# Patient Record
Sex: MALE | Race: WHITE | NOT HISPANIC OR LATINO | URBAN - METROPOLITAN AREA
[De-identification: names, ages, dates, MRNs, and addresses within clinical notes are randomized per-mention and may not be internally consistent; named-entity substitution may affect disease eponyms.]

---

## 2017-05-04 ENCOUNTER — INPATIENT (INPATIENT)
Facility: HOSPITAL | Age: 77
LOS: 11 days | Discharge: HOME | DRG: 640 | End: 2017-05-16
Attending: INTERNAL MEDICINE | Admitting: INTERNAL MEDICINE
Payer: MEDICARE

## 2017-05-04 VITALS
RESPIRATION RATE: 19 BRPM | OXYGEN SATURATION: 98 % | SYSTOLIC BLOOD PRESSURE: 155 MMHG | TEMPERATURE: 97 F | DIASTOLIC BLOOD PRESSURE: 97 MMHG | HEART RATE: 79 BPM

## 2017-05-04 DIAGNOSIS — E11.319 TYPE 2 DIABETES MELLITUS WITH UNSPECIFIED DIABETIC RETINOPATHY WITHOUT MACULAR EDEMA: ICD-10-CM

## 2017-05-04 DIAGNOSIS — Z29.9 ENCOUNTER FOR PROPHYLACTIC MEASURES, UNSPECIFIED: ICD-10-CM

## 2017-05-04 DIAGNOSIS — K25.9 GASTRIC ULCER, UNSPECIFIED AS ACUTE OR CHRONIC, WITHOUT HEMORRHAGE OR PERFORATION: ICD-10-CM

## 2017-05-04 DIAGNOSIS — K56.41 FECAL IMPACTION: ICD-10-CM

## 2017-05-04 DIAGNOSIS — Z71.89 OTHER SPECIFIED COUNSELING: ICD-10-CM

## 2017-05-04 DIAGNOSIS — G43.A0 CYCLICAL VOMITING, IN MIGRAINE, NOT INTRACTABLE: ICD-10-CM

## 2017-05-04 DIAGNOSIS — E78.5 HYPERLIPIDEMIA, UNSPECIFIED: ICD-10-CM

## 2017-05-04 DIAGNOSIS — N39.0 URINARY TRACT INFECTION, SITE NOT SPECIFIED: ICD-10-CM

## 2017-05-04 DIAGNOSIS — T18.9XXA FOREIGN BODY OF ALIMENTARY TRACT, PART UNSPECIFIED, INITIAL ENCOUNTER: ICD-10-CM

## 2017-05-04 DIAGNOSIS — I69.359 HEMIPLEGIA AND HEMIPARESIS FOLLOWING CEREBRAL INFARCTION AFFECTING UNSPECIFIED SIDE: ICD-10-CM

## 2017-05-04 DIAGNOSIS — R11.0 NAUSEA: ICD-10-CM

## 2017-05-04 LAB
ALBUMIN SERPL ELPH-MCNC: 4 G/DL — SIGNIFICANT CHANGE UP (ref 3.3–5)
ALP SERPL-CCNC: 66 U/L — SIGNIFICANT CHANGE UP (ref 40–120)
ALT FLD-CCNC: 15 U/L RC — SIGNIFICANT CHANGE UP (ref 10–45)
ANION GAP SERPL CALC-SCNC: 14 MMOL/L — SIGNIFICANT CHANGE UP (ref 5–17)
APPEARANCE UR: CLEAR — SIGNIFICANT CHANGE UP
APTT BLD: 30.1 SEC — SIGNIFICANT CHANGE UP (ref 27.5–37.4)
AST SERPL-CCNC: 32 U/L — SIGNIFICANT CHANGE UP (ref 10–40)
BACTERIA # UR AUTO: ABNORMAL /HPF
BASE EXCESS BLDV CALC-SCNC: 2.2 MMOL/L — HIGH (ref -2–2)
BASOPHILS # BLD AUTO: 0 K/UL — SIGNIFICANT CHANGE UP (ref 0–0.2)
BASOPHILS NFR BLD AUTO: 0.2 % — SIGNIFICANT CHANGE UP (ref 0–2)
BILIRUB SERPL-MCNC: 0.4 MG/DL — SIGNIFICANT CHANGE UP (ref 0.2–1.2)
BILIRUB UR-MCNC: NEGATIVE — SIGNIFICANT CHANGE UP
BUN SERPL-MCNC: 16 MG/DL — SIGNIFICANT CHANGE UP (ref 7–23)
CA-I SERPL-SCNC: 1.31 MMOL/L — HIGH (ref 1.12–1.3)
CALCIUM SERPL-MCNC: 10 MG/DL — SIGNIFICANT CHANGE UP (ref 8.4–10.5)
CHLORIDE BLDV-SCNC: 106 MMOL/L — SIGNIFICANT CHANGE UP (ref 96–108)
CHLORIDE SERPL-SCNC: 102 MMOL/L — SIGNIFICANT CHANGE UP (ref 96–108)
CK MB BLD-MCNC: 8.3 % — HIGH (ref 0–3.5)
CK MB CFR SERPL CALC: 1 NG/ML — SIGNIFICANT CHANGE UP (ref 0–6.7)
CK SERPL-CCNC: 12 U/L — LOW (ref 30–200)
CK SERPL-CCNC: 44 U/L — SIGNIFICANT CHANGE UP (ref 30–200)
CO2 BLDV-SCNC: 29 MMOL/L — SIGNIFICANT CHANGE UP (ref 22–30)
CO2 SERPL-SCNC: 25 MMOL/L — SIGNIFICANT CHANGE UP (ref 22–31)
COLOR SPEC: YELLOW — SIGNIFICANT CHANGE UP
CREAT SERPL-MCNC: 0.97 MG/DL — SIGNIFICANT CHANGE UP (ref 0.5–1.3)
DIFF PNL FLD: NEGATIVE — SIGNIFICANT CHANGE UP
EOSINOPHIL # BLD AUTO: 0.2 K/UL — SIGNIFICANT CHANGE UP (ref 0–0.5)
EOSINOPHIL NFR BLD AUTO: 2.5 % — SIGNIFICANT CHANGE UP (ref 0–6)
EPI CELLS # UR: SIGNIFICANT CHANGE UP /HPF
GAS PNL BLDV: 141 MMOL/L — SIGNIFICANT CHANGE UP (ref 136–145)
GAS PNL BLDV: SIGNIFICANT CHANGE UP
GAS PNL BLDV: SIGNIFICANT CHANGE UP
GLUCOSE BLDV-MCNC: 179 MG/DL — HIGH (ref 70–99)
GLUCOSE SERPL-MCNC: 180 MG/DL — HIGH (ref 70–99)
GLUCOSE UR QL: NEGATIVE — SIGNIFICANT CHANGE UP
HCO3 BLDV-SCNC: 28 MMOL/L — SIGNIFICANT CHANGE UP (ref 21–29)
HCT VFR BLD CALC: 46 % — SIGNIFICANT CHANGE UP (ref 39–50)
HCT VFR BLDA CALC: 47 % — SIGNIFICANT CHANGE UP (ref 39–50)
HGB BLD CALC-MCNC: 15.3 G/DL — SIGNIFICANT CHANGE UP (ref 13–17)
HGB BLD-MCNC: 15.3 G/DL — SIGNIFICANT CHANGE UP (ref 13–17)
INR BLD: 0.99 RATIO — SIGNIFICANT CHANGE UP (ref 0.88–1.16)
KETONES UR-MCNC: NEGATIVE — SIGNIFICANT CHANGE UP
LACTATE BLDV-MCNC: 2.6 MMOL/L — HIGH (ref 0.7–2)
LEUKOCYTE ESTERASE UR-ACNC: ABNORMAL
LIDOCAIN IGE QN: 36 U/L — SIGNIFICANT CHANGE UP (ref 7–60)
LYMPHOCYTES # BLD AUTO: 1.7 K/UL — SIGNIFICANT CHANGE UP (ref 1–3.3)
LYMPHOCYTES # BLD AUTO: 21 % — SIGNIFICANT CHANGE UP (ref 13–44)
MAGNESIUM SERPL-MCNC: 2 MG/DL — SIGNIFICANT CHANGE UP (ref 1.6–2.6)
MCHC RBC-ENTMCNC: 32.8 PG — SIGNIFICANT CHANGE UP (ref 27–34)
MCHC RBC-ENTMCNC: 33.4 GM/DL — SIGNIFICANT CHANGE UP (ref 32–36)
MCV RBC AUTO: 98.3 FL — SIGNIFICANT CHANGE UP (ref 80–100)
MONOCYTES # BLD AUTO: 0.7 K/UL — SIGNIFICANT CHANGE UP (ref 0–0.9)
MONOCYTES NFR BLD AUTO: 8.3 % — SIGNIFICANT CHANGE UP (ref 2–14)
NEUTROPHILS # BLD AUTO: 5.5 K/UL — SIGNIFICANT CHANGE UP (ref 1.8–7.4)
NEUTROPHILS NFR BLD AUTO: 68 % — SIGNIFICANT CHANGE UP (ref 43–77)
NITRITE UR-MCNC: POSITIVE
NT-PROBNP SERPL-SCNC: 1854 PG/ML — HIGH (ref 0–300)
PCO2 BLDV: 48 MMHG — SIGNIFICANT CHANGE UP (ref 35–50)
PH BLDV: 7.38 — SIGNIFICANT CHANGE UP (ref 7.35–7.45)
PH UR: 6 — SIGNIFICANT CHANGE UP (ref 5–8)
PHOSPHATE SERPL-MCNC: 2.7 MG/DL — SIGNIFICANT CHANGE UP (ref 2.5–4.5)
PLATELET # BLD AUTO: 107 K/UL — LOW (ref 150–400)
PO2 BLDV: 34 MMHG — SIGNIFICANT CHANGE UP (ref 25–45)
POTASSIUM BLDV-SCNC: 4.1 MMOL/L — SIGNIFICANT CHANGE UP (ref 3.5–5)
POTASSIUM SERPL-MCNC: 5.1 MMOL/L — SIGNIFICANT CHANGE UP (ref 3.5–5.3)
POTASSIUM SERPL-SCNC: 5.1 MMOL/L — SIGNIFICANT CHANGE UP (ref 3.5–5.3)
PROT SERPL-MCNC: 6.9 G/DL — SIGNIFICANT CHANGE UP (ref 6–8.3)
PROT UR-MCNC: 30 MG/DL
PROTHROM AB SERPL-ACNC: 10.8 SEC — SIGNIFICANT CHANGE UP (ref 9.8–12.7)
RBC # BLD: 4.67 M/UL — SIGNIFICANT CHANGE UP (ref 4.2–5.8)
RBC # FLD: 12 % — SIGNIFICANT CHANGE UP (ref 10.3–14.5)
RBC CASTS # UR COMP ASSIST: SIGNIFICANT CHANGE UP /HPF (ref 0–2)
SAO2 % BLDV: 60 % — LOW (ref 67–88)
SODIUM SERPL-SCNC: 141 MMOL/L — SIGNIFICANT CHANGE UP (ref 135–145)
SP GR SPEC: 1.03 — HIGH (ref 1.01–1.02)
TROPONIN T SERPL-MCNC: <0.01 NG/ML — SIGNIFICANT CHANGE UP (ref 0–0.06)
TROPONIN T SERPL-MCNC: <0.01 NG/ML — SIGNIFICANT CHANGE UP (ref 0–0.06)
UROBILINOGEN FLD QL: NEGATIVE — SIGNIFICANT CHANGE UP
WBC # BLD: 8.1 K/UL — SIGNIFICANT CHANGE UP (ref 3.8–10.5)
WBC # FLD AUTO: 8.1 K/UL — SIGNIFICANT CHANGE UP (ref 3.8–10.5)
WBC UR QL: SIGNIFICANT CHANGE UP /HPF (ref 0–5)

## 2017-05-04 PROCEDURE — 74177 CT ABD & PELVIS W/CONTRAST: CPT | Mod: 26

## 2017-05-04 PROCEDURE — 93010 ELECTROCARDIOGRAM REPORT: CPT

## 2017-05-04 PROCEDURE — 99497 ADVNCD CARE PLAN 30 MIN: CPT | Mod: 25

## 2017-05-04 PROCEDURE — 99223 1ST HOSP IP/OBS HIGH 75: CPT

## 2017-05-04 PROCEDURE — 71010: CPT | Mod: 26

## 2017-05-04 PROCEDURE — 99285 EMERGENCY DEPT VISIT HI MDM: CPT | Mod: 25

## 2017-05-04 RX ORDER — ONDANSETRON 8 MG/1
4 TABLET, FILM COATED ORAL EVERY 8 HOURS
Qty: 0 | Refills: 0 | Status: DISCONTINUED | OUTPATIENT
Start: 2017-05-04 | End: 2017-05-16

## 2017-05-04 RX ORDER — ONDANSETRON 8 MG/1
4 TABLET, FILM COATED ORAL ONCE
Qty: 0 | Refills: 0 | Status: COMPLETED | OUTPATIENT
Start: 2017-05-04 | End: 2017-05-04

## 2017-05-04 RX ORDER — GLYCERIN ADULT
1 SUPPOSITORY, RECTAL RECTAL ONCE
Qty: 0 | Refills: 0 | Status: COMPLETED | OUTPATIENT
Start: 2017-05-04 | End: 2017-05-04

## 2017-05-04 RX ORDER — TAMSULOSIN HYDROCHLORIDE 0.4 MG/1
0.4 CAPSULE ORAL AT BEDTIME
Qty: 0 | Refills: 0 | Status: DISCONTINUED | OUTPATIENT
Start: 2017-05-04 | End: 2017-05-16

## 2017-05-04 RX ORDER — DEXTROSE 50 % IN WATER 50 %
25 SYRINGE (ML) INTRAVENOUS ONCE
Qty: 0 | Refills: 0 | Status: DISCONTINUED | OUTPATIENT
Start: 2017-05-04 | End: 2017-05-16

## 2017-05-04 RX ORDER — SODIUM CHLORIDE 9 MG/ML
1000 INJECTION, SOLUTION INTRAVENOUS
Qty: 0 | Refills: 0 | Status: DISCONTINUED | OUTPATIENT
Start: 2017-05-04 | End: 2017-05-16

## 2017-05-04 RX ORDER — POLYETHYLENE GLYCOL 3350 17 G/17G
17 POWDER, FOR SOLUTION ORAL DAILY
Qty: 0 | Refills: 0 | Status: DISCONTINUED | OUTPATIENT
Start: 2017-05-04 | End: 2017-05-16

## 2017-05-04 RX ORDER — DOCUSATE SODIUM 100 MG
100 CAPSULE ORAL THREE TIMES A DAY
Qty: 0 | Refills: 0 | Status: DISCONTINUED | OUTPATIENT
Start: 2017-05-04 | End: 2017-05-16

## 2017-05-04 RX ORDER — DEXTROSE 50 % IN WATER 50 %
12.5 SYRINGE (ML) INTRAVENOUS ONCE
Qty: 0 | Refills: 0 | Status: DISCONTINUED | OUTPATIENT
Start: 2017-05-04 | End: 2017-05-16

## 2017-05-04 RX ORDER — PANTOPRAZOLE SODIUM 20 MG/1
40 TABLET, DELAYED RELEASE ORAL
Qty: 0 | Refills: 0 | Status: DISCONTINUED | OUTPATIENT
Start: 2017-05-04 | End: 2017-05-16

## 2017-05-04 RX ORDER — SODIUM CHLORIDE 9 MG/ML
3 INJECTION INTRAMUSCULAR; INTRAVENOUS; SUBCUTANEOUS ONCE
Qty: 0 | Refills: 0 | Status: COMPLETED | OUTPATIENT
Start: 2017-05-04 | End: 2017-05-04

## 2017-05-04 RX ORDER — ENOXAPARIN SODIUM 100 MG/ML
40 INJECTION SUBCUTANEOUS DAILY
Qty: 0 | Refills: 0 | Status: DISCONTINUED | OUTPATIENT
Start: 2017-05-04 | End: 2017-05-16

## 2017-05-04 RX ORDER — SODIUM CHLORIDE 9 MG/ML
1000 INJECTION INTRAMUSCULAR; INTRAVENOUS; SUBCUTANEOUS ONCE
Qty: 0 | Refills: 0 | Status: COMPLETED | OUTPATIENT
Start: 2017-05-04 | End: 2017-05-04

## 2017-05-04 RX ORDER — INSULIN LISPRO 100/ML
VIAL (ML) SUBCUTANEOUS
Qty: 0 | Refills: 0 | Status: DISCONTINUED | OUTPATIENT
Start: 2017-05-04 | End: 2017-05-16

## 2017-05-04 RX ORDER — ASPIRIN/CALCIUM CARB/MAGNESIUM 324 MG
81 TABLET ORAL DAILY
Qty: 0 | Refills: 0 | Status: DISCONTINUED | OUTPATIENT
Start: 2017-05-04 | End: 2017-05-16

## 2017-05-04 RX ORDER — GLUCAGON INJECTION, SOLUTION 0.5 MG/.1ML
1 INJECTION, SOLUTION SUBCUTANEOUS ONCE
Qty: 0 | Refills: 0 | Status: DISCONTINUED | OUTPATIENT
Start: 2017-05-04 | End: 2017-05-16

## 2017-05-04 RX ORDER — SIMVASTATIN 20 MG/1
20 TABLET, FILM COATED ORAL AT BEDTIME
Qty: 0 | Refills: 0 | Status: DISCONTINUED | OUTPATIENT
Start: 2017-05-04 | End: 2017-05-16

## 2017-05-04 RX ORDER — SENNA PLUS 8.6 MG/1
2 TABLET ORAL AT BEDTIME
Qty: 0 | Refills: 0 | Status: DISCONTINUED | OUTPATIENT
Start: 2017-05-04 | End: 2017-05-16

## 2017-05-04 RX ORDER — METOPROLOL TARTRATE 50 MG
25 TABLET ORAL DAILY
Qty: 0 | Refills: 0 | Status: DISCONTINUED | OUTPATIENT
Start: 2017-05-04 | End: 2017-05-16

## 2017-05-04 RX ORDER — DEXTROSE 50 % IN WATER 50 %
1 SYRINGE (ML) INTRAVENOUS ONCE
Qty: 0 | Refills: 0 | Status: DISCONTINUED | OUTPATIENT
Start: 2017-05-04 | End: 2017-05-16

## 2017-05-04 RX ORDER — LISINOPRIL 2.5 MG/1
5 TABLET ORAL DAILY
Qty: 0 | Refills: 0 | Status: DISCONTINUED | OUTPATIENT
Start: 2017-05-04 | End: 2017-05-16

## 2017-05-04 RX ORDER — CEFTRIAXONE 500 MG/1
1 INJECTION, POWDER, FOR SOLUTION INTRAMUSCULAR; INTRAVENOUS ONCE
Qty: 0 | Refills: 0 | Status: COMPLETED | OUTPATIENT
Start: 2017-05-04 | End: 2017-05-04

## 2017-05-04 RX ORDER — CEFTRIAXONE 500 MG/1
1 INJECTION, POWDER, FOR SOLUTION INTRAMUSCULAR; INTRAVENOUS EVERY 24 HOURS
Qty: 0 | Refills: 0 | Status: DISCONTINUED | OUTPATIENT
Start: 2017-05-04 | End: 2017-05-06

## 2017-05-04 RX ADMIN — Medication 100 MILLIGRAM(S): at 22:58

## 2017-05-04 RX ADMIN — ONDANSETRON 4 MILLIGRAM(S): 8 TABLET, FILM COATED ORAL at 11:48

## 2017-05-04 RX ADMIN — TAMSULOSIN HYDROCHLORIDE 0.4 MILLIGRAM(S): 0.4 CAPSULE ORAL at 22:58

## 2017-05-04 RX ADMIN — SODIUM CHLORIDE 1000 MILLILITER(S): 9 INJECTION INTRAMUSCULAR; INTRAVENOUS; SUBCUTANEOUS at 11:37

## 2017-05-04 RX ADMIN — CEFTRIAXONE 100 GRAM(S): 500 INJECTION, POWDER, FOR SOLUTION INTRAMUSCULAR; INTRAVENOUS at 14:05

## 2017-05-04 RX ADMIN — SIMVASTATIN 20 MILLIGRAM(S): 20 TABLET, FILM COATED ORAL at 22:58

## 2017-05-04 RX ADMIN — SODIUM CHLORIDE 3 MILLILITER(S): 9 INJECTION INTRAMUSCULAR; INTRAVENOUS; SUBCUTANEOUS at 11:37

## 2017-05-04 RX ADMIN — Medication 1 SUPPOSITORY(S): at 23:49

## 2017-05-04 RX ADMIN — SENNA PLUS 2 TABLET(S): 8.6 TABLET ORAL at 22:58

## 2017-05-04 NOTE — ED PROVIDER NOTE - NS ED MD SCRIBE ATTENDING SCRIBE SECTIONS
VITAL SIGNS( Pullset)/INTAKE ASSESSMENT/SCREENINGS/HISTORY OF PRESENT ILLNESS/REVIEW OF SYSTEMS/PHYSICAL EXAM/PAST MEDICAL/SURGICAL/SOCIAL HISTORY/HIV

## 2017-05-04 NOTE — H&P ADULT. - PROBLEM SELECTOR PLAN 5
residual left sided weakness  -c/w asa and statin residual left sided weakness  -c/w asa and statin  -metoprolol tartrate 25mg daily (home dose)

## 2017-05-04 NOTE — H&P ADULT. - PROBLEM SELECTOR PLAN 1
recurrent episodes of vomiting occurring every few weeks. Vomiting may be secondary to UTI and fecal impaction +/- ingested foreign object in duodenum per Ct(pt/family denies ingestion)  -zofran prn  -treat UTI and fecal impaction  -GI consult for ? foreign body in duodendum  -NPO until GI eval

## 2017-05-04 NOTE — ED PROVIDER NOTE - CARE PLAN
Principal Discharge DX:	Vomiting Principal Discharge DX:	Nausea  Secondary Diagnosis:	Weakness  Secondary Diagnosis:	UTI (urinary tract infection)

## 2017-05-04 NOTE — H&P ADULT. - PROBLEM SELECTOR PROBLEM 7
Gastric ulcer, unspecified chronicity, unspecified whether gastric ulcer hemorrhage or perforation present

## 2017-05-04 NOTE — H&P ADULT. - PROBLEM SELECTOR PLAN 10
discussed with wife and patient. If EGD offered they would want it despite his co-morbities however they refuse ingestion any foreign body  Patient and wife agree for Full Code and all aggressive measures at this point  -face to face time advance care planning spent 20 minutes

## 2017-05-04 NOTE — H&P ADULT. - RADIOLOGY RESULTS AND INTERPRETATION
personally reviewed CT abd/pelvis: No bowel obstruction.  2.4 cm linear radiopaque density likely within the duodenum. Correlate   with the patient's recent ingestion history. Inspissated fecal material within a distended rectal vault.  personally reviewed CXR: head down, limited upper fields but clear otherwise

## 2017-05-04 NOTE — H&P ADULT. - PROBLEM SELECTOR PLAN 4
-pt and wife deny any ingestion of foreign material as seen in Ct  -will get GI consult to see if EGD warranted, no abd pain  -NPO until GI eval

## 2017-05-04 NOTE — ED ADULT NURSE REASSESSMENT NOTE - NS ED NURSE REASSESS COMMENT FT1
resting in bed, wife present, vitals stable, bed pending, residual L hemiparesis, sluggish R pupil, L pupil nonreactive, A&Ox3, follows commands, +cardiac monitor

## 2017-05-04 NOTE — H&P ADULT. - PROBLEM SELECTOR PROBLEM 6
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

## 2017-05-04 NOTE — H&P ADULT. - HISTORY OF PRESENT ILLNESS
hx per wife as pt nonverbal since last CVA:     76M w/PMHx HTN, HLD, Type 2DM, PUD s/p GI bleed not on A/C, AS s/p TAVR and asc aortic root replacement (1/2013), CVAx3 w/residual L-sided hemiplegia, bedbound, nonverbal, , who p/w recurrent episodes of n/v. Latest episode started 3 days ago x 3 days a day with poor po intake per wife. Has vomiting episodes the last 2-3 days every few weeks for unclear reason. Pt nonverbal since stroke but able to tolerate PO in past 2 days. Has BM everyday but small quantities' varying with large BM every week or so. Denies any hematuria, hematochezia, melena. No abdominal pain or fever. No CP, sob, cough, headache.       ED Vitals: 98.7, 78, 126/85, 18, 94%  ER gave 1LNS, zofran, ceftriaxone. hx mostly per wife as pt minimally verbal but A and O x2 since last CVA:     76M w/PMHx HTN, HLD, Type 2DM, PUD s/p GI bleed not on A/C, AS s/p TAVR and asc aortic root replacement (1/2013), CVAx3 w/residual L-sided hemiparesis, bedbound, A and O 2 but minimally verbal, , who p/w recurrent episodes of n/v. Latest episode started 3 days ago x 3 days a day with poor po intake per wife. Has vomiting episodes the last 2-3 days every few weeks for unclear reason. Pt nonverbal since stroke but able to tolerate PO in past 2 days. Has BM everyday but small quantities' varying with large BM every week or so. Denies any hematuria, hematochezia, melena. No abdominal pain or fever. No CP, sob, cough, headache. Wife and patient deny ingesting any foreign substances or materials. He has a 24 hours home taker, foyer lift etc at home,.      ED Vitals: 98.7, 78, 126/85, 18, 94%  ER gave 1LNS, zofran, ceftriaxone.

## 2017-05-04 NOTE — H&P ADULT. - PROBLEM SELECTOR PLAN 3
-s/p manual impaction in ER by me, soft brown stool  -will get water or mineral oil enema tonight once he gets to floors  -c/w senna, colace  -add miralax  -monitor for BM

## 2017-05-04 NOTE — ED PROVIDER NOTE - CONSTITUTIONAL, MLM
normal... Awake, not oriented, pt minimally answers questions but appears to be in no apparent distress.

## 2017-05-04 NOTE — ED PROVIDER NOTE - OBJECTIVE STATEMENT
76 year old male with PMHx of DM, HTN, HLD, CABG, CVA (in 2015) with residual left sided weakness and paralysis, presents from home with nausea and vomiting x4 days. Pt with hx of dysphagia. Has not be been eating and drinking normally. Pt's wife said he vomits after eating and complains of food not tasting good. Denies abdominal pain. Pt with hx of upper GI bleed last year. Denies blood in emesis or stools currently. No anticoagulants, takes ASA 81mg. No recent fevers, chills, diarrhea. Pt's wife reports pt with decreased urinary output and moments of confusion. NKDA.  PMD: Dr. Joseline Gutierrez

## 2017-05-04 NOTE — ED PROVIDER NOTE - DETAILS:
[I agree with scribe documentation except where as noted below]  76 yom pmhx cad s/p cabg many years ago just on asa, prior significant cva with residual left hemiparesis  now bedbound and dependent on wife/family for all ADLs, prior retina surgery with resulting blindness, prior upper gi bleed per wife requiring intervention via endoscopy (wife does not know specifically what intervention), no hx of abdominal surgery, hx of mild dysphagia but able to tolerate normal diet, presents with nausea/vomiting and difficulty tolerating PO x4 days. pt does not normally complain of pain, so wife unsure whether pt having any pain. wife also reports decr urine output and gen weakness. no f/c, no dark stool, no blood in vomitus per pt.    ROS:     Physical Exam:   constitutional - well appearing, awake and alert, oriented to baseline mental status per wife, nods yes or no somewhat appropriately  head - no external evidence of trauma  heent - dry mucous membranes  cvs - rrr, no murmurs, no peripheral edema, old healed midline cabg scar  resp - breath sounds clear and equal bilat  gi - abdomen soft and nontender, no rigidity, guarding or rebound, bowel sounds present and hyperactive  msk - moving all extremities spontaneously w exception of known residual left hemiparesis  neuro - alert and oriented to baseline mental status per wife, no focal deficits w exception of chronic left hemiparesis, CNs 2-12 w old mild left facial droop  skin- no jaundice, warm and dry    ? sbo vs cardiogenic cause of pt's nausea/vomiting in setting of known signif cad. ekg without overt signs of stemi however very minimal st changes in anterior leads. JOSE Terry MD [I agree with scribe documentation except where as noted below]  76 yom pmhx cad s/p cabg many years ago just on asa, prior significant cva with residual left hemiparesis  now bedbound and dependent on wife/family for all ADLs, minimally verbal at baseline, prior retina surgery with resulting blindness, prior upper gi bleed per wife requiring intervention via endoscopy (wife does not know specifically what intervention), no hx of abdominal surgery, hx of mild dysphagia but able to tolerate normal diet, presents with nausea/vomiting and difficulty tolerating PO x4 days. pt does not normally complain of pain, so wife unsure whether pt having any pain. wife also reports decr urine output and gen weakness. no f/c, no dark stool, no blood in vomitus per wife.    ROS: unobtainable secondary to pt's dementia    Physical Exam:   constitutional - well appearing, awake and alert, oriented to baseline mental status per wife, nods yes or no somewhat appropriately  head - no external evidence of trauma  heent - dry mucous membranes  cvs - rrr, no murmurs, no peripheral edema, old healed midline cabg scar  resp - breath sounds clear and equal bilat  gi - abdomen soft and nontender, no rigidity, guarding or rebound, bowel sounds present and hyperactive  msk - moving all extremities spontaneously w exception of known residual left hemiparesis  neuro - alert and oriented to baseline mental status per wife, no focal deficits w exception of chronic left hemiparesis, CNs 2-12 w old mild left facial droop  skin- no jaundice, warm and dry    ? sbo vs cardiogenic cause of pt's nausea/vomiting in setting of known signif cad vs gerd/reflux. ekg without overt signs of stemi however very minimal st changes in anterior leads. ct scan without evidence of sbo, however ? duodenal foreign body on CT. discussed with wife who denies any recent ingestions that she knows of - wife states she usually provides him all his food and feeds him by hand. no overt signs of toxicity at this time - pt does not require urgent GI intervention at this time; question whether fb will pass on its own - this was discussed with admitting MD who states will f/u w this issue as an inpatient. pt also found to have a UTI - will admit for iv abx and ongoing eval. JOSE Terry MD

## 2017-05-04 NOTE — ED ADULT NURSE NOTE - OBJECTIVE STATEMENT
75 y/o male with wife at bedside who is sole caretaker came in via EMS for vomiting x4days and decreased PO intake. Pt has hx of CVA w/ left sided residual, wife stated pt attempts to eat but "vomits up after eating a little bit." 77 y/o male with wife at bedside who is sole caretaker came in via EMS for vomiting x4days and decreased PO intake. Pt has hx of CVA w/ left sided residual, wife stated pt attempts to eat but "vomits up after eating a little bit." Pt. A&Ox2 nonverbal confused to situation, no s/s chest pain/SOB. ABD soft nontender, mild distention .Blachable redness noted at sacrum. Peripheral pulses strong present x4, weakness and paralysis noted on left side. Safety and comfort measures maintained.

## 2017-05-05 ENCOUNTER — TRANSCRIPTION ENCOUNTER (OUTPATIENT)
Age: 77
End: 2017-05-05

## 2017-05-05 LAB
BLD GP AB SCN SERPL QL: NEGATIVE — SIGNIFICANT CHANGE UP
HBA1C BLD-MCNC: 6.4 % — HIGH (ref 4–5.6)
HCT VFR BLD CALC: 41.2 % — SIGNIFICANT CHANGE UP (ref 39–50)
HGB BLD-MCNC: 14.4 G/DL — SIGNIFICANT CHANGE UP (ref 13–17)
MCHC RBC-ENTMCNC: 34 PG — SIGNIFICANT CHANGE UP (ref 27–34)
MCHC RBC-ENTMCNC: 35 GM/DL — SIGNIFICANT CHANGE UP (ref 32–36)
MCV RBC AUTO: 97 FL — SIGNIFICANT CHANGE UP (ref 80–100)
PLATELET # BLD AUTO: 125 K/UL — LOW (ref 150–400)
RBC # BLD: 4.25 M/UL — SIGNIFICANT CHANGE UP (ref 4.2–5.8)
RBC # FLD: 12 % — SIGNIFICANT CHANGE UP (ref 10.3–14.5)
RH IG SCN BLD-IMP: POSITIVE — SIGNIFICANT CHANGE UP
WBC # BLD: 8.3 K/UL — SIGNIFICANT CHANGE UP (ref 3.8–10.5)
WBC # FLD AUTO: 8.3 K/UL — SIGNIFICANT CHANGE UP (ref 3.8–10.5)

## 2017-05-05 PROCEDURE — 99222 1ST HOSP IP/OBS MODERATE 55: CPT

## 2017-05-05 RX ORDER — SODIUM CHLORIDE 9 MG/ML
1000 INJECTION INTRAMUSCULAR; INTRAVENOUS; SUBCUTANEOUS
Qty: 0 | Refills: 0 | Status: DISCONTINUED | OUTPATIENT
Start: 2017-05-05 | End: 2017-05-16

## 2017-05-05 RX ADMIN — CEFTRIAXONE 100 GRAM(S): 500 INJECTION, POWDER, FOR SOLUTION INTRAMUSCULAR; INTRAVENOUS at 12:22

## 2017-05-05 RX ADMIN — TAMSULOSIN HYDROCHLORIDE 0.4 MILLIGRAM(S): 0.4 CAPSULE ORAL at 22:20

## 2017-05-05 RX ADMIN — LISINOPRIL 5 MILLIGRAM(S): 2.5 TABLET ORAL at 05:17

## 2017-05-05 RX ADMIN — SENNA PLUS 2 TABLET(S): 8.6 TABLET ORAL at 22:20

## 2017-05-05 RX ADMIN — SODIUM CHLORIDE 75 MILLILITER(S): 9 INJECTION INTRAMUSCULAR; INTRAVENOUS; SUBCUTANEOUS at 22:20

## 2017-05-05 RX ADMIN — PANTOPRAZOLE SODIUM 40 MILLIGRAM(S): 20 TABLET, DELAYED RELEASE ORAL at 05:17

## 2017-05-05 RX ADMIN — SIMVASTATIN 20 MILLIGRAM(S): 20 TABLET, FILM COATED ORAL at 22:20

## 2017-05-05 RX ADMIN — Medication 25 MILLIGRAM(S): at 05:17

## 2017-05-05 RX ADMIN — Medication 100 MILLIGRAM(S): at 22:20

## 2017-05-05 RX ADMIN — Medication 100 MILLIGRAM(S): at 05:16

## 2017-05-05 RX ADMIN — SODIUM CHLORIDE 75 MILLILITER(S): 9 INJECTION INTRAMUSCULAR; INTRAVENOUS; SUBCUTANEOUS at 12:22

## 2017-05-05 NOTE — DISCHARGE NOTE ADULT - CARE PLAN
Principal Discharge DX:	Cyclical vomiting with nausea, intractability of vomiting not specified  Secondary Diagnosis:	Dehydration  Secondary Diagnosis:	CVA, old, hemiparesis  Secondary Diagnosis:	Hypertension  Secondary Diagnosis:	Diabetes mellitus Principal Discharge DX:	Cyclical vomiting with nausea, intractability of vomiting not specified  Goal:	Resolved.  Instructions for follow-up, activity and diet:	Continue with antiemetics as needed.  Follow-up with your primary care physician  1 week after discharge.  Secondary Diagnosis:	Dehydration  Instructions for follow-up, activity and diet:	Maintain adequate hydration and nutrition.  Follow-up with your primary care physician  1 week after discharge.  Secondary Diagnosis:	CVA, old, hemiparesis  Instructions for follow-up, activity and diet:	Continue with physical therapy as instructed.  Follow-up with your primary care physician  1 week after discharge.  Secondary Diagnosis:	Hypertension  Instructions for follow-up, activity and diet:	Low salt diet  Activity as tolerated.  Take all medication as prescribed.  Follow up with your medical doctor for routine blood pressure monitoring at your next visit.  Notify your doctor if you have any of the following symptoms:   Dizziness, Lightheadedness, Blurry vision, Headache, Chest pain, Shortness of breath  Secondary Diagnosis:	Diabetes mellitus  Instructions for follow-up, activity and diet:	HgA1C this admission 6.4  Make sure you get your HgA1c checked every three months.  If you take oral diabetes medications, check your blood glucose two times a day.  If you take insulin, check your blood glucose before meals and at bedtime.  It's important not to skip any meals.  Keep a log of your blood glucose results and always take it with you to your doctor appointments.  Keep a list of your current medications including injectables and over the counter medications and bring this medication list with you to all your doctor appointments.  If you have not seen your ophthalmologist this year call for appointment.  Check your feet daily for redness, sores, or openings. Do not self treat. If no improvement in two days call your primary care physician for an appointment.  Low blood sugar (hypoglycemia) is a blood sugar below 70mg/dl. Check your blood sugar if you feel signs/symptoms of hypoglycemia. If your blood sugar is below 70 take 15 grams of carbohydrates (ex 4 oz of apple juice, 3-4 glucose tablets, or 4-6 oz of regular soda) wait 15 minutes and repeat blood sugar to make sure it comes up above 70.  If your blood sugar is above 70 and you are due for a meal, have a meal.  If you are not due for a meal have a snack.  This snack helps keeps your blood sugar at a safe range. Principal Discharge DX:	Cyclical vomiting with nausea, intractability of vomiting not specified  Goal:	Resolved.  Instructions for follow-up, activity and diet:	Continue with antiemetics as needed.  Follow-up with your primary care physician  1 week after discharge.  Secondary Diagnosis:	Dehydration  Instructions for follow-up, activity and diet:	Maintain adequate hydration and nutrition.  Follow-up with your primary care physician  1 week after discharge.  Secondary Diagnosis:	CVA, old, hemiparesis  Instructions for follow-up, activity and diet:	Continue with physical therapy as instructed.  Follow-up with your primary care physician  1 week after discharge.  Secondary Diagnosis:	Hypertension  Instructions for follow-up, activity and diet:	Low salt diet  Activity as tolerated.  Take all medication as prescribed.  Follow up with your medical doctor for routine blood pressure monitoring at your next visit.  Notify your doctor if you have any of the following symptoms:   Dizziness, Lightheadedness, Blurry vision, Headache, Chest pain, Shortness of breath  Secondary Diagnosis:	Diabetes mellitus  Instructions for follow-up, activity and diet:	HgA1C this admission 6.4  Make sure you get your HgA1c checked every three months.  If you take oral diabetes medications, check your blood glucose two times a day.  If you take insulin, check your blood glucose before meals and at bedtime.  It's important not to skip any meals.  Keep a log of your blood glucose results and always take it with you to your doctor appointments.  Keep a list of your current medications including injectables and over the counter medications and bring this medication list with you to all your doctor appointments.  If you have not seen your ophthalmologist this year call for appointment.  Check your feet daily for redness, sores, or openings. Do not self treat. If no improvement in two days call your primary care physician for an appointment.  Low blood sugar (hypoglycemia) is a blood sugar below 70mg/dl. Check your blood sugar if you feel signs/symptoms of hypoglycemia. If your blood sugar is below 70 take 15 grams of carbohydrates (ex 4 oz of apple juice, 3-4 glucose tablets, or 4-6 oz of regular soda) wait 15 minutes and repeat blood sugar to make sure it comes up above 70.  If your blood sugar is above 70 and you are due for a meal, have a meal.  If you are not due for a meal have a snack.  This snack helps keeps your blood sugar at a safe range.  Secondary Diagnosis:	Urinary tract infection with hematuria, site unspecified  Instructions for follow-up, activity and diet:	Completed antibiotic.  HOME CARE INSTRUCTIONS  f you were prescribed antibiotics, take them exactly as your caregiver instructs you. Finish the medication even if you feel better after you have only taken some of the medication.  Drink enough water and fluids to keep your urine clear or pale yellow.  Avoid caffeine, tea, and carbonated beverages. They tend to irritate your bladder.  Empty your bladder often. Avoid holding urine for long periods of time.  After a bowel movement, women should cleanse from front to back. Use each tissue only once.  SEEK MEDICAL CARE IF:  You have back pain.  You develop a fever.  Your symptoms do not begin to resolve within 3 days.  SEEK IMMEDIATE MEDICAL CARE IF:  You have severe back pain or lower abdominal pain.  You develop chills.  You have nausea or vomiting.  You have continued burning or discomfort with urination.

## 2017-05-05 NOTE — DISCHARGE NOTE ADULT - SECONDARY DIAGNOSIS.
Dehydration CVA, old, hemiparesis Hypertension Diabetes mellitus Urinary tract infection with hematuria, site unspecified

## 2017-05-05 NOTE — DISCHARGE NOTE ADULT - PLAN OF CARE
Resolved. Continue with antiemetics as needed.  Follow-up with your primary care physician  1 week after discharge. Maintain adequate hydration and nutrition.  Follow-up with your primary care physician  1 week after discharge. Continue with physical therapy as instructed.  Follow-up with your primary care physician  1 week after discharge. Low salt diet  Activity as tolerated.  Take all medication as prescribed.  Follow up with your medical doctor for routine blood pressure monitoring at your next visit.  Notify your doctor if you have any of the following symptoms:   Dizziness, Lightheadedness, Blurry vision, Headache, Chest pain, Shortness of breath HgA1C this admission 6.4  Make sure you get your HgA1c checked every three months.  If you take oral diabetes medications, check your blood glucose two times a day.  If you take insulin, check your blood glucose before meals and at bedtime.  It's important not to skip any meals.  Keep a log of your blood glucose results and always take it with you to your doctor appointments.  Keep a list of your current medications including injectables and over the counter medications and bring this medication list with you to all your doctor appointments.  If you have not seen your ophthalmologist this year call for appointment.  Check your feet daily for redness, sores, or openings. Do not self treat. If no improvement in two days call your primary care physician for an appointment.  Low blood sugar (hypoglycemia) is a blood sugar below 70mg/dl. Check your blood sugar if you feel signs/symptoms of hypoglycemia. If your blood sugar is below 70 take 15 grams of carbohydrates (ex 4 oz of apple juice, 3-4 glucose tablets, or 4-6 oz of regular soda) wait 15 minutes and repeat blood sugar to make sure it comes up above 70.  If your blood sugar is above 70 and you are due for a meal, have a meal.  If you are not due for a meal have a snack.  This snack helps keeps your blood sugar at a safe range. Completed antibiotic.  HOME CARE INSTRUCTIONS  f you were prescribed antibiotics, take them exactly as your caregiver instructs you. Finish the medication even if you feel better after you have only taken some of the medication.  Drink enough water and fluids to keep your urine clear or pale yellow.  Avoid caffeine, tea, and carbonated beverages. They tend to irritate your bladder.  Empty your bladder often. Avoid holding urine for long periods of time.  After a bowel movement, women should cleanse from front to back. Use each tissue only once.  SEEK MEDICAL CARE IF:  You have back pain.  You develop a fever.  Your symptoms do not begin to resolve within 3 days.  SEEK IMMEDIATE MEDICAL CARE IF:  You have severe back pain or lower abdominal pain.  You develop chills.  You have nausea or vomiting.  You have continued burning or discomfort with urination.

## 2017-05-05 NOTE — DISCHARGE NOTE ADULT - NS AS DC FOLLOWUP STROKE INST
Stroke (includes: TIA/SAH/ICH/Ischemic Stroke)/Influenza vaccination ([facility] Pub Date: August 19, 2014)/Smoking Cessation Smoking Cessation/Influenza vaccination (VIS Pub Date: August 19, 2014)

## 2017-05-05 NOTE — DISCHARGE NOTE ADULT - OTHER SIGNIFICANT FINDINGS
ADDENDUM DISCHARGE 06/12/2017    Patient admitted with nausea vomitting 2/2 UTI-dehydration with JENNIFER on admission.    Matt Trevizo MD  [address] [facility] [phone]

## 2017-05-05 NOTE — DISCHARGE NOTE ADULT - NS AS DC STROKE ED MATERIALS
Risk Factors for Stroke/Stroke Warning Signs and Symptoms/Call 911 for Stroke/Need for Followup After Discharge/Prescribed Medications/Stroke Education Booklet

## 2017-05-05 NOTE — DISCHARGE NOTE ADULT - MEDICATION SUMMARY - MEDICATIONS TO TAKE
I will START or STAY ON the medications listed below when I get home from the hospital:    aspirin 81 mg oral delayed release tablet  -- 1 tab(s) by mouth once a day  -- Indication: For CVA, old, hemiparesis    lisinopril 5 mg oral tablet  -- 1 tab(s) by mouth once a day  -- Indication: For Hypertension    tamsulosin 0.4 mg oral capsule  -- 1 cap(s) by mouth once a day  -- Indication: For benign prostatic hypertrophy    enoxaparin  -- 40 milligram(s) subcutaneous once a day  -- Indication: For protect from deep vein thrombosis    metFORMIN 850 mg oral tablet  -- 1 tab(s) by mouth 2 times a day  -- Indication: For 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, unspecified laterality, unspecified long term insulin use status, unspecified retinopathy severity    insulin lispro 100 units/mL subcutaneous solution  --  subcutaneous 3 times a day (before meals); 1 Unit(s) if Glucose 151 - 200  2 Unit(s) if Glucose 201 - 250  3 Unit(s) if Glucose 251 - 300  4 Unit(s) if Glucose 301 - 350  5 Unit(s) if Glucose 351 - 400  6 Unit(s) if Glucose Greater Than 400  -- Indication: For 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, unspecified laterality, unspecified long term insulin use status, unspecified retinopathy severity    simvastatin 20 mg oral tablet  -- 1 tab(s) by mouth once a day (in the evening)  -- Indication: For Hyperlipidemia, unspecified hyperlipidemia type    metoprolol tartrate 25 mg oral tablet  -- 1 tab(s) by mouth once a day  -- Indication: For Hypertension    nystatin 100,000 units/g topical powder  -- 1 application on skin 4 times a day  -- Indication: For perineal rash    docusate sodium 100 mg oral capsule  -- 3 cap(s) by mouth once a day (at bedtime)  -- Indication: For Constipation    polyethylene glycol 3350 oral powder for reconstitution  -- 17 gram(s) by mouth once a day. Hold for loose stools.  -- Indication: For Constipation    senna oral tablet  -- 2 tab(s) by mouth once a day (at bedtime).Hold for loose stools.  -- Indication: For Constipation    pantoprazole 40 mg oral delayed release tablet  -- 1 tab(s) by mouth once a day  -- Indication: For protect from indigestion, gastric ulcer    multivitamin  -- 1 tab(s) by mouth once a day  -- Indication: For Supplement

## 2017-05-05 NOTE — DISCHARGE NOTE ADULT - PATIENT PORTAL LINK FT
“You can access the [facility] Patient Portal, offered by [facility], by registering with the following website: [email or web address]”

## 2017-05-05 NOTE — DISCHARGE NOTE ADULT - ADDITIONAL INSTRUCTIONS
Follow-up with your primary care physician, Follow-up with your primary care physician  1 week after discharge.

## 2017-05-05 NOTE — DISCHARGE NOTE ADULT - CARE PROVIDER_API CALL
Matt Trevizo (BRAYDEN), Cardiology  [address] [place] [address]  Phone: [phone]  Fax: [phone]

## 2017-05-05 NOTE — DISCHARGE NOTE ADULT - HOSPITAL COURSE
To be completed by attending. 76M w/PMHx HTN, HLD, Type 2DM, PUD s/p GI bleed not on A/C, AS s/p TAVR and asc aortic root replacement (1/2013), CVAx3 w/residual L-sided hemiparesis, bedbound, A and O 2 but minimally verbal, , who p/w recurrent episodes of n/v. Latest episode started 3 days ago x 3 days a day with poor po intake per wife. Has vomiting episodes the last 2-3 days every few weeks for unclear reason. Pt nonverbal since stroke but able to tolerate PO in past 2 days. Has BM everyday but small quantities' varying with large BM every week or so. Denies any hematuria, hematochezia, melena. No abdominal pain or fever. No CP, sob, cough, headache. Wife and patient deny ingesting any foreign substances or materials. He has a 24 hours home taker, foyer lift etc at home,.      ED Vitals: 98.7, 78, 126/85, 18, 94%  ER gave 1LNS, zofran, ceftriaxone.       Admit - 5/4 - n/v x 4 days                 No bowel obstruction / 2.4 cm linear radiopaque density likely within the               duodenum                    Correlate  with the pt.'s recent ingestion hx.              Inspissated fecal material within a distended rectal vault.  (Fecal impaction)              Tapwater enema                linear plaque in duodenum - GI to f/u   5/5 - @5:30a - pt had glycerin supp & tap water enema with good result         - moderate amount formed stool   5/5 - @6:45 am per RN pt has not voided overnight since arrival to unit          bladder scan ordered,          pt w/ > 450 cc - straight cath ordered         - + hematuria  - urology called  -stat CBC / type screen  - d/w Dr. Trevizo            GI note- EGD cancelled, want to get Upper GI series now,           EGD pending the results of this test   5/8: Upper GI Series: Severe limited study Esophageal reflux, Egd in ronym (  )  5/09: EGD: Medium sized hiatus hernia, erythematous mucosa in the antrum, biopsied---> 	Gastric antral mucosa with chronic inactive gastritis- Negative for Helicobacter 	microorganisms     5/10 	Pod : toenails filed  5/11 	PT eval. Pt wife wants ERIN.  5/13	AMS thought to be 2/2 dehydration - encourage PO - f/u AM labs tomorrow (5/14)  5/15	urology called to replace foreskin s/p straight cath - done   	dc planning, pt to re-eval for proper dc planning, wife wants ERIN  5/16 Pt is accepted by rehab. Awaiting bed availability.

## 2017-05-06 LAB
-  AMIKACIN: SIGNIFICANT CHANGE UP
-  AZTREONAM: SIGNIFICANT CHANGE UP
-  CEFEPIME: SIGNIFICANT CHANGE UP
-  CEFTAZIDIME: SIGNIFICANT CHANGE UP
-  CIPROFLOXACIN: SIGNIFICANT CHANGE UP
-  GENTAMICIN: SIGNIFICANT CHANGE UP
-  IMIPENEM: SIGNIFICANT CHANGE UP
-  LEVOFLOXACIN: SIGNIFICANT CHANGE UP
-  MEROPENEM: SIGNIFICANT CHANGE UP
-  PIPERACILLIN/TAZOBACTAM: SIGNIFICANT CHANGE UP
-  TOBRAMYCIN: SIGNIFICANT CHANGE UP
ANION GAP SERPL CALC-SCNC: 18 MMOL/L — HIGH (ref 5–17)
BUN SERPL-MCNC: 14 MG/DL — SIGNIFICANT CHANGE UP (ref 7–23)
CALCIUM SERPL-MCNC: 9.4 MG/DL — SIGNIFICANT CHANGE UP (ref 8.4–10.5)
CHLORIDE SERPL-SCNC: 105 MMOL/L — SIGNIFICANT CHANGE UP (ref 96–108)
CO2 SERPL-SCNC: 20 MMOL/L — LOW (ref 22–31)
CREAT SERPL-MCNC: 0.76 MG/DL — SIGNIFICANT CHANGE UP (ref 0.5–1.3)
CULTURE RESULTS: SIGNIFICANT CHANGE UP
GLUCOSE SERPL-MCNC: 122 MG/DL — HIGH (ref 70–99)
HCT VFR BLD CALC: 40.9 % — SIGNIFICANT CHANGE UP (ref 39–50)
HGB BLD-MCNC: 13.7 G/DL — SIGNIFICANT CHANGE UP (ref 13–17)
MCHC RBC-ENTMCNC: 32.2 PG — SIGNIFICANT CHANGE UP (ref 27–34)
MCHC RBC-ENTMCNC: 33.5 GM/DL — SIGNIFICANT CHANGE UP (ref 32–36)
MCV RBC AUTO: 96.2 FL — SIGNIFICANT CHANGE UP (ref 80–100)
METHOD TYPE: SIGNIFICANT CHANGE UP
ORGANISM # SPEC MICROSCOPIC CNT: SIGNIFICANT CHANGE UP
ORGANISM # SPEC MICROSCOPIC CNT: SIGNIFICANT CHANGE UP
PLATELET # BLD AUTO: 151 K/UL — SIGNIFICANT CHANGE UP (ref 150–400)
POTASSIUM SERPL-MCNC: 4 MMOL/L — SIGNIFICANT CHANGE UP (ref 3.5–5.3)
POTASSIUM SERPL-SCNC: 4 MMOL/L — SIGNIFICANT CHANGE UP (ref 3.5–5.3)
RBC # BLD: 4.25 M/UL — SIGNIFICANT CHANGE UP (ref 4.2–5.8)
RBC # FLD: 13.6 % — SIGNIFICANT CHANGE UP (ref 10.3–14.5)
SODIUM SERPL-SCNC: 143 MMOL/L — SIGNIFICANT CHANGE UP (ref 135–145)
SPECIMEN SOURCE: SIGNIFICANT CHANGE UP
WBC # BLD: 7.11 K/UL — SIGNIFICANT CHANGE UP (ref 3.8–10.5)
WBC # FLD AUTO: 7.11 K/UL — SIGNIFICANT CHANGE UP (ref 3.8–10.5)

## 2017-05-06 RX ORDER — CIPROFLOXACIN LACTATE 400MG/40ML
500 VIAL (ML) INTRAVENOUS EVERY 12 HOURS
Qty: 0 | Refills: 0 | Status: COMPLETED | OUTPATIENT
Start: 2017-05-06 | End: 2017-05-11

## 2017-05-06 RX ADMIN — Medication 81 MILLIGRAM(S): at 12:01

## 2017-05-06 RX ADMIN — Medication 2: at 12:07

## 2017-05-06 RX ADMIN — Medication 25 MILLIGRAM(S): at 05:27

## 2017-05-06 RX ADMIN — Medication 500 MILLIGRAM(S): at 22:51

## 2017-05-06 RX ADMIN — POLYETHYLENE GLYCOL 3350 17 GRAM(S): 17 POWDER, FOR SOLUTION ORAL at 12:01

## 2017-05-06 RX ADMIN — Medication 1 TABLET(S): at 12:01

## 2017-05-06 RX ADMIN — PANTOPRAZOLE SODIUM 40 MILLIGRAM(S): 20 TABLET, DELAYED RELEASE ORAL at 05:26

## 2017-05-06 RX ADMIN — SIMVASTATIN 20 MILLIGRAM(S): 20 TABLET, FILM COATED ORAL at 22:36

## 2017-05-06 RX ADMIN — Medication 100 MILLIGRAM(S): at 05:27

## 2017-05-06 RX ADMIN — Medication 100 MILLIGRAM(S): at 13:33

## 2017-05-06 RX ADMIN — ENOXAPARIN SODIUM 40 MILLIGRAM(S): 100 INJECTION SUBCUTANEOUS at 12:01

## 2017-05-06 RX ADMIN — SENNA PLUS 2 TABLET(S): 8.6 TABLET ORAL at 22:36

## 2017-05-06 RX ADMIN — CEFTRIAXONE 100 GRAM(S): 500 INJECTION, POWDER, FOR SOLUTION INTRAMUSCULAR; INTRAVENOUS at 12:06

## 2017-05-06 RX ADMIN — LISINOPRIL 5 MILLIGRAM(S): 2.5 TABLET ORAL at 05:27

## 2017-05-06 RX ADMIN — Medication 100 MILLIGRAM(S): at 22:35

## 2017-05-06 RX ADMIN — Medication 1: at 18:01

## 2017-05-06 RX ADMIN — TAMSULOSIN HYDROCHLORIDE 0.4 MILLIGRAM(S): 0.4 CAPSULE ORAL at 22:36

## 2017-05-07 RX ADMIN — Medication 100 MILLIGRAM(S): at 13:15

## 2017-05-07 RX ADMIN — SIMVASTATIN 20 MILLIGRAM(S): 20 TABLET, FILM COATED ORAL at 21:16

## 2017-05-07 RX ADMIN — TAMSULOSIN HYDROCHLORIDE 0.4 MILLIGRAM(S): 0.4 CAPSULE ORAL at 21:15

## 2017-05-07 RX ADMIN — Medication 81 MILLIGRAM(S): at 12:54

## 2017-05-07 RX ADMIN — SENNA PLUS 2 TABLET(S): 8.6 TABLET ORAL at 21:16

## 2017-05-07 RX ADMIN — Medication 1: at 09:06

## 2017-05-07 RX ADMIN — ENOXAPARIN SODIUM 40 MILLIGRAM(S): 100 INJECTION SUBCUTANEOUS at 12:54

## 2017-05-07 RX ADMIN — LISINOPRIL 5 MILLIGRAM(S): 2.5 TABLET ORAL at 05:59

## 2017-05-07 RX ADMIN — Medication 100 MILLIGRAM(S): at 05:59

## 2017-05-07 RX ADMIN — Medication 1: at 17:21

## 2017-05-07 RX ADMIN — PANTOPRAZOLE SODIUM 40 MILLIGRAM(S): 20 TABLET, DELAYED RELEASE ORAL at 06:00

## 2017-05-07 RX ADMIN — Medication 25 MILLIGRAM(S): at 05:59

## 2017-05-07 RX ADMIN — Medication 3: at 12:54

## 2017-05-07 RX ADMIN — POLYETHYLENE GLYCOL 3350 17 GRAM(S): 17 POWDER, FOR SOLUTION ORAL at 12:54

## 2017-05-07 RX ADMIN — Medication 1 TABLET(S): at 12:54

## 2017-05-07 RX ADMIN — Medication 500 MILLIGRAM(S): at 17:22

## 2017-05-07 RX ADMIN — Medication 100 MILLIGRAM(S): at 21:16

## 2017-05-08 LAB
ANION GAP SERPL CALC-SCNC: 15 MMOL/L — SIGNIFICANT CHANGE UP (ref 5–17)
BUN SERPL-MCNC: 20 MG/DL — SIGNIFICANT CHANGE UP (ref 7–23)
CALCIUM SERPL-MCNC: 9.5 MG/DL — SIGNIFICANT CHANGE UP (ref 8.4–10.5)
CHLORIDE SERPL-SCNC: 105 MMOL/L — SIGNIFICANT CHANGE UP (ref 96–108)
CO2 SERPL-SCNC: 23 MMOL/L — SIGNIFICANT CHANGE UP (ref 22–31)
CREAT SERPL-MCNC: 0.79 MG/DL — SIGNIFICANT CHANGE UP (ref 0.5–1.3)
GLUCOSE SERPL-MCNC: 160 MG/DL — HIGH (ref 70–99)
HCT VFR BLD CALC: 40.4 % — SIGNIFICANT CHANGE UP (ref 39–50)
HGB BLD-MCNC: 13.4 G/DL — SIGNIFICANT CHANGE UP (ref 13–17)
MCHC RBC-ENTMCNC: 32 PG — SIGNIFICANT CHANGE UP (ref 27–34)
MCHC RBC-ENTMCNC: 33.2 GM/DL — SIGNIFICANT CHANGE UP (ref 32–36)
MCV RBC AUTO: 96.4 FL — SIGNIFICANT CHANGE UP (ref 80–100)
PLATELET # BLD AUTO: 141 K/UL — LOW (ref 150–400)
POTASSIUM SERPL-MCNC: 4.4 MMOL/L — SIGNIFICANT CHANGE UP (ref 3.5–5.3)
POTASSIUM SERPL-SCNC: 4.4 MMOL/L — SIGNIFICANT CHANGE UP (ref 3.5–5.3)
RBC # BLD: 4.19 M/UL — LOW (ref 4.2–5.8)
RBC # FLD: 13.4 % — SIGNIFICANT CHANGE UP (ref 10.3–14.5)
SODIUM SERPL-SCNC: 143 MMOL/L — SIGNIFICANT CHANGE UP (ref 135–145)
WBC # BLD: 6.97 K/UL — SIGNIFICANT CHANGE UP (ref 3.8–10.5)
WBC # FLD AUTO: 6.97 K/UL — SIGNIFICANT CHANGE UP (ref 3.8–10.5)

## 2017-05-08 PROCEDURE — 74241: CPT | Mod: 26

## 2017-05-08 RX ADMIN — ENOXAPARIN SODIUM 40 MILLIGRAM(S): 100 INJECTION SUBCUTANEOUS at 11:28

## 2017-05-08 RX ADMIN — Medication 100 MILLIGRAM(S): at 06:17

## 2017-05-08 RX ADMIN — LISINOPRIL 5 MILLIGRAM(S): 2.5 TABLET ORAL at 06:18

## 2017-05-08 RX ADMIN — PANTOPRAZOLE SODIUM 40 MILLIGRAM(S): 20 TABLET, DELAYED RELEASE ORAL at 06:18

## 2017-05-08 RX ADMIN — SENNA PLUS 2 TABLET(S): 8.6 TABLET ORAL at 22:58

## 2017-05-08 RX ADMIN — Medication 500 MILLIGRAM(S): at 17:08

## 2017-05-08 RX ADMIN — POLYETHYLENE GLYCOL 3350 17 GRAM(S): 17 POWDER, FOR SOLUTION ORAL at 11:29

## 2017-05-08 RX ADMIN — Medication 100 MILLIGRAM(S): at 22:58

## 2017-05-08 RX ADMIN — Medication 81 MILLIGRAM(S): at 11:28

## 2017-05-08 RX ADMIN — Medication 2: at 17:08

## 2017-05-08 RX ADMIN — Medication 100 MILLIGRAM(S): at 13:01

## 2017-05-08 RX ADMIN — Medication 1 TABLET(S): at 11:28

## 2017-05-08 RX ADMIN — SIMVASTATIN 20 MILLIGRAM(S): 20 TABLET, FILM COATED ORAL at 22:58

## 2017-05-08 RX ADMIN — Medication 1: at 08:45

## 2017-05-08 RX ADMIN — TAMSULOSIN HYDROCHLORIDE 0.4 MILLIGRAM(S): 0.4 CAPSULE ORAL at 22:58

## 2017-05-08 RX ADMIN — Medication 25 MILLIGRAM(S): at 06:18

## 2017-05-08 RX ADMIN — Medication 500 MILLIGRAM(S): at 06:18

## 2017-05-09 ENCOUNTER — RESULT REVIEW (OUTPATIENT)
Age: 77
End: 2017-05-09

## 2017-05-09 LAB
ANION GAP SERPL CALC-SCNC: 15 MMOL/L — SIGNIFICANT CHANGE UP (ref 5–17)
BUN SERPL-MCNC: 16 MG/DL — SIGNIFICANT CHANGE UP (ref 7–23)
CALCIUM SERPL-MCNC: 9.7 MG/DL — SIGNIFICANT CHANGE UP (ref 8.4–10.5)
CHLORIDE SERPL-SCNC: 103 MMOL/L — SIGNIFICANT CHANGE UP (ref 96–108)
CO2 SERPL-SCNC: 24 MMOL/L — SIGNIFICANT CHANGE UP (ref 22–31)
CREAT SERPL-MCNC: 0.79 MG/DL — SIGNIFICANT CHANGE UP (ref 0.5–1.3)
GLUCOSE SERPL-MCNC: 152 MG/DL — HIGH (ref 70–99)
HCT VFR BLD CALC: 40.4 % — SIGNIFICANT CHANGE UP (ref 39–50)
HGB BLD-MCNC: 13.5 G/DL — SIGNIFICANT CHANGE UP (ref 13–17)
MCHC RBC-ENTMCNC: 32.5 PG — SIGNIFICANT CHANGE UP (ref 27–34)
MCHC RBC-ENTMCNC: 33.4 GM/DL — SIGNIFICANT CHANGE UP (ref 32–36)
MCV RBC AUTO: 97.1 FL — SIGNIFICANT CHANGE UP (ref 80–100)
PLATELET # BLD AUTO: 139 K/UL — LOW (ref 150–400)
POTASSIUM SERPL-MCNC: 3.9 MMOL/L — SIGNIFICANT CHANGE UP (ref 3.5–5.3)
POTASSIUM SERPL-SCNC: 3.9 MMOL/L — SIGNIFICANT CHANGE UP (ref 3.5–5.3)
RBC # BLD: 4.16 M/UL — LOW (ref 4.2–5.8)
RBC # FLD: 13.4 % — SIGNIFICANT CHANGE UP (ref 10.3–14.5)
SODIUM SERPL-SCNC: 142 MMOL/L — SIGNIFICANT CHANGE UP (ref 135–145)
WBC # BLD: 6.73 K/UL — SIGNIFICANT CHANGE UP (ref 3.8–10.5)
WBC # FLD AUTO: 6.73 K/UL — SIGNIFICANT CHANGE UP (ref 3.8–10.5)

## 2017-05-09 PROCEDURE — 43239 EGD BIOPSY SINGLE/MULTIPLE: CPT | Mod: GC

## 2017-05-09 PROCEDURE — 88305 TISSUE EXAM BY PATHOLOGIST: CPT | Mod: 26

## 2017-05-09 PROCEDURE — 88312 SPECIAL STAINS GROUP 1: CPT | Mod: 26

## 2017-05-09 RX ADMIN — SENNA PLUS 2 TABLET(S): 8.6 TABLET ORAL at 21:18

## 2017-05-09 RX ADMIN — Medication 500 MILLIGRAM(S): at 17:09

## 2017-05-09 RX ADMIN — TAMSULOSIN HYDROCHLORIDE 0.4 MILLIGRAM(S): 0.4 CAPSULE ORAL at 21:18

## 2017-05-09 RX ADMIN — Medication 1: at 08:38

## 2017-05-09 RX ADMIN — ENOXAPARIN SODIUM 40 MILLIGRAM(S): 100 INJECTION SUBCUTANEOUS at 15:33

## 2017-05-09 RX ADMIN — Medication 100 MILLIGRAM(S): at 21:18

## 2017-05-09 RX ADMIN — Medication 100 MILLIGRAM(S): at 15:33

## 2017-05-09 RX ADMIN — Medication 1: at 17:09

## 2017-05-09 RX ADMIN — SIMVASTATIN 20 MILLIGRAM(S): 20 TABLET, FILM COATED ORAL at 21:18

## 2017-05-09 NOTE — DIETITIAN INITIAL EVALUATION ADULT. - OTHER INFO
Patient referred as per family request.  patient just returned from endoscopy and wife present at bedside.  Patient is mostly nonverbal s/p CVA x3.  Also noted with left side paralysis and bedbound.  Wife is primary caregiver.  As per wife, patient usually has an excellent appetite and intake but there was a noted decline in the last month.  Patient was experiencing N/V over week before admit.  Patient also found with a fecal impaction upon admit.  As per GI, endoscopy was uneventful, with GERD.  Highest wt was 187 pounds last 2013 and as per wife, patient had many medical issues over the last 4 years, most significant being his 3rd CVA that left him mostly nonverbal and bedbound.  Eats a soft chopped up diet.  Occasionally takes Glucerna.  Fingersticks run around 130-140 at home.  A1c 6.4.  Breakfast is always hot cereal, yogurt and coffee.  Lunch is either homemade soup or hamburger, pizza, chicken.  Dinner is meat, chicken, fish with potato or pasta and vegetables.  Likes pudding and yogurt for snacks.

## 2017-05-09 NOTE — DIETITIAN INITIAL EVALUATION ADULT. - NS AS NUTRI INTERV ED CONTENT
Recommended modifications/Reviewed and reinforced diabetes management, encouraged protein intake , self monitoring, use of Glucerna shakes for wt stability.

## 2017-05-09 NOTE — DIETITIAN INITIAL EVALUATION ADULT. - ENERGY NEEDS
Ht 66 inches, Wt 142 pounds,  pounds,  pounds 2013, recent wt 146 pounds.  Dxd with Fecal impaction, N/V, dysphagia. Well nourished, good muscle considering bedbound  Skin- deep tissue injury to sacrum.

## 2017-05-10 LAB
ANION GAP SERPL CALC-SCNC: 16 MMOL/L — SIGNIFICANT CHANGE UP (ref 5–17)
BUN SERPL-MCNC: 18 MG/DL — SIGNIFICANT CHANGE UP (ref 7–23)
CALCIUM SERPL-MCNC: 9.5 MG/DL — SIGNIFICANT CHANGE UP (ref 8.4–10.5)
CHLORIDE SERPL-SCNC: 103 MMOL/L — SIGNIFICANT CHANGE UP (ref 96–108)
CO2 SERPL-SCNC: 22 MMOL/L — SIGNIFICANT CHANGE UP (ref 22–31)
CREAT SERPL-MCNC: 0.84 MG/DL — SIGNIFICANT CHANGE UP (ref 0.5–1.3)
GLUCOSE SERPL-MCNC: 181 MG/DL — HIGH (ref 70–99)
HCT VFR BLD CALC: 40.8 % — SIGNIFICANT CHANGE UP (ref 39–50)
HGB BLD-MCNC: 13.3 G/DL — SIGNIFICANT CHANGE UP (ref 13–17)
MCHC RBC-ENTMCNC: 32 PG — SIGNIFICANT CHANGE UP (ref 27–34)
MCHC RBC-ENTMCNC: 32.6 GM/DL — SIGNIFICANT CHANGE UP (ref 32–36)
MCV RBC AUTO: 98.1 FL — SIGNIFICANT CHANGE UP (ref 80–100)
PLATELET # BLD AUTO: 151 K/UL — SIGNIFICANT CHANGE UP (ref 150–400)
POTASSIUM SERPL-MCNC: 4.1 MMOL/L — SIGNIFICANT CHANGE UP (ref 3.5–5.3)
POTASSIUM SERPL-SCNC: 4.1 MMOL/L — SIGNIFICANT CHANGE UP (ref 3.5–5.3)
RBC # BLD: 4.16 M/UL — LOW (ref 4.2–5.8)
RBC # FLD: 13.5 % — SIGNIFICANT CHANGE UP (ref 10.3–14.5)
SODIUM SERPL-SCNC: 141 MMOL/L — SIGNIFICANT CHANGE UP (ref 135–145)
WBC # BLD: 7.06 K/UL — SIGNIFICANT CHANGE UP (ref 3.8–10.5)
WBC # FLD AUTO: 7.06 K/UL — SIGNIFICANT CHANGE UP (ref 3.8–10.5)

## 2017-05-10 RX ADMIN — PANTOPRAZOLE SODIUM 40 MILLIGRAM(S): 20 TABLET, DELAYED RELEASE ORAL at 05:15

## 2017-05-10 RX ADMIN — SIMVASTATIN 20 MILLIGRAM(S): 20 TABLET, FILM COATED ORAL at 21:42

## 2017-05-10 RX ADMIN — Medication 1 TABLET(S): at 11:09

## 2017-05-10 RX ADMIN — Medication 100 MILLIGRAM(S): at 21:43

## 2017-05-10 RX ADMIN — Medication 500 MILLIGRAM(S): at 17:11

## 2017-05-10 RX ADMIN — Medication 100 MILLIGRAM(S): at 05:15

## 2017-05-10 RX ADMIN — LISINOPRIL 5 MILLIGRAM(S): 2.5 TABLET ORAL at 05:15

## 2017-05-10 RX ADMIN — ENOXAPARIN SODIUM 40 MILLIGRAM(S): 100 INJECTION SUBCUTANEOUS at 11:09

## 2017-05-10 RX ADMIN — Medication 100 MILLIGRAM(S): at 17:11

## 2017-05-10 RX ADMIN — Medication 81 MILLIGRAM(S): at 11:09

## 2017-05-10 RX ADMIN — Medication 25 MILLIGRAM(S): at 05:15

## 2017-05-10 RX ADMIN — Medication 1: at 11:55

## 2017-05-10 RX ADMIN — POLYETHYLENE GLYCOL 3350 17 GRAM(S): 17 POWDER, FOR SOLUTION ORAL at 11:10

## 2017-05-10 RX ADMIN — TAMSULOSIN HYDROCHLORIDE 0.4 MILLIGRAM(S): 0.4 CAPSULE ORAL at 21:43

## 2017-05-10 RX ADMIN — Medication 500 MILLIGRAM(S): at 05:15

## 2017-05-10 RX ADMIN — SENNA PLUS 2 TABLET(S): 8.6 TABLET ORAL at 21:43

## 2017-05-10 RX ADMIN — Medication 1: at 08:15

## 2017-05-11 RX ORDER — NYSTATIN CREAM 100000 [USP'U]/G
1 CREAM TOPICAL
Qty: 0 | Refills: 0 | Status: DISCONTINUED | OUTPATIENT
Start: 2017-05-11 | End: 2017-05-12

## 2017-05-11 RX ADMIN — SIMVASTATIN 20 MILLIGRAM(S): 20 TABLET, FILM COATED ORAL at 21:54

## 2017-05-11 RX ADMIN — TAMSULOSIN HYDROCHLORIDE 0.4 MILLIGRAM(S): 0.4 CAPSULE ORAL at 21:54

## 2017-05-11 RX ADMIN — Medication 500 MILLIGRAM(S): at 06:10

## 2017-05-11 RX ADMIN — Medication 100 MILLIGRAM(S): at 21:54

## 2017-05-11 RX ADMIN — Medication 2: at 12:41

## 2017-05-11 RX ADMIN — Medication 25 MILLIGRAM(S): at 06:10

## 2017-05-11 RX ADMIN — Medication 100 MILLIGRAM(S): at 13:31

## 2017-05-11 RX ADMIN — Medication 100 MILLIGRAM(S): at 06:10

## 2017-05-11 RX ADMIN — Medication 500 MILLIGRAM(S): at 17:22

## 2017-05-11 RX ADMIN — ENOXAPARIN SODIUM 40 MILLIGRAM(S): 100 INJECTION SUBCUTANEOUS at 12:42

## 2017-05-11 RX ADMIN — Medication 1: at 08:30

## 2017-05-11 RX ADMIN — LISINOPRIL 5 MILLIGRAM(S): 2.5 TABLET ORAL at 06:10

## 2017-05-11 RX ADMIN — PANTOPRAZOLE SODIUM 40 MILLIGRAM(S): 20 TABLET, DELAYED RELEASE ORAL at 06:10

## 2017-05-11 RX ADMIN — Medication 1 TABLET(S): at 12:42

## 2017-05-11 RX ADMIN — Medication 1: at 17:21

## 2017-05-11 RX ADMIN — POLYETHYLENE GLYCOL 3350 17 GRAM(S): 17 POWDER, FOR SOLUTION ORAL at 12:42

## 2017-05-11 RX ADMIN — SENNA PLUS 2 TABLET(S): 8.6 TABLET ORAL at 21:54

## 2017-05-11 RX ADMIN — Medication 81 MILLIGRAM(S): at 12:42

## 2017-05-11 NOTE — PHYSICAL THERAPY INITIAL EVALUATION ADULT - GENERAL OBSERVATIONS, REHAB EVAL
pt received semi-supine in bed, NAD, agreeable to PT session, wife at bedside, non-verbal, follows simple commands

## 2017-05-11 NOTE — PHYSICAL THERAPY INITIAL EVALUATION ADULT - BALANCE DISTURBANCE, IDENTIFIED IMPAIRMENT CONTRIBUTE, REHAB EVAL
impaired coordination/abnormal muscle tone/impaired postural control/impaired motor control/decreased strength

## 2017-05-11 NOTE — PHYSICAL THERAPY INITIAL EVALUATION ADULT - IMPAIRMENTS FOUND, PT EVAL
gait, locomotion, and balance/arousal, attention, and cognition/muscle strength/poor safety awareness/aerobic capacity/endurance

## 2017-05-11 NOTE — PHYSICAL THERAPY INITIAL EVALUATION ADULT - ADDITIONAL COMMENTS
CT abd/pelvis 2.4 cm linear radiopaque density likely within the duodenum. Correlate with the patient's recent ingestion history. Inspissated fecal material within a distended rectal vault.  Pt non-verbal, social hx obtained from wife at bedside, Pt lives with spouse in an apt, 5 steps to enter building. Pt had temporary ramp, can be placed over steps for WC. Wife with inconsistent reports about pt prior level of function. first reporting pt non-ambulatory/performing transfers for last 6 months and any OOB activities (bed<>chair) performed with marcella lift. Then wife reporting, pt been ambulatory for ~12 steps up to 2 weeks ago. Pt owns marcella lift, wheelchair, rolling walker.

## 2017-05-11 NOTE — PHYSICAL THERAPY INITIAL EVALUATION ADULT - PERTINENT HX OF CURRENT PROBLEM, REHAB EVAL
76yoM, pmhx below. p/w recurrent episodes of n/v. Latest episode started 3 days ago x 3 days a day with poor po intake per wife. Has vomiting episodes the last 2-3 days every few weeks for unclear reason. Pt nonverbal since stroke but able to tolerate PO in past 2 days. xray upper GI Presbyesophagus with moderate gastroesophageal reflux. upper endoscopy Etiology of nausea/vomiting not elucidated on upper endoscopy. medium-sized hiatus hernia. Erythematous mucosa in the antrum. Biopsied.

## 2017-05-11 NOTE — PHYSICAL THERAPY INITIAL EVALUATION ADULT - BED MOBILITY LIMITATIONS, REHAB EVAL
impaired ability to control trunk for mobility/decreased ability to use legs for bridging/pushing/dependent to maintain upright sitting balance EOB/decreased ability to use arms for pushing/pulling

## 2017-05-12 RX ORDER — NYSTATIN CREAM 100000 [USP'U]/G
1 CREAM TOPICAL
Qty: 0 | Refills: 0 | COMMUNITY
Start: 2017-05-12

## 2017-05-12 RX ORDER — SENNA PLUS 8.6 MG/1
2 TABLET ORAL
Qty: 0 | Refills: 0 | COMMUNITY
Start: 2017-05-12

## 2017-05-12 RX ORDER — NYSTATIN CREAM 100000 [USP'U]/G
1 CREAM TOPICAL
Qty: 0 | Refills: 0 | Status: DISCONTINUED | OUTPATIENT
Start: 2017-05-12 | End: 2017-05-16

## 2017-05-12 RX ORDER — POLYETHYLENE GLYCOL 3350 17 G/17G
17 POWDER, FOR SOLUTION ORAL
Qty: 0 | Refills: 0 | COMMUNITY
Start: 2017-05-12

## 2017-05-12 RX ADMIN — NYSTATIN CREAM 1 APPLICATION(S): 100000 CREAM TOPICAL at 06:11

## 2017-05-12 RX ADMIN — Medication 81 MILLIGRAM(S): at 12:30

## 2017-05-12 RX ADMIN — Medication 100 MILLIGRAM(S): at 22:52

## 2017-05-12 RX ADMIN — POLYETHYLENE GLYCOL 3350 17 GRAM(S): 17 POWDER, FOR SOLUTION ORAL at 12:30

## 2017-05-12 RX ADMIN — Medication 25 MILLIGRAM(S): at 06:11

## 2017-05-12 RX ADMIN — Medication 1: at 08:22

## 2017-05-12 RX ADMIN — SENNA PLUS 2 TABLET(S): 8.6 TABLET ORAL at 22:52

## 2017-05-12 RX ADMIN — LISINOPRIL 5 MILLIGRAM(S): 2.5 TABLET ORAL at 06:11

## 2017-05-12 RX ADMIN — PANTOPRAZOLE SODIUM 40 MILLIGRAM(S): 20 TABLET, DELAYED RELEASE ORAL at 06:11

## 2017-05-12 RX ADMIN — SIMVASTATIN 20 MILLIGRAM(S): 20 TABLET, FILM COATED ORAL at 22:52

## 2017-05-12 RX ADMIN — Medication 1 TABLET(S): at 12:30

## 2017-05-12 RX ADMIN — ENOXAPARIN SODIUM 40 MILLIGRAM(S): 100 INJECTION SUBCUTANEOUS at 12:30

## 2017-05-12 RX ADMIN — NYSTATIN CREAM 1 APPLICATION(S): 100000 CREAM TOPICAL at 17:24

## 2017-05-12 RX ADMIN — Medication 100 MILLIGRAM(S): at 06:12

## 2017-05-12 RX ADMIN — Medication 1: at 12:30

## 2017-05-12 RX ADMIN — TAMSULOSIN HYDROCHLORIDE 0.4 MILLIGRAM(S): 0.4 CAPSULE ORAL at 22:52

## 2017-05-12 RX ADMIN — Medication 100 MILLIGRAM(S): at 14:07

## 2017-05-12 RX ADMIN — Medication 1: at 17:24

## 2017-05-13 RX ADMIN — Medication 81 MILLIGRAM(S): at 11:59

## 2017-05-13 RX ADMIN — Medication 2: at 08:29

## 2017-05-13 RX ADMIN — LISINOPRIL 5 MILLIGRAM(S): 2.5 TABLET ORAL at 06:04

## 2017-05-13 RX ADMIN — SENNA PLUS 2 TABLET(S): 8.6 TABLET ORAL at 21:03

## 2017-05-13 RX ADMIN — Medication 100 MILLIGRAM(S): at 06:04

## 2017-05-13 RX ADMIN — Medication 100 MILLIGRAM(S): at 21:03

## 2017-05-13 RX ADMIN — PANTOPRAZOLE SODIUM 40 MILLIGRAM(S): 20 TABLET, DELAYED RELEASE ORAL at 06:04

## 2017-05-13 RX ADMIN — TAMSULOSIN HYDROCHLORIDE 0.4 MILLIGRAM(S): 0.4 CAPSULE ORAL at 21:03

## 2017-05-13 RX ADMIN — NYSTATIN CREAM 1 APPLICATION(S): 100000 CREAM TOPICAL at 18:09

## 2017-05-13 RX ADMIN — POLYETHYLENE GLYCOL 3350 17 GRAM(S): 17 POWDER, FOR SOLUTION ORAL at 12:00

## 2017-05-13 RX ADMIN — NYSTATIN CREAM 1 APPLICATION(S): 100000 CREAM TOPICAL at 11:59

## 2017-05-13 RX ADMIN — NYSTATIN CREAM 1 APPLICATION(S): 100000 CREAM TOPICAL at 00:57

## 2017-05-13 RX ADMIN — SIMVASTATIN 20 MILLIGRAM(S): 20 TABLET, FILM COATED ORAL at 21:03

## 2017-05-13 RX ADMIN — Medication 1: at 12:26

## 2017-05-13 RX ADMIN — ENOXAPARIN SODIUM 40 MILLIGRAM(S): 100 INJECTION SUBCUTANEOUS at 11:59

## 2017-05-13 RX ADMIN — Medication 25 MILLIGRAM(S): at 06:05

## 2017-05-13 RX ADMIN — NYSTATIN CREAM 1 APPLICATION(S): 100000 CREAM TOPICAL at 23:02

## 2017-05-13 RX ADMIN — Medication 1 TABLET(S): at 11:59

## 2017-05-13 RX ADMIN — NYSTATIN CREAM 1 APPLICATION(S): 100000 CREAM TOPICAL at 06:05

## 2017-05-14 LAB
ANION GAP SERPL CALC-SCNC: 17 MMOL/L — SIGNIFICANT CHANGE UP (ref 5–17)
BUN SERPL-MCNC: 26 MG/DL — HIGH (ref 7–23)
CALCIUM SERPL-MCNC: 9.8 MG/DL — SIGNIFICANT CHANGE UP (ref 8.4–10.5)
CHLORIDE SERPL-SCNC: 101 MMOL/L — SIGNIFICANT CHANGE UP (ref 96–108)
CO2 SERPL-SCNC: 23 MMOL/L — SIGNIFICANT CHANGE UP (ref 22–31)
CREAT SERPL-MCNC: 0.92 MG/DL — SIGNIFICANT CHANGE UP (ref 0.5–1.3)
GLUCOSE SERPL-MCNC: 174 MG/DL — HIGH (ref 70–99)
HCT VFR BLD CALC: 39.8 % — SIGNIFICANT CHANGE UP (ref 39–50)
HGB BLD-MCNC: 13.6 G/DL — SIGNIFICANT CHANGE UP (ref 13–17)
MCHC RBC-ENTMCNC: 33.6 PG — SIGNIFICANT CHANGE UP (ref 27–34)
MCHC RBC-ENTMCNC: 34.2 GM/DL — SIGNIFICANT CHANGE UP (ref 32–36)
MCV RBC AUTO: 98.3 FL — SIGNIFICANT CHANGE UP (ref 80–100)
PLATELET # BLD AUTO: 140 K/UL — LOW (ref 150–400)
POTASSIUM SERPL-MCNC: 4.4 MMOL/L — SIGNIFICANT CHANGE UP (ref 3.5–5.3)
POTASSIUM SERPL-SCNC: 4.4 MMOL/L — SIGNIFICANT CHANGE UP (ref 3.5–5.3)
RBC # BLD: 4.05 M/UL — LOW (ref 4.2–5.8)
RBC # FLD: 13.5 % — SIGNIFICANT CHANGE UP (ref 10.3–14.5)
SODIUM SERPL-SCNC: 141 MMOL/L — SIGNIFICANT CHANGE UP (ref 135–145)
WBC # BLD: 8.33 K/UL — SIGNIFICANT CHANGE UP (ref 3.8–10.5)
WBC # FLD AUTO: 8.33 K/UL — SIGNIFICANT CHANGE UP (ref 3.8–10.5)

## 2017-05-14 RX ADMIN — Medication 1: at 08:12

## 2017-05-14 RX ADMIN — TAMSULOSIN HYDROCHLORIDE 0.4 MILLIGRAM(S): 0.4 CAPSULE ORAL at 22:08

## 2017-05-14 RX ADMIN — ENOXAPARIN SODIUM 40 MILLIGRAM(S): 100 INJECTION SUBCUTANEOUS at 11:47

## 2017-05-14 RX ADMIN — SENNA PLUS 2 TABLET(S): 8.6 TABLET ORAL at 22:08

## 2017-05-14 RX ADMIN — NYSTATIN CREAM 1 APPLICATION(S): 100000 CREAM TOPICAL at 18:00

## 2017-05-14 RX ADMIN — Medication 100 MILLIGRAM(S): at 22:08

## 2017-05-14 RX ADMIN — Medication 1: at 12:42

## 2017-05-14 RX ADMIN — SIMVASTATIN 20 MILLIGRAM(S): 20 TABLET, FILM COATED ORAL at 22:08

## 2017-05-14 RX ADMIN — Medication 25 MILLIGRAM(S): at 05:05

## 2017-05-14 RX ADMIN — Medication 1 TABLET(S): at 11:47

## 2017-05-14 RX ADMIN — Medication 100 MILLIGRAM(S): at 14:48

## 2017-05-14 RX ADMIN — NYSTATIN CREAM 1 APPLICATION(S): 100000 CREAM TOPICAL at 11:48

## 2017-05-14 RX ADMIN — NYSTATIN CREAM 1 APPLICATION(S): 100000 CREAM TOPICAL at 23:44

## 2017-05-14 RX ADMIN — LISINOPRIL 5 MILLIGRAM(S): 2.5 TABLET ORAL at 05:06

## 2017-05-14 RX ADMIN — POLYETHYLENE GLYCOL 3350 17 GRAM(S): 17 POWDER, FOR SOLUTION ORAL at 11:47

## 2017-05-14 RX ADMIN — NYSTATIN CREAM 1 APPLICATION(S): 100000 CREAM TOPICAL at 05:06

## 2017-05-14 RX ADMIN — Medication 100 MILLIGRAM(S): at 05:06

## 2017-05-14 RX ADMIN — Medication 81 MILLIGRAM(S): at 11:47

## 2017-05-14 RX ADMIN — PANTOPRAZOLE SODIUM 40 MILLIGRAM(S): 20 TABLET, DELAYED RELEASE ORAL at 05:07

## 2017-05-15 PROCEDURE — 54450 PREPUTIAL STRETCHING: CPT

## 2017-05-15 RX ORDER — MORPHINE SULFATE 50 MG/1
1 CAPSULE, EXTENDED RELEASE ORAL ONCE
Qty: 0 | Refills: 0 | Status: DISCONTINUED | OUTPATIENT
Start: 2017-05-15 | End: 2017-05-15

## 2017-05-15 RX ADMIN — NYSTATIN CREAM 1 APPLICATION(S): 100000 CREAM TOPICAL at 05:19

## 2017-05-15 RX ADMIN — ENOXAPARIN SODIUM 40 MILLIGRAM(S): 100 INJECTION SUBCUTANEOUS at 12:28

## 2017-05-15 RX ADMIN — MORPHINE SULFATE 1 MILLIGRAM(S): 50 CAPSULE, EXTENDED RELEASE ORAL at 09:45

## 2017-05-15 RX ADMIN — Medication 100 MILLIGRAM(S): at 15:54

## 2017-05-15 RX ADMIN — NYSTATIN CREAM 1 APPLICATION(S): 100000 CREAM TOPICAL at 17:16

## 2017-05-15 RX ADMIN — SIMVASTATIN 20 MILLIGRAM(S): 20 TABLET, FILM COATED ORAL at 21:30

## 2017-05-15 RX ADMIN — MORPHINE SULFATE 1 MILLIGRAM(S): 50 CAPSULE, EXTENDED RELEASE ORAL at 09:26

## 2017-05-15 RX ADMIN — Medication 81 MILLIGRAM(S): at 12:27

## 2017-05-15 RX ADMIN — Medication 2: at 17:16

## 2017-05-15 RX ADMIN — NYSTATIN CREAM 1 APPLICATION(S): 100000 CREAM TOPICAL at 23:52

## 2017-05-15 RX ADMIN — Medication 2: at 08:58

## 2017-05-15 RX ADMIN — SENNA PLUS 2 TABLET(S): 8.6 TABLET ORAL at 21:30

## 2017-05-15 RX ADMIN — Medication 1 TABLET(S): at 12:27

## 2017-05-15 RX ADMIN — LISINOPRIL 5 MILLIGRAM(S): 2.5 TABLET ORAL at 05:20

## 2017-05-15 RX ADMIN — Medication 25 MILLIGRAM(S): at 05:20

## 2017-05-15 RX ADMIN — Medication 2: at 12:27

## 2017-05-15 RX ADMIN — Medication 100 MILLIGRAM(S): at 21:30

## 2017-05-15 RX ADMIN — TAMSULOSIN HYDROCHLORIDE 0.4 MILLIGRAM(S): 0.4 CAPSULE ORAL at 21:30

## 2017-05-15 RX ADMIN — Medication 100 MILLIGRAM(S): at 05:20

## 2017-05-15 RX ADMIN — PANTOPRAZOLE SODIUM 40 MILLIGRAM(S): 20 TABLET, DELAYED RELEASE ORAL at 05:20

## 2017-05-15 RX ADMIN — NYSTATIN CREAM 1 APPLICATION(S): 100000 CREAM TOPICAL at 12:28

## 2017-05-15 RX ADMIN — POLYETHYLENE GLYCOL 3350 17 GRAM(S): 17 POWDER, FOR SOLUTION ORAL at 12:28

## 2017-05-16 VITALS
HEART RATE: 63 BPM | TEMPERATURE: 98 F | OXYGEN SATURATION: 96 % | SYSTOLIC BLOOD PRESSURE: 116 MMHG | RESPIRATION RATE: 18 BRPM | DIASTOLIC BLOOD PRESSURE: 74 MMHG

## 2017-05-16 PROCEDURE — 99285 EMERGENCY DEPT VISIT HI MDM: CPT | Mod: 25

## 2017-05-16 PROCEDURE — 97163 PT EVAL HIGH COMPLEX 45 MIN: CPT

## 2017-05-16 PROCEDURE — 82962 GLUCOSE BLOOD TEST: CPT

## 2017-05-16 PROCEDURE — 80048 BASIC METABOLIC PNL TOTAL CA: CPT

## 2017-05-16 PROCEDURE — 85014 HEMATOCRIT: CPT

## 2017-05-16 PROCEDURE — 74241: CPT

## 2017-05-16 PROCEDURE — 86901 BLOOD TYPING SEROLOGIC RH(D): CPT

## 2017-05-16 PROCEDURE — 86850 RBC ANTIBODY SCREEN: CPT

## 2017-05-16 PROCEDURE — 83880 ASSAY OF NATRIURETIC PEPTIDE: CPT

## 2017-05-16 PROCEDURE — 87086 URINE CULTURE/COLONY COUNT: CPT

## 2017-05-16 PROCEDURE — 88305 TISSUE EXAM BY PATHOLOGIST: CPT

## 2017-05-16 PROCEDURE — 82947 ASSAY GLUCOSE BLOOD QUANT: CPT

## 2017-05-16 PROCEDURE — 82435 ASSAY OF BLOOD CHLORIDE: CPT

## 2017-05-16 PROCEDURE — 71045 X-RAY EXAM CHEST 1 VIEW: CPT

## 2017-05-16 PROCEDURE — 85730 THROMBOPLASTIN TIME PARTIAL: CPT

## 2017-05-16 PROCEDURE — 88312 SPECIAL STAINS GROUP 1: CPT

## 2017-05-16 PROCEDURE — 85610 PROTHROMBIN TIME: CPT

## 2017-05-16 PROCEDURE — 80053 COMPREHEN METABOLIC PANEL: CPT

## 2017-05-16 PROCEDURE — 82550 ASSAY OF CK (CPK): CPT

## 2017-05-16 PROCEDURE — 83690 ASSAY OF LIPASE: CPT

## 2017-05-16 PROCEDURE — 83735 ASSAY OF MAGNESIUM: CPT

## 2017-05-16 PROCEDURE — 85027 COMPLETE CBC AUTOMATED: CPT

## 2017-05-16 PROCEDURE — 82330 ASSAY OF CALCIUM: CPT

## 2017-05-16 PROCEDURE — 96375 TX/PRO/DX INJ NEW DRUG ADDON: CPT

## 2017-05-16 PROCEDURE — 74177 CT ABD & PELVIS W/CONTRAST: CPT

## 2017-05-16 PROCEDURE — 82803 BLOOD GASES ANY COMBINATION: CPT

## 2017-05-16 PROCEDURE — 86900 BLOOD TYPING SEROLOGIC ABO: CPT

## 2017-05-16 PROCEDURE — 84100 ASSAY OF PHOSPHORUS: CPT

## 2017-05-16 PROCEDURE — 81001 URINALYSIS AUTO W/SCOPE: CPT

## 2017-05-16 PROCEDURE — 83605 ASSAY OF LACTIC ACID: CPT

## 2017-05-16 PROCEDURE — 84484 ASSAY OF TROPONIN QUANT: CPT

## 2017-05-16 PROCEDURE — 84295 ASSAY OF SERUM SODIUM: CPT

## 2017-05-16 PROCEDURE — 97110 THERAPEUTIC EXERCISES: CPT

## 2017-05-16 PROCEDURE — 82553 CREATINE MB FRACTION: CPT

## 2017-05-16 PROCEDURE — 87186 SC STD MICRODIL/AGAR DIL: CPT

## 2017-05-16 PROCEDURE — 83036 HEMOGLOBIN GLYCOSYLATED A1C: CPT

## 2017-05-16 PROCEDURE — 96374 THER/PROPH/DIAG INJ IV PUSH: CPT | Mod: XU

## 2017-05-16 PROCEDURE — 93005 ELECTROCARDIOGRAM TRACING: CPT

## 2017-05-16 PROCEDURE — 84132 ASSAY OF SERUM POTASSIUM: CPT

## 2017-05-16 PROCEDURE — 97530 THERAPEUTIC ACTIVITIES: CPT

## 2017-05-16 RX ORDER — ENOXAPARIN SODIUM 100 MG/ML
40 INJECTION SUBCUTANEOUS
Qty: 0 | Refills: 0 | COMMUNITY
Start: 2017-05-16

## 2017-05-16 RX ORDER — NYSTATIN CREAM 100000 [USP'U]/G
1 CREAM TOPICAL
Qty: 0 | Refills: 0 | COMMUNITY
Start: 2017-05-16

## 2017-05-16 RX ORDER — INSULIN LISPRO 100/ML
0 VIAL (ML) SUBCUTANEOUS
Qty: 0 | Refills: 0 | COMMUNITY
Start: 2017-05-16

## 2017-05-16 RX ADMIN — POLYETHYLENE GLYCOL 3350 17 GRAM(S): 17 POWDER, FOR SOLUTION ORAL at 12:15

## 2017-05-16 RX ADMIN — Medication 25 MILLIGRAM(S): at 05:54

## 2017-05-16 RX ADMIN — Medication 2: at 08:08

## 2017-05-16 RX ADMIN — NYSTATIN CREAM 1 APPLICATION(S): 100000 CREAM TOPICAL at 05:54

## 2017-05-16 RX ADMIN — PANTOPRAZOLE SODIUM 40 MILLIGRAM(S): 20 TABLET, DELAYED RELEASE ORAL at 06:07

## 2017-05-16 RX ADMIN — LISINOPRIL 5 MILLIGRAM(S): 2.5 TABLET ORAL at 05:54

## 2017-05-16 RX ADMIN — NYSTATIN CREAM 1 APPLICATION(S): 100000 CREAM TOPICAL at 12:15

## 2017-05-16 RX ADMIN — Medication 3: at 12:14

## 2017-05-16 RX ADMIN — ENOXAPARIN SODIUM 40 MILLIGRAM(S): 100 INJECTION SUBCUTANEOUS at 12:15

## 2017-05-16 RX ADMIN — Medication 81 MILLIGRAM(S): at 12:15

## 2017-05-16 RX ADMIN — Medication 100 MILLIGRAM(S): at 05:54

## 2017-05-16 RX ADMIN — Medication 1 TABLET(S): at 12:15

## 2017-12-03 ENCOUNTER — INPATIENT (INPATIENT)
Facility: HOSPITAL | Age: 77
LOS: 3 days | Discharge: NURSING, REHAB OR LONG-TERM CARE | DRG: 871 | End: 2017-12-07
Attending: INTERNAL MEDICINE | Admitting: INTERNAL MEDICINE
Payer: MEDICARE

## 2017-12-03 VITALS
DIASTOLIC BLOOD PRESSURE: 84 MMHG | HEART RATE: 102 BPM | SYSTOLIC BLOOD PRESSURE: 138 MMHG | RESPIRATION RATE: 18 BRPM | WEIGHT: 149.91 LBS | TEMPERATURE: 99 F | OXYGEN SATURATION: 95 %

## 2017-12-03 DIAGNOSIS — R50.9 FEVER, UNSPECIFIED: ICD-10-CM

## 2017-12-03 LAB
ALBUMIN SERPL ELPH-MCNC: 3.7 G/DL — SIGNIFICANT CHANGE UP (ref 3.3–5)
ALP SERPL-CCNC: 71 U/L — SIGNIFICANT CHANGE UP (ref 40–120)
ALT FLD-CCNC: 13 U/L RC — SIGNIFICANT CHANGE UP (ref 10–45)
ANION GAP SERPL CALC-SCNC: 18 MMOL/L — HIGH (ref 5–17)
APPEARANCE UR: CLEAR — SIGNIFICANT CHANGE UP
AST SERPL-CCNC: 12 U/L — SIGNIFICANT CHANGE UP (ref 10–40)
BASE EXCESS BLDV CALC-SCNC: 0.7 MMOL/L — SIGNIFICANT CHANGE UP (ref -2–2)
BASOPHILS # BLD AUTO: 0.1 K/UL — SIGNIFICANT CHANGE UP (ref 0–0.2)
BASOPHILS NFR BLD AUTO: 0.5 % — SIGNIFICANT CHANGE UP (ref 0–2)
BILIRUB SERPL-MCNC: 0.2 MG/DL — SIGNIFICANT CHANGE UP (ref 0.2–1.2)
BILIRUB UR-MCNC: NEGATIVE — SIGNIFICANT CHANGE UP
BUN SERPL-MCNC: 24 MG/DL — HIGH (ref 7–23)
CA-I SERPL-SCNC: 1.32 MMOL/L — HIGH (ref 1.12–1.3)
CALCIUM SERPL-MCNC: 10 MG/DL — SIGNIFICANT CHANGE UP (ref 8.4–10.5)
CHLORIDE BLDV-SCNC: 111 MMOL/L — HIGH (ref 96–108)
CHLORIDE SERPL-SCNC: 105 MMOL/L — SIGNIFICANT CHANGE UP (ref 96–108)
CO2 BLDV-SCNC: 26 MMOL/L — SIGNIFICANT CHANGE UP (ref 22–30)
CO2 SERPL-SCNC: 21 MMOL/L — LOW (ref 22–31)
COLOR SPEC: YELLOW — SIGNIFICANT CHANGE UP
CREAT SERPL-MCNC: 0.86 MG/DL — SIGNIFICANT CHANGE UP (ref 0.5–1.3)
DIFF PNL FLD: NEGATIVE — SIGNIFICANT CHANGE UP
EOSINOPHIL # BLD AUTO: 0.3 K/UL — SIGNIFICANT CHANGE UP (ref 0–0.5)
EOSINOPHIL NFR BLD AUTO: 3.2 % — SIGNIFICANT CHANGE UP (ref 0–6)
GAS PNL BLDV: 140 MMOL/L — SIGNIFICANT CHANGE UP (ref 136–145)
GAS PNL BLDV: SIGNIFICANT CHANGE UP
GLUCOSE BLDC GLUCOMTR-MCNC: 194 MG/DL — HIGH (ref 70–99)
GLUCOSE BLDV-MCNC: 311 MG/DL — HIGH (ref 70–99)
GLUCOSE SERPL-MCNC: 338 MG/DL — HIGH (ref 70–99)
GLUCOSE UR QL: >1000
HCO3 BLDV-SCNC: 25 MMOL/L — SIGNIFICANT CHANGE UP (ref 21–29)
HCT VFR BLD CALC: 42.4 % — SIGNIFICANT CHANGE UP (ref 39–50)
HCT VFR BLDA CALC: 48 % — SIGNIFICANT CHANGE UP (ref 39–50)
HGB BLD CALC-MCNC: 15.5 G/DL — SIGNIFICANT CHANGE UP (ref 13–17)
HGB BLD-MCNC: 14.6 G/DL — SIGNIFICANT CHANGE UP (ref 13–17)
KETONES UR-MCNC: NEGATIVE — SIGNIFICANT CHANGE UP
LACTATE BLDV-MCNC: 3.4 MMOL/L — HIGH (ref 0.7–2)
LEUKOCYTE ESTERASE UR-ACNC: ABNORMAL
LYMPHOCYTES # BLD AUTO: 1.8 K/UL — SIGNIFICANT CHANGE UP (ref 1–3.3)
LYMPHOCYTES # BLD AUTO: 16.3 % — SIGNIFICANT CHANGE UP (ref 13–44)
MCHC RBC-ENTMCNC: 34.5 GM/DL — SIGNIFICANT CHANGE UP (ref 32–36)
MCHC RBC-ENTMCNC: 35.1 PG — HIGH (ref 27–34)
MCV RBC AUTO: 102 FL — HIGH (ref 80–100)
MONOCYTES # BLD AUTO: 0.9 K/UL — SIGNIFICANT CHANGE UP (ref 0–0.9)
MONOCYTES NFR BLD AUTO: 8.1 % — SIGNIFICANT CHANGE UP (ref 2–14)
NEUTROPHILS # BLD AUTO: 7.7 K/UL — HIGH (ref 1.8–7.4)
NEUTROPHILS NFR BLD AUTO: 71.9 % — SIGNIFICANT CHANGE UP (ref 43–77)
NITRITE UR-MCNC: NEGATIVE — SIGNIFICANT CHANGE UP
PCO2 BLDV: 42 MMHG — SIGNIFICANT CHANGE UP (ref 35–50)
PH BLDV: 7.39 — SIGNIFICANT CHANGE UP (ref 7.35–7.45)
PH UR: 5.5 — SIGNIFICANT CHANGE UP (ref 5–8)
PLATELET # BLD AUTO: 157 K/UL — SIGNIFICANT CHANGE UP (ref 150–400)
PO2 BLDV: 54 MMHG — HIGH (ref 25–45)
POTASSIUM BLDV-SCNC: 4.1 MMOL/L — SIGNIFICANT CHANGE UP (ref 3.5–5)
POTASSIUM SERPL-MCNC: 4.5 MMOL/L — SIGNIFICANT CHANGE UP (ref 3.5–5.3)
POTASSIUM SERPL-SCNC: 4.5 MMOL/L — SIGNIFICANT CHANGE UP (ref 3.5–5.3)
PROT SERPL-MCNC: 6.9 G/DL — SIGNIFICANT CHANGE UP (ref 6–8.3)
PROT UR-MCNC: 100 MG/DL
RBC # BLD: 4.16 M/UL — LOW (ref 4.2–5.8)
RBC # FLD: 11.5 % — SIGNIFICANT CHANGE UP (ref 10.3–14.5)
RBC CASTS # UR COMP ASSIST: SIGNIFICANT CHANGE UP /HPF (ref 0–2)
SAO2 % BLDV: 87 % — SIGNIFICANT CHANGE UP (ref 67–88)
SODIUM SERPL-SCNC: 144 MMOL/L — SIGNIFICANT CHANGE UP (ref 135–145)
SP GR SPEC: >1.03 — HIGH (ref 1.01–1.02)
UROBILINOGEN FLD QL: NEGATIVE — SIGNIFICANT CHANGE UP
WBC # BLD: 10.7 K/UL — HIGH (ref 3.8–10.5)
WBC # FLD AUTO: 10.7 K/UL — HIGH (ref 3.8–10.5)
WBC UR QL: ABNORMAL /HPF (ref 0–5)

## 2017-12-03 PROCEDURE — 93010 ELECTROCARDIOGRAM REPORT: CPT

## 2017-12-03 PROCEDURE — 71010: CPT | Mod: 26

## 2017-12-03 PROCEDURE — 99223 1ST HOSP IP/OBS HIGH 75: CPT

## 2017-12-03 PROCEDURE — 99284 EMERGENCY DEPT VISIT MOD MDM: CPT | Mod: 25

## 2017-12-03 RX ORDER — DEXTROSE 50 % IN WATER 50 %
25 SYRINGE (ML) INTRAVENOUS ONCE
Qty: 0 | Refills: 0 | Status: DISCONTINUED | OUTPATIENT
Start: 2017-12-03 | End: 2017-12-07

## 2017-12-03 RX ORDER — SODIUM CHLORIDE 9 MG/ML
1000 INJECTION, SOLUTION INTRAVENOUS
Qty: 0 | Refills: 0 | Status: DISCONTINUED | OUTPATIENT
Start: 2017-12-03 | End: 2017-12-07

## 2017-12-03 RX ORDER — DEXTROSE 50 % IN WATER 50 %
12.5 SYRINGE (ML) INTRAVENOUS ONCE
Qty: 0 | Refills: 0 | Status: DISCONTINUED | OUTPATIENT
Start: 2017-12-03 | End: 2017-12-07

## 2017-12-03 RX ORDER — SODIUM CHLORIDE 9 MG/ML
1000 INJECTION INTRAMUSCULAR; INTRAVENOUS; SUBCUTANEOUS ONCE
Qty: 0 | Refills: 0 | Status: COMPLETED | OUTPATIENT
Start: 2017-12-03 | End: 2017-12-03

## 2017-12-03 RX ORDER — CEFAZOLIN SODIUM 1 G
1000 VIAL (EA) INJECTION ONCE
Qty: 0 | Refills: 0 | Status: COMPLETED | OUTPATIENT
Start: 2017-12-03 | End: 2017-12-03

## 2017-12-03 RX ORDER — GLUCAGON INJECTION, SOLUTION 0.5 MG/.1ML
1 INJECTION, SOLUTION SUBCUTANEOUS ONCE
Qty: 0 | Refills: 0 | Status: DISCONTINUED | OUTPATIENT
Start: 2017-12-03 | End: 2017-12-07

## 2017-12-03 RX ORDER — INSULIN LISPRO 100/ML
VIAL (ML) SUBCUTANEOUS AT BEDTIME
Qty: 0 | Refills: 0 | Status: DISCONTINUED | OUTPATIENT
Start: 2017-12-03 | End: 2017-12-07

## 2017-12-03 RX ORDER — INSULIN LISPRO 100/ML
VIAL (ML) SUBCUTANEOUS
Qty: 0 | Refills: 0 | Status: DISCONTINUED | OUTPATIENT
Start: 2017-12-03 | End: 2017-12-07

## 2017-12-03 RX ORDER — DEXTROSE 50 % IN WATER 50 %
1 SYRINGE (ML) INTRAVENOUS ONCE
Qty: 0 | Refills: 0 | Status: DISCONTINUED | OUTPATIENT
Start: 2017-12-03 | End: 2017-12-07

## 2017-12-03 RX ADMIN — SODIUM CHLORIDE 1000 MILLILITER(S): 9 INJECTION INTRAMUSCULAR; INTRAVENOUS; SUBCUTANEOUS at 20:05

## 2017-12-03 RX ADMIN — SODIUM CHLORIDE 1000 MILLILITER(S): 9 INJECTION INTRAMUSCULAR; INTRAVENOUS; SUBCUTANEOUS at 22:19

## 2017-12-03 RX ADMIN — Medication 100 MILLIGRAM(S): at 21:24

## 2017-12-03 NOTE — ED ADULT NURSE NOTE - CHPI ED SYMPTOMS NEG
no weakness/no chills/no vomiting/no pain/no tingling/no dizziness/no fever/no numbness/no nausea/no decreased eating/drinking

## 2017-12-03 NOTE — ED ADULT NURSE NOTE - OBJECTIVE STATEMENT
76M w/PMHx HTN, HLD, Type 2DM, PUD s/p GI bleed not on A/C, AS s/p TAVR and asc aortic root replacement (1/2013), CVAx3 w/residual L-sided hemiparesis, bedbound, A and O 2 but minimally verbal,  brought by wife and son because he is more lethargic and acting not like usually ,They also noticed redness and bluish  discoloration of both legs ,No fever at home but feels warm to touch   PMD DR Matt Trevizo [phone] Pt is a 75 yo M who was brought to the Ed by his family c/o BLE redness. States they were "purple" but is improving, now with mild redness, no streaking, no warmth. + pulses. Pt is bedbound, hx CVAx3 w/residual L-sided hemiparesis, A/O to person, place, minimally verbal. Family also states pt is more lethargic than normal, not acting like his usual self.

## 2017-12-03 NOTE — ED ADULT NURSE NOTE - PMH
CVA, old, hemiparesis    Diabetes mellitus    Gastric ulcer    Hyperlipemia    Hypertension CVA, old, hemiparesis  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  Diabetes mellitus    Gastric ulcer    GI bleed    Hyperlipemia    Hypertension    PUD (peptic ulcer disease)

## 2017-12-03 NOTE — ED PROVIDER NOTE - OBJECTIVE STATEMENT
76M w/PMHx HTN, HLD, Type 2DM, PUD s/p GI bleed not on A/C, AS s/p TAVR and asc aortic root replacement (1/2013), CVAx3 w/residual L-sided hemiparesis, bedbound, A and O 2 but minimally verbal,  brought by wife and son because he is more lethargic and acting not like usually ,They also noticed redness and bluish  discoloration of both legs ,No fever at home but feels warm to touch   PMD DR Matt Trevizo [phone]

## 2017-12-04 DIAGNOSIS — A41.9 SEPSIS, UNSPECIFIED ORGANISM: ICD-10-CM

## 2017-12-04 DIAGNOSIS — G93.41 METABOLIC ENCEPHALOPATHY: ICD-10-CM

## 2017-12-04 DIAGNOSIS — N30.00 ACUTE CYSTITIS WITHOUT HEMATURIA: ICD-10-CM

## 2017-12-04 DIAGNOSIS — I63.9 CEREBRAL INFARCTION, UNSPECIFIED: ICD-10-CM

## 2017-12-04 LAB
ALBUMIN SERPL ELPH-MCNC: 3.3 G/DL — SIGNIFICANT CHANGE UP (ref 3.3–5)
ALP SERPL-CCNC: 57 U/L — SIGNIFICANT CHANGE UP (ref 40–120)
ALT FLD-CCNC: 11 U/L RC — SIGNIFICANT CHANGE UP (ref 10–45)
ANION GAP SERPL CALC-SCNC: 14 MMOL/L — SIGNIFICANT CHANGE UP (ref 5–17)
APTT BLD: 27.4 SEC — LOW (ref 27.5–37.4)
AST SERPL-CCNC: 11 U/L — SIGNIFICANT CHANGE UP (ref 10–40)
BASOPHILS # BLD AUTO: 0 K/UL — SIGNIFICANT CHANGE UP (ref 0–0.2)
BASOPHILS NFR BLD AUTO: 0.2 % — SIGNIFICANT CHANGE UP (ref 0–2)
BILIRUB SERPL-MCNC: 0.3 MG/DL — SIGNIFICANT CHANGE UP (ref 0.2–1.2)
BUN SERPL-MCNC: 21 MG/DL — SIGNIFICANT CHANGE UP (ref 7–23)
CALCIUM SERPL-MCNC: 8.9 MG/DL — SIGNIFICANT CHANGE UP (ref 8.4–10.5)
CHLORIDE SERPL-SCNC: 110 MMOL/L — HIGH (ref 96–108)
CO2 SERPL-SCNC: 23 MMOL/L — SIGNIFICANT CHANGE UP (ref 22–31)
CREAT SERPL-MCNC: 0.74 MG/DL — SIGNIFICANT CHANGE UP (ref 0.5–1.3)
EOSINOPHIL # BLD AUTO: 0.4 K/UL — SIGNIFICANT CHANGE UP (ref 0–0.5)
EOSINOPHIL NFR BLD AUTO: 4.8 % — SIGNIFICANT CHANGE UP (ref 0–6)
GLUCOSE BLDC GLUCOMTR-MCNC: 187 MG/DL — HIGH (ref 70–99)
GLUCOSE BLDC GLUCOMTR-MCNC: 190 MG/DL — HIGH (ref 70–99)
GLUCOSE BLDC GLUCOMTR-MCNC: 214 MG/DL — HIGH (ref 70–99)
GLUCOSE BLDC GLUCOMTR-MCNC: 219 MG/DL — HIGH (ref 70–99)
GLUCOSE SERPL-MCNC: 207 MG/DL — HIGH (ref 70–99)
HBA1C BLD-MCNC: 6.9 % — HIGH (ref 4–5.6)
HCT VFR BLD CALC: 41.3 % — SIGNIFICANT CHANGE UP (ref 39–50)
HGB BLD-MCNC: 14 G/DL — SIGNIFICANT CHANGE UP (ref 13–17)
INR BLD: 1.01 RATIO — SIGNIFICANT CHANGE UP (ref 0.88–1.16)
LYMPHOCYTES # BLD AUTO: 1.8 K/UL — SIGNIFICANT CHANGE UP (ref 1–3.3)
LYMPHOCYTES # BLD AUTO: 20.9 % — SIGNIFICANT CHANGE UP (ref 13–44)
MAGNESIUM SERPL-MCNC: 1.8 MG/DL — SIGNIFICANT CHANGE UP (ref 1.6–2.6)
MCHC RBC-ENTMCNC: 33.8 GM/DL — SIGNIFICANT CHANGE UP (ref 32–36)
MCHC RBC-ENTMCNC: 34.5 PG — HIGH (ref 27–34)
MCV RBC AUTO: 102 FL — HIGH (ref 80–100)
MONOCYTES # BLD AUTO: 0.6 K/UL — SIGNIFICANT CHANGE UP (ref 0–0.9)
MONOCYTES NFR BLD AUTO: 7.3 % — SIGNIFICANT CHANGE UP (ref 2–14)
NEUTROPHILS # BLD AUTO: 5.9 K/UL — SIGNIFICANT CHANGE UP (ref 1.8–7.4)
NEUTROPHILS NFR BLD AUTO: 66.7 % — SIGNIFICANT CHANGE UP (ref 43–77)
PHOSPHATE SERPL-MCNC: 2.7 MG/DL — SIGNIFICANT CHANGE UP (ref 2.5–4.5)
PLATELET # BLD AUTO: 144 K/UL — LOW (ref 150–400)
POTASSIUM SERPL-MCNC: 4.1 MMOL/L — SIGNIFICANT CHANGE UP (ref 3.5–5.3)
POTASSIUM SERPL-SCNC: 4.1 MMOL/L — SIGNIFICANT CHANGE UP (ref 3.5–5.3)
PROT SERPL-MCNC: 6.1 G/DL — SIGNIFICANT CHANGE UP (ref 6–8.3)
PROTHROM AB SERPL-ACNC: 10.9 SEC — SIGNIFICANT CHANGE UP (ref 9.8–12.7)
RAPID RVP RESULT: SIGNIFICANT CHANGE UP
RBC # BLD: 4.04 M/UL — LOW (ref 4.2–5.8)
RBC # FLD: 11.6 % — SIGNIFICANT CHANGE UP (ref 10.3–14.5)
SODIUM SERPL-SCNC: 147 MMOL/L — HIGH (ref 135–145)
TSH SERPL-MCNC: 1.07 UIU/ML — SIGNIFICANT CHANGE UP (ref 0.27–4.2)
WBC # BLD: 8.8 K/UL — SIGNIFICANT CHANGE UP (ref 3.8–10.5)
WBC # FLD AUTO: 8.8 K/UL — SIGNIFICANT CHANGE UP (ref 3.8–10.5)

## 2017-12-04 PROCEDURE — 99222 1ST HOSP IP/OBS MODERATE 55: CPT

## 2017-12-04 RX ORDER — HEPARIN SODIUM 5000 [USP'U]/ML
5000 INJECTION INTRAVENOUS; SUBCUTANEOUS EVERY 8 HOURS
Qty: 0 | Refills: 0 | Status: DISCONTINUED | OUTPATIENT
Start: 2017-12-04 | End: 2017-12-07

## 2017-12-04 RX ORDER — CEFTRIAXONE 500 MG/1
INJECTION, POWDER, FOR SOLUTION INTRAMUSCULAR; INTRAVENOUS
Qty: 0 | Refills: 0 | Status: DISCONTINUED | OUTPATIENT
Start: 2017-12-04 | End: 2017-12-04

## 2017-12-04 RX ORDER — TAMSULOSIN HYDROCHLORIDE 0.4 MG/1
0.4 CAPSULE ORAL AT BEDTIME
Qty: 0 | Refills: 0 | Status: DISCONTINUED | OUTPATIENT
Start: 2017-12-04 | End: 2017-12-07

## 2017-12-04 RX ORDER — CEFTRIAXONE 500 MG/1
1 INJECTION, POWDER, FOR SOLUTION INTRAMUSCULAR; INTRAVENOUS ONCE
Qty: 0 | Refills: 0 | Status: COMPLETED | OUTPATIENT
Start: 2017-12-04 | End: 2017-12-04

## 2017-12-04 RX ORDER — PIPERACILLIN AND TAZOBACTAM 4; .5 G/20ML; G/20ML
3.38 INJECTION, POWDER, LYOPHILIZED, FOR SOLUTION INTRAVENOUS EVERY 8 HOURS
Qty: 0 | Refills: 0 | Status: DISCONTINUED | OUTPATIENT
Start: 2017-12-04 | End: 2017-12-05

## 2017-12-04 RX ORDER — PANTOPRAZOLE SODIUM 20 MG/1
40 TABLET, DELAYED RELEASE ORAL
Qty: 0 | Refills: 0 | Status: DISCONTINUED | OUTPATIENT
Start: 2017-12-04 | End: 2017-12-07

## 2017-12-04 RX ORDER — ASPIRIN/CALCIUM CARB/MAGNESIUM 324 MG
81 TABLET ORAL DAILY
Qty: 0 | Refills: 0 | Status: DISCONTINUED | OUTPATIENT
Start: 2017-12-04 | End: 2017-12-07

## 2017-12-04 RX ORDER — SODIUM CHLORIDE 9 MG/ML
1000 INJECTION INTRAMUSCULAR; INTRAVENOUS; SUBCUTANEOUS
Qty: 0 | Refills: 0 | Status: DISCONTINUED | OUTPATIENT
Start: 2017-12-04 | End: 2017-12-06

## 2017-12-04 RX ADMIN — CEFTRIAXONE 100 GRAM(S): 500 INJECTION, POWDER, FOR SOLUTION INTRAMUSCULAR; INTRAVENOUS at 05:08

## 2017-12-04 RX ADMIN — Medication 1: at 12:44

## 2017-12-04 RX ADMIN — TAMSULOSIN HYDROCHLORIDE 0.4 MILLIGRAM(S): 0.4 CAPSULE ORAL at 23:31

## 2017-12-04 RX ADMIN — Medication 81 MILLIGRAM(S): at 11:41

## 2017-12-04 RX ADMIN — HEPARIN SODIUM 5000 UNIT(S): 5000 INJECTION INTRAVENOUS; SUBCUTANEOUS at 05:08

## 2017-12-04 RX ADMIN — HEPARIN SODIUM 5000 UNIT(S): 5000 INJECTION INTRAVENOUS; SUBCUTANEOUS at 23:31

## 2017-12-04 RX ADMIN — Medication 2: at 17:16

## 2017-12-04 RX ADMIN — PIPERACILLIN AND TAZOBACTAM 25 GRAM(S): 4; .5 INJECTION, POWDER, LYOPHILIZED, FOR SOLUTION INTRAVENOUS at 23:30

## 2017-12-04 RX ADMIN — HEPARIN SODIUM 5000 UNIT(S): 5000 INJECTION INTRAVENOUS; SUBCUTANEOUS at 16:52

## 2017-12-04 RX ADMIN — SODIUM CHLORIDE 60 MILLILITER(S): 9 INJECTION INTRAMUSCULAR; INTRAVENOUS; SUBCUTANEOUS at 02:00

## 2017-12-04 RX ADMIN — Medication 1: at 09:26

## 2017-12-04 NOTE — H&P ADULT - ASSESSMENT
77M c HTN, HLD, DM2, PUD, not on A/C 2/2 GIB, AS s/p TAVR and aortic root replacement (1/2013), CVAx3 c/b left hemiparesis, bedbound, minimally verbal, p/w sepsis, poss 2/2 UTI

## 2017-12-04 NOTE — H&P ADULT - NSHPPHYSICALEXAM_GEN_ALL_CORE
PHYSICAL EXAM:   GENERAL: Alert. Not confused. No acute distress. +thin/cachectic.  HEAD:  Atraumatic. Normocephalic.  EYES: EOMI. PERRLA. Normal conjunctiva/sclera.  ENT: Neck supple. No JVD. Moist oral mucosa.   LYMPH: Normal supraclavicular/cervical lymph nodes.   CARDIAC: Not irregular rhythm. RRR. S1. S2. No tachy. No alexia. No murmur. No rub. No distant heart sounds.  LUNG/CHEST: CTAB. BS equal bilaterally. No wheezes. No rales. No rhonchi.  ABDOMEN: Soft. No tenderness. No distension. No fluid wave. Normal bowel sounds.  BACK: No midline/vertebral tenderness. No flank tenderness.  VASCULAR: +2 b/l radial or ulnar pulses. Palpable DP pulses.  EXTREMITIES:  No clubbing. No cyanosis. No edema. Moving all 4.  NEUROLOGY: Alert. unable to assess orientation. Leftsided hemiplegia.   PSYCH: unable to assess  SKIN: No jaundice. No erythema. No rash/lesion.  Vascular Access:       ICU Vital Signs Last 24 Hrs  T(C): 36.9 (04 Dec 2017 00:50), Max: 38 (03 Dec 2017 18:35)  T(F): 98.5 (04 Dec 2017 00:50), Max: 100.4 (03 Dec 2017 18:35)  HR: 78 (04 Dec 2017 00:50) (74 - 102)  BP: 124/77 (04 Dec 2017 00:50) (114/62 - 145/83)  BP(mean): --  ABP: --  ABP(mean): --  RR: 18 (04 Dec 2017 00:50) (17 - 20)  SpO2: 94% (04 Dec 2017 00:50) (94% - 96%)      I&O's Summary

## 2017-12-04 NOTE — H&P ADULT - ATTENDING COMMENTS
Pt signed out to NP service. Time spent 70 minutes on total encounter. Dr. Teague to assume care in AM. Pt signed out to NP service. Time spent 70 minutes on total encounter. Dr. Teague to assume care in AM. Pt evaluated prior to midnight.

## 2017-12-04 NOTE — H&P ADULT - PROBLEM SELECTOR PLAN 2
- unclear etiology, poss 2/2 UTI  - consider aspiration in dx given CVA, despite clear CXR  - no evidence of cellulitis in b/l LE  - VS q4h  - maintenance IVF  - f/u blood and ur cx  - CXR grossly clear at this time

## 2017-12-04 NOTE — H&P ADULT - NSHPLABSRESULTS_GEN_ALL_CORE
Personally reviewed labs.   Personally reviewed imaging.   Personally reviewed EKG. NSR, rate 93, . Left axis deviation. Anterior fascic block/RBBB. Poor r wave progression. Q wave in II. <1mm NISHI in V1-V3.                          14.6   10.7  )-----------( 157      ( 03 Dec 2017 21:14 )             42.4       12-03    144  |  105  |  24<H>  ----------------------------<  338<H>  4.5   |  21<L>  |  0.86    Ca    10.0      03 Dec 2017 19:24    TPro  6.9  /  Alb  3.7  /  TBili  0.2  /  DBili  x   /  AST  12  /  ALT  13  /  AlkPhos  71  12-03            LIVER FUNCTIONS - ( 03 Dec 2017 19:24 )  Alb: 3.7 g/dL / Pro: 6.9 g/dL / ALK PHOS: 71 U/L / ALT: 13 U/L RC / AST: 12 U/L / GGT: x                 Urinalysis Basic - ( 03 Dec 2017 19:24 )    Color: Yellow / Appearance: Clear / SG: >1.030 / pH: x  Gluc: x / Ketone: Negative  / Bili: Negative / Urobili: Negative   Blood: x / Protein: 100 mg/dL / Nitrite: Negative   Leuk Esterase: Trace / RBC: 0-2 /HPF / WBC 5-10 /HPF   Sq Epi: x / Non Sq Epi: x / Bacteria: x

## 2017-12-04 NOTE — H&P ADULT - PROBLEM SELECTOR PLAN 3
- acute on chronic waxing and waning  - likely 2/2 sepsis and infection  - cont to monitor, treat as above

## 2017-12-04 NOTE — H&P ADULT - NSHPSOCIALHISTORY_GEN_ALL_CORE
Social History:    Marital Status: ( x ) , (  ) Single, (  ) , (  ) , (  )   # of Children:   Lives with: (  ) alone, (  ) children, ( x ) spouse, (  ) parents, (  ) siblings, (  ) friends, (  ) other:   Occupation:     Substance Use/Illicit Drugs: (  ) never used, (  ) other:   Tobacco Usage: (  ) never smoked, (  ) former smoker, (  ) current smoker, and Total Pack-Years:   Last Alcohol Usage/Frequency/Amount:

## 2017-12-04 NOTE — H&P ADULT - HISTORY OF PRESENT ILLNESS
77M c HTN, HLD, Type 2DM, PUD s/p GI bleed not on A/C, AS s/p TAVR and asc aortic root replacement (1/2013), CVAx3 w/residual L-sided hemiparesis, bedbound, A and O 2 but minimally verbal, , who p/w recurrent episodes of n/v. Latest episode started 3 days ago x 3 days a day with poor po intake per wife. Has vomiting episodes the last 2-3 days every few weeks for unclear reason. Pt nonverbal since stroke but able to tolerate PO in past 2 days. Has BM everyday but small quantities' varying with large BM every week or so. Denies any hematuria, hematochezia, melena. No abdominal pain or fever. No CP, sob, cough, headache. Wife and patient deny ingesting any foreign substances or materials. He has a 24 hours home taker, foyer lift etc at home,.      ED Vitals: 98.7, 78, 126/85, 18, 94%  ER gave 1LNS, zofran, ceftriaxone. 77M c HTN, HLD, DM2, PUD, not on A/C 2/2 GIB, AS s/p TAVR and aortic root replacement (1/2013), CVAx3 c/b left hemiparesis, bedbound, minimally verbal, p/w lethargy and fevers.    History obtained from chart. Pt unable to provided history 2/2 minimally verbal. Called and left voicemail on wife's # and son's #.    At baseline, pt is bedbound, minimally verbal but answers questions with yes or no. Currently, pt not complaining of pain, and able to follow simple commands such as raise hand, point at ceiling, squeeze hand. History per chart is that pt has been more lethargic. Pt has an adult diaper on.    VS: Tm 100.4, P 102, /64, R 20, 95% RA  In the ED, received cefazolin, NS 2L, blood and ur cx

## 2017-12-05 ENCOUNTER — TRANSCRIPTION ENCOUNTER (OUTPATIENT)
Age: 77
End: 2017-12-05

## 2017-12-05 LAB
-  AMIKACIN: SIGNIFICANT CHANGE UP
-  AZTREONAM: SIGNIFICANT CHANGE UP
-  CEFEPIME: SIGNIFICANT CHANGE UP
-  CEFTAZIDIME: SIGNIFICANT CHANGE UP
-  CIPROFLOXACIN: SIGNIFICANT CHANGE UP
-  GENTAMICIN: SIGNIFICANT CHANGE UP
-  IMIPENEM: SIGNIFICANT CHANGE UP
-  LEVOFLOXACIN: SIGNIFICANT CHANGE UP
-  MEROPENEM: SIGNIFICANT CHANGE UP
-  PIPERACILLIN/TAZOBACTAM: SIGNIFICANT CHANGE UP
-  TOBRAMYCIN: SIGNIFICANT CHANGE UP
ANION GAP SERPL CALC-SCNC: 14 MMOL/L — SIGNIFICANT CHANGE UP (ref 5–17)
BASOPHILS # BLD AUTO: 0.02 K/UL — SIGNIFICANT CHANGE UP (ref 0–0.2)
BASOPHILS NFR BLD AUTO: 0.2 % — SIGNIFICANT CHANGE UP (ref 0–2)
BUN SERPL-MCNC: 22 MG/DL — SIGNIFICANT CHANGE UP (ref 7–23)
CALCIUM SERPL-MCNC: 9.3 MG/DL — SIGNIFICANT CHANGE UP (ref 8.4–10.5)
CHLORIDE SERPL-SCNC: 108 MMOL/L — SIGNIFICANT CHANGE UP (ref 96–108)
CO2 SERPL-SCNC: 22 MMOL/L — SIGNIFICANT CHANGE UP (ref 22–31)
CREAT SERPL-MCNC: 0.7 MG/DL — SIGNIFICANT CHANGE UP (ref 0.5–1.3)
CULTURE RESULTS: SIGNIFICANT CHANGE UP
EOSINOPHIL # BLD AUTO: 0.43 K/UL — SIGNIFICANT CHANGE UP (ref 0–0.5)
EOSINOPHIL NFR BLD AUTO: 4.4 % — SIGNIFICANT CHANGE UP (ref 0–6)
GLUCOSE BLDC GLUCOMTR-MCNC: 199 MG/DL — HIGH (ref 70–99)
GLUCOSE BLDC GLUCOMTR-MCNC: 223 MG/DL — HIGH (ref 70–99)
GLUCOSE BLDC GLUCOMTR-MCNC: 275 MG/DL — HIGH (ref 70–99)
GLUCOSE BLDC GLUCOMTR-MCNC: 276 MG/DL — HIGH (ref 70–99)
GLUCOSE SERPL-MCNC: 210 MG/DL — HIGH (ref 70–99)
HCT VFR BLD CALC: 41.9 % — SIGNIFICANT CHANGE UP (ref 39–50)
HGB BLD-MCNC: 14 G/DL — SIGNIFICANT CHANGE UP (ref 13–17)
IMM GRANULOCYTES NFR BLD AUTO: 0.4 % — SIGNIFICANT CHANGE UP (ref 0–1.5)
LYMPHOCYTES # BLD AUTO: 1.83 K/UL — SIGNIFICANT CHANGE UP (ref 1–3.3)
LYMPHOCYTES # BLD AUTO: 18.7 % — SIGNIFICANT CHANGE UP (ref 13–44)
MCHC RBC-ENTMCNC: 33.4 GM/DL — SIGNIFICANT CHANGE UP (ref 32–36)
MCHC RBC-ENTMCNC: 33.5 PG — SIGNIFICANT CHANGE UP (ref 27–34)
MCV RBC AUTO: 100.2 FL — HIGH (ref 80–100)
METHOD TYPE: SIGNIFICANT CHANGE UP
MONOCYTES # BLD AUTO: 0.79 K/UL — SIGNIFICANT CHANGE UP (ref 0–0.9)
MONOCYTES NFR BLD AUTO: 8.1 % — SIGNIFICANT CHANGE UP (ref 2–14)
NEUTROPHILS # BLD AUTO: 6.66 K/UL — SIGNIFICANT CHANGE UP (ref 1.8–7.4)
NEUTROPHILS NFR BLD AUTO: 68.2 % — SIGNIFICANT CHANGE UP (ref 43–77)
ORGANISM # SPEC MICROSCOPIC CNT: SIGNIFICANT CHANGE UP
ORGANISM # SPEC MICROSCOPIC CNT: SIGNIFICANT CHANGE UP
PLATELET # BLD AUTO: 123 K/UL — LOW (ref 150–400)
POTASSIUM SERPL-MCNC: 4.5 MMOL/L — SIGNIFICANT CHANGE UP (ref 3.5–5.3)
POTASSIUM SERPL-SCNC: 4.5 MMOL/L — SIGNIFICANT CHANGE UP (ref 3.5–5.3)
RBC # BLD: 4.18 M/UL — LOW (ref 4.2–5.8)
RBC # FLD: 13.1 % — SIGNIFICANT CHANGE UP (ref 10.3–14.5)
SODIUM SERPL-SCNC: 144 MMOL/L — SIGNIFICANT CHANGE UP (ref 135–145)
SPECIMEN SOURCE: SIGNIFICANT CHANGE UP
WBC # BLD: 9.77 K/UL — SIGNIFICANT CHANGE UP (ref 3.8–10.5)
WBC # FLD AUTO: 9.77 K/UL — SIGNIFICANT CHANGE UP (ref 3.8–10.5)

## 2017-12-05 PROCEDURE — 99232 SBSQ HOSP IP/OBS MODERATE 35: CPT

## 2017-12-05 RX ORDER — AZTREONAM 2 G
1000 VIAL (EA) INJECTION ONCE
Qty: 0 | Refills: 0 | Status: COMPLETED | OUTPATIENT
Start: 2017-12-05 | End: 2017-12-05

## 2017-12-05 RX ORDER — AZTREONAM 2 G
1000 VIAL (EA) INJECTION EVERY 8 HOURS
Qty: 0 | Refills: 0 | Status: DISCONTINUED | OUTPATIENT
Start: 2017-12-05 | End: 2017-12-06

## 2017-12-05 RX ORDER — DIPHENHYDRAMINE HCL 50 MG
25 CAPSULE ORAL ONCE
Qty: 0 | Refills: 0 | Status: COMPLETED | OUTPATIENT
Start: 2017-12-05 | End: 2017-12-05

## 2017-12-05 RX ORDER — AZTREONAM 2 G
VIAL (EA) INJECTION
Qty: 0 | Refills: 0 | Status: DISCONTINUED | OUTPATIENT
Start: 2017-12-05 | End: 2017-12-06

## 2017-12-05 RX ADMIN — HEPARIN SODIUM 5000 UNIT(S): 5000 INJECTION INTRAVENOUS; SUBCUTANEOUS at 13:54

## 2017-12-05 RX ADMIN — Medication 1: at 08:25

## 2017-12-05 RX ADMIN — Medication 81 MILLIGRAM(S): at 12:03

## 2017-12-05 RX ADMIN — Medication 50 MILLIGRAM(S): at 16:21

## 2017-12-05 RX ADMIN — PANTOPRAZOLE SODIUM 40 MILLIGRAM(S): 20 TABLET, DELAYED RELEASE ORAL at 06:31

## 2017-12-05 RX ADMIN — TAMSULOSIN HYDROCHLORIDE 0.4 MILLIGRAM(S): 0.4 CAPSULE ORAL at 22:54

## 2017-12-05 RX ADMIN — Medication 25 MILLIGRAM(S): at 16:14

## 2017-12-05 RX ADMIN — HEPARIN SODIUM 5000 UNIT(S): 5000 INJECTION INTRAVENOUS; SUBCUTANEOUS at 06:32

## 2017-12-05 RX ADMIN — HEPARIN SODIUM 5000 UNIT(S): 5000 INJECTION INTRAVENOUS; SUBCUTANEOUS at 22:54

## 2017-12-05 RX ADMIN — PIPERACILLIN AND TAZOBACTAM 25 GRAM(S): 4; .5 INJECTION, POWDER, LYOPHILIZED, FOR SOLUTION INTRAVENOUS at 14:31

## 2017-12-05 RX ADMIN — Medication 50 MILLIGRAM(S): at 22:54

## 2017-12-05 RX ADMIN — Medication 3: at 12:04

## 2017-12-05 RX ADMIN — PIPERACILLIN AND TAZOBACTAM 25 GRAM(S): 4; .5 INJECTION, POWDER, LYOPHILIZED, FOR SOLUTION INTRAVENOUS at 06:31

## 2017-12-05 RX ADMIN — Medication 3: at 17:08

## 2017-12-05 NOTE — OCCUPATIONAL THERAPY INITIAL EVALUATION ADULT - LIVES WITH, PROFILE
spouse/Pt lives with his wife in a private home, 2 steps to enter or ramp access, all needs on 1st floor. Lengthy conversation had with pt's son, Klebre Pfeiffer, [phone], regarding pt's PLOF. As per pt's son, pt requires A with most ADLs, was able to feed/ groom using RUE. A few weeks ago, pt was taking a couple of steps with assistance and RW, transferring to and from bed to chair with assistance. Pt was recently receiving home services and ambulating a few steps. Pt has a hospital bed, RW, wheelchair. urinal, sponge bathes with assistance from wife or . More recently, pt has been primarily in bed with son transferring pt to/from wheelchair as able. Pt's son is a contractor and is in process of making home handicap accessible. spouse/Pt lives with his wife in a private home, 2 steps to enter or ramp access, all needs on 1st floor. Lengthy conversation had with pt's son, Kleber Pfeiffer, [phone], regarding pt's PLOF. As per pt's son, pt requires A with most ADLs, was able to feed/ groom using RUE. A few weeks ago, pt was taking a couple of steps with assistance and RW, transferring to and from bed to chair with assistance. Pt was recently receiving home services and ambulating a few steps. Pt has a hospital bed, RW, wheelchair. urinal, sponge bathes with assistance from wife or son. More recently, pt has been primarily in bed with son transferring pt to/from wheelchair as able. Pt's son is a contractor and is in process of making home handicap accessible.

## 2017-12-05 NOTE — DISCHARGE NOTE ADULT - MEDICATION SUMMARY - MEDICATIONS TO TAKE
I will START or STAY ON the medications listed below when I get home from the hospital:    aspirin 81 mg oral delayed release tablet  -- 1 tab(s) by mouth once a day  -- Indication: For CVA, old, hemiparesis    lisinopril 5 mg oral tablet  -- 1 tab(s) by mouth once a day  -- Indication: For Hypertension    tamsulosin 0.4 mg oral capsule  -- 1 cap(s) by mouth once a day (at bedtime)  -- Indication: For BPH    metFORMIN 500 mg oral tablet  -- 1 tab(s) by mouth 3 times a day  -- Indication: For Diabetes    simvastatin 20 mg oral tablet  -- 1 tab(s) by mouth once a day (in the evening)  -- Indication: For Hyperlipidemia    metoprolol succinate 25 mg oral tablet, extended release  -- 1 tab(s) by mouth once a day  -- Indication: For Hypertension    nystatin 100,000 units/g topical powder  -- 1 application on skin 4 times a day  -- Indication: For Fungal rash    docusate sodium 100 mg oral capsule  -- 3 cap(s) by mouth once a day (at bedtime)  -- Indication: For Constipation    polyethylene glycol 3350 oral powder for reconstitution  -- 17 gram(s) by mouth once a day. Hold for loose stools.  -- Indication: For Constipation    senna oral tablet  -- 2 tab(s) by mouth once a day (at bedtime).Hold for loose stools.  -- Indication: For Constipation    pantoprazole 40 mg oral delayed release tablet  -- 1 tab(s) by mouth once a day  -- Indication: For Gerd    ciprofloxacin 500 mg oral tablet  -- 1 tab(s) by mouth every 12 hours  -- Indication: For Uti    multivitamin  -- 1 tab(s) by mouth once a day  -- Indication: For Supplement

## 2017-12-05 NOTE — PROGRESS NOTE ADULT - SUBJECTIVE AND OBJECTIVE BOX
Patient is a 77y old  Male who presents with a chief complaint of Sent for abdominal pain/nausea/vomiting/ distended abdomen (04 Dec 2017 14:40)    Being followed by ID for ?UTI,failure to thrive    Interval history:awake  answers queries  No acute events      ROS:  No cough,SOB,CP  No N/V/D./abd pain  No other complaints      Antimicrobials:    piperacillin/tazobactam IVPB. 3.375 Gram(s) IV Intermittent every 8 hours    Other medications reviewed    Vital Signs Last 24 Hrs  T(C): 36.6 (12-05-17 @ 04:12), Max: 37.2 (12-04-17 @ 14:53)  T(F): 97.9 (12-05-17 @ 04:12), Max: 98.9 (12-04-17 @ 14:53)  HR: 78 (12-05-17 @ 04:12) (78 - 85)  BP: 153/86 (12-05-17 @ 04:12) (126/82 - 153/86)  BP(mean): --  RR: 18 (12-05-17 @ 04:12) (17 - 18)  SpO2: 96% (12-05-17 @ 04:12) (95% - 96%)    Physical Exam:    Constitutional well preserved,comfortable,pleasant    HEENT PERRLA EOMI,No pallor or icterus    No oral exudate or erythema    Neck supple no JVD or LN    Chest Good AE,CTA    CVS RRR S1 S2 WNl No murmur or rub or gallop    Abd soft BS normal No tenderness no masses    Ext No cyanosis clubbing or edema    IV site no erythema tenderness or discharge    Joints no swelling or LOM    CNS AAO X 2,left hemiparesis    Lab Data:                          14.0   9.77  )-----------( 123      ( 05 Dec 2017 08:42 )             41.9       12-05    144  |  108  |  22  ----------------------------<  210<H>  4.5   |  22  |  0.70    Ca    9.3      05 Dec 2017 08:44  Phos  2.7     12-04  Mg     1.8     12-04    TPro  6.1  /  Alb  3.3  /  TBili  0.3  /  DBili  x   /  AST  11  /  ALT  11  /  AlkPhos  57  12-04        Culture - Urine (collected 03 Dec 2017 22:33)  Source: .Urine Catheterized  Preliminary Report (04 Dec 2017 19:22):    10,000 - 49,000 CFU/mL Mucin producing Pseudomonas aeruginosa    Culture - Blood (collected 03 Dec 2017 22:31)  Source: .Blood Blood  Preliminary Report (04 Dec 2017 23:02):    No growth to date.    Culture - Blood (collected 03 Dec 2017 22:31)  Source: .Blood Blood  Preliminary Report (04 Dec 2017 23:02):    No growth to date.

## 2017-12-05 NOTE — PROGRESS NOTE ADULT - SUBJECTIVE AND OBJECTIVE BOX
Patient is a 77y old  Male who presents with a chief complaint of Sent for abdominal pain/nausea/vomiting/ distended abdomen (05 Dec 2017 13:12)      SUBJECTIVE / OVERNIGHT EVENTS:   Feels better.  Denies CP/SOB/Palpitation/HA.    MEDICATIONS  (STANDING):  aspirin enteric coated 81 milliGRAM(s) Oral daily  aztreonam  IVPB      aztreonam  IVPB 1000 milliGRAM(s) IV Intermittent every 8 hours  dextrose 5%. 1000 milliLiter(s) (50 mL/Hr) IV Continuous <Continuous>  dextrose 50% Injectable 12.5 Gram(s) IV Push once  dextrose 50% Injectable 25 Gram(s) IV Push once  dextrose 50% Injectable 25 Gram(s) IV Push once  heparin  Injectable 5000 Unit(s) SubCutaneous every 8 hours  insulin lispro (HumaLOG) corrective regimen sliding scale   SubCutaneous three times a day before meals  insulin lispro (HumaLOG) corrective regimen sliding scale   SubCutaneous at bedtime  pantoprazole    Tablet 40 milliGRAM(s) Oral before breakfast  sodium chloride 0.9%. 1000 milliLiter(s) (60 mL/Hr) IV Continuous <Continuous>  tamsulosin 0.4 milliGRAM(s) Oral at bedtime    MEDICATIONS  (PRN):  dextrose Gel 1 Dose(s) Oral once PRN Blood Glucose LESS THAN 70 milliGRAM(s)/deciliter  glucagon  Injectable 1 milliGRAM(s) IntraMuscular once PRN Glucose LESS THAN 70 milligrams/deciliter        CAPILLARY BLOOD GLUCOSE      POCT Blood Glucose.: 223 mg/dL (05 Dec 2017 22:34)  POCT Blood Glucose.: 275 mg/dL (05 Dec 2017 16:27)  POCT Blood Glucose.: 276 mg/dL (05 Dec 2017 11:27)  POCT Blood Glucose.: 199 mg/dL (05 Dec 2017 07:21)    I&O's Summary    04 Dec 2017 07:01  -  05 Dec 2017 07:00  --------------------------------------------------------  IN: 200 mL / OUT: 0 mL / NET: 200 mL    05 Dec 2017 07:01  -  05 Dec 2017 22:51  --------------------------------------------------------  IN: 930 mL / OUT: 1 mL / NET: 929 mL        PHYSICAL EXAM:  GENERAL: NAD, well-developed  HEAD:  Atraumatic, Normocephalic  NECK: Supple, No JVD  CHEST/LUNG: Clear to auscultation bilaterally; No wheezing.  HEART: Regular rate and rhythm; No murmurs, rubs, or gallops  ABDOMEN: Soft, Nontender, Nondistended; Bowel sounds present  EXTREMITIES:   No clubbing, cyanosis, or edema  NEUROLOGY: AAO X 3  SKIN: No rashes    LABS:                        14.0   9.77  )-----------( 123      ( 05 Dec 2017 08:42 )             41.9     12-05    144  |  108  |  22  ----------------------------<  210<H>  4.5   |  22  |  0.70    Ca    9.3      05 Dec 2017 08:44  Phos  2.7     12-04  Mg     1.8     12-04    TPro  6.1  /  Alb  3.3  /  TBili  0.3  /  DBili  x   /  AST  11  /  ALT  11  /  AlkPhos  57  12-04    PT/INR - ( 04 Dec 2017 06:19 )   PT: 10.9 sec;   INR: 1.01 ratio         PTT - ( 04 Dec 2017 06:19 )  PTT:27.4 sec        CAPILLARY BLOOD GLUCOSE      POCT Blood Glucose.: 223 mg/dL (05 Dec 2017 22:34)  POCT Blood Glucose.: 275 mg/dL (05 Dec 2017 16:27)  POCT Blood Glucose.: 276 mg/dL (05 Dec 2017 11:27)  POCT Blood Glucose.: 199 mg/dL (05 Dec 2017 07:21)    12-03 @ 22:33  Culture-urine --  Culture results   10,000 - 49,000 CFU/mL Mucin producing Pseudomonas aeruginosa  Normal Urogenital jack present  method type ERNESTINE  Organism Pseudomonas aeruginosa  Organism Identification Pseudomonas aeruginosa  Specimen source .Urine Catheterized  12-03 @ 22:31  Culture-urine --  Culture results   No growth to date.  method type --  Organism --  Organism Identification --  Specimen source .Blood Blood           12-03 @ 22:33  Culture blood --  Culture results   10,000 - 49,000 CFU/mL Mucin producing Pseudomonas aeruginosa  Normal Urogenital jack present  Gram stain --  Gram stain blood --  Method type ERNESTINE  Organism Pseudomonas aeruginosa  Organism identification Pseudomonas aeruginosa  Specimen source .Urine Catheterized   12-03 @ 22:31  Culture blood --  Culture results   No growth to date.  Gram stain --  Gram stain blood --  Method type --  Organism --  Organism identification --  Specimen source .Blood Blood      RADIOLOGY & ADDITIONAL TESTS:    Imaging Personally Reviewed:    Consultant(s) Notes Reviewed:      Care Discussed with Consultants/Other Providers:

## 2017-12-05 NOTE — OCCUPATIONAL THERAPY INITIAL EVALUATION ADULT - RANGE OF MOTION EXAMINATION, LOWER EXTREMITY
Right LE Active ROM was WFL   (within functional limits)/Left LE Passive ROM was WFL (w/i functional limits)

## 2017-12-05 NOTE — OCCUPATIONAL THERAPY INITIAL EVALUATION ADULT - PLANNED THERAPY INTERVENTIONS, OT EVAL
parent/caregiver training.../balance training/neuromuscular re-education/ADL retraining/bed mobility training

## 2017-12-05 NOTE — DISCHARGE NOTE ADULT - HOME CARE AGENCY
[facility] - A visiting nurse will contact you to schedule your home care services. Please call [facility] if you have any questions or concerns about your home care services.

## 2017-12-05 NOTE — DISCHARGE NOTE ADULT - PLAN OF CARE
sepsis resolves Take all antibiotics as ordered.  Call you Health care provider upon arrival home to make a one week follow up appointment.  If you develop fever, chills, malaise, or change in mental status call your Health Care Provider or go to the Emergency Department.  Nutrition is important, eat small frequent meals to help ensure you get adequate calories.  Do not stay in bed all day!  Increase your activity daily as tolerated. complete antibiotics continue aspirin Follow up with cardiology Possible secondary to infection; now improved continue pureed food with honey thick liquids

## 2017-12-05 NOTE — OCCUPATIONAL THERAPY INITIAL EVALUATION ADULT - BALANCE DISTURBANCE, IDENTIFIED IMPAIRMENT CONTRIBUTE, REHAB EVAL
impaired coordination/impaired motor control/decreased strength/abnormal muscle tone/impaired postural control

## 2017-12-05 NOTE — DISCHARGE NOTE ADULT - CARE PROVIDER_API CALL
Matt Trevizo (BRAYDEN), Cardiology  [address] [id number]  Phone: [phone]  Fax: [phone]

## 2017-12-05 NOTE — CONSULT NOTE ADULT - ATTENDING COMMENTS
Thank you for the courtesy of the consultation,I would be available for any further discussion if needed.  Matt Trevizo MD,FACC.  [address] [place], [place] [id number] [phone]

## 2017-12-05 NOTE — DISCHARGE NOTE ADULT - PATIENT PORTAL LINK FT
“You can access the FollowHealth Patient Portal, offered by [facility], by registering with the following website: [email or web address]”

## 2017-12-05 NOTE — DISCHARGE NOTE ADULT - MEDICATION SUMMARY - MEDICATIONS TO CHANGE
I will SWITCH the dose or number of times a day I take the medications listed below when I get home from the hospital:    metFORMIN 850 mg oral tablet  -- 1 tab(s) by mouth 2 times a day    metoprolol tartrate 25 mg oral tablet  -- 1 tab(s) by mouth once a day

## 2017-12-05 NOTE — OCCUPATIONAL THERAPY INITIAL EVALUATION ADULT - DIAGNOSIS, OT EVAL
Pt with impaired strength, balance impacting pt's ability to complete ADLs, functional mobility (impaired motor contolr, cognition, language baseline 2* old CVA).

## 2017-12-05 NOTE — PROGRESS NOTE ADULT - ASSESSMENT
77M c HTN, HLD, DM2, PUD, not on A/C 2/2 GIB, AS s/p TAVR and aortic root replacement (1/2013), CVAx3 c/b left hemiparesis, bedbound, minimally verbal, p/w lethargy and fevers.  Also rhinorrhea  Some pyuria-no catheter  CXR clear  delirium improved    ?UTI ?asp pna  ?other occult foci  ?Non infectious focus    prior Cx s/o pseudomonas urinary colonization-though lore have had morrison then.  Urine cx from yesterdya also with pseudomonas      rec:  1) follow blood,urine Cx  2) Continue Empiric Zosyn  4) if fever /delirium persists and  no other foci consider Ct chest,abd pelvis  5) Consider Speech and swallow eval  6) Tailor plan per course,results.    Will Follow.  Beeper [id number]-groj/afterhours/No response-[phone]

## 2017-12-05 NOTE — DISCHARGE NOTE ADULT - SECONDARY DIAGNOSIS.
Acute cystitis without hematuria CVA, old, hemiparesis H/O aortic root repair Metabolic encephalopathy Dysphagia

## 2017-12-05 NOTE — DISCHARGE NOTE ADULT - NS AS DC FOLLOWUP STROKE INST
Smoking Cessation/Influenza vaccination (VIS Pub Date: August 19, 2014) Influenza vaccination (VIS Pub Date: August 19, 2014)/Smoking Cessation/Stroke (includes: TIA/SAH/ICH/Ischemic Stroke)

## 2017-12-05 NOTE — DISCHARGE NOTE ADULT - MEDICATION SUMMARY - MEDICATIONS TO STOP TAKING
I will STOP taking the medications listed below when I get home from the hospital:    enoxaparin  -- 40 milligram(s) subcutaneous once a day    insulin lispro 100 units/mL subcutaneous solution  --  subcutaneous 3 times a day (before meals); 1 Unit(s) if Glucose 151 - 200  2 Unit(s) if Glucose 201 - 250  3 Unit(s) if Glucose 251 - 300  4 Unit(s) if Glucose 301 - 350  5 Unit(s) if Glucose 351 - 400  6 Unit(s) if Glucose Greater Than 400

## 2017-12-05 NOTE — CONSULT NOTE ADULT - ASSESSMENT
· Assessment		  77 yr old male with sig PMHx of HTN, HLD, T2DM, PUD, not on A/C 2/2 GIB, s/p BioAVR and aortic root replacement (1/2013), CVAx3 with residual  left hemiparesis, bedbound, minimally verbal, p/w sepsis, poss 2/2 UTI    Problem/Plan - 1:  ·  Problem: Acute cystitis without hematuria.  Plan: - Urine C&S no growth.  - cont with ceftriaxone for now.     Problem/Plan - 2:  ·  Problem: Sepsis, due to unspecified organism.  Plan: - unclear etiology, poss 2/2 UTI  - consider aspiration in dx given CVA, despite clear CXR  - no evidence of cellulitis in b/l LE  - VS q4h  - maintenance IVF  -- CXR grossly clear at this time.     Problem/Plan - 3:  ·  Problem: Metabolic encephalopathy.  Plan: - acute on chronic waxing and waning  - likely 2/2 sepsis and infection  - cont to monitor, treat as above.Mental status trending to baseline    Problem/Plan - 4:  ·  Problem: Cerebrovascular accident (CVA), unspecified mechanism.  Plan: - Continue Aspirin 81mg,resume Metoprolol and Lisinopril
1. bilateral hypertrohic Onychomycosis digits 1-5   2. Pain from Elongated nails, ingrowing  and dystrophic nails    Plan: Discussed diagnosis and treatment with patient  Patient was seen and examined  Aseptic debridement and curettage of all fungal and ingrowing nails 1-5 bilateral  Discussed importance of daily foot examinations and proper shoe gear  Please re-consult as needed.
77M c HTN, HLD, DM2, PUD, not on A/C 2/2 GIB, AS s/p TAVR and aortic root replacement (1/2013), CVAx3 c/b left hemiparesis, bedbound, minimally verbal, p/w lethargy and fevers.  Also rhinorrhea  Some pyuria-no catheter  CXR clear  delirium improved    ?UTI ?asp pna  ?other occult foci  ?Non infectious focus    prior Cx s/o pseudomonas urinary colonization-though lore have had morrison then.      rec:  1) follow blood,urine Cx  2) Check RVP  3) Empiric Zosyn  4) if fever /delirium persists and  no other foci consider Ct chest,abd pelvis  5) Speech and swallow eval  6) Tailor plan per course,results.  Case d/w wife,Med NP  Will Follow.  Beeper [id number]-ovla/afterhours/No response-[phone]

## 2017-12-05 NOTE — DISCHARGE NOTE ADULT - CARE PLAN
Principal Discharge DX:	Sepsis, due to unspecified organism  Goal:	sepsis resolves  Instructions for follow-up, activity and diet:	Take all antibiotics as ordered.  Call you Health care provider upon arrival home to make a one week follow up appointment.  If you develop fever, chills, malaise, or change in mental status call your Health Care Provider or go to the Emergency Department.  Nutrition is important, eat small frequent meals to help ensure you get adequate calories.  Do not stay in bed all day!  Increase your activity daily as tolerated.  Secondary Diagnosis:	Acute cystitis without hematuria  Instructions for follow-up, activity and diet:	complete antibiotics  Secondary Diagnosis:	CVA, old, hemiparesis  Instructions for follow-up, activity and diet:	continue aspirin  Secondary Diagnosis:	H/O aortic root repair  Instructions for follow-up, activity and diet:	Follow up with cardiology  Secondary Diagnosis:	Metabolic encephalopathy  Instructions for follow-up, activity and diet:	Possible secondary to infection; now improved Principal Discharge DX:	Sepsis, due to unspecified organism  Goal:	sepsis resolves  Instructions for follow-up, activity and diet:	Take all antibiotics as ordered.  Call you Health care provider upon arrival home to make a one week follow up appointment.  If you develop fever, chills, malaise, or change in mental status call your Health Care Provider or go to the Emergency Department.  Nutrition is important, eat small frequent meals to help ensure you get adequate calories.  Do not stay in bed all day!  Increase your activity daily as tolerated.  Secondary Diagnosis:	Acute cystitis without hematuria  Instructions for follow-up, activity and diet:	complete antibiotics  Secondary Diagnosis:	CVA, old, hemiparesis  Instructions for follow-up, activity and diet:	continue aspirin  Secondary Diagnosis:	H/O aortic root repair  Instructions for follow-up, activity and diet:	Follow up with cardiology  Secondary Diagnosis:	Metabolic encephalopathy  Instructions for follow-up, activity and diet:	Possible secondary to infection; now improved  Secondary Diagnosis:	Dysphagia  Instructions for follow-up, activity and diet:	continue pureed food with honey thick liquids

## 2017-12-05 NOTE — OCCUPATIONAL THERAPY INITIAL EVALUATION ADULT - PERTINENT HX OF CURRENT PROBLEM, REHAB EVAL
77M c HTN, HLD, DM2, PUD, not on A/C 2/2 GIB, AS s/p TAVR and aortic root replacement (1/2013), CVAx3 c/b L hemiparesis, bedbound, minimally verbal, p/w lethargy & fevers. Pt unable to provided history 2/2 minimally verbal. Left voicemail on wife's & son's #. At baseline, pt is bedbound, minimally verbal, answers questions w/ yes/no. Currently, pt w/o c/o pain, follows simple commands such as raise hand, point at ceiling, squeeze hand. Hx per chart is that pt has been more lethargic.

## 2017-12-05 NOTE — CONSULT NOTE ADULT - SUBJECTIVE AND OBJECTIVE BOX
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      VS: Tm 100.4, P 102, /64, R 20, 95% RA  In the ED, received cefazolin, NS 2L,.    PAST MEDICAL & SURGICAL HISTORY:  PUD (peptic ulcer disease)-recurrent GIB  CVA, old, 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  Hyperlipemia  Diabetes mellitus  Hypertension  History of eye surgery: endophthalmitis in 2014  H/O aortic root repair        MEDICATIONS  (STANDING):  aspirin enteric coated 81 milliGRAM(s) Oral daily  heparin  Injectable 5000 Unit(s) SubCutaneous every 8 hours  insulin lispro (HumaLOG) corrective regimen sliding scale   SubCutaneous three times a day before meals  insulin lispro (HumaLOG) corrective regimen sliding scale   SubCutaneous at bedtime  pantoprazole    Tablet 40 milliGRAM(s) Oral before breakfast  piperacillin/tazobactam IVPB. 3.375 Gram(s) IV Intermittent every 8 hours  sodium chloride 0.9%. 1000 milliLiter(s) (60 mL/Hr) IV Continuous <Continuous>  tamsulosin 0.4 milliGRAM(s) Oral at bedtime    MEDICATIONS  (PRN):  dextrose Gel 1 Dose(s) Oral once PRN Blood Glucose LESS THAN 70 milliGRAM(s)/deciliter  glucagon  Injectable 1 milliGRAM(s) IntraMuscular once PRN Glucose LESS THAN 70 milligrams/deciliter      FAMILY HISTORY:  No pertinent family history in first degree relatives  No family history of premature coronary artery disease or sudden cardiac death    SOCIAL HISTORY:  Smoking-Former smoker  Alcohol-Denied  Ilicit Drug use-Denied    REVIEW OF SYSTEMS:  Constitutional: [x ] fever, [ ]weight loss, [ ]fatigue Activity [x ] Bedbound,[ ] Ambulates [ ] Unassisted[ ] Cane/Walker [ ] Assistence.  Eyes: [ ] visual changes  Respiratory: [ ]shortness of breath;  [x ] cough, [ ]wheezing, [ ]chills, [ ]hemoptysis  Cardiovascular: [ ] chest pain, [ ]palpitations, [ ]dizziness,  [ ]leg swelling[ ]orthopnea [ ]PND  Gastrointestinal: [x ] abdominal pain, [x ]nausea, [ ]vomiting,  [ ]diarrhea,[ ]constipation  Genitourinary: [ ] dysuria, [ ] hematuria  Neurologic: [ ] headaches [ ] tremors[x ] weakness-left  Skin: [ ] itching, [ ]burning, [ ] rashes  Endocrine: [ ] heat or cold intolerance  Musculoskeletal: [ ] joint pain or swelling; [ ] muscle, back, or extremity pain  Psychiatric: [ ] depression, [ ]anxiety, [ ]mood swings, or [ ]difficulty sleeping  Hematologic: [ ] easy bruising, [ ] bleeding gums       [ x] All others negative	  [ ] Unable to obtain    Vital Signs Last 24 Hrs  T(C): 36.6 (05 Dec 2017 04:12), Max: 37.2 (04 Dec 2017 14:53)  T(F): 97.9 (05 Dec 2017 04:12), Max: 98.9 (04 Dec 2017 14:53)  HR: 78 (05 Dec 2017 04:12) (78 - 85)  BP: 153/86 (05 Dec 2017 04:12) (126/82 - 153/86)  RR: 18 (05 Dec 2017 04:12) (17 - 18)  SpO2: 96% (05 Dec 2017 04:12) (95% - 96%)  I&O's Summary      PHYSICAL EXAM:  General: No acute distress BMI-27.2  HEENT: EOMI, PERRL[ ] Icteric  Neck: Supple, No JVD  Lungs: Equal air entry bilaterally; [ ] Rales [x] Rhonchi [ ] Wheezing  Heart: Regular rate and rhythm;[x ] Murmurs-   2/6 [x ] Systolic [ ] Diastolic [ ] Radiation,No rubs, or gallops  Abdomen: Nontender, bowel sounds present  Extremities: No clubbing, cyanosis, or edema[ ] Calf tenderness  Nervous system:  Alert & Oriented X2-responds to verbal,Left paresis  Psychiatric: Normal affect  Skin: No rashes or lesions      LABS:  12-04    147<H>  |  110<H>  |  21  ----------------------------<  207<H>  4.1   |  23  |  0.74    Ca    8.9      04 Dec 2017 06:19  Phos  2.7     12-04  Mg     1.8     12-04    TPro  6.1  /  Alb  3.3  /  TBili  0.3  /  DBili  x   /  AST  11  /  ALT  11  /  AlkPhos  57  12-04    Creatinine Trend: 0.74<--, 0.86<--                        14.0   8.8   )-----------( 144      ( 04 Dec 2017 06:19 )             41.3     PT/INR - ( 04 Dec 2017 06:19 )   PT: 10.9 sec;   INR: 1.01 ratio    PTT - ( 04 Dec 2017 06:19 )  PTT:27.4 sec    12-04 [id number] 6.9    Culture - Urine (12.03.17 @ 22:33)    Specimen Source: .Urine Catheterized--10,000 - 49,000 CFU/mL Mucin producing Pseudomonas aeruginosa    Culture - Blood (12.03.17 @ 22:31)    Specimen Source: .Blood--No growth to date.      RADIOLOGY:CHEST-PORTABLE URGENT    IMPRESSION:  Clear lungs    ECG [my interpretation]: SINUS RHYTHM at 93 BPM WITH 1ST DEGREE A-V BLOCK,LEFT AXIS DEVIATION,NON-SPECIFIC INTRA-VENTRICULAR CONDUCTION BLOCK (QRS Duration 148 ms)    DICKSON: 2/21/2013Normal left ventricular systolic function.  Status post Bioprosthetic Aortic  Valve Replacement (BioAVR) and ascending graft  There is no aortic regurgitation.
Patient is a 77y old  Male who presents with a chief complaint of Sent for abdominal pain/nausea/vomiting/ distended abdomen (04 Dec 2017 14:40)    From HPI" HPI:  77M c HTN, HLD, DM2, PUD, not on A/C 2/2 GIB, AS s/p TAVR and aortic root replacement (1/2013), CVAx3 c/b left hemiparesis, bedbound, minimally verbal, p/w lethargy and fevers.    History obtained from chart. Pt unable to provided history 2/2 minimally verbal. Called and left voicemail on wife's # and son's #.    At baseline, pt is bedbound, minimally verbal but answers questions with yes or no. Currently, pt not complaining of pain, and able to follow simple commands such as raise hand, point at ceiling, squeeze hand. History per chart is that pt has been more lethargic. Pt has an adult diaper on.    VS: Tm 100.4, P 102, /64, R 20, 95% RA  In the ED, received cefazolin, NS 2L, blood and ur cx (04 Dec 2017 00:54)  "    Above verified-agree with above unless noted below.    At time of interview wife present  She notes that patient has been more lethargic last 2-3 days  Report runny nose -some cough though no sputum  Also has had decraesed appetitea-she says he has had swallowing issues and coughs some times when eating  No documented fevers  Patient does not have a morrison at home  No sick contacts  No observed nausea,voting or diarrhea      Admitted,given ceftriaxone  When seen patient awake -answers some queries  denies pain(but wife says he raraley gives accurate answers)    ID consulted     PAST MEDICAL & SURGICAL HISTORY:  GI bleed  PUD (peptic ulcer disease)  CVA, old, 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  Gastric ulcer  Hyperlipemia  Diabetes mellitus  Hypertension  History of eye surgery: endophthalmitis in 2014  H/O aortic root repair  No Past Surgical History      Social history:  ex   Smoking quite few years ago,No ,    ETOH,      IVDU       FAMILY HISTORY:  No pertinent family history in first degree relatives  - Reviewed,Non contributory     REVIEW OF SYSTEMS  As above-patient unable to provide reliable ROS    No Known Allergies    Intolerances        Antimicrobials:    cefTRIAXone   IVPB        Other medications reviewed      Vital Signs Last 24 Hrs  T(C): 37.2 (04 Dec 2017 14:53), Max: 38 (03 Dec 2017 18:35)  T(F): 98.9 (04 Dec 2017 14:53), Max: 100.4 (03 Dec 2017 18:35)  HR: 85 (04 Dec 2017 14:53) (74 - 102)  BP: 143/84 (04 Dec 2017 14:53) (114/62 - 154/85)  BP(mean): --  RR: 17 (04 Dec 2017 14:53) (17 - 20)  SpO2: 95% (04 Dec 2017 14:53) (94% - 97%)    PHYSICAL EXAM:Pleasant patient in no acute distress.      Constitutional:Comfortable.Awake and alert    Eyes:PERRL EOMI.NO discharge or conjunctival injection    ENMT:No sinus tenderness.No thrush.No pharyngeal exudate or erythema.  Neck:Supple,No LN,no JVD      Respiratory:Good air entry bilaterally,CTA    Cardiovascular:S1 S2 wnl, No murmurs,rub or gallops    Gastrointestinal:Soft BS(+) no tenderness no masses ,No rebound or guarding    Genitourinary:No CVA tendereness         Extremities:No cyanosis,clubbing or edema.    Vascular:peripheral pulses felt    Neurological:As above ,does not follow commands fully  No meningeal signs            Musculoskeletal:No joint swelling or LOM    Psychiatric:Affect normal.          Labs:                            14.0   8.8   )-----------( 144      ( 04 Dec 2017 06:19 )             41.3         12-04    147<H>  |  110<H>  |  21  ----------------------------<  207<H>  4.1   |  23  |  0.74    Ca    8.9      04 Dec 2017 06:19  Phos  2.7     12-04  Mg     1.8     12-04    TPro  6.1  /  Alb  3.3  /  TBili  0.3  /  DBili  x   /  AST  11  /  ALT  11  /  AlkPhos  57  12-04        Urine Microscopic-Add On (NC) (12.03.17 @ 19:24)    Red Blood Cell - Urine: 0-2 /HPF    White Blood Cell - Urine: 5-10 /HPF          < from: Xray Chest 1 View AP- PORTABLE-Urgent (12.03.17 @ 19:43) >  IMPRESSION:  Clear lungs    < end of copied text >      Culture - Urine (05.04.17 @ 13:04)    -  Piperacillin/Tazobactam: S <=16    -  Tobramycin: S <=4    -  Amikacin: S <=16    -  Aztreonam: S <=4    -  Cefepime: S <=4    -  Ceftazidime: S <=1    -  Ciprofloxacin: S <=1    -  Imipenem: S <=1    -  Levofloxacin: S <=2    -  Gentamicin: S <=4    -  Meropenem: S <=1    Specimen Source: .Urine Catheterized    Culture Results:   >100,000 CFU/ml Pseudomonas aeruginosa    Organism Identification: Pseudomonas aeruginosa    Organism: Pseudomonas aeruginosa    Method Type: ERNESTINE    Culture - Urine (08.12.16 @ 16:03)    -  Amikacin: S <=16 ERNESTINE    -  Aztreonam: S 8 ERNESTINE    -  Piperacillin/Tazobactam: S <=16 ERNESTINE    -  Imipenem: S <=1 ERNESTINE    -  Levofloxacin: S <=2 ERNESTINE    -  Meropenem: S <=1 ERNESTINE    -  Gentamicin: S <=4 ERNESTINE    -  Ciprofloxacin: S <=1 ERNESTINE    -  Ceftazidime: S 4 ERNESTINE    -  Cefepime: S <=4 ERNESTINE    Culture - Urine:   COLONY COUNT: 50,000 CFU/ML    -  Tobramycin: S <=4 ERNESTINE    Specimen Source: URINE MIDSTREAM    Organism Identification: Pseudomonas aeruginosa    Organism: Pseudomonas aeruginosa    Method Type: MICROSCAN NEG URINE COMBO 61
S :   77y year old Male seen at bedside with a chief complaint of   painful thickened, dystrophic, ingrowing  and long toenails digits 1-5 bilaterally  and preventative foot examination. Patient is medically managed  by Medicine/Hospitalists.  Patient denies any history o f trauma to both feet.  Patient has no other pedal complaints.  Patient is experiencing pain while standing, walking and in shoe gear.       Patient admits to  (-) Fevers, (-) Chills, (-) Nausea, (-) Vomiting, (-) Shortness of Breath (-) calf pain (-) chest pain       PMH: GI bleed  PUD (peptic ulcer disease)  CVA, old, hemiparesis  Gastric ulcer  Hyperlipemia  Diabetes mellitus  Hypertension    PSH:History of eye surgery  H/O aortic root repair  No Past Surgical History      Allergies:No Known Allergies      Labs:                        14.0   9.77  )-----------( 123      ( 05 Dec 2017 08:42 )             41.9       WBC Trend  9.77 Date (12-05 @ 08:42)  8.8 Date (12-04 @ 06:19)  10.7<H> Date (12-03 @ 21:14)      Chem  12-05    144  |  108  |  22  ----------------------------<  210<H>  4.5   |  22  |  0.70    Ca    9.3      05 Dec 2017 08:44  Phos  2.7     12-04  Mg     1.8     12-04    TPro  6.1  /  Alb  3.3  /  TBili  0.3  /  DBili  x   /  AST  11  /  ALT  11  /  AlkPhos  57  12-04          T(F): 97.9 (12-05-17 @ 04:12), Max: 98.9 (12-04-17 @ 14:53)  HR: 78 (12-05-17 @ 04:12) (78 - 85)  BP: 153/86 (12-05-17 @ 04:12) (126/82 - 153/86)  RR: 18 (12-05-17 @ 04:12) (17 - 18)  SpO2: 96% (12-05-17 @ 04:12) (95% - 96%)  Wt(kg): --    O:   Integument:  Skin warm, dry and supple bilateral.  No open lesions or inter-digital macerations noted bilateral.  Toenails 1-5 Right and Left feet thickened, elongated, discolored, and dystrophic with subungual debris. There is pain upon palpation of all fungal and ingrowing nails 1-5 bilaterally.   Vascular: Dorsalis Pedis and Posterior Tibial pulses 1/4.  Capillary re-fill time less than 3 seconds digits 1-5 bilateral.  Neuro: Protective sensation intact to the level of the digits bilateral.  MSK: Muscle strength 5/5 all major muscle groups bilateral. No structural abnormality, bilaterally

## 2017-12-05 NOTE — OCCUPATIONAL THERAPY INITIAL EVALUATION ADULT - ANTICIPATED DISCHARGE DISPOSITION, OT EVAL
home w/ OT/to increase independence with ADLs, functional mobility. recommend supervision/assist [facility] functional activities home w/ OT/to increase independence with ADLs, functional mobility. recommend supervision/assist with all functional activities

## 2017-12-05 NOTE — OCCUPATIONAL THERAPY INITIAL EVALUATION ADULT - IMPAIRED TRANSFERS: SIT/STAND, REHAB EVAL
impaired postural control/impaired balance/cognition/impaired coordination/impaired motor control/abnormal muscle tone/decreased strength

## 2017-12-05 NOTE — DISCHARGE NOTE ADULT - HOSPITAL COURSE
77M c HTN, HLD, DM2, PUD, not on A/C 2/2 GIB, AS s/p TAVR and aortic root replacement (1/2013), CVAx3 c/b left hemiparesis, bedbound, minimally verbal, p/w lethargy and fevers. Also rhinorrhea;Some pyuria-no catheter;CXR clear ?UTI ?other occult foci  ?Non infectious focus; Recieved empiric Zosyn : Urine clx  with pseudomonas-Delirium improved; ID cleared to discharge on  cipro to finish 5 day course;

## 2017-12-06 DIAGNOSIS — E11.65 TYPE 2 DIABETES MELLITUS WITH HYPERGLYCEMIA: ICD-10-CM

## 2017-12-06 LAB
GLUCOSE BLDC GLUCOMTR-MCNC: 210 MG/DL — HIGH (ref 70–99)
GLUCOSE BLDC GLUCOMTR-MCNC: 216 MG/DL — HIGH (ref 70–99)
GLUCOSE BLDC GLUCOMTR-MCNC: 222 MG/DL — HIGH (ref 70–99)
GLUCOSE BLDC GLUCOMTR-MCNC: 282 MG/DL — HIGH (ref 70–99)

## 2017-12-06 PROCEDURE — 99233 SBSQ HOSP IP/OBS HIGH 50: CPT

## 2017-12-06 RX ORDER — METOPROLOL TARTRATE 50 MG
25 TABLET ORAL DAILY
Qty: 0 | Refills: 0 | Status: DISCONTINUED | OUTPATIENT
Start: 2017-12-06 | End: 2017-12-07

## 2017-12-06 RX ORDER — METOPROLOL TARTRATE 50 MG
25 TABLET ORAL DAILY
Qty: 0 | Refills: 0 | Status: DISCONTINUED | OUTPATIENT
Start: 2017-12-06 | End: 2017-12-06

## 2017-12-06 RX ORDER — CIPROFLOXACIN LACTATE 400MG/40ML
500 VIAL (ML) INTRAVENOUS EVERY 12 HOURS
Qty: 0 | Refills: 0 | Status: DISCONTINUED | OUTPATIENT
Start: 2017-12-06 | End: 2017-12-07

## 2017-12-06 RX ADMIN — TAMSULOSIN HYDROCHLORIDE 0.4 MILLIGRAM(S): 0.4 CAPSULE ORAL at 21:58

## 2017-12-06 RX ADMIN — Medication 81 MILLIGRAM(S): at 12:38

## 2017-12-06 RX ADMIN — Medication 3: at 17:22

## 2017-12-06 RX ADMIN — Medication 500 MILLIGRAM(S): at 17:22

## 2017-12-06 RX ADMIN — Medication 50 MILLIGRAM(S): at 06:03

## 2017-12-06 RX ADMIN — PANTOPRAZOLE SODIUM 40 MILLIGRAM(S): 20 TABLET, DELAYED RELEASE ORAL at 06:04

## 2017-12-06 RX ADMIN — HEPARIN SODIUM 5000 UNIT(S): 5000 INJECTION INTRAVENOUS; SUBCUTANEOUS at 21:58

## 2017-12-06 RX ADMIN — Medication 2: at 12:40

## 2017-12-06 RX ADMIN — Medication 2: at 08:24

## 2017-12-06 RX ADMIN — Medication 25 MILLIGRAM(S): at 12:38

## 2017-12-06 RX ADMIN — HEPARIN SODIUM 5000 UNIT(S): 5000 INJECTION INTRAVENOUS; SUBCUTANEOUS at 12:38

## 2017-12-06 RX ADMIN — HEPARIN SODIUM 5000 UNIT(S): 5000 INJECTION INTRAVENOUS; SUBCUTANEOUS at 06:04

## 2017-12-06 NOTE — SWALLOW BEDSIDE ASSESSMENT ADULT - COMMENTS
Per ID: Pt p/w lethargy and fevers; Also rhinorrhea; Some pyuria-no catheter; CXR clear; delirium improved; ?UTI ?asp pna ?other occult foci ?Non infectious focus; prior Cx s/o pseudomonas urinary colonization-though may have had morrison then. UCx from 12/5 also with pseudomonas. Cards following. Podiatry called for ingrown toenails.    Swallow Hx: MBS at [facility] 8/17/16: Patient presents with mild to moderate oral and moderate to severe pharyngeal dysphagia characterized by adequate oral containment, slow prolonged chewing for mechanical soft solid, slow bolus manipulation and transport for mechanical soft solid with piecemeal deglutition with oral residue on the tongue surface post primary swallow; adequate bolus manipulation and transport for puree with adequate oral clearance post primary swallow.  There is delayed initiation of the pharyngeal swallow, reduced laryngeal elevation, adequate tongue base retraction, adequate pharyngeal constriction with adequate pharyngeal clearance post swallow.  There was Laryngeal Penetration during the swallow for Honey Thick Liquid with retrieval leaving no residue in the endolarynx. There was Aspiration (Silent) during the swallow for [place] Thick liquids.  Patient with reduced laryngal sensation given significantly delayed cough response to the Aspiration. Rx: Puree with Honey Thick Liquids.

## 2017-12-06 NOTE — SWALLOW BEDSIDE ASSESSMENT ADULT - SLP PERTINENT HISTORY OF CURRENT PROBLEM
77M c HTN, HLD, DM2, PUD, not on A/C 2/2 GIB, AS s/p TAVR and aortic root replacement (1/2013), CVAx3 c/b left hemiparesis, bedbound, minimally verbal, p/w lethargy and fevers. At baseline, pt bedbound, minimally verbal but answers questions with yes or no. Currently, pt not complaining of pain, and able to follow simple commands such as raise hand, point at ceiling, squeeze hand. Hx per chart is that pt has been more lethargic. Pt a/w sepsis, poss 2/2 UTI - started on Abx, Sepsis poss 2/2 UTI, consider aspiration in dx given CVA, despite clear CXR, no evidence of cellulitis in b/l LE, Metabolic encephalopathy - acute on chronic waxing and waning - likely 2/2 sepsis and infection.

## 2017-12-06 NOTE — PROGRESS NOTE ADULT - ASSESSMENT
77M c HTN, HLD, DM2, PUD, not on A/C 2/2 GIB, AS s/p TAVR and aortic root replacement (1/2013), CVAx3 c/b left hemiparesis, bedbound, minimally verbal, p/w lethargy and fevers.  Also rhinorrhea  Some pyuria-no catheter  CXR clear  delirium improved    ?UTI   ?other occult foci  ?Non infectious focus    prior Cx s/o pseudomonas urinary colonization-though lore have had morrison then.  Repeat Cx also with pseudomonas-sensis as above  Continue cipro to finish 5 day course  Other plan per primary  case d/w Med NP  Will Follow.  Beeper [id number]-efgm/afterhours/No response-[id number]

## 2017-12-06 NOTE — PROGRESS NOTE ADULT - SUBJECTIVE AND OBJECTIVE BOX
Patient is a 77y old  Male who presents with a chief complaint of Sent for abdominal pain/nausea/vomiting/ distended abdomen (05 Dec 2017 13:12)      SUBJECTIVE / OVERNIGHT EVENTS:   Feels better.  Denies CP/SOB/Palpitation/HA.    MEDICATIONS  (STANDING):  aspirin enteric coated 81 milliGRAM(s) Oral daily  ciprofloxacin     Tablet 500 milliGRAM(s) Oral every 12 hours  dextrose 5%. 1000 milliLiter(s) (50 mL/Hr) IV Continuous <Continuous>  dextrose 50% Injectable 12.5 Gram(s) IV Push once  dextrose 50% Injectable 25 Gram(s) IV Push once  dextrose 50% Injectable 25 Gram(s) IV Push once  heparin  Injectable 5000 Unit(s) SubCutaneous every 8 hours  insulin lispro (HumaLOG) corrective regimen sliding scale   SubCutaneous three times a day before meals  insulin lispro (HumaLOG) corrective regimen sliding scale   SubCutaneous at bedtime  metoprolol succinate ER 25 milliGRAM(s) Oral daily  pantoprazole    Tablet 40 milliGRAM(s) Oral before breakfast  tamsulosin 0.4 milliGRAM(s) Oral at bedtime    MEDICATIONS  (PRN):  dextrose Gel 1 Dose(s) Oral once PRN Blood Glucose LESS THAN 70 milliGRAM(s)/deciliter  glucagon  Injectable 1 milliGRAM(s) IntraMuscular once PRN Glucose LESS THAN 70 milligrams/deciliter        CAPILLARY BLOOD GLUCOSE      POCT Blood Glucose.: 282 mg/dL (06 Dec 2017 17:17)  POCT Blood Glucose.: 216 mg/dL (06 Dec 2017 12:06)  POCT Blood Glucose.: 222 mg/dL (06 Dec 2017 08:03)  POCT Blood Glucose.: 223 mg/dL (05 Dec 2017 22:34)    I&O's Summary    05 Dec 2017 07:01  -  06 Dec 2017 07:00  --------------------------------------------------------  IN: 1030 mL / OUT: 1 mL / NET: 1029 mL    06 Dec 2017 07:01  -  06 Dec 2017 19:03  --------------------------------------------------------  IN: 720 mL / OUT: 0 mL / NET: 720 mL        PHYSICAL EXAM:  GENERAL: NAD, well-developed  HEAD:  Atraumatic, Normocephalic  NECK: Supple, No JVD  CHEST/LUNG: Clear to auscultation bilaterally; No wheezing.  HEART: Regular rate and rhythm; No murmurs, rubs, or gallops  ABDOMEN: Soft, Nontender, Nondistended; Bowel sounds present  EXTREMITIES:   No clubbing, cyanosis, or edema  NEUROLOGY: AAO X 3  SKIN: No rashes    LABS:                        14.0   9.77  )-----------( 123      ( 05 Dec 2017 08:42 )             41.9     12-05    144  |  108  |  22  ----------------------------<  210<H>  4.5   |  22  |  0.70    Ca    9.3      05 Dec 2017 08:44              CAPILLARY BLOOD GLUCOSE      POCT Blood Glucose.: 282 mg/dL (06 Dec 2017 17:17)  POCT Blood Glucose.: 216 mg/dL (06 Dec 2017 12:06)  POCT Blood Glucose.: 222 mg/dL (06 Dec 2017 08:03)  POCT Blood Glucose.: 223 mg/dL (05 Dec 2017 22:34)    12-03 @ 22:33  Culture-urine --  Culture results   10,000 - 49,000 CFU/mL Mucin producing Pseudomonas aeruginosa  Normal Urogenital jack present  method type ERNESTINE  Organism Pseudomonas aeruginosa  Organism Identification Pseudomonas aeruginosa  Specimen source .Urine Catheterized  12-03 @ 22:31  Culture-urine --  Culture results   No growth to date.  method type --  Organism --  Organism Identification --  Specimen source .Blood Blood           12-03 @ 22:33  Culture blood --  Culture results   10,000 - 49,000 CFU/mL Mucin producing Pseudomonas aeruginosa  Normal Urogenital jack present  Gram stain --  Gram stain blood --  Method type ERNESTINE  Organism Pseudomonas aeruginosa  Organism identification Pseudomonas aeruginosa  Specimen source .Urine Catheterized   12-03 @ 22:31  Culture blood --  Culture results   No growth to date.  Gram stain --  Gram stain blood --  Method type --  Organism --  Organism identification --  Specimen source .Blood Blood      RADIOLOGY & ADDITIONAL TESTS:    Imaging Personally Reviewed:    Consultant(s) Notes Reviewed:      Care Discussed with Consultants/Other Providers:

## 2017-12-06 NOTE — PHYSICAL THERAPY INITIAL EVALUATION ADULT - IMPAIRMENTS FOUND, PT EVAL
muscle strength/aerobic capacity/endurance/joint integrity and mobility/gait, locomotion, and balance

## 2017-12-06 NOTE — PHYSICAL THERAPY INITIAL EVALUATION ADULT - ADDITIONAL COMMENTS
Pt lives with his wife in a private home, 2 steps to enter or ramp access, all needs on 1st floor. As per care notes and OT notes, pt requires A with most ADLs, was able to feed/ groom using RUE. A few weeks ago, pt was taking a couple of steps with assistance and RW, transferring to and from bed to chair with assistance. Pt was recently receiving home services and ambulating a few steps. Pt has a hospital bed, RW, wheelchair. urinal, sponge bathes with assistance from wife or son. More recently, pt has been primarily in bed with son transferring pt to/from wheelchair as able. Pt's son is a contractor and is in process of making home handicap accessible.

## 2017-12-06 NOTE — SWALLOW BEDSIDE ASSESSMENT ADULT - SWALLOW EVAL: DIAGNOSIS
Pt with known prior h/o dysphagia with documented h/o silent aspiration of nectar-thick liquids, thus Pt assessed for tolerance of diet as of last known objective assessment. Pt presents with overt tolerance of puree textures and honey-thick liquids with no overt s/s laryngeal penetration/aspiration. Would recommend objective for comprehensive assessment for potential diet upgrade. Pt with known prior h/o dysphagia with documented h/o silent aspiration of nectar-thick liquids, thus Pt assessed for tolerance of diet as of last known objective assessment. Pt presents with overt tolerance of puree textures and honey-thick liquids with no overt s/s laryngeal penetration/aspiration. Would recommend objective for comprehensive assessment for potential diet upgrade. Additionally, Pt presents with cognitive-linguistic deficits consistent with prior h/o CVAs.

## 2017-12-06 NOTE — SWALLOW BEDSIDE ASSESSMENT ADULT - SWALLOW EVAL: RECOMMENDED FEEDING/EATING TECHNIQUES
crush medication (when feasible)/position upright (90 degrees)/small sips/bites/oral hygiene/maintain upright posture during/after eating for 30 mins

## 2017-12-06 NOTE — SWALLOW BEDSIDE ASSESSMENT ADULT - SWALLOW EVAL: PATIENT/FAMILY GOALS STATEMENT
Pt reports that he's hungry. Pt unable to provide accurate hx due to baseline cognitive-linguistic deficits. As per d/w Pt's wife, Jessa, via telephone, Pt has been drinking thin liquids and eating regular foods cut up very small at home. She reports recalling that Pt had MBS at [facility], that he was followed at rehab by an SLP Pt reports that he's hungry. Pt unable to provide accurate hx due to baseline cognitive-linguistic deficits. As per d/w Pt's wife, Jessa, via telephone, Pt has been drinking thin liquids and eating regular foods cut up very small at home. She reports recalling that Pt had MBS at [facility], that he was followed at rehab by an SLP, but does not recall any obsequent objective swallow assessment.

## 2017-12-06 NOTE — PROGRESS NOTE ADULT - ATTENDING COMMENTS
Thank you for the courtesy of the consultation,I would be available for any further discussion if needed.  Matt Trevizo MD,FACC.  [address] [place], [place] [id number] [phone]

## 2017-12-06 NOTE — SWALLOW BEDSIDE ASSESSMENT ADULT - SLP GENERAL OBSERVATIONS
Pt seen at Pt seen at bedside, awake and alert, oriented to self and hospital. Pt verbally responsive, inconsistently following simple directions for the purposes of the exam.

## 2017-12-06 NOTE — SWALLOW BEDSIDE ASSESSMENT ADULT - ASR SWALLOW ASPIRATION MONITOR
cough/fever/change of breathing pattern/Monitor for s/s aspiration/laryngeal penetration. If noted:  D/C p.o. intake, provide non-oral nutrition/hydration/meds, and contact this service @ [phone]/pneumonia/throat clearing/upper respiratory infection/gurgly voice

## 2017-12-06 NOTE — PHYSICAL THERAPY INITIAL EVALUATION ADULT - CRITERIA FOR SKILLED THERAPEUTIC INTERVENTIONS
functional limitations in following categories/anticipated discharge recommendation/risk reduction/prevention/rehab potential/predicted duration of therapy intervention/anticipated equipment needs at discharge/impairments found/therapy frequency

## 2017-12-06 NOTE — PHYSICAL THERAPY INITIAL EVALUATION ADULT - RANGE OF MOTION EXAMINATION, REHAB EVAL
Right LE ROM was WFL (within functional limits)/Left LE ROM was WFL (within functional limits)/deficits as listed below/Right UE ROM was WFL (within functional limits)/L sided CVA, hand wrist elbow and sholder limited to less than 1/5 avilable ROM, LLE limited to 1/2 avialable ROM

## 2017-12-06 NOTE — PROGRESS NOTE ADULT - SUBJECTIVE AND OBJECTIVE BOX
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      PMH -reviewed admission note, no change since admission  Heart failure: acute [ ] chronic [ ] acute or chronic [ ] diastolic [ ] systolic [ ] combined systolic and diastolic[ ]  JENNIFER: ATN[ ] renal medullary necrosis [ ] CKD I [ ]CKDII [ ]CKD III [ ]CKD IV [ ]CKD V [ ]Other pathological lesions [ ]    MEDICATIONS  (STANDING):  aspirin enteric coated 81 milliGRAM(s) Oral daily  aztreonam  IVPB      aztreonam  IVPB 1000 milliGRAM(s) IV Intermittent every 8 hours  heparin  Injectable 5000 Unit(s) SubCutaneous every 8 hours  insulin lispro (HumaLOG) corrective regimen sliding scale   SubCutaneous three times a day before meals  insulin lispro (HumaLOG) corrective regimen sliding scale   SubCutaneous at bedtime  pantoprazole    Tablet 40 milliGRAM(s) Oral before breakfast  sodium chloride 0.9%. 1000 milliLiter(s) (60 mL/Hr) IV Continuous <Continuous>  tamsulosin 0.4 milliGRAM(s) Oral at bedtime    MEDICATIONS  (PRN):  dextrose Gel 1 Dose(s) Oral once PRN Blood Glucose LESS THAN 70 milliGRAM(s)/deciliter  glucagon  Injectable 1 milliGRAM(s) IntraMuscular once PRN Glucose LESS THAN 70 milligrams/deciliter          FAMILY HISTORY:  No pertinent family history in first degree relatives  No family history of premature coronary artery disease or sudden cardiac death      REVIEW OF SYSTEMS:  Constitutional: [ ] fever, [ ]weight loss,  [x ]fatigue  Eyes: [ ] visual changes  Respiratory: [ ]shortness of breath;  [ ] cough, [ ]wheezing, [ ]chills, [ ]hemoptysis  Cardiovascular: [ ] chest pain, [ ]palpitations, [ ]dizziness,  [ ]leg swelling[ ]orthopnea[ ]PND  Gastrointestinal: [ ] abdominal pain, [ ]nausea, [ ]vomiting,  [ ]diarrhea   Genitourinary: [ ] dysuria, [ ] hematuria  Neurologic: [ ] headaches [ ] tremors[x ]weakness-left  Skin: [ ] itching, [ ]burning, [ ] rashes  Endocrine: [ ] heat or cold intolerance  Musculoskeletal: [ ] joint pain or swelling; [ ] muscle, back, or extremity pain  Psychiatric: [ ] depression, [ ]anxiety, [ ]mood swings, or [ ]difficulty sleeping  Hematologic: [ ] easy bruising, [ ] bleeding gums    [x] All remaining systems negative except as per above.   [ ]Unable to obtain.    Vital Signs Last 24 Hrs  T(C): 36.8 (06 Dec 2017 03:56), Max: 37.2 (05 Dec 2017 11:59)  T(F): 98.3 (06 Dec 2017 03:56), Max: 98.9 (05 Dec 2017 11:59)  HR: 74 (06 Dec 2017 03:56) (74 - 102)  BP: 118/75 (06 Dec 2017 03:56) (118/75 - 132/86)  RR: 18 (06 Dec 2017 03:56) (18 - 18)  SpO2: 95% (06 Dec 2017 03:56) (95% - 98%)  I&O's Summary    05 Dec 2017 07:01  -  06 Dec 2017 07:00  --------------------------------------------------------  IN: 1030 mL / OUT: 1 mL / NET: 1029 mL        PHYSICAL EXAM:  General: No acute distress BMI-27.2  HEENT: EOMI, PERRL  Neck: Supple, [ ] JVD  Lungs: Equal air entry bilaterally; [ ] rales [ ] wheezing [ ] rhonchi  Heart: Regular rate and rhythm; [x ] murmur  2 /6 [x ] systolic [ ] diastolic [ ] radiation[ ] rubs [ ]  gallops  Abdomen: Nontender, bowel sounds present  Extremities: No clubbing, cyanosis, [ ] edema  Nervous system:  Alert & Oriented X3,Left paralysis  Psychiatric: Normal affect  Skin: No rashes or lesions    LABS:  12-05    144  |  108  |  22  ----------------------------<  210<H>  4.5   |  22  |  0.70    Ca    9.3      05 Dec 2017 08:44      Creatinine Trend: 0.70<--, 0.74<--, 0.86<--                        14.0   9.77  )-----------( 123      ( 05 Dec 2017 08:42 )             41.9       Culture - Urine (12.03.17 @ 22:33)      Specimen Source: .Urine Catheterized    Culture Results:  10,000 - 49,000 CFU/mL Mucin producing Pseudomonas aeruginosa          IMPRESSION AND PLAN:    77 yr old male with sig PMHx of HTN, HLD, T2DM, PUD, not on A/C 2/2 GIB, s/p BioAVR and aortic root replacement (1/2013), CVAx3 with residual  left hemiparesis, bedbound, minimally verbal, p/w sepsis, poss 2/2 UTI    Problem/Plan - 1:  ·  Problem: Acute cystitis without hematuria.  Plan: - Urine C&S no growth.      Problem/Plan - 2:  ·  Problem: Sepsis, due to unspecified organism.  Plan: - unclear etiology, poss 2/2 UTI  - consider aspiration in dx given CVA, despite clear CXR  - no evidence of cellulitis in b/l LE  - VS q4h  - maintenance IVF  -- CXR grossly clear at this time.     Problem/Plan - 3:  ·  Problem: Metabolic encephalopathy.  Plan: - acute on chronic waxing and waning  - likely 2/2 sepsis and infection  - cont to monitor, treat as above.Mental status trending to baseline    Problem/Plan - 4:  ·  Problem: Cerebrovascular accident (CVA), unspecified mechanism.  Plan: - Continue Aspirin 81mg,resume Metoprolol and Lisinopril

## 2017-12-06 NOTE — PHYSICAL THERAPY INITIAL EVALUATION ADULT - ACTIVE RANGE OF MOTION EXAMINATION, REHAB EVAL
L sided CVA, hand wrist elbow and sholder limited to less than 1/5 avilable ROM LLE limited to 1/2 avialable ROM/Right LE Active ROM was WFL (within functional limits)/Right UE Active ROM was WFL (within functional limits)/deficits as listed below

## 2017-12-06 NOTE — PROGRESS NOTE ADULT - SUBJECTIVE AND OBJECTIVE BOX
Patient is a 77y old  Male who presents with a chief complaint of Sent for abdominal pain/nausea/vomiting/ distended abdomen (05 Dec 2017 13:12)    Being followed by ID for UTI    Interval history:?Neck redness with zosyn infusion yesterday-was changed to aztreonam-today am after sensis reviewed changed to cipro  Has had facial redness  Alert,awake-denies itching  No other acute events      ROS:  No cough,SOB,CP  No N/V/D  No abd pain  No urinary complaints  No HA  No joint or limb pain  No other complaints      Antimicrobials:    ciprofloxacin     Tablet 500 milliGRAM(s) Oral every 12 hours      other medications reviewed    Vital Signs Last 24 Hrs  T(C): 36.8 (12-06-17 @ 03:56), Max: 37.2 (12-05-17 @ 11:59)  T(F): 98.3 (12-06-17 @ 03:56), Max: 98.9 (12-05-17 @ 11:59)  HR: 74 (12-06-17 @ 03:56) (74 - 102)  BP: 118/75 (12-06-17 @ 03:56) (118/75 - 132/86)  BP(mean): --  RR: 18 (12-06-17 @ 03:56) (18 - 18)  SpO2: 95% (12-06-17 @ 03:56) (95% - 98%)    Physical Exam:    Constitutional well preserved,comfortable,pleasant    HEENT PERRLA EOMI,No pallor or icterus    No oral exudate or erythema    Neck supple no JVD or LN    Chest Good AE,CTA    CVS RRR S1 S2 WNl No murmur or rub or gallop    Abd soft BS normal No tenderness no masses    Ext No cyanosis clubbing or edema    IV site no erythema tenderness or discharge    Joints no swelling or LOM    CNS AAO X 2 Left hemiparesis    Skin-facial erythema(unchanged from admission) -no other rash noted    Lab Data:                          14.0   9.77  )-----------( 123      ( 05 Dec 2017 08:42 )             41.9       12-05    144  |  108  |  22  ----------------------------<  210<H>  4.5   |  22  |  0.70    Ca    9.3      05 Dec 2017 08:44          Culture - Urine (collected 03 Dec 2017 22:33)  Source: .Urine Catheterized  Final Report (05 Dec 2017 19:01):    10,000 - 49,000 CFU/mL Mucin producing Pseudomonas aeruginosa    Normal Urogenital jack present  Organism: Pseudomonas aeruginosa (05 Dec 2017 19:01)  Organism: Pseudomonas aeruginosa (05 Dec 2017 19:01)      -  Amikacin: S <=8      -  Aztreonam: S <=4      -  Cefepime: S <=2      -  Ceftazidime: S 4      -  Ciprofloxacin: S <=0.5      -  Gentamicin: S 2      -  Imipenem: S <=1      -  Levofloxacin: S <=1      -  Meropenem: S <=1      -  Piperacillin/Tazobactam: S <=8      -  Tobramycin: S <=2      Method Type: ERNESTINE    Culture - Blood (collected 03 Dec 2017 22:31)  Source: .Blood Blood  Preliminary Report (04 Dec 2017 23:02):    No growth to date.    Culture - Blood (collected 03 Dec 2017 22:31)  Source: .Blood Blood  Preliminary Report (04 Dec 2017 23:02):    No growth to date.

## 2017-12-07 VITALS
TEMPERATURE: 98 F | RESPIRATION RATE: 18 BRPM | HEART RATE: 83 BPM | OXYGEN SATURATION: 96 % | SYSTOLIC BLOOD PRESSURE: 131 MMHG | DIASTOLIC BLOOD PRESSURE: 77 MMHG

## 2017-12-07 LAB
GLUCOSE BLDC GLUCOMTR-MCNC: 194 MG/DL — HIGH (ref 70–99)
GLUCOSE BLDC GLUCOMTR-MCNC: 222 MG/DL — HIGH (ref 70–99)

## 2017-12-07 PROCEDURE — 74230 X-RAY XM SWLNG FUNCJ C+: CPT | Mod: 26

## 2017-12-07 PROCEDURE — 99232 SBSQ HOSP IP/OBS MODERATE 35: CPT

## 2017-12-07 RX ORDER — ASPIRIN/CALCIUM CARB/MAGNESIUM 324 MG
1 TABLET ORAL
Qty: 0 | Refills: 0 | COMMUNITY
Start: 2017-12-07

## 2017-12-07 RX ORDER — CIPROFLOXACIN LACTATE 400MG/40ML
1 VIAL (ML) INTRAVENOUS
Qty: 8 | Refills: 0 | OUTPATIENT
Start: 2017-12-07 | End: 2017-12-11

## 2017-12-07 RX ORDER — TAMSULOSIN HYDROCHLORIDE 0.4 MG/1
1 CAPSULE ORAL
Qty: 0 | Refills: 0 | COMMUNITY
Start: 2017-12-07

## 2017-12-07 RX ORDER — METOPROLOL TARTRATE 50 MG
1 TABLET ORAL
Qty: 0 | Refills: 0 | COMMUNITY

## 2017-12-07 RX ORDER — METOPROLOL TARTRATE 50 MG
1 TABLET ORAL
Qty: 30 | Refills: 0 | OUTPATIENT
Start: 2017-12-07 | End: 2018-01-06

## 2017-12-07 RX ORDER — METFORMIN HYDROCHLORIDE 850 MG/1
1 TABLET ORAL
Qty: 0 | Refills: 0 | COMMUNITY

## 2017-12-07 RX ADMIN — HEPARIN SODIUM 5000 UNIT(S): 5000 INJECTION INTRAVENOUS; SUBCUTANEOUS at 06:16

## 2017-12-07 RX ADMIN — Medication 2: at 12:29

## 2017-12-07 RX ADMIN — HEPARIN SODIUM 5000 UNIT(S): 5000 INJECTION INTRAVENOUS; SUBCUTANEOUS at 12:31

## 2017-12-07 RX ADMIN — Medication 25 MILLIGRAM(S): at 06:16

## 2017-12-07 RX ADMIN — Medication 1: at 08:11

## 2017-12-07 RX ADMIN — PANTOPRAZOLE SODIUM 40 MILLIGRAM(S): 20 TABLET, DELAYED RELEASE ORAL at 06:16

## 2017-12-07 RX ADMIN — Medication 500 MILLIGRAM(S): at 06:16

## 2017-12-07 RX ADMIN — Medication 81 MILLIGRAM(S): at 12:31

## 2017-12-07 NOTE — PROGRESS NOTE ADULT - SUBJECTIVE AND OBJECTIVE BOX
Patient is a 77y old  Male who presents with a chief complaint of Sent for abdominal pain/nausea/vomiting/ distended abdomen (05 Dec 2017 13:12)    Being followed by ID for UTI    Interval history:  No acute events      ROS:  No cough,SOB,CP  No N/V/D./abd pain  No other complaints      Antimicrobials:    ciprofloxacin     Tablet 500 milliGRAM(s) Oral every 12 hours    Other medications reviewed    Vital Signs Last 24 Hrs  T(C): 36.4 (12-07-17 @ 04:06), Max: 36.9 (12-06-17 @ 12:34)  T(F): 97.5 (12-07-17 @ 04:06), Max: 98.4 (12-06-17 @ 12:34)  HR: 85 (12-07-17 @ 04:06) (72 - 85)  BP: 138/88 (12-07-17 @ 04:06) (126/74 - 138/88)  BP(mean): --  RR: 18 (12-07-17 @ 04:06) (18 - 18)  SpO2: 94% (12-07-17 @ 04:06) (94% - 97%)    Physical Exam:    Constitutional well preserved,comfortable,pleasant    HEENT PERRLA EOMI,No pallor or icterus    No oral exudate or erythema  facial erythema unchanged  no rash otherwsie    Neck supple no JVD or LN    Chest Good AE,CTA    CVS RRR S1 S2 WNl No murmur or rub or gallop    Abd soft BS normal No tenderness no masses    Ext No cyanosis clubbing or edema    IV site no erythema tenderness or discharge    Joints no swelling or LOM    CNS AAO X 2 hemiparesis L    Lab Data:                  Culture - Urine (collected 03 Dec 2017 22:33)  Source: .Urine Catheterized  Final Report (05 Dec 2017 19:01):    10,000 - 49,000 CFU/mL Mucin producing Pseudomonas aeruginosa    Normal Urogenital jack present  Organism: Pseudomonas aeruginosa (05 Dec 2017 19:01)  Organism: Pseudomonas aeruginosa (05 Dec 2017 19:01)      -  Amikacin: S <=8      -  Aztreonam: S <=4      -  Cefepime: S <=2      -  Ceftazidime: S 4      -  Ciprofloxacin: S <=0.5      -  Gentamicin: S 2      -  Imipenem: S <=1      -  Levofloxacin: S <=1      -  Meropenem: S <=1      -  Piperacillin/Tazobactam: S <=8      -  Tobramycin: S <=2      Method Type: ERNESTINE    Culture - Blood (collected 03 Dec 2017 22:31)  Source: .Blood Blood  Preliminary Report (04 Dec 2017 23:02):    No growth to date.    Culture - Blood (collected 03 Dec 2017 22:31)  Source: .Blood Blood  Preliminary Report (04 Dec 2017 23:02):    No growth to date.

## 2017-12-07 NOTE — SWALLOW VFSS/MBS ASSESSMENT ADULT - COMMENTS
Per ID: Pt p/w lethargy and fevers; Also rhinorrhea; Some pyuria-no catheter; CXR clear; delirium improved; ?UTI ?asp pna ?other occult foci ?Non infectious focus; prior Cx s/o pseudomonas urinary colonization-though may have had morrison then. UCx from 12/5 also with pseudomonas. Cards following. Podiatry called for ingrown toenails.    Swallow Hx: MBS at [facility] 8/17/16: Patient presents with mild to moderate oral and moderate to severe pharyngeal dysphagia characterized by adequate oral containment, slow prolonged chewing for mechanical soft solid, slow bolus manipulation and transport for mechanical soft solid with piecemeal deglutition with oral residue on the tongue surface post primary swallow; adequate bolus manipulation and transport for puree with adequate oral clearance post primary swallow.  There is delayed initiation of the pharyngeal swallow, reduced laryngeal elevation, adequate tongue base retraction, adequate pharyngeal constriction with adequate pharyngeal clearance post swallow.  There was Laryngeal Penetration during the swallow for Honey Thick Liquid with retrieval leaving no residue in the endolarynx. There was Aspiration (Silent) during the swallow for Ginger Blue Thick liquids.  Patient with reduced laryngal sensation given significantly delayed cough response to the Aspiration. Rx: Puree with Honey Thick Liquids.

## 2017-12-07 NOTE — SWALLOW VFSS/MBS ASSESSMENT ADULT - RECOMMENDED FEEDING/EATING TECHNIQUES
alternate food with liquid/maintain upright posture during/after eating for 30 mins/small sips/bites/no straws/crush medication (when feasible)/position upright (90 degrees)/oral hygiene

## 2017-12-07 NOTE — PROGRESS NOTE ADULT - ASSESSMENT
77M c HTN, HLD, DM2, PUD, not on A/C 2/2 GIB, AS s/p TAVR and aortic root replacement (1/2013), CVAx3 c/b left hemiparesis, bedbound, minimally verbal, p/w lethargy and fevers.    delirium improved    Pseudomonas UTI   Continue cipro to finish 5 day course  Other plan per primary  case d/w Med NP  Will Follow.  Beeper [id number]-svla/afterhours/No response-[phone]

## 2017-12-07 NOTE — SWALLOW VFSS/MBS ASSESSMENT ADULT - ADDITIONAL RECOMMENDATIONS
Maintain good oral hygiene.   Trial of swallow therapy pending Pt ability to participate in exercises.

## 2017-12-07 NOTE — SWALLOW VFSS/MBS ASSESSMENT ADULT - NS SWALLOW VFSS REC ASPIR MON
Monitor for s/s aspiration/laryngeal penetration. If noted:  D/C p.o. intake, provide non-oral nutrition/hydration/meds, and contact this service @ [phone]/upper respiratory infection/cough/gurgly voice/fever/change of breathing pattern/pneumonia/throat clearing

## 2017-12-07 NOTE — SWALLOW VFSS/MBS ASSESSMENT ADULT - ADDITIONAL INFORMATION
+ difficult positioning with reduced trunk control and high shoulder girdle which limited view of subglottic space and proximal esophagus to a degree.

## 2017-12-07 NOTE — SWALLOW VFSS/MBS ASSESSMENT ADULT - SLP GENERAL OBSERVATIONS
Pt received in radiology, secure in MAUREEN chair. Pt awake and alert, cooperative, inconsistently following directions for the purposes of the exam.

## 2017-12-07 NOTE — SWALLOW VFSS/MBS ASSESSMENT ADULT - DIAGNOSTIC IMPRESSIONS
Pt presents with an oropharyngeal dysphagia notable for reduced oral management, and silent laryngeal penetration/aspiration with all liquid consistencies and soft solids. Use of controlled volume via teaspoon delivery and head in Pt's neutral position (in head down position) was effective for improving airway protection with honey-thick liquids. Pt presents with an oropharyngeal dysphagia notable for reduced oral management, and silent laryngeal penetration/aspiration with all liquid consistencies and soft solids. Use of controlled volume via teaspoon delivery and head in Pt's neutral position (in head down position) was effective for improving airway protection with honey-thick liquids.    Disorders: reduced lingual strength/ROM/Rate of motion, mildly reduced BOT to posterior pharyngeal wall contact, mild delay in trigger of the swallow reflex, reduced laryngeal closure, reduced supraglottic sensation, reduced subglottic sensation.

## 2017-12-08 LAB
CULTURE RESULTS: SIGNIFICANT CHANGE UP
CULTURE RESULTS: SIGNIFICANT CHANGE UP
SPECIMEN SOURCE: SIGNIFICANT CHANGE UP
SPECIMEN SOURCE: SIGNIFICANT CHANGE UP

## 2017-12-19 PROCEDURE — 85027 COMPLETE CBC AUTOMATED: CPT

## 2017-12-19 PROCEDURE — 83735 ASSAY OF MAGNESIUM: CPT

## 2017-12-19 PROCEDURE — 82803 BLOOD GASES ANY COMBINATION: CPT

## 2017-12-19 PROCEDURE — 99285 EMERGENCY DEPT VISIT HI MDM: CPT | Mod: 25

## 2017-12-19 PROCEDURE — 82330 ASSAY OF CALCIUM: CPT

## 2017-12-19 PROCEDURE — 74230 X-RAY XM SWLNG FUNCJ C+: CPT

## 2017-12-19 PROCEDURE — 87486 CHLMYD PNEUM DNA AMP PROBE: CPT

## 2017-12-19 PROCEDURE — 85730 THROMBOPLASTIN TIME PARTIAL: CPT

## 2017-12-19 PROCEDURE — 97162 PT EVAL MOD COMPLEX 30 MIN: CPT

## 2017-12-19 PROCEDURE — 87040 BLOOD CULTURE FOR BACTERIA: CPT

## 2017-12-19 PROCEDURE — 71045 X-RAY EXAM CHEST 1 VIEW: CPT

## 2017-12-19 PROCEDURE — 84132 ASSAY OF SERUM POTASSIUM: CPT

## 2017-12-19 PROCEDURE — 82947 ASSAY GLUCOSE BLOOD QUANT: CPT

## 2017-12-19 PROCEDURE — 85014 HEMATOCRIT: CPT

## 2017-12-19 PROCEDURE — 81001 URINALYSIS AUTO W/SCOPE: CPT

## 2017-12-19 PROCEDURE — 97166 OT EVAL MOD COMPLEX 45 MIN: CPT

## 2017-12-19 PROCEDURE — 93005 ELECTROCARDIOGRAM TRACING: CPT

## 2017-12-19 PROCEDURE — 87186 SC STD MICRODIL/AGAR DIL: CPT

## 2017-12-19 PROCEDURE — 84295 ASSAY OF SERUM SODIUM: CPT

## 2017-12-19 PROCEDURE — 80053 COMPREHEN METABOLIC PANEL: CPT

## 2017-12-19 PROCEDURE — 82962 GLUCOSE BLOOD TEST: CPT

## 2017-12-19 PROCEDURE — 96374 THER/PROPH/DIAG INJ IV PUSH: CPT

## 2017-12-19 PROCEDURE — 82435 ASSAY OF BLOOD CHLORIDE: CPT

## 2017-12-19 PROCEDURE — 87581 M.PNEUMON DNA AMP PROBE: CPT

## 2017-12-19 PROCEDURE — 80048 BASIC METABOLIC PNL TOTAL CA: CPT

## 2017-12-19 PROCEDURE — 87798 DETECT AGENT NOS DNA AMP: CPT

## 2017-12-19 PROCEDURE — 83605 ASSAY OF LACTIC ACID: CPT

## 2017-12-19 PROCEDURE — 87086 URINE CULTURE/COLONY COUNT: CPT

## 2017-12-19 PROCEDURE — 87633 RESP VIRUS 12-25 TARGETS: CPT

## 2017-12-19 PROCEDURE — 85610 PROTHROMBIN TIME: CPT

## 2017-12-19 PROCEDURE — 84100 ASSAY OF PHOSPHORUS: CPT

## 2017-12-19 PROCEDURE — 84443 ASSAY THYROID STIM HORMONE: CPT

## 2017-12-19 PROCEDURE — 83036 HEMOGLOBIN GLYCOSYLATED A1C: CPT

## 2018-07-16 PROBLEM — I69.359 HEMIPLEGIA AND HEMIPARESIS FOLLOWING CEREBRAL INFARCTION AFFECTING UNSPECIFIED SIDE: Chronic | Status: ACTIVE | Noted: 2017-05-04

## 2018-11-01 ENCOUNTER — INPATIENT (INPATIENT)
Facility: HOSPITAL | Age: 78
LOS: 13 days | Discharge: NURSING, REHAB OR LONG-TERM CARE | End: 2018-11-15
Attending: INTERNAL MEDICINE | Admitting: INTERNAL MEDICINE
Payer: MEDICARE

## 2018-11-01 VITALS
RESPIRATION RATE: 18 BRPM | DIASTOLIC BLOOD PRESSURE: 77 MMHG | TEMPERATURE: 100 F | SYSTOLIC BLOOD PRESSURE: 109 MMHG | OXYGEN SATURATION: 96 % | HEART RATE: 123 BPM

## 2018-11-01 DIAGNOSIS — A41.9 SEPSIS, UNSPECIFIED ORGANISM: ICD-10-CM

## 2018-11-01 DIAGNOSIS — E11.8 TYPE 2 DIABETES MELLITUS WITH UNSPECIFIED COMPLICATIONS: ICD-10-CM

## 2018-11-01 DIAGNOSIS — I10 ESSENTIAL (PRIMARY) HYPERTENSION: ICD-10-CM

## 2018-11-01 DIAGNOSIS — N39.0 URINARY TRACT INFECTION, SITE NOT SPECIFIED: ICD-10-CM

## 2018-11-01 DIAGNOSIS — Z29.9 ENCOUNTER FOR PROPHYLACTIC MEASURES, UNSPECIFIED: ICD-10-CM

## 2018-11-01 DIAGNOSIS — L60.8 OTHER NAIL DISORDERS: ICD-10-CM

## 2018-11-01 DIAGNOSIS — Z79.899 OTHER LONG TERM (CURRENT) DRUG THERAPY: ICD-10-CM

## 2018-11-01 PROBLEM — K92.2 GASTROINTESTINAL HEMORRHAGE, UNSPECIFIED: Chronic | Status: ACTIVE | Noted: 2017-12-03

## 2018-11-01 PROBLEM — K27.9 PEPTIC ULCER, SITE UNSPECIFIED, UNSPECIFIED AS ACUTE OR CHRONIC, WITHOUT HEMORRHAGE OR PERFORATION: Chronic | Status: ACTIVE | Noted: 2017-12-03

## 2018-11-01 LAB
ALBUMIN SERPL ELPH-MCNC: 3.5 G/DL — SIGNIFICANT CHANGE UP (ref 3.3–5)
ALP SERPL-CCNC: 61 U/L — SIGNIFICANT CHANGE UP (ref 40–120)
ALT FLD-CCNC: 13 U/L — SIGNIFICANT CHANGE UP (ref 4–41)
APPEARANCE UR: CLEAR — SIGNIFICANT CHANGE UP
AST SERPL-CCNC: 12 U/L — SIGNIFICANT CHANGE UP (ref 4–40)
BACTERIA # UR AUTO: SIGNIFICANT CHANGE UP
BASE EXCESS BLDV CALC-SCNC: 0.1 MMOL/L — SIGNIFICANT CHANGE UP
BASOPHILS # BLD AUTO: 0.06 K/UL — SIGNIFICANT CHANGE UP (ref 0–0.2)
BASOPHILS NFR BLD AUTO: 0.5 % — SIGNIFICANT CHANGE UP (ref 0–2)
BILIRUB SERPL-MCNC: 0.4 MG/DL — SIGNIFICANT CHANGE UP (ref 0.2–1.2)
BILIRUB UR-MCNC: NEGATIVE — SIGNIFICANT CHANGE UP
BLOOD GAS VENOUS - CREATININE: 0.98 MG/DL — SIGNIFICANT CHANGE UP (ref 0.5–1.3)
BLOOD UR QL VISUAL: NEGATIVE — SIGNIFICANT CHANGE UP
BUN SERPL-MCNC: 30 MG/DL — HIGH (ref 7–23)
CALCIUM SERPL-MCNC: 9.5 MG/DL — SIGNIFICANT CHANGE UP (ref 8.4–10.5)
CHLORIDE BLDV-SCNC: 113 MMOL/L — HIGH (ref 96–108)
CHLORIDE SERPL-SCNC: 109 MMOL/L — HIGH (ref 98–107)
CO2 SERPL-SCNC: 22 MMOL/L — SIGNIFICANT CHANGE UP (ref 22–31)
COLOR SPEC: YELLOW — SIGNIFICANT CHANGE UP
CREAT SERPL-MCNC: 0.99 MG/DL — SIGNIFICANT CHANGE UP (ref 0.5–1.3)
EOSINOPHIL # BLD AUTO: 0.1 K/UL — SIGNIFICANT CHANGE UP (ref 0–0.5)
EOSINOPHIL NFR BLD AUTO: 0.8 % — SIGNIFICANT CHANGE UP (ref 0–6)
GAS PNL BLDV: 146 MMOL/L — SIGNIFICANT CHANGE UP (ref 136–146)
GLUCOSE BLDC GLUCOMTR-MCNC: 324 MG/DL — HIGH (ref 70–99)
GLUCOSE BLDC GLUCOMTR-MCNC: 333 MG/DL — HIGH (ref 70–99)
GLUCOSE BLDV-MCNC: 279 — HIGH (ref 70–99)
GLUCOSE SERPL-MCNC: 294 MG/DL — HIGH (ref 70–99)
GLUCOSE UR-MCNC: 300 — HIGH
HCO3 BLDV-SCNC: 24 MMOL/L — SIGNIFICANT CHANGE UP (ref 20–27)
HCT VFR BLD CALC: 44.7 % — SIGNIFICANT CHANGE UP (ref 39–50)
HCT VFR BLDV CALC: 46.3 % — SIGNIFICANT CHANGE UP (ref 39–51)
HGB BLD-MCNC: 14.6 G/DL — SIGNIFICANT CHANGE UP (ref 13–17)
HGB BLDV-MCNC: 15.1 G/DL — SIGNIFICANT CHANGE UP (ref 13–17)
HYALINE CASTS # UR AUTO: HIGH
IMM GRANULOCYTES # BLD AUTO: 0.07 # — SIGNIFICANT CHANGE UP
IMM GRANULOCYTES NFR BLD AUTO: 0.6 % — SIGNIFICANT CHANGE UP (ref 0–1.5)
KETONES UR-MCNC: SIGNIFICANT CHANGE UP
LACTATE BLDV-MCNC: 1.9 MMOL/L — SIGNIFICANT CHANGE UP (ref 0.5–2)
LEUKOCYTE ESTERASE UR-ACNC: SIGNIFICANT CHANGE UP
LYMPHOCYTES # BLD AUTO: 17.1 % — SIGNIFICANT CHANGE UP (ref 13–44)
LYMPHOCYTES # BLD AUTO: 2.13 K/UL — SIGNIFICANT CHANGE UP (ref 1–3.3)
MCHC RBC-ENTMCNC: 32.3 PG — SIGNIFICANT CHANGE UP (ref 27–34)
MCHC RBC-ENTMCNC: 32.7 % — SIGNIFICANT CHANGE UP (ref 32–36)
MCV RBC AUTO: 98.9 FL — SIGNIFICANT CHANGE UP (ref 80–100)
MONOCYTES # BLD AUTO: 1.08 K/UL — HIGH (ref 0–0.9)
MONOCYTES NFR BLD AUTO: 8.7 % — SIGNIFICANT CHANGE UP (ref 2–14)
MUCOUS THREADS # UR AUTO: SIGNIFICANT CHANGE UP
NEUTROPHILS # BLD AUTO: 9.03 K/UL — HIGH (ref 1.8–7.4)
NEUTROPHILS NFR BLD AUTO: 72.3 % — SIGNIFICANT CHANGE UP (ref 43–77)
NITRITE UR-MCNC: NEGATIVE — SIGNIFICANT CHANGE UP
NRBC # FLD: 0.02 — SIGNIFICANT CHANGE UP
PCO2 BLDV: 42 MMHG — SIGNIFICANT CHANGE UP (ref 41–51)
PH BLDV: 7.38 PH — SIGNIFICANT CHANGE UP (ref 7.32–7.43)
PH UR: 5.5 — SIGNIFICANT CHANGE UP (ref 5–8)
PLATELET # BLD AUTO: 150 K/UL — SIGNIFICANT CHANGE UP (ref 150–400)
PMV BLD: 11.9 FL — SIGNIFICANT CHANGE UP (ref 7–13)
PO2 BLDV: 57 MMHG — HIGH (ref 35–40)
POTASSIUM BLDV-SCNC: 4.3 MMOL/L — SIGNIFICANT CHANGE UP (ref 3.4–4.5)
POTASSIUM SERPL-MCNC: 4.5 MMOL/L — SIGNIFICANT CHANGE UP (ref 3.5–5.3)
POTASSIUM SERPL-SCNC: 4.5 MMOL/L — SIGNIFICANT CHANGE UP (ref 3.5–5.3)
PROT SERPL-MCNC: 6.9 G/DL — SIGNIFICANT CHANGE UP (ref 6–8.3)
PROT UR-MCNC: 100 — HIGH
RBC # BLD: 4.52 M/UL — SIGNIFICANT CHANGE UP (ref 4.2–5.8)
RBC # FLD: 12.7 % — SIGNIFICANT CHANGE UP (ref 10.3–14.5)
RBC CASTS # UR COMP ASSIST: HIGH (ref 0–?)
SAO2 % BLDV: 88 % — HIGH (ref 60–85)
SODIUM SERPL-SCNC: 147 MMOL/L — HIGH (ref 135–145)
SP GR SPEC: 1.03 — SIGNIFICANT CHANGE UP (ref 1–1.04)
SQUAMOUS # UR AUTO: SIGNIFICANT CHANGE UP
UROBILINOGEN FLD QL: NORMAL — SIGNIFICANT CHANGE UP
WBC # BLD: 12.47 K/UL — HIGH (ref 3.8–10.5)
WBC # FLD AUTO: 12.47 K/UL — HIGH (ref 3.8–10.5)
WBC UR QL: >50 — HIGH (ref 0–?)

## 2018-11-01 PROCEDURE — 99223 1ST HOSP IP/OBS HIGH 75: CPT

## 2018-11-01 PROCEDURE — 71045 X-RAY EXAM CHEST 1 VIEW: CPT | Mod: 26

## 2018-11-01 PROCEDURE — 70450 CT HEAD/BRAIN W/O DYE: CPT | Mod: 26

## 2018-11-01 RX ORDER — VANCOMYCIN HCL 1 G
1000 VIAL (EA) INTRAVENOUS ONCE
Qty: 0 | Refills: 0 | Status: COMPLETED | OUTPATIENT
Start: 2018-11-01 | End: 2018-11-01

## 2018-11-01 RX ORDER — METOPROLOL TARTRATE 50 MG
25 TABLET ORAL DAILY
Qty: 0 | Refills: 0 | Status: DISCONTINUED | OUTPATIENT
Start: 2018-11-01 | End: 2018-11-15

## 2018-11-01 RX ORDER — SODIUM CHLORIDE 9 MG/ML
1000 INJECTION, SOLUTION INTRAVENOUS
Qty: 0 | Refills: 0 | Status: DISCONTINUED | OUTPATIENT
Start: 2018-11-01 | End: 2018-11-15

## 2018-11-01 RX ORDER — SODIUM CHLORIDE 9 MG/ML
1000 INJECTION INTRAMUSCULAR; INTRAVENOUS; SUBCUTANEOUS
Qty: 0 | Refills: 0 | Status: DISCONTINUED | OUTPATIENT
Start: 2018-11-01 | End: 2018-11-15

## 2018-11-01 RX ORDER — ASPIRIN/CALCIUM CARB/MAGNESIUM 324 MG
81 TABLET ORAL DAILY
Qty: 0 | Refills: 0 | Status: DISCONTINUED | OUTPATIENT
Start: 2018-11-01 | End: 2018-11-15

## 2018-11-01 RX ORDER — CIPROFLOXACIN LACTATE 400MG/40ML
400 VIAL (ML) INTRAVENOUS EVERY 12 HOURS
Qty: 0 | Refills: 0 | Status: DISCONTINUED | OUTPATIENT
Start: 2018-11-01 | End: 2018-11-03

## 2018-11-01 RX ORDER — SODIUM CHLORIDE 9 MG/ML
1000 INJECTION INTRAMUSCULAR; INTRAVENOUS; SUBCUTANEOUS ONCE
Qty: 0 | Refills: 0 | Status: COMPLETED | OUTPATIENT
Start: 2018-11-01 | End: 2018-11-01

## 2018-11-01 RX ORDER — ACETAMINOPHEN 500 MG
1000 TABLET ORAL ONCE
Qty: 0 | Refills: 0 | Status: COMPLETED | OUTPATIENT
Start: 2018-11-01 | End: 2018-11-01

## 2018-11-01 RX ORDER — LISINOPRIL 2.5 MG/1
5 TABLET ORAL DAILY
Qty: 0 | Refills: 0 | Status: DISCONTINUED | OUTPATIENT
Start: 2018-11-01 | End: 2018-11-15

## 2018-11-01 RX ORDER — DEXTROSE 50 % IN WATER 50 %
25 SYRINGE (ML) INTRAVENOUS ONCE
Qty: 0 | Refills: 0 | Status: DISCONTINUED | OUTPATIENT
Start: 2018-11-01 | End: 2018-11-15

## 2018-11-01 RX ORDER — CEFTRIAXONE 500 MG/1
1 INJECTION, POWDER, FOR SOLUTION INTRAMUSCULAR; INTRAVENOUS ONCE
Qty: 0 | Refills: 0 | Status: DISCONTINUED | OUTPATIENT
Start: 2018-11-01 | End: 2018-11-01

## 2018-11-01 RX ORDER — INSULIN LISPRO 100/ML
VIAL (ML) SUBCUTANEOUS AT BEDTIME
Qty: 0 | Refills: 0 | Status: DISCONTINUED | OUTPATIENT
Start: 2018-11-01 | End: 2018-11-15

## 2018-11-01 RX ORDER — PANTOPRAZOLE SODIUM 20 MG/1
40 TABLET, DELAYED RELEASE ORAL
Qty: 0 | Refills: 0 | Status: DISCONTINUED | OUTPATIENT
Start: 2018-11-01 | End: 2018-11-15

## 2018-11-01 RX ORDER — SIMVASTATIN 20 MG/1
20 TABLET, FILM COATED ORAL AT BEDTIME
Qty: 0 | Refills: 0 | Status: DISCONTINUED | OUTPATIENT
Start: 2018-11-01 | End: 2018-11-15

## 2018-11-01 RX ORDER — TAMSULOSIN HYDROCHLORIDE 0.4 MG/1
0.4 CAPSULE ORAL AT BEDTIME
Qty: 0 | Refills: 0 | Status: DISCONTINUED | OUTPATIENT
Start: 2018-11-01 | End: 2018-11-15

## 2018-11-01 RX ORDER — DEXTROSE 50 % IN WATER 50 %
12.5 SYRINGE (ML) INTRAVENOUS ONCE
Qty: 0 | Refills: 0 | Status: DISCONTINUED | OUTPATIENT
Start: 2018-11-01 | End: 2018-11-15

## 2018-11-01 RX ORDER — GLUCAGON INJECTION, SOLUTION 0.5 MG/.1ML
1 INJECTION, SOLUTION SUBCUTANEOUS ONCE
Qty: 0 | Refills: 0 | Status: DISCONTINUED | OUTPATIENT
Start: 2018-11-01 | End: 2018-11-15

## 2018-11-01 RX ORDER — PIPERACILLIN AND TAZOBACTAM 4; .5 G/20ML; G/20ML
3.38 INJECTION, POWDER, LYOPHILIZED, FOR SOLUTION INTRAVENOUS ONCE
Qty: 0 | Refills: 0 | Status: COMPLETED | OUTPATIENT
Start: 2018-11-01 | End: 2018-11-01

## 2018-11-01 RX ORDER — DEXTROSE 50 % IN WATER 50 %
15 SYRINGE (ML) INTRAVENOUS ONCE
Qty: 0 | Refills: 0 | Status: DISCONTINUED | OUTPATIENT
Start: 2018-11-01 | End: 2018-11-15

## 2018-11-01 RX ORDER — ACETAMINOPHEN 500 MG
650 TABLET ORAL ONCE
Qty: 0 | Refills: 0 | Status: DISCONTINUED | OUTPATIENT
Start: 2018-11-01 | End: 2018-11-01

## 2018-11-01 RX ORDER — INSULIN LISPRO 100/ML
VIAL (ML) SUBCUTANEOUS
Qty: 0 | Refills: 0 | Status: DISCONTINUED | OUTPATIENT
Start: 2018-11-01 | End: 2018-11-15

## 2018-11-01 RX ORDER — ACETAMINOPHEN 500 MG
650 TABLET ORAL EVERY 6 HOURS
Qty: 0 | Refills: 0 | Status: DISCONTINUED | OUTPATIENT
Start: 2018-11-01 | End: 2018-11-15

## 2018-11-01 RX ORDER — HEPARIN SODIUM 5000 [USP'U]/ML
5000 INJECTION INTRAVENOUS; SUBCUTANEOUS EVERY 8 HOURS
Qty: 0 | Refills: 0 | Status: DISCONTINUED | OUTPATIENT
Start: 2018-11-01 | End: 2018-11-15

## 2018-11-01 RX ORDER — DOCUSATE SODIUM 100 MG
100 CAPSULE ORAL THREE TIMES A DAY
Qty: 0 | Refills: 0 | Status: DISCONTINUED | OUTPATIENT
Start: 2018-11-01 | End: 2018-11-15

## 2018-11-01 RX ADMIN — Medication 400 MILLIGRAM(S): at 13:53

## 2018-11-01 RX ADMIN — PIPERACILLIN AND TAZOBACTAM 200 GRAM(S): 4; .5 INJECTION, POWDER, LYOPHILIZED, FOR SOLUTION INTRAVENOUS at 13:53

## 2018-11-01 RX ADMIN — HEPARIN SODIUM 5000 UNIT(S): 5000 INJECTION INTRAVENOUS; SUBCUTANEOUS at 21:49

## 2018-11-01 RX ADMIN — SODIUM CHLORIDE 1000 MILLILITER(S): 9 INJECTION INTRAMUSCULAR; INTRAVENOUS; SUBCUTANEOUS at 13:26

## 2018-11-01 RX ADMIN — Medication 200 MILLIGRAM(S): at 21:49

## 2018-11-01 RX ADMIN — SODIUM CHLORIDE 1000 MILLILITER(S): 9 INJECTION INTRAMUSCULAR; INTRAVENOUS; SUBCUTANEOUS at 15:31

## 2018-11-01 RX ADMIN — SODIUM CHLORIDE 75 MILLILITER(S): 9 INJECTION INTRAMUSCULAR; INTRAVENOUS; SUBCUTANEOUS at 21:49

## 2018-11-01 RX ADMIN — SIMVASTATIN 20 MILLIGRAM(S): 20 TABLET, FILM COATED ORAL at 22:41

## 2018-11-01 RX ADMIN — Medication 2: at 22:35

## 2018-11-01 RX ADMIN — Medication 250 MILLIGRAM(S): at 15:31

## 2018-11-01 NOTE — H&P ADULT - HISTORY OF PRESENT ILLNESS
77yr old male significant  PMHx of recurrent UTIs,  HTN, HLD, T2DM, PUD, h/o  recurrent GIB, s/p Aortic root replacement with AVR 2/2 Type A aneurysm (1/2013), CVAx3 with residual left hemiparesis, bedbound, minimally verbal. Lives at home with wife who noted increased weakness/ sleeping more over last few days. Pt on dysphagia 1 diet with honey thick liquids, requiring feeding in small increments. Wife denies coughing, sob, endorses decreased and darker urine output. Pt with diaper. UA +; prior cultures all grew out pan-sensitive pseudomonas.    Tmax in .3, wbc 12.47 77yr old male significant  PMHx of recurrent UTIs,  HTN, HLD, T2DM, PUD, h/o  recurrent GIB, s/p Aortic root replacement with AVR 2/2 Type A aneurysm (1/2013), CVAx3 with residual left hemiparesis, bedbound, minimally verbal. Lives at home with wife who noted increased weakness/ sleeping more over last few days. Pt on dysphagia 1 diet with honey thick liquids, requiring feeding in small increments. Wife denies coughing, sob, endorses decreased and darker urine output. Pt with diaper. UA +; prior urine cultures all grew out pan-sensitive pseudomonas.    Tmax in .3, wbc 12.47

## 2018-11-01 NOTE — ED PROVIDER NOTE - PROGRESS NOTE DETAILS
Bart Sheehan PGY2: pt intermittently responsive, ordered CTH, urine still pending, WBC elevated Bart Sheehan PGY2: d/w pcp Dr. Trevizo - agreed w/ plan and to admission under his service; texted page MAR: admit: Kleber Pfeiffer [phone]  to Dr. Trevizo  UTI, AMS  callback: [phone]

## 2018-11-01 NOTE — H&P ADULT - NSHPLABSRESULTS_GEN_ALL_CORE
Vital Signs Last 24 Hrs  T(C): 38.5 (2018 13:30), Max: 38.5 (2018 13:30)  T(F): 101.3 (2018 13:30), Max: 101.3 (2018 13:30)  HR: 101 (2018 15:36) (101 - 123)  BP: 112/73 (2018 15:36) (109/77 - 122/74)  BP(mean): --  RR: 16 (2018 15:36) (16 - 20)  SpO2: 98% (2018 15:36) (96% - 98%)                            14.6   12.47 )-----------( 150      ( 2018 12:55 )             44.7     11    147<H>  |  109<H>  |  30<H>  ----------------------------<  294<H>  4.5   |  22  |  0.99    Ca    9.5      2018 12:55    TPro  6.9  /  Alb  3.5  /  TBili  0.4  /  DBili  x   /  AST  12  /  ALT  13  /  AlkPhos  61  11-01    CAPILLARY BLOOD GLUCOSE      POCT Blood Glucose.: 273 mg/dL (2018 12:09)      Urinalysis Basic - ( 2018 13:50 )    Color: YELLOW / Appearance: CLEAR / S.030 / pH: 5.5  Gluc: 300 / Ketone: SMALL  / Bili: NEGATIVE / Urobili: NORMAL   Blood: NEGATIVE / Protein: 100 / Nitrite: NEGATIVE   Leuk Esterase: LARGE / RBC: 6-10 / WBC >50   Sq Epi: FEW / Non Sq Epi: x / Bacteria: SMALL

## 2018-11-01 NOTE — ED PROVIDER NOTE - ATTENDING CONTRIBUTION TO CARE
Mary: I have seen and examined the patient face to face, have reviewed and addended the HPI, PE and a/p as necessary.     77M w/ pmh HTN, HLD, DM2, PUD, AS s/p TAVR and aortic root replacement (1/2013, on asa), CVAx3 c/b left hemiparesis, bedbound, minimally verbal, bib wife out of concern for lethargy, fevers (101.2). Wife feeds pt normally but he has been very lethargic and not able to eat or swallow anything today. Wife reports similar to UTI symptoms in the past.      Gen: AAOx0, responsive to pain, eyes closed, Chest: decreased breath sounds bilaterally, S1S2 tachycardic, Abd: Soft non-tender non-distended, Back: non-tender, no decub, Ext: no swelling    76 yo M a/w ams and fever similar to UTI in the past.  Concern for sepsis with AMS.  Septic work up. Likely UTI and dehydrated, will give fluids.  - cbc, cmp, mg and phos,  - blood cultures

## 2018-11-01 NOTE — PHARMACOTHERAPY INTERVENTION NOTE - COMMENTS
Recommended discontinuing Ceftriaxone 1gram x1 order for UTI, since patient received Zosyn 20 minutes prior and has history of urine cultures with Pseudomonas.

## 2018-11-01 NOTE — ED PROVIDER NOTE - PMH
CVA, old, hemiparesis  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  Diabetes mellitus    Gastric ulcer    GI bleed    Hyperlipemia    Hypertension    PUD (peptic ulcer disease)

## 2018-11-01 NOTE — H&P ADULT - PROBLEM SELECTOR PLAN 6
provided by list from wife IMPROVE VTE Individual Risk Assessment    RISK                                                          Points  [] Previous VTE                                           3  [] Thrombophilia                                        2  [] Lower limb paralysis                              2   [] Current Cancer                                       2   [x] Immobilization > 24 hrs                        1  [] ICU/CCU stay > 24 hours                       1  [x] Age > 60                                                   1    IMPROVE VTE Score: 2  heparin

## 2018-11-01 NOTE — H&P ADULT - NSHPPHYSICALEXAM_GEN_ALL_CORE
PHYSICAL EXAM:      Constitutional: NAD,  well-developed  HEENT: EOMI, nearly edentulous  Neck: No LAD, No JVD  Back: Normal spine flexure, No CVA tenderness  Respiratory: CTAB at anterior lung fields  Cardiovascular: S1 and S2, RRR  Gastrointestinal: BS+, soft, NT/ND  Extremities: No peripheral edema  Vascular: 2+ peripheral pulses  Neurological: minimally verbal, follows some commands   Psychiatric: Normal mood, normal affect  Musculoskeletal: chronic left hemiparesis  Skin: No rashes

## 2018-11-01 NOTE — ED PROVIDER NOTE - OBJECTIVE STATEMENT
77M w/ pmh HTN, HLD, DM2, PUD, AS s/p TAVR and aortic root replacement (1/2013, on asa), CVAx3 c/b left hemiparesis, bedbound, minimally verbal, bib wife out of concern for lethargy, fevers (101.2). Wife feeds pt normally but he has been very lethargic and not able to eat or swallow anything today.     PCP: Matt Trevzio [phone]

## 2018-11-01 NOTE — ED PROVIDER NOTE - PHYSICAL EXAMINATION
*GEN:   intermittently awake and responsive, non verbal, not following commands; warm to touch    ///    *EYES:   pupils equally round and reactive to light, extra-occular movements intact    ///    *HEENT:   dry mucosal membranes    ///    *CV:   regular rate and rhythm    ///    *RESP:   clear to auscultation bilaterally, non-labored    ///    *ABD:   soft, non-tender    ///    *:   no cva/flank tenderness    ///    *MSK:   no MSK tenderness or limited ROM    ///    *SKIN:   dry, intact    ///    *NEURO:   intermittently awake and responsive, non verbal, not following commands

## 2018-11-02 LAB
ALBUMIN SERPL ELPH-MCNC: 2.9 G/DL — LOW (ref 3.3–5)
ALP SERPL-CCNC: 50 U/L — SIGNIFICANT CHANGE UP (ref 40–120)
ALT FLD-CCNC: 10 U/L — SIGNIFICANT CHANGE UP (ref 4–41)
APTT BLD: 28.3 SEC — SIGNIFICANT CHANGE UP (ref 27.5–36.3)
AST SERPL-CCNC: 12 U/L — SIGNIFICANT CHANGE UP (ref 4–40)
BACTERIA UR CULT: SIGNIFICANT CHANGE UP
BILIRUB SERPL-MCNC: 0.2 MG/DL — SIGNIFICANT CHANGE UP (ref 0.2–1.2)
BUN SERPL-MCNC: 24 MG/DL — HIGH (ref 7–23)
CALCIUM SERPL-MCNC: 8.7 MG/DL — SIGNIFICANT CHANGE UP (ref 8.4–10.5)
CHLORIDE SERPL-SCNC: 112 MMOL/L — HIGH (ref 98–107)
CO2 SERPL-SCNC: 23 MMOL/L — SIGNIFICANT CHANGE UP (ref 22–31)
CREAT SERPL-MCNC: 0.85 MG/DL — SIGNIFICANT CHANGE UP (ref 0.5–1.3)
GLUCOSE BLDC GLUCOMTR-MCNC: 203 MG/DL — HIGH (ref 70–99)
GLUCOSE BLDC GLUCOMTR-MCNC: 250 MG/DL — HIGH (ref 70–99)
GLUCOSE SERPL-MCNC: 219 MG/DL — HIGH (ref 70–99)
HBA1C BLD-MCNC: 7.1 % — HIGH (ref 4–5.6)
HCT VFR BLD CALC: 37.8 % — LOW (ref 39–50)
HGB BLD-MCNC: 12.2 G/DL — LOW (ref 13–17)
INR BLD: 1.36 — HIGH (ref 0.88–1.17)
MCHC RBC-ENTMCNC: 32.3 % — SIGNIFICANT CHANGE UP (ref 32–36)
MCHC RBC-ENTMCNC: 32.8 PG — SIGNIFICANT CHANGE UP (ref 27–34)
MCV RBC AUTO: 101.6 FL — HIGH (ref 80–100)
METHOD TYPE: SIGNIFICANT CHANGE UP
NRBC # FLD: 0 — SIGNIFICANT CHANGE UP
ORGANISM # SPEC MICROSCOPIC CNT: SIGNIFICANT CHANGE UP
ORGANISM # SPEC MICROSCOPIC CNT: SIGNIFICANT CHANGE UP
PLATELET # BLD AUTO: 134 K/UL — LOW (ref 150–400)
PMV BLD: 12.1 FL — SIGNIFICANT CHANGE UP (ref 7–13)
POTASSIUM SERPL-MCNC: 3.9 MMOL/L — SIGNIFICANT CHANGE UP (ref 3.5–5.3)
POTASSIUM SERPL-SCNC: 3.9 MMOL/L — SIGNIFICANT CHANGE UP (ref 3.5–5.3)
PROT SERPL-MCNC: 5.7 G/DL — LOW (ref 6–8.3)
PROTHROM AB SERPL-ACNC: 15.7 SEC — HIGH (ref 9.8–13.1)
RBC # BLD: 3.72 M/UL — LOW (ref 4.2–5.8)
RBC # FLD: 12.9 % — SIGNIFICANT CHANGE UP (ref 10.3–14.5)
SODIUM SERPL-SCNC: 144 MMOL/L — SIGNIFICANT CHANGE UP (ref 135–145)
SPECIMEN SOURCE: SIGNIFICANT CHANGE UP
WBC # BLD: 8.73 K/UL — SIGNIFICANT CHANGE UP (ref 3.8–10.5)
WBC # FLD AUTO: 8.73 K/UL — SIGNIFICANT CHANGE UP (ref 3.8–10.5)

## 2018-11-02 PROCEDURE — 76770 US EXAM ABDO BACK WALL COMP: CPT | Mod: 26

## 2018-11-02 RX ORDER — MAGNESIUM HYDROXIDE 400 MG/1
30 TABLET, CHEWABLE ORAL DAILY
Qty: 0 | Refills: 0 | Status: DISCONTINUED | OUTPATIENT
Start: 2018-11-02 | End: 2018-11-15

## 2018-11-02 RX ORDER — VANCOMYCIN HCL 1 G
1000 VIAL (EA) INTRAVENOUS EVERY 12 HOURS
Qty: 0 | Refills: 0 | Status: DISCONTINUED | OUTPATIENT
Start: 2018-11-02 | End: 2018-11-05

## 2018-11-02 RX ORDER — NYSTATIN CREAM 100000 [USP'U]/G
1 CREAM TOPICAL
Qty: 0 | Refills: 0 | Status: DISCONTINUED | OUTPATIENT
Start: 2018-11-02 | End: 2018-11-15

## 2018-11-02 RX ORDER — GENTAMICIN SULFATE 40 MG/ML
60 VIAL (ML) INJECTION EVERY 8 HOURS
Qty: 0 | Refills: 0 | Status: DISCONTINUED | OUTPATIENT
Start: 2018-11-02 | End: 2018-11-03

## 2018-11-02 RX ADMIN — SODIUM CHLORIDE 75 MILLILITER(S): 9 INJECTION INTRAMUSCULAR; INTRAVENOUS; SUBCUTANEOUS at 23:01

## 2018-11-02 RX ADMIN — Medication 100 MILLIGRAM(S): at 23:01

## 2018-11-02 RX ADMIN — Medication 1 TABLET(S): at 12:31

## 2018-11-02 RX ADMIN — SODIUM CHLORIDE 75 MILLILITER(S): 9 INJECTION INTRAMUSCULAR; INTRAVENOUS; SUBCUTANEOUS at 07:56

## 2018-11-02 RX ADMIN — Medication 200 MILLIGRAM(S): at 18:01

## 2018-11-02 RX ADMIN — HEPARIN SODIUM 5000 UNIT(S): 5000 INJECTION INTRAVENOUS; SUBCUTANEOUS at 23:01

## 2018-11-02 RX ADMIN — NYSTATIN CREAM 1 APPLICATION(S): 100000 CREAM TOPICAL at 18:01

## 2018-11-02 RX ADMIN — SODIUM CHLORIDE 75 MILLILITER(S): 9 INJECTION INTRAMUSCULAR; INTRAVENOUS; SUBCUTANEOUS at 09:47

## 2018-11-02 RX ADMIN — Medication 3: at 18:00

## 2018-11-02 RX ADMIN — TAMSULOSIN HYDROCHLORIDE 0.4 MILLIGRAM(S): 0.4 CAPSULE ORAL at 23:01

## 2018-11-02 RX ADMIN — Medication 1: at 23:00

## 2018-11-02 RX ADMIN — HEPARIN SODIUM 5000 UNIT(S): 5000 INJECTION INTRAVENOUS; SUBCUTANEOUS at 13:22

## 2018-11-02 RX ADMIN — SIMVASTATIN 20 MILLIGRAM(S): 20 TABLET, FILM COATED ORAL at 23:01

## 2018-11-02 RX ADMIN — Medication 81 MILLIGRAM(S): at 12:32

## 2018-11-02 RX ADMIN — Medication 250 MILLIGRAM(S): at 19:40

## 2018-11-02 RX ADMIN — Medication 200 MILLIGRAM(S): at 09:55

## 2018-11-02 RX ADMIN — HEPARIN SODIUM 5000 UNIT(S): 5000 INJECTION INTRAVENOUS; SUBCUTANEOUS at 05:24

## 2018-11-02 NOTE — CONSULT NOTE ADULT - SUBJECTIVE AND OBJECTIVE BOX
Patient is a 77y old  Male who presents with a chief complaint of sepsis (2018 17:45)      HPI:  77yr old male significant  PMHx of recurrent UTIs,  HTN, HLD, T2DM, PUD, h/o  recurrent GIB, s/p Aortic root replacement with AVR 2/2 Type A aneurysm (2013), CVAx3 with residual left hemiparesis, bedbound, minimally verbal. Lives at home with wife who noted increased weakness/ sleeping more over last few days. Pt on dysphagia 1 diet with honey thick liquids, requiring feeding in small increments. Wife denies coughing, sob, endorses decreased and darker urine output. Pt with diaper. UA +; prior urine cultures all grew out pan-sensitive pseudomonas.    Tmax in .3, wbc 12.47 (2018 17:15)      PAST MEDICAL & SURGICAL HISTORY:  GI bleed  PUD (peptic ulcer disease)  CVA, old, 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  Gastric ulcer  Hyperlipemia  Diabetes mellitus  Hypertension  History of eye surgery: endophthalmitis in   H/O aortic root repair  No Past Surgical History      Review of Systems:   CONSTITUTIONAL: No fever,  or fatigue  NECK: No pain or stiffness  RESPIRATORY: No cough, wheezing, chills or hemoptysis; No shortness of breath  CARDIOVASCULAR: No chest pain, palpitations, dizziness, or leg swelling  GASTROINTESTINAL: No abdominal or epigastric pain. No nausea, vomiting, or hematemesis; No diarrhea or constipation.   NEUROLOGICAL: No headaches,           Allergies    No Known Allergies    Intolerances        Social History:     FAMILY HISTORY:  No pertinent family history in first degree relatives      MEDICATIONS  (STANDING):  aspirin enteric coated 81 milliGRAM(s) Oral daily  ciprofloxacin   IVPB 400 milliGRAM(s) IV Intermittent every 12 hours  dextrose 5%. 1000 milliLiter(s) (50 mL/Hr) IV Continuous <Continuous>  dextrose 50% Injectable 12.5 Gram(s) IV Push once  dextrose 50% Injectable 25 Gram(s) IV Push once  dextrose 50% Injectable 25 Gram(s) IV Push once  docusate sodium 100 milliGRAM(s) Oral three times a day  gentamicin   IVPB 60 milliGRAM(s) IV Intermittent every 8 hours  heparin  Injectable 5000 Unit(s) SubCutaneous every 8 hours  insulin lispro (HumaLOG) corrective regimen sliding scale   SubCutaneous three times a day before meals  insulin lispro (HumaLOG) corrective regimen sliding scale   SubCutaneous at bedtime  lisinopril 5 milliGRAM(s) Oral daily  metoprolol succinate ER 25 milliGRAM(s) Oral daily  multivitamin 1 Tablet(s) Oral daily  nystatin Powder 1 Application(s) Topical two times a day  pantoprazole    Tablet 40 milliGRAM(s) Oral before breakfast  rifampin 300 milliGRAM(s) Oral three times a day  simvastatin 20 milliGRAM(s) Oral at bedtime  sodium chloride 0.9%. 1000 milliLiter(s) (75 mL/Hr) IV Continuous <Continuous>  tamsulosin 0.4 milliGRAM(s) Oral at bedtime  vancomycin  IVPB 1000 milliGRAM(s) IV Intermittent every 12 hours    MEDICATIONS  (PRN):  acetaminophen   Tablet .. 650 milliGRAM(s) Oral every 6 hours PRN Temp greater or equal to 38C (100.4F), Mild Pain (1 - 3), Moderate Pain (4 - 6)  dextrose 40% Gel 15 Gram(s) Oral once PRN Blood Glucose LESS THAN 70 milliGRAM(s)/deciliter  glucagon  Injectable 1 milliGRAM(s) IntraMuscular once PRN Glucose LESS THAN 70 milligrams/deciliter  magnesium hydroxide Suspension 30 milliLiter(s) Oral daily PRN Constipation      CAPILLARY BLOOD GLUCOSE      POCT Blood Glucose.: 286 mg/dL (2018 22:11)  POCT Blood Glucose.: 250 mg/dL (2018 16:59)  POCT Blood Glucose.: 203 mg/dL (2018 07:26)    I&O's Summary      PHYSICAL EXAM:      NECK: Supple, No JVD  CHEST/LUNG: Clear to auscultation bilaterally; No wheezing.  HEART: Regular rate and rhythm; No murmurs, rubs, or gallops  ABDOMEN: Soft, Nontender, Nondistended; Bowel sounds present  EXTREMITIES:  2+ Peripheral Pulses, No clubbing, cyanosis, or edema  PSYCH: AAOx3        LABS:                        12.2   8.73  )-----------( 134      ( 2018 06:00 )             37.8     11-    144  |  112<H>  |  24<H>  ----------------------------<  219<H>  3.9   |  23  |  0.85    Ca    8.7      2018 06:00    TPro  5.7<L>  /  Alb  2.9<L>  /  TBili  0.2  /  DBili  x   /  AST  12  /  ALT  10  /  AlkPhos  50  11    PT/INR - ( 2018 06:00 )   PT: 15.7 SEC;   INR: 1.36          PTT - ( 2018 06:00 )  PTT:28.3 SEC      Urinalysis Basic - ( 2018 13:50 )    Color: YELLOW / Appearance: CLEAR / S.030 / pH: 5.5  Gluc: 300 / Ketone: SMALL  / Bili: NEGATIVE / Urobili: NORMAL   Blood: NEGATIVE / Protein: 100 / Nitrite: NEGATIVE   Leuk Esterase: LARGE / RBC: 6-10 / WBC >50   Sq Epi: FEW / Non Sq Epi: x / Bacteria: SMALL      CAPILLARY BLOOD GLUCOSE      POCT Blood Glucose.: 286 mg/dL (2018 22:11)  POCT Blood Glucose.: 250 mg/dL (2018 16:59)  POCT Blood Glucose.: 203 mg/dL (2018 07:26)     @ 15:22  Culture-urine   NO GROWTH AT 24 HOURS  Culture results --  method type --  Organism --  Organism Identification --  Specimen source URINE CATHETER   @ 13:38  Culture-urine --  Culture results --  method type PCR  Organism BLOOD CULTURE PCR  Organism Identification BLOOD CULTURE PCR  Specimen source BLOOD PERIPHERAL            @ 15:22  Culture blood --  Culture results --  Gram stain --  Gram stain blood --  Method type --  Organism --  Organism identification --  Specimen source URINE CATHETER    @ 13:38  Culture blood   ***Blood Panel PCR results on this specimen are available  approximately 3 hours after the Gram stain result***  Gram stain, PCR, and/or culture results may not always  correspond due to difference in methodologies  ------------------------------------------------------------  This PCR assay was performed using [employer].  The  following targets are tested for:  Enterococcus, vancomycin  resistant enterococci, Listeria monocytogenes,  coagulase  negative staphylococci, S. aureus, methicillin resistant S.  aureus, Streptococcus agalactiae (Group B), S. pneumoniae,  S. pyogenes (Group A), Acinetobacter baumannii, Enterobacter  cloacae, E. coli, Klebsiella oxytoca, K. pneumoniae, Proteus  sp., Serratia marcescens, Haemophilus influenzae, Neisseria  meningitidis, Pseudomonas aeruginosa, Candida albicans, C.  glabrata, C. krusei, C. parapsilosis, C. tropicalis and the  KPC resistance gene.  **NOTE: Due to technical problems, Proteus sp. will NOT be  reported as part of the BCID paneluntil further notice.  Culture results --  Gram stain --  Gram stain blood   ***** CRITICAL RESULT *****  PERSON CALLED / READ-BACK: BLANK CRAWFORD RN  DATE / TIME CALLED: 18  CALLED BY: DEANNE NOE  GPCCL^Gram Pos Cocci In Clusters  AFTER: 16 HOURS INCUBATION  BOTTLE: AEROBIC BOTTLE  Method type PCR  Organism BLOOD CULTURE PCR  Organism identification BLOOD CULTURE PCR  Specimen source BLOOD PERIPHERAL      RADIOLOGY & ADDITIONAL TESTS:    Imaging Personally Reviewed:    Consultant(s) Notes Reviewed:      Care Discussed with Consultants/Other Providers:    Thanks for consult. Will follow.

## 2018-11-02 NOTE — CONSULT NOTE ADULT - ASSESSMENT
77yr old male significant  PMHx of recurrent UTIs,  HTN, HLD, T2DM, PUD, h/o  recurrent GIB, s/p Aortic root replacement with AVR 2/2 Type A aneurysm (1/2013), CVAx3 with residual left hemiparesis, bedbound, minimally verbal. Lives at home with wife who noted increased weakness/ sleeping more over last few days. Pt on dysphagia 1 diet with honey thick liquids, requiring feeding in small increments. Wife denies coughing, sob, endorses decreased and darker urine output. Pt with diaper. UA +; prior urine cultures all grew out pan-sensitive pseudomonas.    Tmax in .3, wbc 12.47 (01 Nov 2018 17:15)    Wife present at bedside, states pt usually does not c/o of anything, but that she noticed he has been weaker than usual, not eating as much.  He had fever >101 at home.  Wife also notes he has a rash over his back and b/l axillary region that "comes and goes".  Sometimes he develops small pimples that pop open, which she then cleans and applies cortisone cream.  She believes he has a heat rash.  No recent hospitalizations, outpatient procedures or dental work, no recent abx exposure.    Pt admitted to evaluate for UTI.  Also noted to have bacteremia with GPC.  Pt received dose of vanco in the ER, he is now on cipro.  NINI sorensen called for further antibiotic managment.     Problem/Plan - 1:    ·	MRSA bacteremia    - Resume vancomycin 1gm iv q12, check trough prior to 4th dose.  Maintain levels between 15-20.  Source likely rash over pt's back and b/l axillary regions.  ?Contact vs. allergic dermatitis.  Consider derm evaluation.    - Check repeat blood cultures to ensure clearance    - Pt with AVR, r/o prosthetic valve endocarditis.  Check TTE, if negative, pt will need DICKSON.  Will add gentamicin (1mg/kg q 8) and rifampin 300mg IV q8 until PVE ruled out. Monitor for renal and ototoxicity.      - ESR, CRP    Will follow,    Tanisha Ash  [phone] 77yr old male significant  PMHx of recurrent UTIs,  HTN, HLD, T2DM, PUD, h/o  recurrent GIB, s/p Aortic root replacement with AVR 2/2 Type A aneurysm (1/2013), CVAx3 with residual left hemiparesis, bedbound, minimally verbal. Lives at home with wife who noted increased weakness/ sleeping more over last few days. Pt on dysphagia 1 diet with honey thick liquids, requiring feeding in small increments. Wife denies coughing, sob, endorses decreased and darker urine output. Pt with diaper. UA +; prior urine cultures all grew out pan-sensitive pseudomonas.    Tmax in .3, wbc 12.47 (01 Nov 2018 17:15)    Wife present at bedside, states pt usually does not c/o of anything, but that she noticed he has been weaker than usual, not eating as much.  He had fever >101 at home.  Wife also notes he has a rash over his back and b/l axillary region that "comes and goes".  Sometimes he develops small pimples that pop open, which she then cleans and applies cortisone cream.  She believes he has a heat rash.  No recent hospitalizations, outpatient procedures or dental work, no recent abx exposure.    Pt admitted to evaluate for UTI.  Also noted to have bacteremia with GPC.  Pt received dose of vanco in the ER, he is now on cipro.  NINI sorensen called for further antibiotic managment.     Problem/Plan - 1:    ·	MRSA bacteremia    - Resume vancomycin 1gm iv q12, check trough prior to 4th dose.  Maintain levels between 15-20.  Source likely rash over pt's back and b/l axillary regions.  ?Contact vs. allergic dermatitis.  Consider derm evaluation.    - Check repeat blood cultures to ensure clearance    - Pt with AVR, r/o prosthetic valve endocarditis.  Check TTE, if negative, pt will need DICKSON.  Will add gentamicin (1mg/kg q 8) and rifampin 300mg IV q8 until PVE ruled out. Monitor for renal and ototoxicity.   Check gent trough prior to 4th dose (should be less than 1mcg/ml)     - ESR, CRP    - Urine cultures negative, will d/c cipro.      Will follow,    Tanisha Ash  [phone] 77yr old male significant  PMHx of recurrent UTIs,  HTN, HLD, T2DM, PUD, h/o  recurrent GIB, s/p Aortic root replacement with AVR 2/2 Type A aneurysm (1/2013), CVAx3 with residual left hemiparesis, bedbound, minimally verbal. Lives at home with wife who noted increased weakness/ sleeping more over last few days. Pt on dysphagia 1 diet with honey thick liquids, requiring feeding in small increments. Wife denies coughing, sob, endorses decreased and darker urine output. Pt with diaper. UA +; prior urine cultures all grew out pan-sensitive pseudomonas.    Tmax in .3, wbc 12.47 (01 Nov 2018 17:15)    Wife present at bedside, states pt usually does not c/o of anything, but that she noticed he has been weaker than usual, not eating as much.  He had fever >101 at home.  Wife also notes he has a rash over his back and b/l axillary region that "comes and goes".  Sometimes he develops small pimples that pop open, which she then cleans and applies cortisone cream.  She believes he has a heat rash.  No recent hospitalizations, outpatient procedures or dental work, no recent abx exposure.    Pt admitted to evaluate for UTI.  Also noted to have bacteremia with GPC.  Pt received dose of vanco in the ER, he is now on cipro.  NINI sorensen called for further antibiotic managment.     Problem/Plan - 1:    ·	MRSA bacteremia    - Resume vancomycin 1gm iv q12, check trough prior to 4th dose.  Maintain levels between 15-20.  Source likely rash over pt's back and b/l axillary regions.  ?Contact vs. allergic dermatitis.  Consider derm evaluation.    - Check repeat blood cultures to ensure clearance    - Pt with AVR, r/o prosthetic valve endocarditis.  Check TTE, if negative, pt will need DICKSON.  Will add gentamicin (1mg/kg q 8) and rifampin 300mg PO q8 until PVE ruled out. Monitor for renal and ototoxicity.   Check gent trough prior to 4th dose (should be less than 1mcg/ml)     - ESR, CRP    - Urine cultures negative, will d/c cipro.      Will follow,    Tanisha Ash  [phone]

## 2018-11-02 NOTE — PROGRESS NOTE ADULT - SUBJECTIVE AND OBJECTIVE BOX
PRESENTING CC:    SUBJ: 77yr old male significant  PMHx of recurrent UTIs,  HTN, HLD, T2DM, PUD, h/o  recurrent GIB, s/p Aortic root replacement with AVR 2/2 Type A aneurysm (1/2013), CVAx3 with residual left hemiparesis, bedbound, minimally verbal increased weakness/ sleeping more over last few days.Admitted with sepsis 2/2 UTI      PMH -reviewed admission note, no change since admission  Heart failure: acute [ ] chronic [ ] acute or chronic [ ] diastolic [ ] systolic [ ] combined systolic and diastolic[ ]  JENNIFER: ATN[ ] renal medullary necrosis [ ] CKD I [ ]CKDII [ ]CKD III [ ]CKD IV [ ]CKD V [ ]Other pathological lesions [ ]    MEDICATIONS  (STANDING):  aspirin enteric coated 81 milliGRAM(s) Oral daily  ciprofloxacin   IVPB 400 milliGRAM(s) IV Intermittent every 12 hours  docusate sodium 100 milliGRAM(s) Oral three times a day  heparin  Injectable 5000 Unit(s) SubCutaneous every 8 hours  insulin lispro (HumaLOG) corrective regimen sliding scale   SubCutaneous three times a day before meals  insulin lispro (HumaLOG) corrective regimen sliding scale   SubCutaneous at bedtime  lisinopril 5 milliGRAM(s) Oral daily  metoprolol succinate ER 25 milliGRAM(s) Oral daily  multivitamin 1 Tablet(s) Oral daily  nystatin Powder 1 Application(s) Topical two times a day  pantoprazole    Tablet 40 milliGRAM(s) Oral before breakfast  simvastatin 20 milliGRAM(s) Oral at bedtime  sodium chloride 0.9%. 1000 milliLiter(s) (75 mL/Hr) IV Continuous <Continuous>  tamsulosin 0.4 milliGRAM(s) Oral at bedtime    MEDICATIONS  (PRN):  acetaminophen   Tablet .. 650 milliGRAM(s) Oral every 6 hours PRN Temp greater or equal to 38C (100.4F), Mild Pain (1 - 3), Moderate Pain (4 - 6)  dextrose 40% Gel 15 Gram(s) Oral once PRN Blood Glucose LESS THAN 70 milliGRAM(s)/deciliter  glucagon  Injectable 1 milliGRAM(s) IntraMuscular once PRN Glucose LESS THAN 70 milligrams/deciliter          FAMILY HISTORY:  No pertinent family history in first degree relatives  No family history of premature coronary artery disease or sudden cardiac death      REVIEW OF SYSTEMS:  Constitutional: [ ] fever, [ ]weight loss,  [ ]fatigue  Eyes: [ ] visual changes  Respiratory: [ ]shortness of breath;  [ ] cough, [ ]wheezing, [ ]chills, [ ]hemoptysis  Cardiovascular: [ ] chest pain, [ ]palpitations, [ ]dizziness,  [ ]leg swelling[ ]orthopnea[ ]PND  Gastrointestinal: [ ] abdominal pain, [ ]nausea, [ ]vomiting,  [ ]diarrhea   Genitourinary: [ ] dysuria, [ ] hematuria  Neurologic: [ ] headaches [ ] tremors[ ]weakness  Skin: [ ] itching, [ ]burning, [ ] rashes  Endocrine: [ ] heat or cold intolerance  Musculoskeletal: [ ] joint pain or swelling; [ ] muscle, back, or extremity pain  Psychiatric: [ ] depression, [ ]anxiety, [ ]mood swings, or [ ]difficulty sleeping  Hematologic: [ ] easy bruising, [ ] bleeding gums    [x] All remaining systems negative except as per above.   [ ]Unable to obtain.    Vital Signs Last 24 Hrs  T(C): 36.3 (02 Nov 2018 05:22), Max: 38.5 (01 Nov 2018 13:30)  T(F): 97.4 (02 Nov 2018 05:22), Max: 101.3 (01 Nov 2018 13:30)  HR: 80 (02 Nov 2018 05:22) (80 - 123)  BP: 110/65 (02 Nov 2018 05:22) (109/77 - 122/74)  BP(mean): --  RR: 18 (02 Nov 2018 05:22) (16 - 20)  SpO2: 97% (02 Nov 2018 05:22) (96% - 98%)  I&O's Summary      PHYSICAL EXAM:  General: No acute distress BMI-22.1  HEENT: EOMI, PERRL  Neck: Supple, [ ] JVD  Lungs: Equal air entry bilaterally; [ ] rales [ ] wheezing [ ] rhonchi  Heart: Regular rate and rhythm; [x ] murmur   /6 [ ] systolic [ ] diastolic [ ] radiation[ ] rubs [ ]  gallops  Abdomen: Nontender, bowel sounds present  Extremities: No clubbing, cyanosis, [ ] edema  Nervous system:  Alert & Oriented X2, Left Paresis  Psychiatric: Normal affect  Skin: No rashes or lesions    LABS:  11-01    147<H>  |  109<H>  |  30<H>  ----------------------------<  294<H>  4.5   |  22  |  0.99    Ca    9.5      01 Nov 2018 12:55    TPro  6.9  /  Alb  3.5  /  TBili  0.4  /  DBili  x   /  AST  12  /  ALT  13  /  AlkPhos  61  11-01    Creatinine Trend: 0.99<--                        14.6   12.47 )-----------( 150      ( 01 Nov 2018 12:55 )             44.7       Urinalysis (11.01.18 @ 13:50)    Color: YELLOW    Urine Appearance: CLEAR    Glucose: 300    Bilirubin: NEGATIVE    Ketone - Urine: SMALL    Specific Gravity: 1.030    Blood: NEGATIVE    pH - Urine: 5.5    Protein, Urine: 100    Urobilinogen: NORMAL    Nitrite: NEGATIVE    Leukocyte Esterase Concentration: LARGE    Red Blood Cell - Urine: 6-10    White Blood Cell - Urine: >50    Hyaline Casts: 3+    Mucus: FEW    Bacteria: SMALL    Squamous Epithelial: FEW          RADIOLOGY: XR CHEST PORTABLE IMMED 1V  IMPRESSION: Clear lungs. No pleural effusions or pneumothorax.      ECG [my interpretation]:        IMPRESSION AND PLAN:    74 yo m with sepsis 2/2 recurrent UTI    Problem/Plan - 1:  ·  Problem:Sepsis 2/2 Urinary tract infection without hematuria, site unspecified.  Plan: -c/w IVF  -place on ciprofloxacin based on prior sensitivities  -Renal Sono      Problem/Plan - 2:  ·  Problem: Sepsis, due to unspecified organism.  Plan: continue with treatment for UTI.     Problem/Plan - 3:  ·  Problem: Type 2 diabetes mellitus with complication, unspecified whether long term insulin use.  Plan: hold metformin, place on ISS.     Problem/Plan - 4:  ·  Problem: Essential hypertension.  Plan: c/w toprol, lisinopril as tolerated.     Problem/Plan - 5:  ·  Problem: Toenail deformity. Plan: podiatry for overgrown toenails.    Problem/Plan - 6:  Problem: Prophylactic measure.Plan: IMPROVE VTE Individual Risk AssessmentIMPROVE VTE Score: 2  heparin.

## 2018-11-02 NOTE — CONSULT NOTE ADULT - ASSESSMENT
· Assessment	  74 yo m with sepsis 2/2 recurrent UTI     Problem/Plan - 1:  ·  Problem: Urinary tract infection without hematuria, site unspecified.  Plan: -c/w IVF  -place on ciprofloxacin based on prior sensitivities     Problem/Plan - 2:  ·  Problem: Sepsis, due to unspecified organism.  Plan: continue with treatment for UTI.      Problem/Plan - 3:  ·  Problem: Type 2 diabetes mellitus with complication, unspecified whether long term insulin use.  Plan: hold metformin, place on ISS.      Problem/Plan - 4:  ·  Problem: Essential hypertension.  Plan: c/w toprol, lisinopril as tolerated.      Problem/Plan - 5:  ·  Problem: Toenail deformity. Plan: podiatry for overgrown toenails.

## 2018-11-02 NOTE — CONSULT NOTE ADULT - SUBJECTIVE AND OBJECTIVE BOX
Patient is a 77y old  Male who presents with a chief complaint of sepsis (2018 06:40)      HPI:    77yr old male significant  PMHx of recurrent UTIs,  HTN, HLD, T2DM, PUD, h/o  recurrent GIB, s/p Aortic root replacement with AVR 2/2 Type A aneurysm (2013), CVAx3 with residual left hemiparesis, bedbound, minimally verbal. Lives at home with wife who noted increased weakness/ sleeping more over last few days. Pt on dysphagia 1 diet with honey thick liquids, requiring feeding in small increments. Wife denies coughing, sob, endorses decreased and darker urine output. Pt with diaper. UA +; prior urine cultures all grew out pan-sensitive pseudomonas.    Tmax in .3, wbc 12.47 (2018 17:15)      REVIEW OF SYSTEMS:    CONSTITUTIONAL: No fever, weight loss, or fatigue  EYES: No eye pain, visual disturbances, or discharge  ENMT:  No sore throat  NECK: No pain or stiffness  RESPIRATORY: No cough, wheezing, chills or hemoptysis; No shortness of breath  CARDIOVASCULAR: No chest pain, palpitations, dizziness, or leg swelling  GASTROINTESTINAL: No abdominal or epigastric pain. No nausea, vomiting, or hematemesis; No diarrhea or constipation. No melena or hematochezia.  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  Gastric ulcer  Hyperlipemia  Diabetes mellitus  Hypertension  History of eye surgery: endophthalmitis in   H/O aortic root repair  No Past Surgical History      Allergies    No Known Allergies    Intolerances        FAMILY HISTORY:  No pertinent family history in first degree relatives      SOCIAL HISTORY:  Lives at home with wife and HHA.  No reported etoh, ivdu, smoking history      MEDICATIONS  (STANDING):  aspirin enteric coated 81 milliGRAM(s) Oral daily  ciprofloxacin   IVPB 400 milliGRAM(s) IV Intermittent every 12 hours  dextrose 5%. 1000 milliLiter(s) (50 mL/Hr) IV Continuous <Continuous>  dextrose 50% Injectable 12.5 Gram(s) IV Push once  dextrose 50% Injectable 25 Gram(s) IV Push once  dextrose 50% Injectable 25 Gram(s) IV Push once  docusate sodium 100 milliGRAM(s) Oral three times a day  heparin  Injectable 5000 Unit(s) SubCutaneous every 8 hours  insulin lispro (HumaLOG) corrective regimen sliding scale   SubCutaneous three times a day before meals  insulin lispro (HumaLOG) corrective regimen sliding scale   SubCutaneous at bedtime  lisinopril 5 milliGRAM(s) Oral daily  metoprolol succinate ER 25 milliGRAM(s) Oral daily  multivitamin 1 Tablet(s) Oral daily  nystatin Powder 1 Application(s) Topical two times a day  pantoprazole    Tablet 40 milliGRAM(s) Oral before breakfast  simvastatin 20 milliGRAM(s) Oral at bedtime  sodium chloride 0.9%. 1000 milliLiter(s) (75 mL/Hr) IV Continuous <Continuous>  tamsulosin 0.4 milliGRAM(s) Oral at bedtime    MEDICATIONS  (PRN):  acetaminophen   Tablet .. 650 milliGRAM(s) Oral every 6 hours PRN Temp greater or equal to 38C (100.4F), Mild Pain (1 - 3), Moderate Pain (4 - 6)  dextrose 40% Gel 15 Gram(s) Oral once PRN Blood Glucose LESS THAN 70 milliGRAM(s)/deciliter  glucagon  Injectable 1 milliGRAM(s) IntraMuscular once PRN Glucose LESS THAN 70 milligrams/deciliter      Vital Signs Last 24 Hrs  T(C): 36.3 (2018 13:49), Max: 37.2 (2018 21:43)  T(F): 97.3 (2018 13:49), Max: 98.9 (2018 21:43)  HR: 95 (2018 13:49) (80 - 95)  BP: 118/69 (2018 13:49) (110/65 - 118/69)  BP(mean): --  RR: 17 (2018 13:49) (17 - 18)  SpO2: 96% (2018 13:49) (96% - 98%)    PHYSICAL EXAM:    GENERAL: NAD, well-groomed  HEAD:  Atraumatic, Normocephalic  EYES: EOMI, PERRLA, conjunctiva and sclera clear  ENMT: No tonsillar erythema, exudates, or enlargement; Moist mucous membranes  NECK: Supple, No JVD  CHEST/LUNG: Clear to percussion bilaterally; No rales, rhonchi, wheezing, or rubs  HEART: Regular rate and rhythm; No murmurs, rubs, or gallops  ABDOMEN: Soft, Nontender, Nondistended; Bowel sounds present  EXTREMITIES:  2+ Peripheral Pulses, No clubbing, cyanosis, or edema  LYMPH: No lymphadenopathy noted  SKIN: No rashes or lesions    LABS:  CBC Full  -  ( 2018 06:00 )  WBC Count : 8.73 K/uL  Hemoglobin : 12.2 g/dL  Hematocrit : 37.8 %  Platelet Count - Automated : 134 K/uL  Mean Cell Volume : 101.6 fL  Mean Cell Hemoglobin : 32.8 pg  Mean Cell Hemoglobin Concentration : 32.3 %  Auto Neutrophil # : x  Auto Lymphocyte # : x  Auto Monocyte # : x  Auto Eosinophil # : x  Auto Basophil # : x  Auto Neutrophil % : x  Auto Lymphocyte % : x  Auto Monocyte % : x  Auto Eosinophil % : x  Auto Basophil % : x      11    144  |  112<H>  |  24<H>  ----------------------------<  219<H>  3.9   |  23  |  0.85    Ca    8.7      2018 06:00    TPro  5.7<L>  /  Alb  2.9<L>  /  TBili  0.2  /  DBili  x   /  AST  12  /  ALT  10  /  AlkPhos  50  11-02      LIVER FUNCTIONS - ( 2018 06:00 )  Alb: 2.9 g/dL / Pro: 5.7 g/dL / ALK PHOS: 50 u/L / ALT: 10 u/L / AST: 12 u/L / GGT: x                               MICROBIOLOGY:        Urinalysis Basic - ( 2018 13:50 )    Color: YELLOW / Appearance: CLEAR / S.030 / pH: 5.5  Gluc: 300 / Ketone: SMALL  / Bili: NEGATIVE / Urobili: NORMAL   Blood: NEGATIVE / Protein: 100 / Nitrite: NEGATIVE   Leuk Esterase: LARGE / RBC: 6-10 / WBC >50   Sq Epi: FEW / Non Sq Epi: x / Bacteria: SMALL      Culture - Urine (18 @ 15:22)    Culture - Urine:   NO GROWTH AT 24 HOURS    Specimen Source: URINE CATHETER    Culture - Blood (18 @ 13:38)    Culture - Blood:   NO ORGANISMS ISOLATED  NO ORGANISMS ISOLATED AT 24 HOURS    Specimen Source: BLOOD VENOUS      Culture - Blood (18 @ 13:38)    Culture - Blood:   ***Blood Panel PCR results on this specimen are available  approximately 3 hours after the Gram stain result***  Gram stain, PCR, and/or culture results may not always  correspond due to difference in methodologies  ------------------------------------------------------------  This PCR assay was performed using [employer].  The  following targets are tested for:  Enterococcus, vancomycin  resistant enterococci, Listeria monocytogenes,  coagulase  negative staphylococci, S. aureus, methicillin resistant S.  aureus, Streptococcus agalactiae (Group B), S. pneumoniae,  S. pyogenes (Group A), Acinetobacter baumannii, Enterobacter  cloacae, E. coli, Klebsiella oxytoca, K. pneumoniae, Proteus  sp., Serratia marcescens, Haemophilus influenzae, Neisseria  meningitidis, Pseudomonas aeruginosa, Candida albicans, C.  glabrata, C. krusei, C. parapsilosis, C. tropicalis and the  KPC resistance gene.  **NOTE: Due to technical problems, Proteus sp. will NOT be  reported as part of the BCID paneluntil further notice.    -  Methicillin resistant Staphylococcus aureus (MRSA): + DETECT ERNESTINE Any isolate of Staphylococcus aureus from a blood culture is  NOT considered a contaminant.    Specimen Source: BLOOD PERIPHERAL    Organism: BLOOD CULTURE PCR    Gram Stain Blood:   ***** CRITICAL RESULT *****  PERSON CALLED / READ-BACK: BLANK CRAWFORD RN  DATE / TIME CALLED: 18 [id number]  CALLED BY: DEANNE NOE  GPCCL^Gram Pos Cocci In Clusters  AFTER: 16 HOURS INCUBATION  BOTTLE: AEROBIC BOTTLE    Organism Identification: BLOOD CULTURE PCR    Method Type: PCR            RADIOLOGY: Patient is a 77y old  Male who presents with a chief complaint of sepsis (2018 06:40)      HPI:    77yr old male significant  PMHx of recurrent UTIs,  HTN, HLD, T2DM, PUD, h/o  recurrent GIB, s/p Aortic root replacement with AVR 2/2 Type A aneurysm (2013), CVAx3 with residual left hemiparesis, bedbound, minimally verbal. Lives at home with wife who noted increased weakness/ sleeping more over last few days. Pt on dysphagia 1 diet with honey thick liquids, requiring feeding in small increments. Wife denies coughing, sob, endorses decreased and darker urine output. Pt with diaper. UA +; prior urine cultures all grew out pan-sensitive pseudomonas.    Tmax in .3, wbc 12.47 (2018 17:15)    Wife present at bedside, states pt usually does not c/o of anything, but that she noticed he has been weaker than usual, not eating as much.  He had fever >101 at home.  Wife also notes he has a rash over his back and b/l axillary region that "comes and goes".  Sometimes he develops small pimples that pop open, which she then cleans and applies cortisone cream.  She believes he has a heat rash.  No recent hospitalizations, outpatient procedures or dental work, no recent abx exposure.    Pt admitted to evaluate for UTI.  Also noted to have bacteremia with GPC.  Pt received dose of vanco in the ER, he is now on cipro.  ID edu called for further antibiotic managment.        REVIEW OF SYSTEMS:    Pt with poor cognition, nonverbal, unable to evaluate      PAST MEDICAL & SURGICAL HISTORY:  GI bleed  PUD (peptic ulcer disease)  CVA, old, 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  Gastric ulcer  Hyperlipemia  Diabetes mellitus  Hypertension  History of eye surgery: endophthalmitis in   H/O aortic root repair  No Past Surgical History      Allergies    No Known Allergies    Intolerances        FAMILY HISTORY:  No pertinent family history in first degree relatives      SOCIAL HISTORY:  Lives at home with wife and HHA.  No reported etoh, ivdu, smoking history      MEDICATIONS  (STANDING):  aspirin enteric coated 81 milliGRAM(s) Oral daily  ciprofloxacin   IVPB 400 milliGRAM(s) IV Intermittent every 12 hours  dextrose 5%. 1000 milliLiter(s) (50 mL/Hr) IV Continuous <Continuous>  dextrose 50% Injectable 12.5 Gram(s) IV Push once  dextrose 50% Injectable 25 Gram(s) IV Push once  dextrose 50% Injectable 25 Gram(s) IV Push once  docusate sodium 100 milliGRAM(s) Oral three times a day  heparin  Injectable 5000 Unit(s) SubCutaneous every 8 hours  insulin lispro (HumaLOG) corrective regimen sliding scale   SubCutaneous three times a day before meals  insulin lispro (HumaLOG) corrective regimen sliding scale   SubCutaneous at bedtime  lisinopril 5 milliGRAM(s) Oral daily  metoprolol succinate ER 25 milliGRAM(s) Oral daily  multivitamin 1 Tablet(s) Oral daily  nystatin Powder 1 Application(s) Topical two times a day  pantoprazole    Tablet 40 milliGRAM(s) Oral before breakfast  simvastatin 20 milliGRAM(s) Oral at bedtime  sodium chloride 0.9%. 1000 milliLiter(s) (75 mL/Hr) IV Continuous <Continuous>  tamsulosin 0.4 milliGRAM(s) Oral at bedtime    MEDICATIONS  (PRN):  acetaminophen   Tablet .. 650 milliGRAM(s) Oral every 6 hours PRN Temp greater or equal to 38C (100.4F), Mild Pain (1 - 3), Moderate Pain (4 - 6)  dextrose 40% Gel 15 Gram(s) Oral once PRN Blood Glucose LESS THAN 70 milliGRAM(s)/deciliter  glucagon  Injectable 1 milliGRAM(s) IntraMuscular once PRN Glucose LESS THAN 70 milligrams/deciliter      Vital Signs Last 24 Hrs  T(C): 36.3 (2018 13:49), Max: 37.2 (2018 21:43)  T(F): 97.3 (2018 13:49), Max: 98.9 (2018 21:43)  HR: 95 (2018 13:49) (80 - 95)  BP: 118/69 (2018 13:49) (110/65 - 118/69)  BP(mean): --  RR: 17 (2018 13:49) (17 - 18)  SpO2: 96% (2018 13:49) (96% - 98%)    PHYSICAL EXAM:    GENERAL: awke, nonverbal  HEAD:  Atraumatic, Normocephalic  EYES: EOMI, PERRLA, conjunctiva and sclera clear  ENMT: No tonsillar erythema, exudates, or enlargement; Moist mucous membranes  NECK: Supple, No JVD  CHEST/LUNG: Clear to percussion bilaterally; No rales, rhonchi, wheezing, or rubs  HEART: Regular rate and rhythm; No murmurs, rubs, or gallops  ABDOMEN: Soft, Nontender, Nondistended; Bowel sounds present  EXTREMITIES:  2+ Peripheral Pulses, No clubbing, cyanosis, or edema  LYMPH: No lymphadenopathy noted  SKIN: rash over b/l axillary region and back, no vesicles, no pus, no devitalized tissue    LABS:  CBC Full  -  ( 2018 06:00 )  WBC Count : 8.73 K/uL  Hemoglobin : 12.2 g/dL  Hematocrit : 37.8 %  Platelet Count - Automated : 134 K/uL  Mean Cell Volume : 101.6 fL  Mean Cell Hemoglobin : 32.8 pg  Mean Cell Hemoglobin Concentration : 32.3 %  Auto Neutrophil # : x  Auto Lymphocyte # : x  Auto Monocyte # : x  Auto Eosinophil # : x  Auto Basophil # : x  Auto Neutrophil % : x  Auto Lymphocyte % : x  Auto Monocyte % : x  Auto Eosinophil % : x  Auto Basophil % : x      11    144  |  112<H>  |  24<H>  ----------------------------<  219<H>  3.9   |  23  |  0.85    Ca    8.7      2018 06:00    TPro  5.7<L>  /  Alb  2.9<L>  /  TBili  0.2  /  DBili  x   /  AST  12  /  ALT  10  /  AlkPhos  50  11-02      LIVER FUNCTIONS - ( 2018 06:00 )  Alb: 2.9 g/dL / Pro: 5.7 g/dL / ALK PHOS: 50 u/L / ALT: 10 u/L / AST: 12 u/L / GGT: x                               MICROBIOLOGY:        Urinalysis Basic - ( 2018 13:50 )    Color: YELLOW / Appearance: CLEAR / S.030 / pH: 5.5  Gluc: 300 / Ketone: SMALL  / Bili: NEGATIVE / Urobili: NORMAL   Blood: NEGATIVE / Protein: 100 / Nitrite: NEGATIVE   Leuk Esterase: LARGE / RBC: 6-10 / WBC >50   Sq Epi: FEW / Non Sq Epi: x / Bacteria: SMALL      Culture - Urine (18 @ 15:22)    Culture - Urine:   NO GROWTH AT 24 HOURS    Specimen Source: URINE CATHETER    Culture - Blood (18 @ 13:38)    Culture - Blood:   NO ORGANISMS ISOLATED  NO ORGANISMS ISOLATED AT 24 HOURS    Specimen Source: BLOOD VENOUS      Culture - Blood (18 @ 13:38)    Culture - Blood:   ***Blood Panel PCR results on this specimen are available  approximately 3 hours after the Gram stain result***  Gram stain, PCR, and/or culture results may not always  correspond due to difference in methodologies  ------------------------------------------------------------  This PCR assay was performed using [employer].  The  following targets are tested for:  Enterococcus, vancomycin  resistant enterococci, Listeria monocytogenes,  coagulase  negative staphylococci, S. aureus, methicillin resistant S.  aureus, Streptococcus agalactiae (Group B), S. pneumoniae,  S. pyogenes (Group A), Acinetobacter baumannii, Enterobacter  cloacae, E. coli, Klebsiella oxytoca, K. pneumoniae, Proteus  sp., Serratia marcescens, Haemophilus influenzae, Neisseria  meningitidis, Pseudomonas aeruginosa, Candida albicans, C.  glabrata, C. krusei, C. parapsilosis, C. tropicalis and the  KPC resistance gene.  **NOTE: Due to technical problems, Proteus sp. will NOT be  reported as part of the BCID paneluntil further notice.    -  Methicillin resistant Staphylococcus aureus (MRSA): + DETECT ERNESTINE Any isolate of Staphylococcus aureus from a blood culture is  NOT considered a contaminant.    Specimen Source: BLOOD PERIPHERAL    Organism: BLOOD CULTURE PCR    Gram Stain Blood:   ***** CRITICAL RESULT *****  PERSON CALLED / READ-BACK: BLANK CRAWFORD RN  DATE / TIME CALLED: 18  CALLED BY: DEANNE NOE^Gram Pos Cocci In Clusters  AFTER: 16 HOURS INCUBATION  BOTTLE: AEROBIC BOTTLE    Organism Identification: BLOOD CULTURE PCR    Method Type: PCR            RADIOLOGY:      < from: US Kidney and Bladder (18 @ 11:46) >  FINDINGS:    Right kidney:  10.7 cm. Several right renal cysts. No renal mass,   hydronephrosis or calculi.    Left kidney:  10 cm. A 2.1 x 2 x 2 cm cystic lesion at the upper pole   with an echogenic nodular focus at at its periphery is indeterminate. No   associated vascularity. No hydronephrosis or calculi.    Urinary bladder: Dependent debris within the urinary bladder.    Prostatomegaly measuring 6.5 cm transverse dimension.    IMPRESSION:     A 2.1 cm LEFT upper pole cystic lesion with a peripheral nodule.   Differential includes neoplasm. Dedicated contrast enhanced renal   protocol CT or MR is recommended for further evaluation.    < end of copied text >        < from: CT Head No Cont (18 @ 14:59) >  FINDINGS:   There is no CT evidence of acute intracranial hemorrhage, extra-axial   collection, vasogenic edema, mass effect, midline shift, central   herniation, or hydrocephalus.     There is moderate age-related cerebral volume loss. There are unchanged   bilateral superior cerebellar artery distribution chronic infarctions and   unchanged chronic pontine lacunar infarctions.    The visualized paranasal sinuses are clear. The mastoid air cells and   middle ear cavities are clear.    The soft tissues of the scalp are unremarkable. The calvarium is intact.      IMPRESSION:   No CT evidence of acute intracranial hemorrhage, brain edema, or mass   effect.     < end of copied text >        < from: Xray Chest 1 View-PORTABLE IMMEDIATE (18 @ 12:55) >  IMPRESSION:  Clear lungs. No pleural effusions or pneumothorax.    Stable sternal wires, prostatic aortic valve, and cardiac and mediastinal   silhouettes.     Trachea midline.    Generalized osteopenia and bilateral shoulder degenerative change again   noted.    Vertically oriented embolization coils in upper right epigastric region,   correlate clinically.    < end of copied text >

## 2018-11-03 LAB
ALBUMIN SERPL ELPH-MCNC: 2.9 G/DL — LOW (ref 3.3–5)
ALP SERPL-CCNC: 49 U/L — SIGNIFICANT CHANGE UP (ref 40–120)
ALT FLD-CCNC: 12 U/L — SIGNIFICANT CHANGE UP (ref 4–41)
AST SERPL-CCNC: 11 U/L — SIGNIFICANT CHANGE UP (ref 4–40)
BILIRUB SERPL-MCNC: 0.2 MG/DL — SIGNIFICANT CHANGE UP (ref 0.2–1.2)
BUN SERPL-MCNC: 20 MG/DL — SIGNIFICANT CHANGE UP (ref 7–23)
CALCIUM SERPL-MCNC: 8.4 MG/DL — SIGNIFICANT CHANGE UP (ref 8.4–10.5)
CHLORIDE SERPL-SCNC: 111 MMOL/L — HIGH (ref 98–107)
CO2 SERPL-SCNC: 21 MMOL/L — LOW (ref 22–31)
CREAT SERPL-MCNC: 0.75 MG/DL — SIGNIFICANT CHANGE UP (ref 0.5–1.3)
CRP SERPL-MCNC: 4.2 MG/L — SIGNIFICANT CHANGE UP
ERYTHROCYTE [SEDIMENTATION RATE] IN BLOOD: 16 MM/HR — HIGH (ref 1–15)
GENTAMICIN TROUGH SERPL-MCNC: 2.3 UG/ML — CRITICAL HIGH (ref 0.4–2)
GLUCOSE BLDC GLUCOMTR-MCNC: 178 MG/DL — HIGH (ref 70–99)
GLUCOSE BLDC GLUCOMTR-MCNC: 197 MG/DL — HIGH (ref 70–99)
GLUCOSE BLDC GLUCOMTR-MCNC: 232 MG/DL — HIGH (ref 70–99)
GLUCOSE SERPL-MCNC: 186 MG/DL — HIGH (ref 70–99)
HCT VFR BLD CALC: 33.8 % — LOW (ref 39–50)
HGB BLD-MCNC: 11.3 G/DL — LOW (ref 13–17)
MCHC RBC-ENTMCNC: 33.4 % — SIGNIFICANT CHANGE UP (ref 32–36)
MCHC RBC-ENTMCNC: 33.5 PG — SIGNIFICANT CHANGE UP (ref 27–34)
MCV RBC AUTO: 100.3 FL — HIGH (ref 80–100)
NRBC # FLD: 0 — SIGNIFICANT CHANGE UP
ORGANISM # SPEC MICROSCOPIC CNT: SIGNIFICANT CHANGE UP
ORGANISM # SPEC MICROSCOPIC CNT: SIGNIFICANT CHANGE UP
PLATELET # BLD AUTO: 124 K/UL — LOW (ref 150–400)
PMV BLD: 12.3 FL — SIGNIFICANT CHANGE UP (ref 7–13)
POTASSIUM SERPL-MCNC: 3.6 MMOL/L — SIGNIFICANT CHANGE UP (ref 3.5–5.3)
POTASSIUM SERPL-SCNC: 3.6 MMOL/L — SIGNIFICANT CHANGE UP (ref 3.5–5.3)
PROT SERPL-MCNC: 5.3 G/DL — LOW (ref 6–8.3)
RBC # BLD: 3.37 M/UL — LOW (ref 4.2–5.8)
RBC # FLD: 12.6 % — SIGNIFICANT CHANGE UP (ref 10.3–14.5)
SODIUM SERPL-SCNC: 144 MMOL/L — SIGNIFICANT CHANGE UP (ref 135–145)
SPECIMEN SOURCE: SIGNIFICANT CHANGE UP
VANCOMYCIN TROUGH SERPL-MCNC: 13.8 UG/ML — SIGNIFICANT CHANGE UP (ref 10–20)
WBC # BLD: 6.5 K/UL — SIGNIFICANT CHANGE UP (ref 3.8–10.5)
WBC # FLD AUTO: 6.5 K/UL — SIGNIFICANT CHANGE UP (ref 3.8–10.5)

## 2018-11-03 RX ORDER — GENTAMICIN SULFATE 40 MG/ML
60 VIAL (ML) INJECTION EVERY 12 HOURS
Qty: 0 | Refills: 0 | Status: DISCONTINUED | OUTPATIENT
Start: 2018-11-04 | End: 2018-11-04

## 2018-11-03 RX ADMIN — SIMVASTATIN 20 MILLIGRAM(S): 20 TABLET, FILM COATED ORAL at 23:06

## 2018-11-03 RX ADMIN — NYSTATIN CREAM 1 APPLICATION(S): 100000 CREAM TOPICAL at 18:09

## 2018-11-03 RX ADMIN — NYSTATIN CREAM 1 APPLICATION(S): 100000 CREAM TOPICAL at 05:43

## 2018-11-03 RX ADMIN — Medication 200 MILLIGRAM(S): at 05:41

## 2018-11-03 RX ADMIN — Medication 1: at 18:09

## 2018-11-03 RX ADMIN — Medication 100 MILLIGRAM(S): at 23:07

## 2018-11-03 RX ADMIN — Medication 2: at 08:53

## 2018-11-03 RX ADMIN — Medication 250 MILLIGRAM(S): at 05:41

## 2018-11-03 RX ADMIN — Medication 100 MILLIGRAM(S): at 15:32

## 2018-11-03 RX ADMIN — Medication 25 MILLIGRAM(S): at 05:43

## 2018-11-03 RX ADMIN — Medication 100 MILLIGRAM(S): at 00:30

## 2018-11-03 RX ADMIN — SODIUM CHLORIDE 75 MILLILITER(S): 9 INJECTION INTRAMUSCULAR; INTRAVENOUS; SUBCUTANEOUS at 08:53

## 2018-11-03 RX ADMIN — SODIUM CHLORIDE 75 MILLILITER(S): 9 INJECTION INTRAMUSCULAR; INTRAVENOUS; SUBCUTANEOUS at 23:06

## 2018-11-03 RX ADMIN — Medication 100 MILLIGRAM(S): at 05:43

## 2018-11-03 RX ADMIN — HEPARIN SODIUM 5000 UNIT(S): 5000 INJECTION INTRAVENOUS; SUBCUTANEOUS at 15:33

## 2018-11-03 RX ADMIN — Medication 1 TABLET(S): at 11:59

## 2018-11-03 RX ADMIN — HEPARIN SODIUM 5000 UNIT(S): 5000 INJECTION INTRAVENOUS; SUBCUTANEOUS at 23:07

## 2018-11-03 RX ADMIN — PANTOPRAZOLE SODIUM 40 MILLIGRAM(S): 20 TABLET, DELAYED RELEASE ORAL at 05:42

## 2018-11-03 RX ADMIN — LISINOPRIL 5 MILLIGRAM(S): 2.5 TABLET ORAL at 05:43

## 2018-11-03 RX ADMIN — HEPARIN SODIUM 5000 UNIT(S): 5000 INJECTION INTRAVENOUS; SUBCUTANEOUS at 05:42

## 2018-11-03 RX ADMIN — Medication 1: at 12:55

## 2018-11-03 RX ADMIN — TAMSULOSIN HYDROCHLORIDE 0.4 MILLIGRAM(S): 0.4 CAPSULE ORAL at 23:06

## 2018-11-03 RX ADMIN — Medication 81 MILLIGRAM(S): at 11:59

## 2018-11-03 RX ADMIN — Medication 250 MILLIGRAM(S): at 18:10

## 2018-11-03 RX ADMIN — Medication 100 MILLIGRAM(S): at 05:42

## 2018-11-03 NOTE — CHART NOTE - NSCHARTNOTEFT_GEN_A_CORE
Notified by RN that Gentamycin trough prior to the 4 th dose is 2.3 .Dose held. Notified by RN that Gentamycin trough prior to the 4 th dose is 2.3 .Dose held. Patient is currently on Gentamycin 60 mg Q8 hours . Frequency changed to Q12 hours . Will send Gentamycin random level prior to the next dose .

## 2018-11-03 NOTE — PROGRESS NOTE ADULT - SUBJECTIVE AND OBJECTIVE BOX
Patient is a 77y old  Male who presents with a chief complaint of sepsis     SUBJECTIVE / OVERNIGHT EVENTS:  Nonverbal  Awake more alert remains afebrile-ID follow up noted    MEDICATIONS  (STANDING):  aspirin enteric coated 81 milliGRAM(s) Oral daily  docusate sodium 100 milliGRAM(s) Oral three times a day  gentamicin   IVPB 60 milliGRAM(s) IV Intermittent every 12 hours  heparin  Injectable 5000 Unit(s) SubCutaneous every 8 hours  insulin lispro (HumaLOG) corrective regimen sliding scale   SubCutaneous three times a day before meals  insulin lispro (HumaLOG) corrective regimen sliding scale   SubCutaneous at bedtime  lisinopril 5 milliGRAM(s) Oral daily  metoprolol succinate ER 25 milliGRAM(s) Oral daily  multivitamin 1 Tablet(s) Oral daily  nystatin Powder 1 Application(s) Topical two times a day  pantoprazole    Tablet 40 milliGRAM(s) Oral before breakfast  rifampin 300 milliGRAM(s) Oral three times a day  simvastatin 20 milliGRAM(s) Oral at bedtime  sodium chloride 0.9%. 1000 milliLiter(s) (75 mL/Hr) IV Continuous <Continuous>  tamsulosin 0.4 milliGRAM(s) Oral at bedtime  vancomycin  IVPB 1000 milliGRAM(s) IV Intermittent every 12 hours    MEDICATIONS  (PRN):  acetaminophen   Tablet .. 650 milliGRAM(s) Oral every 6 hours PRN Temp greater or equal to 38C (100.4F), Mild Pain (1 - 3), Moderate Pain (4 - 6)  dextrose 40% Gel 15 Gram(s) Oral once PRN Blood Glucose LESS THAN 70 milliGRAM(s)/deciliter  glucagon  Injectable 1 milliGRAM(s) IntraMuscular once PRN Glucose LESS THAN 70 milligrams/deciliter  magnesium hydroxide Suspension 30 milliLiter(s) Oral daily PRN Constipation        CAPILLARY BLOOD GLUCOSE      POCT Blood Glucose.: 232 mg/dL (03 Nov 2018 22:21)  POCT Blood Glucose.: 197 mg/dL (03 Nov 2018 17:47)  POCT Blood Glucose.: 178 mg/dL (03 Nov 2018 12:31)  POCT Blood Glucose.: 245 mg/dL (03 Nov 2018 08:36)    I&O's Summary      PHYSICAL EXAM:    HEAD:  Atraumatic, Normocephalic  NECK: Supple, No JVD  CHEST/LUNG: Clear to auscultation bilaterally; No wheezing.  HEART: Regular rate and rhythm; No murmurs, rubs, or gallops  ABDOMEN: Soft, Nontender, Nondistended; Bowel sounds present  EXTREMITIES:   No clubbing, cyanosis, or edema  NEUROLOGY: AAO X 3 Non verbal Paresis      LABS:                        11.3   6.50  )-----------( 124      ( 03 Nov 2018 05:14 )             33.8     11-03    144  |  111<H>  |  20  ----------------------------<  186<H>  3.6   |  21<L>  |  0.75    Ca    8.4      03 Nov 2018 05:14    TPro  5.3<L>  /  Alb  2.9<L>  /  TBili  0.2  /  DBili  x   /  AST  11  /  ALT  12  /  AlkPhos  49  11-03    PT/INR - ( 02 Nov 2018 06:00 )   PT: 15.7 SEC;   INR: 1.36          PTT - ( 02 Nov 2018 06:00 )  PTT:28.3 SEC        CAPILLARY BLOOD GLUCOSE      POCT Blood Glucose.: 232 mg/dL (03 Nov 2018 22:21)  POCT Blood Glucose.: 197 mg/dL (03 Nov 2018 17:47)  POCT Blood Glucose.: 178 mg/dL (03 Nov 2018 12:31)  POCT Blood Glucose.: 245 mg/dL (03 Nov 2018 08:36)    Specimen source BLOOD PERIPHERAL  11-01 @ 15:22  Culture-urine   NO GROWTH AT 24 HOURS  Specimen source URINE CATHETER  11-01 @ 13:38  Organism BLOOD CULTURE PCR  Organism Identification BLOOD CULTURE PCR  Staphylococcus aureus  Specimen source BLOOD PERIPHERAL           Gram stain blood   ***** CRITICAL RESULT *****  PERSON CALLED / READ-BACK: BLANK CRAWFORD RN  DATE / TIME CALLED: 11/02/18 0707  CALLED BY: DEANNE NOE  GPCCL^Gram Pos Cocci In Clusters  AFTER: 16 HOURS INCUBATION  BOTTLE: AEROBIC BOTTLE  Method type PCR  Organism BLOOD CULTURE PCR  Organism identification BLOOD CULTURE PCR  Staphylococcus aureus

## 2018-11-03 NOTE — CHART NOTE - NSCHARTNOTEFT_GEN_A_CORE
Podiatry consulted for elongated toe nails. Will see pt in next 1-2 days. Will follow.     ADS  [id number]

## 2018-11-03 NOTE — PROGRESS NOTE ADULT - SUBJECTIVE AND OBJECTIVE BOX
Patient is a 77y old  Male who presents with a chief complaint of sepsis (03 Nov 2018 22:58)      SUBJECTIVE / OVERNIGHT EVENTS:  Nonverbal  Awake    MEDICATIONS  (STANDING):  aspirin enteric coated 81 milliGRAM(s) Oral daily  dextrose 5%. 1000 milliLiter(s) (50 mL/Hr) IV Continuous <Continuous>  dextrose 50% Injectable 12.5 Gram(s) IV Push once  dextrose 50% Injectable 25 Gram(s) IV Push once  dextrose 50% Injectable 25 Gram(s) IV Push once  docusate sodium 100 milliGRAM(s) Oral three times a day  gentamicin   IVPB 60 milliGRAM(s) IV Intermittent every 12 hours  heparin  Injectable 5000 Unit(s) SubCutaneous every 8 hours  insulin lispro (HumaLOG) corrective regimen sliding scale   SubCutaneous three times a day before meals  insulin lispro (HumaLOG) corrective regimen sliding scale   SubCutaneous at bedtime  lisinopril 5 milliGRAM(s) Oral daily  metoprolol succinate ER 25 milliGRAM(s) Oral daily  multivitamin 1 Tablet(s) Oral daily  nystatin Powder 1 Application(s) Topical two times a day  pantoprazole    Tablet 40 milliGRAM(s) Oral before breakfast  rifampin 300 milliGRAM(s) Oral three times a day  simvastatin 20 milliGRAM(s) Oral at bedtime  sodium chloride 0.9%. 1000 milliLiter(s) (75 mL/Hr) IV Continuous <Continuous>  tamsulosin 0.4 milliGRAM(s) Oral at bedtime  vancomycin  IVPB 1000 milliGRAM(s) IV Intermittent every 12 hours    MEDICATIONS  (PRN):  acetaminophen   Tablet .. 650 milliGRAM(s) Oral every 6 hours PRN Temp greater or equal to 38C (100.4F), Mild Pain (1 - 3), Moderate Pain (4 - 6)  dextrose 40% Gel 15 Gram(s) Oral once PRN Blood Glucose LESS THAN 70 milliGRAM(s)/deciliter  glucagon  Injectable 1 milliGRAM(s) IntraMuscular once PRN Glucose LESS THAN 70 milligrams/deciliter  magnesium hydroxide Suspension 30 milliLiter(s) Oral daily PRN Constipation        CAPILLARY BLOOD GLUCOSE      POCT Blood Glucose.: 232 mg/dL (03 Nov 2018 22:21)  POCT Blood Glucose.: 197 mg/dL (03 Nov 2018 17:47)  POCT Blood Glucose.: 178 mg/dL (03 Nov 2018 12:31)  POCT Blood Glucose.: 245 mg/dL (03 Nov 2018 08:36)    I&O's Summary      PHYSICAL EXAM:    HEAD:  Atraumatic, Normocephalic  NECK: Supple, No JVD  CHEST/LUNG: Clear to auscultation bilaterally; No wheezing.  HEART: Regular rate and rhythm; No murmurs, rubs, or gallops  ABDOMEN: Soft, Nontender, Nondistended; Bowel sounds present  EXTREMITIES:   No clubbing, cyanosis, or edema  NEUROLOGY: AAO X 3      LABS:                        11.3   6.50  )-----------( 124      ( 03 Nov 2018 05:14 )             33.8     11-03    144  |  111<H>  |  20  ----------------------------<  186<H>  3.6   |  21<L>  |  0.75    Ca    8.4      03 Nov 2018 05:14    TPro  5.3<L>  /  Alb  2.9<L>  /  TBili  0.2  /  DBili  x   /  AST  11  /  ALT  12  /  AlkPhos  49  11-03    PT/INR - ( 02 Nov 2018 06:00 )   PT: 15.7 SEC;   INR: 1.36          PTT - ( 02 Nov 2018 06:00 )  PTT:28.3 SEC        CAPILLARY BLOOD GLUCOSE      POCT Blood Glucose.: 232 mg/dL (03 Nov 2018 22:21)  POCT Blood Glucose.: 197 mg/dL (03 Nov 2018 17:47)  POCT Blood Glucose.: 178 mg/dL (03 Nov 2018 12:31)  POCT Blood Glucose.: 245 mg/dL (03 Nov 2018 08:36)    11-02 @ 11:50  Culture-urine --  Culture results --  method type --  Organism --  Organism Identification --  Specimen source BLOOD PERIPHERAL  11-01 @ 15:22  Culture-urine   NO GROWTH AT 24 HOURS  Culture results --  method type --  Organism --  Organism Identification --  Specimen source URINE CATHETER  11-01 @ 13:38  Culture-urine --  Culture results --  method type PCR  Organism BLOOD CULTURE PCR  Organism Identification BLOOD CULTURE PCR  Staphylococcus aureus  Specimen source BLOOD PERIPHERAL           11-02 @ 11:50  Culture blood   NO ORGANISMS ISOLATED  NO ORGANISMS ISOLATED AT 24 HOURS  Culture results --  Gram stain --  Gram stain blood --  Method type --  Organism --  Organism identification --  Specimen source BLOOD PERIPHERAL   11-01 @ 15:22  Culture blood --  Culture results --  Gram stain --  Gram stain blood --  Method type --  Organism --  Organism identification --  Specimen source URINE CATHETER   11-01 @ 13:38  Culture blood   ***Blood Panel PCR results on this specimen are available  approximately 3 hours after the Gram stain result***  Gram stain, PCR, and/or culture results may not always  correspond due to difference in methodologies  ------------------------------------------------------------  This PCR assay was performed using [facility].  The  following targets are tested for:  Enterococcus, vancomycin  resistant enterococci, Listeria monocytogenes,  coagulase  negative staphylococci, S. aureus, methicillin resistant S.  aureus, Streptococcus agalactiae (Group B), S. pneumoniae,  S. pyogenes (Group A), Acinetobacter baumannii, Enterobacter  cloacae, E. coli, Klebsiella oxytoca, K. pneumoniae, Proteus  sp., Serratia marcescens, Haemophilus influenzae, Neisseria  meningitidis, Pseudomonas aeruginosa, Candida albicans, C.  glabrata, C. krusei, C. parapsilosis, C. tropicalis and the  KPC resistance gene.  **NOTE: Due to technical problems, Proteus sp. will NOT be  reported as part of the BCID paneluntil further notice.  Culture results --  Gram stain --  Gram stain blood   ***** CRITICAL RESULT *****  PERSON CALLED / READ-BACK: BLANK CRAWFORD RN  DATE / TIME CALLED: 11/02/18 0707  CALLED BY: DEANNE NOE  GPCCL^Gram Pos Cocci In Clusters  AFTER: 16 HOURS INCUBATION  BOTTLE: AEROBIC BOTTLE  Method type PCR  Organism BLOOD CULTURE PCR  Organism identification BLOOD CULTURE PCR  Staphylococcus aureus  Specimen source BLOOD PERIPHERAL      RADIOLOGY & ADDITIONAL TESTS:    Imaging Personally Reviewed:    Consultant(s) Notes Reviewed:      Care Discussed with Consultants/Other Providers:

## 2018-11-03 NOTE — PROGRESS NOTE ADULT - ASSESSMENT
MRSA Bacteremia    77yr old male significant  PMHx of recurrent UTIs,  HTN, HLD, T2DM, PUD, h/o  recurrent GIB, s/p Aortic root replacement with AVR 2/2 Type A aneurysm (1/2013), CVAx3 with residual left hemiparesis, bedbound, minimally verbal. Lives at home with wife who noted increased weakness/ sleeping more over last few days. Pt on dysphagia 1 diet with honey thick liquids, requiring feeding in small increments. Wife denies coughing, sob, endorses decreased and darker urine output. Pt with diaper. UA +; prior urine cultures all grew out pan-sensitive pseudomonas.  Tmax in .3, wbc 12.47  , having   fevers >101 at home along with the reported  rash over his back and b/l axillary region that "comes and goes",not present currently  No recent hospitalizations, outpatient procedures or dental work, no recent anbx exposure.Adm blood cultures with MRSA ,on anbx afebrile and with the subsequent negative blood cultures.Presentation with the febrile illness along with MRSA bacteremia of unknown duration, ?point of entry might cutaneous but with the h/o AVR and hence need to be r/o and treated for possible prosthetic valve endocarditis along with ruling out other metastatic foci of MRSA seeding  suggest to   provide supportive care  repeat multiple sets of blood   cultures  cont vanco  with q 12 dosing  along with po rifampin ,  to achieve the target trough  level of 14 t0 16, gent can be   dc as already clearing up the  blood stream  TTE which if negative proceed  with DICKSON  get the indium scan if DICKSON negative  monitor closely serum cr while on vanco   and LFTs while on rifampin  cards /derm follow up      Alysia Davis MD  [phone] MRSA Bacteremia    77yr old male significant  PMHx of recurrent UTIs,  HTN, HLD, T2DM, PUD, h/o  recurrent GIB, s/p Aortic root replacement with AVR 2/2 Type A aneurysm (1/2013), CVAx3 with residual left hemiparesis, bedbound, minimally verbal. Lives at home with wife who noted increased weakness/ sleeping more over last few days. Pt on dysphagia 1 diet with honey thick liquids, requiring feeding in small increments. Wife denies coughing, sob, endorses decreased and darker urine output. Pt with diaper. UA +; prior urine cultures all grew out pan-sensitive pseudomonas.  Tmax in .3, wbc 12.47  , having   fevers >101 at home along with the reported  rash over his back and b/l axillary region that "comes and goes",not present currently  No recent hospitalizations, outpatient procedures or dental work, no recent anbx exposure.Adm blood cultures with MRSA ,on anbx afebrile and with the subsequent negative blood cultures.Presentation with the febrile illness along with MRSA bacteremia of unknown duration, ?point of entry might cutaneous but with the h/o AVR and hence need to be r/o and treated for possible prosthetic valve endocarditis along with ruling out other metastatic foci of MRSA seeding  suggest to   provide supportive care  repeat multiple sets of blood   cultures  cont vanco  with q 12 dosing  along with po rifampin ,  to achieve the target trough  level of 14 t0 16, gent can be   dc in am as already clearing up the  blood stream  TTE which if negative proceed  with DICKSON  get the indium scan if DICKSON negative  monitor closely serum cr while on vanco   and LFTs while on rifampin  cards /derm follow up      Alysia Davis MD  [phone]

## 2018-11-03 NOTE — PROGRESS NOTE ADULT - ASSESSMENT
76 yo m with sepsis 2/2 recurrent UTI    Problem/Plan - 1:  ·  Problem:Sepsis 2/2 Urinary tract infection without hematuria, site unspecified.  Plan: -c/w IVF  -place on ciprofloxacin based on prior sensitivities  -Renal Sono      Problem/Plan - 2:  ·  Problem: Sepsis, MRSA bacteremia.  Plan: continue with  IV ABX    Problem/Plan - 3:  ·  Problem: Type 2 diabetes mellitus with complication, unspecified whether long term insulin use.  Plan: hold metformin, place on ISS.     Problem/Plan - 4:  ·  Problem: Essential hypertension.  Plan: c/w toprol, lisinopril as tolerated.     Problem/Plan - 5:  ·  Problem: Toenail deformity. Plan: podiatry for overgrown toenails.    Problem/Plan - 6:  Problem: Prophylactic measure.Plan: IMPROVE VTE Individual Risk AssessmentIMPROVE VTE Score: 2  heparin.

## 2018-11-03 NOTE — PROGRESS NOTE ADULT - SUBJECTIVE AND OBJECTIVE BOX
Infectious Diseases progress note:    Subjective:  HPI:  77yr old male significant  PMHx of recurrent UTIs,  HTN, HLD, T2DM, PUD, h/o  recurrent GIB, s/p Aortic root replacement with AVR 2/2 Type A aneurysm (1/2013), CVAx3 with residual left hemiparesis, bedbound, minimally verbal. Lives at home with wife who noted increased weakness/ sleeping more over last few days. Pt on dysphagia 1 diet with honey thick liquids, requiring feeding in small increments. Wife denies coughing, sob, endorses decreased and darker urine output. Pt with diaper. UA +; prior urine cultures all grew out pan-sensitive pseudomonas.    Tmax in .3, wbc 12.47 (01 Nov 2018 17:15)      ROS:  CONSTITUTIONAL:  No fever, chills, rigors  CARDIOVASCULAR:  No chest pain or palpitations  RESPIRATORY:   No SOB, cough, dyspnea on exertion.  No wheezing  GASTROINTESTINAL:  No abd pain, N/V, diarrhea/constipation  EXTREMITIES:  No swelling or joint pain  GENITOURINARY:  No burning on urination, increased frequency or urgency.  No flank pain  NEUROLOGIC:  No HA, visual disturbances  SKIN: No rashes    Allergies    No Known Allergies    Intolerances        ANTIBIOTICS/RELEVANT:  antimicrobials  rifampin 300 milliGRAM(s) Oral three times a day  vancomycin  IVPB 1000 milliGRAM(s) IV Intermittent every 12 hours    immunologic:    OTHER:  acetaminophen   Tablet .. 650 milliGRAM(s) Oral every 6 hours PRN  aspirin enteric coated 81 milliGRAM(s) Oral daily  dextrose 40% Gel 15 Gram(s) Oral once PRN  dextrose 5%. 1000 milliLiter(s) IV Continuous <Continuous>  dextrose 50% Injectable 12.5 Gram(s) IV Push once  dextrose 50% Injectable 25 Gram(s) IV Push once  dextrose 50% Injectable 25 Gram(s) IV Push once  docusate sodium 100 milliGRAM(s) Oral three times a day  glucagon  Injectable 1 milliGRAM(s) IntraMuscular once PRN  heparin  Injectable 5000 Unit(s) SubCutaneous every 8 hours  insulin lispro (HumaLOG) corrective regimen sliding scale   SubCutaneous three times a day before meals  insulin lispro (HumaLOG) corrective regimen sliding scale   SubCutaneous at bedtime  lisinopril 5 milliGRAM(s) Oral daily  magnesium hydroxide Suspension 30 milliLiter(s) Oral daily PRN  metoprolol succinate ER 25 milliGRAM(s) Oral daily  multivitamin 1 Tablet(s) Oral daily  nystatin Powder 1 Application(s) Topical two times a day  pantoprazole    Tablet 40 milliGRAM(s) Oral before breakfast  simvastatin 20 milliGRAM(s) Oral at bedtime  sodium chloride 0.9%. 1000 milliLiter(s) IV Continuous <Continuous>  tamsulosin 0.4 milliGRAM(s) Oral at bedtime      Objective:  Vital Signs Last 24 Hrs  T(C): 36.7 (03 Nov 2018 22:57), Max: 36.7 (03 Nov 2018 22:57)  T(F): 98 (03 Nov 2018 22:57), Max: 98 (03 Nov 2018 22:57)  HR: 75 (03 Nov 2018 22:57) (75 - 82)  BP: 140/81 (03 Nov 2018 22:57) (115/71 - 140/81)  BP(mean): --  RR: 18 (03 Nov 2018 22:57) (18 - 18)  SpO2: 97% (03 Nov 2018 22:57) (97% - 99%)    PHYSICAL EXAM:  Constitutional:NAD  Eyes:BENJY, EOMI  Ear/Nose/Throat: no thrush, mucositis.  Moist mucous membranes	  Neck:no JVD, no lymphadenopathy, supple  Respiratory: CTA phi  Cardiovascular: S1S2 RRR, no murmurs  Gastrointestinal:soft, nontender,  nondistended (+) BS  Extremities:no e/e/c  Skin:  no rashes, open wounds or ulcerations        LABS:                        11.3   6.50  )-----------( 124      ( 03 Nov 2018 05:14 )             33.8     11-03    144  |  111<H>  |  20  ----------------------------<  186<H>  3.6   |  21<L>  |  0.75    Ca    8.4      03 Nov 2018 05:14    TPro  5.3<L>  /  Alb  2.9<L>  /  TBili  0.2  /  DBili  x   /  AST  11  /  ALT  12  /  AlkPhos  49  11-03    PT/INR - ( 02 Nov 2018 06:00 )   PT: 15.7 SEC;   INR: 1.36          PTT - ( 02 Nov 2018 06:00 )  PTT:28.3 SEC            Vancomycin Level, Trough: 13.8 ug/mL (11-03 @ 17:10)              MICROBIOLOGY:          RADIOLOGY & ADDITIONAL STUDIES: Infectious Diseases progress note: Covering Dr Ash  Subjective:  HPI:  77yr old male significant  PMHx of recurrent UTIs,  HTN, HLD, T2DM, PUD, h/o  recurrent GIB, s/p Aortic root replacement with AVR 2/2 Type A aneurysm (1/2013), CVAx3 with residual left hemiparesis, bedbound, minimally verbal. Lives at home with wife who noted increased weakness/ sleeping more over last few days. Pt on dysphagia 1 diet with honey thick liquids, requiring feeding in small increments. Wife denies coughing, sob, endorses decreased and darker urine output. Pt with diaper. UA +; prior urine cultures all grew out pan-sensitive pseudomonas.  Tmax in .3, wbc 12.47 .Admission Blood Cultures with MRSA.  Urine Culture Negative.      ROS:  CONSTITUTIONAL:  No fever, chills, rigors  CARDIOVASCULAR:  No chest pain or palpitations  RESPIRATORY:   No SOB, cough, dyspnea on exertion.  No wheezing  GASTROINTESTINAL:  No abd pain, N/V, diarrhea/constipation  EXTREMITIES:  No swelling or joint pain  GENITOURINARY:  No burning on urination, increased frequency or urgency.  No flank pain  NEUROLOGIC:  No HA, visual disturbances  SKIN: No rashes    Allergies    No Known Allergies    Intolerances        ANTIBIOTICS/RELEVANT:  antimicrobials  rifampin 300 milliGRAM(s) Oral three times a day  vancomycin  IVPB 1000 milliGRAM(s) IV Intermittent every 12 hours    immunologic:    OTHER:  acetaminophen   Tablet .. 650 milliGRAM(s) Oral every 6 hours PRN  aspirin enteric coated 81 milliGRAM(s) Oral daily  dextrose 40% Gel 15 Gram(s) Oral once PRN  dextrose 5%. 1000 milliLiter(s) IV Continuous <Continuous>  dextrose 50% Injectable 12.5 Gram(s) IV Push once  dextrose 50% Injectable 25 Gram(s) IV Push once  dextrose 50% Injectable 25 Gram(s) IV Push once  docusate sodium 100 milliGRAM(s) Oral three times a day  glucagon  Injectable 1 milliGRAM(s) IntraMuscular once PRN  heparin  Injectable 5000 Unit(s) SubCutaneous every 8 hours  insulin lispro (HumaLOG) corrective regimen sliding scale   SubCutaneous three times a day before meals  insulin lispro (HumaLOG) corrective regimen sliding scale   SubCutaneous at bedtime  lisinopril 5 milliGRAM(s) Oral daily  magnesium hydroxide Suspension 30 milliLiter(s) Oral daily PRN  metoprolol succinate ER 25 milliGRAM(s) Oral daily  multivitamin 1 Tablet(s) Oral daily  nystatin Powder 1 Application(s) Topical two times a day  pantoprazole    Tablet 40 milliGRAM(s) Oral before breakfast  simvastatin 20 milliGRAM(s) Oral at bedtime  sodium chloride 0.9%. 1000 milliLiter(s) IV Continuous <Continuous>  tamsulosin 0.4 milliGRAM(s) Oral at bedtime      Objective:  Vital Signs Last 24 Hrs  T(C): 36.7 (03 Nov 2018 22:57), Max: 36.7 (03 Nov 2018 22:57)  T(F): 98 (03 Nov 2018 22:57), Max: 98 (03 Nov 2018 22:57)  HR: 75 (03 Nov 2018 22:57) (75 - 82)  BP: 140/81 (03 Nov 2018 22:57) (115/71 - 140/81)  BP(mean): --  RR: 18 (03 Nov 2018 22:57) (18 - 18)  SpO2: 97% (03 Nov 2018 22:57) (97% - 99%)    PHYSICAL EXAM:  Constitutional:NAD, Lethargic ,Nonverbal  Eyes:BENJY, EOMI  Ear/Nose/Throat: no thrush, mucositis.  Moist mucous membranes	  Neck:no JVD, no lymphadenopathy, supple  Respiratory: CTA phi  Cardiovascular: S1S2 RRR, SM murmur  Gastrointestinal:soft, nontender,  nondistended (+) BS  Extremities:no e/e/c  Skin:  no rashes, open wounds or ulcerations        LABS:                        11.3   6.50  )-----------( 124      ( 03 Nov 2018 05:14 )             33.8     11-03    144  |  111<H>  |  20  ----------------------------<  186<H>  3.6   |  21<L>  |  0.75    Ca    8.4      03 Nov 2018 05:14    TPro  5.3<L>  /  Alb  2.9<L>  /  TBili  0.2  /  DBili  x   /  AST  11  /  ALT  12  /  AlkPhos  49  11-03    PT/INR - ( 02 Nov 2018 06:00 )   PT: 15.7 SEC;   INR: 1.36          PTT - ( 02 Nov 2018 06:00 )  PTT:28.3 SEC            Vancomycin Level, Trough: 13.8 ug/mL (11-03 @ 17:10)              MICROBIOLOGY:    Culture - Blood (11.01.18 @ 13:38)    -  Methicillin resistant Staphylococcus aureus (MRSA): + DETECT ERNESTINE Any isolate of Staphylococcus aureus from a blood culture is  NOT considered a contaminant.    Culture - Blood:   ***Blood Panel PCR results on this specimen are available  approximately 3 hours after the Gram stain result***  Gram stain, PCR, and/or culture results may not always  correspond due to difference in methodologies  ------------------------------------------------------------  This PCR assay was performed using [employer].  The  following targets are tested for:  Enterococcus, vancomycin  resistant enterococci, Listeria monocytogenes,  coagulase  negative staphylococci, S. aureus, methicillin resistant S.  aureus, Streptococcus agalactiae (Group B), S. pneumoniae,  S. pyogenes (Group A), Acinetobacter baumannii, Enterobacter  cloacae, E. coli, Klebsiella oxytoca, K. pneumoniae, Proteus  sp., Serratia marcescens, Haemophilus influenzae, Neisseria  meningitidis, Pseudomonas aeruginosa, Candida albicans, C.  glabrata, C. krusei, C. parapsilosis, C. tropicalis and the  KPC resistance gene.  **NOTE: Due to technical problems, Proteus sp. will NOT be  reported as part of the BCID paneluntil further notice.    Culture - Blood:   MRSA^Staph. aureus *MRSA*  OXICILLIN-RESISTANT staphylococci should be regarded as  RESISTANT to ALL other Beta-Lactams regardless of the  in-vitro results obtained.  These include: ALL  Penicillins, Cephalosporins, Amoxicillin-clavulanic  acid, Ticarcillin-clavulanic acid,  Ampicillin-sulbactam, and Imipenem.    Culture - Blood:   RESULT CALLED TO / READ BACK: JW SCHWARTZ,S/Y  DATE / TIME CALLED: 11/04/18 1210  CALLED BY: TAMRA LÓPEZ  MRSA^Staph. aureus *MRSA*  OXICILLIN-RESISTANT staphylococci should be regarded as  RESISTANT to ALL other Beta-Lactams regardless of the  in-vitro results obtained.  These include: ALL  Penicillins, Cephalosporins, Amoxicillin-clavulanic  acid, Ticarcillin-clavulanic acid,  Ampicillin-sulbactam, and Imipenem.    Specimen Source: BLOOD PERIPHERAL    Organism: BLOOD CULTURE PCR    Organism: Staphylococcus aureus    Gram Stain Blood:   ***** CRITICAL RESULT *****  PERSON CALLED / READ-BACK: BLANK CRAWFORD RN  DATE / TIME CALLED: 11/02/18 0707  CALLED BY: DEANNE NOE  GPCCL^Gram Pos Cocci In Clusters  AFTER: 16 HOURS INCUBATION  BOTTLE: AEROBIC BOTTLE    Organism Identification: BLOOD CULTURE PCR  Staphylococcus aureus    Method Type: PCR  Culture - Blood (11.02.18 @ 11:50)    Culture - Blood:   NO ORGANISMS ISOLATED  NO ORGANISMS ISOLATED AT 48 HRS.    Specimen Source: BLOOD PERIPHERAL        Culture - Urine (11.01.18 @ 15:22)    Culture - Urine:   NO GROWTH AT 24 HOURS    Specimen Source: URINE CATHETER            RADIOLOGY & ADDITIONAL STUDIES:

## 2018-11-03 NOTE — PROGRESS NOTE ADULT - ASSESSMENT
· Assessment	  76 yo m with sepsis 2/2 recurrent UTI     Problem/Plan - 1:  ·  Problem: Urinary tract infection without hematuria, site unspecified.  Plan: -c/w IVF  IV genta/Vanco     Problem/Plan - 2:  ·  Problem: Sepsis, due to unspecified organism.  Plan: continue with treatment for UTI.      Problem/Plan - 3:  ·  Problem: Type 2 diabetes mellitus with complication, unspecified whether long term insulin use.  Plan:  on ISS.      Problem/Plan - 4:  ·  Problem: Essential hypertension.  Plan: c/w toprol, lisinopril as tolerated.      Problem/Plan - 5:  ·  Problem: Toenail deformity. Plan: podiatry for overgrown toenails.

## 2018-11-04 LAB
ALBUMIN SERPL ELPH-MCNC: 2.9 G/DL — LOW (ref 3.3–5)
ALP SERPL-CCNC: 58 U/L — SIGNIFICANT CHANGE UP (ref 40–120)
ALT FLD-CCNC: 11 U/L — SIGNIFICANT CHANGE UP (ref 4–41)
AST SERPL-CCNC: 10 U/L — SIGNIFICANT CHANGE UP (ref 4–40)
BACTERIA BLD CULT: SIGNIFICANT CHANGE UP
BILIRUB SERPL-MCNC: 0.2 MG/DL — SIGNIFICANT CHANGE UP (ref 0.2–1.2)
BUN SERPL-MCNC: 15 MG/DL — SIGNIFICANT CHANGE UP (ref 7–23)
CALCIUM SERPL-MCNC: 8.4 MG/DL — SIGNIFICANT CHANGE UP (ref 8.4–10.5)
CHLORIDE SERPL-SCNC: 108 MMOL/L — HIGH (ref 98–107)
CO2 SERPL-SCNC: 20 MMOL/L — LOW (ref 22–31)
CREAT SERPL-MCNC: 0.72 MG/DL — SIGNIFICANT CHANGE UP (ref 0.5–1.3)
GENTAMICIN SERPL-MCNC: 1 UG/ML — SIGNIFICANT CHANGE UP
GLUCOSE BLDC GLUCOMTR-MCNC: 162 MG/DL — HIGH (ref 70–99)
GLUCOSE BLDC GLUCOMTR-MCNC: 209 MG/DL — HIGH (ref 70–99)
GLUCOSE BLDC GLUCOMTR-MCNC: 229 MG/DL — HIGH (ref 70–99)
GLUCOSE BLDC GLUCOMTR-MCNC: 230 MG/DL — HIGH (ref 70–99)
GLUCOSE SERPL-MCNC: 207 MG/DL — HIGH (ref 70–99)
HCT VFR BLD CALC: 34.7 % — LOW (ref 39–50)
HGB BLD-MCNC: 11.5 G/DL — LOW (ref 13–17)
MCHC RBC-ENTMCNC: 32.3 PG — SIGNIFICANT CHANGE UP (ref 27–34)
MCHC RBC-ENTMCNC: 33.1 % — SIGNIFICANT CHANGE UP (ref 32–36)
MCV RBC AUTO: 97.5 FL — SIGNIFICANT CHANGE UP (ref 80–100)
NRBC # FLD: 0 — SIGNIFICANT CHANGE UP
PLATELET # BLD AUTO: 107 K/UL — LOW (ref 150–400)
PMV BLD: 12.3 FL — SIGNIFICANT CHANGE UP (ref 7–13)
POTASSIUM SERPL-MCNC: 3.6 MMOL/L — SIGNIFICANT CHANGE UP (ref 3.5–5.3)
POTASSIUM SERPL-SCNC: 3.6 MMOL/L — SIGNIFICANT CHANGE UP (ref 3.5–5.3)
PROT SERPL-MCNC: 5.6 G/DL — LOW (ref 6–8.3)
RBC # BLD: 3.56 M/UL — LOW (ref 4.2–5.8)
RBC # FLD: 12.4 % — SIGNIFICANT CHANGE UP (ref 10.3–14.5)
SODIUM SERPL-SCNC: 140 MMOL/L — SIGNIFICANT CHANGE UP (ref 135–145)
SPECIMEN SOURCE: SIGNIFICANT CHANGE UP
VANCOMYCIN FLD-MCNC: 17.8 UG/ML — SIGNIFICANT CHANGE UP
WBC # BLD: 7.64 K/UL — SIGNIFICANT CHANGE UP (ref 3.8–10.5)
WBC # FLD AUTO: 7.64 K/UL — SIGNIFICANT CHANGE UP (ref 3.8–10.5)

## 2018-11-04 RX ADMIN — Medication 2: at 09:45

## 2018-11-04 RX ADMIN — HEPARIN SODIUM 5000 UNIT(S): 5000 INJECTION INTRAVENOUS; SUBCUTANEOUS at 05:33

## 2018-11-04 RX ADMIN — SIMVASTATIN 20 MILLIGRAM(S): 20 TABLET, FILM COATED ORAL at 22:01

## 2018-11-04 RX ADMIN — NYSTATIN CREAM 1 APPLICATION(S): 100000 CREAM TOPICAL at 05:33

## 2018-11-04 RX ADMIN — Medication 100 MILLIGRAM(S): at 05:32

## 2018-11-04 RX ADMIN — SODIUM CHLORIDE 75 MILLILITER(S): 9 INJECTION INTRAMUSCULAR; INTRAVENOUS; SUBCUTANEOUS at 14:35

## 2018-11-04 RX ADMIN — Medication 250 MILLIGRAM(S): at 05:30

## 2018-11-04 RX ADMIN — Medication 100 MILLIGRAM(S): at 05:31

## 2018-11-04 RX ADMIN — HEPARIN SODIUM 5000 UNIT(S): 5000 INJECTION INTRAVENOUS; SUBCUTANEOUS at 22:27

## 2018-11-04 RX ADMIN — TAMSULOSIN HYDROCHLORIDE 0.4 MILLIGRAM(S): 0.4 CAPSULE ORAL at 22:01

## 2018-11-04 RX ADMIN — Medication 1 TABLET(S): at 14:34

## 2018-11-04 RX ADMIN — Medication 2: at 17:58

## 2018-11-04 RX ADMIN — Medication 81 MILLIGRAM(S): at 14:34

## 2018-11-04 RX ADMIN — Medication 100 MILLIGRAM(S): at 22:01

## 2018-11-04 RX ADMIN — LISINOPRIL 5 MILLIGRAM(S): 2.5 TABLET ORAL at 05:32

## 2018-11-04 RX ADMIN — NYSTATIN CREAM 1 APPLICATION(S): 100000 CREAM TOPICAL at 17:58

## 2018-11-04 RX ADMIN — PANTOPRAZOLE SODIUM 40 MILLIGRAM(S): 20 TABLET, DELAYED RELEASE ORAL at 05:32

## 2018-11-04 RX ADMIN — Medication 1: at 14:34

## 2018-11-04 RX ADMIN — Medication 250 MILLIGRAM(S): at 17:58

## 2018-11-04 RX ADMIN — Medication 25 MILLIGRAM(S): at 05:32

## 2018-11-04 RX ADMIN — HEPARIN SODIUM 5000 UNIT(S): 5000 INJECTION INTRAVENOUS; SUBCUTANEOUS at 14:35

## 2018-11-04 RX ADMIN — Medication 100 MILLIGRAM(S): at 14:34

## 2018-11-04 NOTE — PROGRESS NOTE ADULT - ASSESSMENT
74 yo m with sepsis 2/2 recurrent UTI    Problem/Plan - 1:  ·  Problem:Sepsis 2/2 Urinary tract infection without hematuria, site unspecified.  Plan: -c/w IVF  -place on ciprofloxacin based on prior sensitivities  -Renal Sono      Problem/Plan - 2:  ·  Problem: Sepsis, MRSA bacteremia.  Plan: continue with  IV ABX    Problem/Plan - 3:  ·  Problem: Type 2 diabetes mellitus with complication, unspecified whether long term insulin use.  Plan: hold metformin, place on ISS.     Problem/Plan - 4:  ·  Problem: Essential hypertension.  Plan: c/w toprol, lisinopril as tolerated.     Problem/Plan - 5:  ·  Problem: Toenail deformity. Plan: podiatry for overgrown toenails.    Problem/Plan - 6:  Problem: Prophylactic measure.Plan: IMPROVE VTE Individual Risk AssessmentIMPROVE VTE Score: 2  heparin.

## 2018-11-04 NOTE — PROGRESS NOTE ADULT - ASSESSMENT
MRSA Bacteremia    77yr old male significant  PMHx of recurrent UTIs,  HTN, HLD, T2DM, PUD, h/o  recurrent GIB, s/p Aortic root replacement with AVR 2/2 Type A aneurysm (1/2013), CVAx3 with residual left hemiparesis, bedbound, minimally verbal. Lives at home with wife who noted increased weakness/ sleeping more over last few days. Pt on dysphagia 1 diet with honey thick liquids, requiring feeding in small increments. Wife denies coughing, sob, endorses decreased and darker urine output. Pt with diaper. UA +; prior urine cultures all grew out pan-sensitive pseudomonas.  Tmax in .3, wbc 12.47  , having   fevers >101 at home along with the reported  rash over his back and b/l axillary region that "comes and goes",not present currently  No recent hospitalizations, outpatient procedures or dental work, no recent anbx exposure.Adm blood cultures with MRSA ,on anbx afebrile and with the subsequent negative blood cultures.Presentation with the febrile illness along with MRSA bacteremia of unknown duration, ?point of entry might cutaneous but with the h/o AVR and hence need to be r/o and treated for possible prosthetic valve endocarditis along with ruling out other metastatic foci of MRSA seeding  suggest to   provide supportive care  repeat multiple sets of blood   cultures  cont vanco  with q 12 dosing  along with po rifampin ,  to achieve the target trough  level of 14 t0 16, gent can be   dc as already clearing up the  blood stream  TTE which if negative proceed  with DICKSON  get the indium scan if DICKSON negative  monitor closely serum cr while on vanco   and LFTs while on rifampin  cards /derm follow up      Alysia Davis MD  [phone] MRSA Bacteremia    77yr old male significant  PMHx of recurrent UTIs,  HTN, HLD, T2DM, PUD, h/o  recurrent GIB, s/p Aortic root replacement with AVR 2/2 Type A aneurysm (1/2013), CVAx3 with residual left hemiparesis, bedbound, minimally verbal. Lives at home with wife who noted increased weakness/ sleeping more over last few days. Pt on dysphagia 1 diet with honey thick liquids, requiring feeding in small increments. Wife denies coughing, sob, endorses decreased and darker urine output. Pt with diaper. UA +; prior urine cultures all grew out pan-sensitive pseudomonas.  Tmax in .3, wbc 12.47  , having   fevers >101 at home along with the reported  rash over his back and b/l axillary region that "comes and goes",not present currently  No recent hospitalizations, outpatient procedures or dental work, no recent anbx exposure.Adm blood cultures with MRSA ,on anbx afebrile and with the subsequent negative blood cultures.Presentation with the febrile illness along with MRSA bacteremia of unknown duration, ?point of entry might cutaneous but with the h/o AVR and hence need to be r/o and treated for possible prosthetic valve endocarditis along with ruling out other metastatic foci of MRSA seeding  suggest to   provide supportive care  repeat multiple sets of blood   cultures  cont vanco  with q 12 dosing  along with po rifampin ,  to achieve the target trough  level of 14 t0 16,dc gent as already cleared   up the blood stream  TTE which if negative proceed  with DICKSON  get the indium scan if DICKSON negative  monitor closely serum cr while on vanco   and LFTs while on rifampin  cards /derm follow up      Alysia Davis MD  [phone]

## 2018-11-04 NOTE — PROGRESS NOTE ADULT - ASSESSMENT
· Assessment	  74 yo m with sepsis 2/2 recurrent UTI     Problem/Plan - 1:  ·  Problem: Urinary tract infection without hematuria, site unspecified.  Plan: -c/w IVF  IV genta/Vanco     Problem/Plan - 2:  ·  Problem: Sepsis, due to unspecified organism.  Plan: continue with treatment for UTI.      Problem/Plan - 3:  ·  Problem: Type 2 diabetes mellitus with complication, unspecified whether long term insulin use.  Plan:  on ISS.      Problem/Plan - 4:  ·  Problem: Essential hypertension.  Plan: c/w toprol, lisinopril as tolerated.      Problem/Plan - 5:  ·  Problem: Toenail deformity. Plan: podiatry for overgrown toenails.

## 2018-11-04 NOTE — PROGRESS NOTE ADULT - SUBJECTIVE AND OBJECTIVE BOX
Patient is a 77y old  Male who presents with a chief complaint of sepsis is more alert afebrile      SUBJECTIVE / OVERNIGHT EVENTS:  Awake  No N/V    MEDICATIONS  (STANDING):  aspirin enteric coated 81 milliGRAM(s) Oral daily  docusate sodium 100 milliGRAM(s) Oral three times a day  heparin  Injectable 5000 Unit(s) SubCutaneous every 8 hours  insulin lispro (HumaLOG) corrective regimen sliding scale   SubCutaneous three times a day before meals  insulin lispro (HumaLOG) corrective regimen sliding scale   SubCutaneous at bedtime  lisinopril 5 milliGRAM(s) Oral daily  metoprolol succinate ER 25 milliGRAM(s) Oral daily  multivitamin 1 Tablet(s) Oral daily  nystatin Powder 1 Application(s) Topical two times a day  pantoprazole    Tablet 40 milliGRAM(s) Oral before breakfast  rifampin 300 milliGRAM(s) Oral three times a day  simvastatin 20 milliGRAM(s) Oral at bedtime  sodium chloride 0.9%. 1000 milliLiter(s) (75 mL/Hr) IV Continuous <Continuous>  tamsulosin 0.4 milliGRAM(s) Oral at bedtime  vancomycin  IVPB 1000 milliGRAM(s) IV Intermittent every 12 hours    MEDICATIONS  (PRN):  acetaminophen   Tablet .. 650 milliGRAM(s) Oral every 6 hours PRN Temp greater or equal to 38C (100.4F), Mild Pain (1 - 3), Moderate Pain (4 - 6)  dextrose 40% Gel 15 Gram(s) Oral once PRN Blood Glucose LESS THAN 70 milliGRAM(s)/deciliter  glucagon  Injectable 1 milliGRAM(s) IntraMuscular once PRN Glucose LESS THAN 70 milligrams/deciliter  magnesium hydroxide Suspension 30 milliLiter(s) Oral daily PRN Constipation        CAPILLARY BLOOD GLUCOSE      POCT Blood Glucose.: 229 mg/dL (04 Nov 2018 22:19)  POCT Blood Glucose.: 230 mg/dL (04 Nov 2018 17:53)  POCT Blood Glucose.: 162 mg/dL (04 Nov 2018 13:29)  POCT Blood Glucose.: 209 mg/dL (04 Nov 2018 08:56)    I&O's Summary      PHYSICAL EXAM:    NECK: Supple, No JVD  CHEST/LUNG: Clear to auscultation bilaterally; No wheezing.  HEART: Regular rate and rhythm; No murmurs, rubs, or gallops  ABDOMEN: Soft, Nontender, Nondistended; Bowel sounds present  EXTREMITIES:   No clubbing, cyanosis, or edema  NEUROLOGY: Awake    LABS:                        11.5   7.64  )-----------( 107      ( 04 Nov 2018 04:11 )             34.7     11-04    140  |  108<H>  |  15  ----------------------------<  207<H>  3.6   |  20<L>  |  0.72    Ca    8.4      04 Nov 2018 04:11    TPro  5.6<L>  /  Alb  2.9<L>  /  TBili  0.2  /  DBili  x   /  AST  10  /  ALT  11  /  AlkPhos  58  11-04            CAPILLARY BLOOD GLUCOSE      POCT Blood Glucose.: 229 mg/dL (04 Nov 2018 22:19)  POCT Blood Glucose.: 230 mg/dL (04 Nov 2018 17:53)  POCT Blood Glucose.: 162 mg/dL (04 Nov 2018 13:29)  POCT Blood Glucose.: 209 mg/dL (04 Nov 2018 08:56)    11-03 @ 06:13  Culture-urine --  Culture results --  method type --  Organism --  Organism Identification --  Specimen source BLOOD PERIPHERAL  11-02 @ 11:50  Culture-urine --  Culture results --  method type --  Organism --  Organism Identification --  Specimen source BLOOD PERIPHERAL  11-01 @ 15:22  Culture-urine   NO GROWTH AT 24 HOURS  Culture results --  method type --  Organism --  Organism Identification --  Specimen source URINE CATHETER  11-01 @ 13:38  Culture-urine --  Culture results --  method type PCR  Organism BLOOD CULTURE PCR  Organism Identification BLOOD CULTURE PCR  Staphylococcus aureus  Specimen source BLOOD PERIPHERAL           11-03 @ 06:13  Culture blood   NO ORGANISMS ISOLATED  NO ORGANISMS ISOLATED AT 24 HOURS  Culture results --  Gram stain --  Gram stain blood --  Method type --  Organism --  Organism identification --  Specimen source BLOOD PERIPHERAL   11-02 @ 11:50  Culture blood   NO ORGANISMS ISOLATED  NO ORGANISMS ISOLATED AT 48 HRS.  Culture results --  Gram stain --  Gram stain blood --  Method type --  Organism --  Organism identification --  Specimen source BLOOD PERIPHERAL   11-01 @ 15:22  Culture blood --  Culture results --  Gram stain --  Gram stain blood --  Method type --  Organism --  Organism identification --  Specimen source URINE CATHETER   11-01 @ 13:38  Culture blood   ***Blood Panel PCR results on this specimen are available  approximately 3 hours after the Gram stain result***  Gram stain, PCR, and/or culture results may not always  correspond due to difference in methodologies  ------------------------------------------------------------  This PCR assay was performed using [employer].  The  following targets are tested for:  Enterococcus, vancomycin  resistant enterococci, Listeria monocytogenes,  coagulase  negative staphylococci, S. aureus, methicillin resistant S.  aureus, Streptococcus agalactiae (Group B), S. pneumoniae,  S. pyogenes (Group A), Acinetobacter baumannii, Enterobacter  cloacae, E. coli, Klebsiella oxytoca, K. pneumoniae, Proteus  sp., Serratia marcescens, Haemophilus influenzae, Neisseria  meningitidis, Pseudomonas aeruginosa, Candida albicans, C.  glabrata, C. krusei, C. parapsilosis, C. tropicalis and the  KPC resistance gene.  **NOTE: Due to technical problems, Proteus sp. will NOT be  reported as part of the BCID paneluntil further notice.  Culture results --  Gram stain --  Gram stain blood   ***** CRITICAL RESULT *****  PERSON CALLED / READ-BACK: BLANK CRAWFORD RN  DATE / TIME CALLED: 11/02/18 0707  CALLED BY: DEANNE NOE  GPCCL^Gram Pos Cocci In Clusters  AFTER: 16 HOURS INCUBATION  BOTTLE: AEROBIC BOTTLE  Method type PCR  Organism BLOOD CULTURE PCR  Organism identification BLOOD CULTURE PCR  Staphylococcus aureus  Specimen source BLOOD PERIPHERAL

## 2018-11-05 LAB
ALBUMIN SERPL ELPH-MCNC: 2.4 G/DL — LOW (ref 3.3–5)
ALP SERPL-CCNC: 56 U/L — SIGNIFICANT CHANGE UP (ref 40–120)
ALT FLD-CCNC: 11 U/L — SIGNIFICANT CHANGE UP (ref 4–41)
AST SERPL-CCNC: 20 U/L — SIGNIFICANT CHANGE UP (ref 4–40)
BILIRUB SERPL-MCNC: 0.2 MG/DL — SIGNIFICANT CHANGE UP (ref 0.2–1.2)
BUN SERPL-MCNC: 12 MG/DL — SIGNIFICANT CHANGE UP (ref 7–23)
CALCIUM SERPL-MCNC: 8 MG/DL — LOW (ref 8.4–10.5)
CHLORIDE SERPL-SCNC: 108 MMOL/L — HIGH (ref 98–107)
CO2 SERPL-SCNC: 18 MMOL/L — LOW (ref 22–31)
CREAT SERPL-MCNC: 0.62 MG/DL — SIGNIFICANT CHANGE UP (ref 0.5–1.3)
GENTAMICIN SERPL-MCNC: < 0.1 UG/ML — SIGNIFICANT CHANGE UP
GLUCOSE BLDC GLUCOMTR-MCNC: 137 MG/DL — HIGH (ref 70–99)
GLUCOSE BLDC GLUCOMTR-MCNC: 168 MG/DL — HIGH (ref 70–99)
GLUCOSE BLDC GLUCOMTR-MCNC: 237 MG/DL — HIGH (ref 70–99)
GLUCOSE SERPL-MCNC: 166 MG/DL — HIGH (ref 70–99)
METHOD TYPE: SIGNIFICANT CHANGE UP
ORGANISM # SPEC MICROSCOPIC CNT: SIGNIFICANT CHANGE UP
POTASSIUM SERPL-MCNC: 4.2 MMOL/L — SIGNIFICANT CHANGE UP (ref 3.5–5.3)
POTASSIUM SERPL-SCNC: 4.2 MMOL/L — SIGNIFICANT CHANGE UP (ref 3.5–5.3)
PROT SERPL-MCNC: 4.9 G/DL — LOW (ref 6–8.3)
SODIUM SERPL-SCNC: 139 MMOL/L — SIGNIFICANT CHANGE UP (ref 135–145)
VANCOMYCIN TROUGH SERPL-MCNC: 20.8 UG/ML — HIGH (ref 10–20)

## 2018-11-05 RX ORDER — VANCOMYCIN HCL 1 G
750 VIAL (EA) INTRAVENOUS EVERY 12 HOURS
Qty: 0 | Refills: 0 | Status: DISCONTINUED | OUTPATIENT
Start: 2018-11-05 | End: 2018-11-05

## 2018-11-05 RX ORDER — VANCOMYCIN HCL 1 G
750 VIAL (EA) INTRAVENOUS EVERY 12 HOURS
Qty: 0 | Refills: 0 | Status: DISCONTINUED | OUTPATIENT
Start: 2018-11-06 | End: 2018-11-07

## 2018-11-05 RX ORDER — GENTAMICIN SULFATE 40 MG/ML
60 VIAL (ML) INJECTION EVERY 12 HOURS
Qty: 0 | Refills: 0 | Status: DISCONTINUED | OUTPATIENT
Start: 2018-11-05 | End: 2018-11-05

## 2018-11-05 RX ORDER — GENTAMICIN SULFATE 40 MG/ML
60 VIAL (ML) INJECTION EVERY 12 HOURS
Qty: 0 | Refills: 0 | Status: DISCONTINUED | OUTPATIENT
Start: 2018-11-05 | End: 2018-11-07

## 2018-11-05 RX ADMIN — HEPARIN SODIUM 5000 UNIT(S): 5000 INJECTION INTRAVENOUS; SUBCUTANEOUS at 14:38

## 2018-11-05 RX ADMIN — PANTOPRAZOLE SODIUM 40 MILLIGRAM(S): 20 TABLET, DELAYED RELEASE ORAL at 06:25

## 2018-11-05 RX ADMIN — SIMVASTATIN 20 MILLIGRAM(S): 20 TABLET, FILM COATED ORAL at 21:48

## 2018-11-05 RX ADMIN — Medication 1 TABLET(S): at 12:18

## 2018-11-05 RX ADMIN — SODIUM CHLORIDE 75 MILLILITER(S): 9 INJECTION INTRAMUSCULAR; INTRAVENOUS; SUBCUTANEOUS at 06:25

## 2018-11-05 RX ADMIN — Medication 250 MILLIGRAM(S): at 06:24

## 2018-11-05 RX ADMIN — TAMSULOSIN HYDROCHLORIDE 0.4 MILLIGRAM(S): 0.4 CAPSULE ORAL at 21:48

## 2018-11-05 RX ADMIN — SODIUM CHLORIDE 75 MILLILITER(S): 9 INJECTION INTRAMUSCULAR; INTRAVENOUS; SUBCUTANEOUS at 21:48

## 2018-11-05 RX ADMIN — SODIUM CHLORIDE 75 MILLILITER(S): 9 INJECTION INTRAMUSCULAR; INTRAVENOUS; SUBCUTANEOUS at 09:06

## 2018-11-05 RX ADMIN — NYSTATIN CREAM 1 APPLICATION(S): 100000 CREAM TOPICAL at 06:24

## 2018-11-05 RX ADMIN — HEPARIN SODIUM 5000 UNIT(S): 5000 INJECTION INTRAVENOUS; SUBCUTANEOUS at 06:24

## 2018-11-05 RX ADMIN — Medication 100 MILLIGRAM(S): at 14:37

## 2018-11-05 RX ADMIN — Medication 100 MILLIGRAM(S): at 21:48

## 2018-11-05 RX ADMIN — HEPARIN SODIUM 5000 UNIT(S): 5000 INJECTION INTRAVENOUS; SUBCUTANEOUS at 21:48

## 2018-11-05 RX ADMIN — Medication 25 MILLIGRAM(S): at 06:25

## 2018-11-05 RX ADMIN — Medication 2: at 13:11

## 2018-11-05 RX ADMIN — LISINOPRIL 5 MILLIGRAM(S): 2.5 TABLET ORAL at 06:25

## 2018-11-05 RX ADMIN — Medication 81 MILLIGRAM(S): at 12:18

## 2018-11-05 RX ADMIN — Medication 1: at 09:06

## 2018-11-05 RX ADMIN — NYSTATIN CREAM 1 APPLICATION(S): 100000 CREAM TOPICAL at 17:41

## 2018-11-05 RX ADMIN — Medication 100 MILLIGRAM(S): at 06:25

## 2018-11-05 NOTE — PROGRESS NOTE ADULT - ASSESSMENT
77yr old male significant  PMHx of recurrent UTIs,  HTN, HLD, T2DM, PUD, h/o  recurrent GIB, s/p Aortic root replacement with AVR 2/2 Type A aneurysm (1/2013), CVAx3 with residual left hemiparesis, bedbound, minimally verbal. Lives at home with wife who noted increased weakness/ sleeping more over last few days. Pt on dysphagia 1 diet with honey thick liquids, requiring feeding in small increments. Wife denies coughing, sob, endorses decreased and darker urine output. Pt with diaper. UA +; prior urine cultures all grew out pan-sensitive pseudomonas.    Tmax in .3, wbc 12.47 (01 Nov 2018 17:15)    Wife present at bedside, states pt usually does not c/o of anything, but that she noticed he has been weaker than usual, not eating as much.  He had fever >101 at home.  Wife also notes he has a rash over his back and b/l axillary region that "comes and goes".  Sometimes he develops small pimples that pop open, which she then cleans and applies cortisone cream.  She believes he has a heat rash.  No recent hospitalizations, outpatient procedures or dental work, no recent abx exposure.    Pt admitted to evaluate for UTI.  Also noted to have bacteremia with GPC.  Pt received dose of vanco in the ER, he is now on cipro.  NINI sorensen called for further antibiotic managment.     Problem/Plan - 1:    ·	MRSA bacteremia    - Cont vancomycin,  Maintain levels between 15-20.  Source possibly rash rash over pt's back and b/l axillary regions.  ?Contact vs. allergic dermatitis.  Improving with current managment and nystatin ointment.    - r/o prosthetic valve endocarditits.  F/u TTE.  R/o metastatic focus of infection.  Recommend NM scan    - Check repeat blood cultures to ensure clearance    - Gent d/c'd, cont vanco/rifampin.  Repeat blood cultures NGTD.       - ESR, CRP    - Urine cultures negative, will d/c cipro, no evidence for UTI      Will follow,    Tanisha Ash  [phone] 77yr old male significant  PMHx of recurrent UTIs,  HTN, HLD, T2DM, PUD, h/o  recurrent GIB, s/p Aortic root replacement with AVR 2/2 Type A aneurysm (1/2013), CVAx3 with residual left hemiparesis, bedbound, minimally verbal. Lives at home with wife who noted increased weakness/ sleeping more over last few days. Pt on dysphagia 1 diet with honey thick liquids, requiring feeding in small increments. Wife denies coughing, sob, endorses decreased and darker urine output. Pt with diaper. UA +; prior urine cultures all grew out pan-sensitive pseudomonas.    Tmax in .3, wbc 12.47 (01 Nov 2018 17:15)    Wife present at bedside, states pt usually does not c/o of anything, but that she noticed he has been weaker than usual, not eating as much.  He had fever >101 at home.  Wife also notes he has a rash over his back and b/l axillary region that "comes and goes".  Sometimes he develops small pimples that pop open, which she then cleans and applies cortisone cream.  She believes he has a heat rash.  No recent hospitalizations, outpatient procedures or dental work, no recent abx exposure.    Pt admitted to evaluate for UTI.  Also noted to have bacteremia with GPC.  Pt received dose of vanco in the ER, he is now on cipro.  NINI sorensen called for further antibiotic managment.     Problem/Plan - 1:    ·	MRSA bacteremia    - Cont vancomycin,  Maintain levels between 15-20.  Source possibly rash rash over pt's back and b/l axillary regions.  ?Contact vs. allergic dermatitis.  Improving with current managment and nystatin ointment.    - r/o prosthetic valve endocarditits.  F/u TTE.  R/o metastatic focus of infection.  Recommend NM scan    - Check repeat blood cultures to ensure clearance    -cont vanco/rifampin.  Check random Gent level, if <1 will restart gentamicin at 60mg IV bid.  Repeat blood cultures NGTD.       - ESR, CRP    - Urine cultures negative, will d/c cipro, no evidence for UTI      Will follow,    Tanisha Ash  [phone]

## 2018-11-05 NOTE — PROGRESS NOTE ADULT - ASSESSMENT
· Assessment	  76 yo m with sepsis 2/2 recurrent UTI     Problem/Plan - 1:  ·  Problem: Urinary tract infection without hematuria, site unspecified.  Plan: -c/w IVF  IV genta/Vanco// ID f/up noted.     Problem/Plan - 2:  ·  Problem: Sepsis, due to unspecified organism.  Plan: continue with treatment for UTI.      Problem/Plan - 3:  ·  Problem: Type 2 diabetes mellitus with complication, unspecified whether long term insulin use.  Plan:  on ISS.      Problem/Plan - 4:  ·  Problem: Essential hypertension.  Plan: c/w toprol, lisinopril

## 2018-11-05 NOTE — CONSULT NOTE ADULT - SUBJECTIVE AND OBJECTIVE BOX
HPI:  77yr old male significant  PMHx of recurrent UTIs,  HTN, HLD, T2DM, PUD, h/o  recurrent GIB, s/p Aortic root replacement with AVR 2/2 Type A aneurysm (1/2013), CVAx3 with residual left hemiparesis, bedbound, minimally verbal. Lives at home with wife who noted increased weakness/ sleeping more over last few days. Pt on dysphagia 1 diet with honey thick liquids, requiring feeding in small increments. Wife denies coughing, sob, endorses decreased and darker urine output. Pt with diaper. UA +; prior urine cultures all grew out pan-sensitive pseudomonas.    Tmax in .3, wbc 12.47 (01 Nov 2018 17:15)    Patient is a 77y old  Male who presents with a chief complaint of sepsis (04 Nov 2018 16:41)    Allergies    No Known Allergies    Intolerances      Vital Signs Last 24 Hrs  T(C): 35.8 (05 Nov 2018 11:14), Max: 36.8 (04 Nov 2018 20:55)  T(F): 96.4 (05 Nov 2018 11:14), Max: 98.3 (04 Nov 2018 20:55)  HR: 78 (05 Nov 2018 11:14) (69 - 78)  BP: 123/71 (05 Nov 2018 11:14) (114/69 - 139/67)  BP(mean): --  RR: 19 (05 Nov 2018 11:14) (18 - 19)  SpO2: 99% (05 Nov 2018 11:14) (99% - 99%)                        11.5   7.64  )-----------( 107      ( 04 Nov 2018 04:11 )             34.7     11-05    139  |  108<H>  |  12  ----------------------------<  166<H>  4.2   |  18<L>  |  0.62    Ca    8.0<L>      05 Nov 2018 05:45    TPro  4.9<L>  /  Alb  2.4<L>  /  TBili  0.2  /  DBili  x   /  AST  20  /  ALT  11  /  AlkPhos  56  11-05    CAPILLARY BLOOD GLUCOSE      POCT Blood Glucose.: 168 mg/dL (05 Nov 2018 08:55)    MEDICATIONS  (STANDING):  aspirin enteric coated 81 milliGRAM(s) Oral daily  dextrose 5%. 1000 milliLiter(s) (50 mL/Hr) IV Continuous <Continuous>  dextrose 50% Injectable 12.5 Gram(s) IV Push once  dextrose 50% Injectable 25 Gram(s) IV Push once  dextrose 50% Injectable 25 Gram(s) IV Push once  docusate sodium 100 milliGRAM(s) Oral three times a day  heparin  Injectable 5000 Unit(s) SubCutaneous every 8 hours  insulin lispro (HumaLOG) corrective regimen sliding scale   SubCutaneous three times a day before meals  insulin lispro (HumaLOG) corrective regimen sliding scale   SubCutaneous at bedtime  lisinopril 5 milliGRAM(s) Oral daily  metoprolol succinate ER 25 milliGRAM(s) Oral daily  multivitamin 1 Tablet(s) Oral daily  nystatin Powder 1 Application(s) Topical two times a day  pantoprazole    Tablet 40 milliGRAM(s) Oral before breakfast  rifampin 300 milliGRAM(s) Oral three times a day  simvastatin 20 milliGRAM(s) Oral at bedtime  sodium chloride 0.9%. 1000 milliLiter(s) (75 mL/Hr) IV Continuous <Continuous>  tamsulosin 0.4 milliGRAM(s) Oral at bedtime  vancomycin  IVPB 1000 milliGRAM(s) IV Intermittent every 12 hours    MEDICATIONS  (PRN):  acetaminophen   Tablet .. 650 milliGRAM(s) Oral every 6 hours PRN Temp greater or equal to 38C (100.4F), Mild Pain (1 - 3), Moderate Pain (4 - 6)  dextrose 40% Gel 15 Gram(s) Oral once PRN Blood Glucose LESS THAN 70 milliGRAM(s)/deciliter  glucagon  Injectable 1 milliGRAM(s) IntraMuscular once PRN Glucose LESS THAN 70 milligrams/deciliter  magnesium hydroxide Suspension 30 milliLiter(s) Oral daily PRN Constipation    PAST MEDICAL & SURGICAL HISTORY:  GI bleed  PUD (peptic ulcer disease)  CVA, old, 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  Gastric ulcer  Hyperlipemia  Diabetes mellitus  Hypertension  History of eye surgery: endophthalmitis in 2014  H/O aortic root repair  No Past Surgical History      GUSTABO:  Elongated, ingrowing toe nails x 10  No open lesions or local signs of infection  epicritic sensation intact  pedal pulses palp 2/4 bilaterally  No gross deformities

## 2018-11-05 NOTE — PROGRESS NOTE ADULT - SUBJECTIVE AND OBJECTIVE BOX
77yr old male significant  PMHx of recurrent UTIs,  HTN, HLD, T2DM, PUD, h/o  recurrent GIB, s/p Aortic root replacement with AVR 2/2 Type A aneurysm (1/2013), CVAx3 with residual left hemiparesis, bedbound, minimally verbal increased weakness/ sleeping more over last few days.Admitted with sepsis 2/2 UTI        ROS:  CONSTITUTIONAL:  No fever, chills, rigors  CARDIOVASCULAR:  No chest pain or palpitations  RESPIRATORY:   No SOB, cough, dyspnea on exertion.  No wheezing  GASTROINTESTINAL:  No abd pain, N/V, diarrhea/constipation  EXTREMITIES:  No swelling or joint pain  GENITOURINARY:  No burning on urination, increased frequency or urgency.  No flank pain  NEUROLOGIC:  No HA, visual disturbances  SKIN: No rashes    Allergies    No Known Allergies    Intolerances        ANTIBIOTICS/RELEVANT:  antimicrobials  rifampin 300 milliGRAM(s) Oral three times a day  vancomycin  IVPB 1000 milliGRAM(s) IV Intermittent every 12 hours    immunologic:    OTHER:  acetaminophen   Tablet .. 650 milliGRAM(s) Oral every 6 hours PRN  aspirin enteric coated 81 milliGRAM(s) Oral daily  docusate sodium 100 milliGRAM(s) Oral three times a day  glucagon  Injectable 1 milliGRAM(s) IntraMuscular once PRN  heparin  Injectable 5000 Unit(s) SubCutaneous every 8 hours  insulin lispro (HumaLOG) corrective regimen sliding scale   SubCutaneous three times a day before meals  insulin lispro (HumaLOG) corrective regimen sliding scale   SubCutaneous at bedtime  lisinopril 5 milliGRAM(s) Oral daily  magnesium hydroxide Suspension 30 milliLiter(s) Oral daily PRN  metoprolol succinate ER 25 milliGRAM(s) Oral daily  multivitamin 1 Tablet(s) Oral daily  nystatin Powder 1 Application(s) Topical two times a day  pantoprazole    Tablet 40 milliGRAM(s) Oral before breakfast  simvastatin 20 milliGRAM(s) Oral at bedtime  sodium chloride 0.9%. 1000 milliLiter(s) IV Continuous <Continuous>  tamsulosin 0.4 milliGRAM(s) Oral at bedtime      Objective:  Vital Signs Last 24 Hrs  T(C): 36.3 (05 Nov 2018 12:16), Max: 36.8 (04 Nov 2018 20:55)  T(F): 97.4 (05 Nov 2018 12:16), Max: 98.3 (04 Nov 2018 20:55)  HR: 75 (05 Nov 2018 12:16) (72 - 78)  BP: 136/76 (05 Nov 2018 12:16) (114/69 - 137/71)  BP(mean): --  RR: 18 (05 Nov 2018 12:16) (18 - 19)  SpO2: 97% (05 Nov 2018 12:16) (97% - 99%)    PHYSICAL EXAM:  Constitutional:NAD  Eyes:BENJY, EOMI  Ear/Nose/Throat: no thrush, mucositis.  Moist mucous membranes	  Neck:no JVD, no lymphadenopathy, supple  Respiratory: CTA phi  Cardiovascular: S1S2 RRR, no murmurs  Gastrointestinal:soft, nontender,  nondistended (+) BS  Extremities:no e/e/c  Skin:  rash over back improved erythema        LABS:                        11.5   7.64  )-----------( 107      ( 04 Nov 2018 04:11 )             34.7     11-05    139  |  108<H>  |  12  ----------------------------<  166<H>  4.2   |  18<L>  |  0.62    Ca    8.0<L>      05 Nov 2018 05:45    TPro  4.9<L>  /  Alb  2.4<L>  /  TBili  0.2  /  DBili  x   /  AST  20  /  ALT  11  /  AlkPhos  56  11-05            Vancomycin Level, Random:  ug/mL (11-04 @ 04:11)      Vancomycin Level, Trough: 13.8 ug/mL (11-03 @ 17:10)              MICROBIOLOGY:    Culture - Blood in AM (11.03.18 @ 06:13)    Culture - Blood:   NO ORGANISMS ISOLATED  NO ORGANISMS ISOLATED AT 48 HRS.    Specimen Source: BLOOD PERIPHERAL    Culture - Blood (11.02.18 @ 11:50)    Culture - Blood:   NO ORGANISMS ISOLATED  NO ORGANISMS ISOLATED AT 72 HRS.    Specimen Source: BLOOD PERIPHERAL    Culture - Urine (11.01.18 @ 15:22)    Culture - Urine:   NO GROWTH AT 24 HOURS    Specimen Source: URINE CATHETER    Culture - Blood (11.01.18 @ 13:38)    Culture - Blood:   NO ORGANISMS ISOLATED  NO ORGANISMS ISOLATED AT 96 HOURS    Specimen Source: BLOOD VENOUS    Culture - Blood (11.01.18 @ 13:38)    -  Methicillin resistant Staphylococcus aureus (MRSA): + DETECT ERNESTINE Any isolate of Staphylococcus aureus from a blood culture is  NOT considered a contaminant.    Culture - Blood:   ***Blood Panel PCR results on this specimen are available  approximately 3 hours after the Gram stain result***  Gram stain, PCR, and/or culture results may not always  correspond due to difference in methodologies  ------------------------------------------------------------  This PCR assay was performed using [employer].  The  following targets are tested for:  Enterococcus, vancomycin  resistant enterococci, Listeria monocytogenes,  coagulase  negative staphylococci, S. aureus, methicillin resistant S.  aureus, Streptococcus agalactiae (Group B), S. pneumoniae,  S. pyogenes (Group A), Acinetobacter baumannii, Enterobacter  cloacae, E. coli, Klebsiella oxytoca, K. pneumoniae, Proteus  sp., Serratia marcescens, Haemophilus influenzae, Neisseria  meningitidis, Pseudomonas aeruginosa, Candida albicans, C.  glabrata, C. krusei, C. parapsilosis, C. tropicalis and the  KPC resistance gene.  **NOTE: Due to technical problems, Proteus sp. will NOT be  reported as part of the BCID paneluntil further notice.    Culture - Blood:   MRSA^Staph. aureus *MRSA*  OXICILLIN-RESISTANT staphylococci should be regarded as  RESISTANT to ALL other Beta-Lactams regardless of the  in-vitro results obtained.  These include: ALL  Penicillins, Cephalosporins, Amoxicillin-clavulanic  acid, Ticarcillin-clavulanic acid,  Ampicillin-sulbactam, and Imipenem.    Culture - Blood:   RESULT CALLED TO / READ BACK: JW SCHWARTZ,S/Y  DATE / TIME CALLED: 11/04/18 1210  CALLED BY: TAMRA LÓPEZ  MRSA^Staph. aureus *MRSA*  OXICILLIN-RESISTANT staphylococci should be regarded as  RESISTANT to ALL other Beta-Lactams regardless of the  in-vitro results obtained.  These include: ALL  Penicillins, Cephalosporins, Amoxicillin-clavulanic  acid, Ticarcillin-clavulanic acid,  Ampicillin-sulbactam, and Imipenem.    Specimen Source: BLOOD PERIPHERAL    Organism: BLOOD CULTURE PCR    Organism: Staphylococcus aureus    Gram Stain Blood:   ***** CRITICAL RESULT *****  PERSON CALLED / READ-BACK: BLANK CRAWFORD ANGELA ESCALERA  DATE / TIME CALLED: 11/02/18 0707  CALLED BY: DEANNE NOE  GPCCL^Gram Pos Cocci In Clusters  AFTER: 16 HOURS INCUBATION  BOTTLE: AEROBIC BOTTLE    Organism Identification: BLOOD CULTURE PCR  Staphylococcus aureus    Method Type: PCR          RADIOLOGY & ADDITIONAL STUDIES:    < from: US Kidney and Bladder (11.02.18 @ 11:46) >  FINDINGS:    Right kidney:  10.7 cm. Several right renal cysts. No renal mass,   hydronephrosis or calculi.    Left kidney:  10 cm. A 2.1 x 2 x 2 cm cystic lesion at the upper pole   with an echogenic nodular focus at at its periphery is indeterminate. No   associated vascularity. No hydronephrosis or calculi.    Urinary bladder: Dependent debris within the urinary bladder.    Prostatomegaly measuring 6.5 cm transverse dimension.    IMPRESSION:     A 2.1 cm LEFT upper pole cystic lesion with a peripheral nodule.   Differential includes neoplasm. Dedicated contrast enhanced renal   protocol CT or MR is recommended for further evaluation.    < end of copied text >

## 2018-11-05 NOTE — PROGRESS NOTE ADULT - SUBJECTIVE AND OBJECTIVE BOX
Patient is a 77y old  Male who presents with a chief complaint of sepsis (05 Nov 2018 14:32)      SUBJECTIVE / OVERNIGHT EVENTS:    Awake   No N/V    MEDICATIONS  (STANDING):  aspirin enteric coated 81 milliGRAM(s) Oral daily  dextrose 5%. 1000 milliLiter(s) (50 mL/Hr) IV Continuous <Continuous>  dextrose 50% Injectable 12.5 Gram(s) IV Push once  dextrose 50% Injectable 25 Gram(s) IV Push once  dextrose 50% Injectable 25 Gram(s) IV Push once  docusate sodium 100 milliGRAM(s) Oral three times a day  gentamicin   IVPB 60 milliGRAM(s) IV Intermittent every 12 hours  heparin  Injectable 5000 Unit(s) SubCutaneous every 8 hours  insulin lispro (HumaLOG) corrective regimen sliding scale   SubCutaneous three times a day before meals  insulin lispro (HumaLOG) corrective regimen sliding scale   SubCutaneous at bedtime  lisinopril 5 milliGRAM(s) Oral daily  metoprolol succinate ER 25 milliGRAM(s) Oral daily  multivitamin 1 Tablet(s) Oral daily  nystatin Powder 1 Application(s) Topical two times a day  pantoprazole    Tablet 40 milliGRAM(s) Oral before breakfast  rifampin 300 milliGRAM(s) Oral three times a day  simvastatin 20 milliGRAM(s) Oral at bedtime  sodium chloride 0.9%. 1000 milliLiter(s) (75 mL/Hr) IV Continuous <Continuous>  tamsulosin 0.4 milliGRAM(s) Oral at bedtime  vancomycin  IVPB 750 milliGRAM(s) IV Intermittent every 12 hours    MEDICATIONS  (PRN):  acetaminophen   Tablet .. 650 milliGRAM(s) Oral every 6 hours PRN Temp greater or equal to 38C (100.4F), Mild Pain (1 - 3), Moderate Pain (4 - 6)  dextrose 40% Gel 15 Gram(s) Oral once PRN Blood Glucose LESS THAN 70 milliGRAM(s)/deciliter  glucagon  Injectable 1 milliGRAM(s) IntraMuscular once PRN Glucose LESS THAN 70 milligrams/deciliter  magnesium hydroxide Suspension 30 milliLiter(s) Oral daily PRN Constipation        CAPILLARY BLOOD GLUCOSE      POCT Blood Glucose.: 237 mg/dL (05 Nov 2018 22:20)  POCT Blood Glucose.: 137 mg/dL (05 Nov 2018 17:41)  POCT Blood Glucose.: 236 mg/dL (05 Nov 2018 12:19)  POCT Blood Glucose.: 168 mg/dL (05 Nov 2018 08:55)    I&O's Summary      PHYSICAL EXAM:      NECK: Supple, No JVD  CHEST/LUNG: Clear to auscultation bilaterally; No wheezing.  HEART: Regular rate and rhythm; No murmurs, rubs, or gallops  ABDOMEN: Soft, Nontender, Nondistended; Bowel sounds present  EXTREMITIES:   No clubbing, cyanosis, or edema  NEUROLOGY: Awake      LABS:                        11.5   7.64  )-----------( 107      ( 04 Nov 2018 04:11 )             34.7     11-05    139  |  108<H>  |  12  ----------------------------<  166<H>  4.2   |  18<L>  |  0.62    Ca    8.0<L>      05 Nov 2018 05:45    TPro  4.9<L>  /  Alb  2.4<L>  /  TBili  0.2  /  DBili  x   /  AST  20  /  ALT  11  /  AlkPhos  56  11-05            CAPILLARY BLOOD GLUCOSE      POCT Blood Glucose.: 237 mg/dL (05 Nov 2018 22:20)  POCT Blood Glucose.: 137 mg/dL (05 Nov 2018 17:41)  POCT Blood Glucose.: 236 mg/dL (05 Nov 2018 12:19)  POCT Blood Glucose.: 168 mg/dL (05 Nov 2018 08:55)    11-03 @ 06:13  Culture-urine --  Culture results --  method type --  Organism --  Organism Identification --  Specimen source BLOOD PERIPHERAL  11-02 @ 11:50  Culture-urine --  Culture results --  method type --  Organism --  Organism Identification --  Specimen source BLOOD PERIPHERAL  11-01 @ 15:22  Culture-urine   NO GROWTH AT 24 HOURS  Culture results --  method type --  Organism --  Organism Identification --  Specimen source URINE CATHETER  11-01 @ 13:38  Culture-urine --  Culture results --  method type PCR  Organism BLOOD CULTURE PCR  ***Blood Panel PCR results on this specimen are available  approximately 3 hours after the Gram stain result***  Gram stain, PCR, and/or culture results may not always  correspond due to difference in methodologies  ------------------------------------------------------------  This PCR assay was performed using [employer].  The  following targets are tested for:  Enterococcus, vancomycin  resistant enterococci, Listeria monocytogenes,  coagulase  negative staphylococci, S. aureus, methicillin resistant S.  aureus, Streptococcus agalactiae (Group B), S. pneumoniae,  S. pyogenes (Group A), Acinetobacter baumannii, Enterobacter  cloacae, E. coli, Klebsiella oxytoca, K. pneumoniae, Proteus  sp., Serratia marcescens, Haemophilus influenzae, Neisseria  meningitidis, Pseudomonas aeruginosa, Candida albicans, C.  glabrata, C. krusei, C. parapsilosis, C. tropicalis and the  KPC resistance gene.  **NOTE: Due to technical problems, Proteus sp. will NOT be  reported as part of the BCID panel until further notice.  Organism Identification BLOOD CULTURE PCR  Staph. aureus *MRSA*  Specimen source BLOOD PERIPHERAL           11-03 @ 06:13  Culture blood   NO ORGANISMS ISOLATED  NO ORGANISMS ISOLATED AT 48 HRS.  Culture results --  Gram stain --  Gram stain blood --  Method type --  Organism --  Organism identification --  Specimen source BLOOD PERIPHERAL   11-02 @ 11:50  Culture blood   NO ORGANISMS ISOLATED  NO ORGANISMS ISOLATED AT 72 HRS.  Culture results --  Gram stain --  Gram stain blood --  Method type --  Organism --  Organism identification --  Specimen source BLOOD PERIPHERAL   11-01 @ 15:22  Culture blood --  Culture results --  Gram stain --  Gram stain blood --  Method type --  Organism --  Organism identification --  Specimen source URINE CATHETER   11-01 @ 13:38  Culture blood   ***Blood Panel PCR results on this specimen are available  approximately 3 hours after the Gram stain result***  Gram stain, PCR, and/or culture results may not always  correspond due to difference in methodologies  ------------------------------------------------------------  This PCR assay was performed using [employer].  The  following targets are tested for:  Enterococcus, vancomycin  resistant enterococci, Listeria monocytogenes,  coagulase  negative staphylococci, S. aureus, methicillin resistant S.  aureus, Streptococcus agalactiae (Group B), S. pneumoniae,  S. pyogenes (Group A), Acinetobacter baumannii, Enterobacter  cloacae, E. coli, Klebsiella oxytoca, K. pneumoniae, Proteus  sp., Serratia marcescens, Haemophilus influenzae, Neisseria  meningitidis, Pseudomonas aeruginosa, Candida albicans, C.  glabrata, C. krusei, C. parapsilosis, C. tropicalis and the  KPC resistance gene.  **NOTE: Due to technical problems, Proteus sp. will NOT be  reported as part of the BCID paneluntil further notice.  Culture results --  Gram stain --  Gram stain blood   ***** CRITICAL RESULT *****  PERSON CALLED / READ-BACK: BLANK CRAWFORD RN  DATE / TIME CALLED: 11/02/18 0707  CALLED BY: DEANNE NOE  GPCCL^Gram Pos Cocci In Clusters  AFTER: 16 HOURS INCUBATION  BOTTLE: AEROBIC BOTTLE  Method type PCR  Organism BLOOD CULTURE PCR  ***Blood Panel PCR results on this specimen are available  approximately 3 hours after the Gram stain result***  Gram stain, PCR, and/or culture results may not always  correspond due to difference in methodologies  ------------------------------------------------------------  This PCR assay was performed using [employer].  The  following targets are tested for:  Enterococcus, vancomycin  resistant enterococci, Listeria monocytogenes,  coagulase  negative staphylococci, S. aureus, methicillin resistant S.  aureus, Streptococcus agalactiae (Group B), S. pneumoniae,  S. pyogenes (Group A), Acinetobacter baumannii, Enterobacter  cloacae, E. coli, Klebsiella oxytoca, K. pneumoniae, Proteus  sp., Serratia marcescens, Haemophilus influenzae, Neisseria  meningitidis, Pseudomonas aeruginosa, Candida albicans, C.  glabrata, C. krusei, C. parapsilosis, C. tropicalis and the  KPC resistance gene.  **NOTE: Due to technical problems, Proteus sp. will NOT be  reported as part of the BCID panel until further notice.  Organism identification BLOOD CULTURE PCR  Staph. aureus *MRSA*  Specimen source BLOOD PERIPHERAL      RADIOLOGY & ADDITIONAL TESTS:    Imaging Personally Reviewed:    Consultant(s) Notes Reviewed:      Care Discussed with Consultants/Other Providers:

## 2018-11-05 NOTE — CONSULT NOTE ADULT - ASSESSMENT
77 year old DMII male with onychomycosis, bilateral feet  -Aseptic debridement of elongated nails x 10  -No open lesions or signs of infection  -Z floats at all times  -No other acute podiatric issues at this time  -Please reconsult as needed

## 2018-11-05 NOTE — PROGRESS NOTE ADULT - ASSESSMENT
76 yo m with sepsis 2/2 recurrent UTI    Problem/Plan - 1:  ·  Problem:Sepsis 2/2 Urinary tract infection without hematuria, site unspecified.  Plan: -ID follow up noted  -Renal Sono noted      Problem/Plan - 2:  ·  Problem: Sepsis, MRSA bacteremia.  Plan: continue with  IV ABX    Problem/Plan - 3:  ·  Problem: Type 2 diabetes mellitus with complication, unspecified whether long term insulin use.  Plan: hold metformin, place on ISS.     Problem/Plan - 4:  ·  Problem: Essential hypertension.  Plan: c/w toprol, lisinopril as tolerated.     Problem/Plan - 5:  ·  Problem: Toenail deformity. Plan: podiatry for overgrown toenails.    Problem/Plan - 6:  Problem: Prophylactic measure.Plan: IMPROVE VTE Individual Risk AssessmentIMPROVE VTE Score: 2  heparin.

## 2018-11-05 NOTE — PROGRESS NOTE ADULT - SUBJECTIVE AND OBJECTIVE BOX
Infectious Diseases progress note:    Subjective: Coverage appreciated, events noted.  NAD, resting, nonverbal.  Afebrile.      ROS:  CONSTITUTIONAL:  No fever, chills, rigors  CARDIOVASCULAR:  No chest pain or palpitations  RESPIRATORY:   No SOB, cough, dyspnea on exertion.  No wheezing  GASTROINTESTINAL:  No abd pain, N/V, diarrhea/constipation  EXTREMITIES:  No swelling or joint pain  GENITOURINARY:  No burning on urination, increased frequency or urgency.  No flank pain  NEUROLOGIC:  No HA, visual disturbances  SKIN: No rashes    Allergies    No Known Allergies    Intolerances        ANTIBIOTICS/RELEVANT:  antimicrobials  rifampin 300 milliGRAM(s) Oral three times a day  vancomycin  IVPB 1000 milliGRAM(s) IV Intermittent every 12 hours    immunologic:    OTHER:  acetaminophen   Tablet .. 650 milliGRAM(s) Oral every 6 hours PRN  aspirin enteric coated 81 milliGRAM(s) Oral daily  dextrose 40% Gel 15 Gram(s) Oral once PRN  dextrose 5%. 1000 milliLiter(s) IV Continuous <Continuous>  dextrose 50% Injectable 12.5 Gram(s) IV Push once  dextrose 50% Injectable 25 Gram(s) IV Push once  dextrose 50% Injectable 25 Gram(s) IV Push once  docusate sodium 100 milliGRAM(s) Oral three times a day  glucagon  Injectable 1 milliGRAM(s) IntraMuscular once PRN  heparin  Injectable 5000 Unit(s) SubCutaneous every 8 hours  insulin lispro (HumaLOG) corrective regimen sliding scale   SubCutaneous three times a day before meals  insulin lispro (HumaLOG) corrective regimen sliding scale   SubCutaneous at bedtime  lisinopril 5 milliGRAM(s) Oral daily  magnesium hydroxide Suspension 30 milliLiter(s) Oral daily PRN  metoprolol succinate ER 25 milliGRAM(s) Oral daily  multivitamin 1 Tablet(s) Oral daily  nystatin Powder 1 Application(s) Topical two times a day  pantoprazole    Tablet 40 milliGRAM(s) Oral before breakfast  simvastatin 20 milliGRAM(s) Oral at bedtime  sodium chloride 0.9%. 1000 milliLiter(s) IV Continuous <Continuous>  tamsulosin 0.4 milliGRAM(s) Oral at bedtime      Objective:  Vital Signs Last 24 Hrs  T(C): 36.3 (05 Nov 2018 12:16), Max: 36.8 (04 Nov 2018 20:55)  T(F): 97.4 (05 Nov 2018 12:16), Max: 98.3 (04 Nov 2018 20:55)  HR: 75 (05 Nov 2018 12:16) (72 - 78)  BP: 136/76 (05 Nov 2018 12:16) (114/69 - 137/71)  BP(mean): --  RR: 18 (05 Nov 2018 12:16) (18 - 19)  SpO2: 97% (05 Nov 2018 12:16) (97% - 99%)    PHYSICAL EXAM:  Constitutional:NAD  Eyes:BENJY, EOMI  Ear/Nose/Throat: no thrush, mucositis.  Moist mucous membranes	  Neck:no JVD, no lymphadenopathy, supple  Respiratory: CTA phi  Cardiovascular: S1S2 RRR, no murmurs  Gastrointestinal:soft, nontender,  nondistended (+) BS  Extremities:no e/e/c  Skin:  rash over back improved erythema        LABS:                        11.5   7.64  )-----------( 107      ( 04 Nov 2018 04:11 )             34.7     11-05    139  |  108<H>  |  12  ----------------------------<  166<H>  4.2   |  18<L>  |  0.62    Ca    8.0<L>      05 Nov 2018 05:45    TPro  4.9<L>  /  Alb  2.4<L>  /  TBili  0.2  /  DBili  x   /  AST  20  /  ALT  11  /  AlkPhos  56  11-05            Vancomycin Level, Random:  ug/mL (11-04 @ 04:11)      Vancomycin Level, Trough: 13.8 ug/mL (11-03 @ 17:10)              MICROBIOLOGY:    Culture - Blood in AM (11.03.18 @ 06:13)    Culture - Blood:   NO ORGANISMS ISOLATED  NO ORGANISMS ISOLATED AT 48 HRS.    Specimen Source: BLOOD PERIPHERAL    Culture - Blood (11.02.18 @ 11:50)    Culture - Blood:   NO ORGANISMS ISOLATED  NO ORGANISMS ISOLATED AT 72 HRS.    Specimen Source: BLOOD PERIPHERAL    Culture - Urine (11.01.18 @ 15:22)    Culture - Urine:   NO GROWTH AT 24 HOURS    Specimen Source: URINE CATHETER    Culture - Blood (11.01.18 @ 13:38)    Culture - Blood:   NO ORGANISMS ISOLATED  NO ORGANISMS ISOLATED AT 96 HOURS    Specimen Source: BLOOD VENOUS    Culture - Blood (11.01.18 @ 13:38)    -  Methicillin resistant Staphylococcus aureus (MRSA): + DETECT ERNESTINE Any isolate of Staphylococcus aureus from a blood culture is  NOT considered a contaminant.    Culture - Blood:   ***Blood Panel PCR results on this specimen are available  approximately 3 hours after the Gram stain result***  Gram stain, PCR, and/or culture results may not always  correspond due to difference in methodologies  ------------------------------------------------------------  This PCR assay was performed using [employer].  The  following targets are tested for:  Enterococcus, vancomycin  resistant enterococci, Listeria monocytogenes,  coagulase  negative staphylococci, S. aureus, methicillin resistant S.  aureus, Streptococcus agalactiae (Group B), S. pneumoniae,  S. pyogenes (Group A), Acinetobacter baumannii, Enterobacter  cloacae, E. coli, Klebsiella oxytoca, K. pneumoniae, Proteus  sp., Serratia marcescens, Haemophilus influenzae, Neisseria  meningitidis, Pseudomonas aeruginosa, Candida albicans, C.  glabrata, C. krusei, C. parapsilosis, C. tropicalis and the  KPC resistance gene.  **NOTE: Due to technical problems, Proteus sp. will NOT be  reported as part of the BCID paneluntil further notice.    Culture - Blood:   MRSA^Staph. aureus *MRSA*  OXICILLIN-RESISTANT staphylococci should be regarded as  RESISTANT to ALL other Beta-Lactams regardless of the  in-vitro results obtained.  These include: ALL  Penicillins, Cephalosporins, Amoxicillin-clavulanic  acid, Ticarcillin-clavulanic acid,  Ampicillin-sulbactam, and Imipenem.    Culture - Blood:   RESULT CALLED TO / READ BACK: JW SCHWARTZ,S/Y  DATE / TIME CALLED: 11/04/18 1210  CALLED BY: TAMRA LÓPEZ  MRSA^Staph. aureus *MRSA*  OXICILLIN-RESISTANT staphylococci should be regarded as  RESISTANT to ALL other Beta-Lactams regardless of the  in-vitro results obtained.  These include: ALL  Penicillins, Cephalosporins, Amoxicillin-clavulanic  acid, Ticarcillin-clavulanic acid,  Ampicillin-sulbactam, and Imipenem.    Specimen Source: BLOOD PERIPHERAL    Organism: BLOOD CULTURE PCR    Organism: Staphylococcus aureus    Gram Stain Blood:   ***** CRITICAL RESULT *****  PERSON CALLED / READ-BACK: BLANK CRAWFORD RN  DATE / TIME CALLED: 11/02/18 0707  CALLED BY: DEANNE NOE  GPCCL^Gram Pos Cocci In Clusters  AFTER: 16 HOURS INCUBATION  BOTTLE: AEROBIC BOTTLE    Organism Identification: BLOOD CULTURE PCR  Staphylococcus aureus    Method Type: PCR          RADIOLOGY & ADDITIONAL STUDIES:    < from: US Kidney and Bladder (11.02.18 @ 11:46) >  FINDINGS:    Right kidney:  10.7 cm. Several right renal cysts. No renal mass,   hydronephrosis or calculi.    Left kidney:  10 cm. A 2.1 x 2 x 2 cm cystic lesion at the upper pole   with an echogenic nodular focus at at its periphery is indeterminate. No   associated vascularity. No hydronephrosis or calculi.    Urinary bladder: Dependent debris within the urinary bladder.    Prostatomegaly measuring 6.5 cm transverse dimension.    IMPRESSION:     A 2.1 cm LEFT upper pole cystic lesion with a peripheral nodule.   Differential includes neoplasm. Dedicated contrast enhanced renal   protocol CT or MR is recommended for further evaluation.    < end of copied text >

## 2018-11-06 LAB
ALBUMIN SERPL ELPH-MCNC: 2.8 G/DL — LOW (ref 3.3–5)
ALP SERPL-CCNC: 66 U/L — SIGNIFICANT CHANGE UP (ref 40–120)
ALT FLD-CCNC: 18 U/L — SIGNIFICANT CHANGE UP (ref 4–41)
AST SERPL-CCNC: 19 U/L — SIGNIFICANT CHANGE UP (ref 4–40)
BACTERIA BLD CULT: SIGNIFICANT CHANGE UP
BILIRUB SERPL-MCNC: 0.3 MG/DL — SIGNIFICANT CHANGE UP (ref 0.2–1.2)
BUN SERPL-MCNC: 9 MG/DL — SIGNIFICANT CHANGE UP (ref 7–23)
CALCIUM SERPL-MCNC: 8.6 MG/DL — SIGNIFICANT CHANGE UP (ref 8.4–10.5)
CHLORIDE SERPL-SCNC: 108 MMOL/L — HIGH (ref 98–107)
CO2 SERPL-SCNC: 19 MMOL/L — LOW (ref 22–31)
CREAT SERPL-MCNC: 0.67 MG/DL — SIGNIFICANT CHANGE UP (ref 0.5–1.3)
GLUCOSE BLDC GLUCOMTR-MCNC: 155 MG/DL — HIGH (ref 70–99)
GLUCOSE SERPL-MCNC: 160 MG/DL — HIGH (ref 70–99)
POTASSIUM SERPL-MCNC: 4 MMOL/L — SIGNIFICANT CHANGE UP (ref 3.5–5.3)
POTASSIUM SERPL-SCNC: 4 MMOL/L — SIGNIFICANT CHANGE UP (ref 3.5–5.3)
PROT SERPL-MCNC: 5.8 G/DL — LOW (ref 6–8.3)
SODIUM SERPL-SCNC: 142 MMOL/L — SIGNIFICANT CHANGE UP (ref 135–145)
VANCOMYCIN FLD-MCNC: 15.3 UG/ML — SIGNIFICANT CHANGE UP

## 2018-11-06 PROCEDURE — 93306 TTE W/DOPPLER COMPLETE: CPT | Mod: 26

## 2018-11-06 PROCEDURE — 78806: CPT | Mod: 26

## 2018-11-06 RX ADMIN — Medication 25 MILLIGRAM(S): at 05:18

## 2018-11-06 RX ADMIN — HEPARIN SODIUM 5000 UNIT(S): 5000 INJECTION INTRAVENOUS; SUBCUTANEOUS at 21:15

## 2018-11-06 RX ADMIN — HEPARIN SODIUM 5000 UNIT(S): 5000 INJECTION INTRAVENOUS; SUBCUTANEOUS at 13:22

## 2018-11-06 RX ADMIN — Medication 100 MILLIGRAM(S): at 17:27

## 2018-11-06 RX ADMIN — Medication 1 TABLET(S): at 13:22

## 2018-11-06 RX ADMIN — TAMSULOSIN HYDROCHLORIDE 0.4 MILLIGRAM(S): 0.4 CAPSULE ORAL at 21:15

## 2018-11-06 RX ADMIN — NYSTATIN CREAM 1 APPLICATION(S): 100000 CREAM TOPICAL at 17:27

## 2018-11-06 RX ADMIN — Medication 250 MILLIGRAM(S): at 11:20

## 2018-11-06 RX ADMIN — Medication 100 MILLIGRAM(S): at 06:20

## 2018-11-06 RX ADMIN — Medication 100 MILLIGRAM(S): at 13:22

## 2018-11-06 RX ADMIN — Medication 2: at 17:27

## 2018-11-06 RX ADMIN — Medication 250 MILLIGRAM(S): at 22:07

## 2018-11-06 RX ADMIN — Medication 81 MILLIGRAM(S): at 13:22

## 2018-11-06 RX ADMIN — HEPARIN SODIUM 5000 UNIT(S): 5000 INJECTION INTRAVENOUS; SUBCUTANEOUS at 05:19

## 2018-11-06 RX ADMIN — SODIUM CHLORIDE 75 MILLILITER(S): 9 INJECTION INTRAMUSCULAR; INTRAVENOUS; SUBCUTANEOUS at 05:30

## 2018-11-06 RX ADMIN — Medication 2: at 13:21

## 2018-11-06 RX ADMIN — LISINOPRIL 5 MILLIGRAM(S): 2.5 TABLET ORAL at 05:18

## 2018-11-06 RX ADMIN — Medication 1: at 09:09

## 2018-11-06 RX ADMIN — SIMVASTATIN 20 MILLIGRAM(S): 20 TABLET, FILM COATED ORAL at 21:15

## 2018-11-06 RX ADMIN — SODIUM CHLORIDE 75 MILLILITER(S): 9 INJECTION INTRAMUSCULAR; INTRAVENOUS; SUBCUTANEOUS at 21:15

## 2018-11-06 RX ADMIN — NYSTATIN CREAM 1 APPLICATION(S): 100000 CREAM TOPICAL at 05:18

## 2018-11-06 RX ADMIN — Medication 100 MILLIGRAM(S): at 21:15

## 2018-11-06 RX ADMIN — Medication 100 MILLIGRAM(S): at 05:18

## 2018-11-06 RX ADMIN — PANTOPRAZOLE SODIUM 40 MILLIGRAM(S): 20 TABLET, DELAYED RELEASE ORAL at 06:20

## 2018-11-06 NOTE — PROGRESS NOTE ADULT - ASSESSMENT
77yr old male significant  PMHx of recurrent UTIs,  HTN, HLD, T2DM, PUD, h/o  recurrent GIB, s/p Aortic root replacement with AVR 2/2 Type A aneurysm (1/2013), CVAx3 with residual left hemiparesis, bedbound, minimally verbal. Lives at home with wife who noted increased weakness/ sleeping more over last few days. Pt on dysphagia 1 diet with honey thick liquids, requiring feeding in small increments. Wife denies coughing, sob, endorses decreased and darker urine output. Pt with diaper. UA +; prior urine cultures all grew out pan-sensitive pseudomonas.    Tmax in .3, wbc 12.47 (01 Nov 2018 17:15)    Wife present at bedside, states pt usually does not c/o of anything, but that she noticed he has been weaker than usual, not eating as much.  He had fever >101 at home.  Wife also notes he has a rash over his back and b/l axillary region that "comes and goes".  Sometimes he develops small pimples that pop open, which she then cleans and applies cortisone cream.  She believes he has a heat rash.  No recent hospitalizations, outpatient procedures or dental work, no recent abx exposure.    Pt admitted to evaluate for UTI.  Also noted to have bacteremia with GPC.  Pt received dose of vanco in the ER, he is now on cipro.  ID edu called for further antibiotic managment.     Problem/Plan - 1:    ·	MRSA bacteremia    - Cont vancomycin,  Maintain levels between 15-20.  Source possibly rash over pt's back and b/l axillary regions.  ?Contact vs. allergic dermatitis.  Improving with current managment and nystatin ointment.    - r/o prosthetic valve endocarditits.  F/u TTE.  R/o metastatic focus of infection. NM scan results noted - enhancement of L kidney, urine cx negative.    - Check repeat blood cultures to ensure clearance    -cont vanco/rifampin/gent.  Monitor vanco trough (maintain between 15-20).  f/u gent trough prior to next 4th dose.  Monitor renal function closely    - Urine cultures negative, will d/c cipro.  NM scan results noted, enhancement of L kidney.  Doubt pyelonephritis,however pt is on broad spectrum abx including gram negative coverage.        Will follow,    Tanisha Ash  [phone]

## 2018-11-06 NOTE — ADVANCED PRACTICE NURSE CONSULT - ASSESSMENT
A&Ox0-1, able to state name, currently  bedbound, incontinent of urine and stool. Left hand and wrist contracture, flaccidity of left extremities Skin warm, dry with increased moisture in intertriginous folds, adequate skin turgor, scattered areas of hyperpigmentation and hypopigmentation, scattered areas of ecchymosis on bilateral upper extremities. Blanchable erythema on bilateral heels, areas of hypopigmentation in bilateral buttocks.     Left upper chest wall with intact fluid filled blister measuring [id number]. Tissue type presents as 100% as intact fluid filled blister. Periwound intact. NO increased warmth, no erythema, no induration. Goals of care: prevent friction, monitor for tissue type changes.      Severe Moisture associated dermatitis with suspected candidiasis in upper back, bilateral axillary and left palm of hand. Left palm of hand with macerated skin, moist and erythema more intense in the center. Upper back with dry flaky skin and satelite lesions around periphery (wife reports erythema more intense a few days ago), and bilateral axilla with erythema more intense in the center and moist. Patient unable to reports skin symptoms. Goals of care: treat candidiasis, wick moisture. A&Ox0-1, able to state name, currently  bedbound, incontinent of urine and stool. Left hand and wrist contracture, flaccidity of left extremities Skin warm, dry with increased moisture in intertriginous folds, adequate skin turgor, scattered areas of hyperpigmentation and hypopigmentation, scattered areas of ecchymosis on bilateral upper extremities. Blanchable erythema on bilateral heels, areas of hypopigmentation in bilateral buttocks.     Left upper chest wall with intact fluid filled blister measuring [id number]. Tissue type presents as 100% as intact fluid filled blister. Periwound intact. NO increased warmth, no erythema, no induration. Goals of care: prevent friction, monitor for tissue type changes.      Severe Moisture associated dermatitis with suspected candidiasis in upper back, bilateral axillary and left palm of hand. Left palm of hand with macerated skin, moist and erythema more intense in the center. Upper back with dry flaky skin and satelite lesions around periphery (wife reports erythema more intense a few days ago), and bilateral axilla with erythema more intense in the center and moist. Patient unable to reports skin symptoms. Goals of care: treat candidiasis, wick moisture.   \  Findings discussed with SAHIL [place]

## 2018-11-06 NOTE — ADVANCED PRACTICE NURSE CONSULT - RECOMMEDATIONS
to discuss potential resources for patient home setting     Topical Recommendations     Left side of chest wall intact blister: Clean with NS. Pat dry. Apply Liquid barrier film to periwound skin. Cover with silicone foam with borders. Change every two days or PRN.     Bilateral axilla, left palm, and upper back: Clean with soap and water, pat dry, sprinkle nystatin powder to entire area, twice a day.     Left palm and bilateral axilla: Place Interdry textile sheeting, under intertriginous folds leaving 2 inches exposed at ends to wick, remove to wash & dry affected area, then replace. Individual sheeting may be used for up to 5 days unless soiled.     Bilateral buttocks and sacral area: Clean with skin cleanser. Pat dry. Apply a hin layer of CRITIC AIDE barrier ointment to entire area. Apply twice a day or PRN.     Heels: Apply Liquid barrier film, daily.     Continue low air loss bed therapy, continue heel elevation with Z-flex fluidized positioning boots, continue to turn & reposition q2h with Z-oneyda fluidized positioning device, soft pillow between bony prominences, continue moisture management with barrier creams & single breathable pad, continue measures to decrease friction/shear/pressure. Continue with nutritional support as per dietary/orders.  Plan discussed with patient and family at the bedside, questions answer.     Please contact Wound Care Service Line if we can be of further assistance (ext [phone]).  to discuss potential resources for patient home setting     Topical Recommendations     Left side of chest wall intact blister: Clean with NS. Pat dry. Apply Liquid barrier film to periwound skin. Cover with silicone foam with borders. Change every two days or PRN.     Bilateral axilla, left palm, and upper back: Clean with soap and water, pat dry, sprinkle nystatin powder to entire area, twice a day.     Left palm and bilateral axilla: Place Interdry textile sheeting, under intertriginous folds leaving 2 inches exposed at ends to wick, remove to wash & dry affected area, then replace. Individual sheeting may be used for up to 5 days unless soiled.     Bilateral buttocks and sacral area: Clean with skin cleanser. Pat dry. Apply a hin layer of CRITIC AIDE barrier ointment to entire area. Apply twice a day or PRN.     Heels: Apply Liquid barrier film, daily.     Continue low air loss bed therapy, continue heel elevation with Z-flex fluidized positioning boots, continue to turn & reposition q2h with Z-oneyda fluidized positioning device, soft pillow between bony prominences, continue moisture management with barrier creams & single breathable pad, continue measures to decrease friction/shear/pressure. Continue with nutritional support as per dietary/orders.  Plan discussed with patient and family at the bedside, questions answer.     Please contact Wound Care Service Line if we can be of further assistance (ext [phone]).

## 2018-11-06 NOTE — ADVANCED PRACTICE NURSE CONSULT - REASON FOR CONSULT
Patient seen on skin care rounds after wound care referral received for assessment of skin impairment and recommendations of topical management. Chart reviewed: Serum albumin 2.8g/dl, BMI 22.1kg/m2, Sedimentation rate 16, WBC 7.64, Glucose range 160-294, UA negative, Adriel 13, patient minimally verbal at baseline, wife and son at the bedside reports patient is bedbound and received complete care by wife, patient incontinent of urine and stool and had hospital bed at home. Son and wife reports chronic contact dermatitis since patient is immobile and increase perspiration. Patient H/O of UTIs,  HTN, HLD, T2DM, PUD, h/o  recurrent GIB, s/p Aortic root replacement with AVR 2/2 Type A aneurysm (1/2013), CVAx3 with residual left hemiparesis, bedbound, minimally verbal. . Patient admitted from home with increased in lethargy. Patient is being seen by podiatry for nail care and ID for UTI/bacteremia.

## 2018-11-06 NOTE — PROGRESS NOTE ADULT - ASSESSMENT
· Assessment		  74 yo m with sepsis 2/2 recurrent UTI    Problem/Plan - 1:  ·  Problem:Sepsis 2/2 Urinary tract infection without hematuria, site unspecified.  Plan: -ID follow up noted  -Renal Sono noted      Problem/Plan - 2:  ·  Problem: Sepsis, MRSA bacteremia.  Plan: continue with  IV ABX    Problem/Plan - 3:  ·  Problem: Type 2 diabetes mellitus with complication, unspecified whether long term insulin use.  Plan: hold metformin, place on ISS.     Problem/Plan - 4:  ·  Problem: Essential hypertension.  Plan: c/w toprol, lisinopril as tolerated.     Problem/Plan - 5:  ·  Problem: Toenail deformity. Plan: podiatry for overgrown toenails.    Problem/Plan - 6:Problem: Prophylactic measure.Plan: IMPROVE VTE Individual Risk AssessmentIMPROVE VTE Score: 2  heparin.

## 2018-11-06 NOTE — PROGRESS NOTE ADULT - SUBJECTIVE AND OBJECTIVE BOX
Patient is a 77y old  Male who presents with a chief complaint of sepsis    SUBJECTIVE / OVERNIGHT EVENTS:  Awake  No N/V    MEDICATIONS  (STANDING):  aspirin enteric coated 81 milliGRAM(s) Oral daily  docusate sodium 100 milliGRAM(s) Oral three times a day  gentamicin   IVPB 60 milliGRAM(s) IV Intermittent every 12 hours  heparin  Injectable 5000 Unit(s) SubCutaneous every 8 hours  insulin lispro (HumaLOG) corrective regimen sliding scale   SubCutaneous three times a day before meals  insulin lispro (HumaLOG) corrective regimen sliding scale   SubCutaneous at bedtime  lisinopril 5 milliGRAM(s) Oral daily  metoprolol succinate ER 25 milliGRAM(s) Oral daily  multivitamin 1 Tablet(s) Oral daily  nystatin Powder 1 Application(s) Topical two times a day  pantoprazole    Tablet 40 milliGRAM(s) Oral before breakfast  rifampin 300 milliGRAM(s) Oral three times a day  simvastatin 20 milliGRAM(s) Oral at bedtime  sodium chloride 0.9%. 1000 milliLiter(s) (75 mL/Hr) IV Continuous <Continuous>  tamsulosin 0.4 milliGRAM(s) Oral at bedtime  vancomycin  IVPB 750 milliGRAM(s) IV Intermittent every 12 hours    MEDICATIONS  (PRN):  acetaminophen   Tablet .. 650 milliGRAM(s) Oral every 6 hours PRN Temp greater or equal to 38C (100.4F), Mild Pain (1 - 3), Moderate Pain (4 - 6)  dextrose 40% Gel 15 Gram(s) Oral once PRN Blood Glucose LESS THAN 70 milliGRAM(s)/deciliter  glucagon  Injectable 1 milliGRAM(s) IntraMuscular once PRN Glucose LESS THAN 70 milligrams/deciliter  magnesium hydroxide Suspension 30 milliLiter(s) Oral daily PRN Constipation        CAPILLARY BLOOD GLUCOSE      POCT Blood Glucose.: 230 mg/dL (06 Nov 2018 22:22)  POCT Blood Glucose.: 218 mg/dL (06 Nov 2018 17:15)  POCT Blood Glucose.: 208 mg/dL (06 Nov 2018 13:04)  POCT Blood Glucose.: 155 mg/dL (06 Nov 2018 09:02)      PHYSICAL EXAM:    NECK: Supple, No JVD  CHEST/LUNG: Clear to auscultation bilaterally; No wheezing.  HEART: Regular rate and rhythm; No murmurs, rubs, or gallops  ABDOMEN: Soft, Nontender, Nondistended; Bowel sounds present  EXTREMITIES:   No clubbing, cyanosis, or edema  NEUROLOGY: Awake/Nonvebal      LABS:    11-06    142  |  108<H>  |  9   ----------------------------<  160<H>  4.0   |  19<L>  |  0.67    Ca    8.6      06 Nov 2018 05:33    TPro  5.8<L>  /  Alb  2.8<L>  /  TBili  0.3  /  DBili  x   /  AST  19  /  ALT  18  /  AlkPhos  66  11-06 11-03 @ 06:13  Culture-urine --  Culture results --  method type --  Organism --  Organism Identification --  Specimen source BLOOD PERIPHERAL  11-02 @ 11:50  Culture-urine --  Culture results --  method type --  Organism --  Organism Identification --  Specimen source BLOOD PERIPHERAL  11-01 @ 15:22  Culture-urine   NO GROWTH AT 24 HOURS  Culture results --  method type --  Organism --  Organism Identification --  Specimen source URINE CATHETER  11-01 @ 13:38  Culture-urine --  Culture results --  method type PCR  Organism BLOOD CULTURE PCR  ***Blood Panel PCR results on this specimen are available  approximately 3 hours after the Gram stain result***  Gram stain, PCR, and/or culture results may not always  correspond due to difference in methodologies  ------------------------------------------------------------  This PCR assay was performed using [employer].  The  following targets are tested for:  Enterococcus, vancomycin  resistant enterococci, Listeria monocytogenes,  coagulase  negative staphylococci, S. aureus, methicillin resistant S.  aureus, Streptococcus agalactiae (Group B), S. pneumoniae,  S. pyogenes (Group A), Acinetobacter baumannii, Enterobacter  cloacae, E. coli, Klebsiella oxytoca, K. pneumoniae, Proteus  sp., Serratia marcescens, Haemophilus influenzae, Neisseria  meningitidis, Pseudomonas aeruginosa, Candida albicans, C.  glabrata, C. krusei, C. parapsilosis, C. tropicalis and the  KPC resistance gene.  **NOTE: Due to technical problems, Proteus sp. will NOT be  reported as part of the BCID panel until further notice.  Organism Identification BLOOD CULTURE PCR  Staph. aureus *MRSA*  Specimen source BLOOD PERIPHERAL           11-03 @ 06:13  Culture blood   NO ORGANISMS ISOLATED  NO ORGANISMS ISOLATED AT 72 HRS.  Culture results --  Gram stain --  Gram stain blood --  Method type --  Organism --  Organism identification --  Specimen source BLOOD PERIPHERAL   11-02 @ 11:50  Culture blood   NO ORGANISMS ISOLATED  NO ORGANISMS ISOLATED AT 96 HOURS  Culture results --  Gram stain --  Gram stain blood --  Method type --  Organism --  Organism identification --  Specimen source BLOOD PERIPHERAL   11-01 @ 15:22  Culture blood --  Culture results --  Gram stain --  Gram stain blood --  Method type --  Organism --  Organism identification --  Specimen source URINE CATHETER   11-01 @ 13:38  Culture blood   ***Blood Panel PCR results on this specimen are available  approximately 3 hours after the Gram stain result***  Gram stain, PCR, and/or culture results may not always  correspond due to difference in methodologies  ------------------------------------------------------------  This PCR assay was performed using [employer].  The  following targets are tested for:  Enterococcus, vancomycin  resistant enterococci, Listeria monocytogenes,  coagulase  negative staphylococci, S. aureus, methicillin resistant S.  aureus, Streptococcus agalactiae (Group B), S. pneumoniae,  S. pyogenes (Group A), Acinetobacter baumannii, Enterobacter  cloacae, E. coli, Klebsiella oxytoca, K. pneumoniae, Proteus  sp., Serratia marcescens, Haemophilus influenzae, Neisseria  meningitidis, Pseudomonas aeruginosa, Candida albicans, C.  glabrata, C. krusei, C. parapsilosis, C. tropicalis and the  KPC resistance gene.  **NOTE: Due to technical problems, Proteus sp. will NOT be  reported as part of the BCID paneluntil further notice.  Culture results --  Gram stain --  Gram stain blood   ***** CRITICAL RESULT *****  PERSON CALLED / READ-BACK: BLANK CRAWFORD RN  DATE / TIME CALLED: 11/02/18 0707  CALLED BY: DEANNE NOE  GPCCL^Gram Pos Cocci In Clusters  AFTER: 16 HOURS INCUBATION  BOTTLE: AEROBIC BOTTLE  Method type PCR  Organism BLOOD CULTURE PCR  ***Blood Panel PCR results on this specimen are available  approximately 3 hours after the Gram stain result***  Gram stain, PCR, and/or culture results may not always  correspond due to difference in methodologies  ------------------------------------------------------------  This PCR assay was performed using [employer].  The  following targets are tested for:  Enterococcus, vancomycin  resistant enterococci, Listeria monocytogenes,  coagulase  negative staphylococci, S. aureus, methicillin resistant S.  aureus, Streptococcus agalactiae (Group B), S. pneumoniae,  S. pyogenes (Group A), Acinetobacter baumannii, Enterobacter  cloacae, E. coli, Klebsiella oxytoca, K. pneumoniae, Proteus  sp., Serratia marcescens, Haemophilus influenzae, Neisseria  meningitidis, Pseudomonas aeruginosa, Candida albicans, C.  glabrata, C. krusei, C. parapsilosis, C. tropicalis and the  KPC resistance gene.  **NOTE: Due to technical problems, Proteus sp. will NOT be  reported as part of the BCID panel until further notice.  Organism identification BLOOD CULTURE PCR  Staph. aureus *MRSA*  Specimen source BLOOD PERIPHERAL

## 2018-11-06 NOTE — PROGRESS NOTE ADULT - SUBJECTIVE AND OBJECTIVE BOX
Patient is a 77y old  Male who presents with a chief complaint of sepsis (06 Nov 2018 21:55)      SUBJECTIVE / OVERNIGHT EVENTS:  Awake  No N/V    MEDICATIONS  (STANDING):  aspirin enteric coated 81 milliGRAM(s) Oral daily  dextrose 5%. 1000 milliLiter(s) (50 mL/Hr) IV Continuous <Continuous>  dextrose 50% Injectable 12.5 Gram(s) IV Push once  dextrose 50% Injectable 25 Gram(s) IV Push once  dextrose 50% Injectable 25 Gram(s) IV Push once  docusate sodium 100 milliGRAM(s) Oral three times a day  gentamicin   IVPB 60 milliGRAM(s) IV Intermittent every 12 hours  heparin  Injectable 5000 Unit(s) SubCutaneous every 8 hours  insulin lispro (HumaLOG) corrective regimen sliding scale   SubCutaneous three times a day before meals  insulin lispro (HumaLOG) corrective regimen sliding scale   SubCutaneous at bedtime  lisinopril 5 milliGRAM(s) Oral daily  metoprolol succinate ER 25 milliGRAM(s) Oral daily  multivitamin 1 Tablet(s) Oral daily  nystatin Powder 1 Application(s) Topical two times a day  pantoprazole    Tablet 40 milliGRAM(s) Oral before breakfast  rifampin 300 milliGRAM(s) Oral three times a day  simvastatin 20 milliGRAM(s) Oral at bedtime  sodium chloride 0.9%. 1000 milliLiter(s) (75 mL/Hr) IV Continuous <Continuous>  tamsulosin 0.4 milliGRAM(s) Oral at bedtime  vancomycin  IVPB 750 milliGRAM(s) IV Intermittent every 12 hours    MEDICATIONS  (PRN):  acetaminophen   Tablet .. 650 milliGRAM(s) Oral every 6 hours PRN Temp greater or equal to 38C (100.4F), Mild Pain (1 - 3), Moderate Pain (4 - 6)  dextrose 40% Gel 15 Gram(s) Oral once PRN Blood Glucose LESS THAN 70 milliGRAM(s)/deciliter  glucagon  Injectable 1 milliGRAM(s) IntraMuscular once PRN Glucose LESS THAN 70 milligrams/deciliter  magnesium hydroxide Suspension 30 milliLiter(s) Oral daily PRN Constipation        CAPILLARY BLOOD GLUCOSE      POCT Blood Glucose.: 230 mg/dL (06 Nov 2018 22:22)  POCT Blood Glucose.: 218 mg/dL (06 Nov 2018 17:15)  POCT Blood Glucose.: 208 mg/dL (06 Nov 2018 13:04)  POCT Blood Glucose.: 155 mg/dL (06 Nov 2018 09:02)    I&O's Summary      PHYSICAL EXAM:    NECK: Supple, No JVD  CHEST/LUNG: Clear to auscultation bilaterally; No wheezing.  HEART: Regular rate and rhythm; No murmurs, rubs, or gallops  ABDOMEN: Soft, Nontender, Nondistended; Bowel sounds present  EXTREMITIES:   No clubbing, cyanosis, or edema  NEUROLOGY: Awake/Nonvebal      LABS:    11-06    142  |  108<H>  |  9   ----------------------------<  160<H>  4.0   |  19<L>  |  0.67    Ca    8.6      06 Nov 2018 05:33    TPro  5.8<L>  /  Alb  2.8<L>  /  TBili  0.3  /  DBili  x   /  AST  19  /  ALT  18  /  AlkPhos  66  11-06            CAPILLARY BLOOD GLUCOSE      POCT Blood Glucose.: 230 mg/dL (06 Nov 2018 22:22)  POCT Blood Glucose.: 218 mg/dL (06 Nov 2018 17:15)  POCT Blood Glucose.: 208 mg/dL (06 Nov 2018 13:04)  POCT Blood Glucose.: 155 mg/dL (06 Nov 2018 09:02)    11-03 @ 06:13  Culture-urine --  Culture results --  method type --  Organism --  Organism Identification --  Specimen source BLOOD PERIPHERAL  11-02 @ 11:50  Culture-urine --  Culture results --  method type --  Organism --  Organism Identification --  Specimen source BLOOD PERIPHERAL  11-01 @ 15:22  Culture-urine   NO GROWTH AT 24 HOURS  Culture results --  method type --  Organism --  Organism Identification --  Specimen source URINE CATHETER  11-01 @ 13:38  Culture-urine --  Culture results --  method type PCR  Organism BLOOD CULTURE PCR  ***Blood Panel PCR results on this specimen are available  approximately 3 hours after the Gram stain result***  Gram stain, PCR, and/or culture results may not always  correspond due to difference in methodologies  ------------------------------------------------------------  This PCR assay was performed using [employer].  The  following targets are tested for:  Enterococcus, vancomycin  resistant enterococci, Listeria monocytogenes,  coagulase  negative staphylococci, S. aureus, methicillin resistant S.  aureus, Streptococcus agalactiae (Group B), S. pneumoniae,  S. pyogenes (Group A), Acinetobacter baumannii, Enterobacter  cloacae, E. coli, Klebsiella oxytoca, K. pneumoniae, Proteus  sp., Serratia marcescens, Haemophilus influenzae, Neisseria  meningitidis, Pseudomonas aeruginosa, Candida albicans, C.  glabrata, C. krusei, C. parapsilosis, C. tropicalis and the  KPC resistance gene.  **NOTE: Due to technical problems, Proteus sp. will NOT be  reported as part of the BCID panel until further notice.  Organism Identification BLOOD CULTURE PCR  Staph. aureus *MRSA*  Specimen source BLOOD PERIPHERAL           11-03 @ 06:13  Culture blood   NO ORGANISMS ISOLATED  NO ORGANISMS ISOLATED AT 72 HRS.  Culture results --  Gram stain --  Gram stain blood --  Method type --  Organism --  Organism identification --  Specimen source BLOOD PERIPHERAL   11-02 @ 11:50  Culture blood   NO ORGANISMS ISOLATED  NO ORGANISMS ISOLATED AT 96 HOURS  Culture results --  Gram stain --  Gram stain blood --  Method type --  Organism --  Organism identification --  Specimen source BLOOD PERIPHERAL   11-01 @ 15:22  Culture blood --  Culture results --  Gram stain --  Gram stain blood --  Method type --  Organism --  Organism identification --  Specimen source URINE CATHETER   11-01 @ 13:38  Culture blood   ***Blood Panel PCR results on this specimen are available  approximately 3 hours after the Gram stain result***  Gram stain, PCR, and/or culture results may not always  correspond due to difference in methodologies  ------------------------------------------------------------  This PCR assay was performed using [employer].  The  following targets are tested for:  Enterococcus, vancomycin  resistant enterococci, Listeria monocytogenes,  coagulase  negative staphylococci, S. aureus, methicillin resistant S.  aureus, Streptococcus agalactiae (Group B), S. pneumoniae,  S. pyogenes (Group A), Acinetobacter baumannii, Enterobacter  cloacae, E. coli, Klebsiella oxytoca, K. pneumoniae, Proteus  sp., Serratia marcescens, Haemophilus influenzae, Neisseria  meningitidis, Pseudomonas aeruginosa, Candida albicans, C.  glabrata, C. krusei, C. parapsilosis, C. tropicalis and the  KPC resistance gene.  **NOTE: Due to technical problems, Proteus sp. will NOT be  reported as part of the BCID paneluntil further notice.  Culture results --  Gram stain --  Gram stain blood   ***** CRITICAL RESULT *****  PERSON CALLED / READ-BACK: BLANK CRAWFORD RN  DATE / TIME CALLED: 11/02/18 0707  CALLED BY: DEANNE NOE  GPCCL^Gram Pos Cocci In Clusters  AFTER: 16 HOURS INCUBATION  BOTTLE: AEROBIC BOTTLE  Method type PCR  Organism BLOOD CULTURE PCR  ***Blood Panel PCR results on this specimen are available  approximately 3 hours after the Gram stain result***  Gram stain, PCR, and/or culture results may not always  correspond due to difference in methodologies  ------------------------------------------------------------  This PCR assay was performed using [employer].  The  following targets are tested for:  Enterococcus, vancomycin  resistant enterococci, Listeria monocytogenes,  coagulase  negative staphylococci, S. aureus, methicillin resistant S.  aureus, Streptococcus agalactiae (Group B), S. pneumoniae,  S. pyogenes (Group A), Acinetobacter baumannii, Enterobacter  cloacae, E. coli, Klebsiella oxytoca, K. pneumoniae, Proteus  sp., Serratia marcescens, Haemophilus influenzae, Neisseria  meningitidis, Pseudomonas aeruginosa, Candida albicans, C.  glabrata, C. krusei, C. parapsilosis, C. tropicalis and the  KPC resistance gene.  **NOTE: Due to technical problems, Proteus sp. will NOT be  reported as part of the BCID panel until further notice.  Organism identification BLOOD CULTURE PCR  Staph. aureus *MRSA*  Specimen source BLOOD PERIPHERAL      RADIOLOGY & ADDITIONAL TESTS:    Imaging Personally Reviewed:    Consultant(s) Notes Reviewed:      Care Discussed with Consultants/Other Providers:

## 2018-11-06 NOTE — PROGRESS NOTE ADULT - SUBJECTIVE AND OBJECTIVE BOX
Infectious Diseases progress note:    Subjective:  Pt NAD, nonverbal.  No new fevers, no leukocytosis    ROS:  nonverbal    Allergies    No Known Allergies    Intolerances        ANTIBIOTICS/RELEVANT:  antimicrobials  gentamicin   IVPB 60 milliGRAM(s) IV Intermittent every 12 hours  rifampin 300 milliGRAM(s) Oral three times a day  vancomycin  IVPB 750 milliGRAM(s) IV Intermittent every 12 hours    immunologic:    OTHER:  acetaminophen   Tablet .. 650 milliGRAM(s) Oral every 6 hours PRN  aspirin enteric coated 81 milliGRAM(s) Oral daily  dextrose 40% Gel 15 Gram(s) Oral once PRN  dextrose 5%. 1000 milliLiter(s) IV Continuous <Continuous>  dextrose 50% Injectable 12.5 Gram(s) IV Push once  dextrose 50% Injectable 25 Gram(s) IV Push once  dextrose 50% Injectable 25 Gram(s) IV Push once  docusate sodium 100 milliGRAM(s) Oral three times a day  glucagon  Injectable 1 milliGRAM(s) IntraMuscular once PRN  heparin  Injectable 5000 Unit(s) SubCutaneous every 8 hours  insulin lispro (HumaLOG) corrective regimen sliding scale   SubCutaneous three times a day before meals  insulin lispro (HumaLOG) corrective regimen sliding scale   SubCutaneous at bedtime  lisinopril 5 milliGRAM(s) Oral daily  magnesium hydroxide Suspension 30 milliLiter(s) Oral daily PRN  metoprolol succinate ER 25 milliGRAM(s) Oral daily  multivitamin 1 Tablet(s) Oral daily  nystatin Powder 1 Application(s) Topical two times a day  pantoprazole    Tablet 40 milliGRAM(s) Oral before breakfast  simvastatin 20 milliGRAM(s) Oral at bedtime  sodium chloride 0.9%. 1000 milliLiter(s) IV Continuous <Continuous>  tamsulosin 0.4 milliGRAM(s) Oral at bedtime      Objective:  Vital Signs Last 24 Hrs  T(C): 36.6 (06 Nov 2018 13:06), Max: 36.6 (06 Nov 2018 13:06)  T(F): 97.8 (06 Nov 2018 13:06), Max: 97.8 (06 Nov 2018 13:06)  HR: 71 (06 Nov 2018 13:06) (71 - 92)  BP: 147/77 (06 Nov 2018 13:06) (147/77 - 149/88)  BP(mean): --  RR: 20 (06 Nov 2018 13:06) (20 - 20)  SpO2: 98% (06 Nov 2018 13:06) (97% - 98%)    PHYSICAL EXAM:  Constitutional:NAD  Eyes:BENJY, EOMI  Ear/Nose/Throat: no thrush, mucositis.  Moist mucous membranes	  Neck:no JVD, no lymphadenopathy, supple  Respiratory: CTA phi  Cardiovascular: S1S2 RRR, no murmurs  Gastrointestinal:soft, nontender,  nondistended (+) BS  Extremities:no e/e/c  Skin:  back rash improving        LABS:    11-06    142  |  108<H>  |  9   ----------------------------<  160<H>  4.0   |  19<L>  |  0.67    Ca    8.6      06 Nov 2018 05:33    TPro  5.8<L>  /  Alb  2.8<L>  /  TBili  0.3  /  DBili  x   /  AST  19  /  ALT  18  /  AlkPhos  66  11-06          Vancomycin Level, Random: 15.3: Therapeutic ranges have not been established for random  vancomycin. Interpret results in context of patient's  clinical condition and time sample was drawn relative to  peak and trough therapeutic ranges. Therapeutic ranges for  peak vancomycin are 25-50 and for trough vancomycin 10-20  with 15-20 mcg/mL used for complicated infections. ug/mL (11.06.18 @ 05:33)            MICROBIOLOGY:    Culture - Blood in AM (11.03.18 @ 06:13)    Culture - Blood:   NO ORGANISMS ISOLATED  NO ORGANISMS ISOLATED AT 72 HRS.    Specimen Source: BLOOD PERIPHERAL    Culture - Blood (11.02.18 @ 11:50)    Culture - Blood:   NO ORGANISMS ISOLATED  NO ORGANISMS ISOLATED AT 96 HOURS    Specimen Source: BLOOD PERIPHERAL    Culture - Urine (11.01.18 @ 15:22)    Culture - Urine:   NO GROWTH AT 24 HOURS    Specimen Source: URINE CATHETER          RADIOLOGY & ADDITIONAL STUDIES:    < from: NM Multiple Day Procedure (11.06.18 @ 10:27) >  FINDINGS: There is faint radiopharmaceutical uptake in the left kidney.   There is physiologic distribution of labeled leukocytes in the remainder   of the visualized structures.     IMPRESSION: Abnormal Indium-111 labeled leukocyte scan. Faint uptake in   the left kidney is suggestive of urinary tract infection.    The remainder of the study is unremarkable.    < end of copied text >

## 2018-11-07 LAB
BACTERIA BLD CULT: SIGNIFICANT CHANGE UP
BUN SERPL-MCNC: 11 MG/DL — SIGNIFICANT CHANGE UP (ref 7–23)
CALCIUM SERPL-MCNC: 8.4 MG/DL — SIGNIFICANT CHANGE UP (ref 8.4–10.5)
CHLORIDE SERPL-SCNC: 106 MMOL/L — SIGNIFICANT CHANGE UP (ref 98–107)
CO2 SERPL-SCNC: 24 MMOL/L — SIGNIFICANT CHANGE UP (ref 22–31)
CREAT SERPL-MCNC: 0.78 MG/DL — SIGNIFICANT CHANGE UP (ref 0.5–1.3)
GENTAMICIN TROUGH SERPL-MCNC: 1.2 UG/ML — SIGNIFICANT CHANGE UP (ref 0.4–2)
GENTAMICIN TROUGH SERPL-MCNC: < 0.4 UG/ML — LOW (ref 0.4–2)
GLUCOSE BLDC GLUCOMTR-MCNC: 187 MG/DL — HIGH (ref 70–99)
GLUCOSE BLDC GLUCOMTR-MCNC: 216 MG/DL — HIGH (ref 70–99)
GLUCOSE BLDC GLUCOMTR-MCNC: 252 MG/DL — HIGH (ref 70–99)
GLUCOSE BLDC GLUCOMTR-MCNC: 269 MG/DL — HIGH (ref 70–99)
GLUCOSE SERPL-MCNC: 170 MG/DL — HIGH (ref 70–99)
HCT VFR BLD CALC: 36.2 % — LOW (ref 39–50)
HGB BLD-MCNC: 12.3 G/DL — LOW (ref 13–17)
MCHC RBC-ENTMCNC: 32.5 PG — SIGNIFICANT CHANGE UP (ref 27–34)
MCHC RBC-ENTMCNC: 34 % — SIGNIFICANT CHANGE UP (ref 32–36)
MCV RBC AUTO: 95.8 FL — SIGNIFICANT CHANGE UP (ref 80–100)
NRBC # FLD: 0 — SIGNIFICANT CHANGE UP
PLATELET # BLD AUTO: 133 K/UL — LOW (ref 150–400)
PMV BLD: 11.5 FL — SIGNIFICANT CHANGE UP (ref 7–13)
POTASSIUM SERPL-MCNC: 3.7 MMOL/L — SIGNIFICANT CHANGE UP (ref 3.5–5.3)
POTASSIUM SERPL-SCNC: 3.7 MMOL/L — SIGNIFICANT CHANGE UP (ref 3.5–5.3)
RBC # BLD: 3.78 M/UL — LOW (ref 4.2–5.8)
RBC # FLD: 12.8 % — SIGNIFICANT CHANGE UP (ref 10.3–14.5)
SODIUM SERPL-SCNC: 141 MMOL/L — SIGNIFICANT CHANGE UP (ref 135–145)
VANCOMYCIN TROUGH SERPL-MCNC: 22.8 UG/ML — HIGH (ref 10–20)
WBC # BLD: 6.99 K/UL — SIGNIFICANT CHANGE UP (ref 3.8–10.5)
WBC # FLD AUTO: 6.99 K/UL — SIGNIFICANT CHANGE UP (ref 3.8–10.5)

## 2018-11-07 RX ORDER — GENTAMICIN SULFATE 40 MG/ML
40 VIAL (ML) INJECTION EVERY 12 HOURS
Qty: 0 | Refills: 0 | Status: DISCONTINUED | OUTPATIENT
Start: 2018-11-07 | End: 2018-11-15

## 2018-11-07 RX ADMIN — HEPARIN SODIUM 5000 UNIT(S): 5000 INJECTION INTRAVENOUS; SUBCUTANEOUS at 21:33

## 2018-11-07 RX ADMIN — HEPARIN SODIUM 5000 UNIT(S): 5000 INJECTION INTRAVENOUS; SUBCUTANEOUS at 06:12

## 2018-11-07 RX ADMIN — SODIUM CHLORIDE 75 MILLILITER(S): 9 INJECTION INTRAMUSCULAR; INTRAVENOUS; SUBCUTANEOUS at 09:10

## 2018-11-07 RX ADMIN — PANTOPRAZOLE SODIUM 40 MILLIGRAM(S): 20 TABLET, DELAYED RELEASE ORAL at 06:11

## 2018-11-07 RX ADMIN — Medication 3: at 17:37

## 2018-11-07 RX ADMIN — SIMVASTATIN 20 MILLIGRAM(S): 20 TABLET, FILM COATED ORAL at 21:33

## 2018-11-07 RX ADMIN — NYSTATIN CREAM 1 APPLICATION(S): 100000 CREAM TOPICAL at 17:37

## 2018-11-07 RX ADMIN — NYSTATIN CREAM 1 APPLICATION(S): 100000 CREAM TOPICAL at 06:12

## 2018-11-07 RX ADMIN — Medication 25 MILLIGRAM(S): at 06:11

## 2018-11-07 RX ADMIN — Medication 250 MILLIGRAM(S): at 12:08

## 2018-11-07 RX ADMIN — HEPARIN SODIUM 5000 UNIT(S): 5000 INJECTION INTRAVENOUS; SUBCUTANEOUS at 13:46

## 2018-11-07 RX ADMIN — Medication 100 MILLIGRAM(S): at 21:35

## 2018-11-07 RX ADMIN — Medication 100 MILLIGRAM(S): at 22:21

## 2018-11-07 RX ADMIN — Medication 100 MILLIGRAM(S): at 06:12

## 2018-11-07 RX ADMIN — Medication 81 MILLIGRAM(S): at 12:18

## 2018-11-07 RX ADMIN — LISINOPRIL 5 MILLIGRAM(S): 2.5 TABLET ORAL at 06:11

## 2018-11-07 RX ADMIN — Medication 100 MILLIGRAM(S): at 13:46

## 2018-11-07 RX ADMIN — TAMSULOSIN HYDROCHLORIDE 0.4 MILLIGRAM(S): 0.4 CAPSULE ORAL at 21:33

## 2018-11-07 RX ADMIN — Medication 1 TABLET(S): at 12:10

## 2018-11-07 RX ADMIN — Medication 3: at 12:22

## 2018-11-07 RX ADMIN — Medication 1: at 09:07

## 2018-11-07 RX ADMIN — SODIUM CHLORIDE 75 MILLILITER(S): 9 INJECTION INTRAMUSCULAR; INTRAVENOUS; SUBCUTANEOUS at 22:24

## 2018-11-07 NOTE — PROGRESS NOTE ADULT - SUBJECTIVE AND OBJECTIVE BOX
Infectious Diseases progress note:    Subjective: Resting comfortably, nonverbal.  No acute o/n events.  Pt's wife at bedside.  No acute o/n events.      ROS:  Pt nonverbal    Allergies    No Known Allergies    Intolerances        ANTIBIOTICS/RELEVANT:  antimicrobials  gentamicin   IVPB 40 milliGRAM(s) IV Intermittent every 12 hours  rifampin 300 milliGRAM(s) Oral three times a day  vancomycin  IVPB 750 milliGRAM(s) IV Intermittent every 12 hours    immunologic:    OTHER:  acetaminophen   Tablet .. 650 milliGRAM(s) Oral every 6 hours PRN  aspirin enteric coated 81 milliGRAM(s) Oral daily  dextrose 40% Gel 15 Gram(s) Oral once PRN  dextrose 5%. 1000 milliLiter(s) IV Continuous <Continuous>  dextrose 50% Injectable 12.5 Gram(s) IV Push once  dextrose 50% Injectable 25 Gram(s) IV Push once  dextrose 50% Injectable 25 Gram(s) IV Push once  docusate sodium 100 milliGRAM(s) Oral three times a day  glucagon  Injectable 1 milliGRAM(s) IntraMuscular once PRN  heparin  Injectable 5000 Unit(s) SubCutaneous every 8 hours  insulin lispro (HumaLOG) corrective regimen sliding scale   SubCutaneous three times a day before meals  insulin lispro (HumaLOG) corrective regimen sliding scale   SubCutaneous at bedtime  lisinopril 5 milliGRAM(s) Oral daily  magnesium hydroxide Suspension 30 milliLiter(s) Oral daily PRN  metoprolol succinate ER 25 milliGRAM(s) Oral daily  multivitamin 1 Tablet(s) Oral daily  nystatin Powder 1 Application(s) Topical two times a day  pantoprazole    Tablet 40 milliGRAM(s) Oral before breakfast  simvastatin 20 milliGRAM(s) Oral at bedtime  sodium chloride 0.9%. 1000 milliLiter(s) IV Continuous <Continuous>  tamsulosin 0.4 milliGRAM(s) Oral at bedtime      Objective:  Vital Signs Last 24 Hrs  T(C): 36.8 (07 Nov 2018 20:31), Max: 36.8 (07 Nov 2018 20:31)  T(F): 98.2 (07 Nov 2018 20:31), Max: 98.2 (07 Nov 2018 20:31)  HR: 78 (07 Nov 2018 20:31) (68 - 80)  BP: 156/78 (07 Nov 2018 20:31) (141/74 - 156/78)  BP(mean): --  RR: 18 (07 Nov 2018 20:31) (18 - 18)  SpO2: 99% (07 Nov 2018 20:31) (97% - 99%)    PHYSICAL EXAM:  Constitutional:NAD  Eyes:BENJY, EOMI  Ear/Nose/Throat: no thrush, mucositis.  Moist mucous membranes	  Neck:no JVD, no lymphadenopathy, supple  Respiratory: CTA phi  Cardiovascular: S1S2 RRR, no murmurs  Gastrointestinal:soft, nontender,  nondistended (+) BS  Extremities:no e/e/c  Skin:  rash over back resolving        LABS:                        12.3   6.99  )-----------( 133      ( 07 Nov 2018 06:01 )             36.2     11-07    141  |  106  |  11  ----------------------------<  170<H>  3.7   |  24  |  0.78    Ca    8.4      07 Nov 2018 06:01    TPro  5.8<L>  /  Alb  2.8<L>  /  TBili  0.3  /  DBili  x   /  AST  19  /  ALT  18  /  AlkPhos  66  11-06            Vancomycin Level, Random:  ug/mL (11-06 @ 05:33)  Vancomycin Level, Random:  ug/mL (11-04 @ 04:11)      Vancomycin Level, Trough: 22.8 ug/mL (11-07 @ 17:04)  Vancomycin Level, Trough: 20.8 ug/mL (11-05 @ 17:50)  Vancomycin Level, Trough: 13.8 ug/mL (11-03 @ 17:10)              MICROBIOLOGY:    Culture - Blood in AM (11.03.18 @ 06:13)    Culture - Blood:   NO ORGANISMS ISOLATED  NO ORGANISMS ISOLATED AT 96 HOURS    Specimen Source: BLOOD PERIPHERAL    Culture - Blood (11.02.18 @ 11:50)    Culture - Blood:   NO ORGANISMS ISOLATED    Specimen Source: BLOOD PERIPHERAL    Culture - Urine (11.01.18 @ 15:22)    Culture - Urine:   NO GROWTH AT 24 HOURS    Specimen Source: URINE CATHETER    Culture - Blood (11.01.18 @ 13:38)    Culture - Blood:   NO ORGANISMS ISOLATED    Specimen Source: BLOOD VENOUS    Culture - Blood (11.01.18 @ 13:38)    -  Ciprofloxacin: R >2 ERNESTINE    -  Clindamycin: S    -  Daptomycin: S    -  Erythromycin: R >4 ERNESTINE    -  Gentamicin: S <=1 ERNESTINE    -  Cefazolin: R 8 ERNESTINE    -  Methicillin resistant Staphylococcus aureus (MRSA): + DETECT ERNESTINE    -  Trimethoprim/Sulfamethoxazole: S <=0.5/9.5 ERNESTINE    -  Vancomycin: S 1 ERNESTINE    -  Rifampin: S <=1 ERNESTINE    -  Tetra/Doxy: S <=1 ERNESTINE    Culture - Blood:   ***Blood Panel PCR results on this specimen are available  approximately 3 hours after the Gram stain result***  Gram stain, PCR, and/or culture results may not always  correspond due to difference in methodologies  ------------------------------------------------------------  This PCR assay was performed using [employer].  The  following targets are tested for:  Enterococcus, vancomycin  resistant enterococci, Listeria monocytogenes,  coagulase  negative staphylococci, S. aureus, methicillin resistant S.  aureus, Streptococcus agalactiae (Group B), S. pneumoniae,  S. pyogenes (Group A), Acinetobacter baumannii, Enterobacter  cloacae, E. coli, Klebsiella oxytoca, K. pneumoniae, Proteus  sp., Serratia marcescens, Haemophilus influenzae, Neisseria  meningitidis, Pseudomonas aeruginosa, Candida albicans, C.  glabrata, C. krusei, C. parapsilosis, C. tropicalis and the  KPC resistance gene.  **NOTE: Due to technical problems, Proteus sp. will NOT be  reported as part of the BCID paneluntil further notice.    Culture - Blood:   MRSA^Staph. aureus *MRSA*  OXICILLIN-RESISTANT staphylococci should be regarded as  RESISTANT to ALL other Beta-Lactams regardless of the  in-vitro results obtained.  These include: ALL  Penicillins, Cephalosporins, Amoxicillin-clavulanic  acid, Ticarcillin-clavulanic acid,  Ampicillin-sulbactam, and Imipenem.    Culture - Blood:   RESULT CALLED TO / READ BACK: JW SCHWARTZ,S/Y  DATE / TIME CALLED: 11/04/18 1210  CALLED BY: TAMRA LÓPEZ.    -  Levofloxacin: R >4 ERNESTINE    -  Moxifloxacin(Aerobic): R 4 ERNESTINE    -  Oxacillin: R >2 ERNESTINE    -  Penicillin: R >8 ERNESTINE    -  Linezolid: S 2 ERNESTINE    Specimen Source: BLOOD PERIPHERAL    Organism: BLOOD CULTURE PCR  ***Blood Panel PCR results on this specimen are available  approximately 3 hours after the Gram stain result***  Gram stain, PCR, and/or culture results may not always  correspond due to difference in methodologies  ------------------------------------------------------------  This PCR assay was performed using [employer].  The  following targets are tested for:  Enterococcus, vancomycin  resistant enterococci, Listeria monocytogenes,  coagulase  negative staphylococci, S. aureus, methicillin resistant S.  aureus, Streptococcus agalactiae (Group B), S. pneumoniae,  S. pyogenes (Group A), Acinetobacter baumannii, Enterobacter  cloacae, E. coli, Klebsiella oxytoca, K. pneumoniae, Proteus  sp., Serratia marcescens, Haemophilus influenzae, Neisseria  meningitidis, Pseudomonas aeruginosa, Candida albicans, C.  glabrata, C. krusei, C. parapsilosis, C. tropicalis and the  KPC resistance gene.  **NOTE: Due to technical problems, Proteus sp. will NOT be  reported as part of the BCID panel until further notice.    Organism: Staph. aureus *MRSA*  OXICILLIN-RESISTANT staphylococci should be regarded as  RESISTANT to ALL other Beta-Lactams regardless of the  in-vitro results obtained.  These include: ALL  Penicillins, Cephalosporins, Amoxicillin-clavulanic  acid, Ticarcillin-clavulanic acid,  Ampicillin-sulbactam, and Imipenem.    Gram Stain Blood:   ***** CRITICAL RESULT *****  PERSON CALLED / READ-BACK: BLANK CRAWFORD RN  DATE / TIME CALLED: 11/02/18 0707  CALLED BY: DEANNE NOE  GPC^Gram Pos Cocci In Clusters  AFTER: 16 HOURS INCUBATION  BOTTLE: AEROBIC BOTTLE    Organism Identification: BLOOD CULTURE PCR  Staph. aureus *MRSA*    Method Type: PCR    Method Type: POSITIVE ERNESTINE 29            RADIOLOGY & ADDITIONAL STUDIES:

## 2018-11-07 NOTE — PROGRESS NOTE ADULT - SUBJECTIVE AND OBJECTIVE BOX
Patient is a 77y old  Male who presents with a chief complaint of sepsis (07 Nov 2018 18:42)      SUBJECTIVE / OVERNIGHT EVENTS:   Awake  No N/V  Wife at beside    MEDICATIONS  (STANDING):  aspirin enteric coated 81 milliGRAM(s) Oral daily  dextrose 5%. 1000 milliLiter(s) (50 mL/Hr) IV Continuous <Continuous>  dextrose 50% Injectable 12.5 Gram(s) IV Push once  dextrose 50% Injectable 25 Gram(s) IV Push once  dextrose 50% Injectable 25 Gram(s) IV Push once  docusate sodium 100 milliGRAM(s) Oral three times a day  gentamicin   IVPB 40 milliGRAM(s) IV Intermittent every 12 hours  heparin  Injectable 5000 Unit(s) SubCutaneous every 8 hours  insulin lispro (HumaLOG) corrective regimen sliding scale   SubCutaneous three times a day before meals  insulin lispro (HumaLOG) corrective regimen sliding scale   SubCutaneous at bedtime  lisinopril 5 milliGRAM(s) Oral daily  metoprolol succinate ER 25 milliGRAM(s) Oral daily  multivitamin 1 Tablet(s) Oral daily  nystatin Powder 1 Application(s) Topical two times a day  pantoprazole    Tablet 40 milliGRAM(s) Oral before breakfast  rifampin 300 milliGRAM(s) Oral three times a day  simvastatin 20 milliGRAM(s) Oral at bedtime  sodium chloride 0.9%. 1000 milliLiter(s) (75 mL/Hr) IV Continuous <Continuous>  tamsulosin 0.4 milliGRAM(s) Oral at bedtime    MEDICATIONS  (PRN):  acetaminophen   Tablet .. 650 milliGRAM(s) Oral every 6 hours PRN Temp greater or equal to 38C (100.4F), Mild Pain (1 - 3), Moderate Pain (4 - 6)  dextrose 40% Gel 15 Gram(s) Oral once PRN Blood Glucose LESS THAN 70 milliGRAM(s)/deciliter  glucagon  Injectable 1 milliGRAM(s) IntraMuscular once PRN Glucose LESS THAN 70 milligrams/deciliter  magnesium hydroxide Suspension 30 milliLiter(s) Oral daily PRN Constipation        CAPILLARY BLOOD GLUCOSE      POCT Blood Glucose.: 216 mg/dL (07 Nov 2018 22:20)  POCT Blood Glucose.: 252 mg/dL (07 Nov 2018 16:56)  POCT Blood Glucose.: 269 mg/dL (07 Nov 2018 12:21)  POCT Blood Glucose.: 187 mg/dL (07 Nov 2018 09:00)    I&O's Summary      PHYSICAL EXAM:    HEAD:  Atraumatic, Normocephalic  NECK: Supple, No JVD  CHEST/LUNG: Clear to auscultation bilaterally; No wheezing.  HEART: Regular rate and rhythm; No murmurs, rubs, or gallops  ABDOMEN: Soft, Nontender, Nondistended; Bowel sounds present  EXTREMITIES:   No clubbing, cyanosis, or edema  NEUROLOGY: Awake  SKIN: No rashes    LABS:                        12.3   6.99  )-----------( 133      ( 07 Nov 2018 06:01 )             36.2     11-07    141  |  106  |  11  ----------------------------<  170<H>  3.7   |  24  |  0.78    Ca    8.4      07 Nov 2018 06:01    TPro  5.8<L>  /  Alb  2.8<L>  /  TBili  0.3  /  DBili  x   /  AST  19  /  ALT  18  /  AlkPhos  66  11-06            CAPILLARY BLOOD GLUCOSE      POCT Blood Glucose.: 216 mg/dL (07 Nov 2018 22:20)  POCT Blood Glucose.: 252 mg/dL (07 Nov 2018 16:56)  POCT Blood Glucose.: 269 mg/dL (07 Nov 2018 12:21)  POCT Blood Glucose.: 187 mg/dL (07 Nov 2018 09:00)    11-03 @ 06:13  Culture-urine --  Culture results --  method type --  Organism --  Organism Identification --  Specimen source BLOOD PERIPHERAL  11-02 @ 11:50  Culture-urine --  Culture results --  method type --  Organism --  Organism Identification --  Specimen source BLOOD PERIPHERAL  11-01 @ 15:22  Culture-urine   NO GROWTH AT 24 HOURS  Culture results --  method type --  Organism --  Organism Identification --  Specimen source URINE CATHETER  11-01 @ 13:38  Culture-urine --  Culture results --  method type PCR  Organism BLOOD CULTURE PCR  ***Blood Panel PCR results on this specimen are available  approximately 3 hours after the Gram stain result***  Gram stain, PCR, and/or culture results may not always  correspond due to difference in methodologies  ------------------------------------------------------------  This PCR assay was performed using [employer].  The  following targets are tested for:  Enterococcus, vancomycin  resistant enterococci, Listeria monocytogenes,  coagulase  negative staphylococci, S. aureus, methicillin resistant S.  aureus, Streptococcus agalactiae (Group B), S. pneumoniae,  S. pyogenes (Group A), Acinetobacter baumannii, Enterobacter  cloacae, E. coli, Klebsiella oxytoca, K. pneumoniae, Proteus  sp., Serratia marcescens, Haemophilus influenzae, Neisseria  meningitidis, Pseudomonas aeruginosa, Candida albicans, C.  glabrata, C. krusei, C. parapsilosis, C. tropicalis and the  KPC resistance gene.  **NOTE: Due to technical problems, Proteus sp. will NOT be  reported as part of the BCID panel until further notice.  Organism Identification BLOOD CULTURE PCR  Staph. aureus *MRSA*  Specimen source BLOOD PERIPHERAL           11-03 @ 06:13  Culture blood   NO ORGANISMS ISOLATED  NO ORGANISMS ISOLATED AT 96 HOURS  Culture results --  Gram stain --  Gram stain blood --  Method type --  Organism --  Organism identification --  Specimen source BLOOD PERIPHERAL   11-02 @ 11:50  Culture blood   NO ORGANISMS ISOLATED  Culture results --  Gram stain --  Gram stain blood --  Method type --  Organism --  Organism identification --  Specimen source BLOOD PERIPHERAL   11-01 @ 15:22  Culture blood --  Culture results --  Gram stain --  Gram stain blood --  Method type --  Organism --  Organism identification --  Specimen source URINE CATHETER   11-01 @ 13:38  Culture blood   ***Blood Panel PCR results on this specimen are available  approximately 3 hours after the Gram stain result***  Gram stain, PCR, and/or culture results may not always  correspond due to difference in methodologies  ------------------------------------------------------------  This PCR assay was performed using [employer].  The  following targets are tested for:  Enterococcus, vancomycin  resistant enterococci, Listeria monocytogenes,  coagulase  negative staphylococci, S. aureus, methicillin resistant S.  aureus, Streptococcus agalactiae (Group B), S. pneumoniae,  S. pyogenes (Group A), Acinetobacter baumannii, Enterobacter  cloacae, E. coli, Klebsiella oxytoca, K. pneumoniae, Proteus  sp., Serratia marcescens, Haemophilus influenzae, Neisseria  meningitidis, Pseudomonas aeruginosa, Candida albicans, C.  glabrata, C. krusei, C. parapsilosis, C. tropicalis and the  KPC resistance gene.  **NOTE: Due to technical problems, Proteus sp. will NOT be  reported as part of the BCID paneluntil further notice.  Culture results --  Gram stain --  Gram stain blood   ***** CRITICAL RESULT *****  PERSON CALLED / READ-BACK: BLANK CRAWFORD RN  DATE / TIME CALLED: 11/02/18 0707  CALLED BY: DEANNE NOE  GPCCL^Gram Pos Cocci In Clusters  AFTER: 16 HOURS INCUBATION  BOTTLE: AEROBIC BOTTLE  Method type PCR  Organism BLOOD CULTURE PCR  ***Blood Panel PCR results on this specimen are available  approximately 3 hours after the Gram stain result***  Gram stain, PCR, and/or culture results may not always  correspond due to difference in methodologies  ------------------------------------------------------------  This PCR assay was performed using [employer].  The  following targets are tested for:  Enterococcus, vancomycin  resistant enterococci, Listeria monocytogenes,  coagulase  negative staphylococci, S. aureus, methicillin resistant S.  aureus, Streptococcus agalactiae (Group B), S. pneumoniae,  S. pyogenes (Group A), Acinetobacter baumannii, Enterobacter  cloacae, E. coli, Klebsiella oxytoca, K. pneumoniae, Proteus  sp., Serratia marcescens, Haemophilus influenzae, Neisseria  meningitidis, Pseudomonas aeruginosa, Candida albicans, C.  glabrata, C. krusei, C. parapsilosis, C. tropicalis and the  KPC resistance gene.  **NOTE: Due to technical problems, Proteus sp. will NOT be  reported as part of the BCID panel until further notice.  Organism identification BLOOD CULTURE PCR  Staph. aureus *MRSA*  Specimen source BLOOD PERIPHERAL      RADIOLOGY & ADDITIONAL TESTS:    Imaging Personally Reviewed:    Consultant(s) Notes Reviewed:      Care Discussed with Consultants/Other Providers:

## 2018-11-07 NOTE — PROGRESS NOTE ADULT - ASSESSMENT
· Assessment	  74 yo m with sepsis 2/2 recurrent UTI     Problem/Plan - 1:  ·  Problem: Urinary tract infection without hematuria, site unspecified.  Plan: -  IV genta/Rifampin-  ID f/up noted.     Problem/Plan - 2:  ·  Problem: Sepsis, due to unspecified organism.  Plan: continue with treatment for UTI.      Problem/Plan - 3:  ·  Problem: Type 2 diabetes mellitus with complication, unspecified whether long term insulin use.  Plan:  on ISS.      Problem/Plan - 4:  ·  Problem: Essential hypertension.  Plan: c/w toprol, lisinopril

## 2018-11-07 NOTE — PROGRESS NOTE ADULT - ASSESSMENT
77yr old male significant  PMHx of recurrent UTIs,  HTN, HLD, T2DM, PUD, h/o  recurrent GIB, s/p Aortic root replacement with AVR 2/2 Type A aneurysm (1/2013), CVAx3 with residual left hemiparesis, bedbound, minimally verbal. Lives at home with wife who noted increased weakness/ sleeping more over last few days. Pt on dysphagia 1 diet with honey thick liquids, requiring feeding in small increments. Wife denies coughing, sob, endorses decreased and darker urine output. Pt with diaper. UA +; prior urine cultures all grew out pan-sensitive pseudomonas.    Tmax in .3, wbc 12.47 (01 Nov 2018 17:15)    Wife present at bedside, states pt usually does not c/o of anything, but that she noticed he has been weaker than usual, not eating as much.  He had fever >101 at home.  Wife also notes he has a rash over his back and b/l axillary region that "comes and goes".  Sometimes he develops small pimples that pop open, which she then cleans and applies cortisone cream.  She believes he has a heat rash.  No recent hospitalizations, outpatient procedures or dental work, no recent abx exposure.    Pt admitted to evaluate for UTI.  Also noted to have bacteremia with GPC.  Pt received dose of vanco in the ER, he is now on cipro.  ID edu called for further antibiotic managment.     Problem/Plan - 1:    ·	MRSA bacteremia    - Cont vancomycin,  Maintain levels between 15-20.  Source possibly rash over pt's back and b/l axillary regions.  ?Contact vs. allergic dermatitis.  Rash is resolving with current managment and nystatin ointment.    - r/o prosthetic valve endocarditits.  TTE no vegetations reported, unable to exclude endocarditis.  D/w pt's wife, who did not want to proceed with invasive procedures and moreover pt is poor surgical candidate in the event DICKSON is positive.  Wife would like to continue conservative management at this time.    - R/o metastatic focus of infection. NM scan results noted - enhancement of L kidney, urine cx negative. Do not suspect pyelonephritis based on clinical presentation    -cont vanco/rifampin/gent.  Monitor vanco trough (maintain between 15-20).  Repeat gent trough < 0.4, resume gent at 40mg iv bid.  Complete gent for 2 weeks only.  Cont vanco/rifampin for 4 additional weeks.  Plan for PICC line    - Monitor for nephrotoxicity closely.  Monitor LFTs while patient on rifampin.         Will follow,    Tanisha Ash  [phone] 77yr old male significant  PMHx of recurrent UTIs,  HTN, HLD, T2DM, PUD, h/o  recurrent GIB, s/p Aortic root replacement with AVR 2/2 Type A aneurysm (1/2013), CVAx3 with residual left hemiparesis, bedbound, minimally verbal. Lives at home with wife who noted increased weakness/ sleeping more over last few days. Pt on dysphagia 1 diet with honey thick liquids, requiring feeding in small increments. Wife denies coughing, sob, endorses decreased and darker urine output. Pt with diaper. UA +; prior urine cultures all grew out pan-sensitive pseudomonas.    Tmax in .3, wbc 12.47 (01 Nov 2018 17:15)    Wife present at bedside, states pt usually does not c/o of anything, but that she noticed he has been weaker than usual, not eating as much.  He had fever >101 at home.  Wife also notes he has a rash over his back and b/l axillary region that "comes and goes".  Sometimes he develops small pimples that pop open, which she then cleans and applies cortisone cream.  She believes he has a heat rash.  No recent hospitalizations, outpatient procedures or dental work, no recent abx exposure.    Pt admitted to evaluate for UTI.  Also noted to have bacteremia with GPC.  Pt received dose of vanco in the ER, he is now on cipro.  ID edu called for further antibiotic managment.     Problem/Plan - 1:    ·	MRSA bacteremia    - Cont vancomycin,  Maintain levels between 15-20.  Level is high today, d/c vanco and repeat random level in AM.  Can restart at 1gm IV q24, if level <20.      - Source possibly rash over pt's back and b/l axillary regions.  ?Contact vs. allergic dermatitis.  Rash is resolving with current managment and nystatin ointment.    - r/o prosthetic valve endocarditits.  TTE no vegetations reported, unable to exclude endocarditis.  D/w pt's wife, who did not want to proceed with invasive procedures and moreover pt is poor surgical candidate in the event DICKSON is positive.  Wife would like to continue conservative management at this time.    - R/o metastatic focus of infection. NM scan results noted - enhancement of L kidney, urine cx negative. Do not suspect pyelonephritis based on clinical presentation    -cont vanco/rifampin/gent.  Monitor vanco trough (maintain between 15-20).  Repeat gent trough < 0.4, resume gent at 40mg iv bid.  Complete gent for 2 weeks only.  Cont vanco/rifampin for 4 additional weeks.  Plan for PICC line    - Monitor for nephrotoxicity closely.  Monitor LFTs while patient on rifampin.         Will follow,    Tanisha Ash  [phone]

## 2018-11-08 LAB
BACTERIA BLD CULT: SIGNIFICANT CHANGE UP
BUN SERPL-MCNC: 9 MG/DL — SIGNIFICANT CHANGE UP (ref 7–23)
CALCIUM SERPL-MCNC: 8.6 MG/DL — SIGNIFICANT CHANGE UP (ref 8.4–10.5)
CHLORIDE SERPL-SCNC: 106 MMOL/L — SIGNIFICANT CHANGE UP (ref 98–107)
CO2 SERPL-SCNC: 23 MMOL/L — SIGNIFICANT CHANGE UP (ref 22–31)
CREAT SERPL-MCNC: 0.63 MG/DL — SIGNIFICANT CHANGE UP (ref 0.5–1.3)
GLUCOSE BLDC GLUCOMTR-MCNC: 167 MG/DL — HIGH (ref 70–99)
GLUCOSE BLDC GLUCOMTR-MCNC: 198 MG/DL — HIGH (ref 70–99)
GLUCOSE BLDC GLUCOMTR-MCNC: 222 MG/DL — HIGH (ref 70–99)
GLUCOSE SERPL-MCNC: 163 MG/DL — HIGH (ref 70–99)
HCT VFR BLD CALC: 37.8 % — LOW (ref 39–50)
HGB BLD-MCNC: 12.8 G/DL — LOW (ref 13–17)
MCHC RBC-ENTMCNC: 32.7 PG — SIGNIFICANT CHANGE UP (ref 27–34)
MCHC RBC-ENTMCNC: 33.9 % — SIGNIFICANT CHANGE UP (ref 32–36)
MCV RBC AUTO: 96.4 FL — SIGNIFICANT CHANGE UP (ref 80–100)
NRBC # FLD: 0 — SIGNIFICANT CHANGE UP
PLATELET # BLD AUTO: 125 K/UL — LOW (ref 150–400)
PMV BLD: 12 FL — SIGNIFICANT CHANGE UP (ref 7–13)
POTASSIUM SERPL-MCNC: 3.1 MMOL/L — LOW (ref 3.5–5.3)
POTASSIUM SERPL-SCNC: 3.1 MMOL/L — LOW (ref 3.5–5.3)
RBC # BLD: 3.92 M/UL — LOW (ref 4.2–5.8)
RBC # FLD: 13.1 % — SIGNIFICANT CHANGE UP (ref 10.3–14.5)
SODIUM SERPL-SCNC: 142 MMOL/L — SIGNIFICANT CHANGE UP (ref 135–145)
SPECIMEN SOURCE: SIGNIFICANT CHANGE UP
VANCOMYCIN FLD-MCNC: 15.8 UG/ML — SIGNIFICANT CHANGE UP
WBC # BLD: 6.67 K/UL — SIGNIFICANT CHANGE UP (ref 3.8–10.5)
WBC # FLD AUTO: 6.67 K/UL — SIGNIFICANT CHANGE UP (ref 3.8–10.5)

## 2018-11-08 RX ORDER — POTASSIUM CHLORIDE 20 MEQ
10 PACKET (EA) ORAL ONCE
Qty: 0 | Refills: 0 | Status: DISCONTINUED | OUTPATIENT
Start: 2018-11-08 | End: 2018-11-08

## 2018-11-08 RX ORDER — VANCOMYCIN HCL 1 G
VIAL (EA) INTRAVENOUS
Qty: 0 | Refills: 0 | Status: DISCONTINUED | OUTPATIENT
Start: 2018-11-08 | End: 2018-11-09

## 2018-11-08 RX ORDER — VANCOMYCIN HCL 1 G
500 VIAL (EA) INTRAVENOUS EVERY 12 HOURS
Qty: 0 | Refills: 0 | Status: DISCONTINUED | OUTPATIENT
Start: 2018-11-08 | End: 2018-11-09

## 2018-11-08 RX ORDER — VANCOMYCIN HCL 1 G
500 VIAL (EA) INTRAVENOUS ONCE
Qty: 0 | Refills: 0 | Status: COMPLETED | OUTPATIENT
Start: 2018-11-08 | End: 2018-11-08

## 2018-11-08 RX ORDER — POTASSIUM CHLORIDE 20 MEQ
10 PACKET (EA) ORAL
Qty: 0 | Refills: 0 | Status: COMPLETED | OUTPATIENT
Start: 2018-11-08 | End: 2018-11-08

## 2018-11-08 RX ADMIN — Medication 100 MILLIGRAM(S): at 09:52

## 2018-11-08 RX ADMIN — Medication 81 MILLIGRAM(S): at 11:03

## 2018-11-08 RX ADMIN — Medication 100 MILLIGRAM(S): at 06:48

## 2018-11-08 RX ADMIN — Medication 1: at 12:48

## 2018-11-08 RX ADMIN — NYSTATIN CREAM 1 APPLICATION(S): 100000 CREAM TOPICAL at 06:49

## 2018-11-08 RX ADMIN — HEPARIN SODIUM 5000 UNIT(S): 5000 INJECTION INTRAVENOUS; SUBCUTANEOUS at 13:33

## 2018-11-08 RX ADMIN — SODIUM CHLORIDE 75 MILLILITER(S): 9 INJECTION INTRAMUSCULAR; INTRAVENOUS; SUBCUTANEOUS at 23:02

## 2018-11-08 RX ADMIN — SODIUM CHLORIDE 75 MILLILITER(S): 9 INJECTION INTRAMUSCULAR; INTRAVENOUS; SUBCUTANEOUS at 08:56

## 2018-11-08 RX ADMIN — Medication 1: at 08:55

## 2018-11-08 RX ADMIN — HEPARIN SODIUM 5000 UNIT(S): 5000 INJECTION INTRAVENOUS; SUBCUTANEOUS at 23:09

## 2018-11-08 RX ADMIN — NYSTATIN CREAM 1 APPLICATION(S): 100000 CREAM TOPICAL at 17:39

## 2018-11-08 RX ADMIN — PANTOPRAZOLE SODIUM 40 MILLIGRAM(S): 20 TABLET, DELAYED RELEASE ORAL at 06:48

## 2018-11-08 RX ADMIN — SIMVASTATIN 20 MILLIGRAM(S): 20 TABLET, FILM COATED ORAL at 23:02

## 2018-11-08 RX ADMIN — Medication 100 MILLIEQUIVALENT(S): at 12:48

## 2018-11-08 RX ADMIN — Medication 100 MILLIGRAM(S): at 12:09

## 2018-11-08 RX ADMIN — Medication 1 TABLET(S): at 11:03

## 2018-11-08 RX ADMIN — Medication 2: at 17:39

## 2018-11-08 RX ADMIN — Medication 100 MILLIGRAM(S): at 23:02

## 2018-11-08 RX ADMIN — Medication 25 MILLIGRAM(S): at 06:48

## 2018-11-08 RX ADMIN — Medication 100 MILLIGRAM(S): at 13:33

## 2018-11-08 RX ADMIN — HEPARIN SODIUM 5000 UNIT(S): 5000 INJECTION INTRAVENOUS; SUBCUTANEOUS at 06:48

## 2018-11-08 RX ADMIN — Medication 100 MILLIEQUIVALENT(S): at 13:33

## 2018-11-08 RX ADMIN — TAMSULOSIN HYDROCHLORIDE 0.4 MILLIGRAM(S): 0.4 CAPSULE ORAL at 23:02

## 2018-11-08 RX ADMIN — LISINOPRIL 5 MILLIGRAM(S): 2.5 TABLET ORAL at 06:48

## 2018-11-08 RX ADMIN — Medication 100 MILLIEQUIVALENT(S): at 11:01

## 2018-11-08 NOTE — PROGRESS NOTE ADULT - ASSESSMENT
· Assessment	  76 yo m with sepsis 2/2 recurrent UTI     Problem/Plan - 1:  ·  Problem: Urinary tract infection without hematuria, site unspecified.  Plan: -  IV genta/Rifampin-  ID f/up noted.     Problem/Plan - 2:  ·  Problem: Sepsis, due to unspecified organism.  Plan: continue with treatment for UTI.      Problem/Plan - 3:  ·  Problem: Type 2 diabetes mellitus with complication, unspecified whether long term insulin use.  Plan:  on ISS.      Problem/Plan - 4:  ·  Problem: Essential hypertension.  Plan: c/w toprol, lisinopril

## 2018-11-08 NOTE — CHART NOTE - NSCHARTNOTEFT_GEN_A_CORE
PRE-INTERVENTIONAL RADIOLOGY PROCEDURE NOTE    Patient Age:   Patient Gender:   Procedure (including site / side if known):   Diagnosis / Indication:   Interventional Radiology Attending Physician:   Ordering Attending Physician:   Pertinent medical history:   Pertinent labs:   Patient and Family aware? Yes No      Attending / Resident / NP / PA   Print Sign  Contact #: _____________________ PRE-INTERVENTIONAL RADIOLOGY PROCEDURE NOTE    Patient Age: 77  Patient Gender: Male  Procedure (including site / side if known):   Diagnosis / Indication: MRSA Bacteremia   Interventional Radiology Attending Physician:   Ordering Attending Physician:   Pertinent medical history:  UTI, GIB, HTN, HLD  Pertinent labs:   Patient and Family aware? No - to be called in am please       NP   Print Sign  Contact #:   Barbara Packer NP   [id number]

## 2018-11-08 NOTE — PROGRESS NOTE ADULT - SUBJECTIVE AND OBJECTIVE BOX
Patient is a 77y old  Male who presents with a chief complaint of sepsis (07 Nov 2018 18:42)      SUBJECTIVE / OVERNIGHT EVENTS:  Awake  Nonverbal    MEDICATIONS  (STANDING):  aspirin enteric coated 81 milliGRAM(s) Oral daily  dextrose 5%. 1000 milliLiter(s) (50 mL/Hr) IV Continuous <Continuous>  dextrose 50% Injectable 12.5 Gram(s) IV Push once  dextrose 50% Injectable 25 Gram(s) IV Push once  dextrose 50% Injectable 25 Gram(s) IV Push once  docusate sodium 100 milliGRAM(s) Oral three times a day  gentamicin   IVPB 40 milliGRAM(s) IV Intermittent every 12 hours  heparin  Injectable 5000 Unit(s) SubCutaneous every 8 hours  insulin lispro (HumaLOG) corrective regimen sliding scale   SubCutaneous three times a day before meals  insulin lispro (HumaLOG) corrective regimen sliding scale   SubCutaneous at bedtime  lisinopril 5 milliGRAM(s) Oral daily  metoprolol succinate ER 25 milliGRAM(s) Oral daily  multivitamin 1 Tablet(s) Oral daily  nystatin Powder 1 Application(s) Topical two times a day  pantoprazole    Tablet 40 milliGRAM(s) Oral before breakfast  rifampin 300 milliGRAM(s) Oral three times a day  simvastatin 20 milliGRAM(s) Oral at bedtime  sodium chloride 0.9%. 1000 milliLiter(s) (75 mL/Hr) IV Continuous <Continuous>  tamsulosin 0.4 milliGRAM(s) Oral at bedtime  vancomycin  IVPB      vancomycin  IVPB 500 milliGRAM(s) IV Intermittent every 12 hours    MEDICATIONS  (PRN):  acetaminophen   Tablet .. 650 milliGRAM(s) Oral every 6 hours PRN Temp greater or equal to 38C (100.4F), Mild Pain (1 - 3), Moderate Pain (4 - 6)  dextrose 40% Gel 15 Gram(s) Oral once PRN Blood Glucose LESS THAN 70 milliGRAM(s)/deciliter  glucagon  Injectable 1 milliGRAM(s) IntraMuscular once PRN Glucose LESS THAN 70 milligrams/deciliter  magnesium hydroxide Suspension 30 milliLiter(s) Oral daily PRN Constipation        CAPILLARY BLOOD GLUCOSE      POCT Blood Glucose.: 238 mg/dL (08 Nov 2018 22:23)  POCT Blood Glucose.: 222 mg/dL (08 Nov 2018 17:14)  POCT Blood Glucose.: 198 mg/dL (08 Nov 2018 12:30)  POCT Blood Glucose.: 167 mg/dL (08 Nov 2018 08:39)    I&O's Summary      PHYSICAL EXAM:    HEAD:  Atraumatic, Normocephalic  NECK: Supple, No JVD  CHEST/LUNG: Clear to auscultation bilaterally; No wheezing.  HEART: Regular rate and rhythm; No murmurs, rubs, or gallops  ABDOMEN: Soft, Nontender, Nondistended; Bowel sounds present  EXTREMITIES:   No clubbing, cyanosis, or edema  NEUROLOGY: nonverbal      LABS:                        12.8   6.67  )-----------( 125      ( 08 Nov 2018 07:02 )             37.8     11-08    142  |  106  |  9   ----------------------------<  163<H>  3.1<L>   |  23  |  0.63    Ca    8.6      08 Nov 2018 07:02              CAPILLARY BLOOD GLUCOSE      POCT Blood Glucose.: 238 mg/dL (08 Nov 2018 22:23)  POCT Blood Glucose.: 222 mg/dL (08 Nov 2018 17:14)  POCT Blood Glucose.: 198 mg/dL (08 Nov 2018 12:30)  POCT Blood Glucose.: 167 mg/dL (08 Nov 2018 08:39)    11-07 @ 17:03  Culture-urine --  Culture results --  method type --  Organism --  Organism Identification --  Specimen source BLOOD PERIPHERAL  11-03 @ 06:13  Culture-urine --  Culture results --  method type --  Organism --  Organism Identification --  Specimen source BLOOD PERIPHERAL  11-02 @ 11:50  Culture-urine --  Culture results --  method type --  Organism --  Organism Identification --  Specimen source BLOOD PERIPHERAL           11-07 @ 17:03  Culture blood   NO ORGANISMS ISOLATED  NO ORGANISMS ISOLATED AT 24 HOURS  Culture results --  Gram stain --  Gram stain blood --  Method type --  Organism --  Organism identification --  Specimen source BLOOD PERIPHERAL   11-03 @ 06:13  Culture blood   NO ORGANISMS ISOLATED  Culture results --  Gram stain --  Gram stain blood --  Method type --  Organism --  Organism identification --  Specimen source BLOOD PERIPHERAL   11-02 @ 11:50  Culture blood   NO ORGANISMS ISOLATED  Culture results --  Gram stain --  Gram stain blood --  Method type --  Organism --  Organism identification --  Specimen source BLOOD PERIPHERAL      RADIOLOGY & ADDITIONAL TESTS:    Imaging Personally Reviewed:    Consultant(s) Notes Reviewed:      Care Discussed with Consultants/Other Providers:

## 2018-11-09 ENCOUNTER — TRANSCRIPTION ENCOUNTER (OUTPATIENT)
Age: 78
End: 2018-11-09

## 2018-11-09 LAB
ALBUMIN SERPL ELPH-MCNC: 2.9 G/DL — LOW (ref 3.3–5)
ALP SERPL-CCNC: 89 U/L — SIGNIFICANT CHANGE UP (ref 40–120)
ALT FLD-CCNC: 28 U/L — SIGNIFICANT CHANGE UP (ref 4–41)
AST SERPL-CCNC: 30 U/L — SIGNIFICANT CHANGE UP (ref 4–40)
BILIRUB DIRECT SERPL-MCNC: 0.2 MG/DL — SIGNIFICANT CHANGE UP (ref 0.1–0.2)
BILIRUB SERPL-MCNC: 0.4 MG/DL — SIGNIFICANT CHANGE UP (ref 0.2–1.2)
BUN SERPL-MCNC: 9 MG/DL — SIGNIFICANT CHANGE UP (ref 7–23)
CALCIUM SERPL-MCNC: 8.8 MG/DL — SIGNIFICANT CHANGE UP (ref 8.4–10.5)
CHLORIDE SERPL-SCNC: 107 MMOL/L — SIGNIFICANT CHANGE UP (ref 98–107)
CO2 SERPL-SCNC: 24 MMOL/L — SIGNIFICANT CHANGE UP (ref 22–31)
CREAT SERPL-MCNC: 0.58 MG/DL — SIGNIFICANT CHANGE UP (ref 0.5–1.3)
GENTAMICIN TROUGH SERPL-MCNC: 0.8 UG/ML — SIGNIFICANT CHANGE UP (ref 0.4–2)
GLUCOSE BLDC GLUCOMTR-MCNC: 202 MG/DL — HIGH (ref 70–99)
GLUCOSE BLDC GLUCOMTR-MCNC: 256 MG/DL — HIGH (ref 70–99)
GLUCOSE SERPL-MCNC: 182 MG/DL — HIGH (ref 70–99)
HCT VFR BLD CALC: 39.1 % — SIGNIFICANT CHANGE UP (ref 39–50)
HGB BLD-MCNC: 13.3 G/DL — SIGNIFICANT CHANGE UP (ref 13–17)
MAGNESIUM SERPL-MCNC: 1.8 MG/DL — SIGNIFICANT CHANGE UP (ref 1.6–2.6)
MCHC RBC-ENTMCNC: 32.1 PG — SIGNIFICANT CHANGE UP (ref 27–34)
MCHC RBC-ENTMCNC: 34 % — SIGNIFICANT CHANGE UP (ref 32–36)
MCV RBC AUTO: 94.4 FL — SIGNIFICANT CHANGE UP (ref 80–100)
NRBC # FLD: 0 — SIGNIFICANT CHANGE UP
PLATELET # BLD AUTO: 150 K/UL — SIGNIFICANT CHANGE UP (ref 150–400)
PMV BLD: 11.2 FL — SIGNIFICANT CHANGE UP (ref 7–13)
POTASSIUM SERPL-MCNC: 3.6 MMOL/L — SIGNIFICANT CHANGE UP (ref 3.5–5.3)
POTASSIUM SERPL-SCNC: 3.6 MMOL/L — SIGNIFICANT CHANGE UP (ref 3.5–5.3)
PROT SERPL-MCNC: 5.6 G/DL — LOW (ref 6–8.3)
RBC # BLD: 4.14 M/UL — LOW (ref 4.2–5.8)
RBC # FLD: 13.4 % — SIGNIFICANT CHANGE UP (ref 10.3–14.5)
SODIUM SERPL-SCNC: 141 MMOL/L — SIGNIFICANT CHANGE UP (ref 135–145)
WBC # BLD: 7.49 K/UL — SIGNIFICANT CHANGE UP (ref 3.8–10.5)
WBC # FLD AUTO: 7.49 K/UL — SIGNIFICANT CHANGE UP (ref 3.8–10.5)

## 2018-11-09 PROCEDURE — 76937 US GUIDE VASCULAR ACCESS: CPT | Mod: 26

## 2018-11-09 PROCEDURE — 77001 FLUOROGUIDE FOR VEIN DEVICE: CPT | Mod: 26,GC

## 2018-11-09 PROCEDURE — 36569 INSJ PICC 5 YR+ W/O IMAGING: CPT

## 2018-11-09 RX ORDER — VANCOMYCIN HCL 1 G
1000 VIAL (EA) INTRAVENOUS EVERY 24 HOURS
Qty: 0 | Refills: 0 | Status: DISCONTINUED | OUTPATIENT
Start: 2018-11-10 | End: 2018-11-11

## 2018-11-09 RX ADMIN — Medication 100 MILLIGRAM(S): at 05:40

## 2018-11-09 RX ADMIN — HEPARIN SODIUM 5000 UNIT(S): 5000 INJECTION INTRAVENOUS; SUBCUTANEOUS at 13:16

## 2018-11-09 RX ADMIN — Medication 100 MILLIGRAM(S): at 21:37

## 2018-11-09 RX ADMIN — SODIUM CHLORIDE 75 MILLILITER(S): 9 INJECTION INTRAMUSCULAR; INTRAVENOUS; SUBCUTANEOUS at 21:37

## 2018-11-09 RX ADMIN — Medication 81 MILLIGRAM(S): at 13:16

## 2018-11-09 RX ADMIN — Medication 100 MILLIGRAM(S): at 13:16

## 2018-11-09 RX ADMIN — HEPARIN SODIUM 5000 UNIT(S): 5000 INJECTION INTRAVENOUS; SUBCUTANEOUS at 21:37

## 2018-11-09 RX ADMIN — LISINOPRIL 5 MILLIGRAM(S): 2.5 TABLET ORAL at 05:41

## 2018-11-09 RX ADMIN — Medication 3: at 17:50

## 2018-11-09 RX ADMIN — Medication 100 MILLIGRAM(S): at 16:12

## 2018-11-09 RX ADMIN — NYSTATIN CREAM 1 APPLICATION(S): 100000 CREAM TOPICAL at 05:41

## 2018-11-09 RX ADMIN — Medication 2: at 13:15

## 2018-11-09 RX ADMIN — Medication 25 MILLIGRAM(S): at 05:41

## 2018-11-09 RX ADMIN — Medication 100 MILLIGRAM(S): at 09:07

## 2018-11-09 RX ADMIN — SIMVASTATIN 20 MILLIGRAM(S): 20 TABLET, FILM COATED ORAL at 21:36

## 2018-11-09 RX ADMIN — SODIUM CHLORIDE 75 MILLILITER(S): 9 INJECTION INTRAMUSCULAR; INTRAVENOUS; SUBCUTANEOUS at 13:16

## 2018-11-09 RX ADMIN — TAMSULOSIN HYDROCHLORIDE 0.4 MILLIGRAM(S): 0.4 CAPSULE ORAL at 21:37

## 2018-11-09 RX ADMIN — HEPARIN SODIUM 5000 UNIT(S): 5000 INJECTION INTRAVENOUS; SUBCUTANEOUS at 05:40

## 2018-11-09 RX ADMIN — Medication 1 TABLET(S): at 13:16

## 2018-11-09 RX ADMIN — PANTOPRAZOLE SODIUM 40 MILLIGRAM(S): 20 TABLET, DELAYED RELEASE ORAL at 05:43

## 2018-11-09 RX ADMIN — Medication 1: at 09:18

## 2018-11-09 RX ADMIN — Medication 100 MILLIGRAM(S): at 21:36

## 2018-11-09 RX ADMIN — Medication 100 MILLIGRAM(S): at 03:08

## 2018-11-09 RX ADMIN — NYSTATIN CREAM 1 APPLICATION(S): 100000 CREAM TOPICAL at 17:50

## 2018-11-09 NOTE — PROGRESS NOTE ADULT - SUBJECTIVE AND OBJECTIVE BOX
Patient is a 77y old  Male who presents with a chief complaint of sepsis (09 Nov 2018 15:25)      SUBJECTIVE / OVERNIGHT EVENTS:  Awake/Nonverbal  MEDICATIONS  (STANDING):  aspirin enteric coated 81 milliGRAM(s) Oral daily  dextrose 5%. 1000 milliLiter(s) (50 mL/Hr) IV Continuous <Continuous>  dextrose 50% Injectable 12.5 Gram(s) IV Push once  dextrose 50% Injectable 25 Gram(s) IV Push once  dextrose 50% Injectable 25 Gram(s) IV Push once  docusate sodium 100 milliGRAM(s) Oral three times a day  gentamicin   IVPB 40 milliGRAM(s) IV Intermittent every 12 hours  heparin  Injectable 5000 Unit(s) SubCutaneous every 8 hours  insulin lispro (HumaLOG) corrective regimen sliding scale   SubCutaneous three times a day before meals  insulin lispro (HumaLOG) corrective regimen sliding scale   SubCutaneous at bedtime  lisinopril 5 milliGRAM(s) Oral daily  metoprolol succinate ER 25 milliGRAM(s) Oral daily  multivitamin 1 Tablet(s) Oral daily  nystatin Powder 1 Application(s) Topical two times a day  pantoprazole    Tablet 40 milliGRAM(s) Oral before breakfast  rifampin 300 milliGRAM(s) Oral three times a day  simvastatin 20 milliGRAM(s) Oral at bedtime  sodium chloride 0.9%. 1000 milliLiter(s) (75 mL/Hr) IV Continuous <Continuous>  tamsulosin 0.4 milliGRAM(s) Oral at bedtime    MEDICATIONS  (PRN):  acetaminophen   Tablet .. 650 milliGRAM(s) Oral every 6 hours PRN Temp greater or equal to 38C (100.4F), Mild Pain (1 - 3), Moderate Pain (4 - 6)  dextrose 40% Gel 15 Gram(s) Oral once PRN Blood Glucose LESS THAN 70 milliGRAM(s)/deciliter  glucagon  Injectable 1 milliGRAM(s) IntraMuscular once PRN Glucose LESS THAN 70 milligrams/deciliter  magnesium hydroxide Suspension 30 milliLiter(s) Oral daily PRN Constipation        CAPILLARY BLOOD GLUCOSE      POCT Blood Glucose.: 256 mg/dL (09 Nov 2018 17:19)  POCT Blood Glucose.: 230 mg/dL (09 Nov 2018 13:05)  POCT Blood Glucose.: 172 mg/dL (09 Nov 2018 09:17)  POCT Blood Glucose.: 170 mg/dL (09 Nov 2018 08:38)  POCT Blood Glucose.: 238 mg/dL (08 Nov 2018 22:23)    I&O's Summary      PHYSICAL EXAM:    HEAD:  Atraumatic, Normocephalic  NECK: Supple, No JVD  CHEST/LUNG: Clear to auscultation bilaterally; No wheezing.  HEART: Regular rate and rhythm; No murmurs, rubs, or gallops  ABDOMEN: Soft, Nontender, Nondistended; Bowel sounds present  EXTREMITIES:   No clubbing, cyanosis, or edema  NEUROLOGY: Awake      LABS:                        13.3   7.49  )-----------( 150      ( 09 Nov 2018 07:40 )             39.1     11-09    141  |  107  |  9   ----------------------------<  182<H>  3.6   |  24  |  0.58    Ca    8.8      09 Nov 2018 07:40  Mg     1.8     11-09    TPro  5.6<L>  /  Alb  2.9<L>  /  TBili  0.4  /  DBili  0.2  /  AST  30  /  ALT  28  /  AlkPhos  89  11-09            CAPILLARY BLOOD GLUCOSE      POCT Blood Glucose.: 256 mg/dL (09 Nov 2018 17:19)  POCT Blood Glucose.: 230 mg/dL (09 Nov 2018 13:05)  POCT Blood Glucose.: 172 mg/dL (09 Nov 2018 09:17)  POCT Blood Glucose.: 170 mg/dL (09 Nov 2018 08:38)  POCT Blood Glucose.: 238 mg/dL (08 Nov 2018 22:23)    11-07 @ 17:03  Culture-urine --  Culture results --  method type --  Organism --  Organism Identification --  Specimen source BLOOD PERIPHERAL  11-03 @ 06:13  Culture-urine --  Culture results --  method type --  Organism --  Organism Identification --  Specimen source BLOOD PERIPHERAL           11-07 @ 17:03  Culture blood   NO ORGANISMS ISOLATED  NO ORGANISMS ISOLATED AT 48 HRS.  Culture results --  Gram stain --  Gram stain blood --  Method type --  Organism --  Organism identification --  Specimen source BLOOD PERIPHERAL   11-03 @ 06:13  Culture blood   NO ORGANISMS ISOLATED  Culture results --  Gram stain --  Gram stain blood --  Method type --  Organism --  Organism identification --  Specimen source BLOOD PERIPHERAL      RADIOLOGY & ADDITIONAL TESTS:    Imaging Personally Reviewed:    Consultant(s) Notes Reviewed:      Care Discussed with Consultants/Other Providers:

## 2018-11-09 NOTE — PROGRESS NOTE ADULT - SUBJECTIVE AND OBJECTIVE BOX
PRESENTING CC:Sepsis on admission    SUBJ: 77yr old male significant  PMHx of recurrent UTIs,  HTN, HLD, T2DM, PUD, h/o  recurrent GIB, s/p Aortic root replacement with AVR 2/2 Type A aneurysm (1/2013), CVAx3 with residual left hemiparesis, bedbound, minimally verbal. Lives at home with wife who noted increased weakness/ sleeping more over last few days.MRSA bacteremia on IV abx      PMH -reviewed admission note, no change since admission  Heart failure: acute [ ] chronic [ ] acute or chronic [ ] diastolic [ ] systolic [ ] combined systolic and diastolic[ ]  JENNIFER: ATN[ ] renal medullary necrosis [ ] CKD I [ ]CKDII [ ]CKD III [ ]CKD IV [ ]CKD V [ ]Other pathological lesions [ ]    MEDICATIONS  (STANDING):  aspirin enteric coated 81 milliGRAM(s) Oral daily  dextrose 5%. 1000 milliLiter(s) (50 mL/Hr) IV Continuous <Continuous>  dextrose 50% Injectable 12.5 Gram(s) IV Push once  dextrose 50% Injectable 25 Gram(s) IV Push once  dextrose 50% Injectable 25 Gram(s) IV Push once  docusate sodium 100 milliGRAM(s) Oral three times a day  gentamicin   IVPB 40 milliGRAM(s) IV Intermittent every 12 hours  heparin  Injectable 5000 Unit(s) SubCutaneous every 8 hours  insulin lispro (HumaLOG) corrective regimen sliding scale   SubCutaneous three times a day before meals  insulin lispro (HumaLOG) corrective regimen sliding scale   SubCutaneous at bedtime  lisinopril 5 milliGRAM(s) Oral daily  metoprolol succinate ER 25 milliGRAM(s) Oral daily  multivitamin 1 Tablet(s) Oral daily  nystatin Powder 1 Application(s) Topical two times a day  pantoprazole    Tablet 40 milliGRAM(s) Oral before breakfast  rifampin 300 milliGRAM(s) Oral three times a day  simvastatin 20 milliGRAM(s) Oral at bedtime  sodium chloride 0.9%. 1000 milliLiter(s) (75 mL/Hr) IV Continuous <Continuous>  tamsulosin 0.4 milliGRAM(s) Oral at bedtime  vancomycin  IVPB      vancomycin  IVPB 500 milliGRAM(s) IV Intermittent every 12 hours    MEDICATIONS  (PRN):  acetaminophen   Tablet .. 650 milliGRAM(s) Oral every 6 hours PRN Temp greater or equal to 38C (100.4F), Mild Pain (1 - 3), Moderate Pain (4 - 6)  dextrose 40% Gel 15 Gram(s) Oral once PRN Blood Glucose LESS THAN 70 milliGRAM(s)/deciliter  glucagon  Injectable 1 milliGRAM(s) IntraMuscular once PRN Glucose LESS THAN 70 milligrams/deciliter  magnesium hydroxide Suspension 30 milliLiter(s) Oral daily PRN Constipation          FAMILY HISTORY:  No pertinent family history in first degree relatives    No family history of premature coronary artery disease or sudden cardiac death      Vital Signs Last 24 Hrs  T(C): 36.6 (09 Nov 2018 05:06), Max: 37.1 (08 Nov 2018 13:35)  T(F): 97.9 (09 Nov 2018 05:06), Max: 98.7 (08 Nov 2018 13:35)  HR: 77 (09 Nov 2018 05:06) (77 - 79)  BP: 155/78 (09 Nov 2018 05:06) (149/90 - 158/80)  RR: 18 (09 Nov 2018 05:06) (16 - 18)  SpO2: 98% (09 Nov 2018 05:06) (98% - 98%)  I&O's Summary      PHYSICAL EXAM:  General: No acute distress BMI-24  HEENT: EOMI, PERRL  Neck: Supple, [ ] JVD  Lungs: Equal air entry bilaterally; [ ] rales [ ] wheezing [ ] rhonchi  Heart: Regular rate and rhythm; [x ] murmur   2/6 [x ] systolic [ ] diastolic [ ] radiation[ ] rubs [ ]  gallops  Abdomen: Nontender, bowel sounds present  Extremities: No clubbing, cyanosis, [ ] edema  Nervous system:  Alert & Oriented X3, Right paresis  Psychiatric: Normal affect  Skin: No rashes or lesions    LABS:  11-09    141  |  107  |  9   ----------------------------<  182<H>  3.6   |  24  |  0.58    Ca    8.8      09 Nov 2018 07:40  Mg     1.8     11-09    TPro  5.6<L>  /  Alb  2.9<L>  /  TBili  0.4  /  DBili  0.2  /  AST  30  /  ALT  28  /  AlkPhos  89  11-09    Creatinine Trend: 0.58<--, 0.63<--, 0.78<--, 0.67<--, 0.62<--, 0.72<--                        13.3   7.49  )-----------( 150      ( 09 Nov 2018 07:40 )             39.1         IMPRESSION AND PLAN:    76 yo m with sepsis 2/2 recurrent UTI     Problem/Plan - 1:  ·  Problem: Urinary tract infection without hematuria, site unspecified.  Plan: -  IV genta/Rifampin-    For PICC todayWill need Rehab Wife [facility]     Problem/Plan - 2:  ·  Problem: Sepsis, MRSA bacteremia  Plan: continue with treatment for UTI.      Problem/Plan - 3:  ·  Problem: Type 2 diabetes mellitus with complication, unspecified whether long term insulin use.  Plan:  on ISS.      Problem/Plan - 4:  ·  Problem: Essential hypertension.  Plan: c/w toprol, lisinopril

## 2018-11-09 NOTE — PROGRESS NOTE ADULT - SUBJECTIVE AND OBJECTIVE BOX
Infectious Diseases progress note:    Subjective: No acute o/n events.  Afebrile.  No leukocytosis    ROS:  nonverbal    Allergies    No Known Allergies    Intolerances        ANTIBIOTICS/RELEVANT:  antimicrobials  gentamicin   IVPB 40 milliGRAM(s) IV Intermittent every 12 hours  rifampin 300 milliGRAM(s) Oral three times a day  vancomycin  IVPB      vancomycin  IVPB 500 milliGRAM(s) IV Intermittent every 12 hours    immunologic:    OTHER:  acetaminophen   Tablet .. 650 milliGRAM(s) Oral every 6 hours PRN  aspirin enteric coated 81 milliGRAM(s) Oral daily  dextrose 40% Gel 15 Gram(s) Oral once PRN  dextrose 5%. 1000 milliLiter(s) IV Continuous <Continuous>  dextrose 50% Injectable 12.5 Gram(s) IV Push once  dextrose 50% Injectable 25 Gram(s) IV Push once  dextrose 50% Injectable 25 Gram(s) IV Push once  docusate sodium 100 milliGRAM(s) Oral three times a day  glucagon  Injectable 1 milliGRAM(s) IntraMuscular once PRN  heparin  Injectable 5000 Unit(s) SubCutaneous every 8 hours  insulin lispro (HumaLOG) corrective regimen sliding scale   SubCutaneous three times a day before meals  insulin lispro (HumaLOG) corrective regimen sliding scale   SubCutaneous at bedtime  lisinopril 5 milliGRAM(s) Oral daily  magnesium hydroxide Suspension 30 milliLiter(s) Oral daily PRN  metoprolol succinate ER 25 milliGRAM(s) Oral daily  multivitamin 1 Tablet(s) Oral daily  nystatin Powder 1 Application(s) Topical two times a day  pantoprazole    Tablet 40 milliGRAM(s) Oral before breakfast  simvastatin 20 milliGRAM(s) Oral at bedtime  sodium chloride 0.9%. 1000 milliLiter(s) IV Continuous <Continuous>  tamsulosin 0.4 milliGRAM(s) Oral at bedtime      Objective:  Vital Signs Last 24 Hrs  T(C): 36.6 (09 Nov 2018 05:06), Max: 36.6 (09 Nov 2018 05:06)  T(F): 97.9 (09 Nov 2018 05:06), Max: 97.9 (09 Nov 2018 05:06)  HR: 77 (09 Nov 2018 05:06) (77 - 79)  BP: 155/78 (09 Nov 2018 05:06) (155/78 - 158/80)  BP(mean): --  RR: 18 (09 Nov 2018 05:06) (17 - 18)  SpO2: 98% (09 Nov 2018 05:06) (98% - 98%)    PHYSICAL EXAM:  Constitutional:NAD  Eyes:BENJY, EOMI  Ear/Nose/Throat: no thrush, mucositis.  Moist mucous membranes	  Neck:no JVD, no lymphadenopathy, supple  Respiratory: CTA phi  Cardiovascular: S1S2 RRR, no murmurs  Gastrointestinal:soft, nontender,  nondistended (+) BS  Extremities:no e/e/c  Skin:  no rashes, open wounds or ulcerations        LABS:                        13.3   7.49  )-----------( 150      ( 09 Nov 2018 07:40 )             39.1     11-09    141  |  107  |  9   ----------------------------<  182<H>  3.6   |  24  |  0.58    Ca    8.8      09 Nov 2018 07:40  Mg     1.8     11-09    TPro  5.6<L>  /  Alb  2.9<L>  /  TBili  0.4  /  DBili  0.2  /  AST  30  /  ALT  28  /  AlkPhos  89  11-09            Vancomycin Level, Random:  ug/mL (11-08 @ 07:02)  Vancomycin Level, Random:  ug/mL (11-06 @ 05:33)      Vancomycin Level, Trough: 22.8 ug/mL (11-07 @ 17:04)  Vancomycin Level, Trough: 20.8 ug/mL (11-05 @ 17:50)              MICROBIOLOGY:    Culture - Blood (11.07.18 @ 17:03)    Culture - Blood:   NO ORGANISMS ISOLATED  NO ORGANISMS ISOLATED AT 24 HOURS    Specimen Source: BLOOD PERIPHERAL    Culture - Blood (11.02.18 @ 11:50)    Culture - Blood:   NO ORGANISMS ISOLATED    Specimen Source: BLOOD PERIPHERAL    Culture - Urine (11.01.18 @ 15:22)    Culture - Urine:   NO GROWTH AT 24 HOURS    Specimen Source: URINE CATHETER          RADIOLOGY & ADDITIONAL STUDIES:

## 2018-11-09 NOTE — DISCHARGE NOTE ADULT - ADDITIONAL INSTRUCTIONS
Continue your medications as directed and please follow-up as an outpatient with your primary care provider for further care and recommendations.  Please have bi-weekly labs sent to Dr. Ash - Office #: [phone] Fax #: [phone] to include CMP/CBC/ESR/CRP/vancomycin trough.  Additional Health Issues: Podiatry consulted for toenail abnormality and an aseptic debridement of all elongated nails was performed. No open lesions or signs of infection was identified and it is recommended to wear Z floats at all times.

## 2018-11-09 NOTE — DISCHARGE NOTE ADULT - MEDICATION SUMMARY - MEDICATIONS TO TAKE
I will START or STAY ON the medications listed below when I get home from the hospital:    acetaminophen 325 mg oral tablet  -- 2 tab(s) by mouth every 6 hours, As needed, Temp greater or equal to 38C (100.4F), Mild Pain (1 - 3), Moderate Pain (4 - 6)  -- Indication: For Temp/Pain    aspirin 81 mg oral delayed release tablet  -- 1 tab(s) by mouth once a day  -- Indication: For Prophylactic measure    lisinopril 5 mg oral tablet  -- 1 tab(s) by mouth once a day  -- Indication: For Hypertension    magnesium hydroxide 8% oral suspension  -- 30 milliliter(s) by mouth once a day, As needed, Constipation  -- Indication: For GERD    tamsulosin 0.4 mg oral capsule  -- 1 cap(s) by mouth once a day (at bedtime)  -- Indication: For BPH    metFORMIN 500 mg oral tablet  -- 1 tab(s) by mouth 3 times a day  -- Indication: For Diabetes    simvastatin 20 mg oral tablet  -- 1 tab(s) by mouth once a day (in the evening)  -- Indication: For Hyperlipidemia    rifAMPin 300 mg oral capsule  -- 1 cap(s) by mouth 3 times a day through 12/13/18  -- Indication: For Sepsis    metoprolol succinate 25 mg oral tablet, extended release  -- 1 tab(s) by mouth once a day  -- Indication: For Hypertension    nystatin 100,000 units/g topical powder  -- 1 application on skin 2 times a day  -- Indication: For Rash    vancomycin 750 mg intravenous injection  -- 750 milligram(s) intravenous every 12 hours through 12/13/18  -- Indication: For Sepsis/Bacteremia     docusate sodium 100 mg oral capsule  -- 1 cap(s) by mouth 3 times a day  -- Indication: For Constipation as needed    pantoprazole 40 mg oral delayed release tablet  -- 1 tab(s) by mouth once a day  -- Indication: For PUD    multivitamin  -- 1 tab(s) by mouth once a day  -- Indication: For Supplementation

## 2018-11-09 NOTE — DISCHARGE NOTE ADULT - HOSPITAL COURSE
77 M PMHx recurrent UTIs, recurrent GIB, HTN, HLD, DM2, PUD, AS S/P TAVR, aortic root replacement (1/2013, on ASA) 2/2 type A aneurysm, CVA x3 c/b residual L hemiparesis (bedbound, minimally verbal) BIB wife out of concern for lethargy, fevers (101.2). Wife feeds pt normally but he has been very lethargic and not able to eat or swallow anything today. UA positive, prior urine cultures with pansensitive Pseudomonas (placed on Cipro based on prior sensitivities)    Urinary tract infection without hematuria, site unspecified  - Per attending, outpt Urology evaluation warranted given recurrent UTI; c/w Tamsulosin  - Renal US - 2.1 cm L upper pole cystic lesion with a peripheral nodule.   - Cipro d'lucinda as UCx negative   - F/U outpatient CT/MRI     Sepsis, due to unspecified organism.    - BCx 11/1 MRSA in 1/2 bottles, repeat BCx 11/2 NTD  - UA positive, UCx negative   - Vanco/Gentamycin/Rifampin   - Infectious disease consulted    - Wound care Cx - pt with dry areas on back with erythema, no macules/papules, no open lesions   - NM scan f/u infectious source of MRSA -> Faint uptake in the left kidney is suggestive of urinary tract infection  - TTE - no evidence of endocarditis - No plan for DICKSON  - 11/9: S/p IR PICC placement    Type 2 diabetes mellitus with complication, unspecified whether long term insulin use.   - HgbA1c 7.1; ISS and resume Metformin upon DC    Essential hypertension  - Toprol, Lisinopril    Toe nail overgrowth  - Podiatry consulted and performed aseptic debridement performed at bedside     Dispo - Nursing facility

## 2018-11-09 NOTE — DISCHARGE NOTE ADULT - CARE PLAN
Principal Discharge DX:	Sepsis  Goal:	blood cultures will be negative, pt will have no fevers  Assessment and plan of treatment:	Gentamycin until 11/15  Vanco until 12/13 (6weeks total)   will need biweekly CMP/CBC/ESR/CRP/vanco trough/genta trough  DR DUDLEY office: [phone] FAX: [phone]  Secondary Diagnosis:	Bacteremia  Assessment and plan of treatment:	Gentamycin until 11/15  Vanco until 12/13 (6weeks total)   will need biweekly CMP/CBC/ESR/CRP/vanco trough/genta trough  DR DUDLEY office: [phone] FAX: [phone]  Secondary Diagnosis:	UTI (urinary tract infection) Principal Discharge DX:	Sepsis  Goal:	Resolution and return to baseline  Assessment and plan of treatment:	Complete antibiotic therapy as directed - Vancomycin IV through 12/13/18 via PICC line. Monitor for any further signs and symptoms of further infection, including but not limited to, fevers/chills, shortness of breath, increased heart rate, dizziness, or abrupt changes in mental status. Please have bi-weekly labs sent to Dr. Ash - Office #: [phone] Fax #: [phone] to include CMP/CBC/ESR/CRP/vancomycin trough.  Secondary Diagnosis:	Bacteremia  Assessment and plan of treatment:	Complete antibiotic therapy as directed - Vancomycin IV through 12/13/18 via PICC line. Monitor for any further signs and symptoms of further infection, including but not limited to, fevers/chills, shortness of breath, increased heart rate, dizziness, or abrupt changes in mental status. Please have bi-weekly labs sent to Dr. Ash - Office #: [phone] Fax #: [phone] to include CMP/CBC/ESR/CRP/vancomycin trough.  Secondary Diagnosis:	UTI (urinary tract infection)  Assessment and plan of treatment:	Continue antibiotics as directed and monitor for signs/symptoms of infection, such as, fever/chills, burning/pain with urination, urinary frequency/hesitancy, cloudy urine, or blood in urine.  Secondary Diagnosis:	Essential hypertension  Assessment and plan of treatment:	Continue blood pressure medication regimen as directed. Monitor for any visual changes, headaches or dizziness.  Monitor blood pressure regularly.  Follow up with your PCP for further management for high blood pressure.  Secondary Diagnosis:	Type 2 diabetes mellitus with complication, unspecified whether long term insulin use  Assessment and plan of treatment:	Continue your medication regimen and a consistent carbohydrate diet (Meaning eating the same amount of carbohydrates at the same time each day). Monitor blood glucose levels throughout the day before meals and at bedtime. Record blood sugars and bring to outpatient providers appointment in order to be reviewed by your doctor for management modifications. Monitor for signs/symptoms of low blood glucose, such as, dizziness, altered mental status, or cool/clammy skin. In addition, monitor for signs/symptoms of high blood glucose, such as, feeling hot, dry, fatigued, or with increased thirst/urination. Make regular podiatry appointments in order to have feet checked for wounds and uncontrolled toe nail growth to prevent infections, as well as, appointments with an ophthalmologist to monitor your vision.  Secondary Diagnosis:	Hyperlipemia  Assessment and plan of treatment:	Continue cholesterol control medications. Continue DASH diet. Follow up with your PCP within 1 week of discharge for further management and monitoring of lipid and cholesterol panels.  Secondary Diagnosis:	PUD (peptic ulcer disease)  Assessment and plan of treatment:	Continue with PPI medication and follow-up as outpatient for further care/recommendations with primary medical team. Principal Discharge DX:	Sepsis  Goal:	Resolution and return to baseline  Assessment and plan of treatment:	Complete antibiotic therapy as directed - Vancomycin IV through 12/13/18 via PICC line. Monitor for any further signs and symptoms of further infection, including but not limited to, fevers/chills, shortness of breath, increased heart rate, dizziness, or abrupt changes in mental status. Please have bi-weekly labs sent to Dr. Ash - Office #: [phone] Fax #: [phone] to include CMP/CBC/ESR/CRP/vancomycin trough.  Secondary Diagnosis:	Bacteremia  Assessment and plan of treatment:	Complete antibiotic therapy as directed - Vancomycin IV through 12/13/18 via PICC line. Monitor for any further signs and symptoms of further infection, including but not limited to, fevers/chills, shortness of breath, increased heart rate, dizziness, or abrupt changes in mental status. Please have bi-weekly labs sent to Dr. Ash - Office #: [phone] Fax #: [phone] to include CMP/CBC/ESR/CRP/vancomycin trough.  Secondary Diagnosis:	UTI (urinary tract infection)  Assessment and plan of treatment:	Antibiotics completed - Monitor for signs/symptoms of infection, such as, fever/chills, burning/pain with urination, urinary frequency/hesitancy, cloudy urine, or blood in urine.  Secondary Diagnosis:	Essential hypertension  Assessment and plan of treatment:	Continue blood pressure medication regimen as directed. Monitor for any visual changes, headaches or dizziness.  Monitor blood pressure regularly.  Follow up with your PCP for further management for high blood pressure.  Secondary Diagnosis:	Type 2 diabetes mellitus with complication, unspecified whether long term insulin use  Assessment and plan of treatment:	Continue your medication regimen and a consistent carbohydrate diet (Meaning eating the same amount of carbohydrates at the same time each day). Monitor blood glucose levels throughout the day before meals and at bedtime. Record blood sugars and bring to outpatient providers appointment in order to be reviewed by your doctor for management modifications. Monitor for signs/symptoms of low blood glucose, such as, dizziness, altered mental status, or cool/clammy skin. In addition, monitor for signs/symptoms of high blood glucose, such as, feeling hot, dry, fatigued, or with increased thirst/urination. Make regular podiatry appointments in order to have feet checked for wounds and uncontrolled toe nail growth to prevent infections, as well as, appointments with an ophthalmologist to monitor your vision.  Secondary Diagnosis:	Hyperlipemia  Assessment and plan of treatment:	Continue cholesterol control medications. Continue DASH diet. Follow up with your PCP within 1 week of discharge for further management and monitoring of lipid and cholesterol panels.  Secondary Diagnosis:	PUD (peptic ulcer disease)  Assessment and plan of treatment:	Continue with PPI medication and follow-up as outpatient for further care/recommendations with primary medical team.

## 2018-11-09 NOTE — PROGRESS NOTE ADULT - ASSESSMENT
77yr old male significant  PMHx of recurrent UTIs,  HTN, HLD, T2DM, PUD, h/o  recurrent GIB, s/p Aortic root replacement with AVR 2/2 Type A aneurysm (1/2013), CVAx3 with residual left hemiparesis, bedbound, minimally verbal. Lives at home with wife who noted increased weakness/ sleeping more over last few days. Pt on dysphagia 1 diet with honey thick liquids, requiring feeding in small increments. Wife denies coughing, sob, endorses decreased and darker urine output. Pt with diaper. UA +; prior urine cultures all grew out pan-sensitive pseudomonas.    Tmax in .3, wbc 12.47 (01 Nov 2018 17:15)    Wife present at bedside, states pt usually does not c/o of anything, but that she noticed he has been weaker than usual, not eating as much.  He had fever >101 at home.  Wife also notes he has a rash over his back and b/l axillary region that "comes and goes".  Sometimes he develops small pimples that pop open, which she then cleans and applies cortisone cream.  She believes he has a heat rash.  No recent hospitalizations, outpatient procedures or dental work, no recent abx exposure.    Pt admitted to evaluate for UTI.  Also noted to have bacteremia with GPC.  Pt received dose of vanco in the ER, he is now on cipro.  ID edu called for further antibiotic managment.     Problem/Plan - 1:    ·	MRSA bacteremia    - Cont vancomycin,  Maintain levels between 15-20.       - Source possibly rash over pt's back and b/l axillary regions.  ?Contact vs. allergic dermatitis.  Rash is resolving with current managment and nystatin ointment.    - r/o prosthetic valve endocarditits.  TTE no vegetations reported, unable to exclude endocarditis.  D/w pt's wife, who did not want to proceed with invasive procedures and moreover pt is poor surgical candidate in the event DICKSON is positive.  Wife would like to continue conservative management at this time.    - R/o metastatic focus of infection. NM scan results noted - enhancement of L kidney, urine cx negative. Do not suspect pyelonephritis based on clinical presentation    -cont vanco/rifampin/gent.  Monitor vanco trough (maintain between 15-20). Cont gent at 40mg iv bid.  Complete gent for 2 weeks only (11/2 through 11/15).  Cont vanco/rifampin for 4 additional weeks (11/2 through 12/13)      - Plan for PICC line.  Check biweekly vanco trough, gent trough, CBC, CMP, ESR, CRP            Will follow,    Tanisha Ash  [phone]

## 2018-11-09 NOTE — DISCHARGE NOTE ADULT - CARE PROVIDER_API CALL
Tanisha Ash), Infectious Disease; Internal Medicine  [address] [place] [address]  Phone: [phone]  Fax: [phone]

## 2018-11-09 NOTE — DISCHARGE NOTE ADULT - SECONDARY DIAGNOSIS.
Bacteremia UTI (urinary tract infection) Essential hypertension Type 2 diabetes mellitus with complication, unspecified whether long term insulin use Hyperlipemia PUD (peptic ulcer disease)

## 2018-11-09 NOTE — DISCHARGE NOTE ADULT - PATIENT PORTAL LINK FT
You can access the [facility] Patient Portal, offered by [facility], by registering with the following website: [email or web address]

## 2018-11-09 NOTE — DISCHARGE NOTE ADULT - PLAN OF CARE
blood cultures will be negative, pt will have no fevers Gentamycin until 11/15  Vanco until 12/13 (6weeks total)   will need biweekly CMP/CBC/ESR/CRP/vanco trough/genta trough  DR DUDLEY office: [phone] FAX: [phone] Gentamycin until 11/15  Vanco until 12/13 (6weeks total)   will need biweekly CMP/CBC/ESR/CRP/vanco trough/genta trough  DR DUDLEY office: [phone] FAX: [phone] Resolution and return to baseline Complete antibiotic therapy as directed - Vancomycin IV through 12/13/18 via PICC line. Monitor for any further signs and symptoms of further infection, including but not limited to, fevers/chills, shortness of breath, increased heart rate, dizziness, or abrupt changes in mental status. Please have bi-weekly labs sent to Dr. Ash - Office #: [phone] Fax #: [phone] to include CMP/CBC/ESR/CRP/vancomycin trough. Complete antibiotic therapy as directed - Vancomycin IV through 12/13/18 via PICC line. Monitor for any further signs and symptoms of further infection, including but not limited to, fevers/chills, shortness of breath, increased heart rate, dizziness, or abrupt changes in mental status. Please have bi-weekly labs sent to Dr. Ash - Office #: [phone] Fax #: [phone] to include CMP/CBC/ESR/CRP/vancomycin trough. Continue antibiotics as directed and monitor for signs/symptoms of infection, such as, fever/chills, burning/pain with urination, urinary frequency/hesitancy, cloudy urine, or blood in urine. Continue blood pressure medication regimen as directed. Monitor for any visual changes, headaches or dizziness.  Monitor blood pressure regularly.  Follow up with your PCP for further management for high blood pressure. Continue your medication regimen and a consistent carbohydrate diet (Meaning eating the same amount of carbohydrates at the same time each day). Monitor blood glucose levels throughout the day before meals and at bedtime. Record blood sugars and bring to outpatient providers appointment in order to be reviewed by your doctor for management modifications. Monitor for signs/symptoms of low blood glucose, such as, dizziness, altered mental status, or cool/clammy skin. In addition, monitor for signs/symptoms of high blood glucose, such as, feeling hot, dry, fatigued, or with increased thirst/urination. Make regular podiatry appointments in order to have feet checked for wounds and uncontrolled toe nail growth to prevent infections, as well as, appointments with an ophthalmologist to monitor your vision. Continue cholesterol control medications. Continue DASH diet. Follow up with your PCP within 1 week of discharge for further management and monitoring of lipid and cholesterol panels. Continue with PPI medication and follow-up as outpatient for further care/recommendations with primary medical team. Antibiotics completed - Monitor for signs/symptoms of infection, such as, fever/chills, burning/pain with urination, urinary frequency/hesitancy, cloudy urine, or blood in urine.

## 2018-11-09 NOTE — CHART NOTE - NSCHARTNOTEFT_GEN_A_CORE
OK to proceed with PICC line, blood cultures from nov 2 and nov 3 are negative 48 hours, blood cx from nov 7negative for 24 hours sufficient for negative bacteremia

## 2018-11-09 NOTE — DISCHARGE NOTE ADULT - MEDICATION SUMMARY - MEDICATIONS TO STOP TAKING
I will STOP taking the medications listed below when I get home from the hospital:    polyethylene glycol 3350 oral powder for reconstitution  -- 17 gram(s) by mouth once a day. Hold for loose stools.    senna oral tablet  -- 2 tab(s) by mouth once a day (at bedtime).Hold for loose stools.    ciprofloxacin 500 mg oral tablet  -- 1 tab(s) by mouth every 12 hours

## 2018-11-10 LAB
BUN SERPL-MCNC: 11 MG/DL — SIGNIFICANT CHANGE UP (ref 7–23)
CALCIUM SERPL-MCNC: 8.8 MG/DL — SIGNIFICANT CHANGE UP (ref 8.4–10.5)
CHLORIDE SERPL-SCNC: 101 MMOL/L — SIGNIFICANT CHANGE UP (ref 98–107)
CO2 SERPL-SCNC: 22 MMOL/L — SIGNIFICANT CHANGE UP (ref 22–31)
CREAT SERPL-MCNC: 0.67 MG/DL — SIGNIFICANT CHANGE UP (ref 0.5–1.3)
GLUCOSE BLDC GLUCOMTR-MCNC: 216 MG/DL — HIGH (ref 70–99)
GLUCOSE BLDC GLUCOMTR-MCNC: 299 MG/DL — HIGH (ref 70–99)
GLUCOSE SERPL-MCNC: 156 MG/DL — HIGH (ref 70–99)
POTASSIUM SERPL-MCNC: 3.5 MMOL/L — SIGNIFICANT CHANGE UP (ref 3.5–5.3)
POTASSIUM SERPL-SCNC: 3.5 MMOL/L — SIGNIFICANT CHANGE UP (ref 3.5–5.3)
SODIUM SERPL-SCNC: 139 MMOL/L — SIGNIFICANT CHANGE UP (ref 135–145)
VANCOMYCIN TROUGH SERPL-MCNC: 8.6 UG/ML — LOW (ref 10–20)

## 2018-11-10 RX ADMIN — Medication 2: at 13:20

## 2018-11-10 RX ADMIN — TAMSULOSIN HYDROCHLORIDE 0.4 MILLIGRAM(S): 0.4 CAPSULE ORAL at 21:04

## 2018-11-10 RX ADMIN — SODIUM CHLORIDE 75 MILLILITER(S): 9 INJECTION INTRAMUSCULAR; INTRAVENOUS; SUBCUTANEOUS at 05:04

## 2018-11-10 RX ADMIN — Medication 25 MILLIGRAM(S): at 05:05

## 2018-11-10 RX ADMIN — NYSTATIN CREAM 1 APPLICATION(S): 100000 CREAM TOPICAL at 05:06

## 2018-11-10 RX ADMIN — Medication 100 MILLIGRAM(S): at 21:04

## 2018-11-10 RX ADMIN — LISINOPRIL 5 MILLIGRAM(S): 2.5 TABLET ORAL at 05:05

## 2018-11-10 RX ADMIN — Medication 100 MILLIGRAM(S): at 13:20

## 2018-11-10 RX ADMIN — Medication 81 MILLIGRAM(S): at 13:20

## 2018-11-10 RX ADMIN — Medication 100 MILLIGRAM(S): at 13:21

## 2018-11-10 RX ADMIN — NYSTATIN CREAM 1 APPLICATION(S): 100000 CREAM TOPICAL at 17:49

## 2018-11-10 RX ADMIN — HEPARIN SODIUM 5000 UNIT(S): 5000 INJECTION INTRAVENOUS; SUBCUTANEOUS at 13:20

## 2018-11-10 RX ADMIN — HEPARIN SODIUM 5000 UNIT(S): 5000 INJECTION INTRAVENOUS; SUBCUTANEOUS at 21:04

## 2018-11-10 RX ADMIN — PANTOPRAZOLE SODIUM 40 MILLIGRAM(S): 20 TABLET, DELAYED RELEASE ORAL at 05:06

## 2018-11-10 RX ADMIN — Medication 1: at 09:06

## 2018-11-10 RX ADMIN — Medication 2: at 17:48

## 2018-11-10 RX ADMIN — Medication 1 TABLET(S): at 13:20

## 2018-11-10 RX ADMIN — SODIUM CHLORIDE 75 MILLILITER(S): 9 INJECTION INTRAMUSCULAR; INTRAVENOUS; SUBCUTANEOUS at 21:04

## 2018-11-10 RX ADMIN — Medication 1: at 22:18

## 2018-11-10 RX ADMIN — HEPARIN SODIUM 5000 UNIT(S): 5000 INJECTION INTRAVENOUS; SUBCUTANEOUS at 05:04

## 2018-11-10 RX ADMIN — Medication 100 MILLIGRAM(S): at 05:04

## 2018-11-10 RX ADMIN — Medication 250 MILLIGRAM(S): at 17:48

## 2018-11-10 RX ADMIN — SIMVASTATIN 20 MILLIGRAM(S): 20 TABLET, FILM COATED ORAL at 21:04

## 2018-11-10 NOTE — PROGRESS NOTE ADULT - ASSESSMENT
· Assessment	  76 yo m with sepsis 2/2 recurrent UTI     Problem/Plan - 1:  ·  Problem: Sepsis due to Urinary tract infection without hematuria, site unspecified.  Plan: -  IV Abx-  ID f/up noted.

## 2018-11-10 NOTE — PROGRESS NOTE ADULT - SUBJECTIVE AND OBJECTIVE BOX
Patient is a 77y old  Male who presents with a chief complaint of sepsis (10 Nov 2018 12:13)      SUBJECTIVE / OVERNIGHT EVENTS:  Awake    MEDICATIONS  (STANDING):  aspirin enteric coated 81 milliGRAM(s) Oral daily  dextrose 5%. 1000 milliLiter(s) (50 mL/Hr) IV Continuous <Continuous>  dextrose 50% Injectable 12.5 Gram(s) IV Push once  dextrose 50% Injectable 25 Gram(s) IV Push once  dextrose 50% Injectable 25 Gram(s) IV Push once  docusate sodium 100 milliGRAM(s) Oral three times a day  gentamicin   IVPB 40 milliGRAM(s) IV Intermittent every 12 hours  heparin  Injectable 5000 Unit(s) SubCutaneous every 8 hours  insulin lispro (HumaLOG) corrective regimen sliding scale   SubCutaneous three times a day before meals  insulin lispro (HumaLOG) corrective regimen sliding scale   SubCutaneous at bedtime  lisinopril 5 milliGRAM(s) Oral daily  metoprolol succinate ER 25 milliGRAM(s) Oral daily  multivitamin 1 Tablet(s) Oral daily  nystatin Powder 1 Application(s) Topical two times a day  pantoprazole    Tablet 40 milliGRAM(s) Oral before breakfast  rifampin 300 milliGRAM(s) Oral three times a day  simvastatin 20 milliGRAM(s) Oral at bedtime  sodium chloride 0.9%. 1000 milliLiter(s) (75 mL/Hr) IV Continuous <Continuous>  tamsulosin 0.4 milliGRAM(s) Oral at bedtime  vancomycin  IVPB 1000 milliGRAM(s) IV Intermittent every 24 hours    MEDICATIONS  (PRN):  acetaminophen   Tablet .. 650 milliGRAM(s) Oral every 6 hours PRN Temp greater or equal to 38C (100.4F), Mild Pain (1 - 3), Moderate Pain (4 - 6)  dextrose 40% Gel 15 Gram(s) Oral once PRN Blood Glucose LESS THAN 70 milliGRAM(s)/deciliter  glucagon  Injectable 1 milliGRAM(s) IntraMuscular once PRN Glucose LESS THAN 70 milligrams/deciliter  magnesium hydroxide Suspension 30 milliLiter(s) Oral daily PRN Constipation        CAPILLARY BLOOD GLUCOSE      POCT Blood Glucose.: 299 mg/dL (10 Nov 2018 22:13)  POCT Blood Glucose.: 216 mg/dL (10 Nov 2018 17:12)  POCT Blood Glucose.: 227 mg/dL (10 Nov 2018 13:14)  POCT Blood Glucose.: 176 mg/dL (10 Nov 2018 08:59)    I&O's Summary    09 Nov 2018 07:01  -  10 Nov 2018 07:00  --------------------------------------------------------  IN: 0 mL / OUT: 100 mL / NET: -100 mL        PHYSICAL EXAM:    HEAD:  Atraumatic, Normocephalic  NECK: Supple, No JVD  CHEST/LUNG: Clear to auscultation bilaterally; No wheezing.  HEART: Regular rate and rhythm; No murmurs, rubs, or gallops  ABDOMEN: Soft, Nontender, Nondistended; Bowel sounds present  EXTREMITIES:   No clubbing, cyanosis, or edema  NEUROLOGY: AAO X 3      LABS:                        13.3   7.49  )-----------( 150      ( 09 Nov 2018 07:40 )             39.1     11-10    139  |  101  |  11  ----------------------------<  156<H>  3.5   |  22  |  0.67    Ca    8.8      10 Nov 2018 05:50  Mg     1.8     11-09    TPro  5.6<L>  /  Alb  2.9<L>  /  TBili  0.4  /  DBili  0.2  /  AST  30  /  ALT  28  /  AlkPhos  89  11-09            CAPILLARY BLOOD GLUCOSE      POCT Blood Glucose.: 299 mg/dL (10 Nov 2018 22:13)  POCT Blood Glucose.: 216 mg/dL (10 Nov 2018 17:12)  POCT Blood Glucose.: 227 mg/dL (10 Nov 2018 13:14)  POCT Blood Glucose.: 176 mg/dL (10 Nov 2018 08:59)    11-07 @ 17:03  Culture-urine --  Culture results --  method type --  Organism --  Organism Identification --  Specimen source BLOOD PERIPHERAL           11-07 @ 17:03  Culture blood   NO ORGANISMS ISOLATED  NO ORGANISMS ISOLATED AT 72 HRS.  Culture results --  Gram stain --  Gram stain blood --  Method type --  Organism --  Organism identification --  Specimen source BLOOD PERIPHERAL      RADIOLOGY & ADDITIONAL TESTS:    Imaging Personally Reviewed:    Consultant(s) Notes Reviewed:      Care Discussed with Consultants/Other Providers:

## 2018-11-10 NOTE — PROVIDER CONTACT NOTE (OTHER) - ACTION/TREATMENT ORDERED:
No change in current dose of 1g every 24hrs. Will continue to monitor trough
no new orders given
As above

## 2018-11-10 NOTE — PROGRESS NOTE ADULT - SUBJECTIVE AND OBJECTIVE BOX
Infectious Diseases progress note:    Subjective: NAD, no acute o/n events.  Afebrile.  Wife at bedside.      ROS:  CONSTITUTIONAL:  No fever, chills, rigors  CARDIOVASCULAR:  No chest pain or palpitations  RESPIRATORY:   No SOB, cough, dyspnea on exertion.  No wheezing  GASTROINTESTINAL:  No abd pain, N/V, diarrhea/constipation  EXTREMITIES:  No swelling or joint pain  GENITOURINARY:  No burning on urination, increased frequency or urgency.  No flank pain  NEUROLOGIC:  No HA, visual disturbances  SKIN: No rashes    Allergies    No Known Allergies    Intolerances        ANTIBIOTICS/RELEVANT:  antimicrobials  gentamicin   IVPB 40 milliGRAM(s) IV Intermittent every 12 hours  rifampin 300 milliGRAM(s) Oral three times a day  vancomycin  IVPB 1000 milliGRAM(s) IV Intermittent every 24 hours    immunologic:    OTHER:  acetaminophen   Tablet .. 650 milliGRAM(s) Oral every 6 hours PRN  aspirin enteric coated 81 milliGRAM(s) Oral daily  dextrose 40% Gel 15 Gram(s) Oral once PRN  dextrose 5%. 1000 milliLiter(s) IV Continuous <Continuous>  dextrose 50% Injectable 12.5 Gram(s) IV Push once  dextrose 50% Injectable 25 Gram(s) IV Push once  dextrose 50% Injectable 25 Gram(s) IV Push once  docusate sodium 100 milliGRAM(s) Oral three times a day  glucagon  Injectable 1 milliGRAM(s) IntraMuscular once PRN  heparin  Injectable 5000 Unit(s) SubCutaneous every 8 hours  insulin lispro (HumaLOG) corrective regimen sliding scale   SubCutaneous three times a day before meals  insulin lispro (HumaLOG) corrective regimen sliding scale   SubCutaneous at bedtime  lisinopril 5 milliGRAM(s) Oral daily  magnesium hydroxide Suspension 30 milliLiter(s) Oral daily PRN  metoprolol succinate ER 25 milliGRAM(s) Oral daily  multivitamin 1 Tablet(s) Oral daily  nystatin Powder 1 Application(s) Topical two times a day  pantoprazole    Tablet 40 milliGRAM(s) Oral before breakfast  simvastatin 20 milliGRAM(s) Oral at bedtime  sodium chloride 0.9%. 1000 milliLiter(s) IV Continuous <Continuous>  tamsulosin 0.4 milliGRAM(s) Oral at bedtime      Objective:  Vital Signs Last 24 Hrs  T(C): 36.6 (10 Nov 2018 05:00), Max: 36.8 (09 Nov 2018 20:46)  T(F): 97.9 (10 Nov 2018 05:00), Max: 98.3 (09 Nov 2018 20:46)  HR: 82 (10 Nov 2018 05:00) (80 - 82)  BP: 146/81 (10 Nov 2018 05:00) (142/80 - 167/85)  BP(mean): --  RR: 17 (10 Nov 2018 05:00) (17 - 20)  SpO2: 96% (10 Nov 2018 05:00) (96% - 99%)    PHYSICAL EXAM:  Constitutional:NAD  Eyes:BENJY, EOMI  Ear/Nose/Throat: no thrush, mucositis.  Moist mucous membranes	  Neck:no JVD, no lymphadenopathy, supple  Respiratory: CTA phi  Cardiovascular: S1S2 RRR, no murmurs  Gastrointestinal:soft, nontender,  nondistended (+) BS  Extremities:no e/e/c  Skin:  no rashes, open wounds or ulcerations  :  House draining clear yellow urine        LABS:                        13.3   7.49  )-----------( 150      ( 09 Nov 2018 07:40 )             39.1     11-10    139  |  101  |  11  ----------------------------<  156<H>  3.5   |  22  |  0.67    Ca    8.8      10 Nov 2018 05:50  Mg     1.8     11-09    TPro  5.6<L>  /  Alb  2.9<L>  /  TBili  0.4  /  DBili  0.2  /  AST  30  /  ALT  28  /  AlkPhos  89  11-09            Vancomycin Level, Random:  ug/mL (11-08 @ 07:02)  Vancomycin Level, Random:  ug/mL (11-06 @ 05:33)      Vancomycin Level, Trough: 22.8 ug/mL (11-07 @ 17:04)  Vancomycin Level, Trough: 20.8 ug/mL (11-05 @ 17:50)              MICROBIOLOGY:    Culture - Blood (11.07.18 @ 17:03)    Culture - Blood:   NO ORGANISMS ISOLATED  NO ORGANISMS ISOLATED AT 48 HRS.    Specimen Source: BLOOD PERIPHERAL    Culture - Blood in AM (11.03.18 @ 06:13)    Culture - Blood:   NO ORGANISMS ISOLATED    Specimen Source: BLOOD PERIPHERAL    Culture - Blood (11.02.18 @ 11:50)    Culture - Blood:   NO ORGANISMS ISOLATED    Specimen Source: BLOOD PERIPHERAL    Culture - Urine (11.01.18 @ 15:22)    Culture - Urine:   NO GROWTH AT 24 HOURS    Specimen Source: URINE CATHETER    Culture - Blood (11.01.18 @ 13:38)    -  Methicillin resistant Staphylococcus aureus (MRSA): + DETECT ERNESTINE    Culture - Blood:   ***Blood Panel PCR results on this specimen are available  approximately 3 hours after the Gram stain result***  Gram stain, PCR, and/or culture results may not always  correspond due to difference in methodologies  ------------------------------------------------------------  This PCR assay was performed using [employer].  The  following targets are tested for:  Enterococcus, vancomycin  resistant enterococci, Listeria monocytogenes,  coagulase  negative staphylococci, S. aureus, methicillin resistant S.  aureus, Streptococcus agalactiae (Group B), S. pneumoniae,  S. pyogenes (Group A), Acinetobacter baumannii, Enterobacter  cloacae, E. coli, Klebsiella oxytoca, K. pneumoniae, Proteus  sp., Serratia marcescens, Haemophilus influenzae, Neisseria  meningitidis, Pseudomonas aeruginosa, Candida albicans, C.  glabrata, C. krusei, C. parapsilosis, C. tropicalis and the  KPC resistance gene.  **NOTE: Due to technical problems, Proteus sp. will NOT be  reported as part of the BCID paneluntil further notice.    Culture - Blood:   MRSA^Staph. aureus *MRSA*  OXICILLIN-RESISTANT staphylococci should be regarded as  RESISTANT to ALL other Beta-Lactams regardless of the  in-vitro results obtained.  These include: ALL  Penicillins, Cephalosporins, Amoxicillin-clavulanic  acid, Ticarcillin-clavulanic acid,  Ampicillin-sulbactam, and Imipenem.    Culture - Blood:   RESULT CALLED TO / READ BACK: JW SCHWARTZ,S/Y  DATE / TIME CALLED: 11/04/18 1210  CALLED BY: TAMRA LÓPEZ.    -  Trimethoprim/Sulfamethoxazole: S <=0.5/9.5 ERNESTINE    -  Vancomycin: S 1 ERNESTINE    -  Rifampin: S <=1 ERNESTINE    -  Tetra/Doxy: S <=1 ERNESTINE    -  Levofloxacin: R >4 ERNESTINE    -  Linezolid: S 2 ERNESTINE    -  Moxifloxacin(Aerobic): R 4 ERNESTINE    -  Oxacillin: R >2 ERNESTINE    -  Penicillin: R >8 ERNESTINE    -  Cefazolin: R 8 ERNESTINE    -  Ciprofloxacin: R >2 ERNESTINE    -  Clindamycin: S    -  Daptomycin: S    -  Erythromycin: R >4 ERNESTINE    -  Gentamicin: S <=1 ERNESTINE    Specimen Source: BLOOD PERIPHERAL    Organism: BLOOD CULTURE PCR  ***Blood Panel PCR results on this specimen are available  approximately 3 hours after the Gram stain result***  Gram stain, PCR, and/or culture results may not always  correspond due to difference in methodologies  ------------------------------------------------------------  This PCR assay was performed using [employer].  The  following targets are tested for:  Enterococcus, vancomycin  resistant enterococci, Listeria monocytogenes,  coagulase  negative staphylococci, S. aureus, methicillin resistant S.  aureus, Streptococcus agalactiae (Group B), S. pneumoniae,  S. pyogenes (Group A), Acinetobacter baumannii, Enterobacter  cloacae, E. coli, Klebsiella oxytoca, K. pneumoniae, Proteus  sp., Serratia marcescens, Haemophilus influenzae, Neisseria  meningitidis, Pseudomonas aeruginosa, Candida albicans, C.  glabrata, C. krusei, C. parapsilosis, C. tropicalis and the  KPC resistance gene.  **NOTE: Due to technical problems, Proteus sp. will NOT be  reported as part of the BCID panel until further notice.    Organism: Staph. aureus *MRSA*  OXICILLIN-RESISTANT staphylococci should be regarded as  RESISTANT to ALL other Beta-Lactams regardless of the  in-vitro results obtained.  These include: ALL  Penicillins, Cephalosporins, Amoxicillin-clavulanic  acid, Ticarcillin-clavulanic acid,  Ampicillin-sulbactam, and Imipenem.    Gram Stain Blood:   ***** CRITICAL RESULT *****  PERSON CALLED / READ-BACK: BLANK CRAWFORD RN  DATE / TIME CALLED: 11/02/18 0707  CALLED BY: DEANNE NOE  GPCCL^Gram Pos Cocci In Clusters  AFTER: 16 HOURS INCUBATION  BOTTLE: AEROBIC BOTTLE    Organism Identification: BLOOD CULTURE PCR  Staph. aureus *MRSA*    Method Type: PCR    Method Type: POSITIVE ERNESTINE 29          RADIOLOGY & ADDITIONAL STUDIES:    < from: NM Multiple Day Procedure (11.06.18 @ 10:27) >  FINDINGS: There is faint radiopharmaceutical uptake in the left kidney.   There is physiologic distribution of labeled leukocytes in the remainder   of the visualized structures.     IMPRESSION: Abnormal Indium-111 labeled leukocyte scan. Faint uptake in   the left kidney is suggestive of urinary tract infection.    The remainder of the study is unremarkable.    < end of copied text >

## 2018-11-10 NOTE — PROGRESS NOTE ADULT - ASSESSMENT
77yr old male significant  PMHx of recurrent UTIs,  HTN, HLD, T2DM, PUD, h/o  recurrent GIB, s/p Aortic root replacement with AVR 2/2 Type A aneurysm (1/2013), CVAx3 with residual left hemiparesis, bedbound, minimally verbal. Lives at home with wife who noted increased weakness/ sleeping more over last few days. Pt on dysphagia 1 diet with honey thick liquids, requiring feeding in small increments. Wife denies coughing, sob, endorses decreased and darker urine output. Pt with diaper. UA +; prior urine cultures all grew out pan-sensitive pseudomonas.    Tmax in .3, wbc 12.47 (01 Nov 2018 17:15)    Wife present at bedside, states pt usually does not c/o of anything, but that she noticed he has been weaker than usual, not eating as much.  He had fever >101 at home.  Wife also notes he has a rash over his back and b/l axillary region that "comes and goes".  Sometimes he develops small pimples that pop open, which she then cleans and applies cortisone cream.  She believes he has a heat rash.  No recent hospitalizations, outpatient procedures or dental work, no recent abx exposure.    Pt admitted to evaluate for UTI.  Also noted to have bacteremia with GPC.  Pt received dose of vanco in the ER, he is now on cipro.  ID edu called for further antibiotic managment.     Problem/Plan - 1:    ·	MRSA bacteremia    - Cont vancomycin,  Maintain levels between 15-20.       - Source possibly rash over pt's back and b/l axillary regions.  ?Contact vs. allergic dermatitis.  Rash is resolving with current managment and nystatin ointment.    - r/o prosthetic valve endocarditits.  TTE no vegetations reported, unable to exclude endocarditis.  D/w pt's wife, who did not want to proceed with invasive procedures and moreover pt is poor surgical candidate in the event DICKSON is positive.  Wife would like to continue conservative management at this time.    - R/o metastatic focus of infection. NM scan results noted - enhancement of L kidney, urine cx negative. Do not suspect pyelonephritis based on clinical presentation    -cont vanco/rifampin/gent.  Monitor vanco trough (maintain between 15-20). Cont gent at 40mg iv bid.  Complete gent for 2 weeks only (11/2 through 11/15).  Cont vanco/rifampin for 4 additional weeks (11/2 through 12/13)      - s/p PICC line.  Check biweekly vanco trough (should be betw. 15-20), gent trough (should be < 1), CBC, CMP, ESR, CRP      - D/C House and TOV ?      Will follow,    Tanisha Ash  [phone]

## 2018-11-11 LAB
BUN SERPL-MCNC: 12 MG/DL — SIGNIFICANT CHANGE UP (ref 7–23)
BUN SERPL-MCNC: 13 MG/DL — SIGNIFICANT CHANGE UP (ref 7–23)
CALCIUM SERPL-MCNC: 8 MG/DL — LOW (ref 8.4–10.5)
CALCIUM SERPL-MCNC: 8.1 MG/DL — LOW (ref 8.4–10.5)
CHLORIDE SERPL-SCNC: 103 MMOL/L — SIGNIFICANT CHANGE UP (ref 98–107)
CHLORIDE SERPL-SCNC: 105 MMOL/L — SIGNIFICANT CHANGE UP (ref 98–107)
CO2 SERPL-SCNC: 27 MMOL/L — SIGNIFICANT CHANGE UP (ref 22–31)
CO2 SERPL-SCNC: 27 MMOL/L — SIGNIFICANT CHANGE UP (ref 22–31)
CREAT SERPL-MCNC: 0.7 MG/DL — SIGNIFICANT CHANGE UP (ref 0.5–1.3)
CREAT SERPL-MCNC: 0.74 MG/DL — SIGNIFICANT CHANGE UP (ref 0.5–1.3)
GLUCOSE BLDC GLUCOMTR-MCNC: 185 MG/DL — HIGH (ref 70–99)
GLUCOSE BLDC GLUCOMTR-MCNC: 215 MG/DL — HIGH (ref 70–99)
GLUCOSE SERPL-MCNC: 176 MG/DL — HIGH (ref 70–99)
GLUCOSE SERPL-MCNC: 245 MG/DL — HIGH (ref 70–99)
HCT VFR BLD CALC: 33.2 % — LOW (ref 39–50)
HGB BLD-MCNC: 11.1 G/DL — LOW (ref 13–17)
MCHC RBC-ENTMCNC: 32.3 PG — SIGNIFICANT CHANGE UP (ref 27–34)
MCHC RBC-ENTMCNC: 33.4 % — SIGNIFICANT CHANGE UP (ref 32–36)
MCV RBC AUTO: 96.5 FL — SIGNIFICANT CHANGE UP (ref 80–100)
NRBC # FLD: 0.02 — SIGNIFICANT CHANGE UP
PLATELET # BLD AUTO: 130 K/UL — LOW (ref 150–400)
PMV BLD: 11.2 FL — SIGNIFICANT CHANGE UP (ref 7–13)
POTASSIUM SERPL-MCNC: 3 MMOL/L — LOW (ref 3.5–5.3)
POTASSIUM SERPL-MCNC: 3.8 MMOL/L — SIGNIFICANT CHANGE UP (ref 3.5–5.3)
POTASSIUM SERPL-SCNC: 3 MMOL/L — LOW (ref 3.5–5.3)
POTASSIUM SERPL-SCNC: 3.8 MMOL/L — SIGNIFICANT CHANGE UP (ref 3.5–5.3)
RBC # BLD: 3.44 M/UL — LOW (ref 4.2–5.8)
RBC # FLD: 13.5 % — SIGNIFICANT CHANGE UP (ref 10.3–14.5)
SODIUM SERPL-SCNC: 139 MMOL/L — SIGNIFICANT CHANGE UP (ref 135–145)
SODIUM SERPL-SCNC: 140 MMOL/L — SIGNIFICANT CHANGE UP (ref 135–145)
VANCOMYCIN FLD-MCNC: 10.1 UG/ML — SIGNIFICANT CHANGE UP
WBC # BLD: 5.72 K/UL — SIGNIFICANT CHANGE UP (ref 3.8–10.5)
WBC # FLD AUTO: 5.72 K/UL — SIGNIFICANT CHANGE UP (ref 3.8–10.5)

## 2018-11-11 RX ORDER — POTASSIUM CHLORIDE 20 MEQ
10 PACKET (EA) ORAL
Qty: 0 | Refills: 0 | Status: COMPLETED | OUTPATIENT
Start: 2018-11-11 | End: 2018-11-11

## 2018-11-11 RX ORDER — VANCOMYCIN HCL 1 G
1250 VIAL (EA) INTRAVENOUS DAILY
Qty: 0 | Refills: 0 | Status: DISCONTINUED | OUTPATIENT
Start: 2018-11-11 | End: 2018-11-13

## 2018-11-11 RX ADMIN — TAMSULOSIN HYDROCHLORIDE 0.4 MILLIGRAM(S): 0.4 CAPSULE ORAL at 21:13

## 2018-11-11 RX ADMIN — SIMVASTATIN 20 MILLIGRAM(S): 20 TABLET, FILM COATED ORAL at 21:13

## 2018-11-11 RX ADMIN — SODIUM CHLORIDE 75 MILLILITER(S): 9 INJECTION INTRAMUSCULAR; INTRAVENOUS; SUBCUTANEOUS at 21:13

## 2018-11-11 RX ADMIN — HEPARIN SODIUM 5000 UNIT(S): 5000 INJECTION INTRAVENOUS; SUBCUTANEOUS at 05:02

## 2018-11-11 RX ADMIN — Medication 100 MILLIGRAM(S): at 00:49

## 2018-11-11 RX ADMIN — HEPARIN SODIUM 5000 UNIT(S): 5000 INJECTION INTRAVENOUS; SUBCUTANEOUS at 14:38

## 2018-11-11 RX ADMIN — HEPARIN SODIUM 5000 UNIT(S): 5000 INJECTION INTRAVENOUS; SUBCUTANEOUS at 21:13

## 2018-11-11 RX ADMIN — Medication 100 MILLIGRAM(S): at 11:52

## 2018-11-11 RX ADMIN — Medication 25 MILLIGRAM(S): at 05:01

## 2018-11-11 RX ADMIN — NYSTATIN CREAM 1 APPLICATION(S): 100000 CREAM TOPICAL at 16:58

## 2018-11-11 RX ADMIN — NYSTATIN CREAM 1 APPLICATION(S): 100000 CREAM TOPICAL at 05:02

## 2018-11-11 RX ADMIN — Medication 81 MILLIGRAM(S): at 10:54

## 2018-11-11 RX ADMIN — Medication 250 MILLIGRAM(S): at 16:58

## 2018-11-11 RX ADMIN — Medication 1: at 08:44

## 2018-11-11 RX ADMIN — Medication 1 TABLET(S): at 10:54

## 2018-11-11 RX ADMIN — Medication 100 MILLIEQUIVALENT(S): at 11:52

## 2018-11-11 RX ADMIN — Medication 100 MILLIEQUIVALENT(S): at 12:42

## 2018-11-11 RX ADMIN — Medication 100 MILLIGRAM(S): at 23:28

## 2018-11-11 RX ADMIN — LISINOPRIL 5 MILLIGRAM(S): 2.5 TABLET ORAL at 05:01

## 2018-11-11 RX ADMIN — PANTOPRAZOLE SODIUM 40 MILLIGRAM(S): 20 TABLET, DELAYED RELEASE ORAL at 05:01

## 2018-11-11 RX ADMIN — Medication 2: at 17:36

## 2018-11-11 RX ADMIN — Medication 100 MILLIGRAM(S): at 21:13

## 2018-11-11 RX ADMIN — Medication 2: at 12:42

## 2018-11-11 RX ADMIN — Medication 100 MILLIGRAM(S): at 05:01

## 2018-11-11 RX ADMIN — SODIUM CHLORIDE 75 MILLILITER(S): 9 INJECTION INTRAMUSCULAR; INTRAVENOUS; SUBCUTANEOUS at 05:01

## 2018-11-11 RX ADMIN — Medication 100 MILLIEQUIVALENT(S): at 10:52

## 2018-11-11 RX ADMIN — Medication 100 MILLIGRAM(S): at 14:38

## 2018-11-11 NOTE — PROGRESS NOTE ADULT - SUBJECTIVE AND OBJECTIVE BOX
PRESENTING CC:Sepsis    SUBJ: 77yr old male significant  PMHx of recurrent UTIs,  HTN, HLD, T2DM, PUD, h/o  recurrent GIB, s/p Aortic root replacement with AVR 2/2 Type A aneurysm (1/2013), CVAx3 with residual left hemiparesis, bedbound, minimally verbal. Lives at home with wife who noted increased weakness-admitted with sepsis on admission-MRSA bacteremia-awake remains afebrile-had PICC for STR      PMH -reviewed admission note, no change since admission  Heart failure: acute [ ] chronic [ ] acute or chronic [ ] diastolic [ ] systolic [ ] combined systolic and diastolic[ ]  JENNIFER: ATN[ ] renal medullary necrosis [ ] CKD I [ ]CKDII [ ]CKD III [ ]CKD IV [ ]CKD V [ ]Other pathological lesions [ ]    MEDICATIONS  (STANDING):  aspirin enteric coated 81 milliGRAM(s) Oral daily  docusate sodium 100 milliGRAM(s) Oral three times a day  gentamicin   IVPB 40 milliGRAM(s) IV Intermittent every 12 hours  heparin  Injectable 5000 Unit(s) SubCutaneous every 8 hours  insulin lispro (HumaLOG) corrective regimen sliding scale   SubCutaneous three times a day before meals  insulin lispro (HumaLOG) corrective regimen sliding scale   SubCutaneous at bedtime  lisinopril 5 milliGRAM(s) Oral daily  metoprolol succinate ER 25 milliGRAM(s) Oral daily  multivitamin 1 Tablet(s) Oral daily  nystatin Powder 1 Application(s) Topical two times a day  pantoprazole    Tablet 40 milliGRAM(s) Oral before breakfast  rifampin 300 milliGRAM(s) Oral three times a day  simvastatin 20 milliGRAM(s) Oral at bedtime  sodium chloride 0.9%. 1000 milliLiter(s) (75 mL/Hr) IV Continuous <Continuous>  tamsulosin 0.4 milliGRAM(s) Oral at bedtime  vancomycin  IVPB 1000 milliGRAM(s) IV Intermittent every 24 hours    MEDICATIONS  (PRN):  acetaminophen   Tablet .. 650 milliGRAM(s) Oral every 6 hours PRN Temp greater or equal to 38C (100.4F), Mild Pain (1 - 3), Moderate Pain (4 - 6)  dextrose 40% Gel 15 Gram(s) Oral once PRN Blood Glucose LESS THAN 70 milliGRAM(s)/deciliter  glucagon  Injectable 1 milliGRAM(s) IntraMuscular once PRN Glucose LESS THAN 70 milligrams/deciliter  magnesium hydroxide Suspension 30 milliLiter(s) Oral daily PRN Constipation          FAMILY HISTORY:  No pertinent family history in first degree relatives  No family history of premature coronary artery disease or sudden cardiac death      REVIEW OF SYSTEMS:  Constitutional: [ ] fever, [ ]weight loss,  [ ]fatigue  Eyes: [ ] visual changes  Respiratory: [ ]shortness of breath;  [ ] cough, [ ]wheezing, [ ]chills, [ ]hemoptysis  Cardiovascular: [ ] chest pain, [ ]palpitations, [ ]dizziness,  [ ]leg swelling[ ]orthopnea[ ]PND  Gastrointestinal: [ ] abdominal pain, [ ]nausea, [ ]vomiting,  [ ]diarrhea   Genitourinary: [ ] dysuria, [ ] hematuria  Neurologic: [ ] headaches [ ] tremors[ ]weakness  Skin: [ ] itching, [ ]burning, [ ] rashes  Endocrine: [ ] heat or cold intolerance  Musculoskeletal: [ ] joint pain or swelling; [ ] muscle, back, or extremity pain  Psychiatric: [ ] depression, [ ]anxiety, [ ]mood swings, or [ ]difficulty sleeping  Hematologic: [ ] easy bruising, [ ] bleeding gums    [x] All remaining systems negative except as per above.   [ ]Unable to obtain.    Vital Signs Last 24 Hrs  T(C): 36.6 (11 Nov 2018 04:55), Max: 36.7 (10 Nov 2018 20:54)  T(F): 97.8 (11 Nov 2018 04:55), Max: 98 (10 Nov 2018 20:54)  HR: 80 (11 Nov 2018 04:55) (77 - 84)  BP: 141/80 (11 Nov 2018 04:55) (135/81 - 150/75)  RR: 17 (11 Nov 2018 04:55) (17 - 18)  SpO2: 98% (11 Nov 2018 04:55) (96% - 98%)  I&O's Summary    10 Nov 2018 07:01  -  11 Nov 2018 07:00  --------------------------------------------------------  IN: 0 mL / OUT: 550 mL / NET: -550 mL        PHYSICAL EXAM:  General: No acute distress BMI-22.1  HEENT: EOMI, PERRL  Neck: Supple, [ ] JVD  Lungs: Equal air entry bilaterally; [ ] rales [ ] wheezing [ ] rhonchi  Heart: Regular rate and rhythm; [x ] murmur   2/6 [x ] systolic [ ] diastolic [ ] radiation[ ] rubs [ ]  gallops  Abdomen: Nontender, bowel sounds present  Extremities: No clubbing, cyanosis, [ ] edema  Nervous system:  Alert & Oriented X3, Right paresis  Psychiatric: Normal affect  Skin: No rashes or lesions    LABS:  11-11    140  |  103  |  13  ----------------------------<  176<H>  3.0<L>   |  27  |  0.70    Ca    8.1<L>      11 Nov 2018 05:00      Creatinine Trend: 0.70<--, 0.67<--, 0.58<--, 0.63<--, 0.78<--, 0.67<--                        11.1   5.72  )-----------( 130      ( 11 Nov 2018 05:00 )             33.2       IMPRESSION AND PLAN:    Problem/Plan - 1:    ·	MRSA bacteremia continue IV ABx

## 2018-11-11 NOTE — PROGRESS NOTE ADULT - ASSESSMENT
· Assessment	  74 yo m with sepsis 2/2 recurrent UTI     Problem/Plan - 1:  ·  Problem: Sepsis due to Urinary tract infection without hematuria, site unspecified. MRSA bacteremia:  Plan: -  IV Abx-  ID f/up noted.    CAD:  Cardio f/up noted.

## 2018-11-11 NOTE — PROGRESS NOTE ADULT - SUBJECTIVE AND OBJECTIVE BOX
Patient is a 77y old  Male who presents with a chief complaint of sepsis (11 Nov 2018 08:11)      SUBJECTIVE / OVERNIGHT EVENTS:  Awake  No N/V/SOB    MEDICATIONS  (STANDING):  aspirin enteric coated 81 milliGRAM(s) Oral daily  dextrose 5%. 1000 milliLiter(s) (50 mL/Hr) IV Continuous <Continuous>  dextrose 50% Injectable 12.5 Gram(s) IV Push once  dextrose 50% Injectable 25 Gram(s) IV Push once  dextrose 50% Injectable 25 Gram(s) IV Push once  docusate sodium 100 milliGRAM(s) Oral three times a day  gentamicin   IVPB 40 milliGRAM(s) IV Intermittent every 12 hours  heparin  Injectable 5000 Unit(s) SubCutaneous every 8 hours  insulin lispro (HumaLOG) corrective regimen sliding scale   SubCutaneous three times a day before meals  insulin lispro (HumaLOG) corrective regimen sliding scale   SubCutaneous at bedtime  lisinopril 5 milliGRAM(s) Oral daily  metoprolol succinate ER 25 milliGRAM(s) Oral daily  multivitamin 1 Tablet(s) Oral daily  nystatin Powder 1 Application(s) Topical two times a day  pantoprazole    Tablet 40 milliGRAM(s) Oral before breakfast  rifampin 300 milliGRAM(s) Oral three times a day  simvastatin 20 milliGRAM(s) Oral at bedtime  sodium chloride 0.9%. 1000 milliLiter(s) (75 mL/Hr) IV Continuous <Continuous>  tamsulosin 0.4 milliGRAM(s) Oral at bedtime  vancomycin  IVPB 1250 milliGRAM(s) IV Intermittent daily    MEDICATIONS  (PRN):  acetaminophen   Tablet .. 650 milliGRAM(s) Oral every 6 hours PRN Temp greater or equal to 38C (100.4F), Mild Pain (1 - 3), Moderate Pain (4 - 6)  dextrose 40% Gel 15 Gram(s) Oral once PRN Blood Glucose LESS THAN 70 milliGRAM(s)/deciliter  glucagon  Injectable 1 milliGRAM(s) IntraMuscular once PRN Glucose LESS THAN 70 milligrams/deciliter  magnesium hydroxide Suspension 30 milliLiter(s) Oral daily PRN Constipation        CAPILLARY BLOOD GLUCOSE      POCT Blood Glucose.: 185 mg/dL (11 Nov 2018 22:18)  POCT Blood Glucose.: 212 mg/dL (11 Nov 2018 17:05)  POCT Blood Glucose.: 215 mg/dL (11 Nov 2018 12:35)  POCT Blood Glucose.: 192 mg/dL (11 Nov 2018 08:14)    I&O's Summary    10 Nov 2018 07:01  -  11 Nov 2018 07:00  --------------------------------------------------------  IN: 0 mL / OUT: 550 mL / NET: -550 mL    11 Nov 2018 07:01  -  11 Nov 2018 22:41  --------------------------------------------------------  IN: 0 mL / OUT: 1200 mL / NET: -1200 mL        PHYSICAL EXAM:    HEAD:  Atraumatic, Normocephalic  NECK: Supple, No JVD  CHEST/LUNG: Clear to auscultation bilaterally; No wheezing.  HEART: Regular rate and rhythm; No murmurs, rubs, or gallops  ABDOMEN: Soft, Nontender, Nondistended; Bowel sounds present  EXTREMITIES:   No clubbing, cyanosis, or edema  NEUROLOGY: Awake    LABS:                        11.1   5.72  )-----------( 130      ( 11 Nov 2018 05:00 )             33.2     11-11    139  |  105  |  12  ----------------------------<  245<H>  3.8   |  27  |  0.74    Ca    8.0<L>      11 Nov 2018 14:45              CAPILLARY BLOOD GLUCOSE      POCT Blood Glucose.: 185 mg/dL (11 Nov 2018 22:18)  POCT Blood Glucose.: 212 mg/dL (11 Nov 2018 17:05)  POCT Blood Glucose.: 215 mg/dL (11 Nov 2018 12:35)  POCT Blood Glucose.: 192 mg/dL (11 Nov 2018 08:14)    11-07 @ 17:03  Culture-urine --  Culture results --  method type --  Organism --  Organism Identification --  Specimen source BLOOD PERIPHERAL           11-07 @ 17:03  Culture blood   NO ORGANISMS ISOLATED  NO ORGANISMS ISOLATED AT 96 HOURS  Culture results --  Gram stain --  Gram stain blood --  Method type --  Organism --  Organism identification --  Specimen source BLOOD PERIPHERAL      RADIOLOGY & ADDITIONAL TESTS:    Imaging Personally Reviewed:    Consultant(s) Notes Reviewed:      Care Discussed with Consultants/Other Providers:

## 2018-11-12 LAB
BACTERIA BLD CULT: SIGNIFICANT CHANGE UP
BUN SERPL-MCNC: 10 MG/DL — SIGNIFICANT CHANGE UP (ref 7–23)
CALCIUM SERPL-MCNC: 8.3 MG/DL — LOW (ref 8.4–10.5)
CHLORIDE SERPL-SCNC: 105 MMOL/L — SIGNIFICANT CHANGE UP (ref 98–107)
CO2 SERPL-SCNC: 27 MMOL/L — SIGNIFICANT CHANGE UP (ref 22–31)
CREAT SERPL-MCNC: 0.71 MG/DL — SIGNIFICANT CHANGE UP (ref 0.5–1.3)
GLUCOSE BLDC GLUCOMTR-MCNC: 193 MG/DL — HIGH (ref 70–99)
GLUCOSE BLDC GLUCOMTR-MCNC: 220 MG/DL — HIGH (ref 70–99)
GLUCOSE BLDC GLUCOMTR-MCNC: 242 MG/DL — HIGH (ref 70–99)
GLUCOSE SERPL-MCNC: 184 MG/DL — HIGH (ref 70–99)
HCT VFR BLD CALC: 35.4 % — LOW (ref 39–50)
HGB BLD-MCNC: 11.7 G/DL — LOW (ref 13–17)
MCHC RBC-ENTMCNC: 32.5 PG — SIGNIFICANT CHANGE UP (ref 27–34)
MCHC RBC-ENTMCNC: 33.1 % — SIGNIFICANT CHANGE UP (ref 32–36)
MCV RBC AUTO: 98.3 FL — SIGNIFICANT CHANGE UP (ref 80–100)
NRBC # FLD: 0 — SIGNIFICANT CHANGE UP
PLATELET # BLD AUTO: 131 K/UL — LOW (ref 150–400)
PMV BLD: 10.9 FL — SIGNIFICANT CHANGE UP (ref 7–13)
POTASSIUM SERPL-MCNC: 3.5 MMOL/L — SIGNIFICANT CHANGE UP (ref 3.5–5.3)
POTASSIUM SERPL-SCNC: 3.5 MMOL/L — SIGNIFICANT CHANGE UP (ref 3.5–5.3)
RBC # BLD: 3.6 M/UL — LOW (ref 4.2–5.8)
RBC # FLD: 13.4 % — SIGNIFICANT CHANGE UP (ref 10.3–14.5)
SODIUM SERPL-SCNC: 141 MMOL/L — SIGNIFICANT CHANGE UP (ref 135–145)
VANCOMYCIN FLD-MCNC: 13.5 UG/ML — SIGNIFICANT CHANGE UP
WBC # BLD: 8.23 K/UL — SIGNIFICANT CHANGE UP (ref 3.8–10.5)
WBC # FLD AUTO: 8.23 K/UL — SIGNIFICANT CHANGE UP (ref 3.8–10.5)

## 2018-11-12 RX ORDER — CHLORHEXIDINE GLUCONATE 213 G/1000ML
1 SOLUTION TOPICAL EVERY 12 HOURS
Qty: 0 | Refills: 0 | Status: DISCONTINUED | OUTPATIENT
Start: 2018-11-12 | End: 2018-11-15

## 2018-11-12 RX ADMIN — SODIUM CHLORIDE 75 MILLILITER(S): 9 INJECTION INTRAMUSCULAR; INTRAVENOUS; SUBCUTANEOUS at 23:32

## 2018-11-12 RX ADMIN — NYSTATIN CREAM 1 APPLICATION(S): 100000 CREAM TOPICAL at 17:44

## 2018-11-12 RX ADMIN — CHLORHEXIDINE GLUCONATE 1 APPLICATION(S): 213 SOLUTION TOPICAL at 12:37

## 2018-11-12 RX ADMIN — Medication 2: at 08:52

## 2018-11-12 RX ADMIN — NYSTATIN CREAM 1 APPLICATION(S): 100000 CREAM TOPICAL at 06:05

## 2018-11-12 RX ADMIN — Medication 100 MILLIGRAM(S): at 06:05

## 2018-11-12 RX ADMIN — SIMVASTATIN 20 MILLIGRAM(S): 20 TABLET, FILM COATED ORAL at 23:32

## 2018-11-12 RX ADMIN — TAMSULOSIN HYDROCHLORIDE 0.4 MILLIGRAM(S): 0.4 CAPSULE ORAL at 23:32

## 2018-11-12 RX ADMIN — Medication 81 MILLIGRAM(S): at 14:37

## 2018-11-12 RX ADMIN — SODIUM CHLORIDE 75 MILLILITER(S): 9 INJECTION INTRAMUSCULAR; INTRAVENOUS; SUBCUTANEOUS at 17:44

## 2018-11-12 RX ADMIN — LISINOPRIL 5 MILLIGRAM(S): 2.5 TABLET ORAL at 06:05

## 2018-11-12 RX ADMIN — HEPARIN SODIUM 5000 UNIT(S): 5000 INJECTION INTRAVENOUS; SUBCUTANEOUS at 06:05

## 2018-11-12 RX ADMIN — Medication 166.67 MILLIGRAM(S): at 12:27

## 2018-11-12 RX ADMIN — HEPARIN SODIUM 5000 UNIT(S): 5000 INJECTION INTRAVENOUS; SUBCUTANEOUS at 14:37

## 2018-11-12 RX ADMIN — Medication 25 MILLIGRAM(S): at 06:05

## 2018-11-12 RX ADMIN — Medication 100 MILLIGRAM(S): at 14:37

## 2018-11-12 RX ADMIN — Medication 1: at 18:21

## 2018-11-12 RX ADMIN — Medication 100 MILLIGRAM(S): at 14:38

## 2018-11-12 RX ADMIN — Medication 2: at 12:36

## 2018-11-12 RX ADMIN — Medication 100 MILLIGRAM(S): at 23:32

## 2018-11-12 RX ADMIN — HEPARIN SODIUM 5000 UNIT(S): 5000 INJECTION INTRAVENOUS; SUBCUTANEOUS at 23:32

## 2018-11-12 RX ADMIN — PANTOPRAZOLE SODIUM 40 MILLIGRAM(S): 20 TABLET, DELAYED RELEASE ORAL at 06:05

## 2018-11-12 RX ADMIN — SODIUM CHLORIDE 75 MILLILITER(S): 9 INJECTION INTRAMUSCULAR; INTRAVENOUS; SUBCUTANEOUS at 06:04

## 2018-11-12 RX ADMIN — Medication 1 TABLET(S): at 14:37

## 2018-11-12 NOTE — PROGRESS NOTE ADULT - ASSESSMENT
77yr old male significant  PMHx of recurrent UTIs,  HTN, HLD, T2DM, PUD, h/o  recurrent GIB, s/p Aortic root replacement with AVR 2/2 Type A aneurysm (1/2013), CVAx3 with residual left hemiparesis, bedbound, minimally verbal. Lives at home with wife who noted increased weakness/ sleeping more over last few days. Pt on dysphagia 1 diet with honey thick liquids, requiring feeding in small increments. Wife denies coughing, sob, endorses decreased and darker urine output. Pt with diaper. UA +; prior urine cultures all grew out pan-sensitive pseudomonas.    Tmax in .3, wbc 12.47 (01 Nov 2018 17:15)    Wife present at bedside, states pt usually does not c/o of anything, but that she noticed he has been weaker than usual, not eating as much.  He had fever >101 at home.  Wife also notes he has a rash over his back and b/l axillary region that "comes and goes".  Sometimes he develops small pimples that pop open, which she then cleans and applies cortisone cream.  She believes he has a heat rash.  No recent hospitalizations, outpatient procedures or dental work, no recent abx exposure.    Pt admitted to evaluate for UTI.  Also noted to have bacteremia with GPC.  Pt received dose of vanco in the ER, he is now on cipro.  NINI sorensen called for further antibiotic managment.     Problem/Plan - 1:    ·	MRSA bacteremia    - Cont vancomycin,  Maintain levels between 15-20.  Vanco increased to 1250mg IV qdaily on 11/11.  Trough today 13.5.  Check repeat trough prior to next dose, cont vanco at present dose for now.    - Source possibly rash over pt's back and b/l axillary regions.  ?Contact vs. allergic dermatitis.  Rash is resolving with current managment and nystatin ointment.    - r/o prosthetic valve endocarditits.  TTE no vegetations reported, unable to exclude endocarditis.  D/w pt's wife, who did not want to proceed with invasive procedures and moreover pt is poor surgical candidate in the event DICKSON is positive.  Wife would like to continue conservative management at this time.    - R/o metastatic focus of infection. NM scan results noted - enhancement of L kidney, urine cx negative. Do not suspect pyelonephritis based on clinical presentation    -cont vanco/rifampin/gent.   Complete gent for 2 weeks only (11/2 through 11/15).  Cont vanco/rifampin for 4 additional weeks (11/2 through 12/13)      - s/p PICC line.  Check biweekly vanco trough (should be betw. 15-20), gent trough (should be < 1), CBC, CMP, ESR, CRP      - Pt pending rehab placement      Will follow,    Tanisha Ash  [phone]

## 2018-11-12 NOTE — PROGRESS NOTE ADULT - SUBJECTIVE AND OBJECTIVE BOX
Patient is a 77y old  Male who presents with a chief complaint of sepsis (12 Nov 2018 17:58)      SUBJECTIVE / OVERNIGHT EVENTS:   Awake  Not in distress    MEDICATIONS  (STANDING):  aspirin enteric coated 81 milliGRAM(s) Oral daily  chlorhexidine 4% Liquid 1 Application(s) Topical every 12 hours  dextrose 5%. 1000 milliLiter(s) (50 mL/Hr) IV Continuous <Continuous>  dextrose 50% Injectable 12.5 Gram(s) IV Push once  dextrose 50% Injectable 25 Gram(s) IV Push once  dextrose 50% Injectable 25 Gram(s) IV Push once  docusate sodium 100 milliGRAM(s) Oral three times a day  gentamicin   IVPB 40 milliGRAM(s) IV Intermittent every 12 hours  heparin  Injectable 5000 Unit(s) SubCutaneous every 8 hours  insulin lispro (HumaLOG) corrective regimen sliding scale   SubCutaneous three times a day before meals  insulin lispro (HumaLOG) corrective regimen sliding scale   SubCutaneous at bedtime  lisinopril 5 milliGRAM(s) Oral daily  metoprolol succinate ER 25 milliGRAM(s) Oral daily  multivitamin 1 Tablet(s) Oral daily  nystatin Powder 1 Application(s) Topical two times a day  pantoprazole    Tablet 40 milliGRAM(s) Oral before breakfast  rifampin 300 milliGRAM(s) Oral three times a day  simvastatin 20 milliGRAM(s) Oral at bedtime  sodium chloride 0.9%. 1000 milliLiter(s) (75 mL/Hr) IV Continuous <Continuous>  tamsulosin 0.4 milliGRAM(s) Oral at bedtime  vancomycin  IVPB 1250 milliGRAM(s) IV Intermittent daily    MEDICATIONS  (PRN):  acetaminophen   Tablet .. 650 milliGRAM(s) Oral every 6 hours PRN Temp greater or equal to 38C (100.4F), Mild Pain (1 - 3), Moderate Pain (4 - 6)  dextrose 40% Gel 15 Gram(s) Oral once PRN Blood Glucose LESS THAN 70 milliGRAM(s)/deciliter  glucagon  Injectable 1 milliGRAM(s) IntraMuscular once PRN Glucose LESS THAN 70 milligrams/deciliter  magnesium hydroxide Suspension 30 milliLiter(s) Oral daily PRN Constipation        CAPILLARY BLOOD GLUCOSE      POCT Blood Glucose.: 202 mg/dL (12 Nov 2018 23:01)  POCT Blood Glucose.: 193 mg/dL (12 Nov 2018 18:15)  POCT Blood Glucose.: 242 mg/dL (12 Nov 2018 12:30)  POCT Blood Glucose.: 220 mg/dL (12 Nov 2018 08:49)    I&O's Summary    11 Nov 2018 07:01  -  12 Nov 2018 07:00  --------------------------------------------------------  IN: 0 mL / OUT: 1200 mL / NET: -1200 mL        PHYSICAL EXAM:    NECK: Supple, No JVD  CHEST/LUNG: Clear to auscultation bilaterally; No wheezing.  HEART: Regular rate and rhythm; No murmurs, rubs, or gallops  ABDOMEN: Soft, Nontender, Nondistended; Bowel sounds present  EXTREMITIES:   No clubbing, cyanosis, or edema  NEUROLOGY: O    LABS:                        11.7   8.23  )-----------( 131      ( 12 Nov 2018 06:15 )             35.4     11-12    141  |  105  |  10  ----------------------------<  184<H>  3.5   |  27  |  0.71    Ca    8.3<L>      12 Nov 2018 06:15              CAPILLARY BLOOD GLUCOSE      POCT Blood Glucose.: 202 mg/dL (12 Nov 2018 23:01)  POCT Blood Glucose.: 193 mg/dL (12 Nov 2018 18:15)  POCT Blood Glucose.: 242 mg/dL (12 Nov 2018 12:30)  POCT Blood Glucose.: 220 mg/dL (12 Nov 2018 08:49)    11-07 @ 17:03  Culture-urine --  Culture results --  method type --  Organism --  Organism Identification --  Specimen source BLOOD PERIPHERAL           11-07 @ 17:03  Culture blood   NO ORGANISMS ISOLATED  Culture results --  Gram stain --  Gram stain blood --  Method type --  Organism --  Organism identification --  Specimen source BLOOD PERIPHERAL      RADIOLOGY & ADDITIONAL TESTS:    Imaging Personally Reviewed:    Consultant(s) Notes Reviewed:      Care Discussed with Consultants/Other Providers:

## 2018-11-12 NOTE — PROGRESS NOTE ADULT - ASSESSMENT
· Assessment	  76 yo m with sepsis 2/2 recurrent UTI     Problem/Plan - 1:  ·  Problem: Sepsis due to Urinary tract infection without hematuria, site unspecified. MRSA bacteremia:  Plan: -  IV Abx-  ID f/up noted.    CAD:  Cardio f/up noted.

## 2018-11-13 LAB
BUN SERPL-MCNC: 10 MG/DL — SIGNIFICANT CHANGE UP (ref 7–23)
CALCIUM SERPL-MCNC: 8.5 MG/DL — SIGNIFICANT CHANGE UP (ref 8.4–10.5)
CHLORIDE SERPL-SCNC: 108 MMOL/L — HIGH (ref 98–107)
CO2 SERPL-SCNC: 26 MMOL/L — SIGNIFICANT CHANGE UP (ref 22–31)
CREAT SERPL-MCNC: 0.7 MG/DL — SIGNIFICANT CHANGE UP (ref 0.5–1.3)
GLUCOSE BLDC GLUCOMTR-MCNC: 185 MG/DL — HIGH (ref 70–99)
GLUCOSE BLDC GLUCOMTR-MCNC: 239 MG/DL — HIGH (ref 70–99)
GLUCOSE BLDC GLUCOMTR-MCNC: 261 MG/DL — HIGH (ref 70–99)
GLUCOSE SERPL-MCNC: 174 MG/DL — HIGH (ref 70–99)
HCT VFR BLD CALC: 33.9 % — LOW (ref 39–50)
HGB BLD-MCNC: 11.3 G/DL — LOW (ref 13–17)
MAGNESIUM SERPL-MCNC: 1.7 MG/DL — SIGNIFICANT CHANGE UP (ref 1.6–2.6)
MCHC RBC-ENTMCNC: 32.3 PG — SIGNIFICANT CHANGE UP (ref 27–34)
MCHC RBC-ENTMCNC: 33.3 % — SIGNIFICANT CHANGE UP (ref 32–36)
MCV RBC AUTO: 96.9 FL — SIGNIFICANT CHANGE UP (ref 80–100)
NRBC # FLD: 0 — SIGNIFICANT CHANGE UP
PHOSPHATE SERPL-MCNC: 2.7 MG/DL — SIGNIFICANT CHANGE UP (ref 2.5–4.5)
PLATELET # BLD AUTO: 134 K/UL — LOW (ref 150–400)
PMV BLD: 11.1 FL — SIGNIFICANT CHANGE UP (ref 7–13)
POTASSIUM SERPL-MCNC: 3.5 MMOL/L — SIGNIFICANT CHANGE UP (ref 3.5–5.3)
POTASSIUM SERPL-SCNC: 3.5 MMOL/L — SIGNIFICANT CHANGE UP (ref 3.5–5.3)
RBC # BLD: 3.5 M/UL — LOW (ref 4.2–5.8)
RBC # FLD: 13.6 % — SIGNIFICANT CHANGE UP (ref 10.3–14.5)
SODIUM SERPL-SCNC: 142 MMOL/L — SIGNIFICANT CHANGE UP (ref 135–145)
VANCOMYCIN FLD-MCNC: 13.6 UG/ML — SIGNIFICANT CHANGE UP
WBC # BLD: 6.65 K/UL — SIGNIFICANT CHANGE UP (ref 3.8–10.5)
WBC # FLD AUTO: 6.65 K/UL — SIGNIFICANT CHANGE UP (ref 3.8–10.5)

## 2018-11-13 RX ORDER — VANCOMYCIN HCL 1 G
1500 VIAL (EA) INTRAVENOUS EVERY 24 HOURS
Qty: 0 | Refills: 0 | Status: DISCONTINUED | OUTPATIENT
Start: 2018-11-13 | End: 2018-11-13

## 2018-11-13 RX ORDER — VANCOMYCIN HCL 1 G
750 VIAL (EA) INTRAVENOUS EVERY 12 HOURS
Qty: 0 | Refills: 0 | Status: DISCONTINUED | OUTPATIENT
Start: 2018-11-14 | End: 2018-11-15

## 2018-11-13 RX ADMIN — LISINOPRIL 5 MILLIGRAM(S): 2.5 TABLET ORAL at 05:48

## 2018-11-13 RX ADMIN — Medication 100 MILLIGRAM(S): at 12:07

## 2018-11-13 RX ADMIN — PANTOPRAZOLE SODIUM 40 MILLIGRAM(S): 20 TABLET, DELAYED RELEASE ORAL at 05:47

## 2018-11-13 RX ADMIN — CHLORHEXIDINE GLUCONATE 1 APPLICATION(S): 213 SOLUTION TOPICAL at 05:47

## 2018-11-13 RX ADMIN — Medication 81 MILLIGRAM(S): at 12:07

## 2018-11-13 RX ADMIN — NYSTATIN CREAM 1 APPLICATION(S): 100000 CREAM TOPICAL at 17:10

## 2018-11-13 RX ADMIN — Medication 1 TABLET(S): at 12:08

## 2018-11-13 RX ADMIN — SODIUM CHLORIDE 75 MILLILITER(S): 9 INJECTION INTRAMUSCULAR; INTRAVENOUS; SUBCUTANEOUS at 08:41

## 2018-11-13 RX ADMIN — SODIUM CHLORIDE 75 MILLILITER(S): 9 INJECTION INTRAMUSCULAR; INTRAVENOUS; SUBCUTANEOUS at 22:10

## 2018-11-13 RX ADMIN — CHLORHEXIDINE GLUCONATE 1 APPLICATION(S): 213 SOLUTION TOPICAL at 17:10

## 2018-11-13 RX ADMIN — Medication 100 MILLIGRAM(S): at 00:47

## 2018-11-13 RX ADMIN — NYSTATIN CREAM 1 APPLICATION(S): 100000 CREAM TOPICAL at 05:47

## 2018-11-13 RX ADMIN — Medication 100 MILLIGRAM(S): at 05:47

## 2018-11-13 RX ADMIN — Medication 100 MILLIGRAM(S): at 22:10

## 2018-11-13 RX ADMIN — Medication 25 MILLIGRAM(S): at 05:48

## 2018-11-13 RX ADMIN — HEPARIN SODIUM 5000 UNIT(S): 5000 INJECTION INTRAVENOUS; SUBCUTANEOUS at 22:10

## 2018-11-13 RX ADMIN — HEPARIN SODIUM 5000 UNIT(S): 5000 INJECTION INTRAVENOUS; SUBCUTANEOUS at 05:48

## 2018-11-13 RX ADMIN — SIMVASTATIN 20 MILLIGRAM(S): 20 TABLET, FILM COATED ORAL at 22:10

## 2018-11-13 RX ADMIN — TAMSULOSIN HYDROCHLORIDE 0.4 MILLIGRAM(S): 0.4 CAPSULE ORAL at 22:10

## 2018-11-13 RX ADMIN — Medication 166.67 MILLIGRAM(S): at 12:07

## 2018-11-13 RX ADMIN — Medication 100 MILLIGRAM(S): at 12:09

## 2018-11-13 RX ADMIN — Medication 3: at 17:10

## 2018-11-13 RX ADMIN — HEPARIN SODIUM 5000 UNIT(S): 5000 INJECTION INTRAVENOUS; SUBCUTANEOUS at 12:09

## 2018-11-13 RX ADMIN — Medication 2: at 12:07

## 2018-11-13 RX ADMIN — Medication 1: at 08:41

## 2018-11-13 RX ADMIN — Medication 100 MILLIGRAM(S): at 23:43

## 2018-11-13 NOTE — DIETITIAN INITIAL EVALUATION ADULT. - ENERGY NEEDS
Ht: 65 inches Wt: dosing weight: 132.4 pounds  BMI: 21.9 kg/m2 IBW: 125 pounds(+/-10%) %IBW: 105%    Edema: + 1 edema left/right foot, + 2 left arm   Skin: skin intact, no pressure injuries noted

## 2018-11-13 NOTE — PROGRESS NOTE ADULT - SUBJECTIVE AND OBJECTIVE BOX
Patient is a 77y old  Male who presents with a chief complaint of sepsis (13 Nov 2018 13:29)      SUBJECTIVE / OVERNIGHT EVENTS:   Awake    MEDICATIONS  (STANDING):  aspirin enteric coated 81 milliGRAM(s) Oral daily  chlorhexidine 4% Liquid 1 Application(s) Topical every 12 hours  dextrose 5%. 1000 milliLiter(s) (50 mL/Hr) IV Continuous <Continuous>  dextrose 50% Injectable 12.5 Gram(s) IV Push once  dextrose 50% Injectable 25 Gram(s) IV Push once  dextrose 50% Injectable 25 Gram(s) IV Push once  docusate sodium 100 milliGRAM(s) Oral three times a day  gentamicin   IVPB 40 milliGRAM(s) IV Intermittent every 12 hours  heparin  Injectable 5000 Unit(s) SubCutaneous every 8 hours  insulin lispro (HumaLOG) corrective regimen sliding scale   SubCutaneous three times a day before meals  insulin lispro (HumaLOG) corrective regimen sliding scale   SubCutaneous at bedtime  lisinopril 5 milliGRAM(s) Oral daily  metoprolol succinate ER 25 milliGRAM(s) Oral daily  multivitamin 1 Tablet(s) Oral daily  nystatin Powder 1 Application(s) Topical two times a day  pantoprazole    Tablet 40 milliGRAM(s) Oral before breakfast  rifampin 300 milliGRAM(s) Oral three times a day  simvastatin 20 milliGRAM(s) Oral at bedtime  sodium chloride 0.9%. 1000 milliLiter(s) (75 mL/Hr) IV Continuous <Continuous>  tamsulosin 0.4 milliGRAM(s) Oral at bedtime  vancomycin  IVPB 750 milliGRAM(s) IV Intermittent every 12 hours    MEDICATIONS  (PRN):  acetaminophen   Tablet .. 650 milliGRAM(s) Oral every 6 hours PRN Temp greater or equal to 38C (100.4F), Mild Pain (1 - 3), Moderate Pain (4 - 6)  dextrose 40% Gel 15 Gram(s) Oral once PRN Blood Glucose LESS THAN 70 milliGRAM(s)/deciliter  glucagon  Injectable 1 milliGRAM(s) IntraMuscular once PRN Glucose LESS THAN 70 milligrams/deciliter  magnesium hydroxide Suspension 30 milliLiter(s) Oral daily PRN Constipation        CAPILLARY BLOOD GLUCOSE      POCT Blood Glucose.: 219 mg/dL (13 Nov 2018 21:52)  POCT Blood Glucose.: 261 mg/dL (13 Nov 2018 17:05)  POCT Blood Glucose.: 239 mg/dL (13 Nov 2018 12:02)  POCT Blood Glucose.: 185 mg/dL (13 Nov 2018 08:37)    I&O's Summary      PHYSICAL EXAM:    HEAD:  Atraumatic, Normocephalic  NECK: Supple, No JVD  CHEST/LUNG: Clear to auscultation bilaterally; No wheezing.  HEART: Regular rate and rhythm; No murmurs, rubs, or gallops  ABDOMEN: Soft, Nontender, Nondistended; Bowel sounds present  EXTREMITIES:   No clubbing, cyanosis, or edema  NEUROLOGY: Awake    LABS:                        11.3   6.65  )-----------( 134      ( 13 Nov 2018 04:50 )             33.9     11-13    142  |  108<H>  |  10  ----------------------------<  174<H>  3.5   |  26  |  0.70    Ca    8.5      13 Nov 2018 04:50  Phos  2.7     11-13  Mg     1.7     11-13              CAPILLARY BLOOD GLUCOSE      POCT Blood Glucose.: 219 mg/dL (13 Nov 2018 21:52)  POCT Blood Glucose.: 261 mg/dL (13 Nov 2018 17:05)  POCT Blood Glucose.: 239 mg/dL (13 Nov 2018 12:02)  POCT Blood Glucose.: 185 mg/dL (13 Nov 2018 08:37)    11-07 @ 17:03  Culture-urine --  Culture results --  method type --  Organism --  Organism Identification --  Specimen source BLOOD PERIPHERAL           11-07 @ 17:03  Culture blood   NO ORGANISMS ISOLATED  Culture results --  Gram stain --  Gram stain blood --  Method type --  Organism --  Organism identification --  Specimen source BLOOD PERIPHERAL      RADIOLOGY & ADDITIONAL TESTS:    Imaging Personally Reviewed:    Consultant(s) Notes Reviewed:      Care Discussed with Consultants/Other Providers:

## 2018-11-13 NOTE — DIETITIAN INITIAL EVALUATION ADULT. - OTHER INFO
Patient seen for LOS. Per chart: Patient is a 77 year old male recurrent UTIs,  HTN, HLD, T2DM, PUD, h/o  recurrent GIB, s/p Aortic root replacement with AVR 2/2 Type A aneurysm (1/2013), CVAx3 with residual left hemiparesis, bedbound, minimally verbal. Per RN- patient is eating well ate 100% of meals. Denies any nausea/vomiting/diarrhea/constipation. Patient tolerating dysphagia 1 pureed diet- honey consistency diet- denies any difficulty chewing and swallowing. Unable to obtain current weight history. Per previous RD note (5/9/17) 142 pounds. Current weight: (dosing wt.)132.4 pounds

## 2018-11-13 NOTE — PROGRESS NOTE ADULT - ASSESSMENT
77yr old male significant  PMHx of recurrent UTIs,  HTN, HLD, T2DM, PUD, h/o  recurrent GIB, s/p Aortic root replacement with AVR 2/2 Type A aneurysm (1/2013), CVAx3 with residual left hemiparesis, bedbound, minimally verbal. Lives at home with wife who noted increased weakness/ sleeping more over last few days. Pt on dysphagia 1 diet with honey thick liquids, requiring feeding in small increments. Wife denies coughing, sob, endorses decreased and darker urine output. Pt with diaper. UA +; prior urine cultures all grew out pan-sensitive pseudomonas.    Tmax in .3, wbc 12.47 (01 Nov 2018 17:15)    Wife present at bedside, states pt usually does not c/o of anything, but that she noticed he has been weaker than usual, not eating as much.  He had fever >101 at home.  Wife also notes he has a rash over his back and b/l axillary region that "comes and goes".  Sometimes he develops small pimples that pop open, which she then cleans and applies cortisone cream.  She believes he has a heat rash.  No recent hospitalizations, outpatient procedures or dental work, no recent abx exposure.    Pt admitted to evaluate for UTI.  Also noted to have bacteremia with GPC.  Pt received dose of vanco in the ER, he is now on cipro.  ID edu called for further antibiotic managment.     Problem/Plan - 1:    ·	MRSA bacteremia    - Cont vancomycin,  Maintain levels between 15-20.  Vanco increased to 1250mg IV qdaily on 11/11.  Trough is 13.6 (on 11/13).  Will increase vanco to 1500mg IV Qdaily.  Check repeat trough prior to next dose and cont to monitor.    - Source possibly rash over pt's back and b/l axillary regions.  ?Contact vs. allergic dermatitis.  Rash is resolving with current managment and nystatin ointment.    - r/o prosthetic valve endocarditits.  TTE no vegetations reported, unable to exclude endocarditis.  D/w pt's wife, who did not want to proceed with invasive procedures and moreover pt is poor surgical candidate in the event DICKSON is positive.  Wife would like to continue conservative management at this time.    - R/o metastatic focus of infection. NM scan results noted - enhancement of L kidney, urine cx negative. Do not suspect pyelonephritis based on clinical presentation    -cont vanco/rifampin/gent.   Complete gent for 2 weeks only (11/2 through 11/15).  Cont vanco/rifampin for 4 additional weeks (11/2 through 12/13)      - s/p PICC line.  Check biweekly vanco trough (should be betw. 15-20), gent trough (should be < 1), CBC, CMP, ESR, CRP      - Pt pending rehab placement, no further ID recs at this time.  Keep scrotum and LE elevated (pt with scrotal swelling and mild LE edema) - d/w RN      Will follow,    Tanisha Ash  [phone]

## 2018-11-13 NOTE — DIETITIAN INITIAL EVALUATION ADULT. - PROBLEM SELECTOR PLAN 1
-c/w IVF  -place on ciprofloxacin based on prior sensitivities  -per attending of record if in or outpt urology eval warranted given recurrent UTI; c/w tamsulosin  -will order renal sono for now

## 2018-11-13 NOTE — DIETITIAN INITIAL EVALUATION ADULT. - PERTINENT LABORATORY DATA
11-13 Na142 mmol/L Glu 174 mg/dL<H> K+ 3.5 mmol/L Cr  0.70 mg/dL BUN 10 mg/dL 11-13 Phos 2.7 mg/dL 11-09 Alb 2.9 g/dL<L> 11-02 [id number] 7.1 %<H>

## 2018-11-13 NOTE — PROGRESS NOTE ADULT - SUBJECTIVE AND OBJECTIVE BOX
PRESENTING CC:Sepsis    SUBJ: 77yr old male significant  PMHx of recurrent UTIs,  HTN, HLD, T2DM, PUD, h/o  recurrent GIB, s/p Aortic root replacement with AVR 2/2 Type A aneurysm (1/2013), CVAx3 with residual left hemiparesis, bedbound, minimally verbal. Lives at home with wife who noted increased weakness-admitted with sepsis on admission-MRSA bacteremia-awake remains afebrile-had PICC for STR      PMH -reviewed admission note, no change since admission  Heart failure: acute [ ] chronic [ ] acute or chronic [ ] diastolic [ ] systolic [ ] combined systolic and diastolic[ ]  JENNIFER: ATN[ ] renal medullary necrosis [ ] CKD I [ ]CKDII [ ]CKD III [ ]CKD IV [ ]CKD V [ ]Other pathological lesions [ ]    MEDICATIONS  (STANDING):  aspirin enteric coated 81 milliGRAM(s) Oral daily  chlorhexidine 4% Liquid 1 Application(s) Topical every 12 hours  docusate sodium 100 milliGRAM(s) Oral three times a day  gentamicin   IVPB 40 milliGRAM(s) IV Intermittent every 12 hours  heparin  Injectable 5000 Unit(s) SubCutaneous every 8 hours  insulin lispro (HumaLOG) corrective regimen sliding scale   SubCutaneous three times a day before meals  insulin lispro (HumaLOG) corrective regimen sliding scale   SubCutaneous at bedtime  lisinopril 5 milliGRAM(s) Oral daily  metoprolol succinate ER 25 milliGRAM(s) Oral daily  multivitamin 1 Tablet(s) Oral daily  nystatin Powder 1 Application(s) Topical two times a day  pantoprazole    Tablet 40 milliGRAM(s) Oral before breakfast  rifampin 300 milliGRAM(s) Oral three times a day  simvastatin 20 milliGRAM(s) Oral at bedtime  sodium chloride 0.9%. 1000 milliLiter(s) (75 mL/Hr) IV Continuous <Continuous>  tamsulosin 0.4 milliGRAM(s) Oral at bedtime  vancomycin  IVPB 1250 milliGRAM(s) IV Intermittent daily    MEDICATIONS  (PRN):  acetaminophen   Tablet .. 650 milliGRAM(s) Oral every 6 hours PRN Temp greater or equal to 38C (100.4F), Mild Pain (1 - 3), Moderate Pain (4 - 6)  dextrose 40% Gel 15 Gram(s) Oral once PRN Blood Glucose LESS THAN 70 milliGRAM(s)/deciliter  glucagon  Injectable 1 milliGRAM(s) IntraMuscular once PRN Glucose LESS THAN 70 milligrams/deciliter  magnesium hydroxide Suspension 30 milliLiter(s) Oral daily PRN Constipation          FAMILY HISTORY:  No pertinent family history in first degree relatives  No family history of premature coronary artery disease or sudden cardiac death      REVIEW OF SYSTEMS:  Constitutional: [ ] fever, [ ]weight loss,  [ ]fatigue  Eyes: [ ] visual changes  Respiratory: [ ]shortness of breath;  [ ] cough, [ ]wheezing, [ ]chills, [ ]hemoptysis  Cardiovascular: [ ] chest pain, [ ]palpitations, [ ]dizziness,  [ ]leg swelling[ ]orthopnea[ ]PND  Gastrointestinal: [ ] abdominal pain, [ ]nausea, [ ]vomiting,  [ ]diarrhea   Genitourinary: [ ] dysuria, [ ] hematuria  Neurologic: [ ] headaches [ ] tremors[ ]weakness  Skin: [ ] itching, [ ]burning, [ ] rashes  Endocrine: [ ] heat or cold intolerance  Musculoskeletal: [ ] joint pain or swelling; [ ] muscle, back, or extremity pain  Psychiatric: [ ] depression, [ ]anxiety, [ ]mood swings, or [ ]difficulty sleeping  Hematologic: [ ] easy bruising, [ ] bleeding gums    [x] All remaining systems negative except as per above.   [ ]Unable to obtain.    Vital Signs Last 24 Hrs  T(C): 36.8 (13 Nov 2018 05:44), Max: 36.8 (13 Nov 2018 05:44)  T(F): 98.3 (13 Nov 2018 05:44), Max: 98.3 (13 Nov 2018 05:44)  HR: 78 (13 Nov 2018 05:44) (78 - 80)  BP: 124/66 (13 Nov 2018 05:44) (124/66 - 153/78)  BP(mean): --  RR: 18 (13 Nov 2018 05:44) (18 - 18)  SpO2: 98% (13 Nov 2018 05:44) (98% - 99%)  I&O's Summary      PHYSICAL EXAM:  General: No acute distress BMI-19  HEENT: EOMI, PERRL  Neck: Supple, [ ] JVD  Lungs: Equal air entry bilaterally; [ ] rales [ ] wheezing [ ] rhonchi  Heart: Regular rate and rhythm; [x ] murmur  2/6 [x ] systolic [ ] diastolic [ ] radiation[ ] rubs [ ]  gallops  Abdomen: Nontender, bowel sounds present  Extremities: No clubbing, cyanosis, [ ] edema  Nervous system:  Alert & Oriented X3,Right paresis  Psychiatric: Normal affect  Skin: No rashes or lesions    LABS:  11-13    142  |  108<H>  |  10  ----------------------------<  174<H>  3.5   |  26  |  0.70    Ca    8.5      13 Nov 2018 04:50  Phos  2.7     11-13  Mg     1.7     11-13      Creatinine Trend: 0.70<--, 0.71<--, 0.74<--, 0.70<--, 0.67<--, 0.58<--                        11.3   6.65  )-----------( 134      ( 13 Nov 2018 04:50 )             33.9         IMPRESSION AND PLAN:    74 yo m with sepsis 2/2 recurrent UTI    Problem/Plan - 1:  ·  Problem:Sepsis 2/2 Urinary tract infection without hematuria, site unspecified.  Plan: -ID follow up noted  -Renal Sono noted  For STR on 11/14      Problem/Plan - 2:  ·  Problem: Sepsis, MRSA bacteremia.  Plan: continue with  IV ABX    Problem/Plan - 3:  ·  Problem: Type 2 diabetes mellitus with complication, unspecified whether long term insulin use.  Plan: hold metformin, place on ISS.     Problem/Plan - 4:  ·  Problem: Essential hypertension.  Plan: c/w toprol, lisinopril as tolerated.

## 2018-11-13 NOTE — DIETITIAN INITIAL EVALUATION ADULT. - PERTINENT MEDS FT
MEDICATIONS  (STANDING):  aspirin enteric coated 81 milliGRAM(s) Oral daily  chlorhexidine 4% Liquid 1 Application(s) Topical every 12 hours  dextrose 5%. 1000 milliLiter(s) (50 mL/Hr) IV Continuous <Continuous>  dextrose 50% Injectable 12.5 Gram(s) IV Push once  dextrose 50% Injectable 25 Gram(s) IV Push once  dextrose 50% Injectable 25 Gram(s) IV Push once  docusate sodium 100 milliGRAM(s) Oral three times a day  gentamicin   IVPB 40 milliGRAM(s) IV Intermittent every 12 hours  heparin  Injectable 5000 Unit(s) SubCutaneous every 8 hours  insulin lispro (HumaLOG) corrective regimen sliding scale   SubCutaneous three times a day before meals  insulin lispro (HumaLOG) corrective regimen sliding scale   SubCutaneous at bedtime  lisinopril 5 milliGRAM(s) Oral daily  metoprolol succinate ER 25 milliGRAM(s) Oral daily  multivitamin 1 Tablet(s) Oral daily  nystatin Powder 1 Application(s) Topical two times a day  pantoprazole    Tablet 40 milliGRAM(s) Oral before breakfast  rifampin 300 milliGRAM(s) Oral three times a day  simvastatin 20 milliGRAM(s) Oral at bedtime  sodium chloride 0.9%. 1000 milliLiter(s) (75 mL/Hr) IV Continuous <Continuous>  tamsulosin 0.4 milliGRAM(s) Oral at bedtime  vancomycin  IVPB 1250 milliGRAM(s) IV Intermittent daily    MEDICATIONS  (PRN):  acetaminophen   Tablet .. 650 milliGRAM(s) Oral every 6 hours PRN Temp greater or equal to 38C (100.4F), Mild Pain (1 - 3), Moderate Pain (4 - 6)  dextrose 40% Gel 15 Gram(s) Oral once PRN Blood Glucose LESS THAN 70 milliGRAM(s)/deciliter  glucagon  Injectable 1 milliGRAM(s) IntraMuscular once PRN Glucose LESS THAN 70 milligrams/deciliter  magnesium hydroxide Suspension 30 milliLiter(s) Oral daily PRN Constipation

## 2018-11-13 NOTE — DIETITIAN INITIAL EVALUATION ADULT. - PROBLEM SELECTOR PLAN 6
IMPROVE VTE Individual Risk Assessment    RISK                                                          Points  [] Previous VTE                                           3  [] Thrombophilia                                        2  [] Lower limb paralysis                              2   [] Current Cancer                                       2   [x] Immobilization > 24 hrs                        1  [] ICU/CCU stay > 24 hours                       1  [x] Age > 60                                                   1    IMPROVE VTE Score: 2  heparin

## 2018-11-13 NOTE — PROGRESS NOTE ADULT - SUBJECTIVE AND OBJECTIVE BOX
Infectious Diseases progress note:    Subjective: No acute o/n events.  Afebrile.  Wife at bedside.  House removed    ROS:  CONSTITUTIONAL:  No fever, chills, rigors  CARDIOVASCULAR:  No chest pain or palpitations  RESPIRATORY:   No SOB, cough, dyspnea on exertion.  No wheezing  GASTROINTESTINAL:  No abd pain, N/V, diarrhea/constipation  EXTREMITIES:  No swelling or joint pain  GENITOURINARY:  No burning on urination, increased frequency or urgency.  No flank pain  NEUROLOGIC:  No HA, visual disturbances  SKIN: No rashes    Allergies    No Known Allergies    Intolerances        ANTIBIOTICS/RELEVANT:  antimicrobials  gentamicin   IVPB 40 milliGRAM(s) IV Intermittent every 12 hours  rifampin 300 milliGRAM(s) Oral three times a day  vancomycin  IVPB 1250 milliGRAM(s) IV Intermittent daily  vancomycin  IVPB 1500 milliGRAM(s) IV Intermittent every 24 hours    immunologic:    OTHER:  acetaminophen   Tablet .. 650 milliGRAM(s) Oral every 6 hours PRN  aspirin enteric coated 81 milliGRAM(s) Oral daily  chlorhexidine 4% Liquid 1 Application(s) Topical every 12 hours  dextrose 40% Gel 15 Gram(s) Oral once PRN  dextrose 5%. 1000 milliLiter(s) IV Continuous <Continuous>  dextrose 50% Injectable 12.5 Gram(s) IV Push once  dextrose 50% Injectable 25 Gram(s) IV Push once  dextrose 50% Injectable 25 Gram(s) IV Push once  docusate sodium 100 milliGRAM(s) Oral three times a day  glucagon  Injectable 1 milliGRAM(s) IntraMuscular once PRN  heparin  Injectable 5000 Unit(s) SubCutaneous every 8 hours  insulin lispro (HumaLOG) corrective regimen sliding scale   SubCutaneous three times a day before meals  insulin lispro (HumaLOG) corrective regimen sliding scale   SubCutaneous at bedtime  lisinopril 5 milliGRAM(s) Oral daily  magnesium hydroxide Suspension 30 milliLiter(s) Oral daily PRN  metoprolol succinate ER 25 milliGRAM(s) Oral daily  multivitamin 1 Tablet(s) Oral daily  nystatin Powder 1 Application(s) Topical two times a day  pantoprazole    Tablet 40 milliGRAM(s) Oral before breakfast  simvastatin 20 milliGRAM(s) Oral at bedtime  sodium chloride 0.9%. 1000 milliLiter(s) IV Continuous <Continuous>  tamsulosin 0.4 milliGRAM(s) Oral at bedtime      Objective:  Vital Signs Last 24 Hrs  T(C): 36.8 (13 Nov 2018 05:44), Max: 36.8 (13 Nov 2018 05:44)  T(F): 98.3 (13 Nov 2018 05:44), Max: 98.3 (13 Nov 2018 05:44)  HR: 78 (13 Nov 2018 05:44) (78 - 80)  BP: 124/66 (13 Nov 2018 05:44) (124/66 - 153/78)  BP(mean): --  RR: 18 (13 Nov 2018 05:44) (18 - 18)  SpO2: 98% (13 Nov 2018 05:44) (98% - 99%)    PHYSICAL EXAM:  Constitutional:NAD  Eyes:BENJY, EOMI  Ear/Nose/Throat: no thrush, mucositis.  Moist mucous membranes	  Neck:no JVD, no lymphadenopathy, supple  Respiratory: CTA phi  Cardiovascular: S1S2 RRR, no murmurs  Gastrointestinal:soft, nontender,  nondistended (+) BS  Extremities:no e/e/c  Skin:  no rashes, open wounds or ulcerations        LABS:                        11.3   6.65  )-----------( 134      ( 13 Nov 2018 04:50 )             33.9     11-13    142  |  108<H>  |  10  ----------------------------<  174<H>  3.5   |  26  |  0.70    Ca    8.5      13 Nov 2018 04:50  Phos  2.7     11-13  Mg     1.7     11-13              Vancomycin Level, Random:  ug/mL (11-13 @ 09:45)  Vancomycin Level, Random:  ug/mL (11-12 @ 06:15)  Vancomycin Level, Random:  ug/mL (11-11 @ 14:45)      Vancomycin Level, Trough: 8.6 ug/mL (11-10 @ 16:40)              MICROBIOLOGY:          RADIOLOGY & ADDITIONAL STUDIES: Infectious Diseases progress note:    Subjective: No acute o/n events.  Afebrile.  Wife at bedside.  House removed, pt able to urinate.  Has scrotal swelling, and mild edema of b/l LE.      ROS:  nonverbal    Allergies    No Known Allergies    Intolerances        ANTIBIOTICS/RELEVANT:  antimicrobials  gentamicin   IVPB 40 milliGRAM(s) IV Intermittent every 12 hours  rifampin 300 milliGRAM(s) Oral three times a day  vancomycin  IVPB 1250 milliGRAM(s) IV Intermittent daily  vancomycin  IVPB 1500 milliGRAM(s) IV Intermittent every 24 hours    immunologic:    OTHER:  acetaminophen   Tablet .. 650 milliGRAM(s) Oral every 6 hours PRN  aspirin enteric coated 81 milliGRAM(s) Oral daily  chlorhexidine 4% Liquid 1 Application(s) Topical every 12 hours  dextrose 40% Gel 15 Gram(s) Oral once PRN  dextrose 5%. 1000 milliLiter(s) IV Continuous <Continuous>  dextrose 50% Injectable 12.5 Gram(s) IV Push once  dextrose 50% Injectable 25 Gram(s) IV Push once  dextrose 50% Injectable 25 Gram(s) IV Push once  docusate sodium 100 milliGRAM(s) Oral three times a day  glucagon  Injectable 1 milliGRAM(s) IntraMuscular once PRN  heparin  Injectable 5000 Unit(s) SubCutaneous every 8 hours  insulin lispro (HumaLOG) corrective regimen sliding scale   SubCutaneous three times a day before meals  insulin lispro (HumaLOG) corrective regimen sliding scale   SubCutaneous at bedtime  lisinopril 5 milliGRAM(s) Oral daily  magnesium hydroxide Suspension 30 milliLiter(s) Oral daily PRN  metoprolol succinate ER 25 milliGRAM(s) Oral daily  multivitamin 1 Tablet(s) Oral daily  nystatin Powder 1 Application(s) Topical two times a day  pantoprazole    Tablet 40 milliGRAM(s) Oral before breakfast  simvastatin 20 milliGRAM(s) Oral at bedtime  sodium chloride 0.9%. 1000 milliLiter(s) IV Continuous <Continuous>  tamsulosin 0.4 milliGRAM(s) Oral at bedtime      Objective:  Vital Signs Last 24 Hrs  T(C): 36.8 (13 Nov 2018 05:44), Max: 36.8 (13 Nov 2018 05:44)  T(F): 98.3 (13 Nov 2018 05:44), Max: 98.3 (13 Nov 2018 05:44)  HR: 78 (13 Nov 2018 05:44) (78 - 80)  BP: 124/66 (13 Nov 2018 05:44) (124/66 - 153/78)  BP(mean): --  RR: 18 (13 Nov 2018 05:44) (18 - 18)  SpO2: 98% (13 Nov 2018 05:44) (98% - 99%)    PHYSICAL EXAM:  Constitutional:NAD  Eyes:BENJY, EOMI  Ear/Nose/Throat: no thrush, mucositis.  Moist mucous membranes	  Neck:no JVD, no lymphadenopathy, supple  Respiratory: CTA phi  Cardiovascular: S1S2 RRR, no murmurs  Gastrointestinal:soft, nontender,  nondistended (+) BS  Extremities: b/l mild edema LE  Skin:  no rashes, open wounds or ulcerations  :  scrotal swelling        LABS:                        11.3   6.65  )-----------( 134      ( 13 Nov 2018 04:50 )             33.9     11-13    142  |  108<H>  |  10  ----------------------------<  174<H>  3.5   |  26  |  0.70    Ca    8.5      13 Nov 2018 04:50  Phos  2.7     11-13  Mg     1.7     11-13          Vancomycin Level, Random: 13.6: Therapeutic ranges have not been established for random  vancomycin. Interpret results in context of patient's  clinical condition and time sample was drawn relative to  peak and trough therapeutic ranges. Therapeutic ranges for  peak vancomycin are 25-50 and for trough vancomycin 10-20  with 15-20 mcg/mL used for complicated infections. ug/mL (11.13.18 @ 09:45)            MICROBIOLOGY:    Culture - Blood (11.07.18 @ 17:03)    Culture - Blood:   NO ORGANISMS ISOLATED    Specimen Source: BLOOD PERIPHERAL    Culture - Blood in AM (11.03.18 @ 06:13)    Culture - Blood:   NO ORGANISMS ISOLATED    Specimen Source: BLOOD PERIPHERAL          RADIOLOGY & ADDITIONAL STUDIES:

## 2018-11-14 LAB
BUN SERPL-MCNC: 13 MG/DL — SIGNIFICANT CHANGE UP (ref 7–23)
CALCIUM SERPL-MCNC: 8.3 MG/DL — LOW (ref 8.4–10.5)
CHLORIDE SERPL-SCNC: 104 MMOL/L — SIGNIFICANT CHANGE UP (ref 98–107)
CO2 SERPL-SCNC: 26 MMOL/L — SIGNIFICANT CHANGE UP (ref 22–31)
CREAT SERPL-MCNC: 0.74 MG/DL — SIGNIFICANT CHANGE UP (ref 0.5–1.3)
GLUCOSE BLDC GLUCOMTR-MCNC: 248 MG/DL — HIGH (ref 70–99)
GLUCOSE SERPL-MCNC: 177 MG/DL — HIGH (ref 70–99)
HCT VFR BLD CALC: 34.7 % — LOW (ref 39–50)
HGB BLD-MCNC: 11.6 G/DL — LOW (ref 13–17)
MAGNESIUM SERPL-MCNC: 1.6 MG/DL — SIGNIFICANT CHANGE UP (ref 1.6–2.6)
MCHC RBC-ENTMCNC: 32 PG — SIGNIFICANT CHANGE UP (ref 27–34)
MCHC RBC-ENTMCNC: 33.4 % — SIGNIFICANT CHANGE UP (ref 32–36)
MCV RBC AUTO: 95.9 FL — SIGNIFICANT CHANGE UP (ref 80–100)
NRBC # FLD: 0 — SIGNIFICANT CHANGE UP
PHOSPHATE SERPL-MCNC: 2.8 MG/DL — SIGNIFICANT CHANGE UP (ref 2.5–4.5)
PLATELET # BLD AUTO: 139 K/UL — LOW (ref 150–400)
PMV BLD: 11 FL — SIGNIFICANT CHANGE UP (ref 7–13)
POTASSIUM SERPL-MCNC: 3.4 MMOL/L — LOW (ref 3.5–5.3)
POTASSIUM SERPL-SCNC: 3.4 MMOL/L — LOW (ref 3.5–5.3)
RBC # BLD: 3.62 M/UL — LOW (ref 4.2–5.8)
RBC # FLD: 13.6 % — SIGNIFICANT CHANGE UP (ref 10.3–14.5)
SODIUM SERPL-SCNC: 140 MMOL/L — SIGNIFICANT CHANGE UP (ref 135–145)
VANCOMYCIN TROUGH SERPL-MCNC: 15.8 UG/ML — SIGNIFICANT CHANGE UP (ref 10–20)
WBC # BLD: 6.55 K/UL — SIGNIFICANT CHANGE UP (ref 3.8–10.5)
WBC # FLD AUTO: 6.55 K/UL — SIGNIFICANT CHANGE UP (ref 3.8–10.5)

## 2018-11-14 RX ORDER — POTASSIUM CHLORIDE 20 MEQ
40 PACKET (EA) ORAL
Qty: 0 | Refills: 0 | Status: COMPLETED | OUTPATIENT
Start: 2018-11-14 | End: 2018-11-15

## 2018-11-14 RX ORDER — FUROSEMIDE 40 MG
20 TABLET ORAL
Qty: 0 | Refills: 0 | Status: COMPLETED | OUTPATIENT
Start: 2018-11-14 | End: 2018-11-15

## 2018-11-14 RX ADMIN — HEPARIN SODIUM 5000 UNIT(S): 5000 INJECTION INTRAVENOUS; SUBCUTANEOUS at 22:31

## 2018-11-14 RX ADMIN — Medication 2: at 12:50

## 2018-11-14 RX ADMIN — Medication 2: at 08:48

## 2018-11-14 RX ADMIN — Medication 100 MILLIGRAM(S): at 05:45

## 2018-11-14 RX ADMIN — Medication 2: at 17:50

## 2018-11-14 RX ADMIN — Medication 100 MILLIGRAM(S): at 12:51

## 2018-11-14 RX ADMIN — Medication 25 MILLIGRAM(S): at 05:44

## 2018-11-14 RX ADMIN — PANTOPRAZOLE SODIUM 40 MILLIGRAM(S): 20 TABLET, DELAYED RELEASE ORAL at 05:45

## 2018-11-14 RX ADMIN — Medication 250 MILLIGRAM(S): at 22:30

## 2018-11-14 RX ADMIN — Medication 250 MILLIGRAM(S): at 07:19

## 2018-11-14 RX ADMIN — TAMSULOSIN HYDROCHLORIDE 0.4 MILLIGRAM(S): 0.4 CAPSULE ORAL at 22:30

## 2018-11-14 RX ADMIN — Medication 1 TABLET(S): at 12:51

## 2018-11-14 RX ADMIN — SIMVASTATIN 20 MILLIGRAM(S): 20 TABLET, FILM COATED ORAL at 22:30

## 2018-11-14 RX ADMIN — Medication 81 MILLIGRAM(S): at 12:50

## 2018-11-14 RX ADMIN — Medication 20 MILLIGRAM(S): at 17:51

## 2018-11-14 RX ADMIN — SODIUM CHLORIDE 75 MILLILITER(S): 9 INJECTION INTRAMUSCULAR; INTRAVENOUS; SUBCUTANEOUS at 22:30

## 2018-11-14 RX ADMIN — SODIUM CHLORIDE 75 MILLILITER(S): 9 INJECTION INTRAMUSCULAR; INTRAVENOUS; SUBCUTANEOUS at 12:50

## 2018-11-14 RX ADMIN — HEPARIN SODIUM 5000 UNIT(S): 5000 INJECTION INTRAVENOUS; SUBCUTANEOUS at 12:52

## 2018-11-14 RX ADMIN — NYSTATIN CREAM 1 APPLICATION(S): 100000 CREAM TOPICAL at 05:44

## 2018-11-14 RX ADMIN — CHLORHEXIDINE GLUCONATE 1 APPLICATION(S): 213 SOLUTION TOPICAL at 05:44

## 2018-11-14 RX ADMIN — SODIUM CHLORIDE 75 MILLILITER(S): 9 INJECTION INTRAMUSCULAR; INTRAVENOUS; SUBCUTANEOUS at 05:44

## 2018-11-14 RX ADMIN — LISINOPRIL 5 MILLIGRAM(S): 2.5 TABLET ORAL at 05:45

## 2018-11-14 RX ADMIN — HEPARIN SODIUM 5000 UNIT(S): 5000 INJECTION INTRAVENOUS; SUBCUTANEOUS at 05:44

## 2018-11-14 RX ADMIN — CHLORHEXIDINE GLUCONATE 1 APPLICATION(S): 213 SOLUTION TOPICAL at 17:51

## 2018-11-14 RX ADMIN — Medication 100 MILLIGRAM(S): at 22:30

## 2018-11-14 RX ADMIN — Medication 40 MILLIEQUIVALENT(S): at 17:51

## 2018-11-14 RX ADMIN — NYSTATIN CREAM 1 APPLICATION(S): 100000 CREAM TOPICAL at 17:51

## 2018-11-14 NOTE — PROGRESS NOTE ADULT - SUBJECTIVE AND OBJECTIVE BOX
Infectious Diseases progress note:    Subjective:  NAD, no acute o/n events.  AFebrile.  No leukocytosis    ROS:  nonverbal    Allergies    No Known Allergies    Intolerances        ANTIBIOTICS/RELEVANT:  antimicrobials  gentamicin   IVPB 40 milliGRAM(s) IV Intermittent every 12 hours  rifampin 300 milliGRAM(s) Oral three times a day  vancomycin  IVPB 750 milliGRAM(s) IV Intermittent every 12 hours    immunologic:    OTHER:  acetaminophen   Tablet .. 650 milliGRAM(s) Oral every 6 hours PRN  aspirin enteric coated 81 milliGRAM(s) Oral daily  chlorhexidine 4% Liquid 1 Application(s) Topical every 12 hours  dextrose 40% Gel 15 Gram(s) Oral once PRN  dextrose 5%. 1000 milliLiter(s) IV Continuous <Continuous>  dextrose 50% Injectable 12.5 Gram(s) IV Push once  dextrose 50% Injectable 25 Gram(s) IV Push once  dextrose 50% Injectable 25 Gram(s) IV Push once  docusate sodium 100 milliGRAM(s) Oral three times a day  furosemide   Injectable 20 milliGRAM(s) IV Push two times a day  glucagon  Injectable 1 milliGRAM(s) IntraMuscular once PRN  heparin  Injectable 5000 Unit(s) SubCutaneous every 8 hours  insulin lispro (HumaLOG) corrective regimen sliding scale   SubCutaneous three times a day before meals  insulin lispro (HumaLOG) corrective regimen sliding scale   SubCutaneous at bedtime  lisinopril 5 milliGRAM(s) Oral daily  magnesium hydroxide Suspension 30 milliLiter(s) Oral daily PRN  metoprolol succinate ER 25 milliGRAM(s) Oral daily  multivitamin 1 Tablet(s) Oral daily  nystatin Powder 1 Application(s) Topical two times a day  pantoprazole    Tablet 40 milliGRAM(s) Oral before breakfast  potassium chloride   Powder 40 milliEquivalent(s) Oral two times a day  simvastatin 20 milliGRAM(s) Oral at bedtime  sodium chloride 0.9%. 1000 milliLiter(s) IV Continuous <Continuous>  tamsulosin 0.4 milliGRAM(s) Oral at bedtime      Objective:  Vital Signs Last 24 Hrs  T(C): 36.8 (14 Nov 2018 20:42), Max: 37 (14 Nov 2018 05:40)  T(F): 98.3 (14 Nov 2018 20:42), Max: 98.6 (14 Nov 2018 05:40)  HR: 86 (14 Nov 2018 20:42) (78 - 86)  BP: 154/81 (14 Nov 2018 20:42) (137/67 - 154/81)  BP(mean): --  RR: 18 (14 Nov 2018 20:42) (18 - 18)  SpO2: 98% (14 Nov 2018 20:42) (96% - 98%)    PHYSICAL EXAM:  Constitutional:NAD  Eyes:BENJY, EOMI  Ear/Nose/Throat: no thrush, mucositis.  Moist mucous membranes	  Neck:no JVD, no lymphadenopathy, supple  Respiratory: CTA phi  Cardiovascular: S1S2 RRR, no murmurs  Gastrointestinal:soft, nontender,  nondistended (+) BS  Extremities:no e/e/c  Skin:  no rashes, open wounds or ulcerations        LABS:                        11.6   6.55  )-----------( 139      ( 14 Nov 2018 06:17 )             34.7     11-14    140  |  104  |  13  ----------------------------<  177<H>  3.4<L>   |  26  |  0.74    Ca    8.3<L>      14 Nov 2018 05:12  Phos  2.8     11-14  Mg     1.6     11-14              Vancomycin Level, Random:  ug/mL (11-13 @ 09:45)  Vancomycin Level, Random:  ug/mL (11-12 @ 06:15)  Vancomycin Level, Random:  ug/mL (11-11 @ 14:45)      Vancomycin Level, Trough: 15.8 ug/mL (11-14 @ 05:12)  Vancomycin Level, Trough: 8.6 ug/mL (11-10 @ 16:40)              MICROBIOLOGY:          RADIOLOGY & ADDITIONAL STUDIES:

## 2018-11-14 NOTE — PROGRESS NOTE ADULT - ASSESSMENT
77yr old male significant  PMHx of recurrent UTIs,  HTN, HLD, T2DM, PUD, h/o  recurrent GIB, s/p Aortic root replacement with AVR 2/2 Type A aneurysm (1/2013), CVAx3 with residual left hemiparesis, bedbound, minimally verbal. Lives at home with wife who noted increased weakness/ sleeping more over last few days. Pt on dysphagia 1 diet with honey thick liquids, requiring feeding in small increments. Wife denies coughing, sob, endorses decreased and darker urine output. Pt with diaper. UA +; prior urine cultures all grew out pan-sensitive pseudomonas.    Tmax in .3, wbc 12.47 (01 Nov 2018 17:15)    Wife present at bedside, states pt usually does not c/o of anything, but that she noticed he has been weaker than usual, not eating as much.  He had fever >101 at home.  Wife also notes he has a rash over his back and b/l axillary region that "comes and goes".  Sometimes he develops small pimples that pop open, which she then cleans and applies cortisone cream.  She believes he has a heat rash.  No recent hospitalizations, outpatient procedures or dental work, no recent abx exposure.    Pt admitted to evaluate for UTI.  Also noted to have bacteremia with GPC.  Pt received dose of vanco in the ER, he is now on cipro.  ID edu called for further antibiotic managment.     Problem/Plan - 1:    ·	MRSA bacteremia    - Cont vancomycin,  Maintain levels between 15-20.  Vanco increased to 1250mg IV qdaily on 11/11.  Trough is 13.6 (on 11/13).  Will increase vanco to 1500mg IV Qdaily.  Check repeat trough prior to next dose and cont to monitor.    - Source possibly rash over pt's back and b/l axillary regions.  ?Contact vs. allergic dermatitis.  Rash is resolving with current managment and nystatin ointment.    - r/o prosthetic valve endocarditits.  TTE no vegetations reported, unable to exclude endocarditis.  D/w pt's wife, who did not want to proceed with invasive procedures and moreover pt is poor surgical candidate in the event DICKSON is positive.  Wife would like to continue conservative management at this time.    - R/o metastatic focus of infection. NM scan results noted - enhancement of L kidney, urine cx negative. Do not suspect pyelonephritis based on clinical presentation    -cont vanco/rifampin/gent.   Complete gent for 2 weeks only (11/2 through 11/15).  Cont vanco/rifampin for 4 additional weeks (11/2 through 12/13)      - s/p PICC line.  Check biweekly vanco trough (should be betw. 15-20), gent trough (should be < 1), CBC, CMP, ESR, CRP    - Monitor LFTs while pt is on rifampin      - Pt pending rehab placement, no further ID recs at this time.        Will follow,    Tanisha Ash  [phone]

## 2018-11-14 NOTE — PROGRESS NOTE ADULT - SUBJECTIVE AND OBJECTIVE BOX
Patient is a 77y old  Male who presents with a chief complaint of sepsis (14 Nov 2018 17:31)      SUBJECTIVE / OVERNIGHT EVENTS:  Awake  No CP/SOB/Palpitation    MEDICATIONS  (STANDING):  aspirin enteric coated 81 milliGRAM(s) Oral daily  chlorhexidine 4% Liquid 1 Application(s) Topical every 12 hours  dextrose 5%. 1000 milliLiter(s) (50 mL/Hr) IV Continuous <Continuous>  dextrose 50% Injectable 12.5 Gram(s) IV Push once  dextrose 50% Injectable 25 Gram(s) IV Push once  dextrose 50% Injectable 25 Gram(s) IV Push once  docusate sodium 100 milliGRAM(s) Oral three times a day  furosemide   Injectable 20 milliGRAM(s) IV Push two times a day  gentamicin   IVPB 40 milliGRAM(s) IV Intermittent every 12 hours  heparin  Injectable 5000 Unit(s) SubCutaneous every 8 hours  insulin lispro (HumaLOG) corrective regimen sliding scale   SubCutaneous three times a day before meals  insulin lispro (HumaLOG) corrective regimen sliding scale   SubCutaneous at bedtime  lisinopril 5 milliGRAM(s) Oral daily  metoprolol succinate ER 25 milliGRAM(s) Oral daily  multivitamin 1 Tablet(s) Oral daily  nystatin Powder 1 Application(s) Topical two times a day  pantoprazole    Tablet 40 milliGRAM(s) Oral before breakfast  potassium chloride   Powder 40 milliEquivalent(s) Oral two times a day  rifampin 300 milliGRAM(s) Oral three times a day  simvastatin 20 milliGRAM(s) Oral at bedtime  sodium chloride 0.9%. 1000 milliLiter(s) (75 mL/Hr) IV Continuous <Continuous>  tamsulosin 0.4 milliGRAM(s) Oral at bedtime  vancomycin  IVPB 750 milliGRAM(s) IV Intermittent every 12 hours    MEDICATIONS  (PRN):  acetaminophen   Tablet .. 650 milliGRAM(s) Oral every 6 hours PRN Temp greater or equal to 38C (100.4F), Mild Pain (1 - 3), Moderate Pain (4 - 6)  dextrose 40% Gel 15 Gram(s) Oral once PRN Blood Glucose LESS THAN 70 milliGRAM(s)/deciliter  glucagon  Injectable 1 milliGRAM(s) IntraMuscular once PRN Glucose LESS THAN 70 milligrams/deciliter  magnesium hydroxide Suspension 30 milliLiter(s) Oral daily PRN Constipation        CAPILLARY BLOOD GLUCOSE      POCT Blood Glucose.: 235 mg/dL (14 Nov 2018 22:13)  POCT Blood Glucose.: 208 mg/dL (14 Nov 2018 17:21)  POCT Blood Glucose.: 248 mg/dL (14 Nov 2018 12:47)  POCT Blood Glucose.: 226 mg/dL (14 Nov 2018 08:45)    I&O's Summary    13 Nov 2018 07:01  -  14 Nov 2018 07:00  --------------------------------------------------------  IN: 0 mL / OUT: 350 mL / NET: -350 mL    14 Nov 2018 07:01  -  15 Nov 2018 00:06  --------------------------------------------------------  IN: 0 mL / OUT: 1100 mL / NET: -1100 mL        PHYSICAL EXAM:      NECK: Supple, No JVD  CHEST/LUNG: Clear to auscultation bilaterally; No wheezing.  HEART: Regular rate and rhythm; No murmurs, rubs, or gallops  ABDOMEN: Soft, Nontender, Nondistended; Bowel sounds present  EXTREMITIES:   No clubbing, cyanosis, or edema  NEUROLOGY: Awake      LABS:                        11.6   6.55  )-----------( 139      ( 14 Nov 2018 06:17 )             34.7     11-14    140  |  104  |  13  ----------------------------<  177<H>  3.4<L>   |  26  |  0.74    Ca    8.3<L>      14 Nov 2018 05:12  Phos  2.8     11-14  Mg     1.6     11-14              CAPILLARY BLOOD GLUCOSE      POCT Blood Glucose.: 235 mg/dL (14 Nov 2018 22:13)  POCT Blood Glucose.: 208 mg/dL (14 Nov 2018 17:21)  POCT Blood Glucose.: 248 mg/dL (14 Nov 2018 12:47)  POCT Blood Glucose.: 226 mg/dL (14 Nov 2018 08:45)                RADIOLOGY & ADDITIONAL TESTS:    Imaging Personally Reviewed:    Consultant(s) Notes Reviewed:      Care Discussed with Consultants/Other Providers:

## 2018-11-14 NOTE — PROGRESS NOTE ADULT - ASSESSMENT
· Assessment	  76 yo m with sepsis 2/2 recurrent UTI     Problem/Plan - 1:  ·  Problem: Sepsis due to Urinary tract infection without hematuria, site unspecified. MRSA bacteremia:  Plan: -  IV Abx-  ID f/up noted.    CAD:  Cardio f/up noted.    Hypokalemia:  K supp  BMP    Uncontrolled DM II:  FSSS

## 2018-11-15 VITALS
OXYGEN SATURATION: 96 % | RESPIRATION RATE: 18 BRPM | DIASTOLIC BLOOD PRESSURE: 69 MMHG | HEART RATE: 77 BPM | SYSTOLIC BLOOD PRESSURE: 126 MMHG | TEMPERATURE: 98 F

## 2018-11-15 LAB
ALBUMIN SERPL ELPH-MCNC: 2.8 G/DL — LOW (ref 3.3–5)
ALP SERPL-CCNC: 73 U/L — SIGNIFICANT CHANGE UP (ref 40–120)
ALT FLD-CCNC: 18 U/L — SIGNIFICANT CHANGE UP (ref 4–41)
AST SERPL-CCNC: 15 U/L — SIGNIFICANT CHANGE UP (ref 4–40)
BILIRUB DIRECT SERPL-MCNC: 0.1 MG/DL — SIGNIFICANT CHANGE UP (ref 0.1–0.2)
BILIRUB SERPL-MCNC: 0.2 MG/DL — SIGNIFICANT CHANGE UP (ref 0.2–1.2)
BUN SERPL-MCNC: 13 MG/DL — SIGNIFICANT CHANGE UP (ref 7–23)
CALCIUM SERPL-MCNC: 8.6 MG/DL — SIGNIFICANT CHANGE UP (ref 8.4–10.5)
CHLORIDE SERPL-SCNC: 103 MMOL/L — SIGNIFICANT CHANGE UP (ref 98–107)
CO2 SERPL-SCNC: 25 MMOL/L — SIGNIFICANT CHANGE UP (ref 22–31)
CREAT SERPL-MCNC: 0.79 MG/DL — SIGNIFICANT CHANGE UP (ref 0.5–1.3)
GLUCOSE BLDC GLUCOMTR-MCNC: 249 MG/DL — HIGH (ref 70–99)
GLUCOSE SERPL-MCNC: 222 MG/DL — HIGH (ref 70–99)
HCT VFR BLD CALC: 38.3 % — LOW (ref 39–50)
HGB BLD-MCNC: 12.5 G/DL — LOW (ref 13–17)
MAGNESIUM SERPL-MCNC: 1.7 MG/DL — SIGNIFICANT CHANGE UP (ref 1.6–2.6)
MCHC RBC-ENTMCNC: 32.1 PG — SIGNIFICANT CHANGE UP (ref 27–34)
MCHC RBC-ENTMCNC: 32.6 % — SIGNIFICANT CHANGE UP (ref 32–36)
MCV RBC AUTO: 98.2 FL — SIGNIFICANT CHANGE UP (ref 80–100)
NRBC # FLD: 0 — SIGNIFICANT CHANGE UP
PHOSPHATE SERPL-MCNC: 2.9 MG/DL — SIGNIFICANT CHANGE UP (ref 2.5–4.5)
PLATELET # BLD AUTO: 164 K/UL — SIGNIFICANT CHANGE UP (ref 150–400)
PMV BLD: 11.2 FL — SIGNIFICANT CHANGE UP (ref 7–13)
POTASSIUM SERPL-MCNC: 3.8 MMOL/L — SIGNIFICANT CHANGE UP (ref 3.5–5.3)
POTASSIUM SERPL-SCNC: 3.8 MMOL/L — SIGNIFICANT CHANGE UP (ref 3.5–5.3)
PROT SERPL-MCNC: 5.5 G/DL — LOW (ref 6–8.3)
RBC # BLD: 3.9 M/UL — LOW (ref 4.2–5.8)
RBC # FLD: 13.3 % — SIGNIFICANT CHANGE UP (ref 10.3–14.5)
SODIUM SERPL-SCNC: 139 MMOL/L — SIGNIFICANT CHANGE UP (ref 135–145)
VANCOMYCIN FLD-MCNC: 18.1 UG/ML — SIGNIFICANT CHANGE UP
WBC # BLD: 9.2 K/UL — SIGNIFICANT CHANGE UP (ref 3.8–10.5)
WBC # FLD AUTO: 9.2 K/UL — SIGNIFICANT CHANGE UP (ref 3.8–10.5)

## 2018-11-15 RX ORDER — DOCUSATE SODIUM 100 MG
3 CAPSULE ORAL
Qty: 0 | Refills: 0 | COMMUNITY

## 2018-11-15 RX ORDER — DOCUSATE SODIUM 100 MG
1 CAPSULE ORAL
Qty: 0 | Refills: 0 | COMMUNITY
Start: 2018-11-15

## 2018-11-15 RX ORDER — NYSTATIN CREAM 100000 [USP'U]/G
1 CREAM TOPICAL
Qty: 0 | Refills: 0 | COMMUNITY
Start: 2018-11-15

## 2018-11-15 RX ORDER — ACETAMINOPHEN 500 MG
2 TABLET ORAL
Qty: 0 | Refills: 0 | COMMUNITY
Start: 2018-11-15

## 2018-11-15 RX ORDER — MAGNESIUM HYDROXIDE 400 MG/1
30 TABLET, CHEWABLE ORAL
Qty: 0 | Refills: 0 | COMMUNITY
Start: 2018-11-15

## 2018-11-15 RX ORDER — VANCOMYCIN HCL 1 G
750 VIAL (EA) INTRAVENOUS
Qty: 0 | Refills: 0 | COMMUNITY
Start: 2018-11-15

## 2018-11-15 RX ORDER — GENTAMICIN SULFATE 40 MG/ML
40 VIAL (ML) INJECTION ONCE
Qty: 0 | Refills: 0 | Status: COMPLETED | OUTPATIENT
Start: 2018-11-15 | End: 2018-11-15

## 2018-11-15 RX ADMIN — Medication 100 MILLIGRAM(S): at 14:53

## 2018-11-15 RX ADMIN — Medication 250 MILLIGRAM(S): at 12:33

## 2018-11-15 RX ADMIN — Medication 20 MILLIGRAM(S): at 05:54

## 2018-11-15 RX ADMIN — LISINOPRIL 5 MILLIGRAM(S): 2.5 TABLET ORAL at 05:54

## 2018-11-15 RX ADMIN — NYSTATIN CREAM 1 APPLICATION(S): 100000 CREAM TOPICAL at 05:53

## 2018-11-15 RX ADMIN — Medication 2: at 08:52

## 2018-11-15 RX ADMIN — PANTOPRAZOLE SODIUM 40 MILLIGRAM(S): 20 TABLET, DELAYED RELEASE ORAL at 05:54

## 2018-11-15 RX ADMIN — Medication 1 TABLET(S): at 11:32

## 2018-11-15 RX ADMIN — Medication 100 MILLIGRAM(S): at 05:54

## 2018-11-15 RX ADMIN — Medication 100 MILLIGRAM(S): at 00:19

## 2018-11-15 RX ADMIN — Medication 100 MILLIGRAM(S): at 11:31

## 2018-11-15 RX ADMIN — HEPARIN SODIUM 5000 UNIT(S): 5000 INJECTION INTRAVENOUS; SUBCUTANEOUS at 05:54

## 2018-11-15 RX ADMIN — Medication 81 MILLIGRAM(S): at 11:32

## 2018-11-15 RX ADMIN — Medication 2: at 13:20

## 2018-11-15 RX ADMIN — Medication 40 MILLIEQUIVALENT(S): at 05:54

## 2018-11-15 RX ADMIN — CHLORHEXIDINE GLUCONATE 1 APPLICATION(S): 213 SOLUTION TOPICAL at 05:53

## 2018-11-15 RX ADMIN — Medication 25 MILLIGRAM(S): at 05:54

## 2018-11-15 NOTE — PROGRESS NOTE ADULT - SUBJECTIVE AND OBJECTIVE BOX
Patient is a 77y old  Male who presents with a chief complaint of sepsis (15 Nov 2018 07:58)      SUBJECTIVE / OVERNIGHT EVENTS:  Awake  No N/V    MEDICATIONS  (STANDING):  aspirin enteric coated 81 milliGRAM(s) Oral daily  chlorhexidine 4% Liquid 1 Application(s) Topical every 12 hours  dextrose 5%. 1000 milliLiter(s) (50 mL/Hr) IV Continuous <Continuous>  dextrose 50% Injectable 12.5 Gram(s) IV Push once  dextrose 50% Injectable 25 Gram(s) IV Push once  dextrose 50% Injectable 25 Gram(s) IV Push once  docusate sodium 100 milliGRAM(s) Oral three times a day  heparin  Injectable 5000 Unit(s) SubCutaneous every 8 hours  insulin lispro (HumaLOG) corrective regimen sliding scale   SubCutaneous three times a day before meals  insulin lispro (HumaLOG) corrective regimen sliding scale   SubCutaneous at bedtime  lisinopril 5 milliGRAM(s) Oral daily  metoprolol succinate ER 25 milliGRAM(s) Oral daily  multivitamin 1 Tablet(s) Oral daily  nystatin Powder 1 Application(s) Topical two times a day  pantoprazole    Tablet 40 milliGRAM(s) Oral before breakfast  rifampin 300 milliGRAM(s) Oral three times a day  simvastatin 20 milliGRAM(s) Oral at bedtime  sodium chloride 0.9%. 1000 milliLiter(s) (75 mL/Hr) IV Continuous <Continuous>  tamsulosin 0.4 milliGRAM(s) Oral at bedtime  vancomycin  IVPB 750 milliGRAM(s) IV Intermittent every 12 hours    MEDICATIONS  (PRN):  acetaminophen   Tablet .. 650 milliGRAM(s) Oral every 6 hours PRN Temp greater or equal to 38C (100.4F), Mild Pain (1 - 3), Moderate Pain (4 - 6)  dextrose 40% Gel 15 Gram(s) Oral once PRN Blood Glucose LESS THAN 70 milliGRAM(s)/deciliter  glucagon  Injectable 1 milliGRAM(s) IntraMuscular once PRN Glucose LESS THAN 70 milligrams/deciliter  magnesium hydroxide Suspension 30 milliLiter(s) Oral daily PRN Constipation        CAPILLARY BLOOD GLUCOSE      POCT Blood Glucose.: 249 mg/dL (15 Nov 2018 12:55)  POCT Blood Glucose.: 204 mg/dL (15 Nov 2018 08:43)  POCT Blood Glucose.: 235 mg/dL (14 Nov 2018 22:13)  POCT Blood Glucose.: 208 mg/dL (14 Nov 2018 17:21)    I&O's Summary    14 Nov 2018 07:01  -  15 Nov 2018 07:00  --------------------------------------------------------  IN: 0 mL / OUT: 1100 mL / NET: -1100 mL    15 Nov 2018 07:01  -  15 Nov 2018 13:58  --------------------------------------------------------  IN: 0 mL / OUT: 1400 mL / NET: -1400 mL        PHYSICAL EXAM:    NECK: Supple, No JVD  CHEST/LUNG: Clear to auscultation bilaterally; No wheezing.  HEART: Regular rate and rhythm; No murmurs, rubs, or gallops  ABDOMEN: Soft, Nontender, Nondistended; Bowel sounds present  EXTREMITIES:   No clubbing, cyanosis, or edema  NEUROLOGY: Awake    LABS:                        12.5   9.20  )-----------( 164      ( 15 Nov 2018 05:30 )             38.3     11-15    139  |  103  |  13  ----------------------------<  222<H>  3.8   |  25  |  0.79    Ca    8.6      15 Nov 2018 05:30  Phos  2.9     11-15  Mg     1.7     11-15    TPro  5.5<L>  /  Alb  2.8<L>  /  TBili  0.2  /  DBili  0.1  /  AST  15  /  ALT  18  /  AlkPhos  73  11-15            CAPILLARY BLOOD GLUCOSE      POCT Blood Glucose.: 249 mg/dL (15 Nov 2018 12:55)  POCT Blood Glucose.: 204 mg/dL (15 Nov 2018 08:43)  POCT Blood Glucose.: 235 mg/dL (14 Nov 2018 22:13)  POCT Blood Glucose.: 208 mg/dL (14 Nov 2018 17:21)                RADIOLOGY & ADDITIONAL TESTS:    Imaging Personally Reviewed:    Consultant(s) Notes Reviewed:      Care Discussed with Consultants/Other Providers:

## 2018-11-15 NOTE — PROGRESS NOTE ADULT - ASSESSMENT
· Assessment	  76 yo m with sepsis 2/2 recurrent UTI     Problem/Plan - 1:  ·  Problem: Sepsis due to Urinary tract infection without hematuria, site unspecified. MRSA bacteremia:  Plan: -  IV Abx-  ID f/up noted.    CAD:  Cardio f/up noted.      Uncontrolled DM II:  FSSS

## 2018-11-15 NOTE — PROGRESS NOTE ADULT - SUBJECTIVE AND OBJECTIVE BOX
PRESENTING CC:Sepsis-MRSA    SUBJ: 77yr old male significant  PMHx of recurrent UTIs,  HTN, HLD, T2DM, PUD, h/o  recurrent GIB, s/p Aortic root replacement with AVR 2/2 Type A aneurysm (1/2013), CVAx3 with residual left hemiparesis, bedbound, minimally verbal. Lives at home with wife who noted increased weakness-admitted with sepsis on admission-MRSA bacteremia-awake remains afebrile-had PICC for STR Off gentamycin-needs to be on Vanco till 12/13-1500mg IV Daily      PMH -reviewed admission note, no change since admission  Heart failure: acute [ ] chronic [ ] acute or chronic [ ] diastolic [ ] systolic [ ] combined systolic and diastolic[ ]  JENNIFER: ATN[ ] renal medullary necrosis [ ] CKD I [ ]CKDII [ ]CKD III [ ]CKD IV [ ]CKD V [ ]Other pathological lesions [ ]    MEDICATIONS  (STANDING):  aspirin enteric coated 81 milliGRAM(s) Oral daily  chlorhexidine 4% Liquid 1 Application(s) Topical every 12 hours  docusate sodium 100 milliGRAM(s) Oral three times a day  gentamicin   IVPB 40 milliGRAM(s) IV Intermittent every 12 hours  heparin  Injectable 5000 Unit(s) SubCutaneous every 8 hours  insulin lispro (HumaLOG) corrective regimen sliding scale   SubCutaneous three times a day before meals  insulin lispro (HumaLOG) corrective regimen sliding scale   SubCutaneous at bedtime  lisinopril 5 milliGRAM(s) Oral daily  metoprolol succinate ER 25 milliGRAM(s) Oral daily  multivitamin 1 Tablet(s) Oral daily  nystatin Powder 1 Application(s) Topical two times a day  pantoprazole    Tablet 40 milliGRAM(s) Oral before breakfast  rifampin 300 milliGRAM(s) Oral three times a day  simvastatin 20 milliGRAM(s) Oral at bedtime  sodium chloride 0.9%. 1000 milliLiter(s) (75 mL/Hr) IV Continuous <Continuous>  tamsulosin 0.4 milliGRAM(s) Oral at bedtime  vancomycin  IVPB 750 milliGRAM(s) IV Intermittent every 12 hours    MEDICATIONS  (PRN):  acetaminophen   Tablet .. 650 milliGRAM(s) Oral every 6 hours PRN Temp greater or equal to 38C (100.4F), Mild Pain (1 - 3), Moderate Pain (4 - 6)  dextrose 40% Gel 15 Gram(s) Oral once PRN Blood Glucose LESS THAN 70 milliGRAM(s)/deciliter  glucagon  Injectable 1 milliGRAM(s) IntraMuscular once PRN Glucose LESS THAN 70 milligrams/deciliter  magnesium hydroxide Suspension 30 milliLiter(s) Oral daily PRN Constipation          FAMILY HISTORY:  No pertinent family history in first degree relatives    No family history of premature coronary artery disease or sudden cardiac death      REVIEW OF SYSTEMS:  Constitutional: [ ] fever, [ ]weight loss,  [ ]fatigue  Eyes: [ ] visual changes  Respiratory: [ ]shortness of breath;  [ ] cough, [ ]wheezing, [ ]chills, [ ]hemoptysis  Cardiovascular: [ ] chest pain, [ ]palpitations, [ ]dizziness,  [ ]leg swelling[ ]orthopnea[ ]PND  Gastrointestinal: [ ] abdominal pain, [ ]nausea, [ ]vomiting,  [ ]diarrhea   Genitourinary: [ ] dysuria, [ ] hematuria  Neurologic: [ ] headaches [ ] tremors[ ]weakness  Skin: [ ] itching, [ ]burning, [ ] rashes  Endocrine: [ ] heat or cold intolerance  Musculoskeletal: [ ] joint pain or swelling; [ ] muscle, back, or extremity pain  Psychiatric: [ ] depression, [ ]anxiety, [ ]mood swings, or [ ]difficulty sleeping  Hematologic: [ ] easy bruising, [ ] bleeding gums    [x] All remaining systems negative except as per above.   [ ]Unable to obtain.    Vital Signs Last 24 Hrs  T(C): 36.9 (15 Nov 2018 05:49), Max: 36.9 (15 Nov 2018 05:49)  T(F): 98.4 (15 Nov 2018 05:49), Max: 98.4 (15 Nov 2018 05:49)  HR: 81 (15 Nov 2018 05:49) (78 - 86)  BP: 138/78 (15 Nov 2018 05:49) (137/78 - 154/81)  BP(mean): --  RR: 17 (15 Nov 2018 05:49) (17 - 18)  SpO2: 96% (15 Nov 2018 05:49) (96% - 98%)  I&O's Summary    14 Nov 2018 07:01  -  15 Nov 2018 07:00  --------------------------------------------------------  IN: 0 mL / OUT: 1100 mL / NET: -1100 mL        PHYSICAL EXAM:  General: No acute distress BMI-22.1  HEENT: EOMI, PERRL  Neck: Supple, [ ] JVD  Lungs: Equal air entry bilaterally; [ ] rales [ ] wheezing [ ] rhonchi  Heart: Regular rate and rhythm; [x ] murmur  2/6 [x ] systolic [ ] diastolic [ ] radiation[ ] rubs [ ]  gallops  Abdomen: Nontender, bowel sounds present  Extremities: No clubbing, cyanosis, [x ] edema less  Nervous system:  Alert & Oriented X3, Right paresis  Psychiatric: Normal affect  Skin: No rashes or lesions    LABS:  11-15    139  |  103  |  13  ----------------------------<  222<H>  3.8   |  25  |  0.79    Ca    8.6      15 Nov 2018 05:30  Phos  2.9     11-15  Mg     1.7     11-15      Creatinine Trend: 0.79<--, 0.74<--, 0.70<--, 0.71<--, 0.74<--, 0.70<--                        12.5   9.20  )-----------( 164      ( 15 Nov 2018 05:30 )             38.3         IMPRESSION AND PLAN:      74 yo m with sepsis 2/2 recurrent UTI    Problem/Plan - 1:  ·  Problem:Sepsis 2/2 Urinary tract infection without hematuria, site unspecified.  Plan: -ID follow up noted  -Renal Sono noted  For STR on 11/15  Vanco 750mg IV q12 till 12/13  stable to transfer to [facility]  Edema is less      Problem/Plan - 2:  ·  Problem: Sepsis, MRSA bacteremia.  Plan: continue with  IV ABX-Vanco/rifampin    Problem/Plan - 3:  ·  Problem: Type 2 diabetes mellitus with complication, unspecified whether long term insulin use.  Plan: hold metformin, place on ISS.     Problem/Plan - 4:  ·  Problem: Essential hypertension.  Plan: c/w toprol, lisinopril as tolerated.

## 2018-11-15 NOTE — PROGRESS NOTE ADULT - PROVIDER SPECIALTY LIST ADULT
Cardiology
Infectious Disease
Internal Medicine
Cardiology

## 2018-11-30 NOTE — H&P ADULT - PROBLEM SELECTOR PLAN 1
Discussion/Summary   Dear Addison,      The results of your thyroid testing are normal.  There is no need to make any change with your thyroid medication.  If you have any questions please call the office, otherwise I will see you at your next follow-up with me at the end of October.      Respectfully,      Peter Hinton MD        Verified Results  THYROID STIMULATING HORMONE [id number] 11:48AM PETER HINTON   REASON FOR VISIT: US CAROTID DUPLEX DOPPLER BILAT = DX: I63.9   Ordering Provider: Peter Hinton     Test Name Result Flag Reference   THYROID STIMULATING HORMONE 2.290 miU/ml  0.465-4.68        -c/w IVF  -place on ciprofloxacin based on prior sensitivities  -per attending of record if in or outpt urology eval warranted given recurrent UTI; c/w tamsulosin  -will order renal sono for now

## 2018-12-16 ENCOUNTER — INPATIENT (INPATIENT)
Facility: HOSPITAL | Age: 78
LOS: 17 days | Discharge: HOME | DRG: 871 | End: 2019-01-03
Attending: INTERNAL MEDICINE | Admitting: INTERNAL MEDICINE
Payer: MEDICARE

## 2018-12-16 VITALS
DIASTOLIC BLOOD PRESSURE: 80 MMHG | SYSTOLIC BLOOD PRESSURE: 126 MMHG | OXYGEN SATURATION: 98 % | HEART RATE: 128 BPM | RESPIRATION RATE: 30 BRPM

## 2018-12-16 DIAGNOSIS — R09.89 OTHER SPECIFIED SYMPTOMS AND SIGNS INVOLVING THE CIRCULATORY AND RESPIRATORY SYSTEMS: ICD-10-CM

## 2018-12-16 DIAGNOSIS — J18.9 PNEUMONIA, UNSPECIFIED ORGANISM: ICD-10-CM

## 2018-12-16 LAB
ALBUMIN SERPL ELPH-MCNC: 3 G/DL — LOW (ref 3.3–5)
ALP SERPL-CCNC: 66 U/L — SIGNIFICANT CHANGE UP (ref 40–120)
ALT FLD-CCNC: 14 U/L — SIGNIFICANT CHANGE UP (ref 10–45)
ANION GAP SERPL CALC-SCNC: 15 MMOL/L — SIGNIFICANT CHANGE UP (ref 5–17)
APPEARANCE UR: ABNORMAL
AST SERPL-CCNC: 14 U/L — SIGNIFICANT CHANGE UP (ref 10–40)
BACTERIA # UR AUTO: NEGATIVE — SIGNIFICANT CHANGE UP
BASOPHILS # BLD AUTO: 0 K/UL — SIGNIFICANT CHANGE UP (ref 0–0.2)
BASOPHILS NFR BLD AUTO: 0.2 % — SIGNIFICANT CHANGE UP (ref 0–2)
BILIRUB SERPL-MCNC: 0.1 MG/DL — LOW (ref 0.2–1.2)
BILIRUB UR-MCNC: NEGATIVE — SIGNIFICANT CHANGE UP
BUN SERPL-MCNC: 13 MG/DL — SIGNIFICANT CHANGE UP (ref 7–23)
CALCIUM SERPL-MCNC: 9.1 MG/DL — SIGNIFICANT CHANGE UP (ref 8.4–10.5)
CHLORIDE SERPL-SCNC: 107 MMOL/L — SIGNIFICANT CHANGE UP (ref 96–108)
CO2 SERPL-SCNC: 22 MMOL/L — SIGNIFICANT CHANGE UP (ref 22–31)
COLOR SPEC: YELLOW — SIGNIFICANT CHANGE UP
CREAT SERPL-MCNC: 0.74 MG/DL — SIGNIFICANT CHANGE UP (ref 0.5–1.3)
DIFF PNL FLD: NEGATIVE — SIGNIFICANT CHANGE UP
EOSINOPHIL # BLD AUTO: 0 K/UL — SIGNIFICANT CHANGE UP (ref 0–0.5)
EOSINOPHIL NFR BLD AUTO: 0 % — SIGNIFICANT CHANGE UP (ref 0–6)
EPI CELLS # UR: 11 /HPF — HIGH
GAS PNL BLDV: SIGNIFICANT CHANGE UP
GAS PNL BLDV: SIGNIFICANT CHANGE UP
GLUCOSE SERPL-MCNC: 211 MG/DL — HIGH (ref 70–99)
GLUCOSE UR QL: ABNORMAL
GRAN CASTS # UR COMP ASSIST: 1 /LPF — SIGNIFICANT CHANGE UP
HCT VFR BLD CALC: 45.1 % — SIGNIFICANT CHANGE UP (ref 39–50)
HGB BLD-MCNC: 14.8 G/DL — SIGNIFICANT CHANGE UP (ref 13–17)
HYALINE CASTS # UR AUTO: 37 /LPF — HIGH (ref 0–2)
KETONES UR-MCNC: NEGATIVE — SIGNIFICANT CHANGE UP
LEUKOCYTE ESTERASE UR-ACNC: ABNORMAL
LYMPHOCYTES # BLD AUTO: 1.8 K/UL — SIGNIFICANT CHANGE UP (ref 1–3.3)
LYMPHOCYTES # BLD AUTO: 18.3 % — SIGNIFICANT CHANGE UP (ref 13–44)
MCHC RBC-ENTMCNC: 32.3 PG — SIGNIFICANT CHANGE UP (ref 27–34)
MCHC RBC-ENTMCNC: 32.7 GM/DL — SIGNIFICANT CHANGE UP (ref 32–36)
MCV RBC AUTO: 98.7 FL — SIGNIFICANT CHANGE UP (ref 80–100)
MONOCYTES # BLD AUTO: 1 K/UL — HIGH (ref 0–0.9)
MONOCYTES NFR BLD AUTO: 10 % — SIGNIFICANT CHANGE UP (ref 2–14)
NEUTROPHILS # BLD AUTO: 7.1 K/UL — SIGNIFICANT CHANGE UP (ref 1.8–7.4)
NEUTROPHILS NFR BLD AUTO: 71.4 % — SIGNIFICANT CHANGE UP (ref 43–77)
NITRITE UR-MCNC: NEGATIVE — SIGNIFICANT CHANGE UP
NT-PROBNP SERPL-SCNC: HIGH PG/ML (ref 0–300)
PH UR: 5.5 — SIGNIFICANT CHANGE UP (ref 5–8)
PLATELET # BLD AUTO: 201 K/UL — SIGNIFICANT CHANGE UP (ref 150–400)
POTASSIUM SERPL-MCNC: 5.4 MMOL/L — HIGH (ref 3.5–5.3)
POTASSIUM SERPL-SCNC: 5.4 MMOL/L — HIGH (ref 3.5–5.3)
PROT SERPL-MCNC: 5.9 G/DL — LOW (ref 6–8.3)
PROT UR-MCNC: ABNORMAL
RAPID RVP RESULT: SIGNIFICANT CHANGE UP
RBC # BLD: 4.57 M/UL — SIGNIFICANT CHANGE UP (ref 4.2–5.8)
RBC # FLD: 13.8 % — SIGNIFICANT CHANGE UP (ref 10.3–14.5)
RBC CASTS # UR COMP ASSIST: 11 /HPF — HIGH (ref 0–4)
SODIUM SERPL-SCNC: 144 MMOL/L — SIGNIFICANT CHANGE UP (ref 135–145)
SP GR SPEC: 1.03 — HIGH (ref 1.01–1.02)
TROPONIN T, HIGH SENSITIVITY RESULT: 104 NG/L — HIGH (ref 0–51)
UROBILINOGEN FLD QL: NEGATIVE — SIGNIFICANT CHANGE UP
WBC # BLD: 9.9 K/UL — SIGNIFICANT CHANGE UP (ref 3.8–10.5)
WBC # FLD AUTO: 9.9 K/UL — SIGNIFICANT CHANGE UP (ref 3.8–10.5)
WBC UR QL: 16 /HPF — HIGH (ref 0–5)

## 2018-12-16 PROCEDURE — 99223 1ST HOSP IP/OBS HIGH 75: CPT

## 2018-12-16 PROCEDURE — 71045 X-RAY EXAM CHEST 1 VIEW: CPT | Mod: 26

## 2018-12-16 PROCEDURE — 99497 ADVNCD CARE PLAN 30 MIN: CPT | Mod: 25

## 2018-12-16 PROCEDURE — 71275 CT ANGIOGRAPHY CHEST: CPT | Mod: 26

## 2018-12-16 PROCEDURE — 99285 EMERGENCY DEPT VISIT HI MDM: CPT | Mod: GC

## 2018-12-16 RX ORDER — SODIUM CHLORIDE 9 MG/ML
2000 INJECTION INTRAMUSCULAR; INTRAVENOUS; SUBCUTANEOUS ONCE
Qty: 0 | Refills: 0 | Status: COMPLETED | OUTPATIENT
Start: 2018-12-16 | End: 2018-12-16

## 2018-12-16 RX ORDER — CIPROFLOXACIN LACTATE 400MG/40ML
400 VIAL (ML) INTRAVENOUS ONCE
Qty: 0 | Refills: 0 | Status: COMPLETED | OUTPATIENT
Start: 2018-12-16 | End: 2018-12-16

## 2018-12-16 RX ORDER — VANCOMYCIN HCL 1 G
1000 VIAL (EA) INTRAVENOUS ONCE
Qty: 0 | Refills: 0 | Status: COMPLETED | OUTPATIENT
Start: 2018-12-16 | End: 2018-12-16

## 2018-12-16 RX ORDER — PIPERACILLIN AND TAZOBACTAM 4; .5 G/20ML; G/20ML
3.38 INJECTION, POWDER, LYOPHILIZED, FOR SOLUTION INTRAVENOUS ONCE
Qty: 0 | Refills: 0 | Status: COMPLETED | OUTPATIENT
Start: 2018-12-16 | End: 2018-12-16

## 2018-12-16 RX ORDER — FUROSEMIDE 40 MG
40 TABLET ORAL ONCE
Qty: 0 | Refills: 0 | Status: COMPLETED | OUTPATIENT
Start: 2018-12-16 | End: 2018-12-16

## 2018-12-16 RX ADMIN — Medication 40 MILLIGRAM(S): at 23:58

## 2018-12-16 RX ADMIN — PIPERACILLIN AND TAZOBACTAM 200 GRAM(S): 4; .5 INJECTION, POWDER, LYOPHILIZED, FOR SOLUTION INTRAVENOUS at 20:00

## 2018-12-16 RX ADMIN — SODIUM CHLORIDE 2000 MILLILITER(S): 9 INJECTION INTRAMUSCULAR; INTRAVENOUS; SUBCUTANEOUS at 22:00

## 2018-12-16 RX ADMIN — Medication 200 MILLIGRAM(S): at 20:54

## 2018-12-16 RX ADMIN — PIPERACILLIN AND TAZOBACTAM 3.38 GRAM(S): 4; .5 INJECTION, POWDER, LYOPHILIZED, FOR SOLUTION INTRAVENOUS at 20:50

## 2018-12-16 RX ADMIN — Medication 400 MILLIGRAM(S): at 22:00

## 2018-12-16 RX ADMIN — Medication 250 MILLIGRAM(S): at 22:30

## 2018-12-16 RX ADMIN — Medication 1000 MILLIGRAM(S): at 23:30

## 2018-12-16 RX ADMIN — SODIUM CHLORIDE 1333.33 MILLILITER(S): 9 INJECTION INTRAMUSCULAR; INTRAVENOUS; SUBCUTANEOUS at 19:30

## 2018-12-16 NOTE — ED ADULT NURSE REASSESSMENT NOTE - NS ED NURSE REASSESS COMMENT FT1
Multiple attempts at placing IV, by 3 separate nurses. U/S guided IV to be placed by MD. Safety maintained, side rails up.

## 2018-12-16 NOTE — ED PROVIDER NOTE - ATTENDING CONTRIBUTION TO CARE
Private Physician Chelsey Trevizo PCP  78y male, PMH CVA w left weakness 2015, DMT2, PUD/GI bleed, HTN,HLD, Pt recently admitted to [facility] for urosepsis and transferred 4wk ago. to [place]. pt dc home yeseterday. Now comes to ed complains of fever tmax 99.3 with shortness of breath, weakness, with wheezing, diaphoresis, no nausea and vomiting.+constipation. PE Adult male looking stated age acutley ill normocephalic atraumatic chest tachypnea scant rales. cv tacyh abd sof,. no rebound guarding. neuro left weaknes.   Bret Dubose MD, Facep .

## 2018-12-16 NOTE — H&P ADULT - PROBLEM SELECTOR PLAN 1
Suspect related to fluid overload given cardiac hx and elevated pBNP. Started on BIPAP. Given pulmonary edema, will give IV lasix and reassess. Pt noted to still be tachycardic to 130s. Unclear if related to respiratory distress vs sepsis.  -Cont. BIPAP overnight  -F/u 02 needs s/p IV lasix  -I/Os as tolerated.

## 2018-12-16 NOTE — H&P ADULT - HISTORY OF PRESENT ILLNESS
78M w/ hx of DM2, CAD s/p CABG 2013, CHF EF 25%, CVA w/ residual L sided weakness, BPH p/w rigors. Pt has been in rehab since his prior discharge receiving IV antibiotics by PICC vs midline. He was treated for MRSA/pseudomonas sepsis and UTI. Line was taken out yesterday at rehab and pt sent home. Pt's family and wife state he has been having decreased PO for the past several days and had vomiting as well. At home today, pt seemed slightly more lethargic than usual and then developed shaking/rigors and seemed to be in mild distress to his wife. She denies any recent dysuria, cough, diarrhea, abdominal pain, urine discoloration, chest pain.     In ER: Given Zosyn, vancomycin, cipro, NS 2L,

## 2018-12-16 NOTE — H&P ADULT - NSHPPHYSICALEXAM_GEN_ALL_CORE
Vital Signs Last 24 Hrs  T(C): 37.6 (12-16-18 @ 22:50), Max: 37.6 (12-16-18 @ 19:47)  T(F): 99.6 (12-16-18 @ 22:50), Max: 99.6 (12-16-18 @ 19:47)  HR: 133 (12-16-18 @ 23:05) (117 - 133)  BP: 140/88 (12-16-18 @ 23:05) (115/70 - 140/88)  BP(mean): --  RR: 26 (12-16-18 @ 23:05) (26 - 30)  SpO2: 94% (12-16-18 @ 23:05) (86% - 98%)

## 2018-12-16 NOTE — ED ADULT NURSE REASSESSMENT NOTE - NS ED NURSE REASSESS COMMENT FT1
patient becoming diaphoretic and sat. 86% on 2L NC. Patient placed in high fowlers and placed on non-rebreather. MAR paged. awaiting return page. safety maintained, side rails up.

## 2018-12-16 NOTE — H&P ADULT - ASSESSMENT
78M w/ hx of DM2, CAD s/p CABG 2013, CHF EF 25%, CVA w/ residual L sided weakness, BPH p/w rigors concerning for sepsis possibly from UTI and now with hypoxic respiratory failure suspicious for CHF

## 2018-12-16 NOTE — ED PROVIDER NOTE - CARE PLAN
Principal Discharge DX:	PNA (pneumonia)  Secondary Diagnosis:	Sepsis  Secondary Diagnosis:	Pleural effusion

## 2018-12-16 NOTE — H&P ADULT - PROBLEM SELECTOR PLAN 4
Residual weakness. Now bed bound, reportedly with stage II, ulcer.  -Wound care consult  -PT consult before d/c

## 2018-12-16 NOTE — ED ADULT NURSE NOTE - OBJECTIVE STATEMENT
79 YO male c/o SOB. Patient is A&OX2 with family at bedside. Patient wife states "He was at [facility] and discharged to rehab where it was found that he has a fever and could not catch his breath". 77 YO female PMHx htn hld dm cva w/ residual left sided weakness BIBEMS for fever, SOB and rigors. Patient had a recent admission to [facility] for MRSA bacteremia and UTI, and d/c'ed to rehab and sent home yesterday. This morning, wife noted that patient had chills/fever rigors and sweats, with difficulty breathing, using accessory muscles. family became concerned and brought him to ED for eval. Stage 2 pressure ulcer to left buttocks notedPatient is A&OX2, denies chest pain, HA, N/V/D, abdominal pain, weakness, dizziness, numbness, tinging. Peripheral pulses present b/l. Skin warm, dry and pink. Safety maintained, side rails up.

## 2018-12-16 NOTE — H&P ADULT - PROBLEM SELECTOR PLAN 2
No leukocytosis or recorded fevers but given borderline UA and medical history will cover broadly  -Cont. vancomycin and cefepime for now  -F/u BCx and UCx  -Will hold off additional IVF given pulmonary edema on CT chest  -Lactate up trending, limited options given signs of fluid overload. Will cont. to monitor vital signs closely and repeat lactate, if not improving will consult critical care.  -F/u lactate

## 2018-12-16 NOTE — ED ADULT NURSE NOTE - NSIMPLEMENTINTERV_GEN_ALL_ED
Implemented All Fall Risk Interventions:  [place] to call system. Call bell, personal items and telephone within reach. Instruct patient to call for assistance. Room bathroom lighting operational. Non-slip footwear when patient is off stretcher. Physically safe environment: no spills, clutter or unnecessary equipment. Stretcher in lowest position, wheels locked, appropriate side rails in place. Provide visual cue, wrist band, yellow gown, etc. Monitor gait and stability. Monitor for mental status changes and reorient to person, place, and time. Review medications for side effects contributing to fall risk. Reinforce activity limits and safety measures with patient and family.

## 2018-12-16 NOTE — ED PROVIDER NOTE - PROGRESS NOTE DETAILS
Endorsed To Dr Ernst Dubose MD, Facep CXR without focal consolidation to suggest pna. given hypoxia and tachycardia ad bed bound status, cocnern for PE. willCTA. on provider review cta with b/l effusions. concern still for underlying pna. pt endorsed to dr yasmin daniel who is accepting pt. will admit. will follow up official cta read pt again diaphoretic tachycardic hypoxic working to breath. will start BiPAP. will revital. on provider review cta with b/l effusions. concern still for underlying pnavs chf. sending trop and bnp pt endorsed to dr yasmin daniel who is accepting pt. will admit. will follow up official cta read spoke with hospitalist regarding decompensation. pt doing better on bipap. will give lasix and monitor. switched admission to tele.

## 2018-12-16 NOTE — ED PROVIDER NOTE - MEDICAL DECISION MAKING DETAILS
78M p/w tachycardia, hypoxia, and diaphoresis, concerning for sepsis- likely pna vs uti. will panculture, broad abx (including mrsa and hcap coverage given recent hospitalization) cxr. admission to dr daniel.

## 2018-12-16 NOTE — H&P ADULT - PROBLEM SELECTOR PLAN 3
S/p CABG 2013. Reportedly with EF 25% as per family but 45% on recent TTE.  -Cont. asa  -Cont. simvastatin  -F/u Amin cardiology recommendations

## 2018-12-16 NOTE — ED PROVIDER NOTE - OBJECTIVE STATEMENT
GORDO MonD: 78M hx htn hld dm cva w/ residual left sided weakness BIBEMS for fever, rigors and diaphoresis. recent admission to [facility] for MRSA bacteremia and pseudomonas UTI, d/c'd to rehab and then to home yesterday. this AM was noted to have intermittent rigors and sweats, with difficulty breathing, using accessory muscles. family became concerned and brought him to ED for eval.  PMD: LEONCIO Trevizo

## 2018-12-16 NOTE — H&P ADULT - PROBLEM SELECTOR PLAN 5
On PO meds at home.  -Hold metformin given inpatient admissions, lactic acid  -Fingersticks and sliding scale

## 2018-12-16 NOTE — ED PROVIDER NOTE - PHYSICAL EXAMINATION
Serena Espinosa M.D.:   patient awake alert seen lying on stretcher, diaphoretic, using accessory muscles to breath, in mild distress.   LUNGS Coarse b/l. using accessory muscles  CARD tachycardic no m/r/g.    Abdomen soft NT ND no rebound no guarding no CVA tenderness.   EXT WWP no edema no calf tenderness CV 2+DP/PT bilaterally.   neuro A&Ox0 (normally 1-2) , moving right sided extremities. left sided extremities hemiparetic.    skin warm and moist no rash  HEENT: dry mucous membranes, PERRL, EOMI

## 2018-12-17 DIAGNOSIS — I10 ESSENTIAL (PRIMARY) HYPERTENSION: ICD-10-CM

## 2018-12-17 DIAGNOSIS — I69.359 HEMIPLEGIA AND HEMIPARESIS FOLLOWING CEREBRAL INFARCTION AFFECTING UNSPECIFIED SIDE: ICD-10-CM

## 2018-12-17 DIAGNOSIS — J96.01 ACUTE RESPIRATORY FAILURE WITH HYPOXIA: ICD-10-CM

## 2018-12-17 DIAGNOSIS — Z29.9 ENCOUNTER FOR PROPHYLACTIC MEASURES, UNSPECIFIED: ICD-10-CM

## 2018-12-17 DIAGNOSIS — I25.810 ATHEROSCLEROSIS OF CORONARY ARTERY BYPASS GRAFT(S) WITHOUT ANGINA PECTORIS: ICD-10-CM

## 2018-12-17 DIAGNOSIS — A41.9 SEPSIS, UNSPECIFIED ORGANISM: ICD-10-CM

## 2018-12-17 DIAGNOSIS — E11.8 TYPE 2 DIABETES MELLITUS WITH UNSPECIFIED COMPLICATIONS: ICD-10-CM

## 2018-12-17 LAB
ANION GAP SERPL CALC-SCNC: 14 MMOL/L — SIGNIFICANT CHANGE UP (ref 5–17)
ANION GAP SERPL CALC-SCNC: 17 MMOL/L — SIGNIFICANT CHANGE UP (ref 5–17)
BASOPHILS # BLD AUTO: 0.03 K/UL — SIGNIFICANT CHANGE UP (ref 0–0.2)
BASOPHILS NFR BLD AUTO: 0.2 % — SIGNIFICANT CHANGE UP (ref 0–2)
BUN SERPL-MCNC: 14 MG/DL — SIGNIFICANT CHANGE UP (ref 7–23)
BUN SERPL-MCNC: 14 MG/DL — SIGNIFICANT CHANGE UP (ref 7–23)
CALCIUM SERPL-MCNC: 8.6 MG/DL — SIGNIFICANT CHANGE UP (ref 8.4–10.5)
CALCIUM SERPL-MCNC: 9 MG/DL — SIGNIFICANT CHANGE UP (ref 8.4–10.5)
CHLORIDE SERPL-SCNC: 106 MMOL/L — SIGNIFICANT CHANGE UP (ref 96–108)
CHLORIDE SERPL-SCNC: 107 MMOL/L — SIGNIFICANT CHANGE UP (ref 96–108)
CO2 SERPL-SCNC: 20 MMOL/L — LOW (ref 22–31)
CO2 SERPL-SCNC: 23 MMOL/L — SIGNIFICANT CHANGE UP (ref 22–31)
CREAT SERPL-MCNC: 0.89 MG/DL — SIGNIFICANT CHANGE UP (ref 0.5–1.3)
CREAT SERPL-MCNC: 1 MG/DL — SIGNIFICANT CHANGE UP (ref 0.5–1.3)
CULTURE RESULTS: NO GROWTH — SIGNIFICANT CHANGE UP
EOSINOPHIL # BLD AUTO: 0.01 K/UL — SIGNIFICANT CHANGE UP (ref 0–0.5)
EOSINOPHIL NFR BLD AUTO: 0.1 % — SIGNIFICANT CHANGE UP (ref 0–6)
GLUCOSE BLDC GLUCOMTR-MCNC: 141 MG/DL — HIGH (ref 70–99)
GLUCOSE BLDC GLUCOMTR-MCNC: 154 MG/DL — HIGH (ref 70–99)
GLUCOSE BLDC GLUCOMTR-MCNC: 160 MG/DL — HIGH (ref 70–99)
GLUCOSE BLDC GLUCOMTR-MCNC: 172 MG/DL — HIGH (ref 70–99)
GLUCOSE SERPL-MCNC: 212 MG/DL — HIGH (ref 70–99)
GLUCOSE SERPL-MCNC: 266 MG/DL — HIGH (ref 70–99)
HCT VFR BLD CALC: 43.2 % — SIGNIFICANT CHANGE UP (ref 39–50)
HGB BLD-MCNC: 13.7 G/DL — SIGNIFICANT CHANGE UP (ref 13–17)
IMM GRANULOCYTES NFR BLD AUTO: 0.2 % — SIGNIFICANT CHANGE UP (ref 0–1.5)
LACTATE SERPL-SCNC: 3.3 MMOL/L — HIGH (ref 0.7–2)
LYMPHOCYTES # BLD AUTO: 1.67 K/UL — SIGNIFICANT CHANGE UP (ref 1–3.3)
LYMPHOCYTES # BLD AUTO: 13.6 % — SIGNIFICANT CHANGE UP (ref 13–44)
MAGNESIUM SERPL-MCNC: 1.7 MG/DL — SIGNIFICANT CHANGE UP (ref 1.6–2.6)
MCHC RBC-ENTMCNC: 31.7 GM/DL — LOW (ref 32–36)
MCHC RBC-ENTMCNC: 31.8 PG — SIGNIFICANT CHANGE UP (ref 27–34)
MCV RBC AUTO: 100.2 FL — HIGH (ref 80–100)
MONOCYTES # BLD AUTO: 1.53 K/UL — HIGH (ref 0–0.9)
MONOCYTES NFR BLD AUTO: 12.5 % — SIGNIFICANT CHANGE UP (ref 2–14)
NEUTROPHILS # BLD AUTO: 9 K/UL — HIGH (ref 1.8–7.4)
NEUTROPHILS NFR BLD AUTO: 73.4 % — SIGNIFICANT CHANGE UP (ref 43–77)
OB PNL STL: NEGATIVE — SIGNIFICANT CHANGE UP
PLATELET # BLD AUTO: 247 K/UL — SIGNIFICANT CHANGE UP (ref 150–400)
POTASSIUM SERPL-MCNC: 4.2 MMOL/L — SIGNIFICANT CHANGE UP (ref 3.5–5.3)
POTASSIUM SERPL-MCNC: 4.4 MMOL/L — SIGNIFICANT CHANGE UP (ref 3.5–5.3)
POTASSIUM SERPL-SCNC: 4.2 MMOL/L — SIGNIFICANT CHANGE UP (ref 3.5–5.3)
POTASSIUM SERPL-SCNC: 4.4 MMOL/L — SIGNIFICANT CHANGE UP (ref 3.5–5.3)
RBC # BLD: 4.31 M/UL — SIGNIFICANT CHANGE UP (ref 4.2–5.8)
RBC # FLD: 14.8 % — HIGH (ref 10.3–14.5)
SODIUM SERPL-SCNC: 143 MMOL/L — SIGNIFICANT CHANGE UP (ref 135–145)
SODIUM SERPL-SCNC: 144 MMOL/L — SIGNIFICANT CHANGE UP (ref 135–145)
SPECIMEN SOURCE: SIGNIFICANT CHANGE UP
TROPONIN T, HIGH SENSITIVITY RESULT: 146 NG/L — HIGH (ref 0–51)
TROPONIN T, HIGH SENSITIVITY RESULT: 188 NG/L — HIGH (ref 0–51)
WBC # BLD: 12.27 K/UL — HIGH (ref 3.8–10.5)
WBC # FLD AUTO: 12.27 K/UL — HIGH (ref 3.8–10.5)

## 2018-12-17 PROCEDURE — 71045 X-RAY EXAM CHEST 1 VIEW: CPT | Mod: 26

## 2018-12-17 RX ORDER — IPRATROPIUM/ALBUTEROL SULFATE 18-103MCG
3 AEROSOL WITH ADAPTER (GRAM) INHALATION ONCE
Qty: 0 | Refills: 0 | Status: COMPLETED | OUTPATIENT
Start: 2018-12-17 | End: 2018-12-17

## 2018-12-17 RX ORDER — INSULIN LISPRO 100/ML
VIAL (ML) SUBCUTANEOUS AT BEDTIME
Qty: 0 | Refills: 0 | Status: DISCONTINUED | OUTPATIENT
Start: 2018-12-17 | End: 2019-01-03

## 2018-12-17 RX ORDER — TAMSULOSIN HYDROCHLORIDE 0.4 MG/1
0.4 CAPSULE ORAL AT BEDTIME
Qty: 0 | Refills: 0 | Status: DISCONTINUED | OUTPATIENT
Start: 2018-12-17 | End: 2019-01-03

## 2018-12-17 RX ORDER — DEXTROSE 50 % IN WATER 50 %
15 SYRINGE (ML) INTRAVENOUS ONCE
Qty: 0 | Refills: 0 | Status: DISCONTINUED | OUTPATIENT
Start: 2018-12-17 | End: 2019-01-03

## 2018-12-17 RX ORDER — ACETAMINOPHEN 500 MG
1000 TABLET ORAL ONCE
Qty: 0 | Refills: 0 | Status: COMPLETED | OUTPATIENT
Start: 2018-12-17 | End: 2018-12-17

## 2018-12-17 RX ORDER — METOPROLOL TARTRATE 50 MG
25 TABLET ORAL DAILY
Qty: 0 | Refills: 0 | Status: DISCONTINUED | OUTPATIENT
Start: 2018-12-17 | End: 2019-01-03

## 2018-12-17 RX ORDER — VANCOMYCIN HCL 1 G
750 VIAL (EA) INTRAVENOUS EVERY 12 HOURS
Qty: 0 | Refills: 0 | Status: DISCONTINUED | OUTPATIENT
Start: 2018-12-17 | End: 2018-12-20

## 2018-12-17 RX ORDER — DEXTROSE 50 % IN WATER 50 %
25 SYRINGE (ML) INTRAVENOUS ONCE
Qty: 0 | Refills: 0 | Status: DISCONTINUED | OUTPATIENT
Start: 2018-12-17 | End: 2019-01-03

## 2018-12-17 RX ORDER — INSULIN LISPRO 100/ML
VIAL (ML) SUBCUTANEOUS
Qty: 0 | Refills: 0 | Status: DISCONTINUED | OUTPATIENT
Start: 2018-12-17 | End: 2019-01-03

## 2018-12-17 RX ORDER — CEFEPIME 1 G/1
2000 INJECTION, POWDER, FOR SOLUTION INTRAMUSCULAR; INTRAVENOUS EVERY 8 HOURS
Qty: 0 | Refills: 0 | Status: DISCONTINUED | OUTPATIENT
Start: 2018-12-17 | End: 2018-12-20

## 2018-12-17 RX ORDER — SIMVASTATIN 20 MG/1
20 TABLET, FILM COATED ORAL AT BEDTIME
Qty: 0 | Refills: 0 | Status: DISCONTINUED | OUTPATIENT
Start: 2018-12-17 | End: 2019-01-03

## 2018-12-17 RX ORDER — ACETAMINOPHEN 500 MG
650 TABLET ORAL EVERY 6 HOURS
Qty: 0 | Refills: 0 | Status: DISCONTINUED | OUTPATIENT
Start: 2018-12-17 | End: 2019-01-03

## 2018-12-17 RX ORDER — ASPIRIN/CALCIUM CARB/MAGNESIUM 324 MG
81 TABLET ORAL DAILY
Qty: 0 | Refills: 0 | Status: DISCONTINUED | OUTPATIENT
Start: 2018-12-17 | End: 2019-01-03

## 2018-12-17 RX ORDER — SODIUM CHLORIDE 9 MG/ML
1000 INJECTION, SOLUTION INTRAVENOUS
Qty: 0 | Refills: 0 | Status: DISCONTINUED | OUTPATIENT
Start: 2018-12-17 | End: 2019-01-03

## 2018-12-17 RX ORDER — PANTOPRAZOLE SODIUM 20 MG/1
40 TABLET, DELAYED RELEASE ORAL
Qty: 0 | Refills: 0 | Status: DISCONTINUED | OUTPATIENT
Start: 2018-12-17 | End: 2019-01-03

## 2018-12-17 RX ORDER — DEXTROSE 50 % IN WATER 50 %
12.5 SYRINGE (ML) INTRAVENOUS ONCE
Qty: 0 | Refills: 0 | Status: DISCONTINUED | OUTPATIENT
Start: 2018-12-17 | End: 2019-01-03

## 2018-12-17 RX ADMIN — CEFEPIME 100 MILLIGRAM(S): 1 INJECTION, POWDER, FOR SOLUTION INTRAMUSCULAR; INTRAVENOUS at 03:16

## 2018-12-17 RX ADMIN — CEFEPIME 100 MILLIGRAM(S): 1 INJECTION, POWDER, FOR SOLUTION INTRAMUSCULAR; INTRAVENOUS at 11:35

## 2018-12-17 RX ADMIN — Medication 25 MILLIGRAM(S): at 06:20

## 2018-12-17 RX ADMIN — Medication 250 MILLIGRAM(S): at 14:05

## 2018-12-17 RX ADMIN — CEFEPIME 100 MILLIGRAM(S): 1 INJECTION, POWDER, FOR SOLUTION INTRAMUSCULAR; INTRAVENOUS at 18:24

## 2018-12-17 RX ADMIN — Medication 400 MILLIGRAM(S): at 19:04

## 2018-12-17 RX ADMIN — TAMSULOSIN HYDROCHLORIDE 0.4 MILLIGRAM(S): 0.4 CAPSULE ORAL at 23:22

## 2018-12-17 RX ADMIN — Medication 3 MILLILITER(S): at 01:31

## 2018-12-17 RX ADMIN — SIMVASTATIN 20 MILLIGRAM(S): 20 TABLET, FILM COATED ORAL at 23:22

## 2018-12-17 RX ADMIN — Medication 2: at 18:36

## 2018-12-17 RX ADMIN — Medication 2: at 07:50

## 2018-12-17 NOTE — PROGRESS NOTE ADULT - SUBJECTIVE AND OBJECTIVE BOX
PRESENTING CC:Dyspnea    SUBJ: 78M w/ hx of T2DM, s/p CVA w/ residual L sided weakness, BPH p/w rigors. Pt has been in rehab since his prior discharge receiving IV antibiotics by PICC vs midline. He was treated for MRSA/pseudomonas sepsis and UTI. Line was taken out yesterday at rehab and pt sent home.      PMH -reviewed admission note, no change since admission  Heart failure: acute [ ] chronic [ ] acute or chronic [ ] diastolic [ ] systolic [ ] combined systolic and diastolic[ ]  JENNIFER: ATN[ ] renal medullary necrosis [ ] CKD I [ ]CKDII [ ]CKD III [ ]CKD IV [ ]CKD V [ ]Other pathological lesions [ ]    MEDICATIONS  (STANDING):  aspirin enteric coated 81 milliGRAM(s) Oral daily  cefepime   IVPB 2000 milliGRAM(s) IV Intermittent every 8 hours  dextrose 5%. 1000 milliLiter(s) (50 mL/Hr) IV Continuous <Continuous>  dextrose 50% Injectable 12.5 Gram(s) IV Push once  dextrose 50% Injectable 25 Gram(s) IV Push once  insulin lispro (HumaLOG) corrective regimen sliding scale   SubCutaneous three times a day before meals  insulin lispro (HumaLOG) corrective regimen sliding scale   SubCutaneous at bedtime  metoprolol succinate ER 25 milliGRAM(s) Oral daily  pantoprazole    Tablet 40 milliGRAM(s) Oral before breakfast  simvastatin 20 milliGRAM(s) Oral at bedtime  tamsulosin 0.4 milliGRAM(s) Oral at bedtime  vancomycin  IVPB 750 milliGRAM(s) IV Intermittent every 12 hours    MEDICATIONS  (PRN):  acetaminophen   Tablet .. 650 milliGRAM(s) Oral every 6 hours PRN Temp greater or equal to 38C (100.4F), Mild Pain (1 - 3)  dextrose 40% Gel 15 Gram(s) Oral once PRN Blood Glucose LESS THAN 70 milliGRAM(s)/deciliter          FAMILY HISTORY:  No pertinent family history in first degree relatives    No family history of premature coronary artery disease or sudden cardiac death      REVIEW OF SYSTEMS:  Constitutional: [ ] fever, [ ]weight loss,  [ ]fatigue  Eyes: [ ] visual changes  Respiratory: [ ]shortness of breath;  [ ] cough, [ ]wheezing, [ ]chills, [ ]hemoptysis  Cardiovascular: [ ] chest pain, [ ]palpitations, [ ]dizziness,  [ ]leg swelling[ ]orthopnea[ ]PND  Gastrointestinal: [ ] abdominal pain, [ ]nausea, [ ]vomiting,  [ ]diarrhea   Genitourinary: [ ] dysuria, [ ] hematuria  Neurologic: [ ] headaches [ ] tremors[ ]weakness  Skin: [ ] itching, [ ]burning, [ ] rashes  Endocrine: [ ] heat or cold intolerance  Musculoskeletal: [ ] joint pain or swelling; [ ] muscle, back, or extremity pain  Psychiatric: [ ] depression, [ ]anxiety, [ ]mood swings, or [ ]difficulty sleeping  Hematologic: [ ] easy bruising, [ ] bleeding gums    [x] All remaining systems negative except as per above.   [ ]Unable to obtain.    Vital Signs Last 24 Hrs  T(C): 37.7 (17 Dec 2018 09:24), Max: 37.9 (17 Dec 2018 07:18)  T(F): 99.9 (17 Dec 2018 09:24), Max: 100.3 (17 Dec 2018 07:18)  HR: 121 (17 Dec 2018 09:24) (117 - 136)  BP: 132/91 (17 Dec 2018 09:24) (113/86 - 140/88)  BP(mean): --  RR: 26 (17 Dec 2018 09:24) (22 - 30)  SpO2: 100% (17 Dec 2018 09:24) (86% - 100%)  I&O's Summary      PHYSICAL EXAM:  General: No acute distress BMI-  HEENT: EOMI, PERRL  Neck: Supple, [ ] JVD  Lungs: Equal air entry bilaterally; [ ] rales [ ] wheezing [ ] rhonchi  Heart: Regular rate and rhythm; [ ] murmur   /6 [ ] systolic [ ] diastolic [ ] radiation[ ] rubs [ ]  gallops  Abdomen: Nontender, bowel sounds present  Extremities: No clubbing, cyanosis, [ ] edema  Nervous system:  Alert & Oriented X3, no focal deficits  Psychiatric: Normal affect  Skin: No rashes or lesions    LABS:  12-17    144  |  107  |  14  ----------------------------<  212<H>  4.4   |  23  |  1.00    Ca    9.0      17 Dec 2018 06:14  Mg     1.7     12-17    TPro  5.9<L>  /  Alb  3.0<L>  /  TBili  0.1<L>  /  DBili  x   /  AST  14  /  ALT  14  /  AlkPhos  66  12-16    Creatinine Trend: 1.00<--, 0.89<--, 0.74<--                        13.7   12.27 )-----------( 247      ( 17 Dec 2018 07:46 )             43.2       Lipid Panel: Serum Pro-Brain Natriuretic Peptide: 37436 pg/mL (12-16 @ 22:40)    Cardiac Enzymes:     Serum Pro-Brain Natriuretic Peptide: 02740 pg/mL (12-16-18 @ 22:40)        RADIOLOGY:    ECG [my interpretation]:    TELEMETRY:    ECHO:    STRESS TEST:    CATHETERIZATION:      IMPRESSION AND PLAN: PRESENTING CC:Dyspnea    SUBJ: 78M w/ hx of T2DM,s/p AVR with aortic root repair for Type A aneurysm, s/p CVA w/ residual L sided weakness, BPH p/w rigors. Pt has been in rehab since his prior discharge receiving IV antibiotics by PICC vs midline. He was treated for MRSA/pseudomonas sepsis and UTI. Line was taken out yesterday at rehab and pt sent home.Noted to be tachypneac brought to [facility] now admitted on BiPAP       PMH -reviewed admission note, no change since admission  Heart failure: acute [ ] chronic [ ] acute or chronic [ ] diastolic [ ] systolic [ ] combined systolic and diastolic[ ]  JENNIFER: ATN[ ] renal medullary necrosis [ ] CKD I [ ]CKDII [ ]CKD III [ ]CKD IV [ ]CKD V [ ]Other pathological lesions [ ]    MEDICATIONS  (STANDING):  aspirin enteric coated 81 milliGRAM(s) Oral daily  cefepime   IVPB 2000 milliGRAM(s) IV Intermittent every 8 hours  insulin lispro (HumaLOG) corrective regimen sliding scale   SubCutaneous three times a day before meals  insulin lispro (HumaLOG) corrective regimen sliding scale   SubCutaneous at bedtime  metoprolol succinate ER 25 milliGRAM(s) Oral daily  pantoprazole    Tablet 40 milliGRAM(s) Oral before breakfast  simvastatin 20 milliGRAM(s) Oral at bedtime  tamsulosin 0.4 milliGRAM(s) Oral at bedtime  vancomycin  IVPB 750 milliGRAM(s) IV Intermittent every 12 hours    MEDICATIONS  (PRN):  acetaminophen   Tablet .. 650 milliGRAM(s) Oral every 6 hours PRN Temp greater or equal to 38C (100.4F), Mild Pain (1 - 3)  dextrose 40% Gel 15 Gram(s) Oral once PRN Blood Glucose LESS THAN 70 milliGRAM(s)/deciliter          FAMILY HISTORY:  No pertinent family history in first degree relatives  No family history of premature coronary artery disease or sudden cardiac death      REVIEW OF SYSTEMS:  Constitutional: [ ] fever, [ ]weight loss,  [ ]fatigue  Eyes: [ ] visual changes  Respiratory: [ ]shortness of breath;  [ ] cough, [ ]wheezing, [ ]chills, [ ]hemoptysis  Cardiovascular: [ ] chest pain, [ ]palpitations, [ ]dizziness,  [ ]leg swelling[ ]orthopnea[ ]PND  Gastrointestinal: [ ] abdominal pain, [ ]nausea, [ ]vomiting,  [ ]diarrhea   Genitourinary: [ ] dysuria, [ ] hematuria  Neurologic: [ ] headaches [ ] tremors[ ]weakness  Skin: [ ] itching, [ ]burning, [ ] rashes  Endocrine: [ ] heat or cold intolerance  Musculoskeletal: [ ] joint pain or swelling; [ ] muscle, back, or extremity pain  Psychiatric: [ ] depression, [ ]anxiety, [ ]mood swings, or [ ]difficulty sleeping  Hematologic: [ ] easy bruising, [ ] bleeding gums    [ ] All remaining systems negative except as per above.   [x ]Unable to obtain.    Vital Signs Last 24 Hrs  T(C): 37.7 (17 Dec 2018 09:24), Max: 37.9 (17 Dec 2018 07:18)  T(F): 99.9 (17 Dec 2018 09:24), Max: 100.3 (17 Dec 2018 07:18)  HR: 121 (17 Dec 2018 09:24) (117 - 136)  BP: 132/91 (17 Dec 2018 09:24) (113/86 - 140/88)  RR: 26 (17 Dec 2018 09:24) (22 - 30)  SpO2: 100% (17 Dec 2018 09:24) (86% - 100%)  I&O's Summary      PHYSICAL EXAM:  General: No acute distress BMI-28  HEENT: EOMI, PERRL  Neck: Supple, [ ] JVD  Lungs: Fair air entry bilaterally; [ ] rales [ ] wheezing [x ] rhonchi  Heart: Regular rate and rhythm; [x ] murmur  2 /6 [x ] systolic [ ] diastolic [ ] radiation[ ] rubs [ ]  gallops  Abdomen: Nontender, bowel sounds present  Extremities: No clubbing, cyanosis, [ ] edema  Nervous system:  Alert & Oriented X2, Left paresis  Psychiatric: Normal affect  Skin: No rashes or lesions    LABS:  12-17    144  |  107  |  14  ----------------------------<  212<H>  4.4   |  23  |  1.00    Ca    9.0      17 Dec 2018 06:14  Mg     1.7     12-17    TPro  5.9<L>  /  Alb  3.0<L>  /  TBili  0.1<L>  /  DBili  x   /  AST  14  /  ALT  14  /  AlkPhos  66  12-16    Creatinine Trend: 1.00<--, 0.89<--, 0.74<--                        13.7   12.27 )-----------( 247      ( 17 Dec 2018 07:46 )             43.2       Serum Pro-Brain Natriuretic Peptide: 73582 pg/mL (12-16-18 @ 22:40)        RADIOLOGY:XR CHEST PORTABLE IMPRESSION:  There are median sternotomy wires status post aortic valve replacement.  Cardiac silhouette is enlarged.  Unchanged bilateral pleural effusions with superimposed pulmonary edema.   No pneumothorax.      ECG [my interpretation]:  SINUS TACHYCARDIA  at 126 BPM WITH PREMATURE ATRIAL COMPLEXES  NON-SPECIFIC INTRA-VENTRICULAR CONDUCTION BLOCK      TELEMETRY:Sinus rhythm sinus tachycardia    ECHO: Study Date: 11/6/2018   Mild global left ventricular systolic dysfunction. EF-45-50 %   Bioprosthetic aortic valve replacement.    Minimal mitral regurgitation.      IMPRESSION AND PLAN:  · Assessment		  78M w/ hx of DM2, CAD s/pBioAVR with aortic root repair-2013, CHF EF 25%, CVA w/ residual L sided weakness, BPH p/w rigors concerning for sepsis possibly from UTI and now with hypoxic respiratory failure suspicious for CHF      Problem/Plan - 1:  ·  Problem: Acute respiratory failure with hypoxia.  Plan: Suspect related to fluid overload given cardiac hx and elevated pBNP. Started on BIPAP. Given pulmonary edema, will give IV lasix and reassess. Pt noted to still be tachycardic to 130s. Unclear if related to respiratory distress vs sepsis.  -Cont. BIPAP     Problem/Plan - 2:  ·  Problem: Sepsis, due to unspecified organism.  Plan: No leukocytosis or recorded fevers but given borderline UA and medical history will cover broadly  -Cont. vancomycin and cefepime for now      Problem/Plan - 4:  ·  Problem: CVA, old, hemiparesis.  Plan: Residual weakness. Now bed bound, reportedly with stage II, ulcer.  -Wound care consult      Problem/Plan - 5:  ·  Problem: Type 2 diabetes mellitus with complication, unspecified whether long term insulin use.  Plan: On PO meds at home.  -Hold metformin given inpatient admissions, lactic acid  -Fingersticks and sliding scale.     Problem/Plan - 6:  Problem: Essential hypertension. Plan: -Trend vital signs  -Hold lisinopril for now.    Problem/Plan - 7:  ·  Problem: Prophylactic measure.  Plan: DVT PPx  -SCDs.

## 2018-12-17 NOTE — ED ADULT NURSE REASSESSMENT NOTE - NS ED NURSE REASSESS COMMENT FT1
Admitting paged [phone], MD aware that pt is still working to breath and is using accessory muscles. MD agreed to come see pt.

## 2018-12-17 NOTE — ED ADULT NURSE REASSESSMENT NOTE - NS ED NURSE REASSESS COMMENT FT1
Patients airway patent, breathing continues to be labored on Bipap, MD Davenport made aware. Left contact info [phone]. Family at bedside, spo2 95%. Patient appears less diaphoretic. MICU consult ordered. Safety maintained, side rails up.

## 2018-12-17 NOTE — ED ADULT NURSE REASSESSMENT NOTE - NS ED NURSE REASSESS COMMENT FT1
MD Davenport at bedside discussing plan of care with patient and family. Patient tolerating BiPAP. Airway patent, breathing continues to be labored, MD aware. Equal and symmetric chest rise and fall. Wheeze auscultated b/l. Spo2 95%. skin cool, clammy, pink. Patient diaphoretic, MD aware. safety maintained, side rails up.

## 2018-12-17 NOTE — PROVIDER CONTACT NOTE (OTHER) - ASSESSMENT
pt A&Ox1, lethargic on IV vanco and cefepime. Pt edematous. unable to obtain blood draw. tried twice.

## 2018-12-17 NOTE — PROVIDER CONTACT NOTE (OTHER) - ASSESSMENT
Pt lethargic, pt on bipap. tele ST. rectal temp of 100.3, with orders for PO tylenol but pt has no diet order and is on bipap for respiratory distressed.

## 2018-12-17 NOTE — CONSULT NOTE ADULT - SUBJECTIVE AND OBJECTIVE BOX
Patient is a 78y old  Male who presents with a chief complaint of Rigors/shaking (17 Dec 2018 09:57)      HPI:  78M w/ hx of DM2, CAD s/p CABG , CHF EF 25%, CVA w/ residual L sided weakness, BPH p/w rigors. Pt has been in rehab since his prior discharge receiving IV antibiotics by PICC vs midline. He was treated for MRSA/pseudomonas sepsis and UTI. Line was taken out yesterday at rehab and pt sent home. Pt's family and wife state he has been having decreased PO for the past several days and had vomiting as well. At home today, pt seemed slightly more lethargic than usual and then developed shaking/rigors and seemed to be in mild distress to his wife. She denies any recent dysuria, cough, diarrhea, abdominal pain, urine discoloration, chest pain.     In ER: Given Zosyn, vancomycin, cipro, NS 2L, (16 Dec 2018 23:06)      PAST MEDICAL & SURGICAL HISTORY:  GI bleed  PUD (peptic ulcer disease)  CVA, old, 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  Gastric ulcer  Hyperlipemia  Diabetes mellitus  Hypertension  History of eye surgery: endophthalmitis in   H/O aortic root repair  No Past Surgical History      Review of Systems:     RESPIRATORY: SOB on BIPAP  Awake            Allergies    No Known Allergies    Intolerances        Social History:     FAMILY HISTORY:  No pertinent family history in first degree relatives      MEDICATIONS  (STANDING):  aspirin enteric coated 81 milliGRAM(s) Oral daily  cefepime   IVPB 2000 milliGRAM(s) IV Intermittent every 8 hours  dextrose 5%. 1000 milliLiter(s) (50 mL/Hr) IV Continuous <Continuous>  dextrose 50% Injectable 12.5 Gram(s) IV Push once  dextrose 50% Injectable 25 Gram(s) IV Push once  insulin lispro (HumaLOG) corrective regimen sliding scale   SubCutaneous three times a day before meals  insulin lispro (HumaLOG) corrective regimen sliding scale   SubCutaneous at bedtime  metoprolol succinate ER 25 milliGRAM(s) Oral daily  pantoprazole    Tablet 40 milliGRAM(s) Oral before breakfast  simvastatin 20 milliGRAM(s) Oral at bedtime  tamsulosin 0.4 milliGRAM(s) Oral at bedtime  vancomycin  IVPB 750 milliGRAM(s) IV Intermittent every 12 hours    MEDICATIONS  (PRN):  acetaminophen   Tablet .. 650 milliGRAM(s) Oral every 6 hours PRN Temp greater or equal to 38C (100.4F), Mild Pain (1 - 3)  dextrose 40% Gel 15 Gram(s) Oral once PRN Blood Glucose LESS THAN 70 milliGRAM(s)/deciliter      CAPILLARY BLOOD GLUCOSE      POCT Blood Glucose.: 154 mg/dL (17 Dec 2018 21:25)  POCT Blood Glucose.: 160 mg/dL (17 Dec 2018 18:25)  POCT Blood Glucose.: 141 mg/dL (17 Dec 2018 12:59)  POCT Blood Glucose.: 172 mg/dL (17 Dec 2018 07:46)    I&O's Summary      PHYSICAL EXAM:    GENERAL: NAD  NECK: Supple, No JVD  CHEST/LUNG: Clear to auscultation bilaterally; BL basilar crackles  HEART: Regular rate and rhythm; No murmurs, rubs, or gallops  ABDOMEN: Soft, Nontender, Nondistended; Bowel sounds present  EXTREMITIES:  2+ Peripheral Pulses, No edema  NEUROLOGY: AAOx 3      LABS:                        13.7   12.27 )-----------( 247      ( 17 Dec 2018 07:46 )             43.2     12-    144  |  107  |  14  ----------------------------<  212<H>  4.4   |  23  |  1.00    Ca    9.0      17 Dec 2018 06:14  Mg     1.7         TPro  5.9<L>  /  Alb  3.0<L>  /  TBili  0.1<L>  /  DBili  x   /  AST  14  /  ALT  14  /  AlkPhos  66  12-16          Urinalysis Basic - ( 16 Dec 2018 21:02 )    Color: Yellow / Appearance: Slightly Turbid / S.027 / pH: x  Gluc: x / Ketone: Negative  / Bili: Negative / Urobili: Negative   Blood: x / Protein: 100 mg/dL / Nitrite: Negative   Leuk Esterase: Moderate / RBC: 11 /hpf / WBC 16 /hpf   Sq Epi: x / Non Sq Epi: 11 /hpf / Bacteria: Negative      CAPILLARY BLOOD GLUCOSE      POCT Blood Glucose.: 154 mg/dL (17 Dec 2018 21:25)  POCT Blood Glucose.: 160 mg/dL (17 Dec 2018 18:25)  POCT Blood Glucose.: 141 mg/dL (17 Dec 2018 12:59)  POCT Blood Glucose.: 172 mg/dL (17 Dec 2018 07:46)     @ 23:32  Culture-urine --  Culture results   No growth  method type --  Organism --  Organism Identification --  Specimen source .Urine Clean Catch (Midstream)   @ 21:46  Culture-urine --  Culture results   No growth to date.  method type --  Organism --  Organism Identification --  Specimen source .Blood Blood-Peripheral            @ 23:32  Culture blood --  Culture results   No growth  Gram stain --  Gram stain blood --  Method type --  Organism --  Organism identification --  Specimen source .Urine Clean Catch (Midstream)    @ 21:46  Culture blood --  Culture results   No growth to date.  Gram stain --  Gram stain blood --  Method type --  Organism --  Organism identification --  Specimen source .Blood Blood-Peripheral      RADIOLOGY & ADDITIONAL TESTS:    Imaging Personally Reviewed:    Consultant(s) Notes Reviewed:      Care Discussed with Consultants/Other Providers:    Thanks for consult. Will follow.

## 2018-12-17 NOTE — ED ADULT NURSE REASSESSMENT NOTE - NS ED NURSE REASSESS COMMENT FT1
Patient a&ox3, patient continues to be tachycardic, 110's. MD Davenport made aware. Patient airway patent, breathing less labored. Skin cool, dry and pink.  Denies headache, dizziness, chest pain, palpitations, abd pain, N/V/D, urinary symptoms, fevers, chills, weakness at this time. Patient awaiting bed assignment. Safety maintained, side rails up.

## 2018-12-18 LAB
-  COAGULASE NEGATIVE STAPHYLOCOCCUS: SIGNIFICANT CHANGE UP
ALBUMIN SERPL ELPH-MCNC: 2.7 G/DL — LOW (ref 3.3–5)
ALP SERPL-CCNC: 51 U/L — SIGNIFICANT CHANGE UP (ref 40–120)
ALT FLD-CCNC: 14 U/L — SIGNIFICANT CHANGE UP (ref 10–45)
ANION GAP SERPL CALC-SCNC: 13 MMOL/L — SIGNIFICANT CHANGE UP (ref 5–17)
AST SERPL-CCNC: 16 U/L — SIGNIFICANT CHANGE UP (ref 10–40)
BILIRUB SERPL-MCNC: 0.2 MG/DL — SIGNIFICANT CHANGE UP (ref 0.2–1.2)
BUN SERPL-MCNC: 15 MG/DL — SIGNIFICANT CHANGE UP (ref 7–23)
CALCIUM SERPL-MCNC: 9 MG/DL — SIGNIFICANT CHANGE UP (ref 8.4–10.5)
CHLORIDE SERPL-SCNC: 107 MMOL/L — SIGNIFICANT CHANGE UP (ref 96–108)
CO2 SERPL-SCNC: 22 MMOL/L — SIGNIFICANT CHANGE UP (ref 22–31)
CREAT SERPL-MCNC: 0.89 MG/DL — SIGNIFICANT CHANGE UP (ref 0.5–1.3)
GAS PNL BLDA: SIGNIFICANT CHANGE UP
GLUCOSE BLDC GLUCOMTR-MCNC: 128 MG/DL — HIGH (ref 70–99)
GLUCOSE BLDC GLUCOMTR-MCNC: 136 MG/DL — HIGH (ref 70–99)
GLUCOSE BLDC GLUCOMTR-MCNC: 153 MG/DL — HIGH (ref 70–99)
GLUCOSE BLDC GLUCOMTR-MCNC: 172 MG/DL — HIGH (ref 70–99)
GLUCOSE SERPL-MCNC: 181 MG/DL — HIGH (ref 70–99)
GRAM STN FLD: SIGNIFICANT CHANGE UP
LACTATE SERPL-SCNC: 1.3 MMOL/L — SIGNIFICANT CHANGE UP (ref 0.7–2)
METHOD TYPE: SIGNIFICANT CHANGE UP
POTASSIUM SERPL-MCNC: 3.9 MMOL/L — SIGNIFICANT CHANGE UP (ref 3.5–5.3)
POTASSIUM SERPL-SCNC: 3.9 MMOL/L — SIGNIFICANT CHANGE UP (ref 3.5–5.3)
PROT SERPL-MCNC: 5.4 G/DL — LOW (ref 6–8.3)
SODIUM SERPL-SCNC: 142 MMOL/L — SIGNIFICANT CHANGE UP (ref 135–145)
VANCOMYCIN TROUGH SERPL-MCNC: 24.4 UG/ML — HIGH (ref 10–20)

## 2018-12-18 RX ADMIN — Medication 25 MILLIGRAM(S): at 06:14

## 2018-12-18 RX ADMIN — CEFEPIME 100 MILLIGRAM(S): 1 INJECTION, POWDER, FOR SOLUTION INTRAMUSCULAR; INTRAVENOUS at 03:22

## 2018-12-18 RX ADMIN — CEFEPIME 100 MILLIGRAM(S): 1 INJECTION, POWDER, FOR SOLUTION INTRAMUSCULAR; INTRAVENOUS at 18:10

## 2018-12-18 RX ADMIN — Medication 250 MILLIGRAM(S): at 10:34

## 2018-12-18 RX ADMIN — PANTOPRAZOLE SODIUM 40 MILLIGRAM(S): 20 TABLET, DELAYED RELEASE ORAL at 06:14

## 2018-12-18 RX ADMIN — CEFEPIME 100 MILLIGRAM(S): 1 INJECTION, POWDER, FOR SOLUTION INTRAMUSCULAR; INTRAVENOUS at 10:34

## 2018-12-18 RX ADMIN — Medication 250 MILLIGRAM(S): at 02:10

## 2018-12-18 RX ADMIN — TAMSULOSIN HYDROCHLORIDE 0.4 MILLIGRAM(S): 0.4 CAPSULE ORAL at 22:20

## 2018-12-18 RX ADMIN — Medication 81 MILLIGRAM(S): at 13:04

## 2018-12-18 RX ADMIN — SIMVASTATIN 20 MILLIGRAM(S): 20 TABLET, FILM COATED ORAL at 22:20

## 2018-12-18 RX ADMIN — Medication 2: at 13:12

## 2018-12-18 NOTE — SWALLOW BEDSIDE ASSESSMENT ADULT - SLP PERTINENT HISTORY OF CURRENT PROBLEM
78M w/ hx of DM2, CAD s/p CABG 2013, CHF EF 25%, CVA w/ residual L sided weakness, BPH p/w rigors. Pt has been in rehab since his prior discharge receiving IV antibiotics by PICC vs midline. He was treated for MRSA/pseudomonas sepsis and UTI. Line was taken out yesterday at rehab and pt sent home. Pt's family and wife state he has been having decreased PO for the past several days and had vomiting as well. At home today, pt seemed slightly more lethargic than usual and then developed shaking/rigors and seemed to be in mild distress with labored breathing according to his wife. She denies any recent dysuria, cough, diarrhea, abdominal pain, urine discoloration, chest pain or rash.

## 2018-12-18 NOTE — SWALLOW BEDSIDE ASSESSMENT ADULT - SWALLOW EVAL: DIAGNOSIS
Consult for bedside swallow evaluation received and appreciated. Chart reviewed, events noted. Case d/w MARYSE Sawyer, per PA pt tolerating dysphagia diet which has been pt's baseline. No indication for swallowing evaluation at this time. PA to d/c order, this service to sign off. Please reconsult as clinically indicated.

## 2018-12-18 NOTE — PROGRESS NOTE ADULT - SUBJECTIVE AND OBJECTIVE BOX
Patient is a 78y old  Male who presents with a chief complaint of Rigors/shaking (18 Dec 2018 12:48)      SUBJECTIVE / OVERNIGHT EVENTS:    Events noted.  Feels better.  CONSTITUTIONAL: No fever,  or fatigue  NECK: No pain or stiffness  RESPIRATORY: No cough, wheezing, chills or hemoptysis; No shortness of breath  CARDIOVASCULAR: No chest pain, palpitations, dizziness, or leg swelling  GASTROINTESTINAL: No abdominal or epigastric pain. No nausea, vomiting, or hematemesis; No diarrhea or constipation.   NEUROLOGICAL: No headaches,     MEDICATIONS  (STANDING):  aspirin enteric coated 81 milliGRAM(s) Oral daily  cefepime   IVPB 2000 milliGRAM(s) IV Intermittent every 8 hours  dextrose 5%. 1000 milliLiter(s) (50 mL/Hr) IV Continuous <Continuous>  dextrose 50% Injectable 12.5 Gram(s) IV Push once  dextrose 50% Injectable 25 Gram(s) IV Push once  insulin lispro (HumaLOG) corrective regimen sliding scale   SubCutaneous three times a day before meals  insulin lispro (HumaLOG) corrective regimen sliding scale   SubCutaneous at bedtime  metoprolol succinate ER 25 milliGRAM(s) Oral daily  pantoprazole    Tablet 40 milliGRAM(s) Oral before breakfast  simvastatin 20 milliGRAM(s) Oral at bedtime  tamsulosin 0.4 milliGRAM(s) Oral at bedtime  vancomycin  IVPB 750 milliGRAM(s) IV Intermittent every 12 hours    MEDICATIONS  (PRN):  acetaminophen   Tablet .. 650 milliGRAM(s) Oral every 6 hours PRN Temp greater or equal to 38C (100.4F), Mild Pain (1 - 3)  dextrose 40% Gel 15 Gram(s) Oral once PRN Blood Glucose LESS THAN 70 milliGRAM(s)/deciliter        CAPILLARY BLOOD GLUCOSE      POCT Blood Glucose.: 136 mg/dL (18 Dec 2018 22:19)  POCT Blood Glucose.: 128 mg/dL (18 Dec 2018 18:18)  POCT Blood Glucose.: 172 mg/dL (18 Dec 2018 12:58)  POCT Blood Glucose.: 153 mg/dL (18 Dec 2018 08:42)    I&O's Summary    17 Dec 2018 07:01  -  18 Dec 2018 07:00  --------------------------------------------------------  IN: 300 mL / OUT: 0 mL / NET: 300 mL        PHYSICAL EXAM:  GENERAL: NAD  NECK: Supple, No JVD  CHEST/LUNG: Clear to auscultation bilaterally; No wheezing.  HEART: Regular rate and rhythm; No murmurs, rubs, or gallops  ABDOMEN: Soft, Nontender, Nondistended; Bowel sounds present  EXTREMITIES:   No clubbing, cyanosis, or edema  NEUROLOGY: AAO X 3      LABS:                        13.7   12.27 )-----------( 247      ( 17 Dec 2018 07:46 )             43.2     12-18    142  |  107  |  15  ----------------------------<  181<H>  3.9   |  22  |  0.89    Ca    9.0      18 Dec 2018 06:45  Mg     1.7     12-17    TPro  5.4<L>  /  Alb  2.7<L>  /  TBili  0.2  /  DBili  x   /  AST  16  /  ALT  14  /  AlkPhos  51  12-18            CAPILLARY BLOOD GLUCOSE      POCT Blood Glucose.: 136 mg/dL (18 Dec 2018 22:19)  POCT Blood Glucose.: 128 mg/dL (18 Dec 2018 18:18)  POCT Blood Glucose.: 172 mg/dL (18 Dec 2018 12:58)  POCT Blood Glucose.: 153 mg/dL (18 Dec 2018 08:42)    12-16 @ 23:32  Culture-urine --  Culture results   No growth  method type --  Organism --  Organism Identification --  Specimen source .Urine Clean Catch (Midstream)  12-16 @ 21:46  Culture-urine --  Culture results   No growth to date.  method type PCR  Organism Blood Culture PCR  Organism Identification Blood Culture PCR  Specimen source .Blood Blood-Peripheral           12-16 @ 23:32  Culture blood --  Culture results   No growth  Gram stain --  Gram stain blood --  Method type --  Organism --  Organism identification --  Specimen source .Urine Clean Catch (Midstream)   12-16 @ 21:46  Culture blood --  Culture results   No growth to date.  Gram stain   Growth in aerobic bottle: Gram Positive Cocci in Clusters  Gram stain blood --  Method type PCR  Organism Blood Culture PCR  Organism identification Blood Culture PCR  Specimen source .Blood Blood-Peripheral      RADIOLOGY & ADDITIONAL TESTS:    Imaging Personally Reviewed:    Consultant(s) Notes Reviewed:      Care Discussed with Consultants/Other Providers:

## 2018-12-18 NOTE — CONSULT NOTE ADULT - SUBJECTIVE AND OBJECTIVE BOX
Patient is a 78y old  Male who presents with a chief complaint of Rigors/shaking (17 Dec 2018 15:12)      HPI:  78M w/ hx of DM2, CAD s/p CABG , CHF EF 25%, CVA w/ residual L sided weakness, BPH p/w rigors. Pt has been in rehab since his prior discharge receiving IV antibiotics by PICC vs midline. He was treated for MRSA/pseudomonas sepsis and UTI. Line was taken out yesterday at rehab and pt sent home. Pt's family and wife state he has been having decreased PO for the past several days and had vomiting as well. At home today, pt seemed slightly more lethargic than usual and then developed shaking/rigors and seemed to be in mild distress to his wife. She denies any recent dysuria, cough, diarrhea, abdominal pain, urine discoloration, chest pain.     In ER: Given Zosyn, vancomycin, cipro, NS 2L, (16 Dec 2018 23:06)    Pts last Ph venous was 7.19: Per his wife at home he was talking to her and was talking to her too: When he came from NH he was very tired but  he was talking to her: Then he became SOB on  after noon and that why he was brought to hospital , as he was sweaty and she had to change three gowns and his stomach was bulging out and she saw a change but no fever: He does not have any COPD or Asthma:       ?FOLLOWING PRESENT  [x ] Hx of PE/DVT, x[ ] Hx COPD, [x ] Hx of Asthma, [y ] Hx of Hospitalization, [ x]  Hx of BiPAP/CPAP use, [x ] Hx of SAM    Allergies    No Known Allergies    Intolerances        PAST MEDICAL & SURGICAL HISTORY:  GI bleed  PUD (peptic ulcer disease)  CVA, old, 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  Gastric ulcer  Hyperlipemia  Diabetes mellitus  Hypertension  History of eye surgery: endophthalmitis in   H/O aortic root repair  No Past Surgical History      FAMILY HISTORY:  No pertinent family history in first degree relatives      Social History: [ quit : Ex smoker: 40 years at least  ] TOBACCO                  [x  ] ETOH                                 [  x] IVDA/DRUGS    REVIEW OF SYSTEMS    pt cant talk right now  General:	    Skin/Breast:  	  Ophthalmologic:  	  ENMT:	    Respiratory and Thorax:  	  Cardiovascular:	    Gastrointestinal:	    Genitourinary:	    Musculoskeletal:	    Neurological:	    Psychiatric:	    Hematology/Lymphatics:	    Endocrine:	    Allergic/Immunologic:	    MEDICATIONS  (STANDING):  aspirin enteric coated 81 milliGRAM(s) Oral daily  cefepime   IVPB 2000 milliGRAM(s) IV Intermittent every 8 hours  dextrose 5%. 1000 milliLiter(s) (50 mL/Hr) IV Continuous <Continuous>  dextrose 50% Injectable 12.5 Gram(s) IV Push once  dextrose 50% Injectable 25 Gram(s) IV Push once  insulin lispro (HumaLOG) corrective regimen sliding scale   SubCutaneous three times a day before meals  insulin lispro (HumaLOG) corrective regimen sliding scale   SubCutaneous at bedtime  metoprolol succinate ER 25 milliGRAM(s) Oral daily  pantoprazole    Tablet 40 milliGRAM(s) Oral before breakfast  simvastatin 20 milliGRAM(s) Oral at bedtime  tamsulosin 0.4 milliGRAM(s) Oral at bedtime  vancomycin  IVPB 750 milliGRAM(s) IV Intermittent every 12 hours    MEDICATIONS  (PRN):  acetaminophen   Tablet .. 650 milliGRAM(s) Oral every 6 hours PRN Temp greater or equal to 38C (100.4F), Mild Pain (1 - 3)  dextrose 40% Gel 15 Gram(s) Oral once PRN Blood Glucose LESS THAN 70 milliGRAM(s)/deciliter       Vital Signs Last 24 Hrs  T(C): 36.5 (18 Dec 2018 04:28), Max: 37.9 (17 Dec 2018 16:17)  T(F): 97.7 (18 Dec 2018 04:28), Max: 100.3 (17 Dec 2018 16:17)  HR: 120 (18 Dec 2018 06:19) (101 - 120)  BP: 124/85 (18 Dec 2018 04:28) (108/76 - 151/74)  BP(mean): --  RR: 18 (18 Dec 2018 04:28) (18 - 23)  SpO2: 100% (18 Dec 2018 10:16) (97% - 100%)        I&O's Summary    17 Dec 2018 07:01  -  18 Dec 2018 07:00  --------------------------------------------------------  IN: 300 mL / OUT: 0 mL / NET: 300 mL        Physical Exam:   GENERAL: NAD, well-groomed, well-developed  HEENT: BENJY/   Atraumatic, Normocephalic  ENMT: No tonsillar erythema, exudates, or enlargement; Moist mucous membranes, Good dentition, No lesions  NECK: Supple, No JVD, Normal thyroid  CHEST/LUNG: No Wheezing  CVS: Regular rate and rhythm; No murmurs, rubs, or gallops  GI: : Soft, Nontender, Nondistended; Bowel sounds present  NERVOUS SYSTEM:  Awake and not responding   EXTREMITIES:- edema  LYMPH: No lymphadenopathy noted  SKIN: No rashes or lesions  ENDOCRINOLOGY: No Thyromegaly  PSYCH: calm    Labs:  Venous<68<4>>49<<7.195>>Venous<<3><<4><<5<<499>>, Venous<41<4>>50<<7.375>>Venous<<3><<4><<5<<509>>                            13.7   12.27 )-----------( 247      ( 17 Dec 2018 07:46 )             43.2                         14.8   9.9   )-----------( 201      ( 16 Dec 2018 19:54 )             45.1         142  |  107  |  15  ----------------------------<  181<H>  3.9   |  22  |  0.89      144  |  107  |  14  ----------------------------<  212<H>  4.4   |  23  |  1.00      143  |  106  |  14  ----------------------------<  266<H>  4.2   |  20<L>  |  0.89      144  |  107  |  13  ----------------------------<  211<H>  5.4<H>   |  22  |  0.74    Ca    9.0      18 Dec 2018 06:45  Ca    9.0      17 Dec 2018 06:14  Ca    8.6      17 Dec 2018 03:16  Ca    9.1      16 Dec 2018 19:54  Mg     1.7         TPro  5.4<L>  /  Alb  2.7<L>  /  TBili  0.2  /  DBili  x   /  AST  16  /  ALT  14  /  AlkPhos  51    TPro  5.9<L>  /  Alb  3.0<L>  /  TBili  0.1<L>  /  DBili  x   /  AST  14  /  ALT  14  /  AlkPhos  66      CAPILLARY BLOOD GLUCOSE      POCT Blood Glucose.: 153 mg/dL (18 Dec 2018 08:42)  POCT Blood Glucose.: 154 mg/dL (17 Dec 2018 21:25)  POCT Blood Glucose.: 160 mg/dL (17 Dec 2018 18:25)  POCT Blood Glucose.: 141 mg/dL (17 Dec 2018 12:59)    LIVER FUNCTIONS - ( 18 Dec 2018 06:45 )  Alb: 2.7 g/dL / Pro: 5.4 g/dL / ALK PHOS: 51 U/L / ALT: 14 U/L / AST: 16 U/L / GGT: x             Urinalysis Basic - ( 16 Dec 2018 21:02 )    Color: Yellow / Appearance: Slightly Turbid / S.027 / pH: x  Gluc: x / Ketone: Negative  / Bili: Negative / Urobili: Negative   Blood: x / Protein: 100 mg/dL / Nitrite: Negative   Leuk Esterase: Moderate / RBC: 11 /hpf / WBC 16 /hpf   Sq Epi: x / Non Sq Epi: 11 /hpf / Bacteria: Negative      D DImerLactate, Blood: 1.3 mmol/L ( @ 00:08)  Lactate, Blood: 3.3 mmol/L ( @ 03:16)    Serum Pro-Brain Natriuretic Peptide: 35918 pg/mL ( @ 22:40)      Studies  Chest X-RAY  CT SCAN Chest   CT Abdomen  Venous Dopplers: LE:   Others    < from: Xray Chest 1 View- PORTABLE-Routine (18 @ 09:48) >  INTERPRETATION:    CLINICAL INDICATION: Hypoxic respiratory failure. Fluid overload.   Follow-up evaluation.    TECHNIQUE: Portable frontal view of the chest.    COMPARISON: Frontal chest radiograph from 2018.    IMPRESSION:    There are median sternotomy wires status post aortic valve replacement.  Cardiac silhouette is enlarged.  Unchanged bilateral pleural effusions with superimposed pulmonary edema.   No pneumothorax.      < from: Transthoracic Echocardiogram (18 @ 18:43) >  dysfunction. Eccentric left ventricular hypertrophy  (dilated left ventricle with normal relative wall  thickness). Normal left ventricular diastolic function.  Right Heart: Right atrium not well visualized. The right  ventricle is not well visualized; grossly normal right  ventricular systolic function. Tricuspid valve not well  visualized. Pulmonic valve not well visualized.  Pericardium/PleuraNormal pericardium with no pericardial  effusion.  ------------------------------------------------------------------------  CONCLUSIONS:  1. Bioprosthetic aortic valve replacement. Peak transaortic  valve gradient equals 28 mm Hg, mean transaortic valve  gradient equals 14 mm Hg, which is probably normal in the  presence of a prosthetic valve. Minimal aortic  regurgitation.  2. Eccentric left ventricular hypertrophy (dilated left  ventricle with normal relative wall thickness).  3. Mild global left ventricular systolic dysfunction.  4. The right ventricle is not well visualized; grossly  normal right ventricular systolic function.  5. No obvious vegetation or mobile echodensities are seen  on the mitral valve. The bioprosthetic aortic valve,  tricuspid, and pulmonic valves are not well visualized. Can  not exclude endocarditis. Consider DICKSON if clinically  indicated.  *** Compared with echocardiogram of 2013, a  bioprosthetic aortic valve is now present. Left ventricular  function has decreased.  ------------------------------------------------------------------------  Confirmed on  2018 - 22:28:29 by Edmond Cardenas M.D.  ------------------------------------------------------------------------    < end of copied text >              SUSANNE BHAT M.D., RADIOLOGY RESIDENT  This document has been electronically signed.  CASH PATEL M.D. ATTENDING RADIOLOGIST  This document has been electronically signed. Dec 17 2018 10:09AM    < end of copied text >      < from: CT Angio Chest w/ IV Cont (18 @ 22:22) >    LUNGS AND LARGE AIRWAYS: Limited evaluation of the lung parenchyma   secondary to respiratory motion artifact. Interlobular septal thickening   and bilateral groundglass opacities, most prominently in the right upper   lobe are suggestive of pulmonary edema. Bibasilar subsegmental   atelectasis.  PLEURA: Moderate bilateral pleural effusions.  VESSELS: No pulmonary embolism. Evaluation of the subsegmental pulmonary   arteries is limited secondary to respiratory motion artifact. Coronary   atherosclerosis.  HEART: Multichamber cardiac enlargement. Nopericardial effusion. Aortic   valve replacement and aortic root repair.  MEDIASTINUM AND СЕРГЕЙ: No lymphadenopathy.  CHEST WALL AND LOWER NECK: Within normal limits. Bilateral gynecomastia.  VISUALIZED UPPER ABDOMEN: Within normal limits.  BONES: Median sternotomy. Generalized osteopenia. Degenerative changes of   the right acromioclavicular joint. Moderate superior endplate compression   deformity of T12. No bony retropulsion or severe bony impingement of the   spinal canal.    IMPRESSION:     No pulmonary embolism.    Pulmonary edema with moderate bilateral pleural effusions.    < end of copied text >

## 2018-12-18 NOTE — CONSULT NOTE ADULT - PROBLEM SELECTOR RECOMMENDATION 2
He was low grade fever and is on broad spectrum antibiotics: Don't see any pneumonia on ct chest ? ID consult: As he was on antibiotics before

## 2018-12-18 NOTE — SWALLOW BEDSIDE ASSESSMENT ADULT - COMMENTS
Hx contd: During prior admission in Nov, at [facility], pt was treated for MRSA bacteremia.  He was treated with triple abx therapy (2 week of gent, and 4 weeks of vanco/rifampin) in the setting of prosthetic aortic valve.  TTE was negative at that time, repeat bcx cleared, source was thought to be through skin entry (pt had a significant dermatitis presentation at that time). Wife declined any further invasive procedures such as DICKSON or surgical intervention. In ER: Given Zosyn, vancomycin, cipro, NS 2L, (16 Dec 2018 23:06). ER vitals: Tm 100.3, P 121, /91. RR 26 (100% supp O2), WBC 12.2.  UA (+) LE/(-) nit, ucx (-).  RVP (-).  12/16 Bcx - CNS.

## 2018-12-18 NOTE — CONSULT NOTE ADULT - PROBLEM SELECTOR RECOMMENDATION 9
Likely due to pulmonary edema: I don't see any see any pneumonia on ct chest : His venous Ph on 16th night was 7.19!: Stat ABG now. DW NP on the floor: [place]: Pt seems awake but not responding to commands: Per his wife he was talking at home demanding for food at home: Depending upon his ABG , wii decide about his Bipap:

## 2018-12-18 NOTE — CONSULT NOTE ADULT - SUBJECTIVE AND OBJECTIVE BOX
Patient is a 78y old  Male who presents with a chief complaint of Rigors/shaking (18 Dec 2018 10:22)      HPI:    78M w/ hx of DM2, CAD s/p CABG , CHF EF 25%, CVA w/ residual L sided weakness, BPH p/w rigors. Pt has been in rehab since his prior discharge receiving IV antibiotics by PICC vs midline. He was treated for MRSA/pseudomonas sepsis and UTI. Line was taken out yesterday at rehab and pt sent home. Pt's family and wife state he has been having decreased PO for the past several days and had vomiting as well. At home today, pt seemed slightly more lethargic than usual and then developed shaking/rigors and seemed to be in mild distress to his wife. She denies any recent dysuria, cough, diarrhea, abdominal pain, urine discoloration, chest pain.     During prior admission in Nov, at [facility], pt was treated for MRSA bacteremia.  He was treated with triple abx therapy (2 week of gent, and 4 weeks of vanco/rifampin) in the setting of prosthetic aortic valve.  TTE was negative at that time, repeat bcx cleared, source was thought to be through skin entry (pt had a significant dermatitis presentation at that time).  Wife declined any further invasive procedures such as DICKSON or surgical intervention.      In ER: Given Zosyn, vancomycin, cipro, NS 2L, (16 Dec 2018 23:06).  ER vitals:  Tm 100.3, P 121, /91.  RR 26 (100% supp O2), WBC 12.2.  UA (+) LE/(-) nit, ucx (-).  RVP (-).   Bcx - CNS.      ID consult called for evaluation of sepsis.        REVIEW OF SYSTEMS:    CONSTITUTIONAL: No fever, weight loss, or fatigue  EYES: No eye pain, visual disturbances, or discharge  ENMT:  No sore throat  NECK: No pain or stiffness  RESPIRATORY: No cough, wheezing, chills or hemoptysis; No shortness of breath  CARDIOVASCULAR: No chest pain, palpitations, dizziness, or leg swelling  GASTROINTESTINAL: No abdominal or epigastric pain. No nausea, vomiting, or hematemesis; No diarrhea or constipation. No melena or hematochezia.  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  Gastric ulcer  Hyperlipemia  Diabetes mellitus  Hypertension  History of eye surgery: endophthalmitis in   H/O aortic root repair  No Past Surgical History      Allergies    No Known Allergies    Intolerances        FAMILY HISTORY:  No pertinent family history in first degree relatives      SOCIAL HISTORY:        MEDICATIONS  (STANDING):  aspirin enteric coated 81 milliGRAM(s) Oral daily  cefepime   IVPB 2000 milliGRAM(s) IV Intermittent every 8 hours  dextrose 5%. 1000 milliLiter(s) (50 mL/Hr) IV Continuous <Continuous>  dextrose 50% Injectable 12.5 Gram(s) IV Push once  dextrose 50% Injectable 25 Gram(s) IV Push once  insulin lispro (HumaLOG) corrective regimen sliding scale   SubCutaneous three times a day before meals  insulin lispro (HumaLOG) corrective regimen sliding scale   SubCutaneous at bedtime  metoprolol succinate ER 25 milliGRAM(s) Oral daily  pantoprazole    Tablet 40 milliGRAM(s) Oral before breakfast  simvastatin 20 milliGRAM(s) Oral at bedtime  tamsulosin 0.4 milliGRAM(s) Oral at bedtime  vancomycin  IVPB 750 milliGRAM(s) IV Intermittent every 12 hours    MEDICATIONS  (PRN):  acetaminophen   Tablet .. 650 milliGRAM(s) Oral every 6 hours PRN Temp greater or equal to 38C (100.4F), Mild Pain (1 - 3)  dextrose 40% Gel 15 Gram(s) Oral once PRN Blood Glucose LESS THAN 70 milliGRAM(s)/deciliter      Vital Signs Last 24 Hrs  T(C): 36.3 (18 Dec 2018 11:54), Max: 37.9 (17 Dec 2018 16:17)  T(F): 97.4 (18 Dec 2018 11:54), Max: 100.3 (17 Dec 2018 16:17)  HR: 103 (18 Dec 2018 12:19) (101 - 120)  BP: 124/80 (18 Dec 2018 11:54) (108/76 - 151/74)  BP(mean): --  RR: 18 (18 Dec 2018 11:54) (18 - 23)  SpO2: 100% (18 Dec 2018 12:19) (98% - 100%)    PHYSICAL EXAM:    GENERAL: NAD, well-groomed  HEAD:  Atraumatic, Normocephalic  EYES: EOMI, PERRLA, conjunctiva and sclera clear  ENMT: No tonsillar erythema, exudates, or enlargement; Moist mucous membranes  NECK: Supple, No JVD  CHEST/LUNG: Clear to percussion bilaterally; No rales, rhonchi, wheezing, or rubs  HEART: Regular rate and rhythm; No murmurs, rubs, or gallops  ABDOMEN: Soft, Nontender, Nondistended; Bowel sounds present  EXTREMITIES:  2+ Peripheral Pulses, No clubbing, cyanosis, or edema  LYMPH: No lymphadenopathy noted  SKIN: No rashes or lesions    LABS:  CBC Full  -  ( 17 Dec 2018 07:46 )  WBC Count : 12.27 K/uL  Hemoglobin : 13.7 g/dL  Hematocrit : 43.2 %  Platelet Count - Automated : 247 K/uL  Mean Cell Volume : 100.2 fl  Mean Cell Hemoglobin : 31.8 pg  Mean Cell Hemoglobin Concentration : 31.7 gm/dL  Auto Neutrophil # : 9.00 K/uL  Auto Lymphocyte # : 1.67 K/uL  Auto Monocyte # : 1.53 K/uL  Auto Eosinophil # : 0.01 K/uL  Auto Basophil # : 0.03 K/uL  Auto Neutrophil % : 73.4 %  Auto Lymphocyte % : 13.6 %  Auto Monocyte % : 12.5 %  Auto Eosinophil % : 0.1 %  Auto Basophil % : 0.2 %          142  |  107  |  15  ----------------------------<  181<H>  3.9   |  22  |  0.89    Ca    9.0      18 Dec 2018 06:45  Mg     1.7     12-17    TPro  5.4<L>  /  Alb  2.7<L>  /  TBili  0.2  /  DBili  x   /  AST  16  /  ALT  14  /  AlkPhos  51  12-18      LIVER FUNCTIONS - ( 18 Dec 2018 06:45 )  Alb: 2.7 g/dL / Pro: 5.4 g/dL / ALK PHOS: 51 U/L / ALT: 14 U/L / AST: 16 U/L / GGT: x                               MICROBIOLOGY:        Urinalysis Basic - ( 16 Dec 2018 21:02 )    Color: Yellow / Appearance: Slightly Turbid / S.027 / pH: x  Gluc: x / Ketone: Negative  / Bili: Negative / Urobili: Negative   Blood: x / Protein: 100 mg/dL / Nitrite: Negative   Leuk Esterase: Moderate / RBC: 11 /hpf / WBC 16 /hpf   Sq Epi: x / Non Sq Epi: 11 /hpf / Bacteria: Negative      Culture - Urine (18 @ 23:32)    Specimen Source: .Urine Clean Catch (Midstream)    Culture Results:   No growth    Culture - Blood (18 @ 21:46)    Gram Stain:   Growth in aerobic bottle: Gram Positive Cocci in Clusters    -  Coagulase negative Staphylococcus: Detec    Specimen Source: .Blood Blood-Peripheral    Organism: Blood Culture PCR  Culture - Blood (18 @ 21:46)    Specimen Source: .Blood Blood-Peripheral    Culture Results:   No growth to date.      Culture Results:   Growth in aerobic bottle: Gram Positive Cocci in Clusters  "Due to technical problems, Proteus sp. will Not be reported as part of  the BCID panel until further notice"  ***Blood Panel PCR results on this specimen are available  approximately 3 hours after the Gram stain result.***  Gram stain, PCR, and/or culture results may not always  correspond due to difference in methodologies.  ************************************************************  This PCR assay was performed using [employer].  The following targets are tested for: Enterococcus,  vancomycin resistant enterococci, Listeria monocytogenes,  coagulase negative staphylococci, S. aureus,  methicillin resistant S. aureus, Streptococcus agalactiae  (Group B), S. pneumoniae, S.pyogenes (Group A),  Acinetobacter baumannii, Enterobacter cloacae, E. coli,  Klebsiella oxytoca, K. pneumoniae, Proteus sp.,  Serratia marcescens, Haemophilus influenzae,  Neisseria meningitidis, Pseudomonas aeruginosa, Candida  albicans, C. glabrata, C krusei, C parapsilosis,  C. tropicalis and the KPC resistance gene.    Organism Identification: Blood Culture PCR    Method Type: PCR              RADIOLOGY:    < from: Xray Chest 1 View- PORTABLE-Routine (18 @ 09:48) >  IMPRESSION:    There are median sternotomy wires status post aortic valve replacement.  Cardiac silhouette is enlarged.  Unchanged bilateral pleural effusions with superimposed pulmonary edema.   No pneumothorax.    < end of copied text >        < from: CT Angio Chest w/ IV Cont (18 @ 22:22) >  PROCEDURE:   CT Angiography of the Chest.  90 ml of Omnipaque 350 was injected intravenously. 10 ml were discarded.  Sagittal and coronal reformats were performed as well as 3D (MIP)   reconstructions.    FINDINGS:    LUNGS AND LARGE AIRWAYS: Limited evaluation of the lung parenchyma   secondary to respiratory motion artifact. Interlobular septal thickening   and bilateral groundglass opacities, most prominently in the right upper   lobe are suggestive of pulmonary edema. Bibasilar subsegmental   atelectasis.  PLEURA: Moderate bilateral pleural effusions.  VESSELS: No pulmonary embolism. Evaluation of the subsegmental pulmonary   arteries is limited secondary to respiratory motion artifact. Coronary   atherosclerosis.  HEART: Multichamber cardiac enlargement. No pericardial effusion. Aortic   valve replacement and aortic root repair.  MEDIASTINUM AND СЕРГЕЙ: No lymphadenopathy.  CHEST WALL AND LOWER NECK: Within normal limits. Bilateral gynecomastia.  VISUALIZED UPPER ABDOMEN: Within normal limits.  BONES: Median sternotomy. Generalized osteopenia. Degenerative changes of   the right acromioclavicular joint. Moderate superior endplate compression   deformity of T12. No bony retropulsion or severe bony impingement of the   spinal canal.    IMPRESSION:     No pulmonary embolism.    Pulmonary edema with moderate bilateral pleural effusions.    < end of copied text > Patient is a 78y old  Male who presents with a chief complaint of Rigors/shaking (18 Dec 2018 10:22)      HPI:    78M w/ hx of DM2, CAD s/p CABG , CHF EF 25%, CVA w/ residual L sided weakness, BPH p/w rigors. Pt has been in rehab since his prior discharge receiving IV antibiotics by PICC vs midline. He was treated for MRSA/pseudomonas sepsis and UTI. Line was taken out yesterday at rehab and pt sent home. Pt's family and wife state he has been having decreased PO for the past several days and had vomiting as well. At home today, pt seemed slightly more lethargic than usual and then developed shaking/rigors and seemed to be in mild distress with labored breathing according to his wife. She denies any recent dysuria, cough, diarrhea, abdominal pain, urine discoloration, chest pain or rash.     During prior admission in Nov, at [facility], pt was treated for MRSA bacteremia.  He was treated with triple abx therapy (2 week of gent, and 4 weeks of vanco/rifampin) in the setting of prosthetic aortic valve.  TTE was negative at that time, repeat bcx cleared, source was thought to be through skin entry (pt had a significant dermatitis presentation at that time).  Wife declined any further invasive procedures such as DICKSON or surgical intervention.      In ER: Given Zosyn, vancomycin, cipro, NS 2L, (16 Dec 2018 23:06).  ER vitals:  Tm 100.3, P 121, /91.  RR 26 (100% supp O2), WBC 12.2.  UA (+) LE/(-) nit, ucx (-).  RVP (-).   Bcx - CNS.      ID consult called for evaluation of sepsis.        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  Gastric ulcer  Hyperlipemia  Diabetes mellitus  Hypertension  History of eye surgery: endophthalmitis in   H/O aortic root repair  No Past Surgical History      Allergies    No Known Allergies    Intolerances        FAMILY HISTORY:  No pertinent family history in first degree relatives      SOCIAL HISTORY:  Former 1ppd smoker quit 6 years ago, Denies ETOH. Lives at home usually, bedbound      MEDICATIONS  (STANDING):  aspirin enteric coated 81 milliGRAM(s) Oral daily  cefepime   IVPB 2000 milliGRAM(s) IV Intermittent every 8 hours  dextrose 5%. 1000 milliLiter(s) (50 mL/Hr) IV Continuous <Continuous>  dextrose 50% Injectable 12.5 Gram(s) IV Push once  dextrose 50% Injectable 25 Gram(s) IV Push once  insulin lispro (HumaLOG) corrective regimen sliding scale   SubCutaneous three times a day before meals  insulin lispro (HumaLOG) corrective regimen sliding scale   SubCutaneous at bedtime  metoprolol succinate ER 25 milliGRAM(s) Oral daily  pantoprazole    Tablet 40 milliGRAM(s) Oral before breakfast  simvastatin 20 milliGRAM(s) Oral at bedtime  tamsulosin 0.4 milliGRAM(s) Oral at bedtime  vancomycin  IVPB 750 milliGRAM(s) IV Intermittent every 12 hours    MEDICATIONS  (PRN):  acetaminophen   Tablet .. 650 milliGRAM(s) Oral every 6 hours PRN Temp greater or equal to 38C (100.4F), Mild Pain (1 - 3)  dextrose 40% Gel 15 Gram(s) Oral once PRN Blood Glucose LESS THAN 70 milliGRAM(s)/deciliter      Vital Signs Last 24 Hrs  T(C): 36.3 (18 Dec 2018 11:54), Max: 37.9 (17 Dec 2018 16:17)  T(F): 97.4 (18 Dec 2018 11:54), Max: 100.3 (17 Dec 2018 16:17)  HR: 103 (18 Dec 2018 12:19) (101 - 120)  BP: 124/80 (18 Dec 2018 11:54) (108/76 - 151/74)  BP(mean): --  RR: 18 (18 Dec 2018 11:54) (18 - 23)  SpO2: 100% (18 Dec 2018 12:19) (98% - 100%)    PHYSICAL EXAM:    GENERAL: Lethargic, responds to tactile stimuli, nonverbal  HEAD:  Atraumatic, Normocephalic  EYES: EOMI, PERRLA, conjunctiva and sclera clear  ENMT: No tonsillar erythema, exudates, or enlargement; Moist mucous membranes  NECK: Supple, No JVD  CHEST/LUNG: decr BS at bases  HEART: Regular rate and rhythm; No murmurs, rubs, or gallops  ABDOMEN: Soft, Nontender, Nondistended; Bowel sounds present  EXTREMITIES:  b/l pitting LE edema  LYMPH: No lymphadenopathy noted  SKIN: No rashes or lesions    LABS:  CBC Full  -  ( 17 Dec 2018 07:46 )  WBC Count : 12.27 K/uL  Hemoglobin : 13.7 g/dL  Hematocrit : 43.2 %  Platelet Count - Automated : 247 K/uL  Mean Cell Volume : 100.2 fl  Mean Cell Hemoglobin : 31.8 pg  Mean Cell Hemoglobin Concentration : 31.7 gm/dL  Auto Neutrophil # : 9.00 K/uL  Auto Lymphocyte # : 1.67 K/uL  Auto Monocyte # : 1.53 K/uL  Auto Eosinophil # : 0.01 K/uL  Auto Basophil # : 0.03 K/uL  Auto Neutrophil % : 73.4 %  Auto Lymphocyte % : 13.6 %  Auto Monocyte % : 12.5 %  Auto Eosinophil % : 0.1 %  Auto Basophil % : 0.2 %          142  |  107  |  15  ----------------------------<  181<H>  3.9   |  22  |  0.89    Ca    9.0      18 Dec 2018 06:45  Mg     1.7         TPro  5.4<L>  /  Alb  2.7<L>  /  TBili  0.2  /  DBili  x   /  AST  16  /  ALT  14  /  AlkPhos  51        LIVER FUNCTIONS - ( 18 Dec 2018 06:45 )  Alb: 2.7 g/dL / Pro: 5.4 g/dL / ALK PHOS: 51 U/L / ALT: 14 U/L / AST: 16 U/L / GGT: x                               MICROBIOLOGY:        Urinalysis Basic - ( 16 Dec 2018 21:02 )    Color: Yellow / Appearance: Slightly Turbid / S.027 / pH: x  Gluc: x / Ketone: Negative  / Bili: Negative / Urobili: Negative   Blood: x / Protein: 100 mg/dL / Nitrite: Negative   Leuk Esterase: Moderate / RBC: 11 /hpf / WBC 16 /hpf   Sq Epi: x / Non Sq Epi: 11 /hpf / Bacteria: Negative      Culture - Urine (18 @ 23:32)    Specimen Source: .Urine Clean Catch (Midstream)    Culture Results:   No growth    Culture - Blood (18 @ 21:46)    Gram Stain:   Growth in aerobic bottle: Gram Positive Cocci in Clusters    -  Coagulase negative Staphylococcus: Detec    Specimen Source: .Blood Blood-Peripheral    Organism: Blood Culture PCR  Culture - Blood (18 @ 21:46)    Specimen Source: .Blood Blood-Peripheral    Culture Results:   No growth to date.      Culture Results:   Growth in aerobic bottle: Gram Positive Cocci in Clusters  "Due to technical problems, Proteus sp. will Not be reported as part of  the BCID panel until further notice"  ***Blood Panel PCR results on this specimen are available  approximately 3 hours after the Gram stain result.***  Gram stain, PCR, and/or culture results may not always  correspond due to difference in methodologies.  ************************************************************  This PCR assay was performed using [employer].  The following targets are tested for: Enterococcus,  vancomycin resistant enterococci, Listeria monocytogenes,  coagulase negative staphylococci, S. aureus,  methicillin resistant S. aureus, Streptococcus agalactiae  (Group B), S. pneumoniae, S.pyogenes (Group A),  Acinetobacter baumannii, Enterobacter cloacae, E. coli,  Klebsiella oxytoca, K. pneumoniae, Proteus sp.,  Serratia marcescens, Haemophilus influenzae,  Neisseria meningitidis, Pseudomonas aeruginosa, Candida  albicans, C. glabrata, C krusei, C parapsilosis,  C. tropicalis and the KPC resistance gene.    Organism Identification: Blood Culture PCR    Method Type: PCR      Culture - Blood (18 @ 21:46)    Specimen Source: .Blood Blood-Peripheral    Culture Results:   No growth to date.            RADIOLOGY:    < from: Xray Chest 1 View- PORTABLE-Routine (18 @ 09:48) >  IMPRESSION:    There are median sternotomy wires status post aortic valve replacement.  Cardiac silhouette is enlarged.  Unchanged bilateral pleural effusions with superimposed pulmonary edema.   No pneumothorax.    < end of copied text >        < from: CT Angio Chest w/ IV Cont (18 @ 22:22) >  PROCEDURE:   CT Angiography of the Chest.  90 ml of Omnipaque 350 was injected intravenously. 10 ml were discarded.  Sagittal and coronal reformats were performed as well as 3D (MIP)   reconstructions.    FINDINGS:    LUNGS AND LARGE AIRWAYS: Limited evaluation of the lung parenchyma   secondary to respiratory motion artifact. Interlobular septal thickening   and bilateral groundglass opacities, most prominently in the right upper   lobe are suggestive of pulmonary edema. Bibasilar subsegmental   atelectasis.  PLEURA: Moderate bilateral pleural effusions.  VESSELS: No pulmonary embolism. Evaluation of the subsegmental pulmonary   arteries is limited secondary to respiratory motion artifact. Coronary   atherosclerosis.  HEART: Multichamber cardiac enlargement. No pericardial effusion. Aortic   valve replacement and aortic root repair.  MEDIASTINUM AND СЕРГЕЙ: No lymphadenopathy.  CHEST WALL AND LOWER NECK: Within normal limits. Bilateral gynecomastia.  VISUALIZED UPPER ABDOMEN: Within normal limits.  BONES: Median sternotomy. Generalized osteopenia. Degenerative changes of   the right acromioclavicular joint. Moderate superior endplate compression   deformity of T12. No bony retropulsion or severe bony impingement of the   spinal canal.    IMPRESSION:     No pulmonary embolism.    Pulmonary edema with moderate bilateral pleural effusions.    < end of copied text >

## 2018-12-19 LAB
ANION GAP SERPL CALC-SCNC: 11 MMOL/L — SIGNIFICANT CHANGE UP (ref 5–17)
BASOPHILS # BLD AUTO: 0.1 K/UL — SIGNIFICANT CHANGE UP (ref 0–0.2)
BASOPHILS NFR BLD AUTO: 0.7 % — SIGNIFICANT CHANGE UP (ref 0–2)
BUN SERPL-MCNC: 16 MG/DL — SIGNIFICANT CHANGE UP (ref 7–23)
CALCIUM SERPL-MCNC: 8.8 MG/DL — SIGNIFICANT CHANGE UP (ref 8.4–10.5)
CHLORIDE SERPL-SCNC: 107 MMOL/L — SIGNIFICANT CHANGE UP (ref 96–108)
CO2 SERPL-SCNC: 25 MMOL/L — SIGNIFICANT CHANGE UP (ref 22–31)
CREAT SERPL-MCNC: 0.9 MG/DL — SIGNIFICANT CHANGE UP (ref 0.5–1.3)
CULTURE RESULTS: SIGNIFICANT CHANGE UP
EOSINOPHIL # BLD AUTO: 0.4 K/UL — SIGNIFICANT CHANGE UP (ref 0–0.5)
EOSINOPHIL NFR BLD AUTO: 5.7 % — SIGNIFICANT CHANGE UP (ref 0–6)
GLUCOSE BLDC GLUCOMTR-MCNC: 151 MG/DL — HIGH (ref 70–99)
GLUCOSE BLDC GLUCOMTR-MCNC: 152 MG/DL — HIGH (ref 70–99)
GLUCOSE BLDC GLUCOMTR-MCNC: 182 MG/DL — HIGH (ref 70–99)
GLUCOSE BLDC GLUCOMTR-MCNC: 182 MG/DL — HIGH (ref 70–99)
GLUCOSE SERPL-MCNC: 157 MG/DL — HIGH (ref 70–99)
HCT VFR BLD CALC: 36.6 % — LOW (ref 39–50)
HGB BLD-MCNC: 12.7 G/DL — LOW (ref 13–17)
LYMPHOCYTES # BLD AUTO: 1.3 K/UL — SIGNIFICANT CHANGE UP (ref 1–3.3)
LYMPHOCYTES # BLD AUTO: 17.8 % — SIGNIFICANT CHANGE UP (ref 13–44)
MCHC RBC-ENTMCNC: 34.3 PG — HIGH (ref 27–34)
MCHC RBC-ENTMCNC: 34.6 GM/DL — SIGNIFICANT CHANGE UP (ref 32–36)
MCV RBC AUTO: 99 FL — SIGNIFICANT CHANGE UP (ref 80–100)
MONOCYTES # BLD AUTO: 0.6 K/UL — SIGNIFICANT CHANGE UP (ref 0–0.9)
MONOCYTES NFR BLD AUTO: 8.1 % — SIGNIFICANT CHANGE UP (ref 2–14)
NEUTROPHILS # BLD AUTO: 5 K/UL — SIGNIFICANT CHANGE UP (ref 1.8–7.4)
NEUTROPHILS NFR BLD AUTO: 67.7 % — SIGNIFICANT CHANGE UP (ref 43–77)
ORGANISM # SPEC MICROSCOPIC CNT: SIGNIFICANT CHANGE UP
ORGANISM # SPEC MICROSCOPIC CNT: SIGNIFICANT CHANGE UP
PLATELET # BLD AUTO: 164 K/UL — SIGNIFICANT CHANGE UP (ref 150–400)
POTASSIUM SERPL-MCNC: 3.7 MMOL/L — SIGNIFICANT CHANGE UP (ref 3.5–5.3)
POTASSIUM SERPL-SCNC: 3.7 MMOL/L — SIGNIFICANT CHANGE UP (ref 3.5–5.3)
RBC # BLD: 3.69 M/UL — LOW (ref 4.2–5.8)
RBC # FLD: 13.3 % — SIGNIFICANT CHANGE UP (ref 10.3–14.5)
SODIUM SERPL-SCNC: 143 MMOL/L — SIGNIFICANT CHANGE UP (ref 135–145)
SPECIMEN SOURCE: SIGNIFICANT CHANGE UP
VANCOMYCIN TROUGH SERPL-MCNC: 21.6 UG/ML — HIGH (ref 10–20)
WBC # BLD: 7.4 K/UL — SIGNIFICANT CHANGE UP (ref 3.8–10.5)
WBC # FLD AUTO: 7.4 K/UL — SIGNIFICANT CHANGE UP (ref 3.8–10.5)

## 2018-12-19 PROCEDURE — 93010 ELECTROCARDIOGRAM REPORT: CPT

## 2018-12-19 RX ORDER — FUROSEMIDE 40 MG
40 TABLET ORAL ONCE
Qty: 0 | Refills: 0 | Status: COMPLETED | OUTPATIENT
Start: 2018-12-19 | End: 2018-12-19

## 2018-12-19 RX ADMIN — CEFEPIME 100 MILLIGRAM(S): 1 INJECTION, POWDER, FOR SOLUTION INTRAMUSCULAR; INTRAVENOUS at 18:00

## 2018-12-19 RX ADMIN — CEFEPIME 100 MILLIGRAM(S): 1 INJECTION, POWDER, FOR SOLUTION INTRAMUSCULAR; INTRAVENOUS at 10:15

## 2018-12-19 RX ADMIN — Medication 2: at 09:07

## 2018-12-19 RX ADMIN — TAMSULOSIN HYDROCHLORIDE 0.4 MILLIGRAM(S): 0.4 CAPSULE ORAL at 21:04

## 2018-12-19 RX ADMIN — Medication 2: at 13:12

## 2018-12-19 RX ADMIN — Medication 40 MILLIGRAM(S): at 10:43

## 2018-12-19 RX ADMIN — SIMVASTATIN 20 MILLIGRAM(S): 20 TABLET, FILM COATED ORAL at 21:04

## 2018-12-19 RX ADMIN — Medication 25 MILLIGRAM(S): at 05:47

## 2018-12-19 RX ADMIN — Medication 650 MILLIGRAM(S): at 05:47

## 2018-12-19 RX ADMIN — Medication 250 MILLIGRAM(S): at 10:15

## 2018-12-19 RX ADMIN — PANTOPRAZOLE SODIUM 40 MILLIGRAM(S): 20 TABLET, DELAYED RELEASE ORAL at 05:47

## 2018-12-19 RX ADMIN — Medication 81 MILLIGRAM(S): at 13:12

## 2018-12-19 RX ADMIN — Medication 250 MILLIGRAM(S): at 22:19

## 2018-12-19 RX ADMIN — Medication 650 MILLIGRAM(S): at 07:19

## 2018-12-19 RX ADMIN — Medication 2: at 18:13

## 2018-12-19 RX ADMIN — CEFEPIME 100 MILLIGRAM(S): 1 INJECTION, POWDER, FOR SOLUTION INTRAMUSCULAR; INTRAVENOUS at 03:05

## 2018-12-19 NOTE — PATIENT PROFILE ADULT - VISION (WITH CORRECTIVE LENSES IF THE PATIENT USUALLY WEARS THEM):
Attempted to reach pt, no answer. Left message for pt to return call.     Normal vision: sees adequately in most situations; can see medication labels, newsprint

## 2018-12-19 NOTE — PROGRESS NOTE ADULT - SUBJECTIVE AND OBJECTIVE BOX
Patient is a 78y old  Male who presents with a chief complaint of Rigors/shaking (19 Dec 2018 19:33)      SUBJECTIVE / OVERNIGHT EVENTS:    Events noted.  On supp O2  No N/v    MEDICATIONS  (STANDING):  aspirin enteric coated 81 milliGRAM(s) Oral daily  cefepime   IVPB 2000 milliGRAM(s) IV Intermittent every 8 hours  dextrose 5%. 1000 milliLiter(s) (50 mL/Hr) IV Continuous <Continuous>  dextrose 50% Injectable 12.5 Gram(s) IV Push once  dextrose 50% Injectable 25 Gram(s) IV Push once  insulin lispro (HumaLOG) corrective regimen sliding scale   SubCutaneous three times a day before meals  insulin lispro (HumaLOG) corrective regimen sliding scale   SubCutaneous at bedtime  metoprolol succinate ER 25 milliGRAM(s) Oral daily  pantoprazole    Tablet 40 milliGRAM(s) Oral before breakfast  simvastatin 20 milliGRAM(s) Oral at bedtime  tamsulosin 0.4 milliGRAM(s) Oral at bedtime  vancomycin  IVPB 750 milliGRAM(s) IV Intermittent every 12 hours    MEDICATIONS  (PRN):  acetaminophen   Tablet .. 650 milliGRAM(s) Oral every 6 hours PRN Temp greater or equal to 38C (100.4F), Mild Pain (1 - 3)  dextrose 40% Gel 15 Gram(s) Oral once PRN Blood Glucose LESS THAN 70 milliGRAM(s)/deciliter        CAPILLARY BLOOD GLUCOSE      POCT Blood Glucose.: 182 mg/dL (19 Dec 2018 21:27)  POCT Blood Glucose.: 152 mg/dL (19 Dec 2018 17:58)  POCT Blood Glucose.: 182 mg/dL (19 Dec 2018 13:02)  POCT Blood Glucose.: 151 mg/dL (19 Dec 2018 08:45)    I&O's Summary    19 Dec 2018 07:01  -  19 Dec 2018 23:00  --------------------------------------------------------  IN: 400 mL / OUT: 0 mL / NET: 400 mL        PHYSICAL EXAM:  GENERAL: NAD  NECK: Supple, No JVD  CHEST/LUNG: Clear to auscultation bilaterally; BL basilar crackles  HEART: Regular rate and rhythm; No murmurs, rubs, or gallops  ABDOMEN: Soft, Nontender, Nondistended; Bowel sounds present  EXTREMITIES:   No clubbing, cyanosis, or edema  NEUROLOGY: AAO X 3      LABS:                        12.7   7.4   )-----------( 164      ( 19 Dec 2018 07:02 )             36.6     12-19    143  |  107  |  16  ----------------------------<  157<H>  3.7   |  25  |  0.90    Ca    8.8      19 Dec 2018 07:01    TPro  5.4<L>  /  Alb  2.7<L>  /  TBili  0.2  /  DBili  x   /  AST  16  /  ALT  14  /  AlkPhos  51  12-18            CAPILLARY BLOOD GLUCOSE      POCT Blood Glucose.: 182 mg/dL (19 Dec 2018 21:27)  POCT Blood Glucose.: 152 mg/dL (19 Dec 2018 17:58)  POCT Blood Glucose.: 182 mg/dL (19 Dec 2018 13:02)  POCT Blood Glucose.: 151 mg/dL (19 Dec 2018 08:45)    12-18 @ 18:39  Culture-urine --  Culture results   No growth to date.  method type --  Organism --  Organism Identification --  Specimen source .Blood Blood-Peripheral  12-16 @ 23:32  Culture-urine --  Culture results   No growth  method type --  Organism --  Organism Identification --  Specimen source .Urine Clean Catch (Midstream)  12-16 @ 21:46  Culture-urine --  Culture results   No growth to date.  method type PCR  Organism Blood Culture PCR  Organism Identification Blood Culture PCR  Specimen source .Blood Blood-Peripheral           12-18 @ 18:39  Culture blood --  Culture results   No growth to date.  Gram stain --  Gram stain blood --  Method type --  Organism --  Organism identification --  Specimen source .Blood Blood-Peripheral   12-16 @ 23:32  Culture blood --  Culture results   No growth  Gram stain --  Gram stain blood --  Method type --  Organism --  Organism identification --  Specimen source .Urine Clean Catch (Midstream)   12-16 @ 21:46  Culture blood --  Culture results   No growth to date.  Gram stain   Growth in aerobic bottle: Gram Positive Cocci in Clusters  Gram stain blood --  Method type PCR  Organism Blood Culture PCR  Organism identification Blood Culture PCR  Specimen source .Blood Blood-Peripheral      RADIOLOGY & ADDITIONAL TESTS:    Imaging Personally Reviewed:    Consultant(s) Notes Reviewed:      Care Discussed with Consultants/Other Providers:

## 2018-12-19 NOTE — PROGRESS NOTE ADULT - SUBJECTIVE AND OBJECTIVE BOX
Patient is a 78y old  Male who presents with a chief complaint of Rigors/shaking (18 Dec 2018 16:41)      Any change in ROS: Pt is doingb daphne: He isalert and awake and responsive today: Of bipap     MEDICATIONS  (STANDING):  aspirin enteric coated 81 milliGRAM(s) Oral daily  cefepime   IVPB 2000 milliGRAM(s) IV Intermittent every 8 hours  dextrose 5%. 1000 milliLiter(s) (50 mL/Hr) IV Continuous <Continuous>  dextrose 50% Injectable 12.5 Gram(s) IV Push once  dextrose 50% Injectable 25 Gram(s) IV Push once  insulin lispro (HumaLOG) corrective regimen sliding scale   SubCutaneous three times a day before meals  insulin lispro (HumaLOG) corrective regimen sliding scale   SubCutaneous at bedtime  metoprolol succinate ER 25 milliGRAM(s) Oral daily  pantoprazole    Tablet 40 milliGRAM(s) Oral before breakfast  simvastatin 20 milliGRAM(s) Oral at bedtime  tamsulosin 0.4 milliGRAM(s) Oral at bedtime  vancomycin  IVPB 750 milliGRAM(s) IV Intermittent every 12 hours    MEDICATIONS  (PRN):  acetaminophen   Tablet .. 650 milliGRAM(s) Oral every 6 hours PRN Temp greater or equal to 38C (100.4F), Mild Pain (1 - 3)  dextrose 40% Gel 15 Gram(s) Oral once PRN Blood Glucose LESS THAN 70 milliGRAM(s)/deciliter    Vital Signs Last 24 Hrs  T(C): 36.3 (19 Dec 2018 05:01), Max: 37 (18 Dec 2018 21:15)  T(F): 97.4 (19 Dec 2018 05:01), Max: 98.6 (18 Dec 2018 21:15)  HR: 111 (19 Dec 2018 07:14) (101 - 119)  BP: 115/73 (19 Dec 2018 05:01) (115/73 - 126/77)  BP(mean): --  RR: 18 (19 Dec 2018 05:01) (18 - 18)  SpO2: 96% (19 Dec 2018 07:14) (95% - 100%)    I&O's Summary    19 Dec 2018 07:01  -  19 Dec 2018 10:51  --------------------------------------------------------  IN: 0 mL / OUT: 0 mL / NET: 0 mL          Physical Exam:   GENERAL: NAD, well-groomed, well-developed  HEENT: BENJY/   Atraumatic, Normocephalic  ENMT: No tonsillar erythema, exudates, or enlargement; Moist mucous membranes, Good dentition, No lesions  NECK: Supple, No JVD, Normal thyroid  CHEST/LUNG: Decreased air entry bilaterally   CVS: Regular rate and rhythm; No murmurs, rubs, or gallops  GI: : Soft, Nontender, Nondistended; Bowel sounds present  NERVOUS SYSTEM:  Alert & Awake: no resp distress  EXTREMITIES: + edema  LYMPH: No lymphadenopathy noted  SKIN: No rashes or lesions  ENDOCRINOLOGY: No Thyromegaly  PSYCH: calm    Labs:  ABG - ( 18 Dec 2018 10:59 )  pH, Arterial: 7.43  pH, Blood: x     /  pCO2: 42    /  pO2: 195   / HCO3: 28    / Base Excess: 3.3   /  SaO2: 100             25, 23                            12.7   7.4   )-----------( 164      ( 19 Dec 2018 07:02 )             36.6                         13.7   12.27 )-----------( 247      ( 17 Dec 2018 07:46 )             43.2                         14.8   9.9   )-----------( 201      ( 16 Dec 2018 19:54 )             45.1     12-19    143  |  107  |  16  ----------------------------<  157<H>  3.7   |  25  |  0.90  12-18    142  |  107  |  15  ----------------------------<  181<H>  3.9   |  22  |  0.89  12-17    144  |  107  |  14  ----------------------------<  212<H>  4.4   |  23  |  1.00  12-17    143  |  106  |  14  ----------------------------<  266<H>  4.2   |  20<L>  |  0.89  12-16    144  |  107  |  13  ----------------------------<  211<H>  5.4<H>   |  22  |  0.74    Ca    8.8      19 Dec 2018 07:01  Ca    9.0      18 Dec 2018 06:45    TPro  5.4<L>  /  Alb  2.7<L>  /  TBili  0.2  /  DBili  x   /  AST  16  /  ALT  14  /  AlkPhos  51  12-18  TPro  5.9<L>  /  Alb  3.0<L>  /  TBili  0.1<L>  /  DBili  x   /  AST  14  /  ALT  14  /  AlkPhos  66  12-16    CAPILLARY BLOOD GLUCOSE      POCT Blood Glucose.: 151 mg/dL (19 Dec 2018 08:45)  POCT Blood Glucose.: 136 mg/dL (18 Dec 2018 22:19)  POCT Blood Glucose.: 128 mg/dL (18 Dec 2018 18:18)  POCT Blood Glucose.: 172 mg/dL (18 Dec 2018 12:58)      LIVER FUNCTIONS - ( 18 Dec 2018 06:45 )  Alb: 2.7 g/dL / Pro: 5.4 g/dL / ALK PHOS: 51 U/L / ALT: 14 U/L / AST: 16 U/L / GGT: x               Serum Pro-Brain Natriuretic Peptide: 74951 pg/mL (12-16 @ 22:40)  Lactate, Blood: 1.3 mmol/L (12-18 @ 00:08)  Lactate, Blood: 3.3 mmol/L (12-17 @ 03:16)        RECENT CULTURES:  12-16 @ 23:32 .Urine Clean Catch (Midstream)       < from: Xray Chest 1 View- PORTABLE-Routine (12.17.18 @ 09:48) >      PROCEDURE DATE:  12/17/2018            INTERPRETATION:    CLINICAL INDICATION: Hypoxic respiratory failure. Fluid overload.   Follow-up evaluation.    TECHNIQUE: Portable frontal view of the chest.    COMPARISON: Frontal chest radiograph from 12/16/2018.    IMPRESSION:    There are median sternotomy wires status post aortic valve replacement.  Cardiac silhouette is enlarged.  Unchanged bilateral pleural effusions with superimposed pulmonary edema.   No pneumothorax.                  SUSANNE BHAT M.D., RADIOLOGY RESIDENT  This document has been electronically signed.  CASH PATEL M.D. ATTENDING RADIOLOGIST  This document has been electronically signed. Dec 17 2018 10:09AM        < end of copied text >           No growth    12-16 @ 21:46 .Blood Blood-Peripheral   PCR    Growth in aerobic bottle: Gram Positive Cocci in Clusters    Blood Culture PCR  Blood Culture PCR     No growth to date.          RESPIRATORY CULTURES:          Studies  Chest X-RAY  CT SCAN Chest   Venous Dopplers: LE:   CT Abdomen  Others

## 2018-12-19 NOTE — PROGRESS NOTE ADULT - SUBJECTIVE AND OBJECTIVE BOX
Infectious Diseases progress note:    Subjective:  No new fevers.  Pt more awake, alert, minimally verbal.      ROS:  CONSTITUTIONAL:  No fever, chills, rigors  CARDIOVASCULAR:  No chest pain or palpitations  RESPIRATORY:   No SOB, cough, dyspnea on exertion.  No wheezing  GASTROINTESTINAL:  No abd pain, N/V, diarrhea/constipation  EXTREMITIES:  No swelling or joint pain  GENITOURINARY:  No burning on urination, increased frequency or urgency.  No flank pain  NEUROLOGIC:  No HA, visual disturbances  SKIN: No rashes    Allergies    No Known Allergies    Intolerances        ANTIBIOTICS/RELEVANT:  antimicrobials  cefepime   IVPB 2000 milliGRAM(s) IV Intermittent every 8 hours  vancomycin  IVPB 750 milliGRAM(s) IV Intermittent every 12 hours    immunologic:    OTHER:  acetaminophen   Tablet .. 650 milliGRAM(s) Oral every 6 hours PRN  aspirin enteric coated 81 milliGRAM(s) Oral daily  dextrose 40% Gel 15 Gram(s) Oral once PRN  dextrose 5%. 1000 milliLiter(s) IV Continuous <Continuous>  dextrose 50% Injectable 12.5 Gram(s) IV Push once  dextrose 50% Injectable 25 Gram(s) IV Push once  insulin lispro (HumaLOG) corrective regimen sliding scale   SubCutaneous three times a day before meals  insulin lispro (HumaLOG) corrective regimen sliding scale   SubCutaneous at bedtime  metoprolol succinate ER 25 milliGRAM(s) Oral daily  pantoprazole    Tablet 40 milliGRAM(s) Oral before breakfast  simvastatin 20 milliGRAM(s) Oral at bedtime  tamsulosin 0.4 milliGRAM(s) Oral at bedtime      Objective:  Vital Signs Last 24 Hrs  T(C): 37.1 (19 Dec 2018 20:58), Max: 37.2 (19 Dec 2018 13:00)  T(F): 98.8 (19 Dec 2018 20:58), Max: 98.9 (19 Dec 2018 13:00)  HR: 116 (19 Dec 2018 20:58) (101 - 116)  BP: 130/83 (19 Dec 2018 20:58) (115/73 - 130/83)  BP(mean): --  RR: 18 (19 Dec 2018 20:58) (18 - 20)  SpO2: 98% (19 Dec 2018 20:58) (96% - 100%)    PHYSICAL EXAM:  Constitutional:NAD  Eyes:BENJY, EOMI  Ear/Nose/Throat: no thrush, mucositis.  Moist mucous membranes	  Neck:no JVD, no lymphadenopathy, supple  Respiratory: CTA phi  Cardiovascular: S1S2 RRR, no murmurs  Gastrointestinal:soft, nontender,  nondistended (+) BS  Extremities: b/l LE edema  Skin:  no rashes, open wounds or ulcerations        LABS:                        12.7   7.4   )-----------( 164      ( 19 Dec 2018 07:02 )             36.6     12-19    143  |  107  |  16  ----------------------------<  157<H>  3.7   |  25  |  0.90    Ca    8.8      19 Dec 2018 07:01    TPro  5.4<L>  /  Alb  2.7<L>  /  TBili  0.2  /  DBili  x   /  AST  16  /  ALT  14  /  AlkPhos  51  12-18                Vancomycin Level, Trough: 21.6 ug/mL (12-19 @ 07:02)  Vancomycin Level, Trough: 24.4 ug/mL (12-18 @ 20:49)      Rapid RVP Result: [facility]          MICROBIOLOGY:    Culture - Blood (12.18.18 @ 18:39)    Specimen Source: .Blood Blood-Peripheral    Culture Results:   No growth to date.    Culture - Blood (12.18.18 @ 18:39)    Specimen Source: .Blood Blood-Peripheral    Culture Results:   No growth to date.    Culture - Urine (12.16.18 @ 23:32)    Specimen Source: .Urine Clean Catch (Midstream)    Culture Results:   No growth    Culture - Blood (12.16.18 @ 21:46)    Gram Stain:   Growth in aerobic bottle: Gram Positive Cocci in Clusters    -  Coagulase negative Staphylococcus: Detec    Specimen Source: .Blood Blood-Peripheral    Organism: Blood Culture PCR    Culture Results:   Growth in aerobic bottle: Coag Negative Staphylococcus  Single set isolate, possible contaminant. Contact  Microbiology if susceptibility testing clinically  indicated.  "Due to technical problems, Proteus sp. will Not be reported as part of  the BCID panel until further notice"  ***Blood Panel PCR results on this specimen are available  approximately 3 hours after the Gram stain result.***  Gram stain, PCR, and/or culture results may not always  correspond due to difference in methodologies.  ************************************************************  This PCR assay was performed using "[employer].  The following targets are tested for: Enterococcus,  vancomycin resistant enterococci, Listeria monocytogenes,  coagulase negative staphylococci, S. aureus,  methicillin resistant S. aureus, Streptococcus agalactiae  (Group B), S. pneumoniae, S. pyogenes (Group A),  Acinetobacter baumannii, Enterobacter cloacae, E. coli,  Klebsiella oxytoca, K. pneumoniae, Proteus sp.,  Serratia marcescens, Haemophilus influenzae,  Neisseria meningitidis, Pseudomonas aeruginosa, Candida  albicans, C. glabrata, C krusei, C parapsilosis,  C. tropicalis and the KPC resistance gene.    Organism Identification: Blood Culture PCR    Method Type: PCR          RADIOLOGY & ADDITIONAL STUDIES:    < from: Xray Chest 1 View- PORTABLE-Routine (12.17.18 @ 09:48) >  IMPRESSION:    There are median sternotomy wires status post aortic valve replacement.  Cardiac silhouette is enlarged.  Unchanged bilateral pleural effusions with superimposed pulmonary edema.   No pneumothorax.    < end of copied text >

## 2018-12-19 NOTE — CONSULT NOTE ADULT - SUBJECTIVE AND OBJECTIVE BOX
EP ATTENDING      HISTORY OF PRESENT ILLNESS: He is a pleasant 77 y/o male PMH CAD s/p CABG (LVEF 45-50%) now admitted with rigors after a recent course of antibiotics for bacteremia. EP is now called for persistent tachycardia on telemetry. Review of every EKG and tele shows the rhythm is sinus with an underlying IVCD. His TSH last year was normal.    PAST MEDICAL & SURGICAL HISTORY:  CAD s/p CABG (LVEF 45-50%)  PUD (peptic ulcer disease)  CVA  Hyperlipemia  Diabetes mellitus  Hypertension  BPH    History of eye surgery: endophthalmitis in 2014  H/O aortic root repair and CABG    MEDICATIONS  (STANDING):  aspirin enteric coated 81 milliGRAM(s) Oral daily  cefepime   IVPB 2000 milliGRAM(s) IV Intermittent every 8 hours  dextrose 5%. 1000 milliLiter(s) (50 mL/Hr) IV Continuous <Continuous>  dextrose 50% Injectable 12.5 Gram(s) IV Push once  dextrose 50% Injectable 25 Gram(s) IV Push once  insulin lispro (HumaLOG) corrective regimen sliding scale   SubCutaneous three times a day before meals  insulin lispro (HumaLOG) corrective regimen sliding scale   SubCutaneous at bedtime  metoprolol succinate ER 25 milliGRAM(s) Oral daily  pantoprazole    Tablet 40 milliGRAM(s) Oral before breakfast  simvastatin 20 milliGRAM(s) Oral at bedtime  tamsulosin 0.4 milliGRAM(s) Oral at bedtime  vancomycin  IVPB 750 milliGRAM(s) IV Intermittent every 12 hours    No Known Allergies    FAMILY HISTORY:  No pertinent family history in first degree relatives  Non-contributary for premature coronary disease or sudden cardiac death    SOCIAL HISTORY:    [x ] Non-smoker  [ ] Smoker  [ ] Alcohol      REVIEW OF SYSTEMS:  [ ]chest pain  [  ]shortness of breath  [  ]palpitations  [  ]syncope  [ ]near syncope [ ]upper extremity weakness   [ ] lower extremity weakness  [  ]diplopia  [  ]altered mental status   [  ]fevers  [ ]chills [ ]nausea  [ ]vomitting  [  ]dysphagia    [ ]abdominal pain  [ ]melena  [ ]BRBPR    [  ]epistaxis  [  ]rash    [ ]lower extremity edema        [ x] All others negative	  [ ] Unable to obtain    PHYSICAL EXAM:  T(C): 36.3 (12-19-18 @ 05:01), Max: 37 (12-18-18 @ 21:15)  HR: 110 (12-19-18 @ 11:25) (101 - 119)  BP: 115/73 (12-19-18 @ 05:01) (115/73 - 126/77)  RR: 19 (12-19-18 @ 11:25) (18 - 19)  SpO2: 100% (12-19-18 @ 11:25) (95% - 100%)  Wt(kg): --    no JVD  tachy, no murmurs  CTAB  soft nt/nd  no c/c/e      TELEMETRY: 	    ECG:  	    Echo:  NST:  Cath:  	  	  LABS:	 	                          12.7   7.4   )-----------( 164      ( 19 Dec 2018 07:02 )             36.6     12-19    143  |  107  |  16  ----------------------------<  157<H>  3.7   |  25  |  0.90    Ca    8.8      19 Dec 2018 07:01    TPro  5.4<L>  /  Alb  2.7<L>  /  TBili  0.2  /  DBili  x   /  AST  16  /  ALT  14  /  AlkPhos  51  12-18    proBNP:   Lipid Profile:   HgA1c:   TSH:     A/P) He is a pleasant 77 y/o male PMH CAD s/p CABG (LVEF 45-50%) now admitted with rigors after a recent course of antibiotics for bacteremia. EP is now called for persistent tachycardia on telemetry. Review of every EKG and tele shows the rhythm is sinus with an underlying IVCD. His TSH last year was normal.    -will order TSH  -would continue toprol for his mild LV dysfunction, but this rhythm does not require any specific intervention  -etiology of sinus tachycardia almost certainly secondary to his underlying medical illness  -no further inpatient EP workup needed      Shane Walsh M.D., [facility]  Cardiac Electrophysiology  [facility]  [address] [place] [address], [address] [place] [address]  [email or web address]    office [phone]  pager [phone] EP ATTENDING      HISTORY OF PRESENT ILLNESS: He is a pleasant 79 y/o male PMH CAD s/p CABG (LVEF 45-50%) now admitted with rigors after a recent course of antibiotics for bacteremia. EP is now called for persistent tachycardia on telemetry. Review of every EKG and tele shows the rhythm is sinus with an underlying IVCD. His TSH last year was normal.    PAST MEDICAL & SURGICAL HISTORY:  CAD s/p CABG (LVEF 45-50%)  PUD (peptic ulcer disease)  CVA  Hyperlipemia  Diabetes mellitus  Hypertension  BPH    History of eye surgery: endophthalmitis in 2014  H/O aortic root repair and CABG    MEDICATIONS  (STANDING):  aspirin enteric coated 81 milliGRAM(s) Oral daily  cefepime   IVPB 2000 milliGRAM(s) IV Intermittent every 8 hours  dextrose 5%. 1000 milliLiter(s) (50 mL/Hr) IV Continuous <Continuous>  dextrose 50% Injectable 12.5 Gram(s) IV Push once  dextrose 50% Injectable 25 Gram(s) IV Push once  insulin lispro (HumaLOG) corrective regimen sliding scale   SubCutaneous three times a day before meals  insulin lispro (HumaLOG) corrective regimen sliding scale   SubCutaneous at bedtime  metoprolol succinate ER 25 milliGRAM(s) Oral daily  pantoprazole    Tablet 40 milliGRAM(s) Oral before breakfast  simvastatin 20 milliGRAM(s) Oral at bedtime  tamsulosin 0.4 milliGRAM(s) Oral at bedtime  vancomycin  IVPB 750 milliGRAM(s) IV Intermittent every 12 hours    No Known Allergies    FAMILY HISTORY:  No pertinent family history in first degree relatives  Non-contributary for premature coronary disease or sudden cardiac death    SOCIAL HISTORY:    [x ] Non-smoker  [ ] Smoker  [ ] Alcohol      REVIEW OF SYSTEMS:  [ ]chest pain  [  ]shortness of breath  [  ]palpitations  [  ]syncope  [ ]near syncope [ ]upper extremity weakness   [ ] lower extremity weakness  [  ]diplopia  [  ]altered mental status   [  ]fevers  [ ]chills [ ]nausea  [ ]vomitting  [  ]dysphagia    [ ]abdominal pain  [ ]melena  [ ]BRBPR    [  ]epistaxis  [  ]rash    [ ]lower extremity edema        [ x] All others negative	  [ ] Unable to obtain    PHYSICAL EXAM:  T(C): 36.3 (12-19-18 @ 05:01), Max: 37 (12-18-18 @ 21:15)  HR: 110 (12-19-18 @ 11:25) (101 - 119)  BP: 115/73 (12-19-18 @ 05:01) (115/73 - 126/77)  RR: 19 (12-19-18 @ 11:25) (18 - 19)  SpO2: 100% (12-19-18 @ 11:25) (95% - 100%)  Wt(kg): --    no JVD  tachy, no murmurs  CTAB  soft nt/nd  no c/c/e      TELEMETRY: 	    ECG:  	    Echo:  NST:  Cath:  	  	  LABS:	 	                          12.7   7.4   )-----------( 164      ( 19 Dec 2018 07:02 )             36.6     12-19    143  |  107  |  16  ----------------------------<  157<H>  3.7   |  25  |  0.90    Ca    8.8      19 Dec 2018 07:01    TPro  5.4<L>  /  Alb  2.7<L>  /  TBili  0.2  /  DBili  x   /  AST  16  /  ALT  14  /  AlkPhos  51  12-18    proBNP:   Lipid Profile:   HgA1c:   TSH:     A/P) He is a pleasant 79 y/o male PMH CAD s/p CABG (LVEF 45-50%) now admitted with rigors after a recent course of antibiotics for bacteremia. EP is now called for persistent tachycardia on telemetry. Review of every EKG and tele shows the rhythm is sinus with an underlying IVCD. His TSH last year was normal.    -will order TSH  -would continue toprol for his mild LV dysfunction, but this rhythm does not require any specific intervention  -etiology of sinus tachycardia almost certainly secondary to his underlying medical illness  -no further inpatient EP workup needed  -d/w patient, his son, and his wife in detail who were happy to hear this is a normal physiologic response and does not require any specific treatment      Shane Walsh M.D., [facility]  Cardiac Electrophysiology  [facility]  [address] [place] [address], [address] [place] [address]  [email or web address]    office [phone]  pager [phone]

## 2018-12-20 LAB
GLUCOSE BLDC GLUCOMTR-MCNC: 144 MG/DL — HIGH (ref 70–99)
GLUCOSE BLDC GLUCOMTR-MCNC: 161 MG/DL — HIGH (ref 70–99)
GLUCOSE BLDC GLUCOMTR-MCNC: 174 MG/DL — HIGH (ref 70–99)
GLUCOSE BLDC GLUCOMTR-MCNC: 206 MG/DL — HIGH (ref 70–99)
HCT VFR BLD CALC: 39.1 % — SIGNIFICANT CHANGE UP (ref 39–50)
HGB BLD-MCNC: 13 G/DL — SIGNIFICANT CHANGE UP (ref 13–17)
MCHC RBC-ENTMCNC: 32.9 PG — SIGNIFICANT CHANGE UP (ref 27–34)
MCHC RBC-ENTMCNC: 33.2 GM/DL — SIGNIFICANT CHANGE UP (ref 32–36)
MCV RBC AUTO: 98.9 FL — SIGNIFICANT CHANGE UP (ref 80–100)
PLATELET # BLD AUTO: 137 K/UL — LOW (ref 150–400)
RBC # BLD: 3.96 M/UL — LOW (ref 4.2–5.8)
RBC # FLD: 12.7 % — SIGNIFICANT CHANGE UP (ref 10.3–14.5)
TSH SERPL-MCNC: 0.52 UIU/ML — SIGNIFICANT CHANGE UP (ref 0.27–4.2)
WBC # BLD: 5.2 K/UL — SIGNIFICANT CHANGE UP (ref 3.8–10.5)
WBC # FLD AUTO: 5.2 K/UL — SIGNIFICANT CHANGE UP (ref 3.8–10.5)

## 2018-12-20 RX ORDER — FUROSEMIDE 40 MG
40 TABLET ORAL ONCE
Qty: 0 | Refills: 0 | Status: COMPLETED | OUTPATIENT
Start: 2018-12-20 | End: 2018-12-20

## 2018-12-20 RX ORDER — FUROSEMIDE 40 MG
40 TABLET ORAL EVERY 12 HOURS
Qty: 0 | Refills: 0 | Status: DISCONTINUED | OUTPATIENT
Start: 2018-12-21 | End: 2018-12-26

## 2018-12-20 RX ADMIN — Medication 40 MILLIGRAM(S): at 21:45

## 2018-12-20 RX ADMIN — Medication 40 MILLIGRAM(S): at 14:52

## 2018-12-20 RX ADMIN — SIMVASTATIN 20 MILLIGRAM(S): 20 TABLET, FILM COATED ORAL at 21:45

## 2018-12-20 RX ADMIN — Medication 2: at 09:00

## 2018-12-20 RX ADMIN — CEFEPIME 100 MILLIGRAM(S): 1 INJECTION, POWDER, FOR SOLUTION INTRAMUSCULAR; INTRAVENOUS at 02:53

## 2018-12-20 RX ADMIN — Medication 4: at 13:15

## 2018-12-20 RX ADMIN — PANTOPRAZOLE SODIUM 40 MILLIGRAM(S): 20 TABLET, DELAYED RELEASE ORAL at 06:25

## 2018-12-20 RX ADMIN — TAMSULOSIN HYDROCHLORIDE 0.4 MILLIGRAM(S): 0.4 CAPSULE ORAL at 21:45

## 2018-12-20 RX ADMIN — Medication 81 MILLIGRAM(S): at 12:07

## 2018-12-20 RX ADMIN — Medication 250 MILLIGRAM(S): at 10:19

## 2018-12-20 RX ADMIN — Medication 2: at 18:22

## 2018-12-20 RX ADMIN — CEFEPIME 100 MILLIGRAM(S): 1 INJECTION, POWDER, FOR SOLUTION INTRAMUSCULAR; INTRAVENOUS at 13:36

## 2018-12-20 RX ADMIN — Medication 25 MILLIGRAM(S): at 06:25

## 2018-12-20 NOTE — PROGRESS NOTE ADULT - SUBJECTIVE AND OBJECTIVE BOX
Patient is a 78y old  Male who presents with a chief complaint of Rigors/shaking (20 Dec 2018 11:55)      SUBJECTIVE / OVERNIGHT EVENTS:    Events noted.  Feels better.  CONSTITUTIONAL: No fever,  or fatigue  NECK: No pain or stiffness  RESPIRATORY: No cough, wheezing, chills or hemoptysis; No shortness of breath  CARDIOVASCULAR: No chest pain, palpitations, dizziness, or leg swelling  GASTROINTESTINAL: No abdominal or epigastric pain. No nausea, vomiting, or hematemesis; No diarrhea or constipation.   NEUROLOGICAL: No headaches,     MEDICATIONS  (STANDING):  aspirin enteric coated 81 milliGRAM(s) Oral daily  dextrose 5%. 1000 milliLiter(s) (50 mL/Hr) IV Continuous <Continuous>  dextrose 50% Injectable 12.5 Gram(s) IV Push once  dextrose 50% Injectable 25 Gram(s) IV Push once  insulin lispro (HumaLOG) corrective regimen sliding scale   SubCutaneous three times a day before meals  insulin lispro (HumaLOG) corrective regimen sliding scale   SubCutaneous at bedtime  metoprolol succinate ER 25 milliGRAM(s) Oral daily  pantoprazole    Tablet 40 milliGRAM(s) Oral before breakfast  simvastatin 20 milliGRAM(s) Oral at bedtime  tamsulosin 0.4 milliGRAM(s) Oral at bedtime    MEDICATIONS  (PRN):  acetaminophen   Tablet .. 650 milliGRAM(s) Oral every 6 hours PRN Temp greater or equal to 38C (100.4F), Mild Pain (1 - 3)  dextrose 40% Gel 15 Gram(s) Oral once PRN Blood Glucose LESS THAN 70 milliGRAM(s)/deciliter        CAPILLARY BLOOD GLUCOSE      POCT Blood Glucose.: 144 mg/dL (20 Dec 2018 22:00)  POCT Blood Glucose.: 161 mg/dL (20 Dec 2018 17:53)  POCT Blood Glucose.: 206 mg/dL (20 Dec 2018 13:06)  POCT Blood Glucose.: 174 mg/dL (20 Dec 2018 08:54)    I&O's Summary    19 Dec 2018 07:01  -  20 Dec 2018 07:00  --------------------------------------------------------  IN: 700 mL / OUT: 0 mL / NET: 700 mL    20 Dec 2018 07:01  -  20 Dec 2018 23:31  --------------------------------------------------------  IN: 580 mL / OUT: 0 mL / NET: 580 mL        PHYSICAL EXAM:  GENERAL: NAD  NECK: Supple, No JVD  CHEST/LUNG: Clear to auscultation bilaterally; No wheezing.  HEART: Regular rate and rhythm; No murmurs, rubs, or gallops  ABDOMEN: Soft, Nontender, Nondistended; Bowel sounds present  EXTREMITIES:   No clubbing, cyanosis, or edema  NEUROLOGY: Awake      LABS:                        13.0   5.2   )-----------( 137      ( 20 Dec 2018 07:05 )             39.1     12-19    143  |  107  |  16  ----------------------------<  157<H>  3.7   |  25  |  0.90    Ca    8.8      19 Dec 2018 07:01              CAPILLARY BLOOD GLUCOSE      POCT Blood Glucose.: 144 mg/dL (20 Dec 2018 22:00)  POCT Blood Glucose.: 161 mg/dL (20 Dec 2018 17:53)  POCT Blood Glucose.: 206 mg/dL (20 Dec 2018 13:06)  POCT Blood Glucose.: 174 mg/dL (20 Dec 2018 08:54)    12-18 @ 18:39  Culture-urine --  Culture results   No growth to date.  method type --  Organism --  Organism Identification --  Specimen source .Blood Blood-Peripheral  12-16 @ 23:32  Culture-urine --  Culture results   No growth  method type --  Organism --  Organism Identification --  Specimen source .Urine Clean Catch (Midstream)  12-16 @ 21:46  Culture-urine --  Culture results   No growth to date.  method type PCR  Organism Blood Culture PCR  Organism Identification Blood Culture PCR  Specimen source .Blood Blood-Peripheral           12-18 @ 18:39  Culture blood --  Culture results   No growth to date.  Gram stain --  Gram stain blood --  Method type --  Organism --  Organism identification --  Specimen source .Blood Blood-Peripheral   12-16 @ 23:32  Culture blood --  Culture results   No growth  Gram stain --  Gram stain blood --  Method type --  Organism --  Organism identification --  Specimen source .Urine Clean Catch (Midstream)   12-16 @ 21:46  Culture blood --  Culture results   No growth to date.  Gram stain   Growth in aerobic bottle: Gram Positive Cocci in Clusters  Gram stain blood --  Method type PCR  Organism Blood Culture PCR  Organism identification Blood Culture PCR  Specimen source .Blood Blood-Peripheral      RADIOLOGY & ADDITIONAL TESTS:    Imaging Personally Reviewed:    Consultant(s) Notes Reviewed:      Care Discussed with Consultants/Other Providers:

## 2018-12-20 NOTE — PROGRESS NOTE ADULT - SUBJECTIVE AND OBJECTIVE BOX
Patient is a 78y old  Male who presents with a chief complaint of Rigors/shaking (19 Dec 2018 19:33)      Any change in ROS: Doing much better:     MEDICATIONS  (STANDING):  aspirin enteric coated 81 milliGRAM(s) Oral daily  cefepime   IVPB 2000 milliGRAM(s) IV Intermittent every 8 hours  dextrose 5%. 1000 milliLiter(s) (50 mL/Hr) IV Continuous <Continuous>  dextrose 50% Injectable 12.5 Gram(s) IV Push once  dextrose 50% Injectable 25 Gram(s) IV Push once  insulin lispro (HumaLOG) corrective regimen sliding scale   SubCutaneous three times a day before meals  insulin lispro (HumaLOG) corrective regimen sliding scale   SubCutaneous at bedtime  metoprolol succinate ER 25 milliGRAM(s) Oral daily  pantoprazole    Tablet 40 milliGRAM(s) Oral before breakfast  simvastatin 20 milliGRAM(s) Oral at bedtime  tamsulosin 0.4 milliGRAM(s) Oral at bedtime  vancomycin  IVPB 750 milliGRAM(s) IV Intermittent every 12 hours    MEDICATIONS  (PRN):  acetaminophen   Tablet .. 650 milliGRAM(s) Oral every 6 hours PRN Temp greater or equal to 38C (100.4F), Mild Pain (1 - 3)  dextrose 40% Gel 15 Gram(s) Oral once PRN Blood Glucose LESS THAN 70 milliGRAM(s)/deciliter    Vital Signs Last 24 Hrs  T(C): 36.4 (20 Dec 2018 04:49), Max: 37.2 (19 Dec 2018 13:00)  T(F): 97.5 (20 Dec 2018 04:49), Max: 98.9 (19 Dec 2018 13:00)  HR: 100 (20 Dec 2018 09:17) (100 - 116)  BP: 106/67 (20 Dec 2018 04:49) (106/67 - 130/83)  BP(mean): --  RR: 19 (20 Dec 2018 04:49) (18 - 20)  SpO2: 97% (20 Dec 2018 09:17) (97% - 100%)    I&O's Summary    19 Dec 2018 07:01  -  20 Dec 2018 07:00  --------------------------------------------------------  IN: 700 mL / OUT: 0 mL / NET: 700 mL    20 Dec 2018 07:01  -  20 Dec 2018 11:55  --------------------------------------------------------  IN: 60 mL / OUT: 0 mL / NET: 60 mL          Physical Exam:   GENERAL: NAD, well-groomed, well-developed  HEENT: BENJY/   Atraumatic, Normocephalic  ENMT: No tonsillar erythema, exudates, or enlargement; Moist mucous membranes, Good dentition, No lesions  NECK: Supple, No JVD, Normal thyroid  CHEST/LUNG: Clear to auscultaion, ; No rales, rhonchi, wheezing, or rubs  CVS: Regular rate and rhythm; No murmurs, rubs, or gallops  GI: : Soft, Nontender, Nondistended; Bowel sounds present  NERVOUS SYSTEM:  Alert & [place] and tries to talk : not in any resp diatress  EXTREMITIES: trace edema  LYMPH: No lymphadenopathy noted  SKIN: No rashes or lesions  ENDOCRINOLOGY: No Thyromegaly  PSYCH: Appropriate    Labs:  25, 23                            13.0   5.2   )-----------( 137      ( 20 Dec 2018 07:05 )             39.1                         12.7   7.4   )-----------( 164      ( 19 Dec 2018 07:02 )             36.6                         13.7   12.27 )-----------( 247      ( 17 Dec 2018 07:46 )             43.2                         14.8   9.9   )-----------( 201      ( 16 Dec 2018 19:54 )             45.1     12-19    143  |  107  |  16  ----------------------------<  157<H>  3.7   |  25  |  0.90  12-18    142  |  107  |  15  ----------------------------<  181<H>  3.9   |  22  |  0.89  12-17    144  |  107  |  14  ----------------------------<  212<H>  4.4   |  23  |  1.00  12-17    143  |  106  |  14  ----------------------------<  266<H>  4.2   |  20<L>  |  0.89  12-16    144  |  107  |  13  ----------------------------<  211<H>  5.4<H>   |  22  |  0.74    Ca    8.8      19 Dec 2018 07:01    TPro  5.4<L>  /  Alb  2.7<L>  /  TBili  0.2  /  DBili  x   /  AST  16  /  ALT  14  /  AlkPhos  51  12-18  TPro  5.9<L>  /  Alb  3.0<L>  /  TBili  0.1<L>  /  DBili  x   /  AST  14  /  ALT  14  /  AlkPhos  66  12-16    CAPILLARY BLOOD GLUCOSE      POCT Blood Glucose.: 174 mg/dL (20 Dec 2018 08:54)  POCT Blood Glucose.: 182 mg/dL (19 Dec 2018 21:27)  POCT Blood Glucose.: 152 mg/dL (19 Dec 2018 17:58)  POCT Blood Glucose.: 182 mg/dL (19 Dec 2018 13:02)            Lactate, Blood: 1.3 mmol/L (12-18 @ 00:08)  Lactate, Blood: 3.3 mmol/L (12-17 @ 03:16)        RECENT CULTURES:  12-18 @ 18:39 .Blood Blood-Peripheral         < from: Xray Chest 1 View- PORTABLE-Routine (12.17.18 @ 09:48) >        INTERPRETATION:    CLINICAL INDICATION: Hypoxic respiratory failure. Fluid overload.   Follow-up evaluation.    TECHNIQUE: Portable frontal view of the chest.    COMPARISON: Frontal chest radiograph from 12/16/2018.    IMPRESSION:    There are median sternotomy wires status post aortic valve replacement.  Cardiac silhouette is enlarged.  Unchanged bilateral pleural effusions with superimposed pulmonary edema.   No pneumothorax.                  SUSANNE BHAT M.D., RADIOLOGY RESIDENT  This document has been electronically signed.  CASH PATEL M.D. ATTENDING RADIOLOGIST  This document has been electronically signed. Dec 17 2018 10:09AM        < end of copied text >         No growth to date.    12-16 @ 23:32 .Urine Clean Catch (Midstream)                No growth    12-16 @ 21:46 .Blood Blood-Peripheral   PCR    Growth in aerobic bottle: Gram Positive Cocci in Clusters    Blood Culture PCR  Blood Culture PCR     No growth to date.          RESPIRATORY CULTURES:          Studies  Chest X-RAY  CT SCAN Chest   Venous Dopplers: LE:   CT Abdomen  Others      < from: Transthoracic Echocardiogram (11.06.18 @ 18:43) >  l aortic regurgitation.  Peak left ventricular  outflow tract gradient equals 1 mm Hg.  Left Atrium: Normal left atrium  Left Ventricle: Mild global left ventricular systolic  dysfunction. Eccentric left ventricular hypertrophy  (dilated left ventricle with normal relative wall  thickness). Normal left ventricular diastolic function.  Right Heart: Right atrium not well visualized. The right  ventricle is not well visualized; grossly normal right  ventricular systolic function. Tricuspid valve not well  visualized. Pulmonic valve not well visualized.  Pericardium/PleuraNormal pericardium with no pericardial  effusion.  ------------------------------------------------------------------------  CONCLUSIONS:  1. Bioprosthetic aortic valve replacement. Peak transaortic  valve gradient equals 28 mm Hg, mean transaortic valve  gradient equals 14 mm Hg, which is probably normal in the  presence of a prosthetic valve. Minimal aortic  regurgitation.  2. Eccentric left ventricular hypertrophy (dilated left  ventricle with normal relative wall thickness).  3. Mild global left ventricular systolic dysfunction.  4. The right ventricle is not well visualized; grossly  normal right ventricular systolic function.  5. No obvious vegetation or mobile echodensities are seen  on the mitral valve. The bioprosthetic aortic valve,  tricuspid, and pulmonic valves are not well visualized. Can  not exclude endocarditis. Consider DICKSON if clinically  indicated.  *** Compared with echocardiogram of 1/28/2013, a  bioprosthetic aortic valve is now present. Left ventricular  function has decreased.  ------------------------------------------------------------------------  Confirmed on  11/6/2018 - 22:28:29 by Edmond Cardenas M.D.  ------------------------------------------------------------------------    < end of copied text >

## 2018-12-20 NOTE — PROGRESS NOTE ADULT - SUBJECTIVE AND OBJECTIVE BOX
Infectious Diseases progress note:    Subjective:  Pt more awake, alert.  No new fevers, WBC normalized    ROS:  Minimally verbal, unable to assess    Allergies    No Known Allergies    Intolerances        ANTIBIOTICS/RELEVANT:  antimicrobials    immunologic:    OTHER:  acetaminophen   Tablet .. 650 milliGRAM(s) Oral every 6 hours PRN  aspirin enteric coated 81 milliGRAM(s) Oral daily  dextrose 40% Gel 15 Gram(s) Oral once PRN  dextrose 5%. 1000 milliLiter(s) IV Continuous <Continuous>  dextrose 50% Injectable 12.5 Gram(s) IV Push once  dextrose 50% Injectable 25 Gram(s) IV Push once  insulin lispro (HumaLOG) corrective regimen sliding scale   SubCutaneous three times a day before meals  insulin lispro (HumaLOG) corrective regimen sliding scale   SubCutaneous at bedtime  metoprolol succinate ER 25 milliGRAM(s) Oral daily  pantoprazole    Tablet 40 milliGRAM(s) Oral before breakfast  simvastatin 20 milliGRAM(s) Oral at bedtime  tamsulosin 0.4 milliGRAM(s) Oral at bedtime      Objective:  Vital Signs Last 24 Hrs  T(C): 36.9 (20 Dec 2018 20:56), Max: 36.9 (20 Dec 2018 20:56)  T(F): 98.5 (20 Dec 2018 20:56), Max: 98.5 (20 Dec 2018 20:56)  HR: 99 (20 Dec 2018 22:27) (84 - 105)  BP: 100/67 (20 Dec 2018 21:43) (93/61 - 111/75)  BP(mean): --  RR: 16 (20 Dec 2018 20:56) (16 - 19)  SpO2: 98% (20 Dec 2018 22:27) (97% - 100%)    PHYSICAL EXAM:  Constitutional:NAD  Eyes:BENJY, EOMI  Ear/Nose/Throat: no thrush, mucositis.  Moist mucous membranes	  Neck:no JVD, no lymphadenopathy, supple  Respiratory: CTA phi  Cardiovascular: S1S2 RRR, no murmurs  Gastrointestinal:soft, nontender,  nondistended (+) BS  Extremities: b/l UE edema  Skin:  no rashes, open wounds or ulcerations        LABS:                        13.0   5.2   )-----------( 137      ( 20 Dec 2018 07:05 )             39.1     12-19    143  |  107  |  16  ----------------------------<  157<H>  3.7   |  25  |  0.90    Ca    8.8      19 Dec 2018 07:01                  Vancomycin Level, Trough: 21.6 ug/mL (12-19 @ 07:02)  Vancomycin Level, Trough: 24.4 ug/mL (12-18 @ 20:49)      Rapid RVP Result: [facility]          MICROBIOLOGY:    Culture - Blood (12.18.18 @ 18:39)    Specimen Source: .Blood Blood-Peripheral    Culture Results:   No growth to date.    Culture - Blood (12.18.18 @ 18:39)    Specimen Source: .Blood Blood-Peripheral    Culture Results:   No growth to date.    Culture - Urine (12.16.18 @ 23:32)    Specimen Source: .Urine Clean Catch (Midstream)    Culture Results:   No growth    Culture - Blood (12.16.18 @ 21:46)    -  Coagulase negative Staphylococcus: Detec    Gram Stain:   Growth in aerobic bottle: Gram Positive Cocci in Clusters    Specimen Source: .Blood Blood-Peripheral    Organism: Blood Culture PCR    Culture Results:   Growth in aerobic bottle: Coag Negative Staphylococcus  Single set isolate, possible contaminant. Contact  Microbiology if susceptibility testing clinically  indicated.  "Due to technical problems, Proteus sp. will Not be reported as part of  the BCID panel until further notice"  ***Blood Panel PCR results on this specimen are available  approximately 3 hours after the Gram stain result.***  Gram stain, PCR, and/or culture results may not always  correspond due to difference in methodologies.  ************************************************************  This PCR assay was performed using [employer].  The following targets are tested for: Enterococcus,  vancomycin resistant enterococci, Listeria monocytogenes,  coagulase negative staphylococci, S. aureus,  methicillin resistant S. aureus, Streptococcus agalactiae  (Group B), S. pneumoniae, S. pyogenes (Group A),  Acinetobacter baumannii, Enterobacter cloacae, E. coli,  Klebsiella oxytoca, K. pneumoniae, Proteus sp.,  Serratia marcescens, Haemophilus influenzae,  Neisseria meningitidis, Pseudomonas aeruginosa, Candida  albicans, C. glabrata, C krusei, C parapsilosis,  C. tropicalis and the KPC resistance gene.    Organism Identification: Blood Culture PCR    Method Type: PCR          RADIOLOGY & ADDITIONAL STUDIES:

## 2018-12-20 NOTE — PROGRESS NOTE ADULT - SUBJECTIVE AND OBJECTIVE BOX
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 Pt has been in rehab since his prior discharge receiving IV antibiotics by PICC vs midline. He was treated for MRSA/pseudomonas sepsis and UTI. Line was taken out yesterday at rehab and pt sent home.Noted to be tachypneac brought to [facility] now admitted     SUBJECTIVE / OVERNIGHT EVENTS:Awake still on supplemental O2-    CONSTITUTIONAL: No fever,  or fatigue  NECK: No pain or stiffness  RESPIRATORY: No cough, wheezing, chills or hemoptysis; No shortness of breath  CARDIOVASCULAR: No chest pain, palpitations, dizziness, or leg swelling  GASTROINTESTINAL: No abdominal or epigastric pain. No nausea, vomiting, or hematemesis; No diarrhea or constipation.   NEUROLOGICAL: No headaches,     MEDICATIONS  (STANDING):  aspirin enteric coated 81 milliGRAM(s) Oral daily  insulin lispro (HumaLOG) corrective regimen sliding scale   SubCutaneous three times a day before meals  insulin lispro (HumaLOG) corrective regimen sliding scale   SubCutaneous at bedtime  metoprolol succinate ER 25 milliGRAM(s) Oral daily  pantoprazole    Tablet 40 milliGRAM(s) Oral before breakfast  simvastatin 20 milliGRAM(s) Oral at bedtime  tamsulosin 0.4 milliGRAM(s) Oral at bedtime    MEDICATIONS  (PRN):  acetaminophen   Tablet .. 650 milliGRAM(s) Oral every 6 hours PRN Temp greater or equal to 38C (100.4F), Mild Pain (1 - 3)  dextrose 40% Gel 15 Gram(s) Oral once PRN Blood Glucose LESS THAN 70 milliGRAM(s)/deciliter    VITALS:-T(C): 36.9 (20 Dec 2018 20:56), Max: 36.9 (20 Dec 2018 20:56)  T(F): 98.5 (20 Dec 2018 20:56), Max: 98.5 (20 Dec 2018 20:56)  HR: 99 (20 Dec 2018 22:27) (84 - 105)  BP: 100/67 (20 Dec 2018 21:43) (93/61 - 111/75)  RR: 16 (20 Dec 2018 20:56) (16 - 19)  SpO2: 98% (20 Dec 2018 22:27) (97% - 100%)    CAPILLARY BLOOD GLUCOSE      POCT Blood Glucose.: 144 mg/dL (20 Dec 2018 22:00)  POCT Blood Glucose.: 161 mg/dL (20 Dec 2018 17:53)  POCT Blood Glucose.: 206 mg/dL (20 Dec 2018 13:06)  POCT Blood Glucose.: 174 mg/dL (20 Dec 2018 08:54)    I&O's Summary    19 Dec 2018 07:01  -  20 Dec 2018 07:00  --------------------------------------------------------  IN: 700 mL / OUT: 0 mL / NET: 700 mL    20 Dec 2018 07:01  -  20 Dec 2018 23:31  --------------------------------------------------------  IN: 580 mL / OUT: 0 mL / NET: 580 mL        PHYSICAL EXAM:  GENERAL: NAD  NECK: Supple, No JVD  CHEST/LUNG: Clear to auscultation bilaterally; No wheezing.  HEART: Regular rate and rhythm; No murmurs, rubs, or gallops  ABDOMEN: Soft, Nontender, Nondistended; Bowel sounds present  EXTREMITIES:   No clubbing, cyanosis, or edema  NEUROLOGY: Awake      LABS:                        13.0   5.2   )-----------( 137      ( 20 Dec 2018 07:05 )             39.1     12-19    143  |  107  |  16  ----------------------------<  157<H>  3.7   |  25  |  0.90    Ca    8.8      19 Dec 2018 07:01              CAPILLARY BLOOD GLUCOSE      POCT Blood Glucose.: 144 mg/dL (20 Dec 2018 22:00)  POCT Blood Glucose.: 161 mg/dL (20 Dec 2018 17:53)  POCT Blood Glucose.: 206 mg/dL (20 Dec 2018 13:06)  POCT Blood Glucose.: 174 mg/dL (20 Dec 2018 08:54)

## 2018-12-21 LAB
ANION GAP SERPL CALC-SCNC: 13 MMOL/L — SIGNIFICANT CHANGE UP (ref 5–17)
BUN SERPL-MCNC: 20 MG/DL — SIGNIFICANT CHANGE UP (ref 7–23)
CALCIUM SERPL-MCNC: 8.7 MG/DL — SIGNIFICANT CHANGE UP (ref 8.4–10.5)
CHLORIDE SERPL-SCNC: 103 MMOL/L — SIGNIFICANT CHANGE UP (ref 96–108)
CO2 SERPL-SCNC: 28 MMOL/L — SIGNIFICANT CHANGE UP (ref 22–31)
CREAT SERPL-MCNC: 1.03 MG/DL — SIGNIFICANT CHANGE UP (ref 0.5–1.3)
CULTURE RESULTS: SIGNIFICANT CHANGE UP
GLUCOSE BLDC GLUCOMTR-MCNC: 138 MG/DL — HIGH (ref 70–99)
GLUCOSE BLDC GLUCOMTR-MCNC: 194 MG/DL — HIGH (ref 70–99)
GLUCOSE BLDC GLUCOMTR-MCNC: 215 MG/DL — HIGH (ref 70–99)
GLUCOSE BLDC GLUCOMTR-MCNC: 222 MG/DL — HIGH (ref 70–99)
GLUCOSE SERPL-MCNC: 179 MG/DL — HIGH (ref 70–99)
HCT VFR BLD CALC: 36.9 % — LOW (ref 39–50)
HGB BLD-MCNC: 12.8 G/DL — LOW (ref 13–17)
MAGNESIUM SERPL-MCNC: 1.7 MG/DL — SIGNIFICANT CHANGE UP (ref 1.6–2.6)
MCHC RBC-ENTMCNC: 34 PG — SIGNIFICANT CHANGE UP (ref 27–34)
MCHC RBC-ENTMCNC: 34.5 GM/DL — SIGNIFICANT CHANGE UP (ref 32–36)
MCV RBC AUTO: 98.4 FL — SIGNIFICANT CHANGE UP (ref 80–100)
PLATELET # BLD AUTO: 140 K/UL — LOW (ref 150–400)
POTASSIUM SERPL-MCNC: 3 MMOL/L — LOW (ref 3.5–5.3)
POTASSIUM SERPL-SCNC: 3 MMOL/L — LOW (ref 3.5–5.3)
RBC # BLD: 3.75 M/UL — LOW (ref 4.2–5.8)
RBC # FLD: 13 % — SIGNIFICANT CHANGE UP (ref 10.3–14.5)
SODIUM SERPL-SCNC: 144 MMOL/L — SIGNIFICANT CHANGE UP (ref 135–145)
SPECIMEN SOURCE: SIGNIFICANT CHANGE UP
WBC # BLD: 4.7 K/UL — SIGNIFICANT CHANGE UP (ref 3.8–10.5)
WBC # FLD AUTO: 4.7 K/UL — SIGNIFICANT CHANGE UP (ref 3.8–10.5)

## 2018-12-21 RX ORDER — POTASSIUM CHLORIDE 20 MEQ
20 PACKET (EA) ORAL
Qty: 0 | Refills: 0 | Status: COMPLETED | OUTPATIENT
Start: 2018-12-21 | End: 2018-12-22

## 2018-12-21 RX ORDER — POTASSIUM CHLORIDE 20 MEQ
40 PACKET (EA) ORAL EVERY 4 HOURS
Qty: 0 | Refills: 0 | Status: DISCONTINUED | OUTPATIENT
Start: 2018-12-21 | End: 2018-12-21

## 2018-12-21 RX ADMIN — TAMSULOSIN HYDROCHLORIDE 0.4 MILLIGRAM(S): 0.4 CAPSULE ORAL at 23:01

## 2018-12-21 RX ADMIN — Medication 25 MILLIGRAM(S): at 05:13

## 2018-12-21 RX ADMIN — Medication 2: at 09:46

## 2018-12-21 RX ADMIN — Medication 20 MILLIEQUIVALENT(S): at 17:55

## 2018-12-21 RX ADMIN — Medication 40 MILLIGRAM(S): at 17:01

## 2018-12-21 RX ADMIN — PANTOPRAZOLE SODIUM 40 MILLIGRAM(S): 20 TABLET, DELAYED RELEASE ORAL at 05:13

## 2018-12-21 RX ADMIN — Medication 4: at 13:48

## 2018-12-21 RX ADMIN — SIMVASTATIN 20 MILLIGRAM(S): 20 TABLET, FILM COATED ORAL at 23:01

## 2018-12-21 RX ADMIN — Medication 20 MILLIEQUIVALENT(S): at 23:00

## 2018-12-21 RX ADMIN — Medication 40 MILLIGRAM(S): at 05:12

## 2018-12-21 RX ADMIN — Medication 81 MILLIGRAM(S): at 12:06

## 2018-12-21 NOTE — PROGRESS NOTE ADULT - SUBJECTIVE AND OBJECTIVE BOX
Patient is a 78y old  Male who presents with a chief complaint of Rigors/shaking (20 Dec 2018 17:51)      Any change in ROS: No resp events overnight Currently he is pretty sleepy but not in any resp distress:     MEDICATIONS  (STANDING):  aspirin enteric coated 81 milliGRAM(s) Oral daily  dextrose 5%. 1000 milliLiter(s) (50 mL/Hr) IV Continuous <Continuous>  dextrose 50% Injectable 12.5 Gram(s) IV Push once  dextrose 50% Injectable 25 Gram(s) IV Push once  furosemide   Injectable 40 milliGRAM(s) IV Push every 12 hours  insulin lispro (HumaLOG) corrective regimen sliding scale   SubCutaneous three times a day before meals  insulin lispro (HumaLOG) corrective regimen sliding scale   SubCutaneous at bedtime  metoprolol succinate ER 25 milliGRAM(s) Oral daily  pantoprazole    Tablet 40 milliGRAM(s) Oral before breakfast  simvastatin 20 milliGRAM(s) Oral at bedtime  tamsulosin 0.4 milliGRAM(s) Oral at bedtime    MEDICATIONS  (PRN):  acetaminophen   Tablet .. 650 milliGRAM(s) Oral every 6 hours PRN Temp greater or equal to 38C (100.4F), Mild Pain (1 - 3)  dextrose 40% Gel 15 Gram(s) Oral once PRN Blood Glucose LESS THAN 70 milliGRAM(s)/deciliter    Vital Signs Last 24 Hrs  T(C): 36.3 (21 Dec 2018 04:46), Max: 36.9 (20 Dec 2018 20:56)  T(F): 97.4 (21 Dec 2018 04:46), Max: 98.5 (20 Dec 2018 20:56)  HR: 87 (21 Dec 2018 04:46) (84 - 105)  BP: 119/79 (21 Dec 2018 04:46) (93/61 - 119/79)  BP(mean): --  RR: 18 (21 Dec 2018 04:46) (16 - 18)  SpO2: 100% (21 Dec 2018 04:46) (98% - 100%)    I&O's Summary    20 Dec 2018 07:01  -  21 Dec 2018 07:00  --------------------------------------------------------  IN: 630 mL / OUT: 0 mL / NET: 630 mL          Physical Exam:   GENERAL: NAD, well-groomed, well-developed  HEENT: BENJY/   Atraumatic, Normocephalic  ENMT: No tonsillar erythema, exudates, or enlargement; Moist mucous membranes, Good dentition, No lesions  NECK: Supple, No JVD, Normal thyroid  CHEST/LUNG: Bibasilar crackles   CVS: Regular rate and rhythm; No murmurs, rubs, or gallops  GI: : Soft, Nontender, Nondistended; Bowel sounds present  NERVOUS SYSTEM:  sleepy but tries toopen eyses  EXTREMITIES:  Trace edema  LYMPH: No lymphadenopathy noted  SKIN: No rashes or lesions  ENDOCRINOLOGY: No Thyromegaly  PSYCH: calm    Labs:  25, 23                            12.8   4.7   )-----------( 140      ( 21 Dec 2018 06:28 )             36.9                         13.0   5.2   )-----------( 137      ( 20 Dec 2018 07:05 )             39.1                         12.7   7.4   )-----------( 164      ( 19 Dec 2018 07:02 )             36.6     12-21    144  |  103  |  20  ----------------------------<  179<H>  3.0<L>   |  28  |  1.03  12-19    143  |  107  |  16  ----------------------------<  157<H>  3.7   |  25  |  0.90  12-18    142  |  107  |  15  ----------------------------<  181<H>  3.9   |  22  |  0.89    Ca    8.7      21 Dec 2018 06:27  Mg     1.7     12-21    TPro  5.4<L>  /  Alb  2.7<L>  /  TBili  0.2  /  DBili  x   /  AST  16  /  ALT  14  /  AlkPhos  51  12-18    CAPILLARY BLOOD GLUCOSE      POCT Blood Glucose.: 194 mg/dL (21 Dec 2018 09:06)  POCT Blood Glucose.: 144 mg/dL (20 Dec 2018 22:00)  POCT Blood Glucose.: 161 mg/dL (20 Dec 2018 17:53)  POCT Blood Glucose.: 206 mg/dL (20 Dec 2018 13:06)            Lactate, Blood: 1.3 mmol/L (12-18 @ 00:08)        RECENT CULTURES:  12-18 @ 18:39 .Blood Blood-Peripheral                No growth to date.    12-16 @ 23:32 .Urine Clean Catch (Midstream)     < from: Xray Chest 1 View- PORTABLE-Routine (12.17.18 @ 09:48) >  PROCEDURE DATE:  12/17/2018            INTERPRETATION:    CLINICAL INDICATION: Hypoxic respiratory failure. Fluid overload.   Follow-up evaluation.    TECHNIQUE: Portable frontal view of the chest.    COMPARISON: Frontal chest radiograph from 12/16/2018.    IMPRESSION:    There are median sternotomy wires status post aortic valve replacement.  Cardiac silhouette is enlarged.  Unchanged bilateral pleural effusions with superimposed pulmonary edema.   No pneumothorax.                  SUSANNE BHAT M.D., RADIOLOGY RESIDENT  This document has been electronically signed.  CASH PATEL M.D. ATTENDING RADIOLOGIST  This document has been electronically signed. Dec 17 2018 10:09AM        < end of copied text >             No growth    12-16 @ 21:46 .Blood Blood-Peripheral   PCR    Growth in aerobic bottle: Gram Positive Cocci in Clusters    Blood Culture PCR  Blood Culture PCR     No growth to date.          RESPIRATORY CULTURES:          Studies  Chest X-RAY  CT SCAN Chest   Venous Dopplers: LE:   CT Abdomen  Others

## 2018-12-21 NOTE — PROGRESS NOTE ADULT - SUBJECTIVE AND OBJECTIVE BOX
Patient is a 78y old  Male who presents with a chief complaint of Rigors/shaking (21 Dec 2018 11:12)      SUBJECTIVE / OVERNIGHT EVENTS:    Events noted.  On supp O2  No N/V    MEDICATIONS  (STANDING):  aspirin enteric coated 81 milliGRAM(s) Oral daily  dextrose 5%. 1000 milliLiter(s) (50 mL/Hr) IV Continuous <Continuous>  dextrose 50% Injectable 12.5 Gram(s) IV Push once  dextrose 50% Injectable 25 Gram(s) IV Push once  furosemide   Injectable 40 milliGRAM(s) IV Push every 12 hours  insulin lispro (HumaLOG) corrective regimen sliding scale   SubCutaneous three times a day before meals  insulin lispro (HumaLOG) corrective regimen sliding scale   SubCutaneous at bedtime  metoprolol succinate ER 25 milliGRAM(s) Oral daily  pantoprazole    Tablet 40 milliGRAM(s) Oral before breakfast  simvastatin 20 milliGRAM(s) Oral at bedtime  tamsulosin 0.4 milliGRAM(s) Oral at bedtime    MEDICATIONS  (PRN):  acetaminophen   Tablet .. 650 milliGRAM(s) Oral every 6 hours PRN Temp greater or equal to 38C (100.4F), Mild Pain (1 - 3)  dextrose 40% Gel 15 Gram(s) Oral once PRN Blood Glucose LESS THAN 70 milliGRAM(s)/deciliter        CAPILLARY BLOOD GLUCOSE      POCT Blood Glucose.: 222 mg/dL (21 Dec 2018 13:00)  POCT Blood Glucose.: 194 mg/dL (21 Dec 2018 09:06)  POCT Blood Glucose.: 144 mg/dL (20 Dec 2018 22:00)  POCT Blood Glucose.: 161 mg/dL (20 Dec 2018 17:53)    I&O's Summary    20 Dec 2018 07:01  -  21 Dec 2018 07:00  --------------------------------------------------------  IN: 630 mL / OUT: 0 mL / NET: 630 mL    21 Dec 2018 07:01  -  21 Dec 2018 15:45  --------------------------------------------------------  IN: 280 mL / OUT: 0 mL / NET: 280 mL        PHYSICAL EXAM:  GENERAL: NAD  NECK: Supple, No JVD  CHEST/LUNG: Clear to auscultation bilaterally; No wheezing.  HEART: Regular rate and rhythm; No murmurs, rubs, or gallops  ABDOMEN: Soft, Nontender, Nondistended; Bowel sounds present  EXTREMITIES:   No clubbing, cyanosis, or edema  NEUROLOGY: Awake/Lt hemiparesis      LABS:                        12.8   4.7   )-----------( 140      ( 21 Dec 2018 06:28 )             36.9     12-21    144  |  103  |  20  ----------------------------<  179<H>  3.0<L>   |  28  |  1.03    Ca    8.7      21 Dec 2018 06:27  Mg     1.7     12-21              CAPILLARY BLOOD GLUCOSE      POCT Blood Glucose.: 222 mg/dL (21 Dec 2018 13:00)  POCT Blood Glucose.: 194 mg/dL (21 Dec 2018 09:06)  POCT Blood Glucose.: 144 mg/dL (20 Dec 2018 22:00)  POCT Blood Glucose.: 161 mg/dL (20 Dec 2018 17:53)    12-18 @ 18:39  Culture-urine --  Culture results   No growth to date.  method type --  Organism --  Organism Identification --  Specimen source .Blood Blood-Peripheral  12-16 @ 23:32  Culture-urine --  Culture results   No growth  method type --  Organism --  Organism Identification --  Specimen source .Urine Clean Catch (Midstream)  12-16 @ 21:46  Culture-urine --  Culture results   No growth to date.  method type PCR  Organism Blood Culture PCR  Organism Identification Blood Culture PCR  Specimen source .Blood Blood-Peripheral           12-18 @ 18:39  Culture blood --  Culture results   No growth to date.  Gram stain --  Gram stain blood --  Method type --  Organism --  Organism identification --  Specimen source .Blood Blood-Peripheral   12-16 @ 23:32  Culture blood --  Culture results   No growth  Gram stain --  Gram stain blood --  Method type --  Organism --  Organism identification --  Specimen source .Urine Clean Catch (Midstream)   12-16 @ 21:46  Culture blood --  Culture results   No growth to date.  Gram stain   Growth in aerobic bottle: Gram Positive Cocci in Clusters  Gram stain blood --  Method type PCR  Organism Blood Culture PCR  Organism identification Blood Culture PCR  Specimen source .Blood Blood-Peripheral      RADIOLOGY & ADDITIONAL TESTS:    Imaging Personally Reviewed:    Consultant(s) Notes Reviewed:      Care Discussed with Consultants/Other Providers:

## 2018-12-21 NOTE — PROGRESS NOTE ADULT - SUBJECTIVE AND OBJECTIVE BOX
Infectious Diseases progress note:    Subjective: No new fevers.  No acute o/n events.  Has been off abx since 12/20.    ROS:  Minimally verbal    Allergies    No Known Allergies    Intolerances        ANTIBIOTICS/RELEVANT:  antimicrobials    immunologic:    OTHER:  acetaminophen   Tablet .. 650 milliGRAM(s) Oral every 6 hours PRN  aspirin enteric coated 81 milliGRAM(s) Oral daily  dextrose 40% Gel 15 Gram(s) Oral once PRN  dextrose 5%. 1000 milliLiter(s) IV Continuous <Continuous>  dextrose 50% Injectable 12.5 Gram(s) IV Push once  dextrose 50% Injectable 25 Gram(s) IV Push once  furosemide   Injectable 40 milliGRAM(s) IV Push every 12 hours  insulin lispro (HumaLOG) corrective regimen sliding scale   SubCutaneous three times a day before meals  insulin lispro (HumaLOG) corrective regimen sliding scale   SubCutaneous at bedtime  metoprolol succinate ER 25 milliGRAM(s) Oral daily  pantoprazole    Tablet 40 milliGRAM(s) Oral before breakfast  potassium chloride   Solution 20 milliEquivalent(s) Oral every 2 hours  simvastatin 20 milliGRAM(s) Oral at bedtime  tamsulosin 0.4 milliGRAM(s) Oral at bedtime      Objective:  Vital Signs Last 24 Hrs  T(C): 36.7 (21 Dec 2018 12:12), Max: 36.9 (20 Dec 2018 20:56)  T(F): 98 (21 Dec 2018 12:12), Max: 98.5 (20 Dec 2018 20:56)  HR: 96 (21 Dec 2018 16:48) (87 - 99)  BP: 113/76 (21 Dec 2018 16:48) (93/61 - 125/77)  BP(mean): --  RR: 18 (21 Dec 2018 12:12) (16 - 18)  SpO2: 97% (21 Dec 2018 12:12) (97% - 100%)    PHYSICAL EXAM:  Constitutional:NAD  Eyes:BENJY, EOMI  Ear/Nose/Throat: no thrush, mucositis.  Moist mucous membranes	  Neck:no JVD, no lymphadenopathy, supple  Respiratory: CTA phi  Cardiovascular: S1S2 RRR, no murmurs  Gastrointestinal:soft, nontender,  nondistended (+) BS  Extremities: b/l UE edema  Skin:  no rashes, open wounds or ulcerations        LABS:                        12.8   4.7   )-----------( 140      ( 21 Dec 2018 06:28 )             36.9     12-21    144  |  103  |  20  ----------------------------<  179<H>  3.0<L>   |  28  |  1.03    Ca    8.7      21 Dec 2018 06:27  Mg     1.7     12-21                  Vancomycin Level, Trough: 21.6 ug/mL (12-19 @ 07:02)  Vancomycin Level, Trough: 24.4 ug/mL (12-18 @ 20:49)      Rapid RVP Result: [facility]          MICROBIOLOGY:    Culture - Blood (12.18.18 @ 18:39)    Specimen Source: .Blood Blood-Peripheral    Culture Results:   No growth to date.    Culture - Blood (12.18.18 @ 18:39)    Specimen Source: .Blood Blood-Peripheral    Culture Results:   No growth to date.    Culture - Urine (12.16.18 @ 23:32)    Specimen Source: .Urine Clean Catch (Midstream)    Culture Results:   No growth    Culture - Blood (12.16.18 @ 21:46)    -  Coagulase negative Staphylococcus: Detec    Gram Stain:   Growth in aerobic bottle: Gram Positive Cocci in Clusters    Specimen Source: .Blood Blood-Peripheral    Organism: Blood Culture PCR    Culture Results:   Growth in aerobic bottle: Coag Negative Staphylococcus  Single set isolate, possible contaminant. Contact  Microbiology if susceptibility testing clinically  indicated.  "Due to technical problems, Proteus sp. will Not be reported as part of  the BCID panel until further notice"  ***Blood Panel PCR results on this specimen are available  approximately 3 hours after the Gram stain result.***  Gram stain, PCR, and/or culture results may not always  correspond due to difference in methodologies.  ************************************************************  This PCR assay was performed using [employer].  The following targets are tested for: Enterococcus,  vancomycin resistant enterococci, Listeria monocytogenes,  coagulase negative staphylococci, S. aureus,  methicillin resistant S. aureus, Streptococcus agalactiae  (Group B), S. pneumoniae, S. pyogenes (Group A),  Acinetobacter baumannii, Enterobacter cloacae, E. coli,  Klebsiella oxytoca, K. pneumoniae, Proteus sp.,  Serratia marcescens, Haemophilus influenzae,  Neisseria meningitidis, Pseudomonas aeruginosa, Candida  albicans, C. glabrata, C krusei, C parapsilosis,  C. tropicalis and the KPC resistance gene.    Organism Identification: Blood Culture PCR    Method Type: PCR    Culture - Blood (12.16.18 @ 21:46)    Specimen Source: .Blood Blood-Peripheral    Culture Results:   No growth to date.          RADIOLOGY & ADDITIONAL STUDIES:    < from: Xray Chest 1 View- PORTABLE-Routine (12.17.18 @ 09:48) >  IMPRESSION:    There are median sternotomy wires status post aortic valve replacement.  Cardiac silhouette is enlarged.  Unchanged bilateral pleural effusions with superimposed pulmonary edema.   No pneumothorax.    < end of copied text >        < from: CT Angio Chest w/ IV Cont (12.16.18 @ 22:22) >  FINDINGS:    LUNGS AND LARGE AIRWAYS: Limited evaluation of the lung parenchyma   secondary to respiratory motion artifact. Interlobular septal thickening   and bilateral groundglass opacities, most prominently in the right upper   lobe are suggestive of pulmonary edema. Bibasilar subsegmental   atelectasis.  PLEURA: Moderate bilateral pleural effusions.  VESSELS: No pulmonary embolism. Evaluation of the subsegmental pulmonary   arteries is limited secondary to respiratory motion artifact. Coronary   atherosclerosis.  HEART: Multichamber cardiac enlargement. Nopericardial effusion. Aortic   valve replacement and aortic root repair.  MEDIASTINUM AND СЕРГЕЙ: No lymphadenopathy.  CHEST WALL AND LOWER NECK: Within normal limits. Bilateral gynecomastia.  VISUALIZED UPPER ABDOMEN: Within normal limits.  BONES: Median sternotomy. Generalized osteopenia. Degenerative changes of   the right acromioclavicular joint. Moderate superior endplate compression   deformity of T12. No bony retropulsion or severe bony impingement of the   spinal canal.    IMPRESSION:     No pulmonary embolism.    Pulmonary edema with moderate bilateral pleural effusions.    < end of copied text >

## 2018-12-22 LAB
ANION GAP SERPL CALC-SCNC: 12 MMOL/L — SIGNIFICANT CHANGE UP (ref 5–17)
ANION GAP SERPL CALC-SCNC: 9 MMOL/L — SIGNIFICANT CHANGE UP (ref 5–17)
BASE EXCESS BLDA CALC-SCNC: 11 MMOL/L — HIGH (ref -2–2)
BUN SERPL-MCNC: 21 MG/DL — SIGNIFICANT CHANGE UP (ref 7–23)
BUN SERPL-MCNC: 22 MG/DL — SIGNIFICANT CHANGE UP (ref 7–23)
CALCIUM SERPL-MCNC: 8.6 MG/DL — SIGNIFICANT CHANGE UP (ref 8.4–10.5)
CALCIUM SERPL-MCNC: 8.8 MG/DL — SIGNIFICANT CHANGE UP (ref 8.4–10.5)
CHLORIDE SERPL-SCNC: 101 MMOL/L — SIGNIFICANT CHANGE UP (ref 96–108)
CHLORIDE SERPL-SCNC: 101 MMOL/L — SIGNIFICANT CHANGE UP (ref 96–108)
CO2 BLDA-SCNC: 37 MMOL/L — HIGH (ref 22–30)
CO2 SERPL-SCNC: 25 MMOL/L — SIGNIFICANT CHANGE UP (ref 22–31)
CO2 SERPL-SCNC: 32 MMOL/L — HIGH (ref 22–31)
CREAT SERPL-MCNC: 0.9 MG/DL — SIGNIFICANT CHANGE UP (ref 0.5–1.3)
CREAT SERPL-MCNC: 1.01 MG/DL — SIGNIFICANT CHANGE UP (ref 0.5–1.3)
GLUCOSE BLDC GLUCOMTR-MCNC: 152 MG/DL — HIGH (ref 70–99)
GLUCOSE BLDC GLUCOMTR-MCNC: 188 MG/DL — HIGH (ref 70–99)
GLUCOSE BLDC GLUCOMTR-MCNC: 201 MG/DL — HIGH (ref 70–99)
GLUCOSE BLDC GLUCOMTR-MCNC: 281 MG/DL — HIGH (ref 70–99)
GLUCOSE SERPL-MCNC: 202 MG/DL — HIGH (ref 70–99)
GLUCOSE SERPL-MCNC: 309 MG/DL — HIGH (ref 70–99)
HCO3 BLDA-SCNC: 36 MMOL/L — HIGH (ref 21–29)
HCT VFR BLD CALC: 36.3 % — LOW (ref 39–50)
HGB BLD-MCNC: 12.4 G/DL — LOW (ref 13–17)
HOROWITZ INDEX BLDA+IHG-RTO: 28 — SIGNIFICANT CHANGE UP
MAGNESIUM SERPL-MCNC: 1.8 MG/DL — SIGNIFICANT CHANGE UP (ref 1.6–2.6)
MCHC RBC-ENTMCNC: 33.6 PG — SIGNIFICANT CHANGE UP (ref 27–34)
MCHC RBC-ENTMCNC: 34.1 GM/DL — SIGNIFICANT CHANGE UP (ref 32–36)
MCV RBC AUTO: 98.4 FL — SIGNIFICANT CHANGE UP (ref 80–100)
PCO2 BLDA: 46 MMHG — SIGNIFICANT CHANGE UP (ref 32–46)
PH BLDA: 7.5 — HIGH (ref 7.35–7.45)
PLATELET # BLD AUTO: 157 K/UL — SIGNIFICANT CHANGE UP (ref 150–400)
PO2 BLDA: 93 MMHG — SIGNIFICANT CHANGE UP (ref 74–108)
POTASSIUM SERPL-MCNC: 3.6 MMOL/L — SIGNIFICANT CHANGE UP (ref 3.5–5.3)
POTASSIUM SERPL-MCNC: 6.1 MMOL/L — HIGH (ref 3.5–5.3)
POTASSIUM SERPL-SCNC: 3.6 MMOL/L — SIGNIFICANT CHANGE UP (ref 3.5–5.3)
POTASSIUM SERPL-SCNC: 6.1 MMOL/L — HIGH (ref 3.5–5.3)
RBC # BLD: 3.69 M/UL — LOW (ref 4.2–5.8)
RBC # FLD: 12.9 % — SIGNIFICANT CHANGE UP (ref 10.3–14.5)
SAO2 % BLDA: 98 % — HIGH (ref 92–96)
SODIUM SERPL-SCNC: 138 MMOL/L — SIGNIFICANT CHANGE UP (ref 135–145)
SODIUM SERPL-SCNC: 142 MMOL/L — SIGNIFICANT CHANGE UP (ref 135–145)
WBC # BLD: 5.5 K/UL — SIGNIFICANT CHANGE UP (ref 3.8–10.5)
WBC # FLD AUTO: 5.5 K/UL — SIGNIFICANT CHANGE UP (ref 3.8–10.5)

## 2018-12-22 RX ADMIN — Medication 2: at 09:33

## 2018-12-22 RX ADMIN — Medication 4: at 18:31

## 2018-12-22 RX ADMIN — TAMSULOSIN HYDROCHLORIDE 0.4 MILLIGRAM(S): 0.4 CAPSULE ORAL at 21:32

## 2018-12-22 RX ADMIN — Medication 20 MILLIEQUIVALENT(S): at 01:00

## 2018-12-22 RX ADMIN — Medication 81 MILLIGRAM(S): at 11:22

## 2018-12-22 RX ADMIN — Medication 6: at 13:08

## 2018-12-22 RX ADMIN — Medication 25 MILLIGRAM(S): at 06:37

## 2018-12-22 RX ADMIN — Medication 40 MILLIGRAM(S): at 16:59

## 2018-12-22 RX ADMIN — Medication 40 MILLIGRAM(S): at 06:37

## 2018-12-22 RX ADMIN — SIMVASTATIN 20 MILLIGRAM(S): 20 TABLET, FILM COATED ORAL at 21:32

## 2018-12-22 RX ADMIN — PANTOPRAZOLE SODIUM 40 MILLIGRAM(S): 20 TABLET, DELAYED RELEASE ORAL at 06:37

## 2018-12-22 NOTE — PROGRESS NOTE ADULT - SUBJECTIVE AND OBJECTIVE BOX
Patient is a 78y old  Male who presents with a chief complaint of Rigors/shaking (22 Dec 2018 11:19)      SUBJECTIVE / OVERNIGHT EVENTS:    Events noted.  Feels better.  CONSTITUTIONAL: No fever,  or fatigue  NECK: No pain or stiffness  RESPIRATORY: No cough, wheezing, chills or hemoptysis; No shortness of breath  CARDIOVASCULAR: No chest pain, palpitations, dizziness, or leg swelling  GASTROINTESTINAL: No abdominal or epigastric pain. No nausea, vomiting, or hematemesis; No diarrhea or constipation.   NEUROLOGICAL: No headaches,     MEDICATIONS  (STANDING):  aspirin enteric coated 81 milliGRAM(s) Oral daily  dextrose 5%. 1000 milliLiter(s) (50 mL/Hr) IV Continuous <Continuous>  dextrose 50% Injectable 12.5 Gram(s) IV Push once  dextrose 50% Injectable 25 Gram(s) IV Push once  furosemide   Injectable 40 milliGRAM(s) IV Push every 12 hours  insulin lispro (HumaLOG) corrective regimen sliding scale   SubCutaneous three times a day before meals  insulin lispro (HumaLOG) corrective regimen sliding scale   SubCutaneous at bedtime  metoprolol succinate ER 25 milliGRAM(s) Oral daily  pantoprazole    Tablet 40 milliGRAM(s) Oral before breakfast  simvastatin 20 milliGRAM(s) Oral at bedtime  tamsulosin 0.4 milliGRAM(s) Oral at bedtime    MEDICATIONS  (PRN):  acetaminophen   Tablet .. 650 milliGRAM(s) Oral every 6 hours PRN Temp greater or equal to 38C (100.4F), Mild Pain (1 - 3)  dextrose 40% Gel 15 Gram(s) Oral once PRN Blood Glucose LESS THAN 70 milliGRAM(s)/deciliter        CAPILLARY BLOOD GLUCOSE      POCT Blood Glucose.: 281 mg/dL (22 Dec 2018 12:56)  POCT Blood Glucose.: 188 mg/dL (22 Dec 2018 09:11)  POCT Blood Glucose.: 215 mg/dL (21 Dec 2018 21:32)  POCT Blood Glucose.: 138 mg/dL (21 Dec 2018 18:01)    I&O's Summary    21 Dec 2018 07:01  -  22 Dec 2018 07:00  --------------------------------------------------------  IN: 760 mL / OUT: 0 mL / NET: 760 mL    22 Dec 2018 07:01  -  22 Dec 2018 16:10  --------------------------------------------------------  IN: 360 mL / OUT: 0 mL / NET: 360 mL        PHYSICAL EXAM:  GENERAL: NAD  NECK: Supple, No JVD  CHEST/LUNG: Clear to auscultation bilaterally; No wheezing.  HEART: Regular rate and rhythm; No murmurs, rubs, or gallops  ABDOMEN: Soft, Nontender, Nondistended; Bowel sounds present  EXTREMITIES:   No clubbing, cyanosis, or edema  NEUROLOGY: Awake      LABS:                        12.4   5.5   )-----------( 157      ( 22 Dec 2018 10:28 )             36.3     12-22    142  |  101  |  22  ----------------------------<  309<H>  3.6   |  32<H>  |  1.01    Ca    8.8      22 Dec 2018 12:30  Mg     1.8     12-22              CAPILLARY BLOOD GLUCOSE      POCT Blood Glucose.: 281 mg/dL (22 Dec 2018 12:56)  POCT Blood Glucose.: 188 mg/dL (22 Dec 2018 09:11)  POCT Blood Glucose.: 215 mg/dL (21 Dec 2018 21:32)  POCT Blood Glucose.: 138 mg/dL (21 Dec 2018 18:01)    12-18 @ 18:39  Culture-urine --  Culture results   No growth to date.  method type --  Organism --  Organism Identification --  Specimen source .Blood Blood-Peripheral  12-16 @ 23:32  Culture-urine --  Culture results   No growth  method type --  Organism --  Organism Identification --  Specimen source .Urine Clean Catch (Midstream)  12-16 @ 21:46  Culture-urine --  Culture results   No growth at 5 days.  method type PCR  Organism Blood Culture PCR  Organism Identification Blood Culture PCR  Specimen source .Blood Blood-Peripheral           12-18 @ 18:39  Culture blood --  Culture results   No growth to date.  Gram stain --  Gram stain blood --  Method type --  Organism --  Organism identification --  Specimen source .Blood Blood-Peripheral   12-16 @ 23:32  Culture blood --  Culture results   No growth  Gram stain --  Gram stain blood --  Method type --  Organism --  Organism identification --  Specimen source .Urine Clean Catch (Midstream)   12-16 @ 21:46  Culture blood --  Culture results   No growth at 5 days.  Gram stain   Growth in aerobic bottle: Gram Positive Cocci in Clusters  Gram stain blood --  Method type PCR  Organism Blood Culture PCR  Organism identification Blood Culture PCR  Specimen source .Blood Blood-Peripheral      RADIOLOGY & ADDITIONAL TESTS:    Imaging Personally Reviewed:    Consultant(s) Notes Reviewed:      Care Discussed with Consultants/Other Providers:

## 2018-12-22 NOTE — PROGRESS NOTE ADULT - SUBJECTIVE AND OBJECTIVE BOX
Patient is a 78y old  Male who presents with a chief complaint of Rigors/shaking (21 Dec 2018 18:35)      Any change in ROS: Remaind stable off bipap last night: he is alert and awake and tries to follow simple commands but slowly:     MEDICATIONS  (STANDING):  aspirin enteric coated 81 milliGRAM(s) Oral daily  dextrose 5%. 1000 milliLiter(s) (50 mL/Hr) IV Continuous <Continuous>  dextrose 50% Injectable 12.5 Gram(s) IV Push once  dextrose 50% Injectable 25 Gram(s) IV Push once  furosemide   Injectable 40 milliGRAM(s) IV Push every 12 hours  insulin lispro (HumaLOG) corrective regimen sliding scale   SubCutaneous three times a day before meals  insulin lispro (HumaLOG) corrective regimen sliding scale   SubCutaneous at bedtime  metoprolol succinate ER 25 milliGRAM(s) Oral daily  pantoprazole    Tablet 40 milliGRAM(s) Oral before breakfast  simvastatin 20 milliGRAM(s) Oral at bedtime  tamsulosin 0.4 milliGRAM(s) Oral at bedtime    MEDICATIONS  (PRN):  acetaminophen   Tablet .. 650 milliGRAM(s) Oral every 6 hours PRN Temp greater or equal to 38C (100.4F), Mild Pain (1 - 3)  dextrose 40% Gel 15 Gram(s) Oral once PRN Blood Glucose LESS THAN 70 milliGRAM(s)/deciliter    Vital Signs Last 24 Hrs  T(C): 36.7 (22 Dec 2018 04:44), Max: 36.9 (21 Dec 2018 20:46)  T(F): 98 (22 Dec 2018 04:44), Max: 98.5 (21 Dec 2018 20:46)  HR: 88 (22 Dec 2018 04:44) (88 - 103)  BP: 101/66 (22 Dec 2018 04:44) (101/66 - 125/77)  BP(mean): --  RR: 18 (22 Dec 2018 04:44) (18 - 18)  SpO2: 99% (22 Dec 2018 04:44) (97% - 99%)    I&O's Summary    21 Dec 2018 07:01  -  22 Dec 2018 07:00  --------------------------------------------------------  IN: 760 mL / OUT: 0 mL / NET: 760 mL    22 Dec 2018 07:01  -  22 Dec 2018 11:19  --------------------------------------------------------  IN: 360 mL / OUT: 0 mL / NET: 360 mL          Physical Exam:   GENERAL: NAD, well-groomed, well-developed  HEENT: BENJY/   Atraumatic, Normocephalic  ENMT: No tonsillar erythema, exudates, or enlargement; Moist mucous membranes, Good dentition, No lesions  NECK: Supple, No JVD, Normal thyroid  CHEST/LUNG: Clear to auscultaion  CVS: Regular rate and rhythm; No murmurs, rubs, or gallops  GI: : Soft, Nontender, Nondistended; Bowel sounds present  NERVOUS SYSTEM:  Alert & Awake  EXTREMITIES:  Trace edema  LYMPH: No lymphadenopathy noted  SKIN: No rashes or lesions  ENDOCRINOLOGY: No Thyromegaly  PSYCH: calm    Labs:  ABG - ( 22 Dec 2018 09:28 )  pH, Arterial: 7.50  pH, Blood: x     /  pCO2: 46    /  pO2: 93    / HCO3: 36    / Base Excess: 11.0  /  SaO2: 98              25, 23                            12.4   5.5   )-----------( 157      ( 22 Dec 2018 10:28 )             36.3                         12.8   4.7   )-----------( 140      ( 21 Dec 2018 06:28 )             36.9                         13.0   5.2   )-----------( 137      ( 20 Dec 2018 07:05 )             39.1                         12.7   7.4   )-----------( 164      ( 19 Dec 2018 07:02 )             36.6     12-22    138  |  101  |  21  ----------------------------<  202<H>  6.1<H>   |  25  |  0.90  12-21    144  |  103  |  20  ----------------------------<  179<H>  3.0<L>   |  28  |  1.03  12-19    143  |  107  |  16  ----------------------------<  157<H>  3.7   |  25  |  0.90    Ca    8.6      22 Dec 2018 07:36  Ca    8.7      21 Dec 2018 06:27  Mg     1.8     12-22  Mg     1.7     12-21      CAPILLARY BLOOD GLUCOSE      POCT Blood Glucose.: 188 mg/dL (22 Dec 2018 09:11)  POCT Blood Glucose.: 215 mg/dL (21 Dec 2018 21:32)  POCT Blood Glucose.: 138 mg/dL (21 Dec 2018 18:01)  POCT Blood Glucose.: 222 mg/dL (21 Dec 2018 13:00)                  RECENT CULTURES:  12-18 @ 18:39 .Blood Blood-Peripheral                No growth to date.    12-16 @ 23:32 .Urine Clean Catch (Midstream)         < from: Xray Chest 1 View- PORTABLE-Routine (12.17.18 @ 09:48) >      INTERPRETATION:    CLINICAL INDICATION: Hypoxic respiratory failure. Fluid overload.   Follow-up evaluation.    TECHNIQUE: Portable frontal view of the chest.    COMPARISON: Frontal chest radiograph from 12/16/2018.    IMPRESSION:    There are median sternotomy wires status post aortic valve replacement.  Cardiac silhouette is enlarged.  Unchanged bilateral pleural effusions with superimposed pulmonary edema.   No pneumothorax.                  SUSANNE BHAT M.D., RADIOLOGY RESIDENT  This document has been electronically signed.  CASH PATEL M.D. ATTENDING RADIOLOGIST  This document has been electronically signed. Dec 17 2018 10:09AM        < end of copied text >         No growth    12-16 @ 21:46 .Blood Blood-Peripheral   PCR    Growth in aerobic bottle: Gram Positive Cocci in Clusters    Blood Culture PCR  Blood Culture PCR     No growth at 5 days.          RESPIRATORY CULTURES:          Studies  Chest X-RAY  CT SCAN Chest   Venous Dopplers: LE:   CT Abdomen  Others

## 2018-12-23 LAB
ANION GAP SERPL CALC-SCNC: 12 MMOL/L — SIGNIFICANT CHANGE UP (ref 5–17)
BUN SERPL-MCNC: 21 MG/DL — SIGNIFICANT CHANGE UP (ref 7–23)
CALCIUM SERPL-MCNC: 9.1 MG/DL — SIGNIFICANT CHANGE UP (ref 8.4–10.5)
CHLORIDE SERPL-SCNC: 100 MMOL/L — SIGNIFICANT CHANGE UP (ref 96–108)
CO2 SERPL-SCNC: 33 MMOL/L — HIGH (ref 22–31)
CREAT SERPL-MCNC: 0.87 MG/DL — SIGNIFICANT CHANGE UP (ref 0.5–1.3)
CULTURE RESULTS: SIGNIFICANT CHANGE UP
CULTURE RESULTS: SIGNIFICANT CHANGE UP
GLUCOSE BLDC GLUCOMTR-MCNC: 153 MG/DL — HIGH (ref 70–99)
GLUCOSE BLDC GLUCOMTR-MCNC: 195 MG/DL — HIGH (ref 70–99)
GLUCOSE BLDC GLUCOMTR-MCNC: 201 MG/DL — HIGH (ref 70–99)
GLUCOSE BLDC GLUCOMTR-MCNC: 234 MG/DL — HIGH (ref 70–99)
GLUCOSE SERPL-MCNC: 198 MG/DL — HIGH (ref 70–99)
POTASSIUM SERPL-MCNC: 3.3 MMOL/L — LOW (ref 3.5–5.3)
POTASSIUM SERPL-SCNC: 3.3 MMOL/L — LOW (ref 3.5–5.3)
SODIUM SERPL-SCNC: 145 MMOL/L — SIGNIFICANT CHANGE UP (ref 135–145)
SPECIMEN SOURCE: SIGNIFICANT CHANGE UP
SPECIMEN SOURCE: SIGNIFICANT CHANGE UP

## 2018-12-23 PROCEDURE — 71045 X-RAY EXAM CHEST 1 VIEW: CPT | Mod: 26

## 2018-12-23 RX ORDER — POTASSIUM CHLORIDE 20 MEQ
20 PACKET (EA) ORAL
Qty: 0 | Refills: 0 | Status: COMPLETED | OUTPATIENT
Start: 2018-12-23 | End: 2018-12-24

## 2018-12-23 RX ADMIN — Medication 4: at 13:29

## 2018-12-23 RX ADMIN — Medication 20 MILLIEQUIVALENT(S): at 21:04

## 2018-12-23 RX ADMIN — Medication 2: at 18:29

## 2018-12-23 RX ADMIN — Medication 40 MILLIGRAM(S): at 17:22

## 2018-12-23 RX ADMIN — PANTOPRAZOLE SODIUM 40 MILLIGRAM(S): 20 TABLET, DELAYED RELEASE ORAL at 06:11

## 2018-12-23 RX ADMIN — Medication 25 MILLIGRAM(S): at 05:20

## 2018-12-23 RX ADMIN — Medication 40 MILLIGRAM(S): at 05:20

## 2018-12-23 RX ADMIN — Medication 81 MILLIGRAM(S): at 15:26

## 2018-12-23 RX ADMIN — SIMVASTATIN 20 MILLIGRAM(S): 20 TABLET, FILM COATED ORAL at 21:04

## 2018-12-23 RX ADMIN — TAMSULOSIN HYDROCHLORIDE 0.4 MILLIGRAM(S): 0.4 CAPSULE ORAL at 21:04

## 2018-12-23 RX ADMIN — Medication 2: at 09:06

## 2018-12-23 NOTE — PROGRESS NOTE ADULT - SUBJECTIVE AND OBJECTIVE BOX
Infectious Diseases progress note:    Subjective: NAD, afebrile.  No acute o/n events.      ROS:  nonverbal    Allergies    No Known Allergies    Intolerances        ANTIBIOTICS/RELEVANT:  antimicrobials    immunologic:    OTHER:  acetaminophen   Tablet .. 650 milliGRAM(s) Oral every 6 hours PRN  aspirin enteric coated 81 milliGRAM(s) Oral daily  dextrose 40% Gel 15 Gram(s) Oral once PRN  dextrose 5%. 1000 milliLiter(s) IV Continuous <Continuous>  dextrose 50% Injectable 12.5 Gram(s) IV Push once  dextrose 50% Injectable 25 Gram(s) IV Push once  furosemide   Injectable 40 milliGRAM(s) IV Push every 12 hours  insulin lispro (HumaLOG) corrective regimen sliding scale   SubCutaneous three times a day before meals  insulin lispro (HumaLOG) corrective regimen sliding scale   SubCutaneous at bedtime  metoprolol succinate ER 25 milliGRAM(s) Oral daily  pantoprazole    Tablet 40 milliGRAM(s) Oral before breakfast  simvastatin 20 milliGRAM(s) Oral at bedtime  tamsulosin 0.4 milliGRAM(s) Oral at bedtime      Objective:  Vital Signs Last 24 Hrs  T(C): 36.6 (23 Dec 2018 14:25), Max: 36.7 (22 Dec 2018 21:33)  T(F): 97.8 (23 Dec 2018 14:25), Max: 98 (22 Dec 2018 21:33)  HR: 89 (23 Dec 2018 14:25) (87 - 89)  BP: 96/61 (23 Dec 2018 14:25) (96/61 - 114/76)  BP(mean): --  RR: 19 (23 Dec 2018 14:25) (18 - 19)  SpO2: 98% (23 Dec 2018 14:25) (98% - 99%)    PHYSICAL EXAM:  Constitutional:NAD  Eyes:BENJY, EOMI  Ear/Nose/Throat: no thrush, mucositis.  Moist mucous membranes	  Neck:no JVD, no lymphadenopathy, supple  Respiratory: CTA phi  Cardiovascular: S1S2 RRR, no murmurs  Gastrointestinal:soft, nontender,  nondistended (+) BS  Extremities: b/l UE edema  Skin:  no rashes, open wounds or ulcerations        LABS:                        12.4   5.5   )-----------( 157      ( 22 Dec 2018 10:28 )             36.3     12-23    145  |  100  |  21  ----------------------------<  198<H>  3.3<L>   |  33<H>  |  0.87    Ca    9.1      23 Dec 2018 07:19  Mg     1.8     12-22                  Vancomycin Level, Trough: 21.6 ug/mL (12-19 @ 07:02)  Vancomycin Level, Trough: 24.4 ug/mL (12-18 @ 20:49)      Rapid RVP Result: [facility]          MICROBIOLOGY:    Culture - Blood (12.18.18 @ 18:39)    Specimen Source: .Blood Blood-Peripheral    Culture Results:   No growth to date.    Culture - Blood (12.18.18 @ 18:39)    Specimen Source: .Blood Blood-Peripheral    Culture Results:   No growth to date.    Culture - Urine (12.16.18 @ 23:32)    Specimen Source: .Urine Clean Catch (Midstream)    Culture Results:   No growth          RADIOLOGY & ADDITIONAL STUDIES:    < from: Xray Chest 1 View- PORTABLE-Urgent (12.23.18 @ 12:19) >  Impression:    The heart is enlarged. Bilateral  pleural effusion. Left lower lobe   pneumonia and/or atelectasis. Status post sternotomy.    < end of copied text >

## 2018-12-23 NOTE — PROGRESS NOTE ADULT - SUBJECTIVE AND OBJECTIVE BOX
Patient is a 78y old  Male who presents with a chief complaint of Rigors/shaking (22 Dec 2018 14:10)      Any change in ROS: Discussed with RN at bedside: Doing ok : no SOB : no cough     MEDICATIONS  (STANDING):  aspirin enteric coated 81 milliGRAM(s) Oral daily  dextrose 5%. 1000 milliLiter(s) (50 mL/Hr) IV Continuous <Continuous>  dextrose 50% Injectable 12.5 Gram(s) IV Push once  dextrose 50% Injectable 25 Gram(s) IV Push once  furosemide   Injectable 40 milliGRAM(s) IV Push every 12 hours  insulin lispro (HumaLOG) corrective regimen sliding scale   SubCutaneous three times a day before meals  insulin lispro (HumaLOG) corrective regimen sliding scale   SubCutaneous at bedtime  metoprolol succinate ER 25 milliGRAM(s) Oral daily  pantoprazole    Tablet 40 milliGRAM(s) Oral before breakfast  simvastatin 20 milliGRAM(s) Oral at bedtime  tamsulosin 0.4 milliGRAM(s) Oral at bedtime    MEDICATIONS  (PRN):  acetaminophen   Tablet .. 650 milliGRAM(s) Oral every 6 hours PRN Temp greater or equal to 38C (100.4F), Mild Pain (1 - 3)  dextrose 40% Gel 15 Gram(s) Oral once PRN Blood Glucose LESS THAN 70 milliGRAM(s)/deciliter    Vital Signs Last 24 Hrs  T(C): 36.2 (23 Dec 2018 04:24), Max: 36.7 (22 Dec 2018 21:33)  T(F): 97.2 (23 Dec 2018 04:24), Max: 98 (22 Dec 2018 21:33)  HR: 88 (23 Dec 2018 04:24) (86 - 88)  BP: 114/76 (23 Dec 2018 04:24) (101/69 - 114/76)  BP(mean): --  RR: 18 (23 Dec 2018 04:24) (16 - 18)  SpO2: 99% (23 Dec 2018 04:24) (98% - 99%)    I&O's Summary    22 Dec 2018 07:01  -  23 Dec 2018 07:00  --------------------------------------------------------  IN: 800 mL / OUT: 0 mL / NET: 800 mL    23 Dec 2018 07:01  -  23 Dec 2018 11:27  --------------------------------------------------------  IN: 0 mL / OUT: 0 mL / NET: 0 mL          Physical Exam:   GENERAL: NAD, well-groomed, well-developed  HEENT: BENJY/   Atraumatic, Normocephalic  ENMT: No tonsillar erythema, exudates, or enlargement; Moist mucous membranes, Good dentition, No lesions  NECK: Supple, No JVD, Normal thyroid  CHEST/LUNG: Clear to auscultaion  CVS: Regular rate and rhythm; No murmurs, rubs, or gallops  GI: : Soft, Nontender, Nondistended; Bowel sounds present  NERVOUS SYSTEM:  Alert & Awake  EXTREMITIES:  - edema  LYMPH: No lymphadenopathy noted  SKIN: No rashes or lesions  ENDOCRINOLOGY: No Thyromegaly  PSYCH: calm    Labs:  ABG - ( 22 Dec 2018 09:28 )  pH, Arterial: 7.50  pH, Blood: x     /  pCO2: 46    /  pO2: 93    / HCO3: 36    / Base Excess: 11.0  /  SaO2: 98              25, 23                            12.4   5.5   )-----------( 157      ( 22 Dec 2018 10:28 )             36.3                         12.8   4.7   )-----------( 140      ( 21 Dec 2018 06:28 )             36.9                         13.0   5.2   )-----------( 137      ( 20 Dec 2018 07:05 )             39.1     12-23    145  |  100  |  21  ----------------------------<  198<H>  3.3<L>   |  33<H>  |  0.87  12-22    142  |  101  |  22  ----------------------------<  309<H>  3.6   |  32<H>  |  1.01  12-22    138  |  101  |  21  ----------------------------<  202<H>  6.1<H>   |  25  |  0.90  12-21    144  |  103  |  20  ----------------------------<  179<H>  3.0<L>   |  28  |  1.03    Ca    9.1      23 Dec 2018 07:19  Ca    8.8      22 Dec 2018 12:30  Ca    8.6      22 Dec 2018 07:36  Mg     1.8     12-22      CAPILLARY BLOOD GLUCOSE      POCT Blood Glucose.: 195 mg/dL (23 Dec 2018 08:43)  POCT Blood Glucose.: 152 mg/dL (22 Dec 2018 22:18)  POCT Blood Glucose.: 201 mg/dL (22 Dec 2018 17:58)  POCT Blood Glucose.: 281 mg/dL (22 Dec 2018 12:56)        < from: Xray Chest 1 View- PORTABLE-Routine (12.17.18 @ 09:48) >    EXAM:  XR CHEST PORTABLE ROUTINE 1V                            PROCEDURE DATE:  12/17/2018            INTERPRETATION:    CLINICAL INDICATION: Hypoxic respiratory failure. Fluid overload.   Follow-up evaluation.    TECHNIQUE: Portable frontal view of the chest.    COMPARISON: Frontal chest radiograph from 12/16/2018.    IMPRESSION:    There are median sternotomy wires status post aortic valve replacement.  Cardiac silhouette is enlarged.  Unchanged bilateral pleural effusions with superimposed pulmonary edema.   No pneumothorax.                  SUSANNE BHAT M.D., RADIOLOGY RESIDENT  This document has been electronically signed.  CASH PATEL M.D. ATTENDING RADIOLOGIST  This document has been electronically signed. Dec 17 2018 10:09AM    < end of copied text >            RECENT CULTURES:  12-18 @ 18:39 .Blood Blood-Peripheral                No growth to date.    12-16 @ 23:32 .Urine Clean Catch (Midstream)                No growth    12-16 @ 21:46 .Blood Blood-Peripheral   PCR    Growth in aerobic bottle: Gram Positive Cocci in Clusters    Blood Culture PCR  Blood Culture PCR     No growth at 5 days.          RESPIRATORY CULTURES:          Studies  Chest X-RAY  CT SCAN Chest   Venous Dopplers: LE:   CT Abdomen  Others

## 2018-12-23 NOTE — PROGRESS NOTE ADULT - SUBJECTIVE AND OBJECTIVE BOX
Patient is a 78y old  Male who presents with a chief complaint of Rigors/shaking (23 Dec 2018 11:27)      SUBJECTIVE / OVERNIGHT EVENTS:  Covering MD  Off BiPAP  Review of Systems:   CONSTITUTIONAL: No fever, weight loss, or fatigue  EYES: No eye pain, visual disturbances, or discharge  ENMT:  No difficulty hearing, tinnitus, vertigo; No sinus or throat pain  NECK: No pain or stiffness  BREASTS: No pain, masses, or nipple discharge  RESPIRATORY: No cough, wheezing, chills or hemoptysis; No shortness of breath  CARDIOVASCULAR: No chest pain, palpitations, dizziness, or leg swelling  GASTROINTESTINAL: No abdominal or epigastric pain. No nausea, vomiting, or hematemesis; No diarrhea or constipation. No melena or hematochezia.  GENITOURINARY: No dysuria, frequency, hematuria, or incontinence  NEUROLOGICAL: No headaches, memory loss, loss of strength, numbness, or tremors  SKIN: No itching, burning, rashes, or lesions   LYMPH NODES: No enlarged glands  ENDOCRINE: No heat or cold intolerance; No hair loss  MUSCULOSKELETAL: No joint pain or swelling; No muscle, back, or extremity pain  PSYCHIATRIC: No depression, anxiety, mood swings, or difficulty sleeping  HEME/LYMPH: No easy bruising, or bleeding gums  ALLERY AND IMMUNOLOGIC: No hives or eczema    MEDICATIONS  (STANDING):  aspirin enteric coated 81 milliGRAM(s) Oral daily  dextrose 5%. 1000 milliLiter(s) (50 mL/Hr) IV Continuous <Continuous>  dextrose 50% Injectable 12.5 Gram(s) IV Push once  dextrose 50% Injectable 25 Gram(s) IV Push once  furosemide   Injectable 40 milliGRAM(s) IV Push every 12 hours  insulin lispro (HumaLOG) corrective regimen sliding scale   SubCutaneous three times a day before meals  insulin lispro (HumaLOG) corrective regimen sliding scale   SubCutaneous at bedtime  metoprolol succinate ER 25 milliGRAM(s) Oral daily  pantoprazole    Tablet 40 milliGRAM(s) Oral before breakfast  simvastatin 20 milliGRAM(s) Oral at bedtime  tamsulosin 0.4 milliGRAM(s) Oral at bedtime    MEDICATIONS  (PRN):  acetaminophen   Tablet .. 650 milliGRAM(s) Oral every 6 hours PRN Temp greater or equal to 38C (100.4F), Mild Pain (1 - 3)  dextrose 40% Gel 15 Gram(s) Oral once PRN Blood Glucose LESS THAN 70 milliGRAM(s)/deciliter      PHYSICAL EXAM:  Vital Signs Last 24 Hrs  T(C): 36.6 (23 Dec 2018 14:25), Max: 36.7 (22 Dec 2018 21:33)  T(F): 97.8 (23 Dec 2018 14:25), Max: 98 (22 Dec 2018 21:33)  HR: 89 (23 Dec 2018 14:25) (87 - 89)  BP: 96/61 (23 Dec 2018 14:25) (96/61 - 114/76)  BP(mean): --  RR: 19 (23 Dec 2018 14:25) (18 - 19)  SpO2: 98% (23 Dec 2018 14:25) (98% - 99%)  I&O's Summary    22 Dec 2018 07:01  -  23 Dec 2018 07:00  --------------------------------------------------------  IN: 800 mL / OUT: 0 mL / NET: 800 mL    23 Dec 2018 07:01  -  23 Dec 2018 14:30  --------------------------------------------------------  IN: 0 mL / OUT: 0 mL / NET: 0 mL      GENERAL: NAD, well-developed  HEAD:  Atraumatic, Normocephalic  EYES: EOMI, PERRLA, conjunctiva and sclera clear  NECK: Supple, No JVD  CHEST/LUNG: Clear to auscultation bilaterally; No wheeze  HEART: Regular rate and rhythm; No murmurs, rubs, or gallops  ABDOMEN: Soft, Nontender, Nondistended; Bowel sounds present  EXTREMITIES:  2+ Peripheral Pulses, No clubbing, cyanosis, or edema  PSYCH: AAOx3  NEUROLOGY: non-focal  SKIN: No rashes or lesions    LABS:  CAPILLARY BLOOD GLUCOSE      POCT Blood Glucose.: 201 mg/dL (23 Dec 2018 13:04)  POCT Blood Glucose.: 195 mg/dL (23 Dec 2018 08:43)  POCT Blood Glucose.: 152 mg/dL (22 Dec 2018 22:18)  POCT Blood Glucose.: 201 mg/dL (22 Dec 2018 17:58)                          12.4   5.5   )-----------( 157      ( 22 Dec 2018 10:28 )             36.3     12-23    145  |  100  |  21  ----------------------------<  198<H>  3.3<L>   |  33<H>  |  0.87    Ca    9.1      23 Dec 2018 07:19  Mg     1.8     12-22                RADIOLOGY & ADDITIONAL TESTS:    Imaging Personally Reviewed:    Consultant(s) Notes Reviewed:      Care Discussed with Consultants/Other Providers:

## 2018-12-24 LAB
ANION GAP SERPL CALC-SCNC: 10 MMOL/L — SIGNIFICANT CHANGE UP (ref 5–17)
BUN SERPL-MCNC: 22 MG/DL — SIGNIFICANT CHANGE UP (ref 7–23)
CALCIUM SERPL-MCNC: 9.4 MG/DL — SIGNIFICANT CHANGE UP (ref 8.4–10.5)
CHLORIDE SERPL-SCNC: 100 MMOL/L — SIGNIFICANT CHANGE UP (ref 96–108)
CO2 SERPL-SCNC: 34 MMOL/L — HIGH (ref 22–31)
CREAT SERPL-MCNC: 0.91 MG/DL — SIGNIFICANT CHANGE UP (ref 0.5–1.3)
GLUCOSE BLDC GLUCOMTR-MCNC: 208 MG/DL — HIGH (ref 70–99)
GLUCOSE BLDC GLUCOMTR-MCNC: 212 MG/DL — HIGH (ref 70–99)
GLUCOSE BLDC GLUCOMTR-MCNC: 228 MG/DL — HIGH (ref 70–99)
GLUCOSE BLDC GLUCOMTR-MCNC: 265 MG/DL — HIGH (ref 70–99)
GLUCOSE SERPL-MCNC: 210 MG/DL — HIGH (ref 70–99)
POTASSIUM SERPL-MCNC: 3.9 MMOL/L — SIGNIFICANT CHANGE UP (ref 3.5–5.3)
POTASSIUM SERPL-SCNC: 3.9 MMOL/L — SIGNIFICANT CHANGE UP (ref 3.5–5.3)
SODIUM SERPL-SCNC: 144 MMOL/L — SIGNIFICANT CHANGE UP (ref 135–145)

## 2018-12-24 RX ORDER — DOCUSATE SODIUM 100 MG
100 CAPSULE ORAL
Qty: 0 | Refills: 0 | Status: DISCONTINUED | OUTPATIENT
Start: 2018-12-24 | End: 2019-01-03

## 2018-12-24 RX ADMIN — Medication 40 MILLIGRAM(S): at 05:24

## 2018-12-24 RX ADMIN — Medication 81 MILLIGRAM(S): at 12:00

## 2018-12-24 RX ADMIN — Medication 100 MILLIGRAM(S): at 17:29

## 2018-12-24 RX ADMIN — Medication 20 MILLIEQUIVALENT(S): at 00:44

## 2018-12-24 RX ADMIN — Medication 40 MILLIGRAM(S): at 17:26

## 2018-12-24 RX ADMIN — Medication 4: at 09:20

## 2018-12-24 RX ADMIN — TAMSULOSIN HYDROCHLORIDE 0.4 MILLIGRAM(S): 0.4 CAPSULE ORAL at 21:25

## 2018-12-24 RX ADMIN — Medication 4: at 18:30

## 2018-12-24 RX ADMIN — Medication 25 MILLIGRAM(S): at 05:25

## 2018-12-24 RX ADMIN — SIMVASTATIN 20 MILLIGRAM(S): 20 TABLET, FILM COATED ORAL at 21:25

## 2018-12-24 RX ADMIN — Medication 6: at 13:26

## 2018-12-24 RX ADMIN — PANTOPRAZOLE SODIUM 40 MILLIGRAM(S): 20 TABLET, DELAYED RELEASE ORAL at 06:15

## 2018-12-24 NOTE — PROGRESS NOTE ADULT - SUBJECTIVE AND OBJECTIVE BOX
Patient is a 78y old  Male who presents with a chief complaint of Rigors/shaking (24 Dec 2018 13:17)      SUBJECTIVE / OVERNIGHT EVENTS:    Events noted.  No N/V     MEDICATIONS  (STANDING):  aspirin enteric coated 81 milliGRAM(s) Oral daily  dextrose 5%. 1000 milliLiter(s) (50 mL/Hr) IV Continuous <Continuous>  dextrose 50% Injectable 12.5 Gram(s) IV Push once  dextrose 50% Injectable 25 Gram(s) IV Push once  docusate sodium Liquid 100 milliGRAM(s) Oral two times a day  furosemide   Injectable 40 milliGRAM(s) IV Push every 12 hours  insulin lispro (HumaLOG) corrective regimen sliding scale   SubCutaneous three times a day before meals  insulin lispro (HumaLOG) corrective regimen sliding scale   SubCutaneous at bedtime  metoprolol succinate ER 25 milliGRAM(s) Oral daily  pantoprazole    Tablet 40 milliGRAM(s) Oral before breakfast  simvastatin 20 milliGRAM(s) Oral at bedtime  tamsulosin 0.4 milliGRAM(s) Oral at bedtime    MEDICATIONS  (PRN):  acetaminophen   Tablet .. 650 milliGRAM(s) Oral every 6 hours PRN Temp greater or equal to 38C (100.4F), Mild Pain (1 - 3)  dextrose 40% Gel 15 Gram(s) Oral once PRN Blood Glucose LESS THAN 70 milliGRAM(s)/deciliter        CAPILLARY BLOOD GLUCOSE      POCT Blood Glucose.: 212 mg/dL (24 Dec 2018 21:50)  POCT Blood Glucose.: 228 mg/dL (24 Dec 2018 18:12)  POCT Blood Glucose.: 265 mg/dL (24 Dec 2018 13:09)  POCT Blood Glucose.: 208 mg/dL (24 Dec 2018 08:47)    I&O's Summary    23 Dec 2018 07:01  -  24 Dec 2018 07:00  --------------------------------------------------------  IN: 440 mL / OUT: 0 mL / NET: 440 mL    24 Dec 2018 07:01  -  25 Dec 2018 00:04  --------------------------------------------------------  IN: 420 mL / OUT: 0 mL / NET: 420 mL        PHYSICAL EXAM:  GENERAL: NAD  NECK: Supple, No JVD  CHEST/LUNG: Clear to auscultation bilaterally; No wheezing.  HEART: Regular rate and rhythm; No murmurs, rubs, or gallops  ABDOMEN: Soft, Nontender, Nondistended; Bowel sounds present  EXTREMITIES:   No clubbing, cyanosis, or edema  NEUROLOGY: Awake      LABS:    12-24    144  |  100  |  22  ----------------------------<  210<H>  3.9   |  34<H>  |  0.91    Ca    9.4      24 Dec 2018 05:55              CAPILLARY BLOOD GLUCOSE      POCT Blood Glucose.: 212 mg/dL (24 Dec 2018 21:50)  POCT Blood Glucose.: 228 mg/dL (24 Dec 2018 18:12)  POCT Blood Glucose.: 265 mg/dL (24 Dec 2018 13:09)  POCT Blood Glucose.: 208 mg/dL (24 Dec 2018 08:47)    12-18 @ 18:39  Culture-urine --  Culture results   No growth at 5 days.  method type --  Organism --  Organism Identification --  Specimen source .Blood Blood-Peripheral           12-18 @ 18:39  Culture blood --  Culture results   No growth at 5 days.  Gram stain --  Gram stain blood --  Method type --  Organism --  Organism identification --  Specimen source .Blood Blood-Peripheral      RADIOLOGY & ADDITIONAL TESTS:    Imaging Personally Reviewed:    Consultant(s) Notes Reviewed:      Care Discussed with Consultants/Other Providers:

## 2018-12-24 NOTE — DIETITIAN INITIAL EVALUATION ADULT. - OTHER INFO
seen for length, admitted for Rigors/shaking, consuming good intake at breakfast and lunch and dinner not as well as per nurse. last BM 12/17-fecal incontinence. NKFA

## 2018-12-24 NOTE — PROGRESS NOTE ADULT - SUBJECTIVE AND OBJECTIVE BOX
Patient is a 78y old  Male who presents with a chief complaint of Rigors/shaking (23 Dec 2018 16:09)      Any change in ROS: Ptis doing ok : no SOB : no cough   off oxygen: Doingok : no resp ditress    MEDICATIONS  (STANDING):  aspirin enteric coated 81 milliGRAM(s) Oral daily  dextrose 5%. 1000 milliLiter(s) (50 mL/Hr) IV Continuous <Continuous>  dextrose 50% Injectable 12.5 Gram(s) IV Push once  dextrose 50% Injectable 25 Gram(s) IV Push once  docusate sodium Liquid 100 milliGRAM(s) Oral two times a day  furosemide   Injectable 40 milliGRAM(s) IV Push every 12 hours  insulin lispro (HumaLOG) corrective regimen sliding scale   SubCutaneous three times a day before meals  insulin lispro (HumaLOG) corrective regimen sliding scale   SubCutaneous at bedtime  metoprolol succinate ER 25 milliGRAM(s) Oral daily  pantoprazole    Tablet 40 milliGRAM(s) Oral before breakfast  simvastatin 20 milliGRAM(s) Oral at bedtime  tamsulosin 0.4 milliGRAM(s) Oral at bedtime    MEDICATIONS  (PRN):  acetaminophen   Tablet .. 650 milliGRAM(s) Oral every 6 hours PRN Temp greater or equal to 38C (100.4F), Mild Pain (1 - 3)  dextrose 40% Gel 15 Gram(s) Oral once PRN Blood Glucose LESS THAN 70 milliGRAM(s)/deciliter    Vital Signs Last 24 Hrs  T(C): 36.7 (24 Dec 2018 04:40), Max: 36.9 (23 Dec 2018 22:23)  T(F): 98 (24 Dec 2018 04:40), Max: 98.5 (23 Dec 2018 22:23)  HR: 90 (24 Dec 2018 05:21) (81 - 91)  BP: 107/70 (24 Dec 2018 05:21) (96/61 - 113/75)  BP(mean): --  RR: 18 (24 Dec 2018 04:40) (18 - 19)  SpO2: 95% (24 Dec 2018 04:40) (95% - 99%)    I&O's Summary    23 Dec 2018 07:01  -  24 Dec 2018 07:00  --------------------------------------------------------  IN: 440 mL / OUT: 0 mL / NET: 440 mL    24 Dec 2018 07:01  -  24 Dec 2018 11:34  --------------------------------------------------------  IN: 240 mL / OUT: 0 mL / NET: 240 mL          Physical Exam:   GENERAL: NAD, well-groomed, well-developed  HEENT: BENJY/   Atraumatic, Normocephalic  ENMT: No tonsillar erythema, exudates, or enlargement; Moist mucous membranes, Good dentition, No lesions  NECK: Supple, No JVD, Normal thyroid  CHEST/LUNG: Clear to auscultaion  CVS: Regular rate and rhythm; No murmurs, rubs, or gallops  GI: : Soft, Nontender, Nondistended; Bowel sounds present  NERVOUS SYSTEM:  Alert & Awake  EXTREMITIES: - edema  LYMPH: No lymphadenopathy noted  SKIN: No rashes or lesions  ENDOCRINOLOGY: No Thyromegaly  PSYCH: Calm    Labs:                              12.4   5.5   )-----------( 157      ( 22 Dec 2018 10:28 )             36.3                         12.8   4.7   )-----------( 140      ( 21 Dec 2018 06:28 )             36.9     12-24    144  |  100  |  22  ----------------------------<  210<H>  3.9   |  34<H>  |  0.91  12-23    145  |  100  |  21  ----------------------------<  198<H>  3.3<L>   |  33<H>  |  0.87  12-22    142  |  101  |  22  ----------------------------<  309<H>  3.6   |  32<H>  |  1.01  12-22    138  |  101  |  21  ----------------------------<  202<H>  6.1<H>   |  25  |  0.90  12-21    144  |  103  |  20  ----------------------------<  179<H>  3.0<L>   |  28  |  1.03    Ca    9.4      24 Dec 2018 05:55  Ca    9.1      23 Dec 2018 07:19  Ca    8.8      22 Dec 2018 12:30      CAPILLARY BLOOD GLUCOSE      POCT Blood Glucose.: 208 mg/dL (24 Dec 2018 08:47)  POCT Blood Glucose.: 234 mg/dL (23 Dec 2018 22:52)  POCT Blood Glucose.: 153 mg/dL (23 Dec 2018 18:28)  POCT Blood Glucose.: 201 mg/dL (23 Dec 2018 13:04)        < from: Xray Chest 1 View- PORTABLE-Urgent (12.23.18 @ 12:19) >      PROCEDURE DATE:  12/23/2018            INTERPRETATION:  A single chest x-ray was obtained on December 24, 2018.    Indication: Fever.    Impression:    The heart is enlarged. Bilateral  pleural effusion. Left lower lobe   pneumonia and/or atelectasis. Status post sternotomy.                    RILEY CAMARENA M.D., ATTENDING RADIOLOGIST  This document has been electronically signed. Dec 23 2018  4:06PM        < end of copied text >            RECENT CULTURES:  12-18 @ 18:39 .Blood Blood-Peripheral                No growth at 5 days.          RESPIRATORY CULTURES:          Studies  Chest X-RAY  CT SCAN Chest   Venous Dopplers: LE:   CT Abdomen  Others

## 2018-12-24 NOTE — DIETITIAN INITIAL EVALUATION ADULT. - DIET TYPE
consistent carbohydrate (evening snack)/DASH/TLC (sodium and cholesterol restricted diet)/dysphagia 1, pureed, honey consistency fluid

## 2018-12-24 NOTE — PROGRESS NOTE ADULT - SUBJECTIVE AND OBJECTIVE BOX
Infectious Diseases progress note:    Subjective: NAD, afebrile.  No acute o/n events.      ROS:  minimally verbal, unable to assess    Allergies    No Known Allergies    Intolerances        ANTIBIOTICS/RELEVANT:  antimicrobials    immunologic:    OTHER:  acetaminophen   Tablet .. 650 milliGRAM(s) Oral every 6 hours PRN  aspirin enteric coated 81 milliGRAM(s) Oral daily  dextrose 40% Gel 15 Gram(s) Oral once PRN  dextrose 5%. 1000 milliLiter(s) IV Continuous <Continuous>  dextrose 50% Injectable 12.5 Gram(s) IV Push once  dextrose 50% Injectable 25 Gram(s) IV Push once  docusate sodium Liquid 100 milliGRAM(s) Oral two times a day  furosemide   Injectable 40 milliGRAM(s) IV Push every 12 hours  insulin lispro (HumaLOG) corrective regimen sliding scale   SubCutaneous three times a day before meals  insulin lispro (HumaLOG) corrective regimen sliding scale   SubCutaneous at bedtime  metoprolol succinate ER 25 milliGRAM(s) Oral daily  pantoprazole    Tablet 40 milliGRAM(s) Oral before breakfast  simvastatin 20 milliGRAM(s) Oral at bedtime  tamsulosin 0.4 milliGRAM(s) Oral at bedtime      Objective:  Vital Signs Last 24 Hrs  T(C): 36.7 (24 Dec 2018 04:40), Max: 36.9 (23 Dec 2018 22:23)  T(F): 98 (24 Dec 2018 04:40), Max: 98.5 (23 Dec 2018 22:23)  HR: 90 (24 Dec 2018 05:21) (81 - 91)  BP: 107/70 (24 Dec 2018 05:21) (96/61 - 113/75)  BP(mean): --  RR: 18 (24 Dec 2018 04:40) (18 - 19)  SpO2: 95% (24 Dec 2018 04:40) (95% - 99%)    PHYSICAL EXAM:  Constitutional:NAD  Eyes:BENJY, EOMI  Ear/Nose/Throat: no thrush, mucositis.  Moist mucous membranes	  Neck:no JVD, no lymphadenopathy, supple  Respiratory: CTA phi  Cardiovascular: S1S2 RRR, no murmurs  Gastrointestinal:soft, nontender,  nondistended (+) BS  Extremities:no e/e/c  Skin:  no rashes, open wounds or ulcerations        LABS:    12-24    144  |  100  |  22  ----------------------------<  210<H>  3.9   |  34<H>  |  0.91    Ca    9.4      24 Dec 2018 05:55                      Rapid RVP Result: [facility]          MICROBIOLOGY:    Culture - Blood (12.18.18 @ 18:39)    Specimen Source: .Blood Blood-Peripheral    Culture Results:   No growth at 5 days.      Culture - Blood (12.18.18 @ 18:39)    Specimen Source: .Blood Blood-Peripheral    Culture Results:   No growth at 5 days.    Culture - Urine (12.16.18 @ 23:32)    Specimen Source: .Urine Clean Catch (Midstream)    Culture Results:   No growth    Culture - Blood (12.16.18 @ 21:46)    Gram Stain:   Growth in aerobic bottle: Gram Positive Cocci in Clusters    -  Coagulase negative Staphylococcus: Detec    Specimen Source: .Blood Blood-Peripheral    Organism: Blood Culture PCR    Culture Results:   Growth in aerobic bottle: Coag Negative Staphylococcus  Single set isolate, possible contaminant. Contact  Microbiology if susceptibility testing clinically  indicated.  "Due to technical problems, Proteus sp. will Not be reported as part of  the BCID panel until further notice"  ***Blood Panel PCR results on this specimen are available  approximately 3 hours after the Gram stain result.***  Gram stain, PCR, and/or culture results may not always  correspond due to difference in methodologies.  ************************************************************  This PCR assay was performed using [employer].  The following targets are tested for: Enterococcus,  vancomycin resistant enterococci, Listeria monocytogenes,  coagulase negative staphylococci, S. aureus,  methicillin resistant S. aureus, Streptococcus agalactiae  (Group B), S. pneumoniae, S. pyogenes (Group A),  Acinetobacter baumannii, Enterobacter cloacae, E. coli,  Klebsiella oxytoca, K. pneumoniae, Proteus sp.,  Serratia marcescens, Haemophilus influenzae,  Neisseria meningitidis, Pseudomonas aeruginosa, Candida  albicans, C. glabrata, C krusei, C parapsilosis,  C. tropicalis and the KPC resistance gene.    Organism Identification: Blood Culture PCR    Method Type: PCR        RADIOLOGY & ADDITIONAL STUDIES:    < from: Xray Chest 1 View- PORTABLE-Urgent (12.23.18 @ 12:19) >  Impression:    The heart is enlarged. Bilateral  pleural effusion. Left lower lobe   pneumonia and/or atelectasis. Status post sternotomy.    < end of copied text >

## 2018-12-24 NOTE — DIETITIAN INITIAL EVALUATION ADULT. - PERTINENT MEDS FT
acetaminophen   Tablet .. 650 PRN  aspirin enteric coated 81  dextrose 40% Gel 15 PRN  dextrose 5%. 1000  dextrose 50% Injectable 12.5  dextrose 50% Injectable 25  docusate sodium Liquid 100  furosemide   Injectable 40  insulin lispro (HumaLOG) corrective regimen sliding scale   insulin lispro (HumaLOG) corrective regimen sliding scale   metoprolol succinate ER 25  pantoprazole    Tablet 40  simvastatin 20  tamsulosin 0.4

## 2018-12-25 LAB
ANION GAP SERPL CALC-SCNC: 14 MMOL/L — SIGNIFICANT CHANGE UP (ref 5–17)
BUN SERPL-MCNC: 28 MG/DL — HIGH (ref 7–23)
CALCIUM SERPL-MCNC: 9.7 MG/DL — SIGNIFICANT CHANGE UP (ref 8.4–10.5)
CHLORIDE SERPL-SCNC: 99 MMOL/L — SIGNIFICANT CHANGE UP (ref 96–108)
CO2 SERPL-SCNC: 33 MMOL/L — HIGH (ref 22–31)
CREAT SERPL-MCNC: 0.97 MG/DL — SIGNIFICANT CHANGE UP (ref 0.5–1.3)
GLUCOSE BLDC GLUCOMTR-MCNC: 167 MG/DL — HIGH (ref 70–99)
GLUCOSE BLDC GLUCOMTR-MCNC: 218 MG/DL — HIGH (ref 70–99)
GLUCOSE BLDC GLUCOMTR-MCNC: 258 MG/DL — HIGH (ref 70–99)
GLUCOSE BLDC GLUCOMTR-MCNC: 265 MG/DL — HIGH (ref 70–99)
GLUCOSE SERPL-MCNC: 320 MG/DL — HIGH (ref 70–99)
MAGNESIUM SERPL-MCNC: 1.9 MG/DL — SIGNIFICANT CHANGE UP (ref 1.6–2.6)
POTASSIUM SERPL-MCNC: 4.1 MMOL/L — SIGNIFICANT CHANGE UP (ref 3.5–5.3)
POTASSIUM SERPL-SCNC: 4.1 MMOL/L — SIGNIFICANT CHANGE UP (ref 3.5–5.3)
SODIUM SERPL-SCNC: 146 MMOL/L — HIGH (ref 135–145)

## 2018-12-25 RX ADMIN — Medication 40 MILLIGRAM(S): at 18:05

## 2018-12-25 RX ADMIN — Medication 40 MILLIGRAM(S): at 05:41

## 2018-12-25 RX ADMIN — TAMSULOSIN HYDROCHLORIDE 0.4 MILLIGRAM(S): 0.4 CAPSULE ORAL at 22:01

## 2018-12-25 RX ADMIN — Medication 25 MILLIGRAM(S): at 05:41

## 2018-12-25 RX ADMIN — Medication 6: at 09:26

## 2018-12-25 RX ADMIN — PANTOPRAZOLE SODIUM 40 MILLIGRAM(S): 20 TABLET, DELAYED RELEASE ORAL at 05:42

## 2018-12-25 RX ADMIN — Medication 100 MILLIGRAM(S): at 18:02

## 2018-12-25 RX ADMIN — SIMVASTATIN 20 MILLIGRAM(S): 20 TABLET, FILM COATED ORAL at 22:01

## 2018-12-25 RX ADMIN — Medication 6: at 12:52

## 2018-12-25 RX ADMIN — Medication 2: at 18:03

## 2018-12-25 RX ADMIN — Medication 81 MILLIGRAM(S): at 12:52

## 2018-12-25 NOTE — PROGRESS NOTE ADULT - SUBJECTIVE AND OBJECTIVE BOX
Patient is a 78y old  Male who presents with a chief complaint of Rigors/shaking (24 Dec 2018 15:04)      Any change in ROS: Mr Pfeiffer is back on oxygen: He does not talk but does seem to understand a littel bit:     MEDICATIONS  (STANDING):  aspirin enteric coated 81 milliGRAM(s) Oral daily  dextrose 5%. 1000 milliLiter(s) (50 mL/Hr) IV Continuous <Continuous>  dextrose 50% Injectable 12.5 Gram(s) IV Push once  dextrose 50% Injectable 25 Gram(s) IV Push once  docusate sodium Liquid 100 milliGRAM(s) Oral two times a day  furosemide   Injectable 40 milliGRAM(s) IV Push every 12 hours  insulin lispro (HumaLOG) corrective regimen sliding scale   SubCutaneous three times a day before meals  insulin lispro (HumaLOG) corrective regimen sliding scale   SubCutaneous at bedtime  metoprolol succinate ER 25 milliGRAM(s) Oral daily  pantoprazole    Tablet 40 milliGRAM(s) Oral before breakfast  simvastatin 20 milliGRAM(s) Oral at bedtime  tamsulosin 0.4 milliGRAM(s) Oral at bedtime    MEDICATIONS  (PRN):  acetaminophen   Tablet .. 650 milliGRAM(s) Oral every 6 hours PRN Temp greater or equal to 38C (100.4F), Mild Pain (1 - 3)  dextrose 40% Gel 15 Gram(s) Oral once PRN Blood Glucose LESS THAN 70 milliGRAM(s)/deciliter    Vital Signs Last 24 Hrs  T(C): 36.2 (25 Dec 2018 04:22), Max: 37.2 (24 Dec 2018 12:58)  T(F): 97.2 (25 Dec 2018 04:22), Max: 98.9 (24 Dec 2018 12:58)  HR: 103 (25 Dec 2018 04:22) (88 - 103)  BP: 129/79 (25 Dec 2018 04:22) (107/71 - 129/79)  BP(mean): --  RR: 18 (25 Dec 2018 04:22) (17 - 18)  SpO2: 95% (25 Dec 2018 04:22) (90% - 96%)    I&O's Summary    24 Dec 2018 07:01  -  25 Dec 2018 07:00  --------------------------------------------------------  IN: 520 mL / OUT: 0 mL / NET: 520 mL          Physical Exam:   GENERAL: NAD, well-groomed, well-developed  HEENT: BENJY/   Atraumatic, Normocephalic  ENMT: No tonsillar erythema, exudates, or enlargement; Moist mucous membranes, Good dentition, No lesions  NECK: Supple, No JVD, Normal thyroid  CHEST/LUNG: Crackles atbases  CVS: Regular rate and rhythm; No murmurs, rubs, or gallops  GI: : Soft, Nontender, Nondistended; Bowel sounds present  NERVOUS SYSTEM:  Alert & Awake  EXTREMITIES:  2+ Peripheral Pulses, No clubbing, cyanosis, or edema  LYMPH: No lymphadenopathy noted  SKIN: No rashes or lesions  ENDOCRINOLOGY: No Thyromegaly  PSYCH: Appropriate    Labs:                              12.4   5.5   )-----------( 157      ( 22 Dec 2018 10:28 )             36.3     12-24    144  |  100  |  22  ----------------------------<  210<H>  3.9   |  34<H>  |  0.91  12-23    145  |  100  |  21  ----------------------------<  198<H>  3.3<L>   |  33<H>  |  0.87  12-22    142  |  101  |  22  ----------------------------<  309<H>  3.6   |  32<H>  |  1.01  12-22    138  |  101  |  21  ----------------------------<  202<H>  6.1<H>   |  25  |  0.90    Ca    9.4      24 Dec 2018 05:55      CAPILLARY BLOOD GLUCOSE      POCT Blood Glucose.: 258 mg/dL (25 Dec 2018 08:51)  POCT Blood Glucose.: 212 mg/dL (24 Dec 2018 21:50)  POCT Blood Glucose.: 228 mg/dL (24 Dec 2018 18:12)  POCT Blood Glucose.: 265 mg/dL (24 Dec 2018 13:09)      < from: Xray Chest 1 View- PORTABLE-Urgent (12.23.18 @ 12:19) >    EXAM:  XR CHEST PORTABLE URGENT 1V                            PROCEDURE DATE:  12/23/2018            INTERPRETATION:  A single chest x-ray was obtained on December 24, 2018.    Indication: Fever.    Impression:    The heart is enlarged. Bilateral  pleural effusion. Left lower lobe   pneumonia and/or atelectasis. Status post sternotomy.                    RILEY CAMARENA M.D., ATTENDING RADIOLOGIST  This document has been electronically signed. Dec 23 2018  4:06PM              < end of copied text >              RECENT CULTURES:  12-18 @ 18:39 .Blood Blood-Peripheral                No growth at 5 days.          RESPIRATORY CULTURES:      < from: Xray Chest 1 View- PORTABLE-Urgent (12.23.18 @ 12:19) >    EXAM:  XR CHEST PORTABLE URGENT 1V                            PROCEDURE DATE:  12/23/2018            INTERPRETATION:  A single chest x-ray was obtained on December 24, 2018.    Indication: Fever.    Impression:    The heart is enlarged. Bilateral  pleural effusion. Left lower lobe   pneumonia and/or atelectasis. Status post sternotomy.                    RILEY CAMARENA M.D., ATTENDING RADIOLOGIST  This document has been electronically signed. Dec 23 2018  4:06PM    < end of copied text >      Studies  Chest X-RAY  CT SCAN Chest   Venous Dopplers: LE:   CT Abdomen  Others

## 2018-12-25 NOTE — PROGRESS NOTE ADULT - SUBJECTIVE AND OBJECTIVE BOX
Patient is a 78y old  Male who presents with a chief complaint of Rigors/shaking (25 Dec 2018 11:51)      SUBJECTIVE / OVERNIGHT EVENTS:    Events noted.  Feels better.  CONSTITUTIONAL: No fever,  or fatigue  NECK: No pain or stiffness  RESPIRATORY: No cough, wheezing, chills or hemoptysis; No shortness of breath  CARDIOVASCULAR: No chest pain, palpitations, dizziness, or leg swelling  GASTROINTESTINAL: No abdominal or epigastric pain. No nausea, vomiting, or hematemesis; No diarrhea or constipation.   NEUROLOGICAL: No headaches,     MEDICATIONS  (STANDING):  aspirin enteric coated 81 milliGRAM(s) Oral daily  dextrose 5%. 1000 milliLiter(s) (50 mL/Hr) IV Continuous <Continuous>  dextrose 50% Injectable 12.5 Gram(s) IV Push once  dextrose 50% Injectable 25 Gram(s) IV Push once  docusate sodium Liquid 100 milliGRAM(s) Oral two times a day  furosemide   Injectable 40 milliGRAM(s) IV Push every 12 hours  insulin lispro (HumaLOG) corrective regimen sliding scale   SubCutaneous three times a day before meals  insulin lispro (HumaLOG) corrective regimen sliding scale   SubCutaneous at bedtime  metoprolol succinate ER 25 milliGRAM(s) Oral daily  pantoprazole    Tablet 40 milliGRAM(s) Oral before breakfast  simvastatin 20 milliGRAM(s) Oral at bedtime  tamsulosin 0.4 milliGRAM(s) Oral at bedtime    MEDICATIONS  (PRN):  acetaminophen   Tablet .. 650 milliGRAM(s) Oral every 6 hours PRN Temp greater or equal to 38C (100.4F), Mild Pain (1 - 3)  dextrose 40% Gel 15 Gram(s) Oral once PRN Blood Glucose LESS THAN 70 milliGRAM(s)/deciliter        CAPILLARY BLOOD GLUCOSE      POCT Blood Glucose.: 265 mg/dL (25 Dec 2018 12:32)  POCT Blood Glucose.: 258 mg/dL (25 Dec 2018 08:51)  POCT Blood Glucose.: 212 mg/dL (24 Dec 2018 21:50)  POCT Blood Glucose.: 228 mg/dL (24 Dec 2018 18:12)    I&O's Summary    24 Dec 2018 07:01  -  25 Dec 2018 07:00  --------------------------------------------------------  IN: 520 mL / OUT: 0 mL / NET: 520 mL    25 Dec 2018 07:01  -  25 Dec 2018 15:12  --------------------------------------------------------  IN: 360 mL / OUT: 0 mL / NET: 360 mL        PHYSICAL EXAM:  GENERAL: NAD  NECK: Supple, No JVD  CHEST/LUNG: Clear to auscultation bilaterally; No wheezing.  HEART: Regular rate and rhythm; No murmurs, rubs, or gallops  ABDOMEN: Soft, Nontender, Nondistended; Bowel sounds present  EXTREMITIES:   No clubbing, cyanosis, or edema  NEUROLOGY: Awake      LABS:    12-25    146<H>  |  99  |  28<H>  ----------------------------<  320<H>  4.1   |  33<H>  |  0.97    Ca    9.7      25 Dec 2018 12:08  Mg     1.9     12-25              CAPILLARY BLOOD GLUCOSE      POCT Blood Glucose.: 265 mg/dL (25 Dec 2018 12:32)  POCT Blood Glucose.: 258 mg/dL (25 Dec 2018 08:51)  POCT Blood Glucose.: 212 mg/dL (24 Dec 2018 21:50)  POCT Blood Glucose.: 228 mg/dL (24 Dec 2018 18:12)    12-18 @ 18:39  Culture-urine --  Culture results   No growth at 5 days.  method type --  Organism --  Organism Identification --  Specimen source .Blood Blood-Peripheral           12-18 @ 18:39  Culture blood --  Culture results   No growth at 5 days.  Gram stain --  Gram stain blood --  Method type --  Organism --  Organism identification --  Specimen source .Blood Blood-Peripheral      RADIOLOGY & ADDITIONAL TESTS:    Imaging Personally Reviewed:    Consultant(s) Notes Reviewed:      Care Discussed with Consultants/Other Providers:

## 2018-12-26 LAB
ANION GAP SERPL CALC-SCNC: 14 MMOL/L — SIGNIFICANT CHANGE UP (ref 5–17)
BUN SERPL-MCNC: 35 MG/DL — HIGH (ref 7–23)
CALCIUM SERPL-MCNC: 9.4 MG/DL — SIGNIFICANT CHANGE UP (ref 8.4–10.5)
CHLORIDE SERPL-SCNC: 99 MMOL/L — SIGNIFICANT CHANGE UP (ref 96–108)
CO2 SERPL-SCNC: 33 MMOL/L — HIGH (ref 22–31)
CREAT SERPL-MCNC: 1.22 MG/DL — SIGNIFICANT CHANGE UP (ref 0.5–1.3)
GLUCOSE BLDC GLUCOMTR-MCNC: 178 MG/DL — HIGH (ref 70–99)
GLUCOSE BLDC GLUCOMTR-MCNC: 218 MG/DL — HIGH (ref 70–99)
GLUCOSE BLDC GLUCOMTR-MCNC: 224 MG/DL — HIGH (ref 70–99)
GLUCOSE BLDC GLUCOMTR-MCNC: 318 MG/DL — HIGH (ref 70–99)
GLUCOSE SERPL-MCNC: 232 MG/DL — HIGH (ref 70–99)
MAGNESIUM SERPL-MCNC: 1.9 MG/DL — SIGNIFICANT CHANGE UP (ref 1.6–2.6)
POTASSIUM SERPL-MCNC: 3.4 MMOL/L — LOW (ref 3.5–5.3)
POTASSIUM SERPL-SCNC: 3.4 MMOL/L — LOW (ref 3.5–5.3)
SODIUM SERPL-SCNC: 146 MMOL/L — HIGH (ref 135–145)

## 2018-12-26 RX ORDER — FUROSEMIDE 40 MG
40 TABLET ORAL DAILY
Qty: 0 | Refills: 0 | Status: DISCONTINUED | OUTPATIENT
Start: 2018-12-27 | End: 2019-01-03

## 2018-12-26 RX ORDER — POTASSIUM CHLORIDE 20 MEQ
40 PACKET (EA) ORAL EVERY 4 HOURS
Qty: 0 | Refills: 0 | Status: COMPLETED | OUTPATIENT
Start: 2018-12-26 | End: 2018-12-26

## 2018-12-26 RX ADMIN — Medication 100 MILLIGRAM(S): at 05:18

## 2018-12-26 RX ADMIN — TAMSULOSIN HYDROCHLORIDE 0.4 MILLIGRAM(S): 0.4 CAPSULE ORAL at 22:34

## 2018-12-26 RX ADMIN — Medication 25 MILLIGRAM(S): at 05:18

## 2018-12-26 RX ADMIN — Medication 4: at 09:00

## 2018-12-26 RX ADMIN — Medication 4: at 17:44

## 2018-12-26 RX ADMIN — Medication 40 MILLIEQUIVALENT(S): at 11:23

## 2018-12-26 RX ADMIN — Medication 100 MILLIGRAM(S): at 17:55

## 2018-12-26 RX ADMIN — Medication 81 MILLIGRAM(S): at 11:22

## 2018-12-26 RX ADMIN — Medication 40 MILLIGRAM(S): at 05:18

## 2018-12-26 RX ADMIN — SIMVASTATIN 20 MILLIGRAM(S): 20 TABLET, FILM COATED ORAL at 22:33

## 2018-12-26 RX ADMIN — PANTOPRAZOLE SODIUM 40 MILLIGRAM(S): 20 TABLET, DELAYED RELEASE ORAL at 05:18

## 2018-12-26 RX ADMIN — Medication 8: at 13:07

## 2018-12-26 RX ADMIN — Medication 40 MILLIEQUIVALENT(S): at 17:55

## 2018-12-26 NOTE — PROGRESS NOTE ADULT - SUBJECTIVE AND OBJECTIVE BOX
Patient is a 78y old  Male who presents with a chief complaint of Rigors/shaking (26 Dec 2018 17:17)      SUBJECTIVE / OVERNIGHT EVENTS:    Events noted.  No CP/SOB    MEDICATIONS  (STANDING):  aspirin enteric coated 81 milliGRAM(s) Oral daily  dextrose 5%. 1000 milliLiter(s) (50 mL/Hr) IV Continuous <Continuous>  dextrose 50% Injectable 12.5 Gram(s) IV Push once  dextrose 50% Injectable 25 Gram(s) IV Push once  docusate sodium Liquid 100 milliGRAM(s) Oral two times a day  insulin lispro (HumaLOG) corrective regimen sliding scale   SubCutaneous three times a day before meals  insulin lispro (HumaLOG) corrective regimen sliding scale   SubCutaneous at bedtime  metoprolol succinate ER 25 milliGRAM(s) Oral daily  pantoprazole    Tablet 40 milliGRAM(s) Oral before breakfast  simvastatin 20 milliGRAM(s) Oral at bedtime  tamsulosin 0.4 milliGRAM(s) Oral at bedtime    MEDICATIONS  (PRN):  acetaminophen   Tablet .. 650 milliGRAM(s) Oral every 6 hours PRN Temp greater or equal to 38C (100.4F), Mild Pain (1 - 3)  dextrose 40% Gel 15 Gram(s) Oral once PRN Blood Glucose LESS THAN 70 milliGRAM(s)/deciliter        CAPILLARY BLOOD GLUCOSE      POCT Blood Glucose.: 218 mg/dL (26 Dec 2018 17:43)  POCT Blood Glucose.: 318 mg/dL (26 Dec 2018 12:59)  POCT Blood Glucose.: 224 mg/dL (26 Dec 2018 08:35)  POCT Blood Glucose.: 218 mg/dL (25 Dec 2018 22:13)    I&O's Summary    25 Dec 2018 07:01  -  26 Dec 2018 07:00  --------------------------------------------------------  IN: 560 mL / OUT: 0 mL / NET: 560 mL    26 Dec 2018 07:01  -  26 Dec 2018 18:59  --------------------------------------------------------  IN: 1100 mL / OUT: 0 mL / NET: 1100 mL        PHYSICAL EXAM:  GENERAL: NAD  NECK: Supple, No JVD  CHEST/LUNG: Clear to auscultation bilaterally; No wheezing.  HEART: Regular rate and rhythm; No murmurs, rubs, or gallops  ABDOMEN: Soft, Nontender, Nondistended; Bowel sounds present  EXTREMITIES:   No clubbing, cyanosis, or edema  NEUROLOGY: Awake      LABS:    12-26    146<H>  |  99  |  35<H>  ----------------------------<  232<H>  3.4<L>   |  33<H>  |  1.22    Ca    9.4      26 Dec 2018 06:41  Mg     1.9     12-26              CAPILLARY BLOOD GLUCOSE      POCT Blood Glucose.: 218 mg/dL (26 Dec 2018 17:43)  POCT Blood Glucose.: 318 mg/dL (26 Dec 2018 12:59)  POCT Blood Glucose.: 224 mg/dL (26 Dec 2018 08:35)  POCT Blood Glucose.: 218 mg/dL (25 Dec 2018 22:13)                RADIOLOGY & ADDITIONAL TESTS:    Imaging Personally Reviewed:    Consultant(s) Notes Reviewed:      Care Discussed with Consultants/Other Providers:

## 2018-12-26 NOTE — PROGRESS NOTE ADULT - SUBJECTIVE AND OBJECTIVE BOX
Patient is a 78y old  Male who presents with a chief complaint of Rigors/shaking (25 Dec 2018 14:12)      Any change in ROS: Doing pretty good:      MEDICATIONS  (STANDING):  aspirin enteric coated 81 milliGRAM(s) Oral daily  dextrose 5%. 1000 milliLiter(s) (50 mL/Hr) IV Continuous <Continuous>  dextrose 50% Injectable 12.5 Gram(s) IV Push once  dextrose 50% Injectable 25 Gram(s) IV Push once  docusate sodium Liquid 100 milliGRAM(s) Oral two times a day  furosemide   Injectable 40 milliGRAM(s) IV Push every 12 hours  insulin lispro (HumaLOG) corrective regimen sliding scale   SubCutaneous three times a day before meals  insulin lispro (HumaLOG) corrective regimen sliding scale   SubCutaneous at bedtime  metoprolol succinate ER 25 milliGRAM(s) Oral daily  pantoprazole    Tablet 40 milliGRAM(s) Oral before breakfast  potassium chloride    Tablet ER 40 milliEquivalent(s) Oral every 4 hours  simvastatin 20 milliGRAM(s) Oral at bedtime  tamsulosin 0.4 milliGRAM(s) Oral at bedtime    MEDICATIONS  (PRN):  acetaminophen   Tablet .. 650 milliGRAM(s) Oral every 6 hours PRN Temp greater or equal to 38C (100.4F), Mild Pain (1 - 3)  dextrose 40% Gel 15 Gram(s) Oral once PRN Blood Glucose LESS THAN 70 milliGRAM(s)/deciliter    Vital Signs Last 24 Hrs  T(C): 36.4 (26 Dec 2018 04:00), Max: 36.6 (25 Dec 2018 13:48)  T(F): 97.5 (26 Dec 2018 04:00), Max: 97.9 (25 Dec 2018 13:48)  HR: 100 (26 Dec 2018 04:00) (92 - 115)  BP: 105/65 (26 Dec 2018 04:00) (97/65 - 105/65)  BP(mean): --  RR: 18 (26 Dec 2018 04:00) (18 - 19)  SpO2: 93% (26 Dec 2018 04:00) (92% - 95%)    I&O's Summary    25 Dec 2018 07:01  -  26 Dec 2018 07:00  --------------------------------------------------------  IN: 560 mL / OUT: 0 mL / NET: 560 mL          Physical Exam:   GENERAL: NAD, well-groomed, well-developed  HEENT: BENJY/   Atraumatic, Normocephalic  ENMT: No tonsillar erythema, exudates, or enlargement; Moist mucous membranes, Good dentition, No lesions  NECK: Supple, No JVD, Normal thyroid  CHEST/LUNG: Clear to auscultaion, ; No rales, rhonchi, wheezing, or rubs  CVS: Regular rate and rhythm; No murmurs, rubs, or gallops  GI: : Soft, Nontender, Nondistended; Bowel sounds present  NERVOUS SYSTEM:  Alert & Awake  EXTREMITIES:  2Trace edema  LYMPH: No lymphadenopathy noted  SKIN: No rashes or lesions  ENDOCRINOLOGY: No Thyromegaly  PSYCH: Calm    Labs:          12-26    146<H>  |  99  |  35<H>  ----------------------------<  232<H>  3.4<L>   |  33<H>  |  1.22  12-25    146<H>  |  99  |  28<H>  ----------------------------<  320<H>  4.1   |  33<H>  |  0.97  12-24    144  |  100  |  22  ----------------------------<  210<H>  3.9   |  34<H>  |  0.91  12-23    145  |  100  |  21  ----------------------------<  198<H>  3.3<L>   |  33<H>  |  0.87  12-22    142  |  101  |  22  ----------------------------<  309<H>  3.6   |  32<H>  |  1.01    Ca    9.4      26 Dec 2018 06:41  Ca    9.7      25 Dec 2018 12:08  Mg     1.9     12-26  Mg     1.9     12-25      CAPILLARY BLOOD GLUCOSE      POCT Blood Glucose.: 224 mg/dL (26 Dec 2018 08:35)  POCT Blood Glucose.: 218 mg/dL (25 Dec 2018 22:13)  POCT Blood Glucose.: 167 mg/dL (25 Dec 2018 18:01)  POCT Blood Glucose.: 265 mg/dL (25 Dec 2018 12:32)            < from: Xray Chest 1 View- PORTABLE-Urgent (12.23.18 @ 12:19) >  PROCEDURE DATE:  12/23/2018            INTERPRETATION:  A single chest x-ray was obtained on December 24, 2018.    Indication: Fever.    Impression:    The heart is enlarged. Bilateral  pleural effusion. Left lower lobe   pneumonia and/or atelectasis. Status post sternotomy.                    RILEY CAMARENA M.D., ATTENDING RADIOLOGIST  This document has been electronically signed. Dec 23 2018  4:06PM              < end of copied text >        RECENT CULTURES:        RESPIRATORY CULTURES:          Studies  Chest X-RAY  CT SCAN Chest   Venous Dopplers: LE:   CT Abdomen  Others

## 2018-12-26 NOTE — PROGRESS NOTE ADULT - SUBJECTIVE AND OBJECTIVE BOX
Infectious Diseases progress note:    Subjective: No acute o/n events.  Pt has been afebrile.  Off abx.      ROS:  Nonverbal    Allergies    No Known Allergies    Intolerances        ANTIBIOTICS/RELEVANT:  antimicrobials    immunologic:    OTHER:  acetaminophen   Tablet .. 650 milliGRAM(s) Oral every 6 hours PRN  aspirin enteric coated 81 milliGRAM(s) Oral daily  dextrose 40% Gel 15 Gram(s) Oral once PRN  dextrose 5%. 1000 milliLiter(s) IV Continuous <Continuous>  dextrose 50% Injectable 12.5 Gram(s) IV Push once  dextrose 50% Injectable 25 Gram(s) IV Push once  docusate sodium Liquid 100 milliGRAM(s) Oral two times a day  insulin lispro (HumaLOG) corrective regimen sliding scale   SubCutaneous three times a day before meals  insulin lispro (HumaLOG) corrective regimen sliding scale   SubCutaneous at bedtime  metoprolol succinate ER 25 milliGRAM(s) Oral daily  pantoprazole    Tablet 40 milliGRAM(s) Oral before breakfast  potassium chloride    Tablet ER 40 milliEquivalent(s) Oral every 4 hours  simvastatin 20 milliGRAM(s) Oral at bedtime  tamsulosin 0.4 milliGRAM(s) Oral at bedtime      Objective:  Vital Signs Last 24 Hrs  T(C): 36.5 (26 Dec 2018 12:48), Max: 36.5 (26 Dec 2018 12:48)  T(F): 97.7 (26 Dec 2018 12:48), Max: 97.7 (26 Dec 2018 12:48)  HR: 94 (26 Dec 2018 12:48) (94 - 115)  BP: 102/62 (26 Dec 2018 12:48) (97/65 - 105/65)  BP(mean): --  RR: 18 (26 Dec 2018 12:48) (18 - 18)  SpO2: 96% (26 Dec 2018 12:48) (92% - 96%)    PHYSICAL EXAM:  Constitutional:NAD  Eyes:BENJY, EOMI  Ear/Nose/Throat: no thrush, mucositis.  Moist mucous membranes	  Neck:no JVD, no lymphadenopathy, supple  Respiratory: CTA phi  Cardiovascular: S1S2 RRR, no murmurs  Gastrointestinal:soft, nontender,  nondistended (+) BS  Extremities: improved b/l UE edema  Skin:  no rashes, open wounds or ulcerations        LABS:    12-26    146<H>  |  99  |  35<H>  ----------------------------<  232<H>  3.4<L>   |  33<H>  |  1.22    Ca    9.4      26 Dec 2018 06:41  Mg     1.9     12-26                      Rapid RVP Result: [facility]          MICROBIOLOGY:    Culture - Blood (12.18.18 @ 18:39)    Specimen Source: .Blood Blood-Peripheral    Culture Results:   No growth at 5 days.    Culture - Blood (12.18.18 @ 18:39)    Specimen Source: .Blood Blood-Peripheral    Culture Results:   No growth at 5 days.    Culture - Urine (12.16.18 @ 23:32)    Specimen Source: .Urine Clean Catch (Midstream)    Culture Results:   No growth    Culture - Blood (12.16.18 @ 21:46)    Gram Stain:   Growth in aerobic bottle: Gram Positive Cocci in Clusters    -  Coagulase negative Staphylococcus: Detec    Specimen Source: .Blood Blood-Peripheral    Organism: Blood Culture PCR    Culture Results:   Growth in aerobic bottle: Coag Negative Staphylococcus  Single set isolate, possible contaminant. Contact  Microbiology if susceptibility testing clinically  indicated.  "Due to technical problems, Proteus sp. will Not be reported as part of  the BCID panel until further notice"  ***Blood Panel PCR results on this specimen are available  approximately 3 hours after the Gram stain result.***  Gram stain, PCR, and/or culture results may not always  correspond due to difference in methodologies.  ************************************************************  This PCR assay was performed using [employer].  The following targets are tested for: Enterococcus,  vancomycin resistant enterococci, Listeria monocytogenes,  coagulase negative staphylococci, S. aureus,  methicillin resistant S. aureus, Streptococcus agalactiae  (Group B), S. pneumoniae, S. pyogenes (Group A),  Acinetobacter baumannii, Enterobacter cloacae, E. coli,  Klebsiella oxytoca, K. pneumoniae, Proteus sp.,  Serratia marcescens, Haemophilus influenzae,  Neisseria meningitidis, Pseudomonas aeruginosa, Candida  albicans, C. glabrata, C krusei, C parapsilosis,  C. tropicalis and the KPC resistance gene.    Organism Identification: Blood Culture PCR    Method Type: PCR    Culture - Blood (12.16.18 @ 21:46)    Specimen Source: .Blood Blood-Peripheral    Culture Results:   No growth at 5 days.          RADIOLOGY & ADDITIONAL STUDIES:

## 2018-12-27 ENCOUNTER — TRANSCRIPTION ENCOUNTER (OUTPATIENT)
Age: 78
End: 2018-12-27

## 2018-12-27 LAB
ANION GAP SERPL CALC-SCNC: 11 MMOL/L — SIGNIFICANT CHANGE UP (ref 5–17)
BUN SERPL-MCNC: 32 MG/DL — HIGH (ref 7–23)
CALCIUM SERPL-MCNC: 9.9 MG/DL — SIGNIFICANT CHANGE UP (ref 8.4–10.5)
CHLORIDE SERPL-SCNC: 101 MMOL/L — SIGNIFICANT CHANGE UP (ref 96–108)
CO2 SERPL-SCNC: 32 MMOL/L — HIGH (ref 22–31)
CREAT SERPL-MCNC: 1.05 MG/DL — SIGNIFICANT CHANGE UP (ref 0.5–1.3)
GLUCOSE BLDC GLUCOMTR-MCNC: 192 MG/DL — HIGH (ref 70–99)
GLUCOSE BLDC GLUCOMTR-MCNC: 209 MG/DL — HIGH (ref 70–99)
GLUCOSE BLDC GLUCOMTR-MCNC: 219 MG/DL — HIGH (ref 70–99)
GLUCOSE BLDC GLUCOMTR-MCNC: 302 MG/DL — HIGH (ref 70–99)
GLUCOSE SERPL-MCNC: 250 MG/DL — HIGH (ref 70–99)
HCT VFR BLD CALC: 39.5 % — SIGNIFICANT CHANGE UP (ref 39–50)
HGB BLD-MCNC: 13.3 G/DL — SIGNIFICANT CHANGE UP (ref 13–17)
MAGNESIUM SERPL-MCNC: 2 MG/DL — SIGNIFICANT CHANGE UP (ref 1.6–2.6)
MCHC RBC-ENTMCNC: 33.2 PG — SIGNIFICANT CHANGE UP (ref 27–34)
MCHC RBC-ENTMCNC: 33.6 GM/DL — SIGNIFICANT CHANGE UP (ref 32–36)
MCV RBC AUTO: 98.7 FL — SIGNIFICANT CHANGE UP (ref 80–100)
PLATELET # BLD AUTO: 153 K/UL — SIGNIFICANT CHANGE UP (ref 150–400)
POTASSIUM SERPL-MCNC: 4.4 MMOL/L — SIGNIFICANT CHANGE UP (ref 3.5–5.3)
POTASSIUM SERPL-SCNC: 4.4 MMOL/L — SIGNIFICANT CHANGE UP (ref 3.5–5.3)
RBC # BLD: 4 M/UL — LOW (ref 4.2–5.8)
RBC # FLD: 12.6 % — SIGNIFICANT CHANGE UP (ref 10.3–14.5)
SODIUM SERPL-SCNC: 144 MMOL/L — SIGNIFICANT CHANGE UP (ref 135–145)
WBC # BLD: 5.9 K/UL — SIGNIFICANT CHANGE UP (ref 3.8–10.5)
WBC # FLD AUTO: 5.9 K/UL — SIGNIFICANT CHANGE UP (ref 3.8–10.5)

## 2018-12-27 RX ADMIN — Medication 100 MILLIGRAM(S): at 05:29

## 2018-12-27 RX ADMIN — Medication 4: at 12:48

## 2018-12-27 RX ADMIN — PANTOPRAZOLE SODIUM 40 MILLIGRAM(S): 20 TABLET, DELAYED RELEASE ORAL at 05:27

## 2018-12-27 RX ADMIN — Medication 40 MILLIGRAM(S): at 05:29

## 2018-12-27 RX ADMIN — TAMSULOSIN HYDROCHLORIDE 0.4 MILLIGRAM(S): 0.4 CAPSULE ORAL at 21:03

## 2018-12-27 RX ADMIN — SIMVASTATIN 20 MILLIGRAM(S): 20 TABLET, FILM COATED ORAL at 21:03

## 2018-12-27 RX ADMIN — Medication 25 MILLIGRAM(S): at 05:27

## 2018-12-27 RX ADMIN — Medication 8: at 18:01

## 2018-12-27 RX ADMIN — Medication 100 MILLIGRAM(S): at 17:05

## 2018-12-27 RX ADMIN — Medication 4: at 08:59

## 2018-12-27 RX ADMIN — Medication 81 MILLIGRAM(S): at 12:49

## 2018-12-27 NOTE — DISCHARGE NOTE ADULT - HOSPITAL COURSE
78M w/ hx of T2DM,s/p AVR with aortic root repair for Type A aneurysm, s/p CVA w/ residual L sided weakness, BPH p/w rigors. Pt was in rehab since his prior discharge receiving IV antibiotics by PICC vs midline. He was treated for MRSA/pseudomonas sepsis and UTI. Noted to be tachypnea brought to [facility] now admitted for sepsis possibly from UTI and now with hypoxic respiratory failure suspicious for CHF. Patient seen by cardiology and pulmonology - treated with IV lasix, Bipap. Patient seen by ID - Pt had low grade temp (100.3), leukocytosis, and tachycardia.  Bcx are growing single set CNS, which is likely a contaminant.  Repeat blood cultures negative. Urine cx no growth, RVP is negative. - Cxr shows pulm edema with b/l pleural effusions.  CT angio chest no PE or pna. (+) effusions/pulm edema. Abx d/c'd on 12/20. Patient transitioned to PO lasix and off oxygen. 79 y/o male with PMH of DM2, CAD s/p CABG 2013, HFrEF 25%, CVA w/ residual L sided weakness, and BPH, atmitted at [facility] in November 2018 with MRSA/pseudomonas sepsis and UTI and discharged to rehab on IV antibiotics via PICC line vs. midline (now removed), discharged home 1 day PTA, now brought to the ED for rigors and lethargy.  Patient's family report decreasing PO intake for several days as well as vomiting.  Patient found to be and hypoxic in the ED, likely 2/2 fluid overload - noted to have pulmonary edema and bilateral pulmonary effusions - initially requiring BIPAP and IV diuretics.  Patient was followed by Cardiology and Pulmonary.  PE was ruled out with CTA chest.  Patient was initially treated with IV antibiotics for possible sepsis given tachycardia low grade temperature, and elevated serum lactate.  Patient was followed by ID.  Antibiotics were discontinued following negative infectious workup - transient leukocytosis, RVP negative, urine culture negative, and no PNA on CTA chest.  Single blood culture grew CoNS, which is likely contaminant.  Repeat blood cultures negative.  Serum lactate normalized.  Patient transitioned to PO lasix and off oxygen. 77 y/o male with PMH of DM2, CAD s/p CABG 2013, HFrEF 25%, CVA w/ residual L sided weakness, and BPH, atmitted at [facility] in November 2018 with MRSA/pseudomonas sepsis and UTI and discharged to rehab on IV antibiotics via PICC line vs. midline (now removed), discharged home 1 day PTA, now brought to the ED for rigors and lethargy.  Patient's family report decreasing PO intake for several days as well as vomiting.  Patient found to be and hypoxic in the ED, likely 2/2 fluid overload - noted to have pulmonary edema and bilateral pulmonary effusions - initially requiring BIPAP and IV diuretics.  Patient was followed by Cardiology and Pulmonary.  PE was ruled out with CTA chest.  Patient was initially treated with IV antibiotics for possible sepsis given tachycardia low grade temperature, and elevated serum lactate.  Patient was followed by ID.  Antibiotics were discontinued following negative infectious workup - transient leukocytosis, RVP negative, urine culture negative, and no PNA on CTA chest.  Single blood culture grew CoNS, which is likely contaminant.  Repeat blood cultures negative.  Serum lactate normalized.  Patient transitioned to PO lasix and off oxygen.  Patient note to have hypernatremia, 2/2 dehydration - given IV fluids and free fluid intake increased - serum sodium has downtrended.  Patient was evaluated by Nephrology and recommended to continue Lasix.  Patient was cleared for discharge to home, to follow up with Primary Care, Cardiology, and Nephrology. 77 y/o male with PMH of DM2, CAD s/p CABG 2013, HFrEF 25%, CVA w/ residual L sided weakness, and BPH, atmitted at [facility] in November 2018 with MRSA/pseudomonas sepsis and UTI and discharged to rehab on IV antibiotics via PICC line vs. midline (now removed), discharged home 1 day PTA, now brought to the ED for rigors and lethargy.  Patient's family report decreasing PO intake for several days as well as vomiting.  Patient found to be and hypoxic in the ED, likely 2/2 fluid overload - noted to have pulmonary edema and bilateral pulmonary effusions - initially requiring BIPAP and IV diuretics.  Patient was followed by Cardiology and Pulmonary.  PE was ruled out with CTA chest.  Patient was initially treated with IV antibiotics for possible sepsis given tachycardia low grade temperature, and elevated serum lactate.  Patient was followed by ID.  Antibiotics were discontinued following negative infectious workup - transient leukocytosis, RVP negative, urine culture negative, and no PNA on CTA chest.  Single blood culture grew CoNS, which is likely contaminant.  Repeat blood cultures negative.  Serum lactate normalized.  Patient transitioned to PO lasix and off oxygen.  Patient note to have hypernatremia, 2/2 dehydration - given IV fluids and free fluid intake increased - serum sodium has downtrended.  Patient was evaluated by Nephrology and recommended to continue Lasix - patient is off ACE-I due to mild JENNIFER.  ACE-I can be reintroduced in the outpatient setting after Nephrology follow up and repeat BMP.  Patient was cleared for discharge to home, to follow up with Primary Care, Cardiology, and Nephrology.

## 2018-12-27 NOTE — DISCHARGE NOTE ADULT - SECONDARY DIAGNOSIS.
Acute respiratory failure with hypoxia Type 2 diabetes mellitus with complication, unspecified whether long term insulin use Coronary artery disease involving coronary bypass graft of native heart without angina pectoris CHF (congestive heart failure) Colitis

## 2018-12-27 NOTE — DISCHARGE NOTE ADULT - PATIENT PORTAL LINK FT
You can access the [facility] Patient Portal, offered by [facility], by registering with the following website: [email or web address]

## 2018-12-27 NOTE — PROGRESS NOTE ADULT - SUBJECTIVE AND OBJECTIVE BOX
Patient is a 78y old  Male who presents with a chief complaint of Rigors/shaking (27 Dec 2018 06:52)      Any change in ROS: Doing ok : no SOB     MEDICATIONS  (STANDING):  aspirin enteric coated 81 milliGRAM(s) Oral daily  dextrose 5%. 1000 milliLiter(s) (50 mL/Hr) IV Continuous <Continuous>  dextrose 50% Injectable 12.5 Gram(s) IV Push once  dextrose 50% Injectable 25 Gram(s) IV Push once  docusate sodium Liquid 100 milliGRAM(s) Oral two times a day  furosemide    Tablet 40 milliGRAM(s) Oral daily  insulin lispro (HumaLOG) corrective regimen sliding scale   SubCutaneous three times a day before meals  insulin lispro (HumaLOG) corrective regimen sliding scale   SubCutaneous at bedtime  metoprolol succinate ER 25 milliGRAM(s) Oral daily  pantoprazole    Tablet 40 milliGRAM(s) Oral before breakfast  simvastatin 20 milliGRAM(s) Oral at bedtime  tamsulosin 0.4 milliGRAM(s) Oral at bedtime    MEDICATIONS  (PRN):  acetaminophen   Tablet .. 650 milliGRAM(s) Oral every 6 hours PRN Temp greater or equal to 38C (100.4F), Mild Pain (1 - 3)  dextrose 40% Gel 15 Gram(s) Oral once PRN Blood Glucose LESS THAN 70 milliGRAM(s)/deciliter    Vital Signs Last 24 Hrs  T(C): 36.4 (27 Dec 2018 04:34), Max: 36.5 (26 Dec 2018 12:48)  T(F): 97.6 (27 Dec 2018 04:34), Max: 97.7 (26 Dec 2018 12:48)  HR: 106 (27 Dec 2018 04:34) (94 - 106)  BP: 111/75 (27 Dec 2018 04:34) (102/62 - 111/75)  BP(mean): --  RR: 18 (27 Dec 2018 04:34) (18 - 18)  SpO2: 97% (27 Dec 2018 04:34) (96% - 97%)    I&O's Summary    26 Dec 2018 07:01  -  27 Dec 2018 07:00  --------------------------------------------------------  IN: 1100 mL / OUT: 0 mL / NET: 1100 mL    27 Dec 2018 07:01  -  27 Dec 2018 12:18  --------------------------------------------------------  IN: 100 mL / OUT: 0 mL / NET: 100 mL          Physical Exam:   GENERAL: NAD, well-groomed, well-developed  HEENT: BENJY/   Atraumatic, Normocephalic  ENMT: No tonsillar erythema, exudates, or enlargement; Moist mucous membranes, Good dentition, No lesions  NECK: Supple, No JVD, Normal thyroid  CHEST/LUNG: Clear to auscultaion  CVS: Regular rate and rhythm; No murmurs, rubs, or gallops  GI: : Soft, Nontender, Nondistended; Bowel sounds present  NERVOUS SYSTEM:  Alert & Awake but doesnot talk much   EXTREMITIES:  Less edema  LYMPH: No lymphadenopathy noted  SKIN: No rashes or lesions  ENDOCRINOLOGY: No Thyromegaly  PSYCH: Appropriate    Labs:                              13.3   5.9   )-----------( 153      ( 27 Dec 2018 06:49 )             39.5     12-27    144  |  101  |  32<H>  ----------------------------<  250<H>  4.4   |  32<H>  |  1.05  12-26    146<H>  |  99  |  35<H>  ----------------------------<  232<H>  3.4<L>   |  33<H>  |  1.22  12-25    146<H>  |  99  |  28<H>  ----------------------------<  320<H>  4.1   |  33<H>  |  0.97  12-24    144  |  100  |  22  ----------------------------<  210<H>  3.9   |  34<H>  |  0.91    Ca    9.9      27 Dec 2018 06:49  Ca    9.4      26 Dec 2018 06:41  Mg     2.0     12-27  Mg     1.9     12-26      CAPILLARY BLOOD GLUCOSE      POCT Blood Glucose.: 219 mg/dL (27 Dec 2018 08:43)  POCT Blood Glucose.: 178 mg/dL (26 Dec 2018 21:49)  POCT Blood Glucose.: 218 mg/dL (26 Dec 2018 17:43)  POCT Blood Glucose.: 318 mg/dL (26 Dec 2018 12:59)        < from: Xray Chest 1 View- PORTABLE-Urgent (12.23.18 @ 12:19) >    EXAM:  XR CHEST PORTABLE URGENT 1V                            PROCEDURE DATE:  12/23/2018            INTERPRETATION:  A single chest x-ray was obtained on December 24, 2018.    Indication: Fever.    Impression:    The heart is enlarged. Bilateral  pleural effusion. Left lower lobe   pneumonia and/or atelectasis. Status post sternotomy.                    RILEY CMAARENA M.D., ATTENDING RADIOLOGIST  This document has been electronically signed. Dec 23 2018  4:06PM        < end of copied text >            RECENT CULTURES:        RESPIRATORY CULTURES:          Studies  Chest X-RAY  CT SCAN Chest   Venous Dopplers: LE:   CT Abdomen  Others

## 2018-12-27 NOTE — DISCHARGE NOTE ADULT - MEDICATION SUMMARY - MEDICATIONS TO TAKE
I will START or STAY ON the medications listed below when I get home from the hospital:    metroNIDAZOLE 500 mg oral tablet  -- 1 tab(s) by mouth every 8 hours  -- Indication: For Colitis    aspirin 81 mg oral delayed release tablet  -- 1 tab(s) by mouth once a day  -- Indication: For Heart    tamsulosin 0.4 mg oral capsule  -- 1 cap(s) by mouth once a day (at bedtime)  -- Indication: For Enlarged prostate    metFORMIN 500 mg oral tablet  -- 1 tab(s) by mouth 2 times a day  -- Indication: For Colitis    simvastatin 20 mg oral tablet  -- 1 tab(s) by mouth once a day (in the evening)  -- Indication: For Coronary artery disease involving coronary bypass graft of native heart without angina pectoris    metoprolol succinate 25 mg oral tablet, extended release  -- 1 tab(s) by mouth once a day  -- Indication: For Essential hypertension    furosemide 40 mg oral tablet  -- 1 tab(s) by mouth once a day  -- Indication: For CHF (congestive heart failure)    docusate sodium 10 mg/mL oral liquid  -- 10 milliliter(s) by mouth 2 times a day  -- Indication: For Bowel regimen    pantoprazole 40 mg oral delayed release tablet  -- 1 tab(s) by mouth once a day  -- Indication: For Acid reflux    ciprofloxacin 500 mg oral tablet  -- 1 tab(s) by mouth every 12 hours  -- Indication: For Colitis    multivitamin  -- 1 tab(s) by mouth once a day  -- Indication: For Supplement

## 2018-12-27 NOTE — DISCHARGE NOTE ADULT - INSTRUCTIONS
Dysphagia I with honey thick liquids; Reduced sodium and cholesterol diet;  Diabetic diet Follow up with MD. Take all medication as prescribed by MD.

## 2018-12-27 NOTE — DISCHARGE NOTE ADULT - PLAN OF CARE
Resolved Weigh yourself daily.  If you gain 3lbs in 3 days, or 5lbs in a week call your Health Care Provider.  Do not eat or drink foods containing more than 2000mg of salt (sodium) in your diet every day.  Call your Health Care Provider if you have any swelling or increased swelling in your feet, ankles, and/or stomach.  Take all of your medication as directed.  If you become dizzy call your Health Care Provider. Continue medications as prescribed HgA1C on 11/2/18 - 7.1   Make sure you get your HgA1c checked every three months.  If you take oral diabetes medications, check your blood glucose two times a day.  If you take insulin, check your blood glucose before meals and at bedtime.  It's important not to skip any meals.  Keep a log of your blood glucose results and always take it with you to your doctor appointments.  Keep a list of your current medications including injectables and over the counter medications and bring this medication list with you to all your doctor appointments.  If you have not seen your ophthalmologist this year call for appointment.  Check your feet daily for redness, sores, or openings. Do not self treat. If no improvement in two days call your primary care physician for an appointment.  Low blood sugar (hypoglycemia) is a blood sugar below 70mg/dl. Check your blood sugar if you feel signs/symptoms of hypoglycemia. If your blood sugar is below 70 take 15 grams of carbohydrates (ex 4 oz of apple juice, 3-4 glucose tablets, or 4-6 oz of regular soda) wait 15 minutes and repeat blood sugar to make sure it comes up above 70.  If your blood sugar is above 70 and you are due for a meal, have a meal.  If you are not due for a meal have a snack.  This snack helps keeps your blood sugar at a safe range. Coronary artery disease is a condition where the arteries the supply the heart muscle get clogged with fatty deposits & puts you at risk for a heart attack  Call your doctor if you have any new pain, pressure, or discomfort in the center of your chest, pain, tingling or discomfort in arms, back, neck, jaw, or stomach, shortness of breath, nausea, vomiting, burping or heartburn, sweating, cold and clammy skin, racing or abnormal heartbeat for more than 10 minutes or if they keep coming & going.  Call 911 and do not tr to get to hospital by care  You can help yourself with lifestyle changes (quitting smoking if you smoke), eat lots of fruits & vegetables & low fat dairy products, not a lot of meat & fatty foods, walk or some form of physical activity most days of the week, lose weight if you are overweight  Take your cardiac medication as prescribed to lower cholesterol, to lower blood pressure, aspirin to prevent blood clots, and diabetes control  Make sure to keep appointments with doctor for cardiac follow up care Seek immediate medical attention if the shortness of breath does not improve with rest, or if you experience chest pain or palpitations. Take all medications as prescribed.  Stop smoking if you currently smoke, and avoid high altitudes.  Weigh yourself daily.  If you gain 3lbs in 3 days, or 5lbs in a week call your Health Care Provider.  Eat a low sodium diet.  If you have pulmonary hypertension and you are a female of child bearing age, talk to your caregiver about using birth control pills or getting pregnant.  Call your Health Care Provider if you have any swelling or increased swelling in your feet, ankles, and/or stomach.  If you experience dizziness, chest pain, or shortness of breath, seek immediate medical attention. Get plenty of rest.  Drink enough water and fluids to keep your urine clear or pale yellow.  Eat a well-balanced diet.  Call your caregiver for follow-up as recommended.      SEEK IMMEDIATE MEDICAL CARE IF:  You develop chills.  You have an oral temperature above 100.4 not controlled by medicine.  You have extreme weakness, fainting, or dehydration.  You have repeated vomiting.  You develop severe belly (abdominal) pain or are passing bloody or tarry stools Make sure you get your HgA1c checked every three months.  If you take oral diabetes medications, check your blood glucose at least two times a day.  If you take short-acting insulin, check your blood glucose before meals and at bedtime.  It's important not to skip any meals.  Keep a log of your blood glucose results and always take it with you to your doctor appointments.  Keep a list of your current medications including over the counter medications and bring this medication list with you to all your doctor appointments.  If you have not seen your ophthalmologist this year, call for appointment.  Check your feet daily for redness, sores, or openings.  Do not self treat.  If there is no improvement in two days, call your primary care physician for an appointment.    HgA1c was 7.1 on November 2nd 2018. Coronary artery disease is a condition where the arteries the supply the heart muscle get clogged with fatty deposits & puts you at risk for a heart attack.  Call your doctor if you have any new pain, pressure, or discomfort in the center of your chest, pain, tingling or discomfort in arms, back, neck, jaw, or stomach, shortness of breath, nausea, vomiting, burping or heartburn, sweating, cold and clammy skin, racing or abnormal heartbeat for more than 10 minutes or if they keep coming & going.  Call 911 and do not try to get to hospital by car.  You can help yourself with lifestyle changes (quitting smoking if you smoke), eat lots of fruits & vegetables & low fat dairy products, not a lot of meat & fatty foods, walk or some form of physical activity most days of the week, lose weight if you are overweight.  Take your cardiac medication as prescribed to lower cholesterol, to lower blood pressure, and control your blood sugar.

## 2018-12-27 NOTE — PROGRESS NOTE ADULT - SUBJECTIVE AND OBJECTIVE BOX
PRESENTING CC:Sepsis    SUBJ: 78M w/ hx of T2DM,s/p AVR with aortic root repair for Type A aneurysm, s/p CVA w/ residual L sided weakness, BPH p/w rigors. Pt was in rehab since his prior discharge receiving IV antibiotics by PICC vs midline. He was treated for MRSA/pseudomonas sepsis and UTI. Noted to be tachypneac brought to [facility] now admitted for sepsis-awake is on O2 supplement not tachypneac.Pulmonary follow up noted.      PMH -reviewed admission note, no change since admission  Heart failure: acute [ ] chronic [ ] acute or chronic [ ] diastolic [ ] systolic [ ] combined systolic and diastolic[ ]  JENNIFER: ATN[ ] renal medullary necrosis [ ] CKD I [ ]CKDII [ ]CKD III [ ]CKD IV [ ]CKD V [ ]Other pathological lesions [ ]    MEDICATIONS  (STANDING):  aspirin enteric coated 81 milliGRAM(s) Oral daily  docusate sodium Liquid 100 milliGRAM(s) Oral two times a day  furosemide    Tablet 40 milliGRAM(s) Oral daily  insulin lispro (HumaLOG) corrective regimen sliding scale   SubCutaneous three times a day before meals  insulin lispro (HumaLOG) corrective regimen sliding scale   SubCutaneous at bedtime  metoprolol succinate ER 25 milliGRAM(s) Oral daily  pantoprazole    Tablet 40 milliGRAM(s) Oral before breakfast  simvastatin 20 milliGRAM(s) Oral at bedtime  tamsulosin 0.4 milliGRAM(s) Oral at bedtime    MEDICATIONS  (PRN):  acetaminophen   Tablet .. 650 milliGRAM(s) Oral every 6 hours PRN Temp greater or equal to 38C (100.4F), Mild Pain (1 - 3)  dextrose 40% Gel 15 Gram(s) Oral once PRN Blood Glucose LESS THAN 70 milliGRAM(s)/deciliter          FAMILY HISTORY:  No pertinent family history in first degree relatives  No family history of premature coronary artery disease or sudden cardiac death      REVIEW OF SYSTEMS:  Constitutional: [ ] fever, [ ]weight loss,  [ ]fatigue  Eyes: [ ] visual changes  Respiratory: [ ]shortness of breath;  [ ] cough, [ ]wheezing, [ ]chills, [ ]hemoptysis  Cardiovascular: [ ] chest pain, [ ]palpitations, [ ]dizziness,  [ ]leg swelling[ ]orthopnea[ ]PND  Gastrointestinal: [ ] abdominal pain, [ ]nausea, [ ]vomiting,  [ ]diarrhea   Genitourinary: [ ] dysuria, [ ] hematuria  Neurologic: [ ] headaches [ ] tremors[x ]weakness  Skin: [ ] itching, [ ]burning, [ ] rashes  Endocrine: [ ] heat or cold intolerance  Musculoskeletal: [ ] joint pain or swelling; [ ] muscle, back, or extremity pain  Psychiatric: [ ] depression, [ ]anxiety, [ ]mood swings, or [ ]difficulty sleeping  Hematologic: [ ] easy bruising, [ ] bleeding gums    [x] All remaining systems negative except as per above.   [ ]Unable to obtain.    Vital Signs Last 24 Hrs  T(C): 36.4 (27 Dec 2018 04:34), Max: 36.5 (26 Dec 2018 12:48)  T(F): 97.6 (27 Dec 2018 04:34), Max: 97.7 (26 Dec 2018 12:48)  HR: 106 (27 Dec 2018 04:34) (94 - 106)  BP: 111/75 (27 Dec 2018 04:34) (102/62 - 111/75)  RR: 18 (27 Dec 2018 04:34) (18 - 18)  SpO2: 97% (27 Dec 2018 04:34) (96% - 97%)  I&O's Summary    25 Dec 2018 07:01  -  26 Dec 2018 07:00  --------------------------------------------------------  IN: 560 mL / OUT: 0 mL / NET: 560 mL    26 Dec 2018 07:01  -  27 Dec 2018 06:52  --------------------------------------------------------  IN: 1100 mL / OUT: 0 mL / NET: 1100 mL        PHYSICAL EXAM:  General: No acute distress BMI-27.6  HEENT: EOMI, PERRL  Neck: Supple, [ ] JVD  Lungs: Equal air entry bilaterally; [ ] rales [ ] wheezing [x ] rhonchi  Heart: Regular rate and rhythm; [ ] murmur   2/6 [x ] systolic [ ] diastolic [ ] radiation[ ] rubs [ ]  gallops  Abdomen: Nontender, bowel sounds present  Extremities: No clubbing, cyanosis, [ ] edema  Nervous system:  Alert & Oriented X3, Leftt weakness  Psychiatric: Normal affect  Skin: No rashes or lesions    LABS:  12-26    146<H>  |  99  |  35<H>  ----------------------------<  232<H>  3.4<L>   |  33<H>  |  1.22    Ca    9.4      26 Dec 2018 06:41  Mg     1.9     12-26      Creatinine Trend: 1.22<--, 0.97<--, 0.91<--, 0.87<--, 1.01<--, 0.90<--        ECHO:1. Bioprosthetic aortic valve replacement. Peak transaortic valve gradient equals 28 mm Hg, mean transaortic valve gradient equals 14 mm Hg, which is probably normal in the presence of a prosthetic valve. Minimal aortic regurgitation.  2. Eccentric left ventricular hypertrophy (dilated left ventricle with normal relative wall thickness).  3. Mild global left ventricular systolic dysfunction.  4. The right ventricle is not well visualized; grossly normal right ventricular systolic function.  5. No obvious vegetation or mobile echodensities are seen on the mitral valve. The bioprosthetic aortic valve, tricuspid, and pulmonic valves are not well visualized. Can not exclude endocarditis. Consider DICKSON if clinically  indicated.  *** Compared with echocardiogram of 1/28/2013, a bioprosthetic aortic valve is now present. Left ventricular function has decreased.  ------------------------------------------------------------------------      IMPRESSION AND PLAN:    78M w/ hx of DM2, CAD s/pBioAVR with aortic root repair-2013, CHF EF 25%, CVA w/ residual L sided weakness, BPH p/w rigors concerning for sepsis possibly from UTI and now with hypoxic respiratory failure suspicious for HF      Problem/Plan - 1:  ·  Problem: Acute respiratory failure with hypoxia.  Plan: Suspect related to fluid overload given cardiac hx and elevated pBNP.   Continue Lasix PO.  Tachycardia resolved continue Metoprolol.    Problem/Plan - 2:  ·  Problem: Sepsis, due to unspecified organism.  Plan: No leukocytosis or recorded fevers but given borderline UA and medical history will cover broadly  -Cont. vancomycin and cefepime for now        Problem/Plan - 4:  ·  Problem: CVA, old, hemiparesis.  Plan: Residual weakness. Now bed bound, reportedly with stage II, ulcer.  -Wound care consult      Problem/Plan - 5:  ·  Problem: Type 2 diabetes mellitus with complication, unspecified whether long term insulin use.  Plan: On PO meds at home.  -Hold metformin given inpatient admissions, lactic acid  -Fingersticks and sliding scale.     Problem/Plan - 6:  Problem: Essential hypertension. Plan: -Trend vital signs  -Hold lisinopril for now.  Problem/Plan - 7:  ·  Problem: Prophylactic measure.  Plan: DVT PPx  -SCDs.

## 2018-12-27 NOTE — DISCHARGE NOTE ADULT - ADDITIONAL INSTRUCTIONS
Follow up with PCP/Cardiologist Dr. Trevizo in 1 week Follow up with your Primary Care Doctor/Cardiologist within 1 week.  Follow up with your Nephrologist within 1 week. Follow up with your Primary Care Doctor/Cardiologist within 1 week.  Follow up with your Nephrologist within 1 week for repeat labs - BMP.

## 2018-12-27 NOTE — DISCHARGE NOTE ADULT - CARE PROVIDER_API CALL
Matt Trevizo), Medicine  Dept Director  [address] [place], [place] [address]  Phone: [phone]  Fax: [phone] Matt Trevizo), Medicine  Dept Director  [address] [id number]  Phone: [phone]  Fax: [phone]    Johan Oscar), Internal Medicine; Nephrology  [address]  [address] [place] [address]  Phone: [phone]  Fax: [phone]

## 2018-12-27 NOTE — DISCHARGE NOTE ADULT - CARE PLAN
Principal Discharge DX:	CHF (congestive heart failure)  Goal:	Resolved  Assessment and plan of treatment:	Weigh yourself daily.  If you gain 3lbs in 3 days, or 5lbs in a week call your Health Care Provider.  Do not eat or drink foods containing more than 2000mg of salt (sodium) in your diet every day.  Call your Health Care Provider if you have any swelling or increased swelling in your feet, ankles, and/or stomach.  Take all of your medication as directed.  If you become dizzy call your Health Care Provider.  Secondary Diagnosis:	Acute respiratory failure with hypoxia  Goal:	Resolved  Assessment and plan of treatment:	Continue medications as prescribed Principal Discharge DX:	CHF (congestive heart failure)  Goal:	Resolved  Assessment and plan of treatment:	Weigh yourself daily.  If you gain 3lbs in 3 days, or 5lbs in a week call your Health Care Provider.  Do not eat or drink foods containing more than 2000mg of salt (sodium) in your diet every day.  Call your Health Care Provider if you have any swelling or increased swelling in your feet, ankles, and/or stomach.  Take all of your medication as directed.  If you become dizzy call your Health Care Provider.  Secondary Diagnosis:	Acute respiratory failure with hypoxia  Goal:	Resolved  Assessment and plan of treatment:	Continue medications as prescribed  Secondary Diagnosis:	Type 2 diabetes mellitus with complication, unspecified whether long term insulin use  Assessment and plan of treatment:	HgA1C on 11/2/18 - 7.1   Make sure you get your HgA1c checked every three months.  If you take oral diabetes medications, check your blood glucose two times a day.  If you take insulin, check your blood glucose before meals and at bedtime.  It's important not to skip any meals.  Keep a log of your blood glucose results and always take it with you to your doctor appointments.  Keep a list of your current medications including injectables and over the counter medications and bring this medication list with you to all your doctor appointments.  If you have not seen your ophthalmologist this year call for appointment.  Check your feet daily for redness, sores, or openings. Do not self treat. If no improvement in two days call your primary care physician for an appointment.  Low blood sugar (hypoglycemia) is a blood sugar below 70mg/dl. Check your blood sugar if you feel signs/symptoms of hypoglycemia. If your blood sugar is below 70 take 15 grams of carbohydrates (ex 4 oz of apple juice, 3-4 glucose tablets, or 4-6 oz of regular soda) wait 15 minutes and repeat blood sugar to make sure it comes up above 70.  If your blood sugar is above 70 and you are due for a meal, have a meal.  If you are not due for a meal have a snack.  This snack helps keeps your blood sugar at a safe range.  Secondary Diagnosis:	Coronary artery disease involving coronary bypass graft of native heart without angina pectoris  Assessment and plan of treatment:	Coronary artery disease is a condition where the arteries the supply the heart muscle get clogged with fatty deposits & puts you at risk for a heart attack  Call your doctor if you have any new pain, pressure, or discomfort in the center of your chest, pain, tingling or discomfort in arms, back, neck, jaw, or stomach, shortness of breath, nausea, vomiting, burping or heartburn, sweating, cold and clammy skin, racing or abnormal heartbeat for more than 10 minutes or if they keep coming & going.  Call 911 and do not tr to get to hospital by care  You can help yourself with lifestyle changes (quitting smoking if you smoke), eat lots of fruits & vegetables & low fat dairy products, not a lot of meat & fatty foods, walk or some form of physical activity most days of the week, lose weight if you are overweight  Take your cardiac medication as prescribed to lower cholesterol, to lower blood pressure, aspirin to prevent blood clots, and diabetes control  Make sure to keep appointments with doctor for cardiac follow up care Principal Discharge DX:	Acute respiratory failure with hypoxia  Goal:	Resolved  Assessment and plan of treatment:	Seek immediate medical attention if the shortness of breath does not improve with rest, or if you experience chest pain or palpitations.  Secondary Diagnosis:	CHF (congestive heart failure)  Goal:	Resolved  Assessment and plan of treatment:	Take all medications as prescribed.  Stop smoking if you currently smoke, and avoid high altitudes.  Weigh yourself daily.  If you gain 3lbs in 3 days, or 5lbs in a week call your Health Care Provider.  Eat a low sodium diet.  If you have pulmonary hypertension and you are a female of child bearing age, talk to your caregiver about using birth control pills or getting pregnant.  Call your Health Care Provider if you have any swelling or increased swelling in your feet, ankles, and/or stomach.  If you experience dizziness, chest pain, or shortness of breath, seek immediate medical attention.  Secondary Diagnosis:	Colitis  Assessment and plan of treatment:	Get plenty of rest.  Drink enough water and fluids to keep your urine clear or pale yellow.  Eat a well-balanced diet.  Call your caregiver for follow-up as recommended.      SEEK IMMEDIATE MEDICAL CARE IF:  You develop chills.  You have an oral temperature above 100.4 not controlled by medicine.  You have extreme weakness, fainting, or dehydration.  You have repeated vomiting.  You develop severe belly (abdominal) pain or are passing bloody or tarry stools  Secondary Diagnosis:	Type 2 diabetes mellitus with complication, unspecified whether long term insulin use  Assessment and plan of treatment:	Make sure you get your HgA1c checked every three months.  If you take oral diabetes medications, check your blood glucose at least two times a day.  If you take short-acting insulin, check your blood glucose before meals and at bedtime.  It's important not to skip any meals.  Keep a log of your blood glucose results and always take it with you to your doctor appointments.  Keep a list of your current medications including over the counter medications and bring this medication list with you to all your doctor appointments.  If you have not seen your ophthalmologist this year, call for appointment.  Check your feet daily for redness, sores, or openings.  Do not self treat.  If there is no improvement in two days, call your primary care physician for an appointment.    HgA1c was 7.1 on November 2nd 2018.  Secondary Diagnosis:	Coronary artery disease involving coronary bypass graft of native heart without angina pectoris  Assessment and plan of treatment:	Coronary artery disease is a condition where the arteries the supply the heart muscle get clogged with fatty deposits & puts you at risk for a heart attack.  Call your doctor if you have any new pain, pressure, or discomfort in the center of your chest, pain, tingling or discomfort in arms, back, neck, jaw, or stomach, shortness of breath, nausea, vomiting, burping or heartburn, sweating, cold and clammy skin, racing or abnormal heartbeat for more than 10 minutes or if they keep coming & going.  Call 911 and do not try to get to hospital by car.  You can help yourself with lifestyle changes (quitting smoking if you smoke), eat lots of fruits & vegetables & low fat dairy products, not a lot of meat & fatty foods, walk or some form of physical activity most days of the week, lose weight if you are overweight.  Take your cardiac medication as prescribed to lower cholesterol, to lower blood pressure, and control your blood sugar.

## 2018-12-27 NOTE — PROGRESS NOTE ADULT - SUBJECTIVE AND OBJECTIVE BOX
Infectious Diseases progress note:    Subjective: No acute o/n events.  Afebrile.  No leukocytosis    ROS:  nonverbal    Allergies    No Known Allergies    Intolerances        ANTIBIOTICS/RELEVANT:  antimicrobials    immunologic:    OTHER:  acetaminophen   Tablet .. 650 milliGRAM(s) Oral every 6 hours PRN  aspirin enteric coated 81 milliGRAM(s) Oral daily  dextrose 40% Gel 15 Gram(s) Oral once PRN  dextrose 5%. 1000 milliLiter(s) IV Continuous <Continuous>  dextrose 50% Injectable 12.5 Gram(s) IV Push once  dextrose 50% Injectable 25 Gram(s) IV Push once  docusate sodium Liquid 100 milliGRAM(s) Oral two times a day  furosemide    Tablet 40 milliGRAM(s) Oral daily  insulin lispro (HumaLOG) corrective regimen sliding scale   SubCutaneous three times a day before meals  insulin lispro (HumaLOG) corrective regimen sliding scale   SubCutaneous at bedtime  metoprolol succinate ER 25 milliGRAM(s) Oral daily  pantoprazole    Tablet 40 milliGRAM(s) Oral before breakfast  simvastatin 20 milliGRAM(s) Oral at bedtime  tamsulosin 0.4 milliGRAM(s) Oral at bedtime      Objective:  Vital Signs Last 24 Hrs  T(C): 36.7 (27 Dec 2018 12:47), Max: 36.7 (27 Dec 2018 12:47)  T(F): 98 (27 Dec 2018 12:47), Max: 98 (27 Dec 2018 12:47)  HR: 97 (27 Dec 2018 12:47) (97 - 106)  BP: 106/72 (27 Dec 2018 12:47) (106/72 - 111/75)  BP(mean): --  RR: 18 (27 Dec 2018 12:47) (18 - 18)  SpO2: 94% (27 Dec 2018 12:47) (94% - 97%)    PHYSICAL EXAM:  Constitutional:NAD  Eyes:BENJY, EOMI  Ear/Nose/Throat: no thrush, mucositis.  Moist mucous membranes	  Neck:no JVD, no lymphadenopathy, supple  Respiratory: CTA phi  Cardiovascular: S1S2 RRR, no murmurs  Gastrointestinal:soft, nontender,  nondistended (+) BS  Extremities:improving edema in extremities  Skin:  no rashes, open wounds or ulcerations        LABS:                        13.3   5.9   )-----------( 153      ( 27 Dec 2018 06:49 )             39.5     12-27    144  |  101  |  32<H>  ----------------------------<  250<H>  4.4   |  32<H>  |  1.05    Ca    9.9      27 Dec 2018 06:49  Mg     2.0     12-27                      Rapid RVP Result: [facility]          MICROBIOLOGY:    Culture - Blood (12.16.18 @ 21:46)    Gram Stain:   Growth in aerobic bottle: Gram Positive Cocci in Clusters    -  Coagulase negative Staphylococcus: Detec    Specimen Source: .Blood Blood-Peripheral    Organism: Blood Culture PCR    Culture Results:   Growth in aerobic bottle: Coag Negative Staphylococcus  Single set isolate, possible contaminant. Contact  Microbiology if susceptibility testing clinically  indicated.  "Due to technical problems, Proteus sp. will Not be reported as part of  the BCID panel until further notice"  ***Blood Panel PCR results on this specimen are available  approximately 3 hours after the Gram stain result.***  Gram stain, PCR, and/or culture results may not always  correspond due to difference in methodologies.  ************************************************************  This PCR assay was performed using [employer].  The following targets are tested for: Enterococcus,  vancomycin resistant enterococci, Listeria monocytogenes,  coagulase negative staphylococci, S. aureus,  methicillin resistant S. aureus, Streptococcus agalactiae  (Group B), S. pneumoniae, S. pyogenes (Group A),  Acinetobacter baumannii, Enterobacter cloacae, E. coli,  Klebsiella oxytoca, K. pneumoniae, Proteus sp.,  Serratia marcescens, Haemophilus influenzae,  Neisseria meningitidis, Pseudomonas aeruginosa, Candida  albicans, C. glabrata, C krusei, C parapsilosis,  C. tropicalis and the KPC resistance gene.    Organism Identification: Blood Culture PCR    Method Type: PCR    Culture - Blood (12.18.18 @ 18:39)    Specimen Source: .Blood Blood-Peripheral    Culture Results:   No growth at 5 days.    Culture - Blood (12.18.18 @ 18:39)    Specimen Source: .Blood Blood-Peripheral    Culture Results:   No growth at 5 days.          RADIOLOGY & ADDITIONAL STUDIES:    < from: Xray Chest 1 View- PORTABLE-Urgent (12.23.18 @ 12:19) >  Impression:    The heart is enlarged. Bilateral  pleural effusion. Left lower lobe   pneumonia and/or atelectasis. Status post sternotomy.    < end of copied text >

## 2018-12-27 NOTE — PROGRESS NOTE ADULT - SUBJECTIVE AND OBJECTIVE BOX
Patient is a 78y old  Male who presents with a chief complaint of Rigors/shaking (27 Dec 2018 17:38)      SUBJECTIVE / OVERNIGHT EVENTS:    Events noted.  More awake  Family at bedside    MEDICATIONS  (STANDING):  aspirin enteric coated 81 milliGRAM(s) Oral daily  dextrose 5%. 1000 milliLiter(s) (50 mL/Hr) IV Continuous <Continuous>  dextrose 50% Injectable 12.5 Gram(s) IV Push once  dextrose 50% Injectable 25 Gram(s) IV Push once  docusate sodium Liquid 100 milliGRAM(s) Oral two times a day  furosemide    Tablet 40 milliGRAM(s) Oral daily  insulin lispro (HumaLOG) corrective regimen sliding scale   SubCutaneous three times a day before meals  insulin lispro (HumaLOG) corrective regimen sliding scale   SubCutaneous at bedtime  metoprolol succinate ER 25 milliGRAM(s) Oral daily  pantoprazole    Tablet 40 milliGRAM(s) Oral before breakfast  simvastatin 20 milliGRAM(s) Oral at bedtime  tamsulosin 0.4 milliGRAM(s) Oral at bedtime    MEDICATIONS  (PRN):  acetaminophen   Tablet .. 650 milliGRAM(s) Oral every 6 hours PRN Temp greater or equal to 38C (100.4F), Mild Pain (1 - 3)  dextrose 40% Gel 15 Gram(s) Oral once PRN Blood Glucose LESS THAN 70 milliGRAM(s)/deciliter        CAPILLARY BLOOD GLUCOSE      POCT Blood Glucose.: 192 mg/dL (27 Dec 2018 21:33)  POCT Blood Glucose.: 302 mg/dL (27 Dec 2018 17:53)  POCT Blood Glucose.: 209 mg/dL (27 Dec 2018 12:37)  POCT Blood Glucose.: 219 mg/dL (27 Dec 2018 08:43)    I&O's Summary    26 Dec 2018 07:01  -  27 Dec 2018 07:00  --------------------------------------------------------  IN: 1100 mL / OUT: 0 mL / NET: 1100 mL    27 Dec 2018 07:01  -  27 Dec 2018 23:57  --------------------------------------------------------  IN: 390 mL / OUT: 0 mL / NET: 390 mL        PHYSICAL EXAM:  GENERAL: NAD  NECK: Supple, No JVD  CHEST/LUNG: Clear to auscultation bilaterally; No wheezing.  HEART: Regular rate and rhythm; No murmurs, rubs, or gallops  ABDOMEN: Soft, Nontender, Nondistended; Bowel sounds present  EXTREMITIES:   No clubbing, cyanosis, or edema  NEUROLOGY: Awake      LABS:                        13.3   5.9   )-----------( 153      ( 27 Dec 2018 06:49 )             39.5     12-27    144  |  101  |  32<H>  ----------------------------<  250<H>  4.4   |  32<H>  |  1.05    Ca    9.9      27 Dec 2018 06:49  Mg     2.0     12-27              CAPILLARY BLOOD GLUCOSE      POCT Blood Glucose.: 192 mg/dL (27 Dec 2018 21:33)  POCT Blood Glucose.: 302 mg/dL (27 Dec 2018 17:53)  POCT Blood Glucose.: 209 mg/dL (27 Dec 2018 12:37)  POCT Blood Glucose.: 219 mg/dL (27 Dec 2018 08:43)                RADIOLOGY & ADDITIONAL TESTS:    Imaging Personally Reviewed:    Consultant(s) Notes Reviewed:      Care Discussed with Consultants/Other Providers:

## 2018-12-28 LAB
ANION GAP SERPL CALC-SCNC: 16 MMOL/L — SIGNIFICANT CHANGE UP (ref 5–17)
BASE EXCESS BLDV CALC-SCNC: 9.5 MMOL/L — HIGH (ref -2–2)
BASOPHILS # BLD AUTO: 0 K/UL — SIGNIFICANT CHANGE UP (ref 0–0.2)
BASOPHILS NFR BLD AUTO: 0.7 % — SIGNIFICANT CHANGE UP (ref 0–2)
BUN SERPL-MCNC: 32 MG/DL — HIGH (ref 7–23)
CA-I SERPL-SCNC: 1.22 MMOL/L — SIGNIFICANT CHANGE UP (ref 1.12–1.3)
CALCIUM SERPL-MCNC: 9.9 MG/DL — SIGNIFICANT CHANGE UP (ref 8.4–10.5)
CHLORIDE BLDV-SCNC: 108 MMOL/L — SIGNIFICANT CHANGE UP (ref 96–108)
CHLORIDE SERPL-SCNC: 101 MMOL/L — SIGNIFICANT CHANGE UP (ref 96–108)
CO2 BLDV-SCNC: 35 MMOL/L — HIGH (ref 22–30)
CO2 SERPL-SCNC: 29 MMOL/L — SIGNIFICANT CHANGE UP (ref 22–31)
CREAT SERPL-MCNC: 1.05 MG/DL — SIGNIFICANT CHANGE UP (ref 0.5–1.3)
EOSINOPHIL # BLD AUTO: 0 K/UL — SIGNIFICANT CHANGE UP (ref 0–0.5)
EOSINOPHIL NFR BLD AUTO: 0 % — SIGNIFICANT CHANGE UP (ref 0–6)
GAS PNL BLDV: 142 MMOL/L — SIGNIFICANT CHANGE UP (ref 136–145)
GAS PNL BLDV: SIGNIFICANT CHANGE UP
GAS PNL BLDV: SIGNIFICANT CHANGE UP
GLUCOSE BLDC GLUCOMTR-MCNC: 216 MG/DL — HIGH (ref 70–99)
GLUCOSE BLDC GLUCOMTR-MCNC: 219 MG/DL — HIGH (ref 70–99)
GLUCOSE BLDC GLUCOMTR-MCNC: 270 MG/DL — HIGH (ref 70–99)
GLUCOSE BLDC GLUCOMTR-MCNC: 287 MG/DL — HIGH (ref 70–99)
GLUCOSE BLDV-MCNC: 325 MG/DL — HIGH (ref 70–99)
GLUCOSE SERPL-MCNC: 227 MG/DL — HIGH (ref 70–99)
HCO3 BLDV-SCNC: 34 MMOL/L — HIGH (ref 21–29)
HCT VFR BLD CALC: 40.5 % — SIGNIFICANT CHANGE UP (ref 39–50)
HCT VFR BLDA CALC: 41 % — SIGNIFICANT CHANGE UP (ref 39–50)
HGB BLD CALC-MCNC: 13.4 G/DL — SIGNIFICANT CHANGE UP (ref 13–17)
HGB BLD-MCNC: 13.5 G/DL — SIGNIFICANT CHANGE UP (ref 13–17)
LACTATE BLDV-MCNC: 2.1 MMOL/L — HIGH (ref 0.7–2)
LYMPHOCYTES # BLD AUTO: 1.2 K/UL — SIGNIFICANT CHANGE UP (ref 1–3.3)
LYMPHOCYTES # BLD AUTO: 21.4 % — SIGNIFICANT CHANGE UP (ref 13–44)
MCHC RBC-ENTMCNC: 33 PG — SIGNIFICANT CHANGE UP (ref 27–34)
MCHC RBC-ENTMCNC: 33.3 GM/DL — SIGNIFICANT CHANGE UP (ref 32–36)
MCV RBC AUTO: 99 FL — SIGNIFICANT CHANGE UP (ref 80–100)
MONOCYTES # BLD AUTO: 0.5 K/UL — SIGNIFICANT CHANGE UP (ref 0–0.9)
MONOCYTES NFR BLD AUTO: 8.6 % — SIGNIFICANT CHANGE UP (ref 2–14)
NEUTROPHILS # BLD AUTO: 4 K/UL — SIGNIFICANT CHANGE UP (ref 1.8–7.4)
NEUTROPHILS NFR BLD AUTO: 69.3 % — SIGNIFICANT CHANGE UP (ref 43–77)
OTHER CELLS CSF MANUAL: 15 ML/DL — LOW (ref 18–22)
PCO2 BLDV: 46 MMHG — SIGNIFICANT CHANGE UP (ref 35–50)
PH BLDV: 7.49 — HIGH (ref 7.35–7.45)
PLAT MORPH BLD: NORMAL — SIGNIFICANT CHANGE UP
PLATELET # BLD AUTO: 157 K/UL — SIGNIFICANT CHANGE UP (ref 150–400)
PO2 BLDV: 48 MMHG — HIGH (ref 25–45)
POTASSIUM BLDV-SCNC: 3.7 MMOL/L — SIGNIFICANT CHANGE UP (ref 3.5–5.3)
POTASSIUM SERPL-MCNC: 4.1 MMOL/L — SIGNIFICANT CHANGE UP (ref 3.5–5.3)
POTASSIUM SERPL-SCNC: 4.1 MMOL/L — SIGNIFICANT CHANGE UP (ref 3.5–5.3)
RBC # BLD: 4.09 M/UL — LOW (ref 4.2–5.8)
RBC # FLD: 12.9 % — SIGNIFICANT CHANGE UP (ref 10.3–14.5)
RBC BLD AUTO: NORMAL — SIGNIFICANT CHANGE UP
SAO2 % BLDV: 83 % — SIGNIFICANT CHANGE UP (ref 67–88)
SODIUM SERPL-SCNC: 146 MMOL/L — HIGH (ref 135–145)
WBC # BLD: 5.7 K/UL — SIGNIFICANT CHANGE UP (ref 3.8–10.5)
WBC # FLD AUTO: 5.7 K/UL — SIGNIFICANT CHANGE UP (ref 3.8–10.5)

## 2018-12-28 RX ADMIN — Medication 6: at 12:59

## 2018-12-28 RX ADMIN — Medication 40 MILLIGRAM(S): at 06:01

## 2018-12-28 RX ADMIN — Medication 100 MILLIGRAM(S): at 06:01

## 2018-12-28 RX ADMIN — TAMSULOSIN HYDROCHLORIDE 0.4 MILLIGRAM(S): 0.4 CAPSULE ORAL at 21:57

## 2018-12-28 RX ADMIN — Medication 81 MILLIGRAM(S): at 11:48

## 2018-12-28 RX ADMIN — PANTOPRAZOLE SODIUM 40 MILLIGRAM(S): 20 TABLET, DELAYED RELEASE ORAL at 06:01

## 2018-12-28 RX ADMIN — Medication 100 MILLIGRAM(S): at 17:37

## 2018-12-28 RX ADMIN — SIMVASTATIN 20 MILLIGRAM(S): 20 TABLET, FILM COATED ORAL at 21:57

## 2018-12-28 RX ADMIN — Medication 4: at 18:17

## 2018-12-28 RX ADMIN — Medication 4: at 09:24

## 2018-12-28 RX ADMIN — Medication 650 MILLIGRAM(S): at 09:58

## 2018-12-28 RX ADMIN — Medication 2: at 21:57

## 2018-12-28 RX ADMIN — Medication 25 MILLIGRAM(S): at 06:01

## 2018-12-28 RX ADMIN — Medication 650 MILLIGRAM(S): at 13:38

## 2018-12-28 NOTE — CHART NOTE - NSCHARTNOTEFT_GEN_A_CORE
Patient is a 78y old  Male who presents with a chief complaint of Rigors/shaking (28 Dec 2018 10:11)      SUBJECTIVE / OVERNIGHT EVENTS:    MEDICATIONS  (STANDING):  aspirin enteric coated 81 milliGRAM(s) Oral daily  dextrose 5%. 1000 milliLiter(s) (50 mL/Hr) IV Continuous <Continuous>  dextrose 50% Injectable 12.5 Gram(s) IV Push once  dextrose 50% Injectable 25 Gram(s) IV Push once  docusate sodium Liquid 100 milliGRAM(s) Oral two times a day  furosemide    Tablet 40 milliGRAM(s) Oral daily  insulin lispro (HumaLOG) corrective regimen sliding scale   SubCutaneous three times a day before meals  insulin lispro (HumaLOG) corrective regimen sliding scale   SubCutaneous at bedtime  metoprolol succinate ER 25 milliGRAM(s) Oral daily  pantoprazole    Tablet 40 milliGRAM(s) Oral before breakfast  simvastatin 20 milliGRAM(s) Oral at bedtime  tamsulosin 0.4 milliGRAM(s) Oral at bedtime    MEDICATIONS  (PRN):  acetaminophen   Tablet .. 650 milliGRAM(s) Oral every 6 hours PRN Temp greater or equal to 38C (100.4F), Mild Pain (1 - 3)  dextrose 40% Gel 15 Gram(s) Oral once PRN Blood Glucose LESS THAN 70 milliGRAM(s)/deciliter        CAPILLARY BLOOD GLUCOSE      POCT Blood Glucose.: 219 mg/dL (28 Dec 2018 09:00)  POCT Blood Glucose.: 192 mg/dL (27 Dec 2018 21:33)  POCT Blood Glucose.: 302 mg/dL (27 Dec 2018 17:53)  POCT Blood Glucose.: 209 mg/dL (27 Dec 2018 12:37)    I&O's Summary    27 Dec 2018 07:01  -  28 Dec 2018 07:00  --------------------------------------------------------  IN: 390 mL / OUT: 0 mL / NET: 390 mL        PHYSICAL EXAM:  GENERAL: NAD, well-developed  HEAD:  Atraumatic, Normocephalic  EYES: EOMI, PERRLA, conjunctiva and sclera clear  NECK: Supple, No JVD  CHEST/LUNG: Clear to auscultation bilaterally; No wheeze  HEART: Regular rate and rhythm; No murmurs, rubs, or gallops  ABDOMEN: Soft, Nontender, Nondistended; Bowel sounds present  EXTREMITIES:  2+ Peripheral Pulses, No clubbing, cyanosis, or edema  PSYCH: AAOx3  NEUROLOGY: non-focal  SKIN: No rashes or lesions    LABS:                        13.3   5.9   )-----------( 153      ( 27 Dec 2018 06:49 )             39.5     12-28    146<H>  |  101  |  32<H>  ----------------------------<  227<H>  4.1   |  29  |  1.05    Ca    9.9      28 Dec 2018 06:00  Mg     2.0     12-27                RADIOLOGY & ADDITIONAL TESTS:    Imaging Personally Reviewed:    Consultant(s) Notes Reviewed:      Care Discussed with Consultants/Other Providers:    Pippa Juarez PA-C  [facility]   [id number]

## 2018-12-28 NOTE — PROGRESS NOTE ADULT - SUBJECTIVE AND OBJECTIVE BOX
Patient is a 78y old  Male who presents with a chief complaint of Rigors/shaking (27 Dec 2018 17:38)      Any change in ROS: Mr Pfeiffer is very sleepy today: He has had no resp issues in the night per RN at bedside: He is for DC today    MEDICATIONS  (STANDING):  aspirin enteric coated 81 milliGRAM(s) Oral daily  dextrose 5%. 1000 milliLiter(s) (50 mL/Hr) IV Continuous <Continuous>  dextrose 50% Injectable 12.5 Gram(s) IV Push once  dextrose 50% Injectable 25 Gram(s) IV Push once  docusate sodium Liquid 100 milliGRAM(s) Oral two times a day  furosemide    Tablet 40 milliGRAM(s) Oral daily  insulin lispro (HumaLOG) corrective regimen sliding scale   SubCutaneous three times a day before meals  insulin lispro (HumaLOG) corrective regimen sliding scale   SubCutaneous at bedtime  metoprolol succinate ER 25 milliGRAM(s) Oral daily  pantoprazole    Tablet 40 milliGRAM(s) Oral before breakfast  simvastatin 20 milliGRAM(s) Oral at bedtime  tamsulosin 0.4 milliGRAM(s) Oral at bedtime    MEDICATIONS  (PRN):  acetaminophen   Tablet .. 650 milliGRAM(s) Oral every 6 hours PRN Temp greater or equal to 38C (100.4F), Mild Pain (1 - 3)  dextrose 40% Gel 15 Gram(s) Oral once PRN Blood Glucose LESS THAN 70 milliGRAM(s)/deciliter    Vital Signs Last 24 Hrs  T(C): 38 (28 Dec 2018 09:45), Max: 38 (28 Dec 2018 09:45)  T(F): 100.4 (28 Dec 2018 09:45), Max: 100.4 (28 Dec 2018 09:45)  HR: 102 (28 Dec 2018 04:24) (97 - 102)  BP: 106/74 (28 Dec 2018 04:24) (106/72 - 123/83)  BP(mean): --  RR: 18 (28 Dec 2018 04:24) (18 - 18)  SpO2: 96% (28 Dec 2018 04:24) (94% - 96%)    I&O's Summary    27 Dec 2018 07:01  -  28 Dec 2018 07:00  --------------------------------------------------------  IN: 390 mL / OUT: 0 mL / NET: 390 mL          Physical Exam:   GENERAL: NAD, well-groomed, well-developed  HEENT: BENJY/   Atraumatic, Normocephalic  ENMT: No tonsillar erythema, exudates, or enlargement; Moist mucous membranes, Good dentition, No lesions  NECK: Supple, No JVD, Normal thyroid  CHEST/LUNG: Clear to auscultaion  CVS: Regular rate and rhythm; No murmurs, rubs, or gallops  GI: : Soft, Nontender, Nondistended; Bowel sounds present  NERVOUS SYSTEM:  Sleepy   EXTREMITIES:  2+ Peripheral Pulses, No clubbing, cyanosis, or edema  LYMPH: No lymphadenopathy noted  SKIN: No rashes or lesions  ENDOCRINOLOGY: No Thyromegaly  PSYCH: Sleepy today     Labs:                              13.3   5.9   )-----------( 153      ( 27 Dec 2018 06:49 )             39.5     12-28    146<H>  |  101  |  32<H>  ----------------------------<  227<H>  4.1   |  29  |  1.05  12-27    144  |  101  |  32<H>  ----------------------------<  250<H>  4.4   |  32<H>  |  1.05  12-26    146<H>  |  99  |  35<H>  ----------------------------<  232<H>  3.4<L>   |  33<H>  |  1.22  12-25    146<H>  |  99  |  28<H>  ----------------------------<  320<H>  4.1   |  33<H>  |  0.97    Ca    9.9      28 Dec 2018 06:00  Ca    9.9      27 Dec 2018 06:49  Mg     2.0     12-27      CAPILLARY BLOOD GLUCOSE      POCT Blood Glucose.: 219 mg/dL (28 Dec 2018 09:00)  POCT Blood Glucose.: 192 mg/dL (27 Dec 2018 21:33)  POCT Blood Glucose.: 302 mg/dL (27 Dec 2018 17:53)  POCT Blood Glucose.: 209 mg/dL (27 Dec 2018 12:37)              < from: Xray Chest 1 View- PORTABLE-Urgent (12.23.18 @ 12:19) >    EXAM:  XR CHEST PORTABLE URGENT 1V                            PROCEDURE DATE:  12/23/2018            INTERPRETATION:  A single chest x-ray was obtained on December 24, 2018.    Indication: Fever.    Impression:    The heart is enlarged. Bilateral  pleural effusion. Left lower lobe   pneumonia and/or atelectasis. Status post sternotomy.                    RILEY CAMARENA M.D., ATTENDING RADIOLOGIST  This document has been electronically signed. Dec 23 2018  4:06PM    < end of copied text >      RECENT CULTURES:        RESPIRATORY CULTURES:          Studies  Chest X-RAY  CT SCAN Chest   Venous Dopplers: LE:   CT Abdomen  Others

## 2018-12-28 NOTE — PROGRESS NOTE ADULT - SUBJECTIVE AND OBJECTIVE BOX
Patient is a 78y old  Male who presents with a chief complaint of Rigors/shaking (28 Dec 2018 17:18)      SUBJECTIVE / OVERNIGHT EVENTS:    Events noted.  Low grade temperature    MEDICATIONS  (STANDING):  aspirin enteric coated 81 milliGRAM(s) Oral daily  dextrose 5%. 1000 milliLiter(s) (50 mL/Hr) IV Continuous <Continuous>  dextrose 50% Injectable 12.5 Gram(s) IV Push once  dextrose 50% Injectable 25 Gram(s) IV Push once  docusate sodium Liquid 100 milliGRAM(s) Oral two times a day  furosemide    Tablet 40 milliGRAM(s) Oral daily  insulin lispro (HumaLOG) corrective regimen sliding scale   SubCutaneous three times a day before meals  insulin lispro (HumaLOG) corrective regimen sliding scale   SubCutaneous at bedtime  metoprolol succinate ER 25 milliGRAM(s) Oral daily  pantoprazole    Tablet 40 milliGRAM(s) Oral before breakfast  simvastatin 20 milliGRAM(s) Oral at bedtime  tamsulosin 0.4 milliGRAM(s) Oral at bedtime    MEDICATIONS  (PRN):  acetaminophen   Tablet .. 650 milliGRAM(s) Oral every 6 hours PRN Temp greater or equal to 38C (100.4F), Mild Pain (1 - 3)  dextrose 40% Gel 15 Gram(s) Oral once PRN Blood Glucose LESS THAN 70 milliGRAM(s)/deciliter        CAPILLARY BLOOD GLUCOSE      POCT Blood Glucose.: 270 mg/dL (28 Dec 2018 21:21)  POCT Blood Glucose.: 216 mg/dL (28 Dec 2018 17:56)  POCT Blood Glucose.: 287 mg/dL (28 Dec 2018 12:52)  POCT Blood Glucose.: 219 mg/dL (28 Dec 2018 09:00)    I&O's Summary    27 Dec 2018 07:01  -  28 Dec 2018 07:00  --------------------------------------------------------  IN: 390 mL / OUT: 0 mL / NET: 390 mL    28 Dec 2018 07:01  -  28 Dec 2018 23:20  --------------------------------------------------------  IN: 300 mL / OUT: 0 mL / NET: 300 mL        PHYSICAL EXAM:  GENERAL: NAD  NECK: Supple, No JVD  CHEST/LUNG: Clear to auscultation bilaterally; No wheezing.  HEART: Regular rate and rhythm; No murmurs, rubs, or gallops  ABDOMEN: Soft, Nontender, Nondistended; Bowel sounds present  EXTREMITIES:   No clubbing, cyanosis, or edema  NEUROLOGY: Awake      LABS:                        13.5   5.7   )-----------( 157      ( 28 Dec 2018 11:21 )             40.5     12-28    146<H>  |  101  |  32<H>  ----------------------------<  227<H>  4.1   |  29  |  1.05    Ca    9.9      28 Dec 2018 06:00  Mg     2.0     12-27              CAPILLARY BLOOD GLUCOSE      POCT Blood Glucose.: 270 mg/dL (28 Dec 2018 21:21)  POCT Blood Glucose.: 216 mg/dL (28 Dec 2018 17:56)  POCT Blood Glucose.: 287 mg/dL (28 Dec 2018 12:52)  POCT Blood Glucose.: 219 mg/dL (28 Dec 2018 09:00)                RADIOLOGY & ADDITIONAL TESTS:    Imaging Personally Reviewed:    Consultant(s) Notes Reviewed:      Care Discussed with Consultants/Other Providers:

## 2018-12-28 NOTE — PROGRESS NOTE ADULT - SUBJECTIVE AND OBJECTIVE BOX
Patient is a 78y old  Male who presents with a chief complaint of Rigors/shaking (28 Dec 2018 17:18)      SUBJECTIVE / OVERNIGHT EVENTS:    Events noted.  Low grade temperature    MEDICATIONS  (STANDING):  aspirin enteric coated 81 milliGRAM(s) Oral daily  docusate sodium Liquid 100 milliGRAM(s) Oral two times a day  furosemide    Tablet 40 milliGRAM(s) Oral daily  insulin lispro (HumaLOG) corrective regimen sliding scale   SubCutaneous three times a day before meals  insulin lispro (HumaLOG) corrective regimen sliding scale   SubCutaneous at bedtime  metoprolol succinate ER 25 milliGRAM(s) Oral daily  pantoprazole    Tablet 40 milliGRAM(s) Oral before breakfast  simvastatin 20 milliGRAM(s) Oral at bedtime  tamsulosin 0.4 milliGRAM(s) Oral at bedtime    MEDICATIONS  (PRN):  acetaminophen   Tablet .. 650 milliGRAM(s) Oral every 6 hours PRN Temp greater or equal to 38C (100.4F), Mild Pain (1 - 3)  dextrose 40% Gel 15 Gram(s) Oral once PRN Blood Glucose LESS THAN 70 milliGRAM(s)/deciliter        CAPILLARY BLOOD GLUCOSE      POCT Blood Glucose.: 270 mg/dL (28 Dec 2018 21:21)  POCT Blood Glucose.: 216 mg/dL (28 Dec 2018 17:56)  POCT Blood Glucose.: 287 mg/dL (28 Dec 2018 12:52)  POCT Blood Glucose.: 219 mg/dL (28 Dec 2018 09:00)    I&O's Summary    27 Dec 2018 07:01  -  28 Dec 2018 07:00  --------------------------------------------------------  IN: 390 mL / OUT: 0 mL / NET: 390 mL    28 Dec 2018 07:01  -  28 Dec 2018 23:20  --------------------------------------------------------  IN: 300 mL / OUT: 0 mL / NET: 300 mL      VITALS:T(C): 38.1 (28 Dec 2018 13:38), Max: 38.1 (28 Dec 2018 13:38)  T(F): 100.5 (28 Dec 2018 13:38), Max: 100.5 (28 Dec 2018 13:38)  HR: 97 (28 Dec 2018 12:32) (97 - 102)  BP: 106/75 (28 Dec 2018 12:32) (106/74 - 123/83)  RR: 18 (28 Dec 2018 12:32) (18 - 18)  SpO2: 97% (28 Dec 2018 12:32) (96% - 97%)    PHYSICAL EXAM:  GENERAL: Lethargic  NECK: Supple, No JVD  CHEST/LUNG: Clear to auscultation bilaterally; No wheezing.  HEART: Regular rate and rhythm; No murmurs, rubs, or gallops  ABDOMEN: Soft, Nontender, Nondistended; Bowel sounds present  EXTREMITIES:   No clubbing, cyanosis, or edema  NEUROLOGY: Left paresis      LABS:                        13.5   5.7   )-----------( 157      ( 28 Dec 2018 11:21 )             40.5     12-28    146<H>  |  101  |  32<H>  ----------------------------<  227<H>  4.1   |  29  |  1.05    Ca    9.9      28 Dec 2018 06:00  Mg     2.0     12-27                POCT Blood Glucose.: 270 mg/dL (28 Dec 2018 21:21)  POCT Blood Glucose.: 216 mg/dL (28 Dec 2018 17:56)  POCT Blood Glucose.: 287 mg/dL (28 Dec 2018 12:52)  POCT Blood Glucose.: 219 mg/dL (28 Dec 2018 09:00)

## 2018-12-28 NOTE — PROGRESS NOTE ADULT - SUBJECTIVE AND OBJECTIVE BOX
Infectious Diseases progress note:    Subjective: Pt with low grade fever 100.4 and 100.5.  Reportedly more lethargic today.  No leukocytosis.      ROS:  Pt nonverbal, poor cognition, unable to assess.      Allergies    No Known Allergies    Intolerances        ANTIBIOTICS/RELEVANT:  antimicrobials    immunologic:    OTHER:  acetaminophen   Tablet .. 650 milliGRAM(s) Oral every 6 hours PRN  aspirin enteric coated 81 milliGRAM(s) Oral daily  dextrose 40% Gel 15 Gram(s) Oral once PRN  dextrose 5%. 1000 milliLiter(s) IV Continuous <Continuous>  dextrose 50% Injectable 12.5 Gram(s) IV Push once  dextrose 50% Injectable 25 Gram(s) IV Push once  docusate sodium Liquid 100 milliGRAM(s) Oral two times a day  furosemide    Tablet 40 milliGRAM(s) Oral daily  insulin lispro (HumaLOG) corrective regimen sliding scale   SubCutaneous three times a day before meals  insulin lispro (HumaLOG) corrective regimen sliding scale   SubCutaneous at bedtime  metoprolol succinate ER 25 milliGRAM(s) Oral daily  pantoprazole    Tablet 40 milliGRAM(s) Oral before breakfast  simvastatin 20 milliGRAM(s) Oral at bedtime  tamsulosin 0.4 milliGRAM(s) Oral at bedtime      Objective:  Vital Signs Last 24 Hrs  T(C): 38.1 (28 Dec 2018 13:38), Max: 38.1 (28 Dec 2018 13:38)  T(F): 100.5 (28 Dec 2018 13:38), Max: 100.5 (28 Dec 2018 13:38)  HR: 97 (28 Dec 2018 12:32) (97 - 102)  BP: 106/75 (28 Dec 2018 12:32) (106/74 - 123/83)  BP(mean): --  RR: 18 (28 Dec 2018 12:32) (18 - 18)  SpO2: 97% (28 Dec 2018 12:32) (96% - 97%)    PHYSICAL EXAM:  Constitutional:  lethargic, arousable to voice, nonverbal  Eyes:BENJY, EOMI  Ear/Nose/Throat: no thrush, mucositis.  Moist mucous membranes	  Neck:no JVD, no lymphadenopathy, supple  Respiratory: CTA phi  Cardiovascular: S1S2 RRR, no murmurs  Gastrointestinal:soft, nontender,  nondistended (+) BS  Extremities: mild b/l UE edema  Skin:  no rashes, open wounds or ulcerations        LABS:                        13.5   5.7   )-----------( 157      ( 28 Dec 2018 11:21 )             40.5     12-28    146<H>  |  101  |  32<H>  ----------------------------<  227<H>  4.1   |  29  |  1.05    Ca    9.9      28 Dec 2018 06:00  Mg     2.0     12-27              MICROBIOLOGY:    Culture - Blood (12.18.18 @ 18:39)    Specimen Source: .Blood Blood-Peripheral    Culture Results:   No growth at 5 days.    Culture - Blood (12.18.18 @ 18:39)    Specimen Source: .Blood Blood-Peripheral    Culture Results:   No growth at 5 days.    Rapid Respiratory Viral Panel (12.16.18 @ 20:24)    Rapid RVP Result: NotDetec: The [employer] RVP Rapid uses polymerase chain reaction (PCR) and melt  curve analysis to screen for adenovirus; coronavirus HKU1, NL63, 229E,  OC43; human metapneumovirus (hMPV); human enterovirus/rhinovirus  (Entero/RV); influenza A; influenza A/H1;influenza A/H3; influenza  A/H1-2009; influenza B; parainfluenza viruses 1, 2, 3, 4; respiratory  syncytial virus; Bordetella pertussis; Mycoplasma pneumoniae; and  Chlamydophila pneumoniae.          RADIOLOGY & ADDITIONAL STUDIES:    < from: Xray Chest 1 View- PORTABLE-Urgent (12.23.18 @ 12:19) >  Impression:    The heart is enlarged. Bilateral  pleural effusion. Left lower lobe   pneumonia and/or atelectasis. Status post sternotomy.    < end of copied text >

## 2018-12-29 LAB
ALBUMIN SERPL ELPH-MCNC: 3.4 G/DL — SIGNIFICANT CHANGE UP (ref 3.3–5)
ALP SERPL-CCNC: 46 U/L — SIGNIFICANT CHANGE UP (ref 40–120)
ALT FLD-CCNC: 34 U/L — SIGNIFICANT CHANGE UP (ref 10–45)
ANION GAP SERPL CALC-SCNC: 14 MMOL/L — SIGNIFICANT CHANGE UP (ref 5–17)
APPEARANCE UR: CLEAR — SIGNIFICANT CHANGE UP
AST SERPL-CCNC: 27 U/L — SIGNIFICANT CHANGE UP (ref 10–40)
BASOPHILS # BLD AUTO: 0 K/UL — SIGNIFICANT CHANGE UP (ref 0–0.2)
BASOPHILS NFR BLD AUTO: 0.7 % — SIGNIFICANT CHANGE UP (ref 0–2)
BILIRUB SERPL-MCNC: 0.4 MG/DL — SIGNIFICANT CHANGE UP (ref 0.2–1.2)
BILIRUB UR-MCNC: NEGATIVE — SIGNIFICANT CHANGE UP
BUN SERPL-MCNC: 34 MG/DL — HIGH (ref 7–23)
CALCIUM SERPL-MCNC: 9.5 MG/DL — SIGNIFICANT CHANGE UP (ref 8.4–10.5)
CHLORIDE SERPL-SCNC: 105 MMOL/L — SIGNIFICANT CHANGE UP (ref 96–108)
CO2 SERPL-SCNC: 29 MMOL/L — SIGNIFICANT CHANGE UP (ref 22–31)
COLOR SPEC: SIGNIFICANT CHANGE UP
CREAT SERPL-MCNC: 1.14 MG/DL — SIGNIFICANT CHANGE UP (ref 0.5–1.3)
DIFF PNL FLD: NEGATIVE — SIGNIFICANT CHANGE UP
EOSINOPHIL # BLD AUTO: 0.1 K/UL — SIGNIFICANT CHANGE UP (ref 0–0.5)
EOSINOPHIL NFR BLD AUTO: 2.7 % — SIGNIFICANT CHANGE UP (ref 0–6)
GLUCOSE BLDC GLUCOMTR-MCNC: 200 MG/DL — HIGH (ref 70–99)
GLUCOSE BLDC GLUCOMTR-MCNC: 203 MG/DL — HIGH (ref 70–99)
GLUCOSE BLDC GLUCOMTR-MCNC: 255 MG/DL — HIGH (ref 70–99)
GLUCOSE BLDC GLUCOMTR-MCNC: 282 MG/DL — HIGH (ref 70–99)
GLUCOSE SERPL-MCNC: 243 MG/DL — HIGH (ref 70–99)
GLUCOSE UR QL: NEGATIVE — SIGNIFICANT CHANGE UP
HCT VFR BLD CALC: 37.9 % — LOW (ref 39–50)
HGB BLD-MCNC: 12.5 G/DL — LOW (ref 13–17)
KETONES UR-MCNC: NEGATIVE — SIGNIFICANT CHANGE UP
LEUKOCYTE ESTERASE UR-ACNC: NEGATIVE — SIGNIFICANT CHANGE UP
LYMPHOCYTES # BLD AUTO: 1.6 K/UL — SIGNIFICANT CHANGE UP (ref 1–3.3)
LYMPHOCYTES # BLD AUTO: 27.9 % — SIGNIFICANT CHANGE UP (ref 13–44)
MCHC RBC-ENTMCNC: 32.7 PG — SIGNIFICANT CHANGE UP (ref 27–34)
MCHC RBC-ENTMCNC: 32.9 GM/DL — SIGNIFICANT CHANGE UP (ref 32–36)
MCV RBC AUTO: 99.2 FL — SIGNIFICANT CHANGE UP (ref 80–100)
MONOCYTES # BLD AUTO: 0.5 K/UL — SIGNIFICANT CHANGE UP (ref 0–0.9)
MONOCYTES NFR BLD AUTO: 9.5 % — SIGNIFICANT CHANGE UP (ref 2–14)
NEUTROPHILS # BLD AUTO: 3.3 K/UL — SIGNIFICANT CHANGE UP (ref 1.8–7.4)
NEUTROPHILS NFR BLD AUTO: 59.2 % — SIGNIFICANT CHANGE UP (ref 43–77)
NITRITE UR-MCNC: NEGATIVE — SIGNIFICANT CHANGE UP
PH UR: 7.5 — SIGNIFICANT CHANGE UP (ref 5–8)
PLATELET # BLD AUTO: 152 K/UL — SIGNIFICANT CHANGE UP (ref 150–400)
POTASSIUM SERPL-MCNC: 3.8 MMOL/L — SIGNIFICANT CHANGE UP (ref 3.5–5.3)
POTASSIUM SERPL-SCNC: 3.8 MMOL/L — SIGNIFICANT CHANGE UP (ref 3.5–5.3)
PROT SERPL-MCNC: 6.1 G/DL — SIGNIFICANT CHANGE UP (ref 6–8.3)
PROT UR-MCNC: ABNORMAL
RAPID RVP RESULT: SIGNIFICANT CHANGE UP
RBC # BLD: 3.82 M/UL — LOW (ref 4.2–5.8)
RBC # FLD: 12.5 % — SIGNIFICANT CHANGE UP (ref 10.3–14.5)
SODIUM SERPL-SCNC: 148 MMOL/L — HIGH (ref 135–145)
SP GR SPEC: 1.01 — SIGNIFICANT CHANGE UP (ref 1.01–1.02)
UROBILINOGEN FLD QL: NEGATIVE — SIGNIFICANT CHANGE UP
WBC # BLD: 5.6 K/UL — SIGNIFICANT CHANGE UP (ref 3.8–10.5)
WBC # FLD AUTO: 5.6 K/UL — SIGNIFICANT CHANGE UP (ref 3.8–10.5)

## 2018-12-29 PROCEDURE — 71045 X-RAY EXAM CHEST 1 VIEW: CPT | Mod: 26

## 2018-12-29 RX ADMIN — TAMSULOSIN HYDROCHLORIDE 0.4 MILLIGRAM(S): 0.4 CAPSULE ORAL at 21:52

## 2018-12-29 RX ADMIN — SIMVASTATIN 20 MILLIGRAM(S): 20 TABLET, FILM COATED ORAL at 21:53

## 2018-12-29 RX ADMIN — Medication 650 MILLIGRAM(S): at 07:30

## 2018-12-29 RX ADMIN — Medication 25 MILLIGRAM(S): at 05:22

## 2018-12-29 RX ADMIN — PANTOPRAZOLE SODIUM 40 MILLIGRAM(S): 20 TABLET, DELAYED RELEASE ORAL at 06:06

## 2018-12-29 RX ADMIN — Medication 100 MILLIGRAM(S): at 05:23

## 2018-12-29 RX ADMIN — Medication 6: at 13:17

## 2018-12-29 RX ADMIN — Medication 6: at 09:24

## 2018-12-29 RX ADMIN — Medication 40 MILLIGRAM(S): at 05:22

## 2018-12-29 RX ADMIN — Medication 100 MILLIGRAM(S): at 18:08

## 2018-12-29 RX ADMIN — Medication 650 MILLIGRAM(S): at 03:23

## 2018-12-29 RX ADMIN — Medication 1000 MILLIGRAM(S): at 07:30

## 2018-12-29 RX ADMIN — Medication 2: at 18:07

## 2018-12-29 RX ADMIN — Medication 81 MILLIGRAM(S): at 09:24

## 2018-12-29 NOTE — PROGRESS NOTE ADULT - SUBJECTIVE AND OBJECTIVE BOX
Patient is a 78y old  Male who presents with a chief complaint of Rigors/shaking (29 Dec 2018 13:35)      SUBJECTIVE / OVERNIGHT EVENTS:    Events noted.  Low grade temp  No N/V    MEDICATIONS  (STANDING):  aspirin enteric coated 81 milliGRAM(s) Oral daily  dextrose 5%. 1000 milliLiter(s) (50 mL/Hr) IV Continuous <Continuous>  dextrose 50% Injectable 12.5 Gram(s) IV Push once  dextrose 50% Injectable 25 Gram(s) IV Push once  docusate sodium Liquid 100 milliGRAM(s) Oral two times a day  furosemide    Tablet 40 milliGRAM(s) Oral daily  insulin lispro (HumaLOG) corrective regimen sliding scale   SubCutaneous three times a day before meals  insulin lispro (HumaLOG) corrective regimen sliding scale   SubCutaneous at bedtime  metoprolol succinate ER 25 milliGRAM(s) Oral daily  pantoprazole    Tablet 40 milliGRAM(s) Oral before breakfast  simvastatin 20 milliGRAM(s) Oral at bedtime  tamsulosin 0.4 milliGRAM(s) Oral at bedtime    MEDICATIONS  (PRN):  acetaminophen   Tablet .. 650 milliGRAM(s) Oral every 6 hours PRN Temp greater or equal to 38C (100.4F), Mild Pain (1 - 3)  dextrose 40% Gel 15 Gram(s) Oral once PRN Blood Glucose LESS THAN 70 milliGRAM(s)/deciliter        CAPILLARY BLOOD GLUCOSE      POCT Blood Glucose.: 203 mg/dL (29 Dec 2018 22:00)  POCT Blood Glucose.: 200 mg/dL (29 Dec 2018 18:02)  POCT Blood Glucose.: 282 mg/dL (29 Dec 2018 13:07)  POCT Blood Glucose.: 255 mg/dL (29 Dec 2018 08:44)    I&O's Summary    28 Dec 2018 07:  -  29 Dec 2018 07:00  --------------------------------------------------------  IN: 300 mL / OUT: 0 mL / NET: 300 mL    29 Dec 2018 07:  -  29 Dec 2018 22:50  --------------------------------------------------------  IN: 720 mL / OUT: 350 mL / NET: 370 mL        PHYSICAL EXAM:  GENERAL: NAD  NECK: Supple, No JVD  CHEST/LUNG: Clear to auscultation bilaterally; No wheezing.  HEART: Regular rate and rhythm; No murmurs, rubs, or gallops  ABDOMEN: Soft, Nontender, Nondistended; Bowel sounds present  EXTREMITIES:   No clubbing, cyanosis, or edema  NEUROLOGY: Awake      LABS:                        12.5   5.6   )-----------( 152      ( 29 Dec 2018 05:28 )             37.9         148<H>  |  105  |  34<H>  ----------------------------<  243<H>  3.8   |  29  |  1.14    Ca    9.5      29 Dec 2018 05:28    TPro  6.1  /  Alb  3.4  /  TBili  0.4  /  DBili  x   /  AST  27  /  ALT  34  /  AlkPhos  46            Urinalysis Basic - ( 29 Dec 2018 09:26 )    Color: Light Yellow / Appearance: Clear / S.015 / pH: x  Gluc: x / Ketone: Negative  / Bili: Negative / Urobili: Negative   Blood: x / Protein: 30 mg/dL / Nitrite: Negative   Leuk Esterase: Negative / RBC: 1 /hpf / WBC 3 /hpf   Sq Epi: x / Non Sq Epi: 2 /hpf / Bacteria: Negative      CAPILLARY BLOOD GLUCOSE      POCT Blood Glucose.: 203 mg/dL (29 Dec 2018 22:00)  POCT Blood Glucose.: 200 mg/dL (29 Dec 2018 18:02)  POCT Blood Glucose.: 282 mg/dL (29 Dec 2018 13:07)  POCT Blood Glucose.: 255 mg/dL (29 Dec 2018 08:44)                RADIOLOGY & ADDITIONAL TESTS:    Imaging Personally Reviewed:    Consultant(s) Notes Reviewed:      Care Discussed with Consultants/Other Providers:

## 2018-12-29 NOTE — PROGRESS NOTE ADULT - SUBJECTIVE AND OBJECTIVE BOX
Patient is a 78y old  Male who presents with a chief complaint of Rigors/shaking (28 Dec 2018 17:18)    Any change in ROS: awake, alert, warm to touch. febrile overnight.     MEDICATIONS  (STANDING):  aspirin enteric coated 81 milliGRAM(s) Oral daily  dextrose 5%. 1000 milliLiter(s) (50 mL/Hr) IV Continuous <Continuous>  dextrose 50% Injectable 12.5 Gram(s) IV Push once  dextrose 50% Injectable 25 Gram(s) IV Push once  docusate sodium Liquid 100 milliGRAM(s) Oral two times a day  furosemide    Tablet 40 milliGRAM(s) Oral daily  insulin lispro (HumaLOG) corrective regimen sliding scale   SubCutaneous three times a day before meals  insulin lispro (HumaLOG) corrective regimen sliding scale   SubCutaneous at bedtime  metoprolol succinate ER 25 milliGRAM(s) Oral daily  pantoprazole    Tablet 40 milliGRAM(s) Oral before breakfast  simvastatin 20 milliGRAM(s) Oral at bedtime  tamsulosin 0.4 milliGRAM(s) Oral at bedtime    MEDICATIONS  (PRN):  acetaminophen   Tablet .. 650 milliGRAM(s) Oral every 6 hours PRN Temp greater or equal to 38C (100.4F), Mild Pain (1 - 3)  dextrose 40% Gel 15 Gram(s) Oral once PRN Blood Glucose LESS THAN 70 milliGRAM(s)/deciliter    Vital Signs Last 24 Hrs  T(C): 36.4 (29 Dec 2018 04:24), Max: 38.2 (28 Dec 2018 20:59)  T(F): 97.6 (29 Dec 2018 04:24), Max: 100.8 (28 Dec 2018 20:59)  HR: 94 (29 Dec 2018 04:24) (94 - 101)  BP: 106/70 (29 Dec 2018 04:24) (106/70 - 112/74)  BP(mean): --  RR: 18 (29 Dec 2018 04:24) (18 - 19)  SpO2: 95% (29 Dec 2018 04:24) (92% - 97%)    I&O's Summary    28 Dec 2018 07:01  -  29 Dec 2018 07:00  --------------------------------------------------------  IN: 300 mL / OUT: 0 mL / NET: 300 mL        Physical Exam:   GENERAL: The patient comfortable with no apparent distress.   HEENT: Head is normocephalic and atraumatic.    NECK: Supple with no elevated JVP.  LUNGS: Clear to auscultation without wheeze and rhonchi.  HEART: S1 and S2 present without murmur.  ABDOMEN: Soft, nontender, and nondistended. No hepatosplenomegaly is noted.  EXTREMITIES: No edema or calf tenderness.  NEUROLOGIC: awake, didn't follow commands    Labs:  34                            12.5   5.6   )-----------( 152      ( 29 Dec 2018 05:28 )             37.9                         13.5   5.7   )-----------( 157      ( 28 Dec 2018 11:21 )             40.5                         13.3   5.9   )-----------( 153      ( 27 Dec 2018 06:49 )             39.5     12-29    148<H>  |  105  |  34<H>  ----------------------------<  243<H>  3.8   |  29  |  1.14  12-28    146<H>  |  101  |  32<H>  ----------------------------<  227<H>  4.1   |  29  |  1.05  12-27    144  |  101  |  32<H>  ----------------------------<  250<H>  4.4   |  32<H>  |  1.05  12-26    146<H>  |  99  |  35<H>  ----------------------------<  232<H>  3.4<L>   |  33<H>  |  1.22  12-25    146<H>  |  99  |  28<H>  ----------------------------<  320<H>  4.1   |  33<H>  |  0.97    Ca    9.5      29 Dec 2018 05:28  Ca    9.9      28 Dec 2018 06:00    TPro  6.1  /  Alb  3.4  /  TBili  0.4  /  DBili  x   /  AST  27  /  ALT  34  /  AlkPhos  46  12-29    CAPILLARY BLOOD GLUCOSE      POCT Blood Glucose.: 270 mg/dL (28 Dec 2018 21:21)  POCT Blood Glucose.: 216 mg/dL (28 Dec 2018 17:56)  POCT Blood Glucose.: 287 mg/dL (28 Dec 2018 12:52)  POCT Blood Glucose.: 219 mg/dL (28 Dec 2018 09:00)      LIVER FUNCTIONS - ( 29 Dec 2018 05:28 )  Alb: 3.4 g/dL / Pro: 6.1 g/dL / ALK PHOS: 46 U/L / ALT: 34 U/L / AST: 27 U/L / GGT: x             Studies  Chest X-RAY   < from: Xray Chest 1 View- PORTABLE-Urgent (12.23.18 @ 12:19) >  EXAM:  XR CHEST PORTABLE URGENT 1V                            PROCEDURE DATE:  12/23/2018            INTERPRETATION:  A single chest x-ray was obtained on December 24, 2018.    Indication: Fever.    Impression:    The heart is enlarged. Bilateral  pleural effusion. Left lower lobe   pneumonia and/or atelectasis. Status post sternotomy.    < end of copied text >    CT SCAN Chest   < from: CT Chest w/Cont (12.11.12 @ 15:59) >    EXAM:  CT CHEST WITH CONTRAST      PROCEDURE DATE:  Dec 11 2012   .      INTERPRETATION:  CLINICAL INDICATIONS: Aortic aneurysm.    TECHNIQUE: Helical CT was performed utilizing the aneurysm protocol from   the aortic arch to the bifurcation after the uncomplicated administration   of 120 cc intravenous Isovue-370 and discarded 5 cc.     COMPARISON: None    FINDINGS:    Calcifications of the aortic valve is present. It is difficult to measure   the aortic root, but a rough estimate is 3.5 cm atthe sinuses of   Valsalva. Fusiform dilatation of the ascending aorta, which is aneurysmal   measuring upwards of 5 cm in the midportion (image 108, series 3) and   tapering to 3.2 cm just proximal to the takeoff of the brachiocephalic   trunk. The aortic arch measures 2.4 cm between the origins of the left   common carotid artery and left subclavian artery. Proximal descending   thoracic aorta measures 2.6 cm. Mid descending thoracic aorta measures   2.5 cm. Descending thoracic aorta at the level of the diaphragmatic   hiatus measures 2.5 cm. Suprarenal abdominal aorta measures 2.3 cm.   Abdominal aorta proximal to the bifurcation of the common iliac arteries   measures 1.6 cm. There is atherosclerotic disease of the aorta.    The aorta demonstrates no intramural hematoma or dissection. The proximal   celiac trunk, SMA, BUDDY, and renal arteries are patent. An accessory right   renal artery is identified.    There is no significant axillary, mediastinal, hilar, or retroperitoneal   lymphadenopathy.    Lungs demonstrate no suspicious nodule or mass. No lung consolidation.   The trachea and central bronchi are patent. There is no pleural or   pericardial effusion. Cardiac size is within normal limits.    The liver, gallbladder, spleen, pancreas and right adrenal gland are   unremarkable.There is thickening of the left adrenal gland which is   nonspecific. There are bilateral renal cysts and subcentimeter hypodense   renal lesions which are too small to characterize.     No bowel obstruction. There is colonic diverticulosis. The appendix is   normal.     Unremarkable prostate.    Mild age indeterminant loss of vertebral height of L1 vertebral body.    IMPRESSION:  1. Fusiform dilatation of the ascending aorta, which is   aneurysmal measuring upwards of 5 cm in its midportion.   2. Aortic valve calcifications.  3. Mild loss of height of L1 vertebral body, which is age indeterminant.    < end of copied text >    Venous Dopplers: LE:   CT Abdomen  Others  < from: Transthoracic Echocardiogram (11.06.18 @ 18:43) >  Patient name: KEVIN VILLARREAL  YOB: 1940   Age: 77 (M)   MR#: [id number]  Study Date: 11/6/2018  [facility]onographer: LALI Deal  Study quality: Technically Difficult  Referring Physician: Matt Trevizo MD  Blood Pressure: 147/77 mmHg  Height: 165 cm  Weight: 60 kg  BSA: 1.7 m2  Heart Rate: 72 mmHg  ------------------------------------------------------------------------  PROCEDURE: Transthoracic echocardiogram with 2-D, M-Mode  and complete spectral and color flow Doppler.  INDICATION: Endocarditis, valve unspecified (I38)  ------------------------------------------------------------------------  DIMENSIONS:  Dimensions:     Normal Values:  LA:     3.5 cm    2.0 - 4.0 cm  Ao:     3.7 cm    2.0 - 3.8 cm  SEPTUM: 0.8 cm    0.6 - 1.2 cm  PWT:    0.8 cm    0.6 - 1.1 cm  LVIDd:  5.7 cm    3.0 - 5.6 cm  LVIDs:  4.6 cm    1.8 - 4.0 cm  Derived Variables:  LVMI: 103 g/m2  RWT: 0.28  Fractional short: 19 %  Ejection Fraction (Visual Estimate): 45-50 %  Ejection Fraction (Rolle): 48 %  ------------------------------------------------------------------------  OBSERVATIONS:  Mitral Valve: Normal mitral valve. Minimal mitral  regurgitation.  Aortic Root: Aortic Root: 3.7 cm.  Aortic Valve: Bioprosthetic aortic valve replacement. Peak  transaortic valve gradient equals 28 mm Hg, mean  transaortic valve gradient equals 14 mm Hg, which is  probably normal in the presence of a prosthetic valve.  Minimal aortic regurgitation.  Peak left ventricular  outflow tract gradient equals 1 mm Hg.  Left Atrium: Normal left atrium  Left Ventricle: Mild global left ventricular systolic  dysfunction. Eccentric left ventricular hypertrophy  (dilated left ventricle with normal relative wall  thickness). Normal left ventricular diastolic function.  Right Heart: Right atrium not well visualized. The right  ventricle is not well visualized; grossly normal right  ventricular systolic function. Tricuspid valve not well  visualized. Pulmonic valve not well visualized.  Pericardium/PleuraNormal pericardium with no pericardial  effusion.  ------------------------------------------------------------------------  CONCLUSIONS:  1. Bioprosthetic aortic valve replacement. Peak transaortic  valve gradient equals 28 mm Hg, mean transaortic valve  gradient equals 14 mm Hg, which is probably normal in the  presence of a prosthetic valve. Minimal aortic  regurgitation.  2. Eccentric left ventricular hypertrophy (dilated left  ventricle with normal relative wall thickness).  3. Mild global left ventricular systolic dysfunction.  4. The right ventricle is not well visualized; grossly  normal right ventricular systolic function.  5. No obvious vegetation or mobile echodensities are seen  on the mitral valve. The bioprosthetic aortic valve,  tricuspid, and pulmonic valves are not well visualized. Can  not exclude endocarditis. Consider DICKSON if clinically  indicated.  *** Compared with echocardiogram of 1/28/2013, a  bioprosthetic aortic valve is now present. Left ventricular  function has decreased.    < end of copied text >

## 2018-12-29 NOTE — PROVIDER CONTACT NOTE (OTHER) - ASSESSMENT
Patient A&Ox1 asymptomatic.  No signs of pain, SOB and  free from arrhthymias. Sinus tachy 100's on tele monitor

## 2018-12-29 NOTE — PROGRESS NOTE ADULT - SUBJECTIVE AND OBJECTIVE BOX
Infectious Diseases progress note:    Subjective: Pt with recurrent low grade fevers since yesterday.  More lethargic.  Pt's wife and son at bedside.      ROS:  CONSTITUTIONAL:  No fever, chills, rigors  CARDIOVASCULAR:  No chest pain or palpitations  RESPIRATORY:   No SOB, cough, dyspnea on exertion.  No wheezing  GASTROINTESTINAL:  No abd pain, N/V, diarrhea/constipation  EXTREMITIES:  No swelling or joint pain  GENITOURINARY:  No burning on urination, increased frequency or urgency.  No flank pain  NEUROLOGIC:  No HA, visual disturbances  SKIN: No rashes    Allergies    No Known Allergies    Intolerances        ANTIBIOTICS/RELEVANT:  antimicrobials    immunologic:    OTHER:  acetaminophen   Tablet .. 650 milliGRAM(s) Oral every 6 hours PRN  aspirin enteric coated 81 milliGRAM(s) Oral daily  dextrose 40% Gel 15 Gram(s) Oral once PRN  dextrose 5%. 1000 milliLiter(s) IV Continuous <Continuous>  dextrose 50% Injectable 12.5 Gram(s) IV Push once  dextrose 50% Injectable 25 Gram(s) IV Push once  docusate sodium Liquid 100 milliGRAM(s) Oral two times a day  furosemide    Tablet 40 milliGRAM(s) Oral daily  insulin lispro (HumaLOG) corrective regimen sliding scale   SubCutaneous three times a day before meals  insulin lispro (HumaLOG) corrective regimen sliding scale   SubCutaneous at bedtime  metoprolol succinate ER 25 milliGRAM(s) Oral daily  pantoprazole    Tablet 40 milliGRAM(s) Oral before breakfast  simvastatin 20 milliGRAM(s) Oral at bedtime  tamsulosin 0.4 milliGRAM(s) Oral at bedtime      Objective:  Vital Signs Last 24 Hrs  T(C): 36.7 (29 Dec 2018 12:50), Max: 38.2 (28 Dec 2018 20:59)  T(F): 98 (29 Dec 2018 12:50), Max: 100.8 (28 Dec 2018 20:59)  HR: 92 (29 Dec 2018 12:50) (92 - 101)  BP: 110/73 (29 Dec 2018 12:50) (106/70 - 112/74)  BP(mean): --  RR: 17 (29 Dec 2018 12:50) (17 - 19)  SpO2: 96% (29 Dec 2018 12:50) (92% - 96%)    PHYSICAL EXAM:  Constitutional: lethargic, arousable, nonverbal  Eyes:BENJY, EOMI  Ear/Nose/Throat: no thrush, mucositis.  Moist mucous membranes	  Neck:no JVD, no lymphadenopathy, supple  Respiratory: CTA phi  Cardiovascular: S1S2 RRR, no murmurs  Gastrointestinal:soft, nontender,  nondistended (+) BS  Extremities: mild ue edema/eccymosis  Skin:  no rashes, open wounds or ulcerations        LABS:                        12.5   5.6   )-----------( 152      ( 29 Dec 2018 05:28 )             37.9         148<H>  |  105  |  34<H>  ----------------------------<  243<H>  3.8   |  29  |  1.14    Ca    9.5      29 Dec 2018 05:28    TPro  6.1  /  Alb  3.4  /  TBili  0.4  /  DBili  x   /  AST  27  /  ALT  34  /  AlkPhos  46        Urinalysis Basic - ( 29 Dec 2018 09:26 )    Color: Light Yellow / Appearance: Clear / S.015 / pH: x  Gluc: x / Ketone: Negative  / Bili: Negative / Urobili: Negative   Blood: x / Protein: 30 mg/dL / Nitrite: Negative   Leuk Esterase: Negative / RBC: 1 /hpf / WBC 3 /hpf   Sq Epi: x / Non Sq Epi: 2 /hpf / Bacteria: Negative                  Rapid RVP Result: NotDetec  Rapid RVP Result: NotDetec          MICROBIOLOGY:          RADIOLOGY & ADDITIONAL STUDIES:    < from: Xray Chest 1 View- PORTABLE-Urgent (18 @ 12:19) >  Impression:    The heart is enlarged. Bilateral  pleural effusion. Left lower lobe   pneumonia and/or atelectasis. Status post sternotomy.    < end of copied text >

## 2018-12-29 NOTE — CHART NOTE - NSCHARTNOTEFT_GEN_A_CORE
Notified by RN for fever of 100.6. Pt seen and examined at bedside. As per Rn; pt is minimally verbal; unable to assess ROS.  Pt has been having low grade temperature for the past 24 hours     Vital Signs Last 24 Hrs  T(C): 38.1 (29 Dec 2018 03:04), Max: 38.2 (28 Dec 2018 20:59)  T(F): 100.6 (29 Dec 2018 03:04), Max: 100.8 (28 Dec 2018 20:59)  HR: 101 (28 Dec 2018 20:59) (97 - 102)  BP: 112/74 (28 Dec 2018 20:59) (106/74 - 112/74)  BP(mean): --  RR: 19 (28 Dec 2018 20:59) (18 - 19)  SpO2: 92% (28 Dec 2018 20:59) (92% - 97%)    PHYSICAL EXAM:   General: NAD, non-toxic appearance, opens eyes to name   Neuro: NC, AT, PERRLA, no focal deficits  CV: S1 S2 RRR  Resp: B/L Lungs CTA, nonlabored  Abd: soft, NT, ND, + BS X4 quadrants  Ext:  +PP b/l LE, warm to touch                           13.5   5.7   )-----------( 157      ( 28 Dec 2018 11:21 )             40.5     12-28    146<H>  |  101  |  32<H>  ----------------------------<  227<H>  4.1   |  29  |  1.05    Ca    9.9      28 Dec 2018 06:00  Mg     2.0     12-27      .Blood Blood-Peripheral  12-18-18   No growth at 5 days.  --  --      .Urine Clean Catch (Midstream)  12-16-18   No growth  --  --      .Blood Blood-Peripheral  12-16-18   No growth at 5 days.  --  Blood Culture PCR    Xray Chest 1 View- PORTABLE-Urgent (12.23.18 @ 12:19) >  The heart is enlarged. Bilateral  pleural effusion. Left lower lobe   pneumonia and/or atelectasis. Status post sternotomy.    Assessment & Plan   78M w/ hx of DM2, CAD s/p CABG 2013, CHF EF 25%, CVA w/ residual L sided weakness, BPH p/w rigors. Pt has been in rehab since his prior discharge receiving IV antibiotics by PICC vs midline. He was treated for MRSA/pseudomonas sepsis and UTI. Line was taken out yesterday at rehab and pt sent home. Pt's family and wife state he has been having decreased PO for the past several days and had vomiting as well. At home today, pt seemed slightly more lethargic than usual and then developed shaking/rigors and seemed to be in mild distress to his wife. She denies any recent dysuria, cough, diarrhea, abdominal pain, urine discoloration, chest pain.     In ER: Given Zosyn, vancomycin, cipro, NS 2L, (16 Dec 2018 23:06)    Pt acutely presenting with fever of 100.6    - Monitor off Abx; ID following   - Continue antipyretic   - Cooling measures  - Blood cx x2, UA and urine cx  - CXR   - RVP  - Will continue to monitor  F/U with primary team in am    Minna ANGEL   #[id number]

## 2018-12-30 LAB
ANION GAP SERPL CALC-SCNC: 14 MMOL/L — SIGNIFICANT CHANGE UP (ref 5–17)
BUN SERPL-MCNC: 39 MG/DL — HIGH (ref 7–23)
CALCIUM SERPL-MCNC: 9.6 MG/DL — SIGNIFICANT CHANGE UP (ref 8.4–10.5)
CHLORIDE SERPL-SCNC: 103 MMOL/L — SIGNIFICANT CHANGE UP (ref 96–108)
CO2 SERPL-SCNC: 31 MMOL/L — SIGNIFICANT CHANGE UP (ref 22–31)
CREAT SERPL-MCNC: 1.1 MG/DL — SIGNIFICANT CHANGE UP (ref 0.5–1.3)
GLUCOSE BLDC GLUCOMTR-MCNC: 181 MG/DL — HIGH (ref 70–99)
GLUCOSE BLDC GLUCOMTR-MCNC: 182 MG/DL — HIGH (ref 70–99)
GLUCOSE BLDC GLUCOMTR-MCNC: 215 MG/DL — HIGH (ref 70–99)
GLUCOSE BLDC GLUCOMTR-MCNC: 252 MG/DL — HIGH (ref 70–99)
GLUCOSE SERPL-MCNC: 237 MG/DL — HIGH (ref 70–99)
HCT VFR BLD CALC: 40.4 % — SIGNIFICANT CHANGE UP (ref 39–50)
HGB BLD-MCNC: 13.2 G/DL — SIGNIFICANT CHANGE UP (ref 13–17)
MCHC RBC-ENTMCNC: 32.3 PG — SIGNIFICANT CHANGE UP (ref 27–34)
MCHC RBC-ENTMCNC: 32.7 GM/DL — SIGNIFICANT CHANGE UP (ref 32–36)
MCV RBC AUTO: 99 FL — SIGNIFICANT CHANGE UP (ref 80–100)
PLATELET # BLD AUTO: 130 K/UL — LOW (ref 150–400)
POTASSIUM SERPL-MCNC: 3.9 MMOL/L — SIGNIFICANT CHANGE UP (ref 3.5–5.3)
POTASSIUM SERPL-SCNC: 3.9 MMOL/L — SIGNIFICANT CHANGE UP (ref 3.5–5.3)
RBC # BLD: 4.08 M/UL — LOW (ref 4.2–5.8)
RBC # FLD: 12.4 % — SIGNIFICANT CHANGE UP (ref 10.3–14.5)
SODIUM SERPL-SCNC: 148 MMOL/L — HIGH (ref 135–145)
WBC # BLD: 5.7 K/UL — SIGNIFICANT CHANGE UP (ref 3.8–10.5)
WBC # FLD AUTO: 5.7 K/UL — SIGNIFICANT CHANGE UP (ref 3.8–10.5)

## 2018-12-30 PROCEDURE — 70450 CT HEAD/BRAIN W/O DYE: CPT | Mod: 26

## 2018-12-30 PROCEDURE — 71250 CT THORAX DX C-: CPT | Mod: 26

## 2018-12-30 RX ADMIN — Medication 6: at 13:37

## 2018-12-30 RX ADMIN — Medication 25 MILLIGRAM(S): at 05:15

## 2018-12-30 RX ADMIN — Medication 2: at 18:28

## 2018-12-30 RX ADMIN — Medication 81 MILLIGRAM(S): at 09:46

## 2018-12-30 RX ADMIN — Medication 4: at 09:46

## 2018-12-30 RX ADMIN — Medication 100 MILLIGRAM(S): at 18:27

## 2018-12-30 RX ADMIN — PANTOPRAZOLE SODIUM 40 MILLIGRAM(S): 20 TABLET, DELAYED RELEASE ORAL at 05:16

## 2018-12-30 RX ADMIN — Medication 100 MILLIGRAM(S): at 05:16

## 2018-12-30 RX ADMIN — SIMVASTATIN 20 MILLIGRAM(S): 20 TABLET, FILM COATED ORAL at 23:46

## 2018-12-30 RX ADMIN — TAMSULOSIN HYDROCHLORIDE 0.4 MILLIGRAM(S): 0.4 CAPSULE ORAL at 23:46

## 2018-12-30 RX ADMIN — Medication 40 MILLIGRAM(S): at 05:15

## 2018-12-30 NOTE — PROGRESS NOTE ADULT - SUBJECTIVE AND OBJECTIVE BOX
Patient is a 78y old  Male who presents with a chief complaint of Rigors/shaking (29 Dec 2018 15:49)    Any change in ROS: no overnight event. remains afebrile without antibiotics. respirations even and unlabored. on room air    MEDICATIONS  (STANDING):  aspirin enteric coated 81 milliGRAM(s) Oral daily  dextrose 5%. 1000 milliLiter(s) (50 mL/Hr) IV Continuous <Continuous>  dextrose 50% Injectable 12.5 Gram(s) IV Push once  dextrose 50% Injectable 25 Gram(s) IV Push once  docusate sodium Liquid 100 milliGRAM(s) Oral two times a day  furosemide    Tablet 40 milliGRAM(s) Oral daily  insulin lispro (HumaLOG) corrective regimen sliding scale   SubCutaneous three times a day before meals  insulin lispro (HumaLOG) corrective regimen sliding scale   SubCutaneous at bedtime  metoprolol succinate ER 25 milliGRAM(s) Oral daily  pantoprazole    Tablet 40 milliGRAM(s) Oral before breakfast  simvastatin 20 milliGRAM(s) Oral at bedtime  tamsulosin 0.4 milliGRAM(s) Oral at bedtime    MEDICATIONS  (PRN):  acetaminophen   Tablet .. 650 milliGRAM(s) Oral every 6 hours PRN Temp greater or equal to 38C (100.4F), Mild Pain (1 - 3)  dextrose 40% Gel 15 Gram(s) Oral once PRN Blood Glucose LESS THAN 70 milliGRAM(s)/deciliter    Vital Signs Last 24 Hrs  T(C): 36.4 (30 Dec 2018 04:44), Max: 36.7 (29 Dec 2018 12:50)  T(F): 97.5 (30 Dec 2018 04:44), Max: 98 (29 Dec 2018 12:50)  HR: 91 (30 Dec 2018 04:44) (91 - 96)  BP: 120/85 (30 Dec 2018 04:44) (104/67 - 120/85)  BP(mean): --  RR: 18 (30 Dec 2018 04:44) (17 - 18)  SpO2: 95% (30 Dec 2018 04:44) (95% - 96%)    I&O's Summary    29 Dec 2018 07:01  -  30 Dec 2018 07:00  --------------------------------------------------------  IN: 720 mL / OUT: 550 mL / NET: 170 mL          Physical Exam:   GENERAL: The patient comfortable with no apparent distress.   HEENT: Head is normocephalic and atraumatic.    NECK: Supple with no elevated JVP.  LUNGS: Clear to auscultation without wheeze and rhonchi.  HEART: S1 and S2 present without murmur.  ABDOMEN: Soft, nontender, and nondistended. No hepatosplenomegaly is noted.  EXTREMITIES: No edema or calf tenderness.  NEUROLOGIC: doesn't follow command, or answers    Labs:  34                            13.2   5.7   )-----------( 130      ( 30 Dec 2018 05:45 )             40.4                         12.5   5.6   )-----------( 152      ( 29 Dec 2018 05:28 )             37.9                         13.5   5.7   )-----------( 157      ( 28 Dec 2018 11:21 )             40.5                         13.3   5.9   )-----------( 153      ( 27 Dec 2018 06:49 )             39.5         148<H>  |  103  |  39<H>  ----------------------------<  237<H>  3.9   |  31  |  1.10      148<H>  |  105  |  34<H>  ----------------------------<  243<H>  3.8   |  29  |  1.14      146<H>  |  101  |  32<H>  ----------------------------<  227<H>  4.1   |  29  |  1.05      144  |  101  |  32<H>  ----------------------------<  250<H>  4.4   |  32<H>  |  1.05    Ca    9.6      30 Dec 2018 05:45  Ca    9.5      29 Dec 2018 05:28    TPro  6.1  /  Alb  3.4  /  TBili  0.4  /  DBili  x   /  AST  27  /  ALT  34  /  AlkPhos  46      CAPILLARY BLOOD GLUCOSE      POCT Blood Glucose.: 215 mg/dL (30 Dec 2018 08:49)  POCT Blood Glucose.: 203 mg/dL (29 Dec 2018 22:00)  POCT Blood Glucose.: 200 mg/dL (29 Dec 2018 18:02)  POCT Blood Glucose.: 282 mg/dL (29 Dec 2018 13:07)      LIVER FUNCTIONS - ( 29 Dec 2018 05:28 )  Alb: 3.4 g/dL / Pro: 6.1 g/dL / ALK PHOS: 46 U/L / ALT: 34 U/L / AST: 27 U/L / GGT: x             Urinalysis Basic - ( 29 Dec 2018 09:26 )    Color: Light Yellow / Appearance: Clear / S.015 / pH: x  Gluc: x / Ketone: Negative  / Bili: Negative / Urobili: Negative   Blood: x / Protein: 30 mg/dL / Nitrite: Negative   Leuk Esterase: Negative / RBC: 1 /hpf / WBC 3 /hpf   Sq Epi: x / Non Sq Epi: 2 /hpf / Bacteria: Negative          Studies  Chest X-RAY   < from: Xray Chest 1 View- PORTABLE-Urgent (18 @ 06:55) >  EXAM:  XR CHEST PORTABLE URGENT 1V                            PROCEDURE DATE:  2018            INTERPRETATION:    DATE OF STUDY: 18.    PRIOR: 18.    CLINICAL INDICATION: 78-yo-male - low grade fevers.    TECHNIQUE: portable chest.    FINDINGS:   S/P sternotomy.  Stable cardiomegaly  Small bilateral pleural effusions, left more than right.  No right-sided focal infiltrate.  Interval improvement in left basal opacity.  No pneumothorax.  No acute bony abnormalities.  ,  IMPRESSION:   Small bilateral pleural effusions  Interval improvement in left basilar atelectasis and/or pneumonia.    < end of copied text >    CT SCAN Chest   < from: CT Angio Chest w/ IV Cont (18 @ 22:22) >    EXAM:  CT ANGIO CHEST (W)AW IC                            PROCEDURE DATE:  2018            INTERPRETATION:  CLINICAL INFORMATION: Shortness of breath and   tachycardia.    COMPARISON: CT abdomen pelvis dated 2012.    PROCEDURE:   CT Angiography of the Chest.  90 ml of Omnipaque 350 was injected intravenously. 10 ml were discarded.  Sagittal and coronal reformats were performed as well as 3D (MIP)   reconstructions.    FINDINGS:    LUNGS AND LARGE AIRWAYS: Limited evaluation of the lung parenchyma   secondary to respiratory motion artifact. Interlobular septal thickening   and bilateral groundglass opacities, most prominently in the right upper   lobe are suggestive of pulmonary edema. Bibasilar subsegmental   atelectasis.  PLEURA: Moderate bilateral pleural effusions.  VESSELS: No pulmonary embolism. Evaluation of the subsegmental pulmonary   arteries is limited secondary to respiratory motion artifact. Coronary   atherosclerosis.  HEART: Multichamber cardiac enlargement. Nopericardial effusion. Aortic   valve replacement and aortic root repair.  MEDIASTINUM AND СЕРГЕЙ: No lymphadenopathy.  CHEST WALL AND LOWER NECK: Within normal limits. Bilateral gynecomastia.  VISUALIZED UPPER ABDOMEN: Within normal limits.  BONES: Median sternotomy. Generalized osteopenia. Degenerative changes of   the right acromioclavicular joint. Moderate superior endplate compression   deformity of T12. No bony retropulsion or severe bony impingement of the   spinal canal.    IMPRESSION:     No pulmonary embolism.    Pulmonary edema with moderate bilateral pleural effusions.        < end of copied text >    Venous Dopplers: LE: n/a   CT Abdomen    Others  < from: Transthoracic Echocardiogram (18 @ 18:43) >  Patient name: KEVIN VILLARREAL  YOB: 1940   Age: 77 (M)   MR#: [id number]  Study Date: 2018  [facility]onographer: LALI Deal  Study quality: Technically Difficult  < from: CT Abdomen and Pelvis w/ Oral Cont and w/ IV Cont (17 @ 14:03) >  EXAM:  CT ABDOMEN AND PELVIS OC IC                            PROCEDURE DATE:  2017            INTERPRETATION:  CLINICAL INFORMATION: Nausea and vomiting. Decreased   p.o. intake for 4 days.    COMPARISON: CT abdomen and pelvis performed 2012.    PROCEDURE:   CT of the Abdomen and Pelvis was performed with intravenous contrast.   Intravenous contrast: 90 ml Omnipaque 350. 10 ml discarded.  Oral contrast: positive contrast was administered.  Sagittal and coronal reformats wereperformed.    FINDINGS:    LOWER CHEST: Status post aortic valve repair.    LIVER: Within normal limits.  BILE DUCTS: Normal caliber.  GALLBLADDER: Within normal limits.  SPLEEN: Within normal limits.  PANCREAS: Within normal limits.  ADRENALS: Nodular thickening of the left adrenal gland, unchanged since   2012.  KIDNEYS/URETERS: Bilateral renal cysts and additional subcentimeter   hypodensities which are too small to characterize. There is a 1.3 cm   hypodense lesion within the upper pole the left kidney on series 2 image   34 which measures higher than fluid attenuation and is indeterminate.    BLADDER: Within normal limits.  REPRODUCTIVE ORGANS: The prostate is within normal limits.    BOWEL: There is a 2.4 cm linear radiopaque density which appears   fenestrated. Evaluation is limited by streak artifact but this radiopaque   density is likely within the posterior medial aspect of the duodenum   (series 2 image 34). There is moderate amount of inspissated fecal   material in the rectum without significant wall thickening. Colonic   diverticulosis. No bowel obstruction. The appendix is normal.  PERITONEUM: Mild presacral edema. Small fat-containing left inguinal   hernia.  VESSELS:  Atherosclerotic calcifications.  RETROPERITONEUM:No lymphadenopathy.    ABDOMINAL WALL: Within normal limits.  BONES: Status post median sternotomy. Degenerative changes of the spine.   Mild compression fracture deformity involving the superior endplate of   T12 which is age indeterminant but new since 2012. There is no   associated osseous retropulsion.    IMPRESSION:    No bowel obstruction.    2.4 cm linear radiopaque density likely within the duodenum. Correlate   with the patient's recent ingestion history.    Inspissated fecal material within a distended rectal vault.     < end of copied text >  Referring Physician: Matt Trevizo MD  Blood Pressure: 147/77 mmHg  Height: 165 cm  Weight: 60 kg  BSA: 1.7 m2  Heart Rate: 72 mmHg  ------------------------------------------------------------------------  PROCEDURE: Transthoracic echocardiogram with 2-D, M-Mode  and complete spectral and color flow Doppler.  INDICATION: Endocarditis, valve unspecified (I38)  ------------------------------------------------------------------------  DIMENSIONS:  Dimensions:     Normal Values:  LA:     3.5 cm    2.0 - 4.0 cm  Ao:     3.7 cm    2.0 - 3.8 cm  SEPTUM: 0.8 cm    0.6 - 1.2 cm  PWT:    0.8 cm    0.6 - 1.1 cm  LVIDd:  5.7 cm    3.0 - 5.6 cm  LVIDs:  4.6 cm    1.8 - 4.0 cm  Derived Variables:  LVMI: 103 g/m2  RWT: 0.28  Fractional short: 19 %  Ejection Fraction (Visual Estimate): 45-50 %  Ejection Fraction (Rolle): 48 %  ------------------------------------------------------------------------  OBSERVATIONS:  Mitral Valve: Normal mitral valve. Minimal mitral  regurgitation.  Aortic Root: Aortic Root: 3.7 cm.  Aortic Valve: Bioprosthetic aortic valve replacement. Peak  transaortic valve gradient equals 28 mm Hg, mean  transaortic valve gradient equals 14 mm Hg, which is  probably normal in the presence of a prosthetic valve.  Minimal aortic regurgitation.  Peak left ventricular  outflow tract gradient equals 1 mm Hg.  Left Atrium: Normal left atrium  Left Ventricle: Mild global left ventricular systolic  dysfunction. Eccentric left ventricular hypertrophy  (dilated left ventricle with normal relative wall  thickness). Normal left ventricular diastolic function.  Right Heart: Right atrium not well visualized. The right  ventricle is not well visualized; grossly normal right  ventricular systolic function. Tricuspid valve not well  visualized. Pulmonic valve not well visualized.  Pericardium/PleuraNormal pericardium with no pericardial  effusion.  ------------------------------------------------------------------------  CONCLUSIONS:  1. Bioprosthetic aortic valve replacement. Peak transaortic  valve gradient equals 28 mm Hg, mean transaortic valve  gradient equals 14 mm Hg, which is probably normal in the  presence of a prosthetic valve. Minimal aortic  regurgitation.  2. Eccentric left ventricular hypertrophy (dilated left  ventricle with normal relative wall thickness).  3. Mild global left ventricular systolic dysfunction.  4. The right ventricle is not well visualized; grossly  normal right ventricular systolic function.  5. No obvious vegetation or mobile echodensities are seen  on the mitral valve. The bioprosthetic aortic valve,  tricuspid, and pulmonic valves are not well visualized. Can  not exclude endocarditis. Consider DICKSON if clinically  indicated.  *** Compared with echocardiogram of 2013, a  bioprosthetic aortic valve is now present. Left ventricular  function has decreased.  -------------------------------    < end of copied text >

## 2018-12-30 NOTE — PROGRESS NOTE ADULT - SUBJECTIVE AND OBJECTIVE BOX
Infectious Diseases progress note:    Subjective:  No new fevers.  Bcx ngtd.  Pt still with lethargy and decreased responsiveness.  Wife/son present at bedside.       ROS:  Nonverbal    Allergies    No Known Allergies    Intolerances        ANTIBIOTICS/RELEVANT:  antimicrobials    immunologic:    OTHER:  acetaminophen   Tablet .. 650 milliGRAM(s) Oral every 6 hours PRN  aspirin enteric coated 81 milliGRAM(s) Oral daily  dextrose 40% Gel 15 Gram(s) Oral once PRN  dextrose 5%. 1000 milliLiter(s) IV Continuous <Continuous>  dextrose 50% Injectable 12.5 Gram(s) IV Push once  dextrose 50% Injectable 25 Gram(s) IV Push once  docusate sodium Liquid 100 milliGRAM(s) Oral two times a day  furosemide    Tablet 40 milliGRAM(s) Oral daily  insulin lispro (HumaLOG) corrective regimen sliding scale   SubCutaneous three times a day before meals  insulin lispro (HumaLOG) corrective regimen sliding scale   SubCutaneous at bedtime  metoprolol succinate ER 25 milliGRAM(s) Oral daily  pantoprazole    Tablet 40 milliGRAM(s) Oral before breakfast  simvastatin 20 milliGRAM(s) Oral at bedtime  tamsulosin 0.4 milliGRAM(s) Oral at bedtime      Objective:  Vital Signs Last 24 Hrs  T(C): 36.7 (30 Dec 2018 12:21), Max: 36.7 (30 Dec 2018 12:21)  T(F): 98 (30 Dec 2018 12:21), Max: 98 (30 Dec 2018 12:21)  HR: 91 (30 Dec 2018 12:21) (91 - 96)  BP: 100/66 (30 Dec 2018 12:21) (100/66 - 120/85)  BP(mean): --  RR: 17 (30 Dec 2018 12:21) (17 - 18)  SpO2: 96% (30 Dec 2018 12:21) (95% - 96%)    PHYSICAL EXAM:  Constitutional: lethargic  Eyes:BENJY, EOMI  Ear/Nose/Throat: no thrush, mucositis.  Moist mucous membranes	  Neck:no JVD, no lymphadenopathy, supple  Respiratory: decr BS at bases  Cardiovascular: S1S2 RRR, no murmurs  Gastrointestinal:soft, nontender,  nondistended (+) BS  Extremities:no e/e/c  Skin:  no rashes, open wounds or ulcerations        LABS:                        13.2   5.7   )-----------( 130      ( 30 Dec 2018 05:45 )             40.4     12    148<H>  |  103  |  39<H>  ----------------------------<  237<H>  3.9   |  31  |  1.10    Ca    9.6      30 Dec 2018 05:45    TPro  6.1  /  Alb  3.4  /  TBili  0.4  /  DBili  x   /  AST  27  /  ALT  34  /  AlkPhos  46        Urinalysis Basic - ( 29 Dec 2018 09:26 )    Color: Light Yellow / Appearance: Clear / S.015 / pH: x  Gluc: x / Ketone: Negative  / Bili: Negative / Urobili: Negative   Blood: x / Protein: 30 mg/dL / Nitrite: Negative   Leuk Esterase: Negative / RBC: 1 /hpf / WBC 3 /hpf   Sq Epi: x / Non Sq Epi: 2 /hpf / Bacteria: Negative                  Rapid RVP Result: "[email or web address]  Rapid RVP Result: "[email or web address]          MICROBIOLOGY:    Culture - Urine (18 @ 10:23)    Specimen Source: .Urine Clean Catch (Midstream)    Culture Results:   10,000 - 49,000 CFU/mL Pseudomonas aeruginosa    Culture - Blood (18 @ 08:22)    Specimen Source: .Blood Blood-Venous    Culture Results:   No growth to date.    Culture - Blood (18 @ 08:22)    Specimen Source: .Blood Blood-Peripheral    Culture Results:   No growth to date.          RADIOLOGY & ADDITIONAL STUDIES:    < from: CT Chest No Cont (18 @ 11:59) >  CHEST:    Thyroid: Diffuse heterogeneity to the thyroid gland. Consider ultrasound   evaluation.  Heart: Cardiomegaly. Sternotomy. Aortic valve repair. Coronary artery,   valvular, and aortic calcifications.    Lymph nodes: No significant adenopathy.    Lungs and Pleura: Small to moderate bilateral pleural effusions, left   greater than right, with minimal associated atelectasis less prominent   than on the previous exam of 2018. No suspicious consolidations,   nodules or masses.  Airways: Patent    Visualized abdominal structures:   Small metallic density object in the region of the duodenum, pancreatic   head is unchanged.  Probable bilateral renal cysts only partially imaged and not well   visualized on this noncontrast examination.  Diffuse thickening of the left adrenal gland, unchanged.  Colonic diverticulosis.    Osseous structures:   Degenerative changes. Height loss of the T12 vertebral body unchanged.    IMPRESSION:    Small to moderate bilateral pleural effusions, left greater than right,   with minimal associated atelectasis. The effusions are less prominent   than on the previous exam of 2018. No evidence for pneumonia.    < end of copied text >      < from: Xray Chest 1 View- PORTABLE-Urgent (. @ 06:55) >  FINDINGS:   S/P sternotomy.  Stable cardiomegaly  Small bilateral pleural effusions, left more than right.  No right-sided focal infiltrate.  Interval improvement in left basal opacity.  No pneumothorax.  No acute bony abnormalities.  ,  IMPRESSION:   Small bilateral pleural effusions  Interval improvement in left basilar atelectasis and/or pneumonia.    < end of copied text >

## 2018-12-31 LAB
-  AMIKACIN: SIGNIFICANT CHANGE UP
-  AZTREONAM: SIGNIFICANT CHANGE UP
-  CEFEPIME: SIGNIFICANT CHANGE UP
-  CEFTAZIDIME: SIGNIFICANT CHANGE UP
-  CIPROFLOXACIN: SIGNIFICANT CHANGE UP
-  GENTAMICIN: SIGNIFICANT CHANGE UP
-  IMIPENEM: SIGNIFICANT CHANGE UP
-  LEVOFLOXACIN: SIGNIFICANT CHANGE UP
-  MEROPENEM: SIGNIFICANT CHANGE UP
-  PIPERACILLIN/TAZOBACTAM: SIGNIFICANT CHANGE UP
-  TOBRAMYCIN: SIGNIFICANT CHANGE UP
ANION GAP SERPL CALC-SCNC: 15 MMOL/L — SIGNIFICANT CHANGE UP (ref 5–17)
BUN SERPL-MCNC: 42 MG/DL — HIGH (ref 7–23)
CALCIUM SERPL-MCNC: 9.9 MG/DL — SIGNIFICANT CHANGE UP (ref 8.4–10.5)
CHLORIDE SERPL-SCNC: 103 MMOL/L — SIGNIFICANT CHANGE UP (ref 96–108)
CO2 SERPL-SCNC: 30 MMOL/L — SIGNIFICANT CHANGE UP (ref 22–31)
CREAT SERPL-MCNC: 1.04 MG/DL — SIGNIFICANT CHANGE UP (ref 0.5–1.3)
CULTURE RESULTS: SIGNIFICANT CHANGE UP
GLUCOSE BLDC GLUCOMTR-MCNC: 257 MG/DL — HIGH (ref 70–99)
GLUCOSE BLDC GLUCOMTR-MCNC: 264 MG/DL — HIGH (ref 70–99)
GLUCOSE BLDC GLUCOMTR-MCNC: 266 MG/DL — HIGH (ref 70–99)
GLUCOSE BLDC GLUCOMTR-MCNC: 340 MG/DL — HIGH (ref 70–99)
GLUCOSE SERPL-MCNC: 224 MG/DL — HIGH (ref 70–99)
HCT VFR BLD CALC: 38.6 % — LOW (ref 39–50)
HGB BLD-MCNC: 13 G/DL — SIGNIFICANT CHANGE UP (ref 13–17)
MCHC RBC-ENTMCNC: 33.5 PG — SIGNIFICANT CHANGE UP (ref 27–34)
MCHC RBC-ENTMCNC: 33.8 GM/DL — SIGNIFICANT CHANGE UP (ref 32–36)
MCV RBC AUTO: 98.9 FL — SIGNIFICANT CHANGE UP (ref 80–100)
METHOD TYPE: SIGNIFICANT CHANGE UP
ORGANISM # SPEC MICROSCOPIC CNT: SIGNIFICANT CHANGE UP
ORGANISM # SPEC MICROSCOPIC CNT: SIGNIFICANT CHANGE UP
PLATELET # BLD AUTO: 136 K/UL — LOW (ref 150–400)
POTASSIUM SERPL-MCNC: 3.8 MMOL/L — SIGNIFICANT CHANGE UP (ref 3.5–5.3)
POTASSIUM SERPL-SCNC: 3.8 MMOL/L — SIGNIFICANT CHANGE UP (ref 3.5–5.3)
RBC # BLD: 3.9 M/UL — LOW (ref 4.2–5.8)
RBC # FLD: 12.5 % — SIGNIFICANT CHANGE UP (ref 10.3–14.5)
SODIUM SERPL-SCNC: 148 MMOL/L — HIGH (ref 135–145)
SPECIMEN SOURCE: SIGNIFICANT CHANGE UP
WBC # BLD: 7 K/UL — SIGNIFICANT CHANGE UP (ref 3.8–10.5)
WBC # FLD AUTO: 7 K/UL — SIGNIFICANT CHANGE UP (ref 3.8–10.5)

## 2018-12-31 PROCEDURE — 74177 CT ABD & PELVIS W/CONTRAST: CPT | Mod: 26

## 2018-12-31 RX ORDER — CIPROFLOXACIN LACTATE 400MG/40ML
500 VIAL (ML) INTRAVENOUS EVERY 12 HOURS
Qty: 0 | Refills: 0 | Status: DISCONTINUED | OUTPATIENT
Start: 2018-12-31 | End: 2019-01-03

## 2018-12-31 RX ORDER — METRONIDAZOLE 500 MG
500 TABLET ORAL EVERY 8 HOURS
Qty: 0 | Refills: 0 | Status: DISCONTINUED | OUTPATIENT
Start: 2018-12-31 | End: 2019-01-03

## 2018-12-31 RX ADMIN — Medication 2: at 22:51

## 2018-12-31 RX ADMIN — Medication 100 MILLIGRAM(S): at 18:45

## 2018-12-31 RX ADMIN — PANTOPRAZOLE SODIUM 40 MILLIGRAM(S): 20 TABLET, DELAYED RELEASE ORAL at 05:10

## 2018-12-31 RX ADMIN — Medication 500 MILLIGRAM(S): at 21:21

## 2018-12-31 RX ADMIN — Medication 6: at 10:21

## 2018-12-31 RX ADMIN — TAMSULOSIN HYDROCHLORIDE 0.4 MILLIGRAM(S): 0.4 CAPSULE ORAL at 21:21

## 2018-12-31 RX ADMIN — Medication 500 MILLIGRAM(S): at 18:45

## 2018-12-31 RX ADMIN — Medication 100 MILLIGRAM(S): at 05:10

## 2018-12-31 RX ADMIN — SIMVASTATIN 20 MILLIGRAM(S): 20 TABLET, FILM COATED ORAL at 21:21

## 2018-12-31 RX ADMIN — Medication 25 MILLIGRAM(S): at 05:10

## 2018-12-31 RX ADMIN — Medication 81 MILLIGRAM(S): at 10:21

## 2018-12-31 RX ADMIN — Medication 6: at 13:19

## 2018-12-31 RX ADMIN — Medication 8: at 18:27

## 2018-12-31 RX ADMIN — Medication 40 MILLIGRAM(S): at 05:10

## 2018-12-31 NOTE — CHART NOTE - NSCHARTNOTEFT_GEN_A_CORE
Reviewed  ct  abdomen  and Pelvis  with DR Teague and  DR Ash  Pt seen and examined  abdomen  benign , added Cipro and Flagyl  as recommend .  Enema  x1    d/c planning in AM if Pt stable , Pt sign out  to night covering shift   Yohan Sim NP-C [id number]

## 2018-12-31 NOTE — PROGRESS NOTE ADULT - SUBJECTIVE AND OBJECTIVE BOX
Patient is a 78y old  Male who presents with a chief complaint of Rigors/shaking (30 Dec 2018 15:55)      Any change in ROS: Pt is much more alert and awake but non verbal does not talk much     MEDICATIONS  (STANDING):  aspirin enteric coated 81 milliGRAM(s) Oral daily  dextrose 5%. 1000 milliLiter(s) (50 mL/Hr) IV Continuous <Continuous>  dextrose 50% Injectable 12.5 Gram(s) IV Push once  dextrose 50% Injectable 25 Gram(s) IV Push once  docusate sodium Liquid 100 milliGRAM(s) Oral two times a day  furosemide    Tablet 40 milliGRAM(s) Oral daily  insulin lispro (HumaLOG) corrective regimen sliding scale   SubCutaneous three times a day before meals  insulin lispro (HumaLOG) corrective regimen sliding scale   SubCutaneous at bedtime  metoprolol succinate ER 25 milliGRAM(s) Oral daily  pantoprazole    Tablet 40 milliGRAM(s) Oral before breakfast  simvastatin 20 milliGRAM(s) Oral at bedtime  tamsulosin 0.4 milliGRAM(s) Oral at bedtime    MEDICATIONS  (PRN):  acetaminophen   Tablet .. 650 milliGRAM(s) Oral every 6 hours PRN Temp greater or equal to 38C (100.4F), Mild Pain (1 - 3)  dextrose 40% Gel 15 Gram(s) Oral once PRN Blood Glucose LESS THAN 70 milliGRAM(s)/deciliter    Vital Signs Last 24 Hrs  T(C): 36.7 (31 Dec 2018 04:53), Max: 36.8 (30 Dec 2018 21:19)  T(F): 98 (31 Dec 2018 04:53), Max: 98.2 (30 Dec 2018 21:19)  HR: 92 (31 Dec 2018 04:53) (82 - 92)  BP: 105/68 (31 Dec 2018 04:53) (100/66 - 105/68)  BP(mean): --  RR: 18 (31 Dec 2018 04:53) (17 - 18)  SpO2: 96% (31 Dec 2018 04:53) (95% - 96%)    I&O's Summary    30 Dec 2018 07:01  -  31 Dec 2018 07:00  --------------------------------------------------------  IN: 240 mL / OUT: 0 mL / NET: 240 mL          Physical Exam:   GENERAL: NAD, well-groomed, well-developed  HEENT: BENJY/   Atraumatic, Normocephalic  ENMT: No tonsillar erythema, exudates, or enlargement; Moist mucous membranes, Good dentition, No lesions  NECK: Supple, No JVD, Normal thyroid  CHEST/LUNG: scattered crackles bilateral lower lobes:   CVS: Regular rate and rhythm; No murmurs, rubs, or gallops  GI: : Soft, Nontender, Nondistended; Bowel sounds present  NERVOUS SYSTEM:  Alert & Awake  EXTREMITIES:  2+ Peripheral Pulses, No clubbing, cyanosis, or edema  LYMPH: No lymphadenopathy noted  SKIN: No rashes or lesions  ENDOCRINOLOGY: No Thyromegaly  PSYCH: Dementia    Labs:  34                            13.0   7.0   )-----------( 136      ( 31 Dec 2018 05:34 )             38.6                         13.2   5.7   )-----------( 130      ( 30 Dec 2018 05:45 )             40.4                         12.5   5.6   )-----------( 152      ( 29 Dec 2018 05:28 )             37.9                         13.5   5.7   )-----------( 157      ( 28 Dec 2018 11:21 )             40.5     12-31    148<H>  |  103  |  42<H>  ----------------------------<  224<H>  3.8   |  30  |  1.04  12-30    148<H>  |  103  |  39<H>  ----------------------------<  237<H>  3.9   |  31  |  1.10  12-29    148<H>  |  105  |  34<H>  ----------------------------<  243<H>  3.8   |  29  |  1.14  12-28    146<H>  |  101  |  32<H>  ----------------------------<  227<H>  4.1   |  29  |  1.05    Ca    9.9      31 Dec 2018 05:34  Ca    9.6      30 Dec 2018 05:45    TPro  6.1  /  Alb  3.4  /  TBili  0.4  /  DBili  x   /  AST  27  /  ALT  34  /  AlkPhos  46  12-29    CAPILLARY BLOOD GLUCOSE      POCT Blood Glucose.: 264 mg/dL (31 Dec 2018 10:19)  POCT Blood Glucose.: 181 mg/dL (30 Dec 2018 21:54)  POCT Blood Glucose.: 182 mg/dL (30 Dec 2018 18:04)  POCT Blood Glucose.: 252 mg/dL (30 Dec 2018 12:57)      < from: CT Head No Cont (12.30.18 @ 22:57) >  Two-dimensional sagittal and coronal reformats were generated.    COMPARISON STUDY: CT head from 11/1/2018. CT head 08/12/2016.    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 There is no gross evidence for new infarcts occurring   over the time interval.    There is age related cerebral atrophy moderate chronic microvascular   ischemic disease.    Visualized paranasal sinuses are well aerated. Mastoid air cells and   middle ear cavities are clear.    Calvarium is intact and soft tissues are unremarkable.    IMPRESSION:     Multiple chronic infarcts are again noted as described. No gross evidence   for new acute infarct or acute hemorrhage.        < from: CT Chest No Cont (12.30.18 @ 11:59) >  Lungs and Pleura: Small to moderate bilateral pleural effusions, left   greater than right, with minimal associated atelectasis less prominent   than on the previous exam of 12/16/2018. No suspicious consolidations,   nodules or masses.  Airways: Patent    Visualized abdominal structures:   Small metallic density object in the region of the duodenum, pancreatic   head is unchanged.  Probable bilateral renal cysts only partially imaged and not well   visualized on this noncontrast examination.  Diffuse thickening of the left adrenal gland, unchanged.  Colonic diverticulosis.    Osseous structures:   Degenerative changes. Height loss of the T12 vertebral body unchanged.    IMPRESSION:    Small to moderate bilateral pleural effusions, left greater than right,   with minimal associated atelectasis. The effusions are less prominent   than on the previous exam of 12/16/2018. No evidence for pneumonia.                    VIJAY GARVIN M.D., ATTENDING RADIOLOGIST  This document has been electronically signed. Dec 30 2018 12:47PM    < end of copied text >          ANTON TREVINO M.D., RADIOLOGY RESIDENT  This document has been electronically signed.  LISA NUÑEZ M.D., ATTENDING RADIOLOGIST  This document has been electronically signed. Dec 31 2018  9:02AM        < end of copied text >              RECENT CULTURES:  12-29 @ 10:23 .Urine Clean Catch (Midstream)                10,000 - 49,000 CFU/mL Pseudomonas aeruginosa    12-29 @ 08:22 .Blood Blood-Peripheral                No growth to date.          RESPIRATORY CULTURES:          Studies  Chest X-RAY  CT SCAN Chest   Venous Dopplers: LE:   CT Abdomen  Others

## 2018-12-31 NOTE — CHART NOTE - NSCHARTNOTEFT_GEN_A_CORE
Nutrition Follow Up Note  Patient seen for: follow up  admitted for Rigors/shaking  78M w/ hx of DM2, CAD s/p CABG , CHF EF 25%, CVA w/ residual L sided weakness, BPH p/w rigors concerning for sepsis possibly from UTI and now with hypoxic respiratory failure suspicious for CHF   is s/p RRRT for respiratory distress not  medically cleared for discharge.      Source: chart since previous assess on     Diet : consistent carbohydrate with snack, dysphagia 1honey consistency, dash    Patient reports: disoriented x 4            Daily Weight in k (12-31), Weight in k.2 (12-30), Weight in k.2 (12-29), Weight in k.4 (12-28), Weight in k.8 (12-27), Weight in k.8 (12-27), Weight in k.8 (12-26)  % Weight Change: 8% loss since     Pertinent Medications: MEDICATIONS  (STANDING):  aspirin enteric coated 81 milliGRAM(s) Oral daily  dextrose 5%. 1000 milliLiter(s) (50 mL/Hr) IV Continuous <Continuous>  dextrose 50% Injectable 12.5 Gram(s) IV Push once  dextrose 50% Injectable 25 Gram(s) IV Push once  docusate sodium Liquid 100 milliGRAM(s) Oral two times a day  furosemide    Tablet 40 milliGRAM(s) Oral daily  insulin lispro (HumaLOG) corrective regimen sliding scale   SubCutaneous three times a day before meals  insulin lispro (HumaLOG) corrective regimen sliding scale   SubCutaneous at bedtime  metoprolol succinate ER 25 milliGRAM(s) Oral daily  pantoprazole    Tablet 40 milliGRAM(s) Oral before breakfast  simvastatin 20 milliGRAM(s) Oral at bedtime  tamsulosin 0.4 milliGRAM(s) Oral at bedtime    MEDICATIONS  (PRN):  acetaminophen   Tablet .. 650 milliGRAM(s) Oral every 6 hours PRN Temp greater or equal to 38C (100.4F), Mild Pain (1 - 3)  dextrose 40% Gel 15 Gram(s) Oral once PRN Blood Glucose LESS THAN 70 milliGRAM(s)/deciliter    Pertinent Labs:  @ 05:34: Na 148<H>, BUN 42<H>, Cr 1.04, <H>, K+ 3.8  Finger Sticks:  POCT Blood Glucose.: 181 mg/dL ( @ 21:54)  POCT Blood Glucose.: 182 mg/dL ( @ 18:04)  POCT Blood Glucose.: 252 mg/dL ( @ 12:57)  POCT Blood Glucose.: 215 mg/dL ( @ 08:49)      Skin per nursing documentation: suspected deep tissue injury  Edema: +1 generalized dependent edema    Estimated Needs:   [x ] no change since previous assessment  [ ] recalculated:     Previous Nutrition Diagnosis:  Inability to manage self-care; Self-feeding difficulty  Nutrition Diagnosis is: ongoing           Interventions: contact provider for recommendations    Recommend  1)multivitamin  2)VitC  3) consistent carbohydrate (no snack) dysphagia 1 honey consistency dash    Monitoring and Evaluation:     Continue to monitor Nutritional intake, Tolerance to diet prescription, weights, labs, skin integrity    RD remains available upon request and will follow up per protocol  Elvi Tobar MA, BROOKE, CDN #[phone] Nutrition Follow Up Note  Patient seen for: follow up  admitted for Rigors/shaking  78M w/ hx of DM2, CAD s/p CABG , CHF EF 25%, CVA w/ residual L sided weakness, BPH p/w rigors concerning for sepsis possibly from UTI and now with hypoxic respiratory failure suspicious for CHF   s/p RRRT for respiratory distress not medically cleared for discharge.  plan for discharge home with home care        Source: chart since previous assess on     Diet : consistent carbohydrate with snack, dysphagia 1honey consistency, dash    Patient reports: disoriented x 4, total care, non verbal, opens mouth to eat            Daily Weight in k (12-31), Weight in k.2 (12-30), Weight in k.2 (12-29), Weight in k.4 (12-28), Weight in k.8 (12-27), Weight in k.8 (12-27), Weight in k.8 (12-26)  % Weight Change: 8% loss since     Pertinent Medications: MEDICATIONS  (STANDING):  aspirin enteric coated 81 milliGRAM(s) Oral daily  dextrose 5%. 1000 milliLiter(s) (50 mL/Hr) IV Continuous <Continuous>  dextrose 50% Injectable 12.5 Gram(s) IV Push once  dextrose 50% Injectable 25 Gram(s) IV Push once  docusate sodium Liquid 100 milliGRAM(s) Oral two times a day  furosemide    Tablet 40 milliGRAM(s) Oral daily  insulin lispro (HumaLOG) corrective regimen sliding scale   SubCutaneous three times a day before meals  insulin lispro (HumaLOG) corrective regimen sliding scale   SubCutaneous at bedtime  metoprolol succinate ER 25 milliGRAM(s) Oral daily  pantoprazole    Tablet 40 milliGRAM(s) Oral before breakfast  simvastatin 20 milliGRAM(s) Oral at bedtime  tamsulosin 0.4 milliGRAM(s) Oral at bedtime    MEDICATIONS  (PRN):  acetaminophen   Tablet .. 650 milliGRAM(s) Oral every 6 hours PRN Temp greater or equal to 38C (100.4F), Mild Pain (1 - 3)  dextrose 40% Gel 15 Gram(s) Oral once PRN Blood Glucose LESS THAN 70 milliGRAM(s)/deciliter    Pertinent Labs:  @ 05:34: Na 148<H>, BUN 42<H>, Cr 1.04, <H>, K+ 3.8  Finger Sticks:  POCT Blood Glucose.: 181 mg/dL (12-30 @ 21:54)  POCT Blood Glucose.: 182 mg/dL (12-30 @ 18:04)  POCT Blood Glucose.: 252 mg/dL (12-30 @ 12:57)  POCT Blood Glucose.: 215 mg/dL ( @ 08:49)      Skin per nursing documentation: suspected deep tissue injury sacrum  Edema: +1 generalized dependent edema    Estimated Needs:   [x ] no change since previous assessment  [ ] recalculated:     Previous Nutrition Diagnosis:  Inability to manage self-care; Self-feeding difficulty  Nutrition Diagnosis is: ongoing           Interventions: contact provider for recommendations    Recommend  1)multivitamin  2)VitC  3) consistent carbohydrate (no snack) dysphagia 1 honey consistency dash    Monitoring and Evaluation:     Continue to monitor Nutritional intake, Tolerance to diet prescription, weights, labs, skin integrity    RD remains available upon request and will follow up per protocol  Elvi Tobar MA, RD, CDN #[phone]

## 2018-12-31 NOTE — PROGRESS NOTE ADULT - SUBJECTIVE AND OBJECTIVE BOX
Patient is a 78y old  Male who presents with a chief complaint of Rigors/shaking (31 Dec 2018 11:30)      SUBJECTIVE / OVERNIGHT EVENTS:    Events noted.  CT abd: Stercoral colitis    MEDICATIONS  (STANDING):  aspirin enteric coated 81 milliGRAM(s) Oral daily  ciprofloxacin     Tablet 500 milliGRAM(s) Oral every 12 hours  dextrose 5%. 1000 milliLiter(s) (50 mL/Hr) IV Continuous <Continuous>  dextrose 50% Injectable 12.5 Gram(s) IV Push once  dextrose 50% Injectable 25 Gram(s) IV Push once  docusate sodium Liquid 100 milliGRAM(s) Oral two times a day  furosemide    Tablet 40 milliGRAM(s) Oral daily  insulin lispro (HumaLOG) corrective regimen sliding scale   SubCutaneous three times a day before meals  insulin lispro (HumaLOG) corrective regimen sliding scale   SubCutaneous at bedtime  metoprolol succinate ER 25 milliGRAM(s) Oral daily  metroNIDAZOLE    Tablet 500 milliGRAM(s) Oral every 8 hours  pantoprazole    Tablet 40 milliGRAM(s) Oral before breakfast  simvastatin 20 milliGRAM(s) Oral at bedtime  tamsulosin 0.4 milliGRAM(s) Oral at bedtime    MEDICATIONS  (PRN):  acetaminophen   Tablet .. 650 milliGRAM(s) Oral every 6 hours PRN Temp greater or equal to 38C (100.4F), Mild Pain (1 - 3)  dextrose 40% Gel 15 Gram(s) Oral once PRN Blood Glucose LESS THAN 70 milliGRAM(s)/deciliter        CAPILLARY BLOOD GLUCOSE      POCT Blood Glucose.: 340 mg/dL (31 Dec 2018 17:55)  POCT Blood Glucose.: 266 mg/dL (31 Dec 2018 12:54)  POCT Blood Glucose.: 264 mg/dL (31 Dec 2018 10:19)  POCT Blood Glucose.: 181 mg/dL (30 Dec 2018 21:54)    I&O's Summary    30 Dec 2018 07:01  -  31 Dec 2018 07:00  --------------------------------------------------------  IN: 240 mL / OUT: 0 mL / NET: 240 mL    31 Dec 2018 07:01  -  31 Dec 2018 18:19  --------------------------------------------------------  IN: 240 mL / OUT: 0 mL / NET: 240 mL        PHYSICAL EXAM:  GENERAL: NAD  NECK: Supple, No JVD  CHEST/LUNG: Clear to auscultation bilaterally; No wheezing.  HEART: Regular rate and rhythm; No murmurs, rubs, or gallops  ABDOMEN: Soft, Nontender, Nondistended; Bowel sounds present  EXTREMITIES:   No clubbing, cyanosis, or edema  NEUROLOGY: Awake      LABS:                        13.0   7.0   )-----------( 136      ( 31 Dec 2018 05:34 )             38.6     12-31    148<H>  |  103  |  42<H>  ----------------------------<  224<H>  3.8   |  30  |  1.04    Ca    9.9      31 Dec 2018 05:34              CAPILLARY BLOOD GLUCOSE      POCT Blood Glucose.: 340 mg/dL (31 Dec 2018 17:55)  POCT Blood Glucose.: 266 mg/dL (31 Dec 2018 12:54)  POCT Blood Glucose.: 264 mg/dL (31 Dec 2018 10:19)  POCT Blood Glucose.: 181 mg/dL (30 Dec 2018 21:54)    12-29 @ 10:23  Culture-urine --  Culture results   10,000 - 49,000 CFU/mL Pseudomonas aeruginosa  method type ERNESTINE  Organism Pseudomonas aeruginosa  Organism Identification Pseudomonas aeruginosa  Specimen source .Urine Clean Catch (Midstream)  12-29 @ 08:22  Culture-urine --  Culture results   No growth to date.  method type --  Organism --  Organism Identification --  Specimen source .Blood Blood-Peripheral           12-29 @ 10:23  Culture blood --  Culture results   10,000 - 49,000 CFU/mL Pseudomonas aeruginosa  Gram stain --  Gram stain blood --  Method type ERNESTINE  Organism Pseudomonas aeruginosa  Organism identification Pseudomonas aeruginosa  Specimen source .Urine Clean Catch (Midstream)   12-29 @ 08:22  Culture blood --  Culture results   No growth to date.  Gram stain --  Gram stain blood --  Method type --  Organism --  Organism identification --  Specimen source .Blood Blood-Peripheral      RADIOLOGY & ADDITIONAL TESTS:    Imaging Personally Reviewed:    Consultant(s) Notes Reviewed:      Care Discussed with Consultants/Other Providers:

## 2019-01-01 LAB
ANION GAP SERPL CALC-SCNC: 13 MMOL/L — SIGNIFICANT CHANGE UP (ref 5–17)
BUN SERPL-MCNC: 37 MG/DL — HIGH (ref 7–23)
CALCIUM SERPL-MCNC: 9.4 MG/DL — SIGNIFICANT CHANGE UP (ref 8.4–10.5)
CHLORIDE SERPL-SCNC: 104 MMOL/L — SIGNIFICANT CHANGE UP (ref 96–108)
CO2 SERPL-SCNC: 30 MMOL/L — SIGNIFICANT CHANGE UP (ref 22–31)
CREAT SERPL-MCNC: 1.05 MG/DL — SIGNIFICANT CHANGE UP (ref 0.5–1.3)
GLUCOSE BLDC GLUCOMTR-MCNC: 184 MG/DL — HIGH (ref 70–99)
GLUCOSE BLDC GLUCOMTR-MCNC: 188 MG/DL — HIGH (ref 70–99)
GLUCOSE BLDC GLUCOMTR-MCNC: 208 MG/DL — HIGH (ref 70–99)
GLUCOSE BLDC GLUCOMTR-MCNC: 221 MG/DL — HIGH (ref 70–99)
GLUCOSE BLDC GLUCOMTR-MCNC: 258 MG/DL — HIGH (ref 70–99)
GLUCOSE SERPL-MCNC: 266 MG/DL — HIGH (ref 70–99)
HCT VFR BLD CALC: 37.4 % — LOW (ref 39–50)
HGB BLD-MCNC: 12.4 G/DL — LOW (ref 13–17)
MCHC RBC-ENTMCNC: 32.8 PG — SIGNIFICANT CHANGE UP (ref 27–34)
MCHC RBC-ENTMCNC: 33.2 GM/DL — SIGNIFICANT CHANGE UP (ref 32–36)
MCV RBC AUTO: 98.7 FL — SIGNIFICANT CHANGE UP (ref 80–100)
PLATELET # BLD AUTO: 145 K/UL — LOW (ref 150–400)
POTASSIUM SERPL-MCNC: 3.3 MMOL/L — LOW (ref 3.5–5.3)
POTASSIUM SERPL-SCNC: 3.3 MMOL/L — LOW (ref 3.5–5.3)
RBC # BLD: 3.78 M/UL — LOW (ref 4.2–5.8)
RBC # FLD: 12.6 % — SIGNIFICANT CHANGE UP (ref 10.3–14.5)
SODIUM SERPL-SCNC: 147 MMOL/L — HIGH (ref 135–145)
WBC # BLD: 5.7 K/UL — SIGNIFICANT CHANGE UP (ref 3.8–10.5)
WBC # FLD AUTO: 5.7 K/UL — SIGNIFICANT CHANGE UP (ref 3.8–10.5)

## 2019-01-01 RX ORDER — POTASSIUM CHLORIDE 20 MEQ
20 PACKET (EA) ORAL ONCE
Qty: 0 | Refills: 0 | Status: COMPLETED | OUTPATIENT
Start: 2019-01-01 | End: 2019-01-01

## 2019-01-01 RX ADMIN — Medication 6: at 09:52

## 2019-01-01 RX ADMIN — Medication 40 MILLIGRAM(S): at 06:02

## 2019-01-01 RX ADMIN — SIMVASTATIN 20 MILLIGRAM(S): 20 TABLET, FILM COATED ORAL at 21:21

## 2019-01-01 RX ADMIN — Medication 500 MILLIGRAM(S): at 09:54

## 2019-01-01 RX ADMIN — Medication 500 MILLIGRAM(S): at 06:03

## 2019-01-01 RX ADMIN — Medication 81 MILLIGRAM(S): at 09:53

## 2019-01-01 RX ADMIN — Medication 2: at 18:58

## 2019-01-01 RX ADMIN — Medication 4: at 13:34

## 2019-01-01 RX ADMIN — Medication 100 MILLIGRAM(S): at 06:02

## 2019-01-01 RX ADMIN — Medication 500 MILLIGRAM(S): at 17:45

## 2019-01-01 RX ADMIN — Medication 20 MILLIEQUIVALENT(S): at 09:53

## 2019-01-01 RX ADMIN — Medication 25 MILLIGRAM(S): at 06:02

## 2019-01-01 RX ADMIN — PANTOPRAZOLE SODIUM 40 MILLIGRAM(S): 20 TABLET, DELAYED RELEASE ORAL at 06:02

## 2019-01-01 RX ADMIN — Medication 500 MILLIGRAM(S): at 21:21

## 2019-01-01 RX ADMIN — Medication 500 MILLIGRAM(S): at 06:02

## 2019-01-01 RX ADMIN — Medication 100 MILLIGRAM(S): at 17:45

## 2019-01-01 RX ADMIN — TAMSULOSIN HYDROCHLORIDE 0.4 MILLIGRAM(S): 0.4 CAPSULE ORAL at 21:21

## 2019-01-01 NOTE — PROGRESS NOTE ADULT - SUBJECTIVE AND OBJECTIVE BOX
Infectious Diseases progress note:    Subjective:  No new fevers, no leukocytosis.  Pt more awake and alert.  CT head neg.  CTap with sterocoral colitis.  Started on cipro/flagyl yesterday.      ROS:  nonverbal    Allergies    No Known Allergies    Intolerances        ANTIBIOTICS/RELEVANT:  antimicrobials  ciprofloxacin     Tablet 500 milliGRAM(s) Oral every 12 hours  metroNIDAZOLE    Tablet 500 milliGRAM(s) Oral every 8 hours    immunologic:    OTHER:  acetaminophen   Tablet .. 650 milliGRAM(s) Oral every 6 hours PRN  aspirin enteric coated 81 milliGRAM(s) Oral daily  dextrose 40% Gel 15 Gram(s) Oral once PRN  dextrose 5%. 1000 milliLiter(s) IV Continuous <Continuous>  dextrose 50% Injectable 12.5 Gram(s) IV Push once  dextrose 50% Injectable 25 Gram(s) IV Push once  docusate sodium Liquid 100 milliGRAM(s) Oral two times a day  furosemide    Tablet 40 milliGRAM(s) Oral daily  insulin lispro (HumaLOG) corrective regimen sliding scale   SubCutaneous three times a day before meals  insulin lispro (HumaLOG) corrective regimen sliding scale   SubCutaneous at bedtime  metoprolol succinate ER 25 milliGRAM(s) Oral daily  pantoprazole    Tablet 40 milliGRAM(s) Oral before breakfast  simvastatin 20 milliGRAM(s) Oral at bedtime  tamsulosin 0.4 milliGRAM(s) Oral at bedtime      Objective:  Vital Signs Last 24 Hrs  T(C): 36.7 (01 Jan 2019 12:33), Max: 36.9 (01 Jan 2019 05:53)  T(F): 98 (01 Jan 2019 12:33), Max: 98.5 (01 Jan 2019 05:53)  HR: 88 (01 Jan 2019 12:33) (88 - 95)  BP: 109/77 (01 Jan 2019 12:33) (105/74 - 111/73)  BP(mean): --  RR: 16 (01 Jan 2019 12:33) (16 - 18)  SpO2: 98% (01 Jan 2019 12:33) (95% - 98%)    PHYSICAL EXAM:  Constitutional:NAD  Eyes:BENJY, EOMI  Ear/Nose/Throat: no thrush, mucositis.  Moist mucous membranes	  Neck:no JVD, no lymphadenopathy, supple  Respiratory: CTA phi  Cardiovascular: S1S2 RRR, no murmurs  Gastrointestinal:soft, nontender,  nondistended (+) BS  Extremities:no e/e/c  Skin:  no rashes, open wounds or ulcerations        LABS:                        12.4   5.7   )-----------( 145      ( 01 Jan 2019 06:24 )             37.4     01-01    147<H>  |  104  |  37<H>  ----------------------------<  266<H>  3.3<L>   |  30  |  1.05    Ca    9.4      01 Jan 2019 06:24                      [facility]  [facility]          MICROBIOLOGY:      Culture - Urine (12.29.18 @ 10:23)    -  Amikacin: S <=8    -  Cefepime: S 8    -  Aztreonam: R >16    -  Tobramycin: S <=2    -  Gentamicin: S 4    -  Ciprofloxacin: I 2    -  Ceftazidime: S 8    -  Imipenem: S 2    -  Levofloxacin: I 4    -  Meropenem: S <=1    -  Piperacillin/Tazobactam: I 32    Specimen Source: .Urine Clean Catch (Midstream)    Culture Results:   10,000 - 49,000 CFU/mL Pseudomonas aeruginosa    Organism Identification: Pseudomonas aeruginosa    Organism: Pseudomonas aeruginosa    Method Type: ERNESTINE    Urinalysis (12.29.18 @ 09:26)    pH Urine: 7.5    Blood, Urine: Negative    Glucose Qualitative, Urine: Negative    Color: Light Yellow    Urine Appearance: Clear    Bilirubin: Negative    Ketone - Urine: Negative    Specific Gravity: 1.015    Protein, Urine: 30 mg/dL    Urobilinogen: Negative    Nitrite: Negative    Leukocyte Esterase Concentration: Negative    Culture - Blood (12.18.18 @ 18:39)    Specimen Source: .Blood Blood-Peripheral    Culture Results:   No growth at 5 days.        RADIOLOGY & ADDITIONAL STUDIES:    < from: CT Abdomen and Pelvis w/ IV Cont (12.31.18 @ 13:28) >  FINDINGS:    LOWER CHEST: Aortic valve repair. Bilateral pleural effusions partially   visualized.    LIVER: Within normal limits.  BILE DUCTS: Normal caliber.  GALLBLADDER: Within normal limits.  SPLEEN: Within normal limits.  PANCREAS: Within normal limits.  ADRENALS: 1.9 cm left adrenal nodule, indeterminate but unchanged.  KIDNEYS/URETERS: Multiple bilateral renal cortical cysts. No   hydronephrosis.    BLADDER: Nondistended. Thick-walled.  REPRODUCTIVE ORGANS: Prostate is not enlarged.    BOWEL: No bowel obstruction. Scattered colonic diverticula. Large volume   of stool in the rectum with rectal wall thickening and perirectal   infiltration consistent with stercoral colitis. Appendix normal.  PERITONEUM: No ascites. No abscess.  VESSELS:  Diffuse atheromatous calcifications. Embolic coils in the   gastroduodenal artery.  RETROPERITONEUM: No lymphadenopathy.    ABDOMINAL WALL: Small fat-containing left inguinal hernia.  BONES: Diffuse osteopenia. Partial wedge compression fracture 12th   thoracic vertebral body.    IMPRESSION:   Stercoral colitis.    New bilateral pleural effusions.    < end of copied text >        < from: CT Head No Cont (12.30.18 @ 22:57) >  FINDINGS:     Multiple chronic infarcts are again noted involving the bilateral   cerebellar hemispheres, right deyvi and midbrain, and bilateral posterior   frontal-parietal perirolandic regions, grossly unchanged given the   differences in slice selection and scan angulation compared to the most   recent prior study. There is no gross evidence for new infarcts occurring   over the time interval.    There is age related cerebral atrophy moderate chronic microvascular   ischemic disease.    Visualized paranasal sinuses are well aerated. Mastoid air cells and   middle ear cavities are clear.    Calvarium is intact and soft tissues are unremarkable.    IMPRESSION:     Multiple chronic infarcts are again noted as described. No gross evidence   for new acute infarct or acute hemorrhage.    < end of copied text >

## 2019-01-01 NOTE — PROGRESS NOTE ADULT - SUBJECTIVE AND OBJECTIVE BOX
Patient is a 78y old  Male who presents with a chief complaint of Rigors/shaking (31 Dec 2018 15:19)      Any change in ROS: Pt is alert and awake:       MEDICATIONS  (STANDING):  aspirin enteric coated 81 milliGRAM(s) Oral daily  ciprofloxacin     Tablet 500 milliGRAM(s) Oral every 12 hours  dextrose 5%. 1000 milliLiter(s) (50 mL/Hr) IV Continuous <Continuous>  dextrose 50% Injectable 12.5 Gram(s) IV Push once  dextrose 50% Injectable 25 Gram(s) IV Push once  docusate sodium Liquid 100 milliGRAM(s) Oral two times a day  furosemide    Tablet 40 milliGRAM(s) Oral daily  insulin lispro (HumaLOG) corrective regimen sliding scale   SubCutaneous three times a day before meals  insulin lispro (HumaLOG) corrective regimen sliding scale   SubCutaneous at bedtime  metoprolol succinate ER 25 milliGRAM(s) Oral daily  metroNIDAZOLE    Tablet 500 milliGRAM(s) Oral every 8 hours  pantoprazole    Tablet 40 milliGRAM(s) Oral before breakfast  simvastatin 20 milliGRAM(s) Oral at bedtime  tamsulosin 0.4 milliGRAM(s) Oral at bedtime    MEDICATIONS  (PRN):  acetaminophen   Tablet .. 650 milliGRAM(s) Oral every 6 hours PRN Temp greater or equal to 38C (100.4F), Mild Pain (1 - 3)  dextrose 40% Gel 15 Gram(s) Oral once PRN Blood Glucose LESS THAN 70 milliGRAM(s)/deciliter    Vital Signs Last 24 Hrs  T(C): 36.9 (01 Jan 2019 05:53), Max: 36.9 (01 Jan 2019 05:53)  T(F): 98.5 (01 Jan 2019 05:53), Max: 98.5 (01 Jan 2019 05:53)  HR: 95 (01 Jan 2019 05:53) (85 - 95)  BP: 111/73 (01 Jan 2019 05:53) (105/74 - 121/81)  BP(mean): --  RR: 18 (01 Jan 2019 05:53) (18 - 19)  SpO2: 95% (01 Jan 2019 05:53) (95% - 98%)    I&O's Summary    31 Dec 2018 07:01  -  01 Jan 2019 07:00  --------------------------------------------------------  IN: 480 mL / OUT: 0 mL / NET: 480 mL    01 Jan 2019 07:01  -  01 Jan 2019 10:52  --------------------------------------------------------  IN: 240 mL / OUT: 0 mL / NET: 240 mL          Physical Exam:   GENERAL: NAD, well-groomed, well-developed  HEENT: BENJY/   Atraumatic, Normocephalic  ENMT: No tonsillar erythema, exudates, or enlargement; Moist mucous membranes, Good dentition, No lesions  NECK: Supple, No JVD, Normal thyroid  CHEST/LUNG: Clear to auscultaion  CVS: Regular rate and rhythm; No murmurs, rubs, or gallops  GI: : Soft, Nontender, Nondistended; Bowel sounds present  NERVOUS SYSTEM:  Alert & Awake  EXTREMITIES:  2+ Peripheral Pulses, No clubbing, cyanosis, or edema  LYMPH: No lymphadenopathy noted  SKIN: No rashes or lesions  ENDOCRINOLOGY: No Thyromegaly  PSYCH: Dementia    Labs:  34                            12.4   5.7   )-----------( 145      ( 01 Jan 2019 06:24 )             37.4                         13.0   7.0   )-----------( 136      ( 31 Dec 2018 05:34 )             38.6                         13.2   5.7   )-----------( 130      ( 30 Dec 2018 05:45 )             40.4                         12.5   5.6   )-----------( 152      ( 29 Dec 2018 05:28 )             37.9                         13.5   5.7   )-----------( 157      ( 28 Dec 2018 11:21 )             40.5     01-01    147<H>  |  104  |  37<H>  ----------------------------<  266<H>  3.3<L>   |  30  |  1.05  12-31    148<H>  |  103  |  42<H>  ----------------------------<  224<H>  3.8   |  30  |  1.04  12-30    148<H>  |  103  |  39<H>  ----------------------------<  237<H>  3.9   |  31  |  1.10  12-29    148<H>  |  105  |  34<H>  ----------------------------<  243<H>  3.8   |  29  |  1.14    Ca    9.4      01 Jan 2019 06:24  Ca    9.9      31 Dec 2018 05:34    TPro  6.1  /  Alb  3.4  /  TBili  0.4  /  DBili  x   /  AST  27  /  ALT  34  /  AlkPhos  46  12-29    CAPILLARY BLOOD GLUCOSE      POCT Blood Glucose.: 258 mg/dL (01 Jan 2019 09:52)  POCT Blood Glucose.: 257 mg/dL (31 Dec 2018 22:09)  POCT Blood Glucose.: 340 mg/dL (31 Dec 2018 17:55)  POCT Blood Glucose.: 266 mg/dL (31 Dec 2018 12:54)          < from: CT Abdomen and Pelvis w/ IV Cont (12.31.18 @ 13:28) >  ADRENALS: 1.9 cm left adrenal nodule, indeterminate but unchanged.  KIDNEYS/URETERS: Multiple bilateral renal cortical cysts. No   hydronephrosis.    BLADDER: Nondistended. Thick-walled.  REPRODUCTIVE ORGANS: Prostate is not enlarged.    BOWEL: No bowel obstruction. Scattered colonic diverticula. Large volume   of stool in the rectum with rectal wall thickening and perirectal   infiltration consistent with stercoral colitis. Appendix normal.  PERITONEUM: No ascites. No abscess.  VESSELS:  Diffuse atheromatous calcifications. Embolic coils in the   gastroduodenal artery.  RETROPERITONEUM: No lymphadenopathy.    ABDOMINAL WALL: Small fat-containing left inguinal hernia.  BONES: Diffuse osteopenia. Partial wedge compression fracture 12th   thoracic vertebral body.    IMPRESSION:   Stercoral colitis.    New bilateral pleural effusions.                        MARIJA VALENCIA M.D., ATTENDING RADIOLOGIST  This document has been electronically signed. Dec 31 2018  2:17PM        < end of copied text >          RECENT CULTURES:  12-29 @ 10:23 .Urine Clean Catch (Midstream)   ERNESTINE      Pseudomonas aeruginosa  Pseudomonas aeruginosa     10,000 - 49,000 CFU/mL Pseudomonas aeruginosa    12-29 @ 08:22 .Blood Blood-Peripheral                No growth to date.          RESPIRATORY CULTURES:          Studies  Chest X-RAY  CT SCAN Chest   Venous Dopplers: LE:   CT Abdomen  Others

## 2019-01-01 NOTE — PROGRESS NOTE ADULT - SUBJECTIVE AND OBJECTIVE BOX
Patient is a 78y old  Male who presents with a chief complaint of Rigors/shaking (01 Jan 2019 16:25)      SUBJECTIVE / OVERNIGHT EVENTS:    Events noted.  Awake  No N/V    MEDICATIONS  (STANDING):  aspirin enteric coated 81 milliGRAM(s) Oral daily  ciprofloxacin     Tablet 500 milliGRAM(s) Oral every 12 hours  dextrose 5%. 1000 milliLiter(s) (50 mL/Hr) IV Continuous <Continuous>  dextrose 50% Injectable 12.5 Gram(s) IV Push once  dextrose 50% Injectable 25 Gram(s) IV Push once  docusate sodium Liquid 100 milliGRAM(s) Oral two times a day  furosemide    Tablet 40 milliGRAM(s) Oral daily  insulin lispro (HumaLOG) corrective regimen sliding scale   SubCutaneous three times a day before meals  insulin lispro (HumaLOG) corrective regimen sliding scale   SubCutaneous at bedtime  metoprolol succinate ER 25 milliGRAM(s) Oral daily  metroNIDAZOLE    Tablet 500 milliGRAM(s) Oral every 8 hours  pantoprazole    Tablet 40 milliGRAM(s) Oral before breakfast  simvastatin 20 milliGRAM(s) Oral at bedtime  tamsulosin 0.4 milliGRAM(s) Oral at bedtime    MEDICATIONS  (PRN):  acetaminophen   Tablet .. 650 milliGRAM(s) Oral every 6 hours PRN Temp greater or equal to 38C (100.4F), Mild Pain (1 - 3)  dextrose 40% Gel 15 Gram(s) Oral once PRN Blood Glucose LESS THAN 70 milliGRAM(s)/deciliter        CAPILLARY BLOOD GLUCOSE      POCT Blood Glucose.: 208 mg/dL (01 Jan 2019 21:32)  POCT Blood Glucose.: 184 mg/dL (01 Jan 2019 18:56)  POCT Blood Glucose.: 188 mg/dL (01 Jan 2019 17:51)  POCT Blood Glucose.: 221 mg/dL (01 Jan 2019 13:01)  POCT Blood Glucose.: 258 mg/dL (01 Jan 2019 09:52)    I&O's Summary    31 Dec 2018 07:01  -  01 Jan 2019 07:00  --------------------------------------------------------  IN: 480 mL / OUT: 0 mL / NET: 480 mL    01 Jan 2019 07:01  -  02 Jan 2019 00:42  --------------------------------------------------------  IN: 840 mL / OUT: 0 mL / NET: 840 mL        PHYSICAL EXAM:  GENERAL: NAD  NECK: Supple, No JVD  CHEST/LUNG: Clear to auscultation bilaterally; No wheezing.  HEART: Regular rate and rhythm; No murmurs, rubs, or gallops  ABDOMEN: Soft, Nontender, Nondistended; Bowel sounds present  EXTREMITIES:   No clubbing, cyanosis, or edema  NEUROLOGY: Awake      LABS:                        12.4   5.7   )-----------( 145      ( 01 Jan 2019 06:24 )             37.4     01-01    147<H>  |  104  |  37<H>  ----------------------------<  266<H>  3.3<L>   |  30  |  1.05    Ca    9.4      01 Jan 2019 06:24              CAPILLARY BLOOD GLUCOSE      POCT Blood Glucose.: 208 mg/dL (01 Jan 2019 21:32)  POCT Blood Glucose.: 184 mg/dL (01 Jan 2019 18:56)  POCT Blood Glucose.: 188 mg/dL (01 Jan 2019 17:51)  POCT Blood Glucose.: 221 mg/dL (01 Jan 2019 13:01)  POCT Blood Glucose.: 258 mg/dL (01 Jan 2019 09:52)    12-29 @ 10:23  Culture-urine --  Culture results   10,000 - 49,000 CFU/mL Pseudomonas aeruginosa  method type ERNESTINE  Organism Pseudomonas aeruginosa  Organism Identification Pseudomonas aeruginosa  Specimen source .Urine Clean Catch (Midstream)  12-29 @ 08:22  Culture-urine --  Culture results   No growth to date.  method type --  Organism --  Organism Identification --  Specimen source .Blood Blood-Peripheral           12-29 @ 10:23  Culture blood --  Culture results   10,000 - 49,000 CFU/mL Pseudomonas aeruginosa  Gram stain --  Gram stain blood --  Method type ERNESTINE  Organism Pseudomonas aeruginosa  Organism identification Pseudomonas aeruginosa  Specimen source .Urine Clean Catch (Midstream)   12-29 @ 08:22  Culture blood --  Culture results   No growth to date.  Gram stain --  Gram stain blood --  Method type --  Organism --  Organism identification --  Specimen source .Blood Blood-Peripheral      RADIOLOGY & ADDITIONAL TESTS:    Imaging Personally Reviewed:    Consultant(s) Notes Reviewed:      Care Discussed with Consultants/Other Providers:

## 2019-01-02 DIAGNOSIS — I50.9 HEART FAILURE, UNSPECIFIED: ICD-10-CM

## 2019-01-02 DIAGNOSIS — E87.0 HYPEROSMOLALITY AND HYPERNATREMIA: ICD-10-CM

## 2019-01-02 LAB
ANION GAP SERPL CALC-SCNC: 13 MMOL/L — SIGNIFICANT CHANGE UP (ref 5–17)
ANION GAP SERPL CALC-SCNC: 17 MMOL/L — SIGNIFICANT CHANGE UP (ref 5–17)
BUN SERPL-MCNC: 37 MG/DL — HIGH (ref 7–23)
BUN SERPL-MCNC: 38 MG/DL — HIGH (ref 7–23)
CALCIUM SERPL-MCNC: 9.7 MG/DL — SIGNIFICANT CHANGE UP (ref 8.4–10.5)
CALCIUM SERPL-MCNC: 9.8 MG/DL — SIGNIFICANT CHANGE UP (ref 8.4–10.5)
CHLORIDE SERPL-SCNC: 105 MMOL/L — SIGNIFICANT CHANGE UP (ref 96–108)
CHLORIDE SERPL-SCNC: 105 MMOL/L — SIGNIFICANT CHANGE UP (ref 96–108)
CO2 SERPL-SCNC: 28 MMOL/L — SIGNIFICANT CHANGE UP (ref 22–31)
CO2 SERPL-SCNC: 31 MMOL/L — SIGNIFICANT CHANGE UP (ref 22–31)
CREAT SERPL-MCNC: 1.23 MG/DL — SIGNIFICANT CHANGE UP (ref 0.5–1.3)
CREAT SERPL-MCNC: 1.32 MG/DL — HIGH (ref 0.5–1.3)
GLUCOSE BLDC GLUCOMTR-MCNC: 156 MG/DL — HIGH (ref 70–99)
GLUCOSE BLDC GLUCOMTR-MCNC: 186 MG/DL — HIGH (ref 70–99)
GLUCOSE BLDC GLUCOMTR-MCNC: 250 MG/DL — HIGH (ref 70–99)
GLUCOSE BLDC GLUCOMTR-MCNC: 262 MG/DL — HIGH (ref 70–99)
GLUCOSE SERPL-MCNC: 190 MG/DL — HIGH (ref 70–99)
GLUCOSE SERPL-MCNC: 243 MG/DL — HIGH (ref 70–99)
HCT VFR BLD CALC: 39.2 % — SIGNIFICANT CHANGE UP (ref 39–50)
HGB BLD-MCNC: 13.3 G/DL — SIGNIFICANT CHANGE UP (ref 13–17)
MAGNESIUM SERPL-MCNC: 2.1 MG/DL — SIGNIFICANT CHANGE UP (ref 1.6–2.6)
MCHC RBC-ENTMCNC: 33.5 PG — SIGNIFICANT CHANGE UP (ref 27–34)
MCHC RBC-ENTMCNC: 33.9 GM/DL — SIGNIFICANT CHANGE UP (ref 32–36)
MCV RBC AUTO: 98.8 FL — SIGNIFICANT CHANGE UP (ref 80–100)
PLATELET # BLD AUTO: 153 K/UL — SIGNIFICANT CHANGE UP (ref 150–400)
POTASSIUM SERPL-MCNC: 3.5 MMOL/L — SIGNIFICANT CHANGE UP (ref 3.5–5.3)
POTASSIUM SERPL-MCNC: 3.7 MMOL/L — SIGNIFICANT CHANGE UP (ref 3.5–5.3)
POTASSIUM SERPL-SCNC: 3.5 MMOL/L — SIGNIFICANT CHANGE UP (ref 3.5–5.3)
POTASSIUM SERPL-SCNC: 3.7 MMOL/L — SIGNIFICANT CHANGE UP (ref 3.5–5.3)
RBC # BLD: 3.97 M/UL — LOW (ref 4.2–5.8)
RBC # FLD: 12.4 % — SIGNIFICANT CHANGE UP (ref 10.3–14.5)
SODIUM SERPL-SCNC: 149 MMOL/L — HIGH (ref 135–145)
SODIUM SERPL-SCNC: 150 MMOL/L — HIGH (ref 135–145)
WBC # BLD: 6.3 K/UL — SIGNIFICANT CHANGE UP (ref 3.8–10.5)
WBC # FLD AUTO: 6.3 K/UL — SIGNIFICANT CHANGE UP (ref 3.8–10.5)

## 2019-01-02 RX ORDER — DOCUSATE SODIUM 100 MG
10 CAPSULE ORAL
Qty: 0 | Refills: 0 | COMMUNITY
Start: 2019-01-02

## 2019-01-02 RX ORDER — FUROSEMIDE 40 MG
1 TABLET ORAL
Qty: 30 | Refills: 0 | OUTPATIENT
Start: 2019-01-02 | End: 2019-01-31

## 2019-01-02 RX ORDER — SODIUM CHLORIDE 9 MG/ML
1000 INJECTION, SOLUTION INTRAVENOUS
Qty: 0 | Refills: 0 | Status: DISCONTINUED | OUTPATIENT
Start: 2019-01-02 | End: 2019-01-03

## 2019-01-02 RX ADMIN — Medication 500 MILLIGRAM(S): at 05:31

## 2019-01-02 RX ADMIN — Medication 6: at 09:32

## 2019-01-02 RX ADMIN — Medication 500 MILLIGRAM(S): at 22:10

## 2019-01-02 RX ADMIN — Medication 25 MILLIGRAM(S): at 05:31

## 2019-01-02 RX ADMIN — Medication 500 MILLIGRAM(S): at 05:32

## 2019-01-02 RX ADMIN — Medication 40 MILLIGRAM(S): at 05:32

## 2019-01-02 RX ADMIN — Medication 81 MILLIGRAM(S): at 13:55

## 2019-01-02 RX ADMIN — Medication 4: at 13:54

## 2019-01-02 RX ADMIN — SIMVASTATIN 20 MILLIGRAM(S): 20 TABLET, FILM COATED ORAL at 22:10

## 2019-01-02 RX ADMIN — Medication 500 MILLIGRAM(S): at 13:55

## 2019-01-02 RX ADMIN — PANTOPRAZOLE SODIUM 40 MILLIGRAM(S): 20 TABLET, DELAYED RELEASE ORAL at 05:32

## 2019-01-02 RX ADMIN — Medication 2: at 18:38

## 2019-01-02 RX ADMIN — TAMSULOSIN HYDROCHLORIDE 0.4 MILLIGRAM(S): 0.4 CAPSULE ORAL at 22:10

## 2019-01-02 RX ADMIN — Medication 100 MILLIGRAM(S): at 18:41

## 2019-01-02 RX ADMIN — Medication 500 MILLIGRAM(S): at 18:39

## 2019-01-02 RX ADMIN — SODIUM CHLORIDE 60 MILLILITER(S): 9 INJECTION, SOLUTION INTRAVENOUS at 22:08

## 2019-01-02 RX ADMIN — Medication 100 MILLIGRAM(S): at 05:31

## 2019-01-02 NOTE — CONSULT NOTE ADULT - SUBJECTIVE AND OBJECTIVE BOX
[facility] [place] [facility]   MD FREDRICK JUAREZ MD ANGELA WONG, PA    TEL:  OFFICE: [phone]  DR BREWER CELL: [phone]  DR. PHILLIP CELL: [phone]  ALYSSA ESCAMILLA CELL: [phone]      HPI:  Patient nonverbal, hx from chart  78M w/ hx of DM2, CAD s/p CABG , CHF EF 25%, CVA w/ residual L sided weakness, BPH p/w rigors concern for sepsis, possible UTI, CT with pl. effusion, s/p IV antibiotic, started on lasix 40.   Nephrology consulted for hypernatremia .     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  Gastric ulcer  Hyperlipemia  Diabetes mellitus  Hypertension  History of eye surgery: endophthalmitis in   H/O aortic root repair  No Past Surgical History      Home Medications Reviewed    Hospital Medications:   MEDICATIONS  (STANDING):  aspirin enteric coated 81 milliGRAM(s) Oral daily  ciprofloxacin     Tablet 500 milliGRAM(s) Oral every 12 hours  dextrose 5%. 1000 milliLiter(s) (50 mL/Hr) IV Continuous <Continuous>  dextrose 50% Injectable 12.5 Gram(s) IV Push once  dextrose 50% Injectable 25 Gram(s) IV Push once  docusate sodium Liquid 100 milliGRAM(s) Oral two times a day  furosemide    Tablet 40 milliGRAM(s) Oral daily  insulin lispro (HumaLOG) corrective regimen sliding scale   SubCutaneous three times a day before meals  insulin lispro (HumaLOG) corrective regimen sliding scale   SubCutaneous at bedtime  metoprolol succinate ER 25 milliGRAM(s) Oral daily  metroNIDAZOLE    Tablet 500 milliGRAM(s) Oral every 8 hours  pantoprazole    Tablet 40 milliGRAM(s) Oral before breakfast  simvastatin 20 milliGRAM(s) Oral at bedtime  sodium chloride 0.45%. 1000 milliLiter(s) (60 mL/Hr) IV Continuous <Continuous>  tamsulosin 0.4 milliGRAM(s) Oral at bedtime      SOCIAL HISTORY:  Denies ETOh, Smoking,     FAMILY HISTORY:  No pertinent family history in first degree relatives      REVIEW OF SYSTEMS:  unable to obtain patient non verbal    VITALS:  T(F): 100.2 (19 @ 13:09), Max: 100.2 (19 @ 13:09)  HR: 99 (19 @ 13:09)  BP: 110/77 (19 @ 13:09)  RR: 18 (19 @ 13:09)  SpO2: 94% (19 @ 13:09)  Wt(kg): --     @ 07:  -   @ 07:00  --------------------------------------------------------  IN: 840 mL / OUT: 0 mL / NET: 840 mL     07:  -   @ 16:12  --------------------------------------------------------  IN: 220 mL / OUT: 0 mL / NET: 220 mL          PHYSICAL EXAM:  Constitutional: NAD  HEENT: anicteric sclera, oropharynx clear, MMM  Neck: No JVD  Respiratory: CTAB, no wheezes, rales or rhonchi  Cardiovascular: S1, S2, RRR  Gastrointestinal: BS+, soft, NT/ND  Extremities: No cyanosis or clubbing. No peripheral edema  Neurological: A/O x 0. awake  Psychiatric: Normal mood, normal affect  : No CVA tenderness. No morrison.   Skin: No rashes      LABS:      150<H>  |  105  |  37<H>  ----------------------------<  243<H>  3.7   |  28  |  1.23    Ca    9.8      2019 05:55  Mg     2.1           Creatinine Trend: 1.23 <--, 1.05 <--, 1.04 <--, 1.10 <--, 1.14 <--, 1.05 <--, 1.05 <--                        13.3   6.3   )-----------( 153      ( 2019 05:55 )             39.2     Urine Studies:  Urinalysis Basic - ( 29 Dec 2018 09:26 )    Color: Light Yellow / Appearance: Clear / S.015 / pH:   Gluc:  / Ketone: Negative  / Bili: Negative / Urobili: Negative   Blood:  / Protein: 30 mg/dL / Nitrite: Negative   Leuk Esterase: Negative / RBC: 1 /hpf / WBC 3 /hpf   Sq Epi:  / Non Sq Epi: 2 /hpf / Bacteria: Negative          RADIOLOGY & ADDITIONAL STUDIES:

## 2019-01-02 NOTE — PROGRESS NOTE ADULT - SUBJECTIVE AND OBJECTIVE BOX
Infectious Diseases progress note:    Subjective:  Na and Cr elevated.  Low grade temp 100.2 (rectal).    ROS:  nonverbal    Allergies    No Known Allergies    Intolerances        ANTIBIOTICS/RELEVANT:  antimicrobials  ciprofloxacin     Tablet 500 milliGRAM(s) Oral every 12 hours  metroNIDAZOLE    Tablet 500 milliGRAM(s) Oral every 8 hours    immunologic:    OTHER:  acetaminophen   Tablet .. 650 milliGRAM(s) Oral every 6 hours PRN  aspirin enteric coated 81 milliGRAM(s) Oral daily  dextrose 40% Gel 15 Gram(s) Oral once PRN  dextrose 5%. 1000 milliLiter(s) IV Continuous <Continuous>  dextrose 50% Injectable 12.5 Gram(s) IV Push once  dextrose 50% Injectable 25 Gram(s) IV Push once  docusate sodium Liquid 100 milliGRAM(s) Oral two times a day  furosemide    Tablet 40 milliGRAM(s) Oral daily  insulin lispro (HumaLOG) corrective regimen sliding scale   SubCutaneous three times a day before meals  insulin lispro (HumaLOG) corrective regimen sliding scale   SubCutaneous at bedtime  metoprolol succinate ER 25 milliGRAM(s) Oral daily  pantoprazole    Tablet 40 milliGRAM(s) Oral before breakfast  simvastatin 20 milliGRAM(s) Oral at bedtime  sodium chloride 0.45%. 1000 milliLiter(s) IV Continuous <Continuous>  tamsulosin 0.4 milliGRAM(s) Oral at bedtime      Objective:  Vital Signs Last 24 Hrs  T(C): 37.2 (02 Jan 2019 20:47), Max: 37.9 (02 Jan 2019 13:09)  T(F): 99 (02 Jan 2019 20:47), Max: 100.2 (02 Jan 2019 13:09)  HR: 89 (02 Jan 2019 20:47) (89 - 100)  BP: 98/64 (02 Jan 2019 20:47) (98/64 - 120/82)  BP(mean): --  RR: 18 (02 Jan 2019 20:47) (18 - 18)  SpO2: 96% (02 Jan 2019 20:47) (94% - 96%)    PHYSICAL EXAM:  Constitutional:NAD  Eyes:BENJY, EOMI  Ear/Nose/Throat: no thrush, mucositis.  Moist mucous membranes	  Neck:no JVD, no lymphadenopathy, supple  Respiratory: CTA phi  Cardiovascular: S1S2 RRR, no murmurs  Gastrointestinal:soft, nontender,  nondistended (+) BS  Extremities:no e/e/c  Skin:  no rashes, open wounds or ulcerations        LABS:                        13.3   6.3   )-----------( 153      ( 02 Jan 2019 05:55 )             39.2     01-02    149<H>  |  105  |  38<H>  ----------------------------<  190<H>  3.5   |  31  |  1.32<H>    Ca    9.7      02 Jan 2019 18:51  Mg     2.1     01-02                Urinalysis (12.29.18 @ 09:26)    Blood, Urine: Negative    Glucose Qualitative, Urine: Negative    pH Urine: 7.5    Color: Light Yellow    Urine Appearance: Clear    Bilirubin: Negative    Ketone - Urine: Negative    Specific Gravity: 1.015    Protein, Urine: 30 mg/dL    Urobilinogen: Negative    Nitrite: Negative    Leukocyte Esterase Concentration: Negative          Rapid RVP Result: NotDete  Rapid RVP Result: NotDete          MICROBIOLOGY:    Culture - Urine (12.29.18 @ 10:23)    -  Tobramycin: S <=2    -  Ceftazidime: S 8    -  Cefepime: S 8    -  Aztreonam: R >16    -  Amikacin: S <=8    -  Gentamicin: S 4    -  Ciprofloxacin: I 2    -  Imipenem: S 2    -  Levofloxacin: I 4    -  Meropenem: S <=1    -  Piperacillin/Tazobactam: I 32    Specimen Source: .Urine Clean Catch (Midstream)    Culture Results:   10,000 - 49,000 CFU/mL Pseudomonas aeruginosa    Organism Identification: Pseudomonas aeruginosa    Organism: Pseudomonas aeruginosa    Method Type: ERNESTINE          RADIOLOGY & ADDITIONAL STUDIES:    < from: CT Abdomen and Pelvis w/ IV Cont (12.31.18 @ 13:28) >  IMPRESSION:   Stercoral colitis.    New bilateral pleural effusions.    < end of copied text >

## 2019-01-02 NOTE — PROGRESS NOTE ADULT - SUBJECTIVE AND OBJECTIVE BOX
Patient is a 78y old  Male who presents with a chief complaint of Rigors/shaking (01 Jan 2019 16:41)      Any change in ROS: Doing ok : no SOB :     MEDICATIONS  (STANDING):  aspirin enteric coated 81 milliGRAM(s) Oral daily  ciprofloxacin     Tablet 500 milliGRAM(s) Oral every 12 hours  dextrose 5%. 1000 milliLiter(s) (50 mL/Hr) IV Continuous <Continuous>  dextrose 50% Injectable 12.5 Gram(s) IV Push once  dextrose 50% Injectable 25 Gram(s) IV Push once  docusate sodium Liquid 100 milliGRAM(s) Oral two times a day  furosemide    Tablet 40 milliGRAM(s) Oral daily  insulin lispro (HumaLOG) corrective regimen sliding scale   SubCutaneous three times a day before meals  insulin lispro (HumaLOG) corrective regimen sliding scale   SubCutaneous at bedtime  metoprolol succinate ER 25 milliGRAM(s) Oral daily  metroNIDAZOLE    Tablet 500 milliGRAM(s) Oral every 8 hours  pantoprazole    Tablet 40 milliGRAM(s) Oral before breakfast  simvastatin 20 milliGRAM(s) Oral at bedtime  sodium chloride 0.45%. 1000 milliLiter(s) (60 mL/Hr) IV Continuous <Continuous>  tamsulosin 0.4 milliGRAM(s) Oral at bedtime    MEDICATIONS  (PRN):  acetaminophen   Tablet .. 650 milliGRAM(s) Oral every 6 hours PRN Temp greater or equal to 38C (100.4F), Mild Pain (1 - 3)  dextrose 40% Gel 15 Gram(s) Oral once PRN Blood Glucose LESS THAN 70 milliGRAM(s)/deciliter    Vital Signs Last 24 Hrs  T(C): 37.4 (02 Jan 2019 04:57), Max: 37.7 (02 Jan 2019 04:39)  T(F): 99.3 (02 Jan 2019 04:57), Max: 99.9 (02 Jan 2019 04:39)  HR: 100 (02 Jan 2019 04:39) (88 - 100)  BP: 120/82 (02 Jan 2019 04:39) (109/77 - 120/82)  BP(mean): --  RR: 18 (02 Jan 2019 04:39) (16 - 18)  SpO2: 95% (02 Jan 2019 04:39) (95% - 98%)    I&O's Summary    01 Jan 2019 07:01  -  02 Jan 2019 07:00  --------------------------------------------------------  IN: 840 mL / OUT: 0 mL / NET: 840 mL    02 Jan 2019 07:01  -  02 Jan 2019 12:08  --------------------------------------------------------  IN: 100 mL / OUT: 0 mL / NET: 100 mL          Physical Exam:   GENERAL: NAD, well-groomed, well-developed  HEENT: BENJY/   Atraumatic, Normocephalic  ENMT: No tonsillar erythema, exudates, or enlargement; Moist mucous membranes, Good dentition, No lesions  NECK: Supple, No JVD, Normal thyroid  CHEST/LUNG: Clear to auscultaion  CVS: Regular rate and rhythm; No murmurs, rubs, or gallops  GI: : Soft, Nontender, Nondistended; Bowel sounds present  NERVOUS SYSTEM:  Alert & Awake  EXTREMITIES:  much better  edema  LYMPH: No lymphadenopathy noted  SKIN: No rashes or lesions  ENDOCRINOLOGY: No Thyromegaly  PSYCH: non verbal!    Labs:  34                            13.3   6.3   )-----------( 153      ( 02 Jan 2019 05:55 )             39.2                         12.4   5.7   )-----------( 145      ( 01 Jan 2019 06:24 )             37.4                         13.0   7.0   )-----------( 136      ( 31 Dec 2018 05:34 )             38.6                         13.2   5.7   )-----------( 130      ( 30 Dec 2018 05:45 )             40.4     01-02    150<H>  |  105  |  37<H>  ----------------------------<  243<H>  3.7   |  28  |  1.23  01-01    147<H>  |  104  |  37<H>  ----------------------------<  266<H>  3.3<L>   |  30  |  1.05  12-31    148<H>  |  103  |  42<H>  ----------------------------<  224<H>  3.8   |  30  |  1.04  12-30    148<H>  |  103  |  39<H>  ----------------------------<  237<H>  3.9   |  31  |  1.10    Ca    9.8      02 Jan 2019 05:55  Ca    9.4      01 Jan 2019 06:24  Mg     2.1     01-02      CAPILLARY BLOOD GLUCOSE      POCT Blood Glucose.: 262 mg/dL (02 Jan 2019 08:52)  POCT Blood Glucose.: 208 mg/dL (01 Jan 2019 21:32)  POCT Blood Glucose.: 184 mg/dL (01 Jan 2019 18:56)  POCT Blood Glucose.: 188 mg/dL (01 Jan 2019 17:51)  POCT Blood Glucose.: 221 mg/dL (01 Jan 2019 13:01)    < from: CT Abdomen and Pelvis w/ IV Cont (12.31.18 @ 13:28) >  GALLBLADDER: Within normal limits.  SPLEEN: Within normal limits.  PANCREAS: Within normal limits.  ADRENALS: 1.9 cm left adrenal nodule, indeterminate but unchanged.  KIDNEYS/URETERS: Multiple bilateral renal cortical cysts. No   hydronephrosis.    BLADDER: Nondistended. Thick-walled.  REPRODUCTIVE ORGANS: Prostate is not enlarged.    BOWEL: No bowel obstruction. Scattered colonic diverticula. Large volume   of stool in the rectum with rectal wall thickening and perirectal   infiltration consistent with stercoral colitis. Appendix normal.  PERITONEUM: No ascites. No abscess.  VESSELS:  Diffuse atheromatous calcifications. Embolic coils in the   gastroduodenal artery.  RETROPERITONEUM: No lymphadenopathy.    ABDOMINAL WALL: Small fat-containing left inguinal hernia.  BONES: Diffuse osteopenia. Partial wedge compression fracture 12th   thoracic vertebral body.    IMPRESSION:   Stercoral colitis.    New bilateral pleural effusions.                        MARIJA VALENCIA M.D., ATTENDING RADIOLOGIST    < end of copied text >                RECENT CULTURES:  12-29 @ 10:23 .Urine Clean Catch (Midstream)   ERNESTINE      Pseudomonas aeruginosa  Pseudomonas aeruginosa     10,000 - 49,000 CFU/mL Pseudomonas aeruginosa    12-29 @ 08:22 .Blood Blood-Peripheral                No growth to date.          RESPIRATORY CULTURES:          Studies  Chest X-RAY  CT SCAN Chest   Venous Dopplers: LE:   CT Abdomen  Others

## 2019-01-02 NOTE — PROGRESS NOTE ADULT - SUBJECTIVE AND OBJECTIVE BOX
Patient is a 78y old  Male who presents with a chief complaint of Rigors/shaking (02 Jan 2019 17:52)      SUBJECTIVE / OVERNIGHT EVENTS:    Events noted.  Hypernatremia - on IVF    MEDICATIONS  (STANDING):  aspirin enteric coated 81 milliGRAM(s) Oral daily  ciprofloxacin     Tablet 500 milliGRAM(s) Oral every 12 hours  dextrose 5%. 1000 milliLiter(s) (50 mL/Hr) IV Continuous <Continuous>  dextrose 50% Injectable 12.5 Gram(s) IV Push once  dextrose 50% Injectable 25 Gram(s) IV Push once  docusate sodium Liquid 100 milliGRAM(s) Oral two times a day  furosemide    Tablet 40 milliGRAM(s) Oral daily  insulin lispro (HumaLOG) corrective regimen sliding scale   SubCutaneous three times a day before meals  insulin lispro (HumaLOG) corrective regimen sliding scale   SubCutaneous at bedtime  metoprolol succinate ER 25 milliGRAM(s) Oral daily  metroNIDAZOLE    Tablet 500 milliGRAM(s) Oral every 8 hours  pantoprazole    Tablet 40 milliGRAM(s) Oral before breakfast  simvastatin 20 milliGRAM(s) Oral at bedtime  sodium chloride 0.45%. 1000 milliLiter(s) (60 mL/Hr) IV Continuous <Continuous>  tamsulosin 0.4 milliGRAM(s) Oral at bedtime    MEDICATIONS  (PRN):  acetaminophen   Tablet .. 650 milliGRAM(s) Oral every 6 hours PRN Temp greater or equal to 38C (100.4F), Mild Pain (1 - 3)  dextrose 40% Gel 15 Gram(s) Oral once PRN Blood Glucose LESS THAN 70 milliGRAM(s)/deciliter        CAPILLARY BLOOD GLUCOSE      POCT Blood Glucose.: 156 mg/dL (02 Jan 2019 22:09)  POCT Blood Glucose.: 186 mg/dL (02 Jan 2019 17:57)  POCT Blood Glucose.: 250 mg/dL (02 Jan 2019 13:29)  POCT Blood Glucose.: 262 mg/dL (02 Jan 2019 08:52)    I&O's Summary    01 Jan 2019 07:01  -  02 Jan 2019 07:00  --------------------------------------------------------  IN: 840 mL / OUT: 0 mL / NET: 840 mL    02 Jan 2019 07:01  -  02 Jan 2019 23:41  --------------------------------------------------------  IN: 580 mL / OUT: 0 mL / NET: 580 mL        PHYSICAL EXAM:  GENERAL: NAD  NECK: Supple, No JVD  CHEST/LUNG: Clear to auscultation bilaterally; No wheezing.  HEART: Regular rate and rhythm; No murmurs, rubs, or gallops  ABDOMEN: Soft, Nontender, Nondistended; Bowel sounds present  EXTREMITIES:   No clubbing, cyanosis, or edema  NEUROLOGY: Awake      LABS:                        13.3   6.3   )-----------( 153      ( 02 Jan 2019 05:55 )             39.2     01-02    149<H>  |  105  |  38<H>  ----------------------------<  190<H>  3.5   |  31  |  1.32<H>    Ca    9.7      02 Jan 2019 18:51  Mg     2.1     01-02              CAPILLARY BLOOD GLUCOSE      POCT Blood Glucose.: 156 mg/dL (02 Jan 2019 22:09)  POCT Blood Glucose.: 186 mg/dL (02 Jan 2019 17:57)  POCT Blood Glucose.: 250 mg/dL (02 Jan 2019 13:29)  POCT Blood Glucose.: 262 mg/dL (02 Jan 2019 08:52)    12-29 @ 10:23  Culture-urine --  Culture results   10,000 - 49,000 CFU/mL Pseudomonas aeruginosa  method type ERNESTINE  Organism Pseudomonas aeruginosa  Organism Identification Pseudomonas aeruginosa  Specimen source .Urine Clean Catch (Midstream)  12-29 @ 08:22  Culture-urine --  Culture results   No growth to date.  method type --  Organism --  Organism Identification --  Specimen source .Blood Blood-Peripheral           12-29 @ 10:23  Culture blood --  Culture results   10,000 - 49,000 CFU/mL Pseudomonas aeruginosa  Gram stain --  Gram stain blood --  Method type ERNESTINE  Organism Pseudomonas aeruginosa  Organism identification Pseudomonas aeruginosa  Specimen source .Urine Clean Catch (Midstream)   12-29 @ 08:22  Culture blood --  Culture results   No growth to date.  Gram stain --  Gram stain blood --  Method type --  Organism --  Organism identification --  Specimen source .Blood Blood-Peripheral      RADIOLOGY & ADDITIONAL TESTS:    Imaging Personally Reviewed:    Consultant(s) Notes Reviewed:      Care Discussed with Consultants/Other Providers:

## 2019-01-02 NOTE — CONSULT NOTE ADULT - PROBLEM SELECTOR RECOMMENDATION 9
likely sec to dehydration. per RN patient eats and drinks but need to be fed on dysphasia diet.  increase free water.  on 1/2NS @ 60cc/hr x 12hrs today. monitor fluid status closely. continue lasix   monitor bmp  Na should not be corrected >8meq in 24 hrs

## 2019-01-02 NOTE — CONSULT NOTE ADULT - ASSESSMENT
78M w/ hx of DM2, CAD s/p CABG 2013, CHF EF 25%, CVA w/ residual L sided weakness, BPH p/w rigors concern for sepsis, possible UTI, CT with pl. effusion, s/p IV antibiotic, started on lasix 40.   Nephrology consulted for hypernatremia .
78M w/ hx of DM2, CAD s/p CABG 2013, CHF EF 25%, CVA w/ residual L sided weakness, BPH p/w rigors concerning for sepsis possibly from UTI and now with hypoxic respiratory failure suspicious for CHF
· Assessment	  78M w/ hx of DM2, CAD s/p CABG 2013, CHF EF 25%, CVA w/ residual L sided weakness, BPH p/w rigors concerning for sepsis possibly from UTI and now with hypoxic respiratory failure suspicious for CHF     Problem/Plan - 1:  ·  Problem: Acute respiratory failure with hypoxia.  Plan: Suspect related to fluid overload given cardiac hx and elevated pBNP. Started on BIPAP. Given pulmonary edema, will give IV lasix and reassess. Pt noted to still be tachycardic to 130s. Unclear if related to respiratory distress vs sepsis.  -Cont. BIPAP overnight   s/p IV lasix  Cardio eval noted.       Problem/Plan - 2:  ·  Problem: Sepsis, due to unspecified organism.  Plan: No leukocytosis or recorded fevers but given borderline UA and medical history will cover broadly  -Cont. vancomycin and cefepime      Problem/Plan - 3:  ·  Problem: Coronary artery disease involving coronary bypass graft of native heart without angina pectoris.  Plan: S/p CABG 2013. Reportedly with EF 25% as per family but 45% on recent TTE.  -Cont. asa  -Cont. simvastatin     Problem/Plan - 4:  ·  Problem: CVA, old, hemiparesis.  Plan: Residual weakness. Now bed bound, reportedly with stage II, ulcer.  -Wound care consult       Problem/Plan - 5:  ·  Problem: Type 2 diabetes mellitus with complication, unspecified whether long term insulin use.  Plan: On PO meds at home.  -Fingersticks and sliding scale.
78M w/ hx of DM2, CAD s/p CABG 2013, CHF EF 25%, CVA w/ residual L sided weakness, BPH p/w rigors. Pt has been in rehab since his prior discharge receiving IV antibiotics by PICC vs midline. He was treated for MRSA/pseudomonas sepsis and UTI. Line was taken out yesterday at rehab and pt sent home. Pt's family and wife state he has been having decreased PO for the past several days and had vomiting as well. At home today, pt seemed slightly more lethargic than usual and then developed shaking/rigors and seemed to be in mild distress with labored breathing according to his wife. She denies any recent dysuria, cough, diarrhea, abdominal pain, urine discoloration, chest pain or rash.     During prior admission in Nov, at [facility], pt was treated for MRSA bacteremia.  He was treated with triple abx therapy (2 week of gent, and 4 weeks of vanco/rifampin) in the setting of prosthetic aortic valve.  TTE was negative at that time, repeat bcx cleared, source was thought to be through skin entry (pt had a significant dermatitis presentation at that time).  Wife declined any further invasive procedures such as DICKSON or surgical intervention.      In ER: Given Zosyn, vancomycin, cipro, NS 2L, (16 Dec 2018 23:06).  ER vitals:  Tm 100.3, P 121, /91.  RR 26 (100% supp O2), WBC 12.2.  UA (+) LE/(-) nit, ucx (-).  RVP (-).  12/16 Bcx - CNS.      ID consult called for evaluation of sepsis.      Recommend:    - Pt has low grade temp (100.3), leukocytosis, and tachycardia.  Bcx are growing single set CNS, which could be a contaminant.  Check repeat blood cultures to ensure clearance.  Will cont vancomycin in the setting of bioprosthetic valve.    - Urine cx no growth, RVP is negative.    - Cxr shows pulm edema with b/l pleural effusions.  CT angio chest no PE or pna.  (+) effusions/pulm edema.  Also reported is a moderate superior endplate compression deformity of T12, this was seen on a prior CTap from May 2017.      - Recommend echocardiogram    - Will cont current abx with vanco/cefepime for now, if repeat bcx negative, will observe off abx.    Will follow,    Tanisha Ash  [phone]

## 2019-01-03 VITALS
DIASTOLIC BLOOD PRESSURE: 71 MMHG | SYSTOLIC BLOOD PRESSURE: 105 MMHG | RESPIRATION RATE: 19 BRPM | HEART RATE: 97 BPM | OXYGEN SATURATION: 93 % | TEMPERATURE: 99 F

## 2019-01-03 LAB
ANION GAP SERPL CALC-SCNC: 12 MMOL/L — SIGNIFICANT CHANGE UP (ref 5–17)
BUN SERPL-MCNC: 37 MG/DL — HIGH (ref 7–23)
CALCIUM SERPL-MCNC: 9.3 MG/DL — SIGNIFICANT CHANGE UP (ref 8.4–10.5)
CHLORIDE SERPL-SCNC: 105 MMOL/L — SIGNIFICANT CHANGE UP (ref 96–108)
CO2 SERPL-SCNC: 29 MMOL/L — SIGNIFICANT CHANGE UP (ref 22–31)
CREAT SERPL-MCNC: 1.33 MG/DL — HIGH (ref 0.5–1.3)
CULTURE RESULTS: SIGNIFICANT CHANGE UP
CULTURE RESULTS: SIGNIFICANT CHANGE UP
GLUCOSE BLDC GLUCOMTR-MCNC: 191 MG/DL — HIGH (ref 70–99)
GLUCOSE BLDC GLUCOMTR-MCNC: 206 MG/DL — HIGH (ref 70–99)
GLUCOSE SERPL-MCNC: 191 MG/DL — HIGH (ref 70–99)
HCT VFR BLD CALC: 35.5 % — LOW (ref 39–50)
HGB BLD-MCNC: 12.1 G/DL — LOW (ref 13–17)
MCHC RBC-ENTMCNC: 33.6 PG — SIGNIFICANT CHANGE UP (ref 27–34)
MCHC RBC-ENTMCNC: 34.1 GM/DL — SIGNIFICANT CHANGE UP (ref 32–36)
MCV RBC AUTO: 98.7 FL — SIGNIFICANT CHANGE UP (ref 80–100)
PLATELET # BLD AUTO: 150 K/UL — SIGNIFICANT CHANGE UP (ref 150–400)
POTASSIUM SERPL-MCNC: 3.3 MMOL/L — LOW (ref 3.5–5.3)
POTASSIUM SERPL-SCNC: 3.3 MMOL/L — LOW (ref 3.5–5.3)
RBC # BLD: 3.6 M/UL — LOW (ref 4.2–5.8)
RBC # FLD: 12.6 % — SIGNIFICANT CHANGE UP (ref 10.3–14.5)
SODIUM SERPL-SCNC: 146 MMOL/L — HIGH (ref 135–145)
SPECIMEN SOURCE: SIGNIFICANT CHANGE UP
SPECIMEN SOURCE: SIGNIFICANT CHANGE UP
WBC # BLD: 5.9 K/UL — SIGNIFICANT CHANGE UP (ref 3.8–10.5)
WBC # FLD AUTO: 5.9 K/UL — SIGNIFICANT CHANGE UP (ref 3.8–10.5)

## 2019-01-03 PROCEDURE — 82435 ASSAY OF BLOOD CHLORIDE: CPT

## 2019-01-03 PROCEDURE — 94640 AIRWAY INHALATION TREATMENT: CPT

## 2019-01-03 PROCEDURE — 81001 URINALYSIS AUTO W/SCOPE: CPT

## 2019-01-03 PROCEDURE — 87486 CHLMYD PNEUM DNA AMP PROBE: CPT

## 2019-01-03 PROCEDURE — 70450 CT HEAD/BRAIN W/O DYE: CPT

## 2019-01-03 PROCEDURE — 96365 THER/PROPH/DIAG IV INF INIT: CPT | Mod: XU

## 2019-01-03 PROCEDURE — 71250 CT THORAX DX C-: CPT

## 2019-01-03 PROCEDURE — 87581 M.PNEUMON DNA AMP PROBE: CPT

## 2019-01-03 PROCEDURE — 80202 ASSAY OF VANCOMYCIN: CPT

## 2019-01-03 PROCEDURE — 87150 DNA/RNA AMPLIFIED PROBE: CPT

## 2019-01-03 PROCEDURE — 87798 DETECT AGENT NOS DNA AMP: CPT

## 2019-01-03 PROCEDURE — 85014 HEMATOCRIT: CPT

## 2019-01-03 PROCEDURE — 80048 BASIC METABOLIC PNL TOTAL CA: CPT

## 2019-01-03 PROCEDURE — 80053 COMPREHEN METABOLIC PANEL: CPT

## 2019-01-03 PROCEDURE — 36600 WITHDRAWAL OF ARTERIAL BLOOD: CPT

## 2019-01-03 PROCEDURE — 87040 BLOOD CULTURE FOR BACTERIA: CPT

## 2019-01-03 PROCEDURE — 71045 X-RAY EXAM CHEST 1 VIEW: CPT

## 2019-01-03 PROCEDURE — 87633 RESP VIRUS 12-25 TARGETS: CPT

## 2019-01-03 PROCEDURE — 83880 ASSAY OF NATRIURETIC PEPTIDE: CPT

## 2019-01-03 PROCEDURE — 96368 THER/DIAG CONCURRENT INF: CPT | Mod: XU

## 2019-01-03 PROCEDURE — 83735 ASSAY OF MAGNESIUM: CPT

## 2019-01-03 PROCEDURE — 82962 GLUCOSE BLOOD TEST: CPT

## 2019-01-03 PROCEDURE — 84484 ASSAY OF TROPONIN QUANT: CPT

## 2019-01-03 PROCEDURE — 71275 CT ANGIOGRAPHY CHEST: CPT

## 2019-01-03 PROCEDURE — 82330 ASSAY OF CALCIUM: CPT

## 2019-01-03 PROCEDURE — 83605 ASSAY OF LACTIC ACID: CPT

## 2019-01-03 PROCEDURE — 84132 ASSAY OF SERUM POTASSIUM: CPT

## 2019-01-03 PROCEDURE — 99285 EMERGENCY DEPT VISIT HI MDM: CPT | Mod: 25

## 2019-01-03 PROCEDURE — 87086 URINE CULTURE/COLONY COUNT: CPT

## 2019-01-03 PROCEDURE — 82803 BLOOD GASES ANY COMBINATION: CPT

## 2019-01-03 PROCEDURE — 93005 ELECTROCARDIOGRAM TRACING: CPT

## 2019-01-03 PROCEDURE — 94660 CPAP INITIATION&MGMT: CPT

## 2019-01-03 PROCEDURE — 82270 OCCULT BLOOD FECES: CPT

## 2019-01-03 PROCEDURE — 74177 CT ABD & PELVIS W/CONTRAST: CPT

## 2019-01-03 PROCEDURE — 85027 COMPLETE CBC AUTOMATED: CPT

## 2019-01-03 PROCEDURE — 87186 SC STD MICRODIL/AGAR DIL: CPT

## 2019-01-03 PROCEDURE — 82565 ASSAY OF CREATININE: CPT

## 2019-01-03 PROCEDURE — 84443 ASSAY THYROID STIM HORMONE: CPT

## 2019-01-03 PROCEDURE — 96375 TX/PRO/DX INJ NEW DRUG ADDON: CPT | Mod: XU

## 2019-01-03 PROCEDURE — 84295 ASSAY OF SERUM SODIUM: CPT

## 2019-01-03 PROCEDURE — 82947 ASSAY GLUCOSE BLOOD QUANT: CPT

## 2019-01-03 RX ORDER — CIPROFLOXACIN LACTATE 400MG/40ML
1 VIAL (ML) INTRAVENOUS
Qty: 8 | Refills: 0 | OUTPATIENT
Start: 2019-01-03 | End: 2019-01-06

## 2019-01-03 RX ORDER — METOPROLOL TARTRATE 50 MG
1 TABLET ORAL
Qty: 0 | Refills: 0 | COMMUNITY
Start: 2019-01-03

## 2019-01-03 RX ORDER — POTASSIUM CHLORIDE 20 MEQ
20 PACKET (EA) ORAL ONCE
Qty: 0 | Refills: 0 | Status: DISCONTINUED | OUTPATIENT
Start: 2019-01-03 | End: 2019-01-03

## 2019-01-03 RX ORDER — METRONIDAZOLE 500 MG
1 TABLET ORAL
Qty: 12 | Refills: 0 | OUTPATIENT
Start: 2019-01-03 | End: 2019-01-06

## 2019-01-03 RX ORDER — METFORMIN HYDROCHLORIDE 850 MG/1
1 TABLET ORAL
Qty: 0 | Refills: 0 | COMMUNITY

## 2019-01-03 RX ORDER — POTASSIUM CHLORIDE 20 MEQ
20 PACKET (EA) ORAL ONCE
Qty: 0 | Refills: 0 | Status: COMPLETED | OUTPATIENT
Start: 2019-01-03 | End: 2019-01-03

## 2019-01-03 RX ADMIN — Medication 40 MILLIGRAM(S): at 05:04

## 2019-01-03 RX ADMIN — PANTOPRAZOLE SODIUM 40 MILLIGRAM(S): 20 TABLET, DELAYED RELEASE ORAL at 05:04

## 2019-01-03 RX ADMIN — Medication 25 MILLIGRAM(S): at 05:04

## 2019-01-03 RX ADMIN — Medication 500 MILLIGRAM(S): at 13:15

## 2019-01-03 RX ADMIN — Medication 100 MILLIGRAM(S): at 05:04

## 2019-01-03 RX ADMIN — Medication 500 MILLIGRAM(S): at 05:04

## 2019-01-03 RX ADMIN — Medication 81 MILLIGRAM(S): at 09:21

## 2019-01-03 RX ADMIN — Medication 4: at 09:17

## 2019-01-03 RX ADMIN — Medication 2: at 13:15

## 2019-01-03 RX ADMIN — Medication 20 MILLIEQUIVALENT(S): at 06:31

## 2019-01-03 NOTE — PHARMACOTHERAPY INTERVENTION NOTE - NSPHARMCOMMPTEDU
Patient Education - Other
Normal shape and contour; nares, nostrils and choana patent; no nasal flaring; mucosa pink and moist.

## 2019-01-03 NOTE — PROGRESS NOTE ADULT - PROBLEM SELECTOR PROBLEM 3
Coronary artery disease involving coronary bypass graft of native heart without angina pectoris

## 2019-01-03 NOTE — PROVIDER CONTACT NOTE (OTHER) - ACTION/TREATMENT ORDERED:
Tylenol ordered Blood cultures , RVP, UA culture and chest x-ray ordered
Jose L Bullard NP will order supplement awaiting order
PA aware. K supplement ordered. Continue to monitor.
Tylenol
provide made aware. no further action at this time. as per provider continue to monitor temp, pt is on IV abx.
provider made aware. no further action at this time. will endorse to on coming shift.
provider to contact urology

## 2019-01-03 NOTE — PROGRESS NOTE ADULT - PROBLEM SELECTOR PLAN 1
Seems secondary to pulm edema: Off bipap at this time: Cont to u se prn in daytime as wellas full time at nighttime:
His new ct chest noted: The pleural effusions are less prominent: Heis on room air : no new pneumonia: afebrile: remains off antibiotics  1/1/2019: Found to have stercoral colitis: Started on antibiotics:
Seems secondary to pulm edema: Off bipap at this time: Cont to u se prn in daytime as wellas full time at nighttime:  12/20: Significantly improved: I think he needs to be on ATC diuretics: MAREK NP on floor  125/21: HIs breathing is better: Would dc bipap today in the night and cheCk his ABG in Am : hEIS ON DIURETICS NOW  12/22 remains stable off bipap : todays ABG with metabolic alkalosis with co2 retention: Monitor his HC3:  12/23: Pt has much improved: on oxygen and have been off bipap for last 48 hours without any respiratory difficulty:  12/24: pt is doing much better: off oxygen at this time  12/25: today on oxygen: Try to taper off as tolerated: Breathing wise he seems to be doing pretty well  12/26: doing pretty good: room air o2 sao2 is 93%: no obvious SOB:
His new ct chest noted: The pleural effusions are less prominent: Heis on room air : no new pneumonia: afebrile: remains off antibiotics  1/1/2019: Found to have stercoral colitis: Started on antibiotics:  1/2: cont antibiotics for 7 day s
His new ct chest noted: The pleural effusions are less prominent: Heis on room air : no new pneumonia: afebrile: remains off antibiotics  1/1/2019: Found to have stercoral colitis: Started on antibiotics:  1/2: cont antibiotics for 7 day s  1/3: stable from resp point of view!
Seems secondary to pulm edema: Off bipap at this time: Cont to u se prn in daytime as wellas full time at nighttime:  12/20: Significantly improved: I think he needs to be on ATC diuretics: MAREK NP on floor
Seems secondary to pulm edema: Off bipap at this time: Cont to u se prn in daytime as wellas full time at nighttime:  12/20: Significantly improved: I think he needs to be on ATC diuretics: MAREK NP on floor  125/21: HIs breathing is better: Would dc bipap today in the night and cheCk his ABG in Am : hEIS ON DIURETICS NOW
Seems secondary to pulm edema: Off bipap at this time: Cont to u se prn in daytime as wellas full time at nighttime:  12/20: Significantly improved: I think he needs to be on ATC diuretics: MAREK NP on floor  125/21: HIs breathing is better: Would dc bipap today in the night and cheCk his ABG in Am : hEIS ON DIURETICS NOW  12/22 remains stable off bipap : todays ABG with metabolic alkalosis with co2 retention: Monitor his HC3:
Seems secondary to pulm edema: Off bipap at this time: Cont to u se prn in daytime as wellas full time at nighttime:  12/20: Significantly improved: I think he needs to be on ATC diuretics: MAREK NP on floor  125/21: HIs breathing is better: Would dc bipap today in the night and cheCk his ABG in Am : hEIS ON DIURETICS NOW  12/22 remains stable off bipap : todays ABG with metabolic alkalosis with co2 retention: Monitor his HC3:  12/23: Pt has much improved: on oxygen and have been off bipap for last 48 hours without any respiratory difficulty:
Seems secondary to pulm edema: Off bipap at this time: Cont to u se prn in daytime as wellas full time at nighttime:  12/20: Significantly improved: I think he needs to be on ATC diuretics: MAREK NP on floor  125/21: HIs breathing is better: Would dc bipap today in the night and cheCk his ABG in Am : hEIS ON DIURETICS NOW  12/22 remains stable off bipap : todays ABG with metabolic alkalosis with co2 retention: Monitor his HC3:  12/23: Pt has much improved: on oxygen and have been off bipap for last 48 hours without any respiratory difficulty:  12/24: pt is doing much better: off oxygen at this time
Seems secondary to pulm edema: Off bipap at this time: Cont to u se prn in daytime as wellas full time at nighttime:  12/20: Significantly improved: I think he needs to be on ATC diuretics: MAREK NP on floor  125/21: HIs breathing is better: Would dc bipap today in the night and cheCk his ABG in Am : hEIS ON DIURETICS NOW  12/22 remains stable off bipap : todays ABG with metabolic alkalosis with co2 retention: Monitor his HC3:  12/23: Pt has much improved: on oxygen and have been off bipap for last 48 hours without any respiratory difficulty:  12/24: pt is doing much better: off oxygen at this time  12/25: today on oxygen: Try to taper off as tolerated: Breathing wise he seems to be doing pretty well
Seems secondary to pulm edema: Off bipap at this time: Cont to u se prn in daytime as wellas full time at nighttime:  12/20: Significantly improved: I think he needs to be on ATC diuretics: MAREK NP on floor  125/21: HIs breathing is better: Would dc bipap today in the night and cheCk his ABG in Am : hEIS ON DIURETICS NOW  12/22 remains stable off bipap : todays ABG with metabolic alkalosis with co2 retention: Monitor his HC3:  12/23: Pt has much improved: on oxygen and have been off bipap for last 48 hours without any respiratory difficulty:  12/24: pt is doing much better: off oxygen at this time  12/25: today on oxygen: Try to taper off as tolerated: Breathing wise he seems to be doing pretty well  12/26: doing pretty good: room air o2 sao2 is 93%: no obvious SOB:  12/27: Pt has been doing great: alert and awake but non communicative: He is not in any resp distress: no infection:
Seems secondary to pulm edema: Off bipap at this time: Cont to u se prn in daytime as wellas full time at nighttime:  12/20: Significantly improved: I think he needs to be on ATC diuretics: MAREK NP on floor  125/21: HIs breathing is better: Would dc bipap today in the night and cheCk his ABG in Am : hEIS ON DIURETICS NOW  12/22 remains stable off bipap : todays ABG with metabolic alkalosis with co2 retention: Monitor his HC3:  12/23: Pt has much improved: on oxygen and have been off bipap for last 48 hours without any respiratory difficulty:  12/24: pt is doing much better: off oxygen at this time  12/25: today on oxygen: Try to taper off as tolerated: Breathing wise he seems to be doing pretty well  12/26: doing pretty good: room air o2 sao2 is 93%: no obvious SOB:  12/27: Pt has been doing great: alert and awake but non communicative: He is not in any resp distress: no infection:  12/28: Much improved: HAVE BEEN DOING BETTER!
Seems secondary to pulm edema: Off bipap at this time: Cont to u se prn in daytime as wellas full time at nighttime:  12/20: Significantly improved: I think he needs to be on ATC diuretics: MAREK NP on floor  125/21: HIs breathing is better: Would dc bipap today in the night and cheCk his ABG in Am : hEIS ON DIURETICS NOW  12/22 remains stable off bipap : todays ABG with metabolic alkalosis with co2 retention: Monitor his HC3:  12/23: Pt has much improved: on oxygen and have been off bipap for last 48 hours without any respiratory difficulty:  12/24: pt is doing much better: off oxygen at this time  12/25: today on oxygen: Try to taper off as tolerated: Breathing wise he seems to be doing pretty well  12/26: doing pretty good: room air o2 sao2 is 93%: no obvious SOB:  12/27: Pt has been doing great: alert and awake but non communicative: He is not in any resp distress: no infection:  12/28: Much improved: HAVE BEEN DOING BETTER!  12/29 awake, alert, O2 sat greater than 92% with room air.
His new ct chest noted: The pleural effusions are less prominent: Heis on room air : no new pneumonia: afebrile: remains off antibtiocs
Seems secondary to pulm edema: Off bipap at this time: Cont to u se prn in daytime as wellas full time at nighttime:  12/20: Significantly improved: I think he needs to be on ATC diuretics: MAREK NP on floor  125/21: HIs breathing is better: Would dc bipap today in the night and cheCk his ABG in Am : hEIS ON DIURETICS NOW  12/22 remains stable off bipap : todays ABG with metabolic alkalosis with co2 retention: Monitor his HC3:  12/23: Pt has much improved: on oxygen and have been off bipap for last 48 hours without any respiratory difficulty:  12/24: pt is doing much better: off oxygen at this time  12/25: today on oxygen: Try to taper off as tolerated: Breathing wise he seems to be doing pretty well  12/26: doing pretty good: room air o2 sao2 is 93%: no obvious SOB:  12/27: Pt has been doing great: alert and awake but non communicative: He is not in any resp distress: no infection:  12/28: Much improved: HAVE BEEN DOING BETTER!  12/29 awake, alert, O2 sat greater than 92% with room air.  12/30 resolved

## 2019-01-03 NOTE — PROGRESS NOTE ADULT - SUBJECTIVE AND OBJECTIVE BOX
Patient is a 78y old  Male who presents with a chief complaint of Rigors/shaking (02 Jan 2019 17:52)      Any change in ROS: pt is doing ok : no resp events overngith    MEDICATIONS  (STANDING):  aspirin enteric coated 81 milliGRAM(s) Oral daily  ciprofloxacin     Tablet 500 milliGRAM(s) Oral every 12 hours  dextrose 5%. 1000 milliLiter(s) (50 mL/Hr) IV Continuous <Continuous>  dextrose 50% Injectable 12.5 Gram(s) IV Push once  dextrose 50% Injectable 25 Gram(s) IV Push once  docusate sodium Liquid 100 milliGRAM(s) Oral two times a day  furosemide    Tablet 40 milliGRAM(s) Oral daily  insulin lispro (HumaLOG) corrective regimen sliding scale   SubCutaneous three times a day before meals  insulin lispro (HumaLOG) corrective regimen sliding scale   SubCutaneous at bedtime  metoprolol succinate ER 25 milliGRAM(s) Oral daily  metroNIDAZOLE    Tablet 500 milliGRAM(s) Oral every 8 hours  pantoprazole    Tablet 40 milliGRAM(s) Oral before breakfast  simvastatin 20 milliGRAM(s) Oral at bedtime  sodium chloride 0.45%. 1000 milliLiter(s) (60 mL/Hr) IV Continuous <Continuous>  tamsulosin 0.4 milliGRAM(s) Oral at bedtime    MEDICATIONS  (PRN):  acetaminophen   Tablet .. 650 milliGRAM(s) Oral every 6 hours PRN Temp greater or equal to 38C (100.4F), Mild Pain (1 - 3)  dextrose 40% Gel 15 Gram(s) Oral once PRN Blood Glucose LESS THAN 70 milliGRAM(s)/deciliter    Vital Signs Last 24 Hrs  T(C): 36.7 (03 Jan 2019 04:02), Max: 37.9 (02 Jan 2019 13:09)  T(F): 98 (03 Jan 2019 04:02), Max: 100.2 (02 Jan 2019 13:09)  HR: 94 (03 Jan 2019 04:02) (89 - 99)  BP: 105/70 (03 Jan 2019 04:02) (98/64 - 110/77)  BP(mean): --  RR: 18 (03 Jan 2019 04:02) (18 - 18)  SpO2: 96% (03 Jan 2019 04:02) (94% - 96%)    I&O's Summary    02 Jan 2019 07:01 - 03 Jan 2019 07:00  --------------------------------------------------------  IN: 1660 mL / OUT: 0 mL / NET: 1660 mL          Physical Exam:   GENERAL: NAD, well-groomed, well-developed  HEENT: BENJY/   Atraumatic, Normocephalic  ENMT: No tonsillar erythema, exudates, or enlargement; Moist mucous membranes, Good dentition, No lesions  NECK: Supple, No JVD, Normal thyroid  CHEST/LUNG: Clear to auscultaion  CVS: Regular rate and rhythm; No murmurs, rubs, or gallops  GI: : Soft, Nontender, Nondistended; Bowel sounds present  NERVOUS SYSTEM:  Alert & Awake  EXTREMITIES:  less edema  LYMPH: No lymphadenopathy noted  SKIN: No rashes or lesions  ENDOCRINOLOGY: No Thyromegaly  PSYCH: Dementia    Labs:  34                            12.1   5.9   )-----------( 150      ( 03 Jan 2019 05:07 )             35.5                         13.3   6.3   )-----------( 153      ( 02 Jan 2019 05:55 )             39.2                         12.4   5.7   )-----------( 145      ( 01 Jan 2019 06:24 )             37.4                         13.0   7.0   )-----------( 136      ( 31 Dec 2018 05:34 )             38.6     01-03    146<H>  |  105  |  37<H>  ----------------------------<  191<H>  3.3<L>   |  29  |  1.33<H>  01-02    149<H>  |  105  |  38<H>  ----------------------------<  190<H>  3.5   |  31  |  1.32<H>  01-02    150<H>  |  105  |  37<H>  ----------------------------<  243<H>  3.7   |  28  |  1.23  01-01    147<H>  |  104  |  37<H>  ----------------------------<  266<H>  3.3<L>   |  30  |  1.05  12-31    148<H>  |  103  |  42<H>  ----------------------------<  224<H>  3.8   |  30  |  1.04    Ca    9.3      03 Jan 2019 05:07  Ca    9.7      02 Jan 2019 18:51  Ca    9.8      02 Jan 2019 05:55  Mg     2.1     01-02      CAPILLARY BLOOD GLUCOSE      POCT Blood Glucose.: 206 mg/dL (03 Jan 2019 08:44)  POCT Blood Glucose.: 156 mg/dL (02 Jan 2019 22:09)  POCT Blood Glucose.: 186 mg/dL (02 Jan 2019 17:57)  POCT Blood Glucose.: 250 mg/dL (02 Jan 2019 13:29)        < from: CT Abdomen and Pelvis w/ IV Cont (12.31.18 @ 13:28) >  BOWEL: No bowel obstruction. Scattered colonic diverticula. Large volume   of stool in the rectum with rectal wall thickening and perirectal   infiltration consistent with stercoral colitis. Appendix normal.  PERITONEUM: No ascites. No abscess.  VESSELS:  Diffuse atheromatous calcifications. Embolic coils in the   gastroduodenal artery.  RETROPERITONEUM: No lymphadenopathy.    ABDOMINAL WALL: Small fat-containing left inguinal hernia.  BONES: Diffuse osteopenia. Partial wedge compression fracture 12th   thoracic vertebral body.    IMPRESSION:   Stercoral colitis.    New bilateral pleural effusions.                        MARIJA VALENCIA M.D., ATTENDING RADIOLOGIST  This document has been electronically signed. Dec 31 2018  2:17PM        < end of copied text >            RECENT CULTURES:  12-29 @ 10:23 .Urine Clean Catch (Midstream)   ERNESTINE      Pseudomonas aeruginosa  Pseudomonas aeruginosa     10,000 - 49,000 CFU/mL Pseudomonas aeruginosa    12-29 @ 08:22 .Blood Blood-Peripheral                No growth at 5 days.          RESPIRATORY CULTURES:          Studies  Chest X-RAY  CT SCAN Chest   Venous Dopplers: LE:   CT Abdomen  Others

## 2019-01-03 NOTE — PROGRESS NOTE ADULT - PROBLEM SELECTOR PROBLEM 5
Type 2 diabetes mellitus with complication, unspecified whether long term insulin use

## 2019-01-03 NOTE — PROGRESS NOTE ADULT - NSHPATTENDINGPLANDISCUSS_GEN_ALL_CORE
Pulm, Cards, Medicine
Tele PA
NP
Patients son
Silverio BAXTER
NP
Silverio BAXTER
NP
osei NP
NP
NP

## 2019-01-03 NOTE — PROGRESS NOTE ADULT - PROBLEM SELECTOR PLAN 2
on empiric antibiotics
on empiric antibiotics  12/20: Not septic to me: He seems to be doing ok  ? DC antibtiocs: His clinical picture is more consistent with chf exacerbation:    12/21: HIs antibiotics have been dced:  12/22: on diuretics: seems to be doing better: not requiring Bipap now:  12/23: Off antibiotics now: Cont diuresis  12/24: stable: off antibiotics:  12/25: remains off antibiotics:  12/26; off antibiotics: cont diuretics: afebrile
resolved  1/1/19: Back on antibiotics for stercoral colitis
on empiric antibiotics  12/20: Not septic to me: He seems to be doing ok  ? DC antibtiocs: His clinical picture is more consistent with chf exacerbation:
on empiric antibiotics  12/20: Not septic to me: He seems to be doing ok  ? DC antibtiocs: His clinical picture is more consistent with chf exacerbation:    12/21: HIs antibiotics have been dced:  12/22: on diuretics: seems to be doing better: not requiring Bipap now:  12/23: Off antibiotics now: Cont diuresis  12/24: stable: off antibiotics:  12/25: remains off antibiotics:  12/26; off antibiotics: cont diuretics: afebrile  12/27: Stable!!
on empiric antibiotics  12/20: Not septic to me: He seems to be doing ok  ? DC antibtiocs: His clinical picture is more consistent with chf exacerbation:    12/21: HIs antibiotics have been dced:  12/22: on diuretics: seems to be doing better: not requiring Bipap now:  12/23: Off antibiotics now: Cont diuresis  12/24: stable: off antibiotics:  12/25: remains off antibiotics:  12/26; off antibiotics: cont diuretics: afebrile  12/27: Stable!!  12/28: remained stable off antibiotics
on empiric antibiotics  12/20: Not septic to me: He seems to be doing ok  ? DC antibtiocs: His clinical picture is more consistent with chf exacerbation:    12/21: HIs antibiotics have been dced:  12/22: on diuretics: seems to be doing better: not requiring Bipap now:  12/23: Off antibiotics now: Cont diuresis  12/24: stable: off antibiotics:  12/25: remains off antibiotics:  12/26; off antibiotics: cont diuretics: afebrile  12/27: Stable!!  12/28: remained stable off antibiotics  12/29 febrile overnight. cultured. CXR looks better. will follow official report
on empiric antibiotics  12/20: Not septic to me: He seems to be doing ok  ? DC antibtiocs: His clinical picture is more consistent with chf exacerbation:  12/21: HIs antibiotics have been dced:
on empiric antibiotics  12/20: Not septic to me: He seems to be doing ok  ? DC antibtiocs: His clinical picture is more consistent with chf exacerbation:  12/21: HIs antibiotics have been dced:  12/22: on diuretics: seems to be doing better: not requiring Bipap now:
on empiric antibiotics  12/20: Not septic to me: He seems to be doing ok  ? DC antibtiocs: His clinical picture is more consistent with chf exacerbation:  12/21: HIs antibiotics have been dced:  12/22: on diuretics: seems to be doing better: not requiring Bipap now:  12/23: Off antibiotics now: Cont diuresis
on empiric antibiotics  12/20: Not septic to me: He seems to be doing ok  ? DC antibtiocs: His clinical picture is more consistent with chf exacerbation:  12/21: HIs antibiotics have been dced:  12/22: on diuretics: seems to be doing better: not requiring Bipap now:  12/23: Off antibiotics now: Cont diuresis  12/24: stable: off antibiotics:
on empiric antibiotics  12/20: Not septic to me: He seems to be doing ok  ? DC antibtiocs: His clinical picture is more consistent with chf exacerbation:  12/21: HIs antibiotics have been dced:  12/22: on diuretics: seems to be doing better: not requiring Bipap now:  12/23: Off antibiotics now: Cont diuresis  12/24: stable: off antibiotics:  12/25: remains off antibiotics:
resolved  1/1/19: Back on antibiotics for stercoral colitis  1/2: cont antibtiocs
resolved  1/1/19: Back on antibiotics for stercoral colitis  1/2: cont antibtiocs  1/3: cont antibtiocs for stercoral colitis
on empiric antibiotics  12/20: Not septic to me: He seems to be doing ok  ? DC antibtiocs: His clinical picture is more consistent with chf exacerbation:    12/21: HIs antibiotics have been dced:  12/22: on diuretics: seems to be doing better: not requiring Bipap now:  12/23: Off antibiotics now: Cont diuresis  12/24: stable: off antibiotics:  12/25: remains off antibiotics:  12/26; off antibiotics: cont diuretics: afebrile  12/27: Stable!!  12/28: remained stable off antibiotics  12/29 febrile overnight. cultured. CXR looks better. will follow official report.  12/30 afebrile. no leukocytosis. CXR read as improving.
resolved

## 2019-01-03 NOTE — PROGRESS NOTE ADULT - PROBLEM SELECTOR PLAN 3
Cont curretn meds
Cont current meds:
Cont current meds  12/21: Cotn current meds:  12/22: Cont current meds:
Cont current meds  12/21: Cotn current meds:  12/22: Cont current meds:
Cont current meds  12/21: Cotn current meds:  12/22: Cont current meds:  12/24: Controlled
Cont current meds  12/21: Dayana current meds:
Cont current meds:
Cont current meds:
Cont current meds:  12/25: On lasix
Cont current meds:  12/25: On lasix  12/26: Cont diuretics:
Cont current meds:  12/25: On lasix  12/26: Cont diuretics:  12/27: On diuretics: Seems to have improved::
Cont current meds:  12/25: On lasix  12/26: Cont diuretics:  12/27: On diuretics: Seems to have improved::  12/28: stable: no resp distress!
Cont current meds:  12/25: On lasix  12/26: Cont diuretics:  12/27: On diuretics: Seems to have improved::  12/28: stable: no resp distress!  12/29 stable. continue home med
Cont curretn meds
Cont current meds:
Cont current meds:  12/25: On lasix  12/26: Cont diuretics:  12/27: On diuretics: Seems to have improved::  12/28: stable: no resp distress!  12/29 stable. continue home med  12/30 stable. continue home med

## 2019-01-03 NOTE — PROGRESS NOTE ADULT - REASON FOR ADMISSION
Rigors/shaking

## 2019-01-03 NOTE — PROGRESS NOTE ADULT - PROVIDER SPECIALTY LIST ADULT
Cardiology
Infectious Disease
Internal Medicine
Pulmonology
Infectious Disease
Internal Medicine
Pulmonology

## 2019-01-03 NOTE — PROGRESS NOTE ADULT - ATTENDING COMMENTS
Patient was seen and examined,interim events noted,labs and radiology studies reviewed.  Matt Trevizo MD,FACC.  [address] [place] [address].  [id number].  [phone]
Patient was seen and examined,interim events noted,labs and radiology studies reviewed.  Matt Trevizo MD,FACC.  [address] [place] [address].  [id number].  [phone]
His ct chest is done : pending report: To me there are small bilateral pleural effusions and I do nt see any gross consolidation: He has been afebrile fro last 24 hours:  Off antibiotics!!  12/31: Seems to be doing better to me: There is no pneumonia: And there is no fever now!!
Patient was seen and examined,interim events noted,labs and radiology studies reviewed.  Matt Trevizo MD,FACC.  [address] [place] [address].  [id number].  [phone]
MAREK BAXTER Silverio now: Await [facility] stat  12[address] [facility] reviewed: Plan MAREK NP on floor:
His ct chest is done : pending report: To me there are small bilateral pleural effusions and I do nt see any gross consolidation: He has been afebrile fro last 24 hours:  Off antibiotics!!  12/31: Seems to be doing better to me: There is no pneumonia: And there is no fever now!!  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
MAREK BAXTER Silverio now: Await [facility] stat  12[address] [facility] reviewed: Plan MAREK NP on floor:  12/20: I think he needs to be on diuretics ATC: Cardiology followup
MAREK BAXTER Silverio now: Await [facility] stat  1219 [facility] reviewed: Plan MAREK NP on floor:  12/20: I think he needs to be on diuretics ATC: Cardiology followup  12/21:Pt seems to be doing better: To cont current meds: DC bipap at night:
MAREK NP Silverio now: Await [facility] stat  1219 [facility] reviewed: Plan MAREK NP on floor:  12/20: I think he needs to be on diuretics ATC: Cardiology followup  12/21:Pt seems to be doing better: To cont current meds: DC bipap at night:  12/22: Seems to be getting better: repeat chest x-ray
MAREK NP Silverio now: Await [facility] stat  1219 [facility] reviewed: Plan MAREK NP on floor:  12/20: I think he needs to be on diuretics ATC: Cardiology followup  12/21:Pt seems to be doing better: To cont current meds: DC bipap at night:  12/22: Seems to be getting better: repeat chest x-ray  12/23: rpt chest x-ray ordered:
MAREK NP Silverio now: Await [facility] stat  1219 [facility] reviewed: Plan MAREK NP on floor:  12/20: I think he needs to be on diuretics ATC: Cardiology followup  12/21:Pt seems to be doing better: To cont current meds: DC bipap at night:  12/22: Seems to be getting better: repeat chest x-ray  12/23: rpt chest x-ray ordered:  12/24: Patient is doing ok: no sepsis!:
MAREK NP Silverio now: Await [facility] stat  1219 [facility] reviewed: Plan MAREK NP on floor:  12/20: I think he needs to be on diuretics ATC: Cardiology followup  12/21:Pt seems to be doing better: To cont current meds: DC bipap at night:  12/22: Seems to be getting better: repeat chest x-ray  12/23: rpt chest x-ray ordered:  12/24: Patient is doing ok: no sepsis!:  12/25: being treated for chf: He has improved significantly:
His ct chest is done : pending report: To me there are small bilateral pleural effusions and I do nt see any gross consolidation: He has been afebrile fro last 24 hours:  Off antibiotics!!  12/31: Seems to be doing better to me: There is no pneumonia: And there is no fever now!!  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
His ct chest is done : pending report: To me there are small bilateral pleural effusions and I do nt see any gross consolidation: He has been afebrile fro last 24 hours:  Off antibiotics!!  12/31: Seems to be doing better to me: There is no pneumonia: And there is no fever now!!  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  1/3: pulmnary wise stable
MAREK NP Silverio now: Await [facility] stat  1219 [facility] reviewed: Plan MAREK NP on floor:  12/20: I think he needs to be on diuretics ATC: Cardiology followup  12/21:Pt seems to be doing better: To cont current meds: DC bipap at night:  12/22: Seems to be getting better: repeat chest x-ray  12/23: rpt chest x-ray ordered:  12/24: Patient is doing ok: no sepsis!:  12/25: being treated for chf: He has improved significantly:  12/26: Improved: Off oxygen and off bipap
MAREK NP Silverio now: Await [facility] stat  1219 [facility] reviewed: Plan MAREK NP on floor:  12/20: I think he needs to be on diuretics ATC: Cardiology followup  12/21:Pt seems to be doing better: To cont current meds: DC bipap at night:  12/22: Seems to be getting better: repeat chest x-ray  12/23: rpt chest x-ray ordered:  12/24: Patient is doing ok: no sepsis!:  12/25: being treated for chf: He has improved significantly:  12/26: Improved: Off oxygen and off bipap  12/27: doing much better!!
doingpretty good : no resp distress: no fever: for dc today
He has developed low grade temperature since yesterday: His chest x-ray from yesterday looks pretty good: His new RVP is negative: Check UA and urine culture: He is off antibiotics at this time: Will defer to primary's team: I doubt he has developed new pneumonia given his clear chest x-ray yesterday except some left basilar atelectasis: to follow official report:
His ct chest is done : pending report: To me there are small bilateral pleural effusions and I do nt see any gross consolidation: He has been afebrile fro last 24 hours:  Off antibiotics!!

## 2019-01-03 NOTE — PROGRESS NOTE ADULT - PROBLEM SELECTOR PROBLEM 7
Prophylactic measure

## 2019-01-03 NOTE — PROGRESS NOTE ADULT - PROBLEM SELECTOR PROBLEM 2
Sepsis, due to unspecified organism

## 2019-01-03 NOTE — PROGRESS NOTE ADULT - PROBLEM SELECTOR PROBLEM 1
Acute respiratory failure with hypoxia

## 2019-01-03 NOTE — PROGRESS NOTE ADULT - PROBLEM SELECTOR PLAN 4
supportive carE: check speech and swallow
supportive carE
supportive carE  12/29 supportive care
supportive carE: check speech and swallow  12/20: STable
supportive carE: check speech and swallow  12/20: STable  12/21: stable
supportive carE: check speech and swallow  12/20: STable  12/21: stable
supportive carE: check speech and swallow  12/20: STable  12/21: stable  12/23: Supportive care:
supportive carE: check speech and swallow  12/20: STable  12/21: stable  12/23: Supportive care:  12/24: Stable
supportive carE
supportive carE  12/29 supportive care  12/30 supportive care

## 2019-01-03 NOTE — PROGRESS NOTE ADULT - PROBLEM SELECTOR PROBLEM 4
CVA, old, hemiparesis

## 2019-01-03 NOTE — PROGRESS NOTE ADULT - PROBLEM SELECTOR PLAN 6
Stable!
Stable!  12/24: stable  12/25: Controlled!  12/26: Controlled  12/27: Stable!  12/28: Controlled  12/29 stable. continue home meds  12/30 stable
Stable!
Stable!  12/24: stable
Stable!  12/24: stable  12/25: Controlled!
Stable!  12/24: stable  12/25: Controlled!  12/26: Controlled
Stable!  12/24: stable  12/25: Controlled!  12/26: Controlled  12/27: Stable!
Stable!  12/24: stable  12/25: Controlled!  12/26: Controlled  12/27: Stable!  12/28: Controlled
Stable!  12/24: stable  12/25: Controlled!  12/26: Controlled  12/27: Stable!  12/28: Controlled  12/29 stable. continue home meds

## 2019-01-03 NOTE — PROGRESS NOTE ADULT - PROBLEM SELECTOR PLAN 5
Controlled  12/23: Controlled!  12/24: in 200;s : defer to primary care::  12/25: running in 200's" cont to control per primary care nevin:  12/27: Defer to primary team  12/28: Running slightly high: defer to primary team  12/29 not yet optimized. defer to primary team  12/30 still running high. management defer to primary team
Controlled
in 200's" defer to primary team  1/1/19: Still running high: control per primary  team
Controlled
Controlled  12/20: cONTROLLED
Controlled  12/20: cONTROLLED  12/21: Controlled
Controlled  12/23: Controlled!
Controlled  12/23: Controlled!  12/24: in 200;s : defer to primary care::
Controlled  12/23: Controlled!  12/24: in 200;s : defer to primary care::  12/25: running in 200's" cont to control per primary care nevin:
Controlled  12/23: Controlled!  12/24: in 200;s : defer to primary care::  12/25: running in 200's" cont to control per primary care nevin:
Controlled  12/23: Controlled!  12/24: in 200;s : defer to primary care::  12/25: running in 200's" cont to control per primary care nevin:  12/27: Defer to primary team
Controlled  12/23: Controlled!  12/24: in 200;s : defer to primary care::  12/25: running in 200's" cont to control per primary care nevin:  12/27: Defer to primary team  12/28: Running slightly high: defer to primary team
Controlled  12/23: Controlled!  12/24: in 200;s : defer to primary care::  12/25: running in 200's" cont to control per primary care nevin:  12/27: Defer to primary team  12/28: Running slightly high: defer to primary team  12/29 not yet optimized. defer to primary team
in 200's" defer to primary team  1/1/19: Still running high: control per primary  team  1/2L:: defer to primary team
in 200's" defer to primary team  1/1/19: Still running high: control per primary  team  1/2L:: defer to primary team  1/3: cont to control agressively
in 200's" defer to primary team

## 2019-01-03 NOTE — PROGRESS NOTE ADULT - ASSESSMENT
78M w/ hx of DM2, CAD s/p CABG 2013, CHF EF 25%, CVA w/ residual L sided weakness, BPH p/w rigors concerning for sepsis possibly from UTI and now with hypoxic respiratory failure suspicious for CHF
78M w/ hx of DM2, CAD s/p CABG 2013, CHF EF 25%, CVA w/ residual L sided weakness, BPH p/w rigors concerning for sepsis possibly from UTI and now with hypoxic respiratory failure suspicious for CHF
78M w/ hx of DM2, CAD s/p CABG 2013, CHF EF 25%, CVA w/ residual L sided weakness, BPH p/w rigors. Pt has been in rehab since his prior discharge receiving IV antibiotics by PICC vs midline. He was treated for MRSA/pseudomonas sepsis and UTI. Line was taken out yesterday at rehab and pt sent home. Pt's family and wife state he has been having decreased PO for the past several days and had vomiting as well. At home today, pt seemed slightly more lethargic than usual and then developed shaking/rigors and seemed to be in mild distress with labored breathing according to his wife. She denies any recent dysuria, cough, diarrhea, abdominal pain, urine discoloration, chest pain or rash.     During prior admission in Nov, at [facility], pt was treated for MRSA bacteremia.  He was treated with triple abx therapy (2 week of gent, and 4 weeks of vanco/rifampin) in the setting of prosthetic aortic valve.  TTE was negative at that time, repeat bcx cleared, source was thought to be through skin entry (pt had a significant dermatitis presentation at that time).  Wife declined any further invasive procedures such as DICKSON or surgical intervention.      In ER: Given Zosyn, vancomycin, cipro, NS 2L, (16 Dec 2018 23:06).  ER vitals:  Tm 100.3, P 121, /91.  RR 26 (100% supp O2), WBC 12.2.  UA (+) LE/(-) nit, ucx (-).  RVP (-).  12/16 Bcx - CNS.      ID consult called for evaluation of sepsis.      Recommend:    - Pt has low grade temp (100.3), leukocytosis, and tachycardia.  Bcx are growing single set CNS, which is likely a contaminant.  Repeat blood cultures negative. Abx d/c'd on 12/20    - Urine cx no growth, RVP is negative.    - Cxr shows pulm edema with b/l pleural effusions.  CT angio chest no PE or pna.  (+) effusions/pulm edema.  Also reported is a moderate superior endplate compression deformity of T12, this was seen on a prior CTap from May 2017.        - observe off abx, no signs of infection or active sepsis.  No further ID w/u at this time.  Cont managment for CHF as per primary team        Tanisha Ash  [phone]
· Assessment	  78M w/ hx of DM2, CAD s/p CABG 2013, CHF EF 25%, CVA w/ residual L sided weakness, BPH p/w rigors concerning for sepsis possibly from UTI and now with hypoxic respiratory failure suspicious for CHF      Fever:  Low grade  ID f/up noted.  Observe off abx at present    Dw family.
78M w/ hx of DM2, CAD s/p CABG 2013, CHF EF 25%, CVA w/ residual L sided weakness, BPH p/w rigors concerning for sepsis possibly from UTI and now with hypoxic respiratory failure suspicious for CHF
78M w/ hx of DM2, CAD s/p CABG 2013, CHF EF 25%, CVA w/ residual L sided weakness, BPH p/w rigors. Pt has been in rehab since his prior discharge receiving IV antibiotics by PICC vs midline. He was treated for MRSA/pseudomonas sepsis and UTI. Line was taken out yesterday at rehab and pt sent home. Pt's family and wife state he has been having decreased PO for the past several days and had vomiting as well. At home today, pt seemed slightly more lethargic than usual and then developed shaking/rigors and seemed to be in mild distress with labored breathing according to his wife. She denies any recent dysuria, cough, diarrhea, abdominal pain, urine discoloration, chest pain or rash.     During prior admission in Nov, at [facility] pt was treated for MRSA bacteremia.  He was treated with triple abx therapy (2 week of gent, and 4 weeks of vanco/rifampin) in the setting of prosthetic aortic valve.  TTE was negative at that time, repeat bcx cleared, source was thought to be through skin entry (pt had a significant dermatitis presentation at that time).  Wife declined any further invasive procedures such as DICKSON or surgical intervention.      In ER: Given Zosyn, vancomycin, cipro, NS 2L, (16 Dec 2018 23:06).  ER vitals:  Tm 100.3, P 121, /91.  RR 26 (100% supp O2), WBC 12.2.  UA (+) LE/(-) nit, ucx (-).  RVP (-).  12/16 Bcx - CNS.      ID consult called for evaluation of sepsis.      Recommend:    - Pt has low grade temp (100.3), leukocytosis, and tachycardia.  Bcx are growing single set CNS, which is likely a contaminant.  Repeat blood cultures negative. Abx d/c'd on 12/20    - Urine cx no growth, RVP is negative.    - Cxr shows pulm edema with b/l pleural effusions.  CT angio chest no PE or pna.  (+) effusions/pulm edema.  Also reported is a moderate superior endplate compression deformity of T12, this was seen on a prior CTap from May 2017.        - observe off abx, no signs of infection.  No further ID w/u at this time.         Tanisha Ash  [phone]
78M w/ hx of DM2, CAD s/p CABG 2013, CHF EF 25%, CVA w/ residual L sided weakness, BPH p/w rigors. Pt has been in rehab since his prior discharge receiving IV antibiotics by PICC vs midline. He was treated for MRSA/pseudomonas sepsis and UTI. Line was taken out yesterday at rehab and pt sent home. Pt's family and wife state he has been having decreased PO for the past several days and had vomiting as well. At home today, pt seemed slightly more lethargic than usual and then developed shaking/rigors and seemed to be in mild distress with labored breathing according to his wife. She denies any recent dysuria, cough, diarrhea, abdominal pain, urine discoloration, chest pain or rash.     During prior admission in Nov, at [facility], pt was treated for MRSA bacteremia.  He was treated with triple abx therapy (2 week of gent, and 4 weeks of vanco/rifampin) in the setting of prosthetic aortic valve.  TTE was negative at that time, repeat bcx cleared, source was thought to be through skin entry (pt had a significant dermatitis presentation at that time).  Wife declined any further invasive procedures such as DICKSON or surgical intervention.      In ER: Given Zosyn, vancomycin, cipro, NS 2L, (16 Dec 2018 23:06).  ER vitals:  Tm 100.3, P 121, /91.  RR 26 (100% supp O2), WBC 12.2.  UA (+) LE/(-) nit, ucx (-).  RVP (-).  12/16 Bcx - CNS.      ID consult called for evaluation of sepsis.      Recommend:    - Pt has low grade temp (100.3), leukocytosis, and tachycardia.  Bcx are growing single set CNS, which is likely a contaminant.  Repeat blood cultures negative. Abx d/c'd on 12/20    - Urine cx no growth, RVP is negative.    - Cxr shows pulm edema with b/l pleural effusions.  CT angio chest no PE or pna.  (+) effusions/pulm edema.  Also reported is a moderate superior endplate compression deformity of T12, this was seen on a prior CTap from May 2017.        - No need for antibiotics at this time, no signs of infection or active sepsis.   Cont managment for CHF as per primary team        Tanisha Ash  [phone]
78M w/ hx of DM2, CAD s/p CABG 2013, CHF EF 25%, CVA w/ residual L sided weakness, BPH p/w rigors. Pt has been in rehab since his prior discharge receiving IV antibiotics by PICC vs midline. He was treated for MRSA/pseudomonas sepsis and UTI. Line was taken out yesterday at rehab and pt sent home. Pt's family and wife state he has been having decreased PO for the past several days and had vomiting as well. At home today, pt seemed slightly more lethargic than usual and then developed shaking/rigors and seemed to be in mild distress with labored breathing according to his wife. She denies any recent dysuria, cough, diarrhea, abdominal pain, urine discoloration, chest pain or rash.     During prior admission in Nov, at [facility], pt was treated for MRSA bacteremia.  He was treated with triple abx therapy (2 week of gent, and 4 weeks of vanco/rifampin) in the setting of prosthetic aortic valve.  TTE was negative at that time, repeat bcx cleared, source was thought to be through skin entry (pt had a significant dermatitis presentation at that time).  Wife declined any further invasive procedures such as DICKSON or surgical intervention.      In ER: Given Zosyn, vancomycin, cipro, NS 2L, (16 Dec 2018 23:06).  ER vitals:  Tm 100.3, P 121, /91.  RR 26 (100% supp O2), WBC 12.2.  UA (+) LE/(-) nit, ucx (-).  RVP (-).  12/16 Bcx - CNS.      ID consult called for evaluation of sepsis.      Recommend:      - Pt with recurrent low grade fevers since 12/28, etiology unclear.  No leukocytosis on labwork.  UA negative, RVP neg.  Repeat cxr performed, appears clearer, f/u official.      - f/u blood cultures    -  If pt with continued fevers and source unclear, recommend CT c/a/p to evaluate for infectious/inflammatory focus.      - Hold abx for now, will re-assess need to start abx if clinically worsens.      d/w pt's wife and son at bedside.         Tanisha Mihir  [phone]
78M w/ hx of DM2, CAD s/p CABG 2013, CHF EF 25%, CVA w/ residual L sided weakness, BPH p/w rigors. Pt has been in rehab since his prior discharge receiving IV antibiotics by PICC vs midline. He was treated for MRSA/pseudomonas sepsis and UTI. Line was taken out yesterday at rehab and pt sent home. Pt's family and wife state he has been having decreased PO for the past several days and had vomiting as well. At home today, pt seemed slightly more lethargic than usual and then developed shaking/rigors and seemed to be in mild distress with labored breathing according to his wife. She denies any recent dysuria, cough, diarrhea, abdominal pain, urine discoloration, chest pain or rash.     During prior admission in Nov, at [facility], pt was treated for MRSA bacteremia.  He was treated with triple abx therapy (2 week of gent, and 4 weeks of vanco/rifampin) in the setting of prosthetic aortic valve.  TTE was negative at that time, repeat bcx cleared, source was thought to be through skin entry (pt had a significant dermatitis presentation at that time).  Wife declined any further invasive procedures such as DICKSON or surgical intervention.      In ER: Given Zosyn, vancomycin, cipro, NS 2L, (16 Dec 2018 23:06).  ER vitals:  Tm 100.3, P 121, /91.  RR 26 (100% supp O2), WBC 12.2.  UA (+) LE/(-) nit, ucx (-).  RVP (-).  12/16 Bcx - CNS.      ID consult called for evaluation of sepsis.      Recommend:    - Pt has low grade temp (100.3), leukocytosis, and tachycardia.  Bcx are growing single set CNS, which is likely a contaminant.  Repeat blood cultures negative.  d/c abx    - Urine cx no growth, RVP is negative.    - Cxr shows pulm edema with b/l pleural effusions.  CT angio chest no PE or pna.  (+) effusions/pulm edema.  Also reported is a moderate superior endplate compression deformity of T12, this was seen on a prior CTap from May 2017.        - observe off abx, no signs of infection    Will follow,    Tanisha Ash  [phone]
78M w/ hx of DM2, CAD s/p CABG 2013, CHF EF 25%, CVA w/ residual L sided weakness, BPH p/w rigors. Pt has been in rehab since his prior discharge receiving IV antibiotics by PICC vs midline. He was treated for MRSA/pseudomonas sepsis and UTI. Line was taken out yesterday at rehab and pt sent home. Pt's family and wife state he has been having decreased PO for the past several days and had vomiting as well. At home today, pt seemed slightly more lethargic than usual and then developed shaking/rigors and seemed to be in mild distress with labored breathing according to his wife. She denies any recent dysuria, cough, diarrhea, abdominal pain, urine discoloration, chest pain or rash.     During prior admission in Nov, at [facility], pt was treated for MRSA bacteremia.  He was treated with triple abx therapy (2 week of gent, and 4 weeks of vanco/rifampin) in the setting of prosthetic aortic valve.  TTE was negative at that time, repeat bcx cleared, source was thought to be through skin entry (pt had a significant dermatitis presentation at that time).  Wife declined any further invasive procedures such as DICKSON or surgical intervention.      In ER: Given Zosyn, vancomycin, cipro, NS 2L, (16 Dec 2018 23:06).  ER vitals:  Tm 100.3, P 121, /91.  RR 26 (100% supp O2), WBC 12.2.  UA (+) LE/(-) nit, ucx (-).  RVP (-).  12/16 Bcx - CNS.      ID consult called for evaluation of sepsis.      Recommend:    - Pt has low grade temp (100.3), leukocytosis, and tachycardia.  Bcx are growing single set CNS, which is likely a contaminant.  Repeat blood cultures negative. Abx d/c'd on 12/20    - Urine cx no growth, RVP is negative.    - Cxr shows pulm edema with b/l pleural effusions.  CT angio chest no PE or pna.  (+) effusions/pulm edema.  Also reported is a moderate superior endplate compression deformity of T12, this was seen on a prior CTap from May 2017.        - observe off abx, no signs of infection.  No further ID w/u at this time.  Cont managment for CHF as per primary team        Tanisha Ash  [phone]
78M w/ hx of DM2, CAD s/p CABG 2013, CHF EF 25%, CVA w/ residual L sided weakness, BPH p/w rigors. Pt has been in rehab since his prior discharge receiving IV antibiotics by PICC vs midline. He was treated for MRSA/pseudomonas sepsis and UTI. Line was taken out yesterday at rehab and pt sent home. Pt's family and wife state he has been having decreased PO for the past several days and had vomiting as well. At home today, pt seemed slightly more lethargic than usual and then developed shaking/rigors and seemed to be in mild distress with labored breathing according to his wife. She denies any recent dysuria, cough, diarrhea, abdominal pain, urine discoloration, chest pain or rash.     During prior admission in Nov, at [facility], pt was treated for MRSA bacteremia.  He was treated with triple abx therapy (2 week of gent, and 4 weeks of vanco/rifampin) in the setting of prosthetic aortic valve.  TTE was negative at that time, repeat bcx cleared, source was thought to be through skin entry (pt had a significant dermatitis presentation at that time).  Wife declined any further invasive procedures such as DICKSON or surgical intervention.      In ER: Given Zosyn, vancomycin, cipro, NS 2L, (16 Dec 2018 23:06).  ER vitals:  Tm 100.3, P 121, /91.  RR 26 (100% supp O2), WBC 12.2.  UA (+) LE/(-) nit, ucx (-).  RVP (-).  12/16 Bcx - CNS.      ID consult called for evaluation of sepsis.      Recommend:      - Pt with recurrent low grade fevers (rectally) on 12/28 and 12/29.  Oral temps have been normal. Etiology unclear.  No leukocytosis on labwork.  UA negative, RVP neg.  Repeat cxr showed decreased efffusions and LLL atelectasis vs. "pna".  Thus, repeat CT chest ordered for further evaluation, and no consolidations/infiltrates reported.       -  Repeat blood cultures - NGTD    - CT head no new stroke process.  CT ap s/o stercoral colitis with stool impaction.  Complete 7 day course of cipro/flagyl.  Cont bowel regimen.  Ucx with <50K pseudomonas that is I to cipro.  UA was negative, suspect this is from colonization.  No need to treat at this time            Tanisha Ash  [phone]
78M w/ hx of DM2, CAD s/p CABG 2013, CHF EF 25%, CVA w/ residual L sided weakness, BPH p/w rigors. Pt has been in rehab since his prior discharge receiving IV antibiotics by PICC vs midline. He was treated for MRSA/pseudomonas sepsis and UTI. Line was taken out yesterday at rehab and pt sent home. Pt's family and wife state he has been having decreased PO for the past several days and had vomiting as well. At home today, pt seemed slightly more lethargic than usual and then developed shaking/rigors and seemed to be in mild distress with labored breathing according to his wife. She denies any recent dysuria, cough, diarrhea, abdominal pain, urine discoloration, chest pain or rash.     During prior admission in Nov, at [facility], pt was treated for MRSA bacteremia.  He was treated with triple abx therapy (2 week of gent, and 4 weeks of vanco/rifampin) in the setting of prosthetic aortic valve.  TTE was negative at that time, repeat bcx cleared, source was thought to be through skin entry (pt had a significant dermatitis presentation at that time).  Wife declined any further invasive procedures such as DICKSON or surgical intervention.      In ER: Given Zosyn, vancomycin, cipro, NS 2L, (16 Dec 2018 23:06).  ER vitals:  Tm 100.3, P 121, /91.  RR 26 (100% supp O2), WBC 12.2.  UA (+) LE/(-) nit, ucx (-).  RVP (-).  12/16 Bcx - CNS.      ID consult called for evaluation of sepsis.      Recommend:    - Pt has low grade temp (100.3), leukocytosis, and tachycardia.  Bcx are growing single set CNS, which is likely a contaminant.  Repeat blood cultures negative. Abx d/c'd on 12/20    - Urine cx no growth, RVP is negative.    - Cxr shows pulm edema with b/l pleural effusions.  CT angio chest no PE or pna.  (+) effusions/pulm edema.  Also reported is a moderate superior endplate compression deformity of T12, this was seen on a prior CTap from May 2017.        - Today pt with low grade fevers, etiology unclear.  No leukocytosis on labwork.  Cont to monitor temp curve.  If patient continues to have fever, please send blood cultures x 2, UA, ucx, cxr, and RVP.    Will reassess need for abx if clinically worse.        Tanisha Ash  [phone]
78M w/ hx of DM2, CAD s/p CABG 2013, CHF EF 25%, CVA w/ residual L sided weakness, BPH p/w rigors. Pt has been in rehab since his prior discharge receiving IV antibiotics by PICC vs midline. He was treated for MRSA/pseudomonas sepsis and UTI. Line was taken out yesterday at rehab and pt sent home. Pt's family and wife state he has been having decreased PO for the past several days and had vomiting as well. At home today, pt seemed slightly more lethargic than usual and then developed shaking/rigors and seemed to be in mild distress with labored breathing according to his wife. She denies any recent dysuria, cough, diarrhea, abdominal pain, urine discoloration, chest pain or rash.     During prior admission in Nov, at [facility], pt was treated for MRSA bacteremia.  He was treated with triple abx therapy (2 week of gent, and 4 weeks of vanco/rifampin) in the setting of prosthetic aortic valve.  TTE was negative at that time, repeat bcx cleared, source was thought to be through skin entry (pt had a significant dermatitis presentation at that time).  Wife declined any further invasive procedures such as DICKSON or surgical intervention.      In ER: Given Zosyn, vancomycin, cipro, NS 2L, (16 Dec 2018 23:06).  ER vitals:  Tm 100.3, P 121, /91.  RR 26 (100% supp O2), WBC 12.2.  UA (+) LE/(-) nit, ucx (-).  RVP (-).  12/16 Bcx - CNS.      ID consult called for evaluation of sepsis.      Recommend:    - Pt has low grade temp (100.3), leukocytosis, and tachycardia.  Bcx are growing single set CNS, which is likely a contaminant.  Repeat blood cultures negative. Abx d/c'd on 12/20    - Urine cx no growth, RVP is negative.    - Cxr shows pulm edema with b/l pleural effusions.  CT angio chest no PE or pna.  (+) effusions/pulm edema.  Also reported is a moderate superior endplate compression deformity of T12, this was seen on a prior CTap from May 2017.        - No need for antibiotics at this time, no signs of infection or active sepsis.   Cont managment for CHF as per primary team    d/c planning as per primary team.          Tanisha Ash  [phone]
78M w/ hx of DM2, CAD s/p CABG 2013, CHF EF 25%, CVA w/ residual L sided weakness, BPH p/w rigors. Pt has been in rehab since his prior discharge receiving IV antibiotics by PICC vs midline. He was treated for MRSA/pseudomonas sepsis and UTI. Line was taken out yesterday at rehab and pt sent home. Pt's family and wife state he has been having decreased PO for the past several days and had vomiting as well. At home today, pt seemed slightly more lethargic than usual and then developed shaking/rigors and seemed to be in mild distress with labored breathing according to his wife. She denies any recent dysuria, cough, diarrhea, abdominal pain, urine discoloration, chest pain or rash.     During prior admission in Nov, at [facility], pt was treated for MRSA bacteremia.  He was treated with triple abx therapy (2 week of gent, and 4 weeks of vanco/rifampin) in the setting of prosthetic aortic valve.  TTE was negative at that time, repeat bcx cleared, source was thought to be through skin entry (pt had a significant dermatitis presentation at that time).  Wife declined any further invasive procedures such as DICKSON or surgical intervention.      In ER: Given Zosyn, vancomycin, cipro, NS 2L, (16 Dec 2018 23:06).  ER vitals:  Tm 100.3, P 121, /91.  RR 26 (100% supp O2), WBC 12.2.  UA (+) LE/(-) nit, ucx (-).  RVP (-).  12/16 Bcx - CNS.      ID consult called for evaluation of sepsis.      Recommend:      - Pt with recurrent low grade fevers (rectally) on 12/28 and 12/29.  Oral temps have been normal. Etiology unclear.  No leukocytosis on labwork.  UA negative, RVP neg.  Repeat cxr showed decreased efffusions and LLL atelectasis vs. "pna".  Thus, repeat CT chest ordered for further evaluation, and no consolidations/infiltrates reported.       -  Repeat blood cultures - NGTD    - Pt still with increased lethargy than his baseline, and as per wife, pt not responding or waking up to her voice.  Has opened his eyes occasionally.  Recommend CT head to r/o new stroke vs. r/o intra-abd infectious focus with CT a/p.        d/w son and wife at bedside.     Tanisha Ash  [phone]
78M w/ hx of DM2, CAD s/p CABG 2013, CHF EF 25%, CVA w/ residual L sided weakness, BPH p/w rigors. Pt has been in rehab since his prior discharge receiving IV antibiotics by PICC vs midline. He was treated for MRSA/pseudomonas sepsis and UTI. Line was taken out yesterday at rehab and pt sent home. Pt's family and wife state he has been having decreased PO for the past several days and had vomiting as well. At home today, pt seemed slightly more lethargic than usual and then developed shaking/rigors and seemed to be in mild distress with labored breathing according to his wife. She denies any recent dysuria, cough, diarrhea, abdominal pain, urine discoloration, chest pain or rash.     During prior admission in Nov, at [facility], pt was treated for MRSA bacteremia.  He was treated with triple abx therapy (2 week of gent, and 4 weeks of vanco/rifampin) in the setting of prosthetic aortic valve.  TTE was negative at that time, repeat bcx cleared, source was thought to be through skin entry (pt had a significant dermatitis presentation at that time).  Wife declined any further invasive procedures such as DICKSON or surgical intervention.      In ER: Given Zosyn, vancomycin, cipro, NS 2L, (16 Dec 2018 23:06).  ER vitals:  Tm 100.3, P 121, /91.  RR 26 (100% supp O2), WBC 12.2.  UA (+) LE/(-) nit, ucx (-).  RVP (-).  12/16 Bcx - CNS.      ID consult called for evaluation of sepsis.      Recommend:    - Pt has low grade temp (100.3), leukocytosis, and tachycardia.  Bcx are growing single set CNS, which could be a contaminant.  Check repeat blood cultures to ensure clearance.  Will cont vancomycin in the setting of bioprosthetic valve.    - Urine cx no growth, RVP is negative.    - Cxr shows pulm edema with b/l pleural effusions.  CT angio chest no PE or pna.  (+) effusions/pulm edema.  Also reported is a moderate superior endplate compression deformity of T12, this was seen on a prior CTap from May 2017.      - Recommend echocardiogram    - Will cont current abx with vanco/cefepime for now, if repeat bcx negative, will observe off abx.    Will follow,    Tanisha Ash  [phone]
78M w/ hx of DM2, CAD s/p CABG 2013, CHF EF 25%, CVA w/ residual L sided weakness, BPH p/w rigors. Pt has been in rehab since his prior discharge receiving IV antibiotics by PICC vs midline. He was treated for MRSA/pseudomonas sepsis and UTI. Line was taken out yesterday at rehab and pt sent home. Pt's family and wife state he has been having decreased PO for the past several days and had vomiting as well. At home today, pt seemed slightly more lethargic than usual and then developed shaking/rigors and seemed to be in mild distress with labored breathing according to his wife. She denies any recent dysuria, cough, diarrhea, abdominal pain, urine discoloration, chest pain or rash.     During prior admission in Nov, at [facility], pt was treated for MRSA bacteremia.  He was treated with triple abx therapy (2 week of gent, and 4 weeks of vanco/rifampin) in the setting of prosthetic aortic valve.  TTE was negative at that time, repeat bcx cleared, source was thought to be through skin entry (pt had a significant dermatitis presentation at that time).  Wife declined any further invasive procedures such as DICKSON or surgical intervention.      In ER: Given Zosyn, vancomycin, cipro, NS 2L, (16 Dec 2018 23:06).  ER vitals:  Tm 100.3, P 121, /91.  RR 26 (100% supp O2), WBC 12.2.  UA (+) LE/(-) nit, ucx (-).  RVP (-).  12/16 Bcx - CNS.      ID consult called for evaluation of sepsis.      Recommend:      - Pt with recurrent low grade fevers (rectally) on 12/28 and 12/29. CT chest ordered for further evaluation, and no consolidations/infiltrates reported.       -  Repeat blood cultures - NGTD    - CT head no new stroke process.  CT ap s/o stercoral colitis with stool impaction.  Complete 7 day course of cipro/flagyl.  Cont bowel regimen.  Ucx with <50K pseudomonas that is I to cipro/zosyn/azactam.  UA was negative, suspect this is from colonization.  No need to treat at this time    - Pt with low grade temp of 100.2, will cont to monitor for fever.            Tanisha Ash  [phone]
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  Plan: Suspect related to fluid overload given cardiac hx and elevated pBNP. Started on BIPAP. Given pulmonary edema, will give IV lasix and reassess. Pt noted to still be tachycardic to 130s. Unclear if related to respiratory distress vs sepsis.  -Continue O2  Lasix 40mg IVP  Repeat TTE     Problem/Plan - 2:  ·  Problem: Sepsis, due to unspecified organism.  Plan: No leukocytosis or recorded fevers but given borderline UA and medical history will cover broadly  -Cont. vancomycin and cefepime for now      Problem/Plan - 4:  ·  Problem: CVA, old, hemiparesis.  Plan: Residual weakness. Now bed bound, reportedly with stage II, ulcer.  -Wound care consult      Problem/Plan - 5:  ·  Problem: Type 2 diabetes mellitus with complication, unspecified whether long term insulin use.  Plan: On PO meds at home.  -Hold metformin given inpatient admissions, lactic acid  -Fingersticks and sliding scale.     Problem/Plan - 6:  Problem: Essential hypertension. Plan: -Trend vital signs  -Hold lisinopril for now.    Problem/Plan - 7:  ·  Problem: Prophylactic measure.  Plan: DVT PPx  -SCDs.
· Assessment	  78M w/ hx of DM2, CAD s/p CABG 2013, CHF EF 25%, CVA w/ residual L sided weakness, BPH p/w rigors concerning for sepsis possibly from UTI and now with hypoxic respiratory failure suspicious for CHF         Problem/Plan - ·  Problem: Acute respiratory failure with hypoxia.  Plan:     PO lasix  Pul/Cardio f/up noted       Problem/Plan - 3:  ·  Problem: Coronary artery disease involving coronary bypass graft of native heart without angina pectoris.  Plan:   -Cont. asa  -Cont. simvastatin     Problem/Plan - 4:  ·  Problem: CVA, old, hemiparesis.  Plan: Residual weakness. Now bed bound,
· Assessment	  78M w/ hx of DM2, CAD s/p CABG 2013, CHF EF 25%, CVA w/ residual L sided weakness, BPH p/w rigors concerning for sepsis possibly from UTI and now with hypoxic respiratory failure suspicious for CHF         Problem/Plan - ·  Problem: Acute respiratory failure with hypoxia.  Plan:     PO lasix  Pul/Cardio f/up noted       Problem/Plan - 3:  ·  Problem: Coronary artery disease involving coronary bypass graft of native heart without angina pectoris.  Plan:   -Cont. asa  -Cont. simvastatin     Problem/Plan - 4:  ·  Problem: CVA, old, hemiparesis.  Plan: Residual weakness. Now bed bound,
· Assessment	  78M w/ hx of DM2, CAD s/p CABG 2013, CHF EF 25%, CVA w/ residual L sided weakness, BPH p/w rigors concerning for sepsis possibly from UTI and now with hypoxic respiratory failure suspicious for CHF         Problem/Plan - ·  Problem: Acute respiratory failure with hypoxia.  Plan:   - BIPAP PRN    IV lasix  Pul/Cardio f/up noted       Problem/Plan - 3:  ·  Problem: Coronary artery disease involving coronary bypass graft of native heart without angina pectoris.  Plan:   -Cont. asa  -Cont. simvastatin     Problem/Plan - 4:  ·  Problem: CVA, old, hemiparesis.  Plan: Residual weakness. Now bed bound,        Problem/Plan - 5:  ·  Problem: Type 2 diabetes mellitus with complication, unspecified whether long term insulin use.  Plan: On PO meds at home.  -Fingersticks and sliding scale.
· Assessment	  78M w/ hx of DM2, CAD s/p CABG 2013, CHF EF 25%, CVA w/ residual L sided weakness, BPH p/w rigors concerning for sepsis possibly from UTI and now with hypoxic respiratory failure suspicious for CHF         Problem/Plan - ·  Problem: Acute respiratory failure with hypoxia.  Plan:   - BIPAP PRN    IV lasix  Pul/Cardio f/up noted       Problem/Plan - 3:  ·  Problem: Coronary artery disease involving coronary bypass graft of native heart without angina pectoris.  Plan:   -Cont. asa  -Cont. simvastatin     Problem/Plan - 4:  ·  Problem: CVA, old, hemiparesis.  Plan: Residual weakness. Now bed bound,     Dw pt's wife and son.
· Assessment	  78M w/ hx of DM2, CAD s/p CABG 2013, CHF EF 25%, CVA w/ residual L sided weakness, BPH p/w rigors concerning for sepsis possibly from UTI and now with hypoxic respiratory failure suspicious for CHF         Problem/Plan - ·  Problem: Acute respiratory failure with hypoxia.  Plan:   - BIPAP not needed so far    IV lasix  Pul/Cardio f/up noted       Problem/Plan - 3:  ·  Problem: Coronary artery disease involving coronary bypass graft of native heart without angina pectoris.  Plan:   -Cont. asa  -Cont. simvastatin     Problem/Plan - 4:  ·  Problem: CVA, old, hemiparesis.  Plan: Residual weakness. Now bed bound,
· Assessment	  78M w/ hx of DM2, CAD s/p CABG 2013, CHF EF 25%, CVA w/ residual L sided weakness, BPH p/w rigors concerning for sepsis possibly from UTI and now with hypoxic respiratory failure suspicious for CHF      Fever:  Low grade  ID f/up noted.  Observe off abx at present    Dw family.
· Assessment	  78M w/ hx of DM2, CAD s/p CABG 2013, CHF EF 25%, CVA w/ residual L sided weakness, BPH p/w rigors concerning for sepsis possibly from UTI and now with hypoxic respiratory failure suspicious for CHF      Fever:  Low grade  ID/Pul f/up noted.  CT Head/Abd/P  Observe off abx at present    Dw family.
· Assessment	  78M w/ hx of DM2, CAD s/p CABG 2013, CHF EF 25%, CVA w/ residual L sided weakness, BPH p/w rigors concerning for sepsis possibly from UTI and now with hypoxic respiratory failure suspicious for CHF      Stercoral colitis:    ID/Pul f/up noted.  IV cipro/flagyl    Dw family.
· Assessment	  78M w/ hx of DM2, CAD s/p CABG 2013, CHF EF 25%, CVA w/ residual L sided weakness, BPH p/w rigors concerning for sepsis possibly from UTI and now with hypoxic respiratory failure suspicious for CHF      Stercoral colitis:    ID/Pul f/up noted.  IV cipro/flagyl    Dw family.
· Assessment	  78M w/ hx of DM2, CAD s/p CABG 2013, CHF EF 25%, CVA w/ residual L sided weakness, BPH p/w rigors concerning for sepsis possibly from UTI and now with hypoxic respiratory failure suspicious for CHF     Problem/Plan - 1:  ·  Problem: Acute respiratory failure with hypoxia.  Plan: Suspect related to fluid overload given cardiac hx and elevated pBNP. Started on BIPAP. Given pulmonary edema, will give IV lasix and reassess. Pt noted to still be tachycardic to 130s. Unclear if related to respiratory distress vs sepsis.  -Cont. BIPAP overnight   s/p IV lasix  Pul eval noted       Problem/Plan - 2:  ·  Problem: Sepsis, due to unspecified organism.  Plan: No leukocytosis or recorded fevers but given borderline UA and medical history will cover broadly  -Cont. vancomycin and cefepime   ID eval noted.     Problem/Plan - 3:  ·  Problem: Coronary artery disease involving coronary bypass graft of native heart without angina pectoris.  Plan: S/p CABG 2013. Reportedly with EF 25% as per family but 45% on recent TTE.  -Cont. asa  -Cont. simvastatin     Problem/Plan - 4:  ·  Problem: CVA, old, hemiparesis.  Plan: Residual weakness. Now bed bound, reportedly with stage II, ulcer.  - PT       Problem/Plan - 5:  ·  Problem: Type 2 diabetes mellitus with complication, unspecified whether long term insulin use.  Plan: On PO meds at home.  -Fingersticks and sliding scale.
· Assessment	  78M w/ hx of DM2, CAD s/p CABG 2013, CHF EF 25%, CVA w/ residual L sided weakness, BPH p/w rigors concerning for sepsis possibly from UTI and now with hypoxic respiratory failure suspicious for CHF    Hypernatremia:  IVF  BMP    Stercoral colitis:    ID/Pul f/up noted.  IV cipro/flagyl    Dw family.
· Assessment	  78M w/ hx of DM2, CAD s/p CABG 2013, CHF EF 25%, CVA w/ residual L sided weakness, BPH p/w rigors concerning for sepsis possibly from UTI and now with hypoxic respiratory failure suspicious for CHF    S. Tachycardia:  Tele  EP eval noted.     Problem/Plan - ·  Problem: Acute respiratory failure with hypoxia.  Plan: Suspect related to fluid overload given cardiac hx and elevated pBNP. Started on BIPAP. Given pulmonary edema, will give IV lasix and reassess. Pt noted to still be tachycardic to 130s. Unclear if related to respiratory distress vs sepsis.  -Cont. BIPAP overnight    IV lasix  Pul f/up noted       Problem/Plan - 2:  ·  Problem: Sepsis,Ruled out.  Off abx.  -     Problem/Plan - 3:  ·  Problem: Coronary artery disease involving coronary bypass graft of native heart without angina pectoris.  Plan: S/p CABG 2013. Reportedly with EF 25% as per family but 45% on recent TTE.  -Cont. asa  -Cont. simvastatin     Problem/Plan - 4:  ·  Problem: CVA, old, hemiparesis.  Plan: Residual weakness. Now bed bound,        Problem/Plan - 5:  ·  Problem: Type 2 diabetes mellitus with complication, unspecified whether long term insulin use.  Plan: On PO meds at home.  -Fingersticks and sliding scale.
· Assessment	  78M w/ hx of DM2, CAD s/p CABG 2013, CHF EF 25%, CVA w/ residual L sided weakness, BPH p/w rigors concerning for sepsis possibly from UTI and now with hypoxic respiratory failure suspicious for CHF    S. Tachycardia:  Tele  EP eval.     Problem/Plan - ·  Problem: Acute respiratory failure with hypoxia.  Plan: Suspect related to fluid overload given cardiac hx and elevated pBNP. Started on BIPAP. Given pulmonary edema, will give IV lasix and reassess. Pt noted to still be tachycardic to 130s. Unclear if related to respiratory distress vs sepsis.  -Cont. BIPAP overnight   s/p IV lasix  Pul f/up noted       Problem/Plan - 2:  ·  Problem: Sepsis, due to unspecified organism.  Plan: No leukocytosis or recorded fevers but given borderline UA and medical history will cover broadly  -Cont. vancomycin and cefepime   ID f/up noted.     Problem/Plan - 3:  ·  Problem: Coronary artery disease involving coronary bypass graft of native heart without angina pectoris.  Plan: S/p CABG 2013. Reportedly with EF 25% as per family but 45% on recent TTE.  -Cont. asa  -Cont. simvastatin     Problem/Plan - 4:  ·  Problem: CVA, old, hemiparesis.  Plan: Residual weakness. Now bed bound,   - PT       Problem/Plan - 5:  ·  Problem: Type 2 diabetes mellitus with complication, unspecified whether long term insulin use.  Plan: On PO meds at home.  -Fingersticks and sliding scale.
78M w/ hx of DM2, CAD s/p CABG 2013, CHF EF 25%, CVA w/ residual L sided weakness, BPH p/w rigors concerning for sepsis possibly from UTI and now with hypoxic respiratory failure suspicious for CHF

## 2019-01-04 LAB — GLUCOSE BLDC GLUCOMTR-MCNC: 271 MG/DL — HIGH (ref 70–99)

## 2019-01-12 ENCOUNTER — INPATIENT (INPATIENT)
Facility: HOSPITAL | Age: 79
LOS: 20 days | Discharge: HOME | DRG: 682 | End: 2019-02-02
Attending: INTERNAL MEDICINE | Admitting: INTERNAL MEDICINE
Payer: MEDICARE

## 2019-01-12 VITALS
OXYGEN SATURATION: 100 % | HEART RATE: 86 BPM | SYSTOLIC BLOOD PRESSURE: 105 MMHG | RESPIRATION RATE: 17 BRPM | DIASTOLIC BLOOD PRESSURE: 75 MMHG | TEMPERATURE: 98 F

## 2019-01-12 DIAGNOSIS — I24.8 OTHER FORMS OF ACUTE ISCHEMIC HEART DISEASE: ICD-10-CM

## 2019-01-12 DIAGNOSIS — I50.22 CHRONIC SYSTOLIC (CONGESTIVE) HEART FAILURE: ICD-10-CM

## 2019-01-12 DIAGNOSIS — Z79.899 OTHER LONG TERM (CURRENT) DRUG THERAPY: ICD-10-CM

## 2019-01-12 DIAGNOSIS — R73.9 HYPERGLYCEMIA, UNSPECIFIED: ICD-10-CM

## 2019-01-12 DIAGNOSIS — G93.40 ENCEPHALOPATHY, UNSPECIFIED: ICD-10-CM

## 2019-01-12 DIAGNOSIS — Z71.89 OTHER SPECIFIED COUNSELING: ICD-10-CM

## 2019-01-12 DIAGNOSIS — E87.2 ACIDOSIS: ICD-10-CM

## 2019-01-12 DIAGNOSIS — N17.9 ACUTE KIDNEY FAILURE, UNSPECIFIED: ICD-10-CM

## 2019-01-12 DIAGNOSIS — E87.0 HYPEROSMOLALITY AND HYPERNATREMIA: ICD-10-CM

## 2019-01-12 LAB
ALBUMIN SERPL ELPH-MCNC: 3.6 G/DL — SIGNIFICANT CHANGE UP (ref 3.3–5)
ALP SERPL-CCNC: 50 U/L — SIGNIFICANT CHANGE UP (ref 40–120)
ALT FLD-CCNC: 32 U/L — SIGNIFICANT CHANGE UP (ref 10–45)
ANION GAP SERPL CALC-SCNC: 16 MMOL/L — SIGNIFICANT CHANGE UP (ref 5–17)
APPEARANCE UR: CLEAR — SIGNIFICANT CHANGE UP
APTT BLD: 25 SEC — LOW (ref 27.5–36.3)
AST SERPL-CCNC: 21 U/L — SIGNIFICANT CHANGE UP (ref 10–40)
BACTERIA # UR AUTO: NEGATIVE — SIGNIFICANT CHANGE UP
BASE EXCESS BLDV CALC-SCNC: 1.9 MMOL/L — SIGNIFICANT CHANGE UP (ref -2–2)
BASOPHILS # BLD AUTO: 0.1 K/UL — SIGNIFICANT CHANGE UP (ref 0–0.2)
BASOPHILS NFR BLD AUTO: 0.6 % — SIGNIFICANT CHANGE UP (ref 0–2)
BILIRUB SERPL-MCNC: 0.3 MG/DL — SIGNIFICANT CHANGE UP (ref 0.2–1.2)
BILIRUB UR-MCNC: NEGATIVE — SIGNIFICANT CHANGE UP
BUN SERPL-MCNC: 60 MG/DL — HIGH (ref 7–23)
CA-I SERPL-SCNC: 1.28 MMOL/L — SIGNIFICANT CHANGE UP (ref 1.12–1.3)
CALCIUM SERPL-MCNC: 9.2 MG/DL — SIGNIFICANT CHANGE UP (ref 8.4–10.5)
CHLORIDE BLDV-SCNC: 120 MMOL/L — HIGH (ref 96–108)
CHLORIDE SERPL-SCNC: 117 MMOL/L — HIGH (ref 96–108)
CO2 BLDV-SCNC: 30 MMOL/L — SIGNIFICANT CHANGE UP (ref 22–30)
CO2 SERPL-SCNC: 25 MMOL/L — SIGNIFICANT CHANGE UP (ref 22–31)
COLOR SPEC: YELLOW — SIGNIFICANT CHANGE UP
CREAT SERPL-MCNC: 1.59 MG/DL — HIGH (ref 0.5–1.3)
DIFF PNL FLD: NEGATIVE — SIGNIFICANT CHANGE UP
EOSINOPHIL # BLD AUTO: 0.2 K/UL — SIGNIFICANT CHANGE UP (ref 0–0.5)
EOSINOPHIL NFR BLD AUTO: 2 % — SIGNIFICANT CHANGE UP (ref 0–6)
EPI CELLS # UR: 0 /HPF — SIGNIFICANT CHANGE UP
GAS PNL BLDV: 156 MMOL/L — HIGH (ref 136–145)
GAS PNL BLDV: SIGNIFICANT CHANGE UP
GLUCOSE BLDC GLUCOMTR-MCNC: 311 MG/DL — HIGH (ref 70–99)
GLUCOSE BLDV-MCNC: 332 MG/DL — HIGH (ref 70–99)
GLUCOSE SERPL-MCNC: 356 MG/DL — HIGH (ref 70–99)
GLUCOSE UR QL: ABNORMAL
HCO3 BLDV-SCNC: 28 MMOL/L — SIGNIFICANT CHANGE UP (ref 21–29)
HCT VFR BLD CALC: 39 % — SIGNIFICANT CHANGE UP (ref 39–50)
HCT VFR BLDA CALC: 39 % — SIGNIFICANT CHANGE UP (ref 39–50)
HGB BLD CALC-MCNC: 12.6 G/DL — LOW (ref 13–17)
HGB BLD-MCNC: 13.2 G/DL — SIGNIFICANT CHANGE UP (ref 13–17)
HYALINE CASTS # UR AUTO: 4 /LPF — HIGH (ref 0–2)
INR BLD: 1.03 RATIO — SIGNIFICANT CHANGE UP (ref 0.88–1.16)
KETONES UR-MCNC: NEGATIVE — SIGNIFICANT CHANGE UP
LACTATE BLDV-MCNC: 4.1 MMOL/L — CRITICAL HIGH (ref 0.7–2)
LEUKOCYTE ESTERASE UR-ACNC: NEGATIVE — SIGNIFICANT CHANGE UP
LYMPHOCYTES # BLD AUTO: 2.4 K/UL — SIGNIFICANT CHANGE UP (ref 1–3.3)
LYMPHOCYTES # BLD AUTO: 27.4 % — SIGNIFICANT CHANGE UP (ref 13–44)
MAGNESIUM SERPL-MCNC: 2.3 MG/DL — SIGNIFICANT CHANGE UP (ref 1.6–2.6)
MCHC RBC-ENTMCNC: 33.8 GM/DL — SIGNIFICANT CHANGE UP (ref 32–36)
MCHC RBC-ENTMCNC: 34.1 PG — HIGH (ref 27–34)
MCV RBC AUTO: 101 FL — HIGH (ref 80–100)
MONOCYTES # BLD AUTO: 0.6 K/UL — SIGNIFICANT CHANGE UP (ref 0–0.9)
MONOCYTES NFR BLD AUTO: 6.3 % — SIGNIFICANT CHANGE UP (ref 2–14)
NEUTROPHILS # BLD AUTO: 5.6 K/UL — SIGNIFICANT CHANGE UP (ref 1.8–7.4)
NEUTROPHILS NFR BLD AUTO: 63.8 % — SIGNIFICANT CHANGE UP (ref 43–77)
NITRITE UR-MCNC: NEGATIVE — SIGNIFICANT CHANGE UP
NT-PROBNP SERPL-SCNC: HIGH PG/ML (ref 0–300)
PCO2 BLDV: 54 MMHG — HIGH (ref 35–50)
PH BLDV: 7.34 — LOW (ref 7.35–7.45)
PH UR: 5.5 — SIGNIFICANT CHANGE UP (ref 5–8)
PLATELET # BLD AUTO: 119 K/UL — LOW (ref 150–400)
PO2 BLDV: 33 MMHG — SIGNIFICANT CHANGE UP (ref 25–45)
POTASSIUM BLDV-SCNC: 3.8 MMOL/L — SIGNIFICANT CHANGE UP (ref 3.5–5.3)
POTASSIUM SERPL-MCNC: 3.6 MMOL/L — SIGNIFICANT CHANGE UP (ref 3.5–5.3)
POTASSIUM SERPL-SCNC: 3.6 MMOL/L — SIGNIFICANT CHANGE UP (ref 3.5–5.3)
PROT SERPL-MCNC: 6.4 G/DL — SIGNIFICANT CHANGE UP (ref 6–8.3)
PROT UR-MCNC: ABNORMAL
PROTHROM AB SERPL-ACNC: 11.8 SEC — SIGNIFICANT CHANGE UP (ref 10–12.9)
RBC # BLD: 3.87 M/UL — LOW (ref 4.2–5.8)
RBC # FLD: 12.3 % — SIGNIFICANT CHANGE UP (ref 10.3–14.5)
RBC CASTS # UR COMP ASSIST: 3 /HPF — SIGNIFICANT CHANGE UP (ref 0–4)
SAO2 % BLDV: 51 % — LOW (ref 67–88)
SODIUM SERPL-SCNC: 158 MMOL/L — HIGH (ref 135–145)
SP GR SPEC: 1.02 — SIGNIFICANT CHANGE UP (ref 1.01–1.02)
TROPONIN T, HIGH SENSITIVITY RESULT: 145 NG/L — HIGH (ref 0–51)
UROBILINOGEN FLD QL: NEGATIVE — SIGNIFICANT CHANGE UP
WBC # BLD: 8.8 K/UL — SIGNIFICANT CHANGE UP (ref 3.8–10.5)
WBC # FLD AUTO: 8.8 K/UL — SIGNIFICANT CHANGE UP (ref 3.8–10.5)
WBC UR QL: 1 /HPF — SIGNIFICANT CHANGE UP (ref 0–5)

## 2019-01-12 PROCEDURE — 70450 CT HEAD/BRAIN W/O DYE: CPT | Mod: 26

## 2019-01-12 PROCEDURE — 99497 ADVNCD CARE PLAN 30 MIN: CPT | Mod: 25

## 2019-01-12 PROCEDURE — 74176 CT ABD & PELVIS W/O CONTRAST: CPT | Mod: 26

## 2019-01-12 PROCEDURE — 93010 ELECTROCARDIOGRAM REPORT: CPT

## 2019-01-12 PROCEDURE — 99285 EMERGENCY DEPT VISIT HI MDM: CPT | Mod: 25

## 2019-01-12 PROCEDURE — 99223 1ST HOSP IP/OBS HIGH 75: CPT

## 2019-01-12 PROCEDURE — 71045 X-RAY EXAM CHEST 1 VIEW: CPT | Mod: 26

## 2019-01-12 RX ORDER — DEXTROSE 50 % IN WATER 50 %
15 SYRINGE (ML) INTRAVENOUS ONCE
Qty: 0 | Refills: 0 | Status: DISCONTINUED | OUTPATIENT
Start: 2019-01-12 | End: 2019-01-15

## 2019-01-12 RX ORDER — SODIUM CHLORIDE 9 MG/ML
500 INJECTION, SOLUTION INTRAVENOUS ONCE
Qty: 0 | Refills: 0 | Status: COMPLETED | OUTPATIENT
Start: 2019-01-12 | End: 2019-01-12

## 2019-01-12 RX ORDER — DEXTROSE 50 % IN WATER 50 %
25 SYRINGE (ML) INTRAVENOUS ONCE
Qty: 0 | Refills: 0 | Status: DISCONTINUED | OUTPATIENT
Start: 2019-01-12 | End: 2019-01-15

## 2019-01-12 RX ORDER — INSULIN LISPRO 100/ML
VIAL (ML) SUBCUTANEOUS EVERY 6 HOURS
Qty: 0 | Refills: 0 | Status: DISCONTINUED | OUTPATIENT
Start: 2019-01-12 | End: 2019-01-15

## 2019-01-12 RX ORDER — METFORMIN HYDROCHLORIDE 850 MG/1
1 TABLET ORAL
Qty: 0 | Refills: 0 | COMMUNITY

## 2019-01-12 RX ORDER — INSULIN GLARGINE 100 [IU]/ML
5 INJECTION, SOLUTION SUBCUTANEOUS ONCE
Qty: 0 | Refills: 0 | Status: COMPLETED | OUTPATIENT
Start: 2019-01-12 | End: 2019-01-12

## 2019-01-12 RX ORDER — GLUCAGON INJECTION, SOLUTION 0.5 MG/.1ML
1 INJECTION, SOLUTION SUBCUTANEOUS ONCE
Qty: 0 | Refills: 0 | Status: DISCONTINUED | OUTPATIENT
Start: 2019-01-12 | End: 2019-01-15

## 2019-01-12 RX ORDER — DEXTROSE 50 % IN WATER 50 %
12.5 SYRINGE (ML) INTRAVENOUS ONCE
Qty: 0 | Refills: 0 | Status: DISCONTINUED | OUTPATIENT
Start: 2019-01-12 | End: 2019-01-15

## 2019-01-12 RX ORDER — HEPARIN SODIUM 5000 [USP'U]/ML
5000 INJECTION INTRAVENOUS; SUBCUTANEOUS EVERY 8 HOURS
Qty: 0 | Refills: 0 | Status: DISCONTINUED | OUTPATIENT
Start: 2019-01-12 | End: 2019-02-02

## 2019-01-12 RX ADMIN — HEPARIN SODIUM 5000 UNIT(S): 5000 INJECTION INTRAVENOUS; SUBCUTANEOUS at 22:30

## 2019-01-12 RX ADMIN — Medication 4: at 23:56

## 2019-01-12 RX ADMIN — INSULIN GLARGINE 5 UNIT(S): 100 INJECTION, SOLUTION SUBCUTANEOUS at 22:28

## 2019-01-12 RX ADMIN — SODIUM CHLORIDE 500 MILLILITER(S): 9 INJECTION, SOLUTION INTRAVENOUS at 22:32

## 2019-01-12 NOTE — H&P ADULT - PROBLEM SELECTOR PLAN 5
-Baseline creatinine is about 1.0; likely this is hypovolemic mediated in setting of using diuretics and not drinking or eating at home.  -will hold diuretics for now  -renally dose all meds  -avoid nephrotoxins  -monitor UOP

## 2019-01-12 NOTE — ED PROVIDER NOTE - OBJECTIVE STATEMENT
79 yo male with pmh of previous CVA with Left sided weaeenss, (bedridden), CAD, htn, DMII (on oral meds only) presenting per PMD/Cardiologists' recommendation Dr. Matt Trevizo for increased weakness insetting of hypernatremis of 166 on yesterdays blood work. Pt at baseline poor historian, wife at [facility] giving interim hx, notes pt has been weak, having increased lethargy, decreased PO intake, FS in 300-400 yesterday and today. No fevers, chills, nausea, vomiting, abdominal pain, no cough/congestion, no urine malodor or hematuria. 79 yo male with pmh of previous CVA with Left sided weaeenss, (bedridden), CAD, htn, DMII (on oral meds only) presenting per PMD/Cardiologists' recommendation Dr. Matt Trevizo for increased weakness insetting of hypernatremis of 166 on yesterdays blood work. Pt at baseline poor historian, wife at beside giving interim hx, notes pt has been weak, having increased lethargy, decreased PO intake, FS in 300-400 yesterday and today. No fevers, chills, nausea, vomiting, abdominal pain, no cough/congestion, no urine malodor or hematuria.

## 2019-01-12 NOTE — ED ADULT NURSE NOTE - CHIEF COMPLAINT
The patient is a 78y Male complaining of dec The patient is a 78y Male complaining of decreased po intake, elevated glucose

## 2019-01-12 NOTE — ED ADULT NURSE NOTE - NSIMPLEMENTINTERV_GEN_ALL_ED
Implemented All Fall with Harm Risk Interventions:  [place] to call system. Call bell, personal items and telephone within reach. Instruct patient to call for assistance. Room bathroom lighting operational. Non-slip footwear when patient is off stretcher. Physically safe environment: no spills, clutter or unnecessary equipment. Stretcher in lowest position, wheels locked, appropriate side rails in place. Provide visual cue, wrist band, yellow gown, etc. Monitor gait and stability. Monitor for mental status changes and reorient to person, place, and time. Review medications for side effects contributing to fall risk. Reinforce activity limits and safety measures with patient and family. Provide visual clues: red socks.

## 2019-01-12 NOTE — ED ADULT NURSE NOTE - OBJECTIVE STATEMENT
Pt bib EMS from home with NS into IV in right forearm,  He had labs done yesterday and was found to be hyperglycemic.  Upon arrival in ED, he is non-verbal, which is his baseline per his wife. Fevers reported last week, none this week.  He was recently seen in ED for CHF exacerbation, previous to that he received treatment for sepsis and was in nursing home receiving antibiotics.  He has residual left sided weakness from previus stroke, but no contractures present.  both heels have blanchable red areas present.  Decubiti present both gluteal areas near gluteal fold. ( Sites described in pressure sore are of chart.)

## 2019-01-12 NOTE — H&P ADULT - PROBLEM SELECTOR PLAN 3
-see ddx above.  -another potential etiology is metformin lactic acidosis.  -Will give 500 cc LR challenge slowly keeping in mind CHF hx. Pt has hyperchloremia of 117, try to avoid NS.

## 2019-01-12 NOTE — ED PROVIDER NOTE - CARE PLAN
Principal Discharge DX:	Hyperglycemia  Secondary Diagnosis:	Dehydration, mild  Secondary Diagnosis:	Altered mental status

## 2019-01-12 NOTE — H&P ADULT - PROBLEM SELECTOR PLAN 7
-proBNP is elevated but given the significant dehydration state, pt would benefit from IVF  and hold diuretics  -check CXR  -monitor on telemetry  -TTE pending

## 2019-01-12 NOTE — ED ADULT NURSE NOTE - PMH
CVA, old, hemiparesis  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  Diabetes mellitus    Gastric ulcer    GI bleed    Hyperlipemia    Hypertension    PUD (peptic ulcer disease)    Sepsis    UTI (urinary tract infection)

## 2019-01-12 NOTE — H&P ADULT - PROBLEM SELECTOR PLAN 8
-Spent total time of 30minutes at patient's beside. I had long discussion with both son Kleber and spouse at bedside regarding patient's wishes. Patient is not able to make his needs know. Per our discussion, family would like all interventions pursued including CPR, intubation, pressors etc... if the situation required. Family's phone number listed in HPI. Emotional support provided. Questions answered.

## 2019-01-12 NOTE — ED PROVIDER NOTE - ATTENDING CONTRIBUTION TO CARE
------------ATTENDING NOTE------------   pt w/ wife sent to ED by PCP for concerns of hyperglycemia and electrolyte abnormalities on recent outpt labs, pt has baseline dementia and limited function but wife describes recent increased fatigue and generalized weakness and more confusion, subjective fevers past several days, recent CHF exacerbation, increase in diuretic but decreased PO per wife, awaiting labs/imaging and dw PCP and close reevaluations -->  - Stepahn Farrell MD   --------------------------------------------------- ------------ATTENDING NOTE------------   pt w/ wife sent to ED by PCP for concerns of hyperglycemia and electrolyte abnormalities on recent outpt labs, pt has baseline dementia and limited function but wife describes recent increased fatigue and generalized weakness and more confusion, subjective fevers past several days, recent CHF exacerbation, increase in diuretic but decreased PO per wife, awaiting labs/imaging and dw PCP and close reevaluations --> hyperglycemia w/o DKA, clear CXR likely intravascular volume low and still overload, elevated BNP/Tn w/ CKD, met by hospitalist for additional orders/tx, pending their choice for empiric antibiotics.  - Stephan Farrell MD   ---------------------------------------------------

## 2019-01-12 NOTE — H&P ADULT - HISTORY OF PRESENT ILLNESS
77 yo M w/ hx of CVA w/ left sided weakness, CAD w/ CABG 2013, prior hx of thoracic aortic aneurysm repair, HTN, DM2, systolic CHF (mild LV dysfunction in 11/2018), bioprosthetic aortic valve, BPH, presents with altered mental status. Patient is unable to provide any history. Hx obtained from son Kleber ([phone]) and spouse ([phone]) at bedside. Per family patient should not have been discharged from recent admission. They report patient was having fevers and was hypernatremic prior to discharge. The family report since coming home the patient has been more altered, sleeping more, hardly interactive, and to the point where now it is difficult to arouse him. He was admitted in November 2018 for MRSA bacteremia. DICKSON was not performed according to discharge paperwork bc family declined. However, spouse and son report that this was never the case. Patient was treated with prolonged IV antibiotics including vancomycin/rifampin/gentamicin. Then again in december 2018 he was hospitalized for hypoxic respiratory failure 2/2 to acute decompensated heart failure requiring NIPPV. His hospital course was complicated by steracolitis for which he was given cipro/flagyl. Spouse reports he completed this on discharge. Today the patient's outpatient blood work returned with sodium in "160s" and blood glucose in "400s" despite continuing outpatient metformin therapy. Therefore, the patient & family was advised to come to hospital for further management. 79 yo M w/ hx of CVA w/ left sided weakness, CAD w/ CABG 2013, prior hx of thoracic aortic aneurysm repair, HTN, DM2, systolic CHF (mild LV dysfunction in 11/2018), bioprosthetic aortic valve, BPH, presents with altered mental status. Patient is unable to provide any history. Hx obtained from son Kleber ([phone]) and spouse ([phone]) at bedside. Per family patient should not have been discharged from recent admission. They report patient was having fevers and was hypernatremic prior to discharge. The family report since coming home the patient has been more altered, sleeping more, hardly interactive, and to the point where now it is difficult to arouse him. He was admitted in November 2018 for MRSA bacteremia. DICKSON was not performed according to discharge paperwork bc family declined. However, spouse and son report that this was never the case. Patient was treated with prolonged IV antibiotics including vancomycin/rifampin/gentamicin. Then again in december 2018 he was hospitalized for hypoxic respiratory failure 2/2 to acute decompensated heart failure requiring NIPPV. His hospital course was complicated by steracolitis for which he was given cipro/flagyl. Spouse reports he completed this on discharge. Today the patient's outpatient blood work returned with sodium in "160s" and blood glucose in "400s" despite continuing outpatient metformin therapy. Therefore, the patient & family was advised to come to hospital for further management.  Of note family reports patient had diarrheal illness which resolved. Last BM was three days ago. Per family, patient has not been reporting any type of pain. Furthermore, family reports patient has had temperatures of 101 at home since discharge.

## 2019-01-12 NOTE — H&P ADULT - NSHPLABSRESULTS_GEN_ALL_CORE
13.2   8.8   )-----------( 119      ( 2019 19:33 )             39.0           158<H>  |  117<H>  |  60<H>  ----------------------------<  356<H>  3.6   |  25  |  1.59<H>    Ca    9.2      2019 19:33  Mg     2.3         TPro  6.4  /  Alb  3.6  /  TBili  0.3  /  DBili  x   /  AST  21  /  ALT  32  /  AlkPhos  50        PT/INR - ( 2019 19:33 )   PT: 11.8 sec;   INR: 1.03 ratio         PTT - ( 2019 19:33 )  PTT:25.0 sec        Urinalysis Basic - ( 2019 20:19 )    Color: Yellow / Appearance: Clear / S.022 / pH: x  Gluc: x / Ketone: Negative  / Bili: Negative / Urobili: Negative   Blood: x / Protein: Trace / Nitrite: Negative   Leuk Esterase: Negative / RBC: 3 /hpf / WBC 1 /HPF   Sq Epi: x / Non Sq Epi: 0 /hpf / Bacteria: Negative    I personally reviewed & interpreted the lab findings above;  I personally reviewed & interpreted the radiographic findings;  I personally reviewed & interpreted the EKG findings;

## 2019-01-12 NOTE — H&P ADULT - PROBLEM SELECTOR PLAN 2
-Patient's blood glucose of 356. He is acidotic and no ketones in urine. This is not consistent with true DKA and this is more driven by other secondary etiologies i.e., infection ,etc..  -Give 5units lantus now for basal coverage  -keep npo  -start humalog low-dose sliding scale q6hr.

## 2019-01-12 NOTE — H&P ADULT - ATTENDING COMMENTS
Patient assigned to me by night hospitalist in charge for management and care for patient for this evening only. Care to be resumed by day hospitalist (Dr. Trevizo) in the morning and thereafter.

## 2019-01-12 NOTE — ED PROVIDER NOTE - PROGRESS NOTE DETAILS
Spoke with Pts PMD/Cardiologist Dr. Matt Trevizo in regards to pts status here in ED, he states  pt can be admitted to his service, no need to wait until labs/imaging returns. -Courtney Rowley PA-C

## 2019-01-12 NOTE — H&P ADULT - ASSESSMENT
77 yo M w/ hx of CVA w/ left sided weakness, CAD w/ CABG 2013, prior hx of thoracic aortic aneurysm repair, HTN, DM2, systolic CHF (mild LV dysfunction in 11/2018), bioprosthetic aortic valve, BPH, presents with acute encephalopathy presumably from infectious source versus metabolic derangement versus a central process.

## 2019-01-12 NOTE — H&P ADULT - NSHPPHYSICALEXAM_GEN_ALL_CORE
PHYSICAL EXAM:  Vital Signs Last 24 Hrs  T(C): 37.3 (01-12-19 @ 19:26)  T(F): 99.2 (01-12-19 @ 19:26), Max: 99.2 (01-12-19 @ 19:26)  HR: 86 (01-12-19 @ 18:50) (86 - 86)  BP: 105/75 (01-12-19 @ 18:50)  BP(mean): --  RR: 17 (01-12-19 @ 18:50) (17 - 17)  SpO2: 100% (01-12-19 @ 18:50) (100% - 100%)  Wt(kg): --    01-12 @ 07:01  -  01-12 @ 21:13  --------------------------------------------------------  IN: 0 mL / OUT: 300 mL / NET: -300 mL      Constitutional: Ill-appearing, cachetic   EYES: Awakes to sternal rub, left pupil dilated compared to right pupil--chronic since left eye surgery per family.   ENT:  Poor dentition, no trauma to nares.   Neck: Soft and supple , no thyromegaly   Respiratory: Poor respiratory effort, not following commands to auscultate well but anterior lung fields are clear. acyanotic   Cardiovascular: S1 and S2, regular rate and rhythm, 2/6 systolic murmur at right 2nd ICS. No JVD.   Gastrointestinal: Bowel Sounds present, soft, but has facial grimace on palpation. Unable to assess for peritoneal signs.   Extremities: No cyanosis or clubbing; warm to touch  Vascular: 2+ peripheral pulses lower ex  Neurological: Left arm hemiparesis. Not following commands enough to assess motor and sensory exam. Left pupil dilated as mentioned above. Withdraws to painful stimuli.   Musculoskeletal: No joint swelling or erythema   Skin: Multiple skin breaks including sacral ulcer but not infected. Left upper back region skin abrasion,. No signs of trauma.   : No penile rash or lesions.   Psych: no depression or anhedonia, AAOx0  HEME: no epistaxis. No cervical lymphadenopathy.

## 2019-01-12 NOTE — H&P ADULT - PROBLEM SELECTOR PLAN 1
-Patient presents with more lethargy and unable to provide any hx. Usually per family patient will say 1-2 words and be much more alert than this. Suspect given family is reporting fevers at home and lactate of 4, this could be an infectious source. Pt has had UCX in past growing 50K pseudomonas--thought to be more colonziation than true infection. He does not have chronic morrison. He did have recent MRSA bacteremia, and DICKSON was not performed, therefore the suspicion for bacteremia/endocarditis with seeding emboli to brain is on differential diagnosis. He was recently admitted for colitis and has facial grimace on palpation of the abdomen with a lactate of 4, and last BM 3 days prior is concerning for abdominal process. UA is nondiagnostic. CXR is still pending.  -check TTE to assess bioprosthetic valve integrity and to see obvious endocarditis or not. Family is ok with DICKSON, so would pursue this route if patient found to be bacteremic or febrile.   -Will get CT head to make sure no emboli to brain or CVA event  -Will get CT abdomen/pelvis to r/o impaction or perforation.  -Will empirically send 2 -sets of blood culture, hold abx for now , monitor fever curve  -aspiration precaution  -hold home meds for tonight as he is not able to swallow safely.

## 2019-01-13 DIAGNOSIS — E87.6 HYPOKALEMIA: ICD-10-CM

## 2019-01-13 DIAGNOSIS — I10 ESSENTIAL (PRIMARY) HYPERTENSION: ICD-10-CM

## 2019-01-13 LAB
ALBUMIN SERPL ELPH-MCNC: 3.3 G/DL — SIGNIFICANT CHANGE UP (ref 3.3–5)
ALP SERPL-CCNC: 43 U/L — SIGNIFICANT CHANGE UP (ref 40–120)
ALT FLD-CCNC: 27 U/L — SIGNIFICANT CHANGE UP (ref 10–45)
ANION GAP SERPL CALC-SCNC: 14 MMOL/L — SIGNIFICANT CHANGE UP (ref 5–17)
ANION GAP SERPL CALC-SCNC: 15 MMOL/L — SIGNIFICANT CHANGE UP (ref 5–17)
ANION GAP SERPL CALC-SCNC: 15 MMOL/L — SIGNIFICANT CHANGE UP (ref 5–17)
AST SERPL-CCNC: 16 U/L — SIGNIFICANT CHANGE UP (ref 10–40)
BASE EXCESS BLDV CALC-SCNC: 3.1 MMOL/L — HIGH (ref -2–2)
BILIRUB SERPL-MCNC: 0.3 MG/DL — SIGNIFICANT CHANGE UP (ref 0.2–1.2)
BUN SERPL-MCNC: 48 MG/DL — HIGH (ref 7–23)
BUN SERPL-MCNC: 50 MG/DL — HIGH (ref 7–23)
BUN SERPL-MCNC: 50 MG/DL — HIGH (ref 7–23)
CALCIUM SERPL-MCNC: 8.8 MG/DL — SIGNIFICANT CHANGE UP (ref 8.4–10.5)
CALCIUM SERPL-MCNC: 8.9 MG/DL — SIGNIFICANT CHANGE UP (ref 8.4–10.5)
CALCIUM SERPL-MCNC: 9 MG/DL — SIGNIFICANT CHANGE UP (ref 8.4–10.5)
CHLORIDE SERPL-SCNC: 120 MMOL/L — HIGH (ref 96–108)
CHLORIDE SERPL-SCNC: 121 MMOL/L — HIGH (ref 96–108)
CHLORIDE SERPL-SCNC: 121 MMOL/L — HIGH (ref 96–108)
CO2 BLDV-SCNC: 31 MMOL/L — HIGH (ref 22–30)
CO2 SERPL-SCNC: 25 MMOL/L — SIGNIFICANT CHANGE UP (ref 22–31)
CO2 SERPL-SCNC: 25 MMOL/L — SIGNIFICANT CHANGE UP (ref 22–31)
CO2 SERPL-SCNC: 26 MMOL/L — SIGNIFICANT CHANGE UP (ref 22–31)
CREAT SERPL-MCNC: 0.95 MG/DL — SIGNIFICANT CHANGE UP (ref 0.5–1.3)
CREAT SERPL-MCNC: 0.96 MG/DL — SIGNIFICANT CHANGE UP (ref 0.5–1.3)
CREAT SERPL-MCNC: 0.99 MG/DL — SIGNIFICANT CHANGE UP (ref 0.5–1.3)
CULTURE RESULTS: NO GROWTH — SIGNIFICANT CHANGE UP
GLUCOSE BLDC GLUCOMTR-MCNC: 202 MG/DL — HIGH (ref 70–99)
GLUCOSE BLDC GLUCOMTR-MCNC: 210 MG/DL — HIGH (ref 70–99)
GLUCOSE BLDC GLUCOMTR-MCNC: 222 MG/DL — HIGH (ref 70–99)
GLUCOSE BLDC GLUCOMTR-MCNC: 224 MG/DL — HIGH (ref 70–99)
GLUCOSE SERPL-MCNC: 205 MG/DL — HIGH (ref 70–99)
GLUCOSE SERPL-MCNC: 209 MG/DL — HIGH (ref 70–99)
GLUCOSE SERPL-MCNC: 210 MG/DL — HIGH (ref 70–99)
HCO3 BLDV-SCNC: 29 MMOL/L — SIGNIFICANT CHANGE UP (ref 21–29)
HCT VFR BLD CALC: 38.8 % — LOW (ref 39–50)
HGB BLD-MCNC: 11.7 G/DL — LOW (ref 13–17)
HOROWITZ INDEX BLDV+IHG-RTO: SIGNIFICANT CHANGE UP
LACTATE BLDV-MCNC: 2.3 MMOL/L — HIGH (ref 0.7–2)
MCHC RBC-ENTMCNC: 30.2 GM/DL — LOW (ref 32–36)
MCHC RBC-ENTMCNC: 32 PG — SIGNIFICANT CHANGE UP (ref 27–34)
MCV RBC AUTO: 106 FL — HIGH (ref 80–100)
PCO2 BLDV: 54 MMHG — HIGH (ref 35–50)
PH BLDV: 7.35 — SIGNIFICANT CHANGE UP (ref 7.35–7.45)
PLATELET # BLD AUTO: 102 K/UL — LOW (ref 150–400)
PO2 BLDV: 34 MMHG — SIGNIFICANT CHANGE UP (ref 25–45)
POTASSIUM SERPL-MCNC: 3.2 MMOL/L — LOW (ref 3.5–5.3)
POTASSIUM SERPL-MCNC: 3.4 MMOL/L — LOW (ref 3.5–5.3)
POTASSIUM SERPL-MCNC: 3.4 MMOL/L — LOW (ref 3.5–5.3)
POTASSIUM SERPL-SCNC: 3.2 MMOL/L — LOW (ref 3.5–5.3)
POTASSIUM SERPL-SCNC: 3.4 MMOL/L — LOW (ref 3.5–5.3)
POTASSIUM SERPL-SCNC: 3.4 MMOL/L — LOW (ref 3.5–5.3)
PROT SERPL-MCNC: 5.8 G/DL — LOW (ref 6–8.3)
RBC # BLD: 3.66 M/UL — LOW (ref 4.2–5.8)
RBC # FLD: 14.2 % — SIGNIFICANT CHANGE UP (ref 10.3–14.5)
SAO2 % BLDV: 55 % — LOW (ref 67–88)
SODIUM SERPL-SCNC: 160 MMOL/L — CRITICAL HIGH (ref 135–145)
SODIUM SERPL-SCNC: 161 MMOL/L — CRITICAL HIGH (ref 135–145)
SODIUM SERPL-SCNC: 161 MMOL/L — CRITICAL HIGH (ref 135–145)
SPECIMEN SOURCE: SIGNIFICANT CHANGE UP
TROPONIN T, HIGH SENSITIVITY RESULT: 117 NG/L — HIGH (ref 0–51)
TROPONIN T, HIGH SENSITIVITY RESULT: 79 NG/L — HIGH (ref 0–51)
TSH SERPL-MCNC: 0.47 UIU/ML — SIGNIFICANT CHANGE UP (ref 0.27–4.2)
WBC # BLD: 7.98 K/UL — SIGNIFICANT CHANGE UP (ref 3.8–10.5)
WBC # FLD AUTO: 7.98 K/UL — SIGNIFICANT CHANGE UP (ref 3.8–10.5)

## 2019-01-13 RX ORDER — DEXTROSE MONOHYDRATE, SODIUM CHLORIDE, AND POTASSIUM CHLORIDE 50; .745; 4.5 G/1000ML; G/1000ML; G/1000ML
1000 INJECTION, SOLUTION INTRAVENOUS
Qty: 0 | Refills: 0 | Status: DISCONTINUED | OUTPATIENT
Start: 2019-01-13 | End: 2019-01-13

## 2019-01-13 RX ORDER — DEXTROSE MONOHYDRATE, SODIUM CHLORIDE, AND POTASSIUM CHLORIDE 50; .745; 4.5 G/1000ML; G/1000ML; G/1000ML
1000 INJECTION, SOLUTION INTRAVENOUS
Qty: 0 | Refills: 0 | Status: DISCONTINUED | OUTPATIENT
Start: 2019-01-13 | End: 2019-01-14

## 2019-01-13 RX ORDER — SODIUM CHLORIDE 9 MG/ML
500 INJECTION, SOLUTION INTRAVENOUS
Qty: 0 | Refills: 0 | Status: DISCONTINUED | OUTPATIENT
Start: 2019-01-13 | End: 2019-01-13

## 2019-01-13 RX ADMIN — HEPARIN SODIUM 5000 UNIT(S): 5000 INJECTION INTRAVENOUS; SUBCUTANEOUS at 05:00

## 2019-01-13 RX ADMIN — HEPARIN SODIUM 5000 UNIT(S): 5000 INJECTION INTRAVENOUS; SUBCUTANEOUS at 13:14

## 2019-01-13 RX ADMIN — DEXTROSE MONOHYDRATE, SODIUM CHLORIDE, AND POTASSIUM CHLORIDE 75 MILLILITER(S): 50; .745; 4.5 INJECTION, SOLUTION INTRAVENOUS at 18:36

## 2019-01-13 RX ADMIN — Medication 2: at 13:14

## 2019-01-13 RX ADMIN — DEXTROSE MONOHYDRATE, SODIUM CHLORIDE, AND POTASSIUM CHLORIDE 75 MILLILITER(S): 50; .745; 4.5 INJECTION, SOLUTION INTRAVENOUS at 20:29

## 2019-01-13 RX ADMIN — Medication 2: at 18:43

## 2019-01-13 RX ADMIN — Medication 2: at 05:01

## 2019-01-13 RX ADMIN — HEPARIN SODIUM 5000 UNIT(S): 5000 INJECTION INTRAVENOUS; SUBCUTANEOUS at 21:30

## 2019-01-13 RX ADMIN — SODIUM CHLORIDE 60 MILLILITER(S): 9 INJECTION, SOLUTION INTRAVENOUS at 04:29

## 2019-01-13 NOTE — PROGRESS NOTE ADULT - SUBJECTIVE AND OBJECTIVE BOX
PRESENTING CC:Weakness    SUBJ: 79 yo M w/ hx of CVA w/ left sided weakness, s/o Thoracic aorta repair with Bio AVR-2013, HTN, T2DM, HF (mild LV dysfunction in 11/2018), , BPH, presents with altered mental status.Recently discharged from [facility] for sepsis-sent home but noted poor oral intake had Bloods drawn-Sodium-166.He is unable to provide history-been having fever~~100 at home with diarrhea.    .  PMH -reviewed admission note, no change since admission  Heart failure: acute [ ] chronic [ ] acute or chronic [ ] diastolic [ ] systolic [ ] combined systolic and diastolic[ ]  JENNIFER: ATN[ ] renal medullary necrosis [ ] CKD I [ ]CKDII [ ]CKD III [ ]CKD IV [ ]CKD V [ ]Other pathological lesions [ ]    MEDICATIONS  (STANDING):  heparin  Injectable 5000 Unit(s) SubCutaneous every 8 hours  insulin lispro (HumaLOG) corrective regimen sliding scale   SubCutaneous every 6 hours  lactated ringers. 500 milliLiter(s) (60 mL/Hr) IV Continuous <Continuous>    MEDICATIONS  (PRN):  dextrose 40% Gel 15 Gram(s) Oral once PRN Blood Glucose LESS THAN 70 milliGRAM(s)/deciliter  glucagon  Injectable 1 milliGRAM(s) IntraMuscular once PRN Glucose LESS THAN 70 milligrams/deciliter          FAMILY HISTORY:  No pertinent family history in first degree relatives  No family history of premature coronary artery disease or sudden cardiac death      REVIEW OF SYSTEMS:  Constitutional: [ ] fever, [ ]weight loss,  [ ]fatigue  Eyes: [ ] visual changes  Respiratory: [ ]shortness of breath;  [ ] cough, [ ]wheezing, [ ]chills, [ ]hemoptysis  Cardiovascular: [ ] chest pain, [ ]palpitations, [ ]dizziness,  [ ]leg swelling[ ]orthopnea[ ]PND  Gastrointestinal: [ ] abdominal pain, [ ]nausea, [ ]vomiting,  [ ]diarrhea   Genitourinary: [ ] dysuria, [ ] hematuria  Neurologic: [ ] headaches [ ] tremors[ ]weakness  Skin: [ ] itching, [ ]burning, [ ] rashes  Endocrine: [ ] heat or cold intolerance  Musculoskeletal: [ ] joint pain or swelling; [ ] muscle, back, or extremity pain  Psychiatric: [ ] depression, [ ]anxiety, [ ]mood swings, or [ ]difficulty sleeping  Hematologic: [ ] easy bruising, [ ] bleeding gums    [ ] All remaining systems negative except as per above.   [x ]Unable to obtain.    Vital Signs Last 24 Hrs  T(C): 37.1 (13 Jan 2019 07:55), Max: 37.3 (12 Jan 2019 19:26)  T(F): 98.7 (13 Jan 2019 07:55), Max: 99.2 (12 Jan 2019 19:26)  HR: 85 (13 Jan 2019 07:55) (81 - 86)  BP: 107/69 (13 Jan 2019 07:55) (103/62 - 118/70)  RR: 21 (13 Jan 2019 07:55) (16 - 21)  SpO2: 100% (13 Jan 2019 07:55) (100% - 100%)  I&O's Summary    12 Jan 2019 07:01  -  13 Jan 2019 07:00  --------------------------------------------------------  IN: 0 mL / OUT: 300 mL / NET: -300 mL        PHYSICAL EXAM:  General: No acute distress BMI-24  HEENT: EOMI, PERRL  Neck: Supple, [ ] JVD  Lungs: Equal air entry bilaterally; [ ] rales [ ] wheezing [x] rhonchi  Heart: Regular rate and rhythm; [x ] murmur   2/6 [x ] systolic [ ] diastolic [ ] radiation[ ] rubs [ ]  gallops  Abdomen: Nontender, bowel sounds present  Extremities: No clubbing, cyanosis, [ ] edema  Nervous system:  Alert & Oriented X1, Left paresis  Psychiatric: Normal affect  Skin: Multiple skin breaks including sacral ulcer but not infected. Left upper back region skin abrasion,. No signs of trauma.     LABS:  01-12    158 |  117<H>  |  60<H>  ----------------------------<  356<H>  3.6   |  25  |  1.59<H>    Ca    9.2      12 Jan 2019 19:33  Mg     2.3     01-12    TPro  6.4  /  Alb  3.6  /  TBili  0.3  /  DBili  x   /  AST  21  /  ALT  32  /  AlkPhos  50  01-12    Creatinine Trend: 1.59<--, 1.33<--, 1.32<--, 1.23<--, 1.05<--, 1.04<--                        13.2   8.8   )-----------( 119      ( 12 Jan 2019 19:33 )             39.0     PT/INR - ( 12 Jan 2019 19:33 )   PT: 11.8 sec;   INR: 1.03 ratio    PTT - ( 12 Jan 2019 19:33 )  PTT:25.0 sec    Serum Pro-Brain Natriuretic Peptide: 52749 pg/mL (01-12-19 @ 19:30      Blood Gas Venous - Lactate (01.13.19 @ 01:32)    Blood Gas Venous - Lactate: 2.3 <---4.1mmoL/L    POCT  Blood Glucose (01.13.19 @ 04:42)    POCT Blood Glucose.: 222<---311 mg/dL        IMPRESSION AND PLAN:      79 yo M w/ hx of CVA w/ left sided weakness,  prior hx of thoracic aortic aneurysm repair, HTN, DM2, systolic CHF (mild LV dysfunction in 11/2018),  s/p bioprosthetic aortic valve, BPH, presents with acute encephalopathy presumably from infectious source versus metabolic derangement versus a central process.       Problem/Plan - 1:  ·  Problem: Acute encephalopathy.  Plan: -Patient presents with more lethargy and unable to provide any hx. Usually per family patient will say 1-2 words and be much more alert than this. Suspect given family is reporting fevers at home and lactate of 4, this could be an infectious source.  Cultures.  ID consult Dr Ash      Problem/Plan - 2:  ·  Problem: Hypernatremia.  Plan: -Likely driven by hypovolemia, dec po intake.       Problem/Plan - 3:  ·  Problem: JENNIFER (acute kidney injury).  Plan: -Baseline creatinine is about 1.0; likely this is hypovolemic mediated in setting of using diuretics and not drinking or eating at home.

## 2019-01-13 NOTE — CONSULT NOTE ADULT - ASSESSMENT
· Assessment	  79 yo M w/ hx of CVA w/ left sided weakness, CAD w/ CABG 2013, prior hx of thoracic aortic aneurysm repair, HTN, DM2, systolic CHF (mild LV dysfunction in 11/2018), bioprosthetic aortic valve, BPH, presents with acute encephalopathy presumably from infectious source versus metabolic derangement versus a central process.      Problem/Plan - 1:  ·  Problem: Acute encephalopathy.  Plan: - Likely from electrolyte disturbance-Hyponatremia            Problem/Plan - 2:  ·  Problem: Hyperglycemia.  Plan: -Patient's blood glucose of 356. He is acidotic and no ketones in urine. This is not consistent with true DKA and this is more driven by other secondary etiologies i.e., infection ,etc..  -Lantus/FSSS     Problem/Plan - 3:  ·  Problem: Lactic acidosis.  Plan: - Will monitor.         Problem/Plan - 4:  ·  Problem: Hypernatremia.  Plan: -  BMP/Nephro f/up noted.      Problem/Plan - 5:  ·  Problem: JENNIFER (acute kidney injury).  Plan: -  BMP/IVF     Problem/Plan - 6:  Problem: Demand ischemia. Plan: -Cardio f/up noted. C/w telemetry monitoring.     Problem/Plan - 7:  ·  Problem: Chronic systolic congestive heart failure.  Plan: - Cardio f/up noted.  - TTE

## 2019-01-13 NOTE — CONSULT NOTE ADULT - SUBJECTIVE AND OBJECTIVE BOX
[facility]   MD FREDRICK JUAREZ MD RUORU WONG, PA    TEL:  OFFICE: [phone]  DR BREWER CELL: [phone]  DAVID ESCAMILLA CELL: [phone]  DR. PHILLIP CELL: [phone]    -- INITIAL RENAL CONSULT NOTE  --------------------------------------------------------------------------------  HPI:  79 yo M w/ hx of CVA w/ left sided weakness, CAD w/ CABG 2013, prior hx of thoracic aortic aneurysm repair, HTN, DM2, systolic CHF (mild LV dysfunction in 11/2018), bioprosthetic aortic valve, BPH, presents with altered mental status. Patient is unable to provide any history.  Nephrology consulted for hypernatremia of 161. Pt s/p 500 bolus of LR and LR and NS .         PAST HISTORY  --------------------------------------------------------------------------------  PAST MEDICAL & SURGICAL HISTORY:  Sepsis  UTI (urinary tract infection)  GI bleed  PUD (peptic ulcer disease)  CVA, old, 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  Gastric ulcer  Hyperlipemia  Diabetes mellitus  Hypertension  History of eye surgery: endophthalmitis in 2014  H/O aortic root repair  No Past Surgical History    FAMILY HISTORY:  No pertinent family history in first degree relatives    PAST SOCIAL HISTORY:    ALLERGIES & MEDICATIONS  --------------------------------------------------------------------------------  Allergies    No Known Allergies    Intolerances      Standing Inpatient Medications  dextrose 50% Injectable 12.5 Gram(s) IV Push once  dextrose 50% Injectable 25 Gram(s) IV Push once  dextrose 50% Injectable 25 Gram(s) IV Push once  heparin  Injectable 5000 Unit(s) SubCutaneous every 8 hours  insulin lispro (HumaLOG) corrective regimen sliding scale   SubCutaneous every 6 hours  sodium chloride 0.9% with potassium chloride 20 mEq/L 1000 milliLiter(s) IV Continuous <Continuous>    PRN Inpatient Medications  dextrose 40% Gel 15 Gram(s) Oral once PRN  glucagon  Injectable 1 milliGRAM(s) IntraMuscular once PRN      REVIEW OF SYSTEMS  --------------------------------------------------------------------------------  Gen: No fevers/chills  Skin: No rashes  Head/Eyes/Ears: Normal hearing,  Normal vision   Respiratory: No dyspnea, cough  CV: No chest pain  GI: No abdominal pain, diarrhea, constipation, nausea, vomiting  : No dysuria, hematuria  MSK: No  edema  Heme: No easy bruising or bleeding  Psych: No significant depression    All other systems were reviewed and are negative, except as noted.    VITALS/PHYSICAL EXAM  --------------------------------------------------------------------------------  T(C): 37.1 (01-13-19 @ 17:00), Max: 37.3 (01-12-19 @ 19:26)  HR: 87 (01-13-19 @ 17:00) (81 - 87)  BP: 128/86 (01-13-19 @ 17:00) (103/62 - 128/86)  RR: 18 (01-13-19 @ 17:00) (16 - 21)  SpO2: 98% (01-13-19 @ 17:00) (98% - 100%)  Wt(kg): --        01-12-19 @ 07:01  -  01-13-19 @ 07:00  --------------------------------------------------------  IN: 0 mL / OUT: 300 mL / NET: -300 mL      Physical Exam:  	Gen: NAD  	HEENT: MMM  	Pulm: CTA B/L  	CV: S1S2  	Abd: Soft, +BS   	Ext: No LE edema B/L  	Neuro: Awake  	Skin: Warm and dry  	Vascular access:    LABS/STUDIES  --------------------------------------------------------------------------------              11.7   7.98  >-----------<  102      [01-13-19 @ 11:33]              38.8     161  |  121  |  50  ----------------------------<  209      [01-13-19 @ 17:05]  3.2   |  25  |  0.99        Ca     9.0     [01-13-19 @ 17:05]      Mg     2.3     [01-12-19 @ 19:33]    TPro  5.8  /  Alb  3.3  /  TBili  0.3  /  DBili  x   /  AST  16  /  ALT  27  /  AlkPhos  43  [01-13-19 @ 17:05]    PT/INR: PT 11.8 , INR 1.03       [01-12-19 @ 19:33]  PTT: 25.0       [01-12-19 @ 19:33]      Creatinine Trend:  SCr 0.99 [01-13 @ 17:05]  SCr 1.59 [01-12 @ 19:33]  SCr 1.33 [01-03 @ 05:07]  SCr 1.32 [01-02 @ 18:51]  SCr 1.23 [01-02 @ 05:55]    Urinalysis - [01-12-19 @ 20:19]      Color Yellow / Appearance Clear / SG 1.022 / pH 5.5      Gluc 500 mg/dL / Ketone Negative  / Bili Negative / Urobili Negative       Blood Negative / Protein Trace / Leuk Est Negative / Nitrite Negative      RBC 3 / WBC 1 / Hyaline 4 / Gran  / Sq Epi  / Non Sq Epi 0 / Bacteria Negative      HbA1c 7.1      [11-02-18 @ 06:00]  TSH 0.47      [01-13-19 @ 00:25] [facility] [place] [facility]   MD FREDRICK JUAREZ MD RUORU WONG, PA    TEL:  OFFICE: [phone]  DR BREWER CELL: [phone]  DAVID ESCAMILLA CELL: [phone]  DR. PHILLIP CELL: [phone]    -- INITIAL RENAL CONSULT NOTE  --------------------------------------------------------------------------------  HPI:  77 yo M w/ hx of CVA w/ left sided weakness, CAD w/ CABG 2013, prior hx of thoracic aortic aneurysm repair, HTN, DM2, systolic CHF (mild LV dysfunction in 11/2018), bioprosthetic aortic valve, BPH, presents with altered mental status. Patient is unable to provide any history.  Nephrology consulted for hypernatremia of 161. Pt s/p 500 bolus of LR and LR and NS .       PAST HISTORY  --------------------------------------------------------------------------------  PAST MEDICAL & SURGICAL HISTORY:  Sepsis  UTI (urinary tract infection)  GI bleed  PUD (peptic ulcer disease)  CVA, old, 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  Gastric ulcer  Hyperlipemia  Diabetes mellitus  Hypertension  History of eye surgery: endophthalmitis in 2014  H/O aortic root repair  No Past Surgical History    FAMILY HISTORY:  No pertinent family history in first degree relatives    PAST SOCIAL HISTORY:    ALLERGIES & MEDICATIONS  --------------------------------------------------------------------------------  Allergies    No Known Allergies    Intolerances      Standing Inpatient Medications  dextrose 50% Injectable 12.5 Gram(s) IV Push once  dextrose 50% Injectable 25 Gram(s) IV Push once  dextrose 50% Injectable 25 Gram(s) IV Push once  heparin  Injectable 5000 Unit(s) SubCutaneous every 8 hours  insulin lispro (HumaLOG) corrective regimen sliding scale   SubCutaneous every 6 hours  sodium chloride 0.9% with potassium chloride 20 mEq/L 1000 milliLiter(s) IV Continuous <Continuous>    PRN Inpatient Medications  dextrose 40% Gel 15 Gram(s) Oral once PRN  glucagon  Injectable 1 milliGRAM(s) IntraMuscular once PRN      REVIEW OF SYSTEMS  --------------------------------------------------------------------------------  unable to obtain due to mental status      VITALS/PHYSICAL EXAM  --------------------------------------------------------------------------------  T(C): 37.1 (01-13-19 @ 17:00), Max: 37.3 (01-12-19 @ 19:26)  HR: 87 (01-13-19 @ 17:00) (81 - 87)  BP: 128/86 (01-13-19 @ 17:00) (103/62 - 128/86)  RR: 18 (01-13-19 @ 17:00) (16 - 21)  SpO2: 98% (01-13-19 @ 17:00) (98% - 100%)  Wt(kg): --        01-12-19 @ 07:01  -  01-13-19 @ 07:00  --------------------------------------------------------  IN: 0 mL / OUT: 300 mL / NET: -300 mL      Physical Exam:  	Gen: lethargic male  	HEENT: MMM  	Pulm: CTA B/L  	CV: S1S2  	Abd: Soft, +BS   	Ext: No LE edema B/L  	Neuro: lethargic  	Skin: Warm and dry  	 no morrison    LABS/STUDIES  --------------------------------------------------------------------------------              11.7   7.98  >-----------<  102      [01-13-19 @ 11:33]              38.8     161  |  121  |  50  ----------------------------<  209      [01-13-19 @ 17:05]  3.2   |  25  |  0.99        Ca     9.0     [01-13-19 @ 17:05]      Mg     2.3     [01-12-19 @ 19:33]    TPro  5.8  /  Alb  3.3  /  TBili  0.3  /  DBili  x   /  AST  16  /  ALT  27  /  AlkPhos  43  [01-13-19 @ 17:05]    PT/INR: PT 11.8 , INR 1.03       [01-12-19 @ 19:33]  PTT: 25.0       [01-12-19 @ 19:33]      Creatinine Trend:  SCr 0.99 [01-13 @ 17:05]  SCr 1.59 [01-12 @ 19:33]  SCr 1.33 [01-03 @ 05:07]  SCr 1.32 [01-02 @ 18:51]  SCr 1.23 [01-02 @ 05:55]    Urinalysis - [01-12-19 @ 20:19]      Color Yellow / Appearance Clear / SG 1.022 / pH 5.5      Gluc 500 mg/dL / Ketone Negative  / Bili Negative / Urobili Negative       Blood Negative / Protein Trace / Leuk Est Negative / Nitrite Negative      RBC 3 / WBC 1 / Hyaline 4 / Gran  / Sq Epi  / Non Sq Epi 0 / Bacteria Negative      HbA1c 7.1      [11-02-18 @ 06:00]  TSH 0.47      [01-13-19 @ 00:25]

## 2019-01-13 NOTE — CONSULT NOTE ADULT - PROBLEM SELECTOR RECOMMENDATION 2
likely sec to decreased PO intake  Repeat serum potassium likely sec to decreased PO intake  Change to IVF with KCL   Repeat serum potassium

## 2019-01-13 NOTE — CONSULT NOTE ADULT - PROBLEM SELECTOR RECOMMENDATION 9
Pt with hypernatremia likely sec to decreased PO intake/dehydration  Serum sodium elevated at 161  recommend   Monitor serum Sodium closely   Avoid overcorrection >8mEq in 24 hours Pt with hypernatremia likely sec to decreased PO intake/dehydration  Serum sodium elevated at 161  recommend to change IVF to 1/2NS with 20mEq of kcl at 75cc/hr   Monitor serum Sodium closely Q 6hrs  Avoid overcorrection >8mEq in 24 hours

## 2019-01-13 NOTE — CONSULT NOTE ADULT - SUBJECTIVE AND OBJECTIVE BOX
Patient is a 78y old  Male who presents with a chief complaint of altered mental status (2019 07:18)      HPI:    79 yo M w/ hx of CVA w/ left sided weakness, CAD w/ CABG , prior hx of thoracic aortic aneurysm repair, HTN, DM2, systolic CHF (mild LV dysfunction in 2018), bioprosthetic aortic valve, BPH, presents with altered mental status. Patient is unable to provide any history.     Hx obtained from son Kleber ([phone]) and spouse ([phone]) at bedside. Per family patient should not have been discharged from recent admission. They report patient was having fevers and was hypernatremic prior to discharge. The family report since coming home the patient has been more altered, sleeping more, hardly interactive, and to the point where now it is difficult to arouse him. He was admitted in 2018 for MRSA bacteremia. DICKSON was not performed according to discharge paperwork bc family declined. However, spouse and son report that this was never the case. Patient was treated with prolonged IV antibiotics including vancomycin/rifampin/gentamicin.     Then again in 2018 he was hospitalized for hypoxic respiratory failure 2/2 to acute decompensated heart failure requiring NIPPV. His hospital course was complicated by steracolitis for which he was given cipro/flagyl. Spouse reports he completed this on discharge. Today the patient's outpatient blood work returned with sodium in "160s" and blood glucose in "400s" despite continuing outpatient metformin therapy. Therefore, the patient & family was advised to come to hospital for further management.  Of note family reports patient had diarrheal illness which resolved. Last BM was three days ago. Per family, patient has not been reporting any type of pain. Furthermore, family reports patient has had temperatures of 101 at home since discharge. (2019 21:02)    Spoke to son and wife at bedside.  Pt more lethargic than usual, not eating or drinking.  Pt's metformin dose decreased during last admission, pt continued to take all his other medications including lasix at regular dose.  He recently finished course of cipro/flagyl for stercoral colitis (diagnosed during prior admission), last week around Monday.  To me, son reported pt had low grade temp off 99 or 100 earlier in the week, but since then no fevers or sweats.  No cough, no other localizing symptoms.            REVIEW OF SYSTEMS:    Pt lethargic, poor cognition      PAST MEDICAL & SURGICAL HISTORY:  Sepsis  UTI (urinary tract infection)  GI bleed  PUD (peptic ulcer disease)  CVA, old, 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  Gastric ulcer  Hyperlipemia  Diabetes mellitus  Hypertension  History of eye surgery: endophthalmitis in   H/O aortic root repair  No Past Surgical History      Allergies    No Known Allergies    Intolerances        FAMILY HISTORY:  No pertinent family history in first degree relatives      SOCIAL HISTORY:  No smoking, ivdu, etoh      MEDICATIONS  (STANDING):  dextrose 50% Injectable 12.5 Gram(s) IV Push once  dextrose 50% Injectable 25 Gram(s) IV Push once  dextrose 50% Injectable 25 Gram(s) IV Push once  heparin  Injectable 5000 Unit(s) SubCutaneous every 8 hours  insulin lispro (HumaLOG) corrective regimen sliding scale   SubCutaneous every 6 hours  sodium chloride 0.45% with potassium chloride 20 mEq/L 1000 milliLiter(s) (75 mL/Hr) IV Continuous <Continuous>    MEDICATIONS  (PRN):  dextrose 40% Gel 15 Gram(s) Oral once PRN Blood Glucose LESS THAN 70 milliGRAM(s)/deciliter  glucagon  Injectable 1 milliGRAM(s) IntraMuscular once PRN Glucose LESS THAN 70 milligrams/deciliter      Vital Signs Last 24 Hrs  T(C): 36.7 (2019 05:03), Max: 37.1 (2019 07:55)  T(F): 98 (2019 05:03), Max: 98.8 (2019 17:00)  HR: 71 (2019 05:03) (71 - 87)  BP: 96/61 (2019 05:03) (96/61 - 128/86)  BP(mean): --  RR: 18 (2019 05:03) (18 - 21)  SpO2: 100% (2019 05:03) (98% - 100%)    PHYSICAL EXAM:    GENERAL: lethargic, arousable.  HEAD:  Atraumatic, Normocephalic  EYES: EOMI, PERRLA, conjunctiva and sclera clear  ENMT: No tonsillar erythema, exudates, or enlargement; Moist mucous membranes, oral thrush? post pharynx  NECK: Supple, No JVD  CHEST/LUNG: Clear to percussion bilaterally; No rales, rhonchi, wheezing, or rubs  HEART: Regular rate and rhythm; No murmurs, rubs, or gallops  ABDOMEN: Soft, Nontender, Nondistended; Bowel sounds present  EXTREMITIES:  2+ Peripheral Pulses, No clubbing, cyanosis, or edema  LYMPH: No lymphadenopathy noted  SKIN: No rashes or lesions    LABS:  CBC Full  -  ( 2019 06:22 )  WBC Count : 5.0 K/uL  Hemoglobin : 11.6 g/dL  Hematocrit : 34.7 %  Platelet Count - Automated : 100 K/uL  Mean Cell Volume : 100.0 fl  Mean Cell Hemoglobin : 33.5 pg  Mean Cell Hemoglobin Concentration : 33.4 gm/dL  Auto Neutrophil # : 3.2 K/uL  Auto Lymphocyte # : 1.4 K/uL  Auto Monocyte # : 0.3 K/uL  Auto Eosinophil # : 0.1 K/uL  Auto Basophil # : 0.0 K/uL  Auto Neutrophil % : 63.5 %  Auto Lymphocyte % : 28.2 %  Auto Monocyte % : 6.4 %  Auto Eosinophil % : 1.6 %  Auto Basophil % : 0.3 %          159<H>  |  124<H>  |  48<H>  ----------------------------<  143<H>  3.5   |  26  |  0.95    Ca    9.0      2019 06:22  Mg     2.3     -12    TPro  5.8<L>  /  Alb  3.3  /  TBili  0.3  /  DBili  x   /  AST  16  /  ALT  27  /  AlkPhos  43  -13      LIVER FUNCTIONS - ( 2019 17:05 )  Alb: 3.3 g/dL / Pro: 5.8 g/dL / ALK PHOS: 43 U/L / ALT: 27 U/L / AST: 16 U/L / GGT: x                               MICROBIOLOGY:        Urinalysis Basic - ( 2019 20:19 )    Color: Yellow / Appearance: Clear / S.022 / pH: x  Gluc: x / Ketone: Negative  / Bili: Negative / Urobili: Negative   Blood: x / Protein: Trace / Nitrite: Negative   Leuk Esterase: Negative / RBC: 3 /hpf / WBC 1 /HPF   Sq Epi: x / Non Sq Epi: 0 /hpf / Bacteria: Negative                RADIOLOGY:      < from: CT Head No Cont (19 @ 21:27) >  IMPRESSION:     No CT evidence of acute intracranial hemorrhage.    Multiple chronic infarcts as described above.    An MRI may be performed for more sensitive detection of stroke, if   clinically indicated.    < end of copied text >        < from: Xray Chest 1 View- PORTABLE-Urgent (19 @ 21:45) >  FINDINGS:     Cardiac silhouette normal in size. Sternotomy and valve repair. Lungs are   clear. No pleural effusion or pneumothorax.    IMPRESSION:   Clear lungs.    < end of copied text >        < from: CT Abdomen and Pelvis No Cont (19 @ 21:22) >  EXAM:  CT ABDOMEN AND PELVIS                            PROCEDURE DATE:  2019            INTERPRETATION:  CLINICAL INFORMATION: Diffuse abdominal pain. Elevated   lactate. Recent colitis.    COMPARISON: CT abdomen pelvis dated 2018    PROCEDURE:   CT of the Abdomen and Pelvis was performed without intravenous contrast.   Intravenous contrast: None.  Oral contrast: None.  Sagittal and coronal reformats were performed.    FINDINGS:    LOWER CHEST: Partially imaged left pleural effusion.  Status post aortic   valve repair.    LIVER: Within normal limits.  BILE DUCTS: Normal caliber.  GALLBLADDER: Within normal limits.  SPLEEN: Within normal limits.  PANCREAS: Within normal limits.  ADRENALS: Focal nodular thickening of the left adrenal gland, unchanged.   KIDNEYS/URETERS: Multiple bilateral renal cysts. No hydronephrosis.   Hyperdense 11 mm right posterior lateral renal lesion and 7 mm left upper   pole renal lesion. These are likely hemorrhagic cysts   BLADDER: Within normal limits.  REPRODUCTIVE ORGANS: The prostate is within normal limits.    BOWEL: Interval decrease in size of rectal stool burden. No perirectal   fat stranding. Scattered colonic diverticula. No bowel obstruction.   Normal appendix.   PERITONEUM: No ascites.  VESSELS:  Embolization coils in the gastroduodenal artery.   Atherosclerotic disease of aorta.  RETROPERITONEUM: No lymphadenopathy.    ABDOMINAL WALL: Small fat-containing left inguinal hernia.  BONES: Multilevel spinal degenerative changes. Partial compression   deformity T12.      IMPRESSION:     Interval decrease in size of rectal stool burden. No evidence of colitis.    < end of copied text >

## 2019-01-13 NOTE — PATIENT PROFILE ADULT - NSASFUNCLEVELADLTOILET_GEN_A_NUR
"Pt continues to refuse medications and VS at this time. Pt refuses to allow RN to place oxygen on pt. O2 sat checked, reading 76% on RA. Pt continues to refuse O2.RN attempted to place NC and pt swatted at RNs hand.  Charge RN Celia to bedside, pt began stating \"no just let me die, leave me alone\" pt  Unable to state date or location. Pt tells staff \"youre going to hell\". O2 increased and placed infront of face for blowby and pt covered her nose. Pt informed if she continues to refuse she might be restrained. Pt states :\"do whatever you want\". Pt asked again if we can place oxygen. States \"do whatever you want\" simple mask placed, O2 sat increased to 91% on 5L simple mask. Placed on continuous pulse ox for monitoring.   " 4 = completely dependent

## 2019-01-13 NOTE — CONSULT NOTE ADULT - ASSESSMENT
79 yo M w/ hx of CVA w/ left sided weakness, CAD w/ CABG 2013, prior hx of thoracic aortic aneurysm repair, HTN, DM2, systolic CHF (mild LV dysfunction in 11/2018), bioprosthetic aortic valve, BPH, presents with altered mental status. Patient is unable to provide any history.     Hx obtained from son Kleber ([phone]) and spouse ([phone]) at bedside. Per family patient should not have been discharged from recent admission. They report patient was having fevers and was hypernatremic prior to discharge. The family report since coming home the patient has been more altered, sleeping more, hardly interactive, and to the point where now it is difficult to arouse him. He was admitted in November 2018 for MRSA bacteremia. DICKSON was not performed according to discharge paperwork bc family declined. However, spouse and son report that this was never the case. Patient was treated with prolonged IV antibiotics including vancomycin/rifampin/gentamicin.     Then again in december 2018 he was hospitalized for hypoxic respiratory failure 2/2 to acute decompensated heart failure requiring NIPPV. His hospital course was complicated by steracolitis for which he was given cipro/flagyl. Spouse reports he completed this on discharge. Today the patient's outpatient blood work returned with sodium in "160s" and blood glucose in "400s" despite continuing outpatient metformin therapy. Therefore, the patient & family was advised to come to hospital for further management.  Of note family reports patient had diarrheal illness which resolved. Last BM was three days ago. Per family, patient has not been reporting any type of pain. Furthermore, family reports patient has had temperatures of 101 at home since discharge. (12 Jan 2019 21:02)    Spoke to son and wife at bedside.  Pt more lethargic than usual, not eating or drinking.  Pt's metformin dose decreased during last admission, pt continued to take all his other medications including lasix at regular dose.  He recently finished course of cipro/flagyl for stercoral colitis (diagnosed during prior admission), last week around Monday.  To me, son reported pt had low grade temp off 99 or 100 earlier in the week, but since then no fevers or sweats.  No cough, no other localizing symptoms.      Recommend:    - Agree with observe off abx for now.  Pt is afebrile, hemodynamically stable.  Chest xray clear.  CTap with decreased stool burden and resolution of stercoral colitis.    - f/u blood cultures x 2    - Possible thrush in posterior pharynx, can start Nystatin oral solution    - Monitor for fever.  No leukocytosis.      Will follow,    Tanisha Ash  [phone]

## 2019-01-13 NOTE — CONSULT NOTE ADULT - ASSESSMENT
77 yo M w/ hx of CVA w/ left sided weakness, CAD w/ CABG 2013, prior hx of thoracic aortic aneurysm repair, HTN, DM2, systolic CHF (mild LV dysfunction in 11/2018), bioprosthetic aortic valve, BPH, presents with altered mental status.

## 2019-01-13 NOTE — CONSULT NOTE ADULT - SUBJECTIVE AND OBJECTIVE BOX
Patient is a 78y old  Male who presents with a chief complaint of altered mental status (2019 18:39)      HPI:  79 yo M w/ hx of CVA w/ left sided weakness, CAD w/ CABG , prior hx of thoracic aortic aneurysm repair, HTN, DM2, systolic CHF (mild LV dysfunction in 2018), bioprosthetic aortic valve, BPH, presents with altered mental status. Patient is unable to provide any history. Hx obtained from son Kleber ([phone]) and spouse ([phone]) at bedside. Per family patient should not have been discharged from recent admission. They report patient was having fevers and was hypernatremic prior to discharge. The family report since coming home the patient has been more altered, sleeping more, hardly interactive, and to the point where now it is difficult to arouse him. He was admitted in 2018 for MRSA bacteremia. DICKSON was not performed according to discharge paperwork bc family declined. However, spouse and son report that this was never the case. Patient was treated with prolonged IV antibiotics including vancomycin/rifampin/gentamicin. Then again in 2018 he was hospitalized for hypoxic respiratory failure 2/2 to acute decompensated heart failure requiring NIPPV. His hospital course was complicated by steracolitis for which he was given cipro/flagyl. Spouse reports he completed this on discharge. Today the patient's outpatient blood work returned with sodium in "160s" and blood glucose in "400s" despite continuing outpatient metformin therapy. Therefore, the patient & family was advised to come to hospital for further management.  Of note family reports patient had diarrheal illness which resolved. Last BM was three days ago. Per family, patient has not been reporting any type of pain. Furthermore, family reports patient has had temperatures of 101 at home since discharge. (2019 21:02)      PAST MEDICAL & SURGICAL HISTORY:  Sepsis  UTI (urinary tract infection)  GI bleed  PUD (peptic ulcer disease)  CVA, old, 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  Gastric ulcer  Hyperlipemia  Diabetes mellitus  Hypertension  History of eye surgery: endophthalmitis in   H/O aortic root repair  No Past Surgical History      Review of Systems:   Lethargic  Not in distress.          Allergies    No Known Allergies    Intolerances        Social History:     FAMILY HISTORY:  No pertinent family history in first degree relatives      MEDICATIONS  (STANDING):  dextrose 50% Injectable 12.5 Gram(s) IV Push once  dextrose 50% Injectable 25 Gram(s) IV Push once  dextrose 50% Injectable 25 Gram(s) IV Push once  heparin  Injectable 5000 Unit(s) SubCutaneous every 8 hours  insulin lispro (HumaLOG) corrective regimen sliding scale   SubCutaneous every 6 hours  sodium chloride 0.45% with potassium chloride 20 mEq/L 1000 milliLiter(s) (75 mL/Hr) IV Continuous <Continuous>    MEDICATIONS  (PRN):  dextrose 40% Gel 15 Gram(s) Oral once PRN Blood Glucose LESS THAN 70 milliGRAM(s)/deciliter  glucagon  Injectable 1 milliGRAM(s) IntraMuscular once PRN Glucose LESS THAN 70 milligrams/deciliter      CAPILLARY BLOOD GLUCOSE      POCT Blood Glucose.: 202 mg/dL (2019 18:26)  POCT Blood Glucose.: 224 mg/dL (2019 12:42)  POCT Blood Glucose.: 210 mg/dL (2019 08:49)  POCT Blood Glucose.: 222 mg/dL (2019 04:42)  POCT Blood Glucose.: 311 mg/dL (2019 23:48)    I&O's Summary    2019 07:01  -  2019 07:00  --------------------------------------------------------  IN: 0 mL / OUT: 300 mL / NET: -300 mL        PHYSICAL EXAM:    GENERAL: NAD  NECK: Supple, No JVD  CHEST/LUNG: Clear to auscultation bilaterally; No wheezing.  HEART: Regular rate and rhythm; No murmurs, rubs, or gallops  ABDOMEN: Soft, Nontender, Nondistended; Bowel sounds present  EXTREMITIES:  2+ Peripheral Pulses, No edema  NEUROLOGY: Lethargic      LABS:                        11.7   7.98  )-----------( 102      ( 2019 11:33 )             38.8     01-13    161<HH>  |  121<H>  |  50<H>  ----------------------------<  209<H>  3.2<L>   |  25  |  0.99    Ca    9.0      2019 17:05  Mg     2.3         TPro  5.8<L>  /  Alb  3.3  /  TBili  0.3  /  DBili  x   /  AST  16  /  ALT  27  /  AlkPhos  43  -    PT/INR - ( 2019 19:33 )   PT: 11.8 sec;   INR: 1.03 ratio         PTT - ( 2019 19:33 )  PTT:25.0 sec      Urinalysis Basic - ( 2019 20:19 )    Color: Yellow / Appearance: Clear / S.022 / pH: x  Gluc: x / Ketone: Negative  / Bili: Negative / Urobili: Negative   Blood: x / Protein: Trace / Nitrite: Negative   Leuk Esterase: Negative / RBC: 3 /hpf / WBC 1 /HPF   Sq Epi: x / Non Sq Epi: 0 /hpf / Bacteria: Negative      CAPILLARY BLOOD GLUCOSE      POCT Blood Glucose.: 202 mg/dL (2019 18:26)  POCT Blood Glucose.: 224 mg/dL (2019 12:42)  POCT Blood Glucose.: 210 mg/dL (2019 08:49)  POCT Blood Glucose.: 222 mg/dL (2019 04:42)  POCT Blood Glucose.: 311 mg/dL (2019 23:48)                RADIOLOGY & ADDITIONAL TESTS:    Imaging Personally Reviewed:    Consultant(s) Notes Reviewed:      Care Discussed with Consultants/Other Providers:    Thanks for consult. Will follow.

## 2019-01-13 NOTE — CHART NOTE - NSCHARTNOTEFT_GEN_A_CORE
Na is 161, K 3.2, d/c lactated ringers, start .9% NS with 20 of KCL @ 75 cc/h, spoke to Cleopatra Hu ABIMBOLA ([place]) [facility] # [id number]

## 2019-01-14 LAB
-  COAGULASE NEGATIVE STAPHYLOCOCCUS: SIGNIFICANT CHANGE UP
ANION GAP SERPL CALC-SCNC: 11 MMOL/L — SIGNIFICANT CHANGE UP (ref 5–17)
ANION GAP SERPL CALC-SCNC: 13 MMOL/L — SIGNIFICANT CHANGE UP (ref 5–17)
ANION GAP SERPL CALC-SCNC: 9 MMOL/L — SIGNIFICANT CHANGE UP (ref 5–17)
BASOPHILS # BLD AUTO: 0 K/UL — SIGNIFICANT CHANGE UP (ref 0–0.2)
BASOPHILS NFR BLD AUTO: 0.3 % — SIGNIFICANT CHANGE UP (ref 0–2)
BUN SERPL-MCNC: 37 MG/DL — HIGH (ref 7–23)
BUN SERPL-MCNC: 43 MG/DL — HIGH (ref 7–23)
BUN SERPL-MCNC: 48 MG/DL — HIGH (ref 7–23)
CALCIUM SERPL-MCNC: 8.5 MG/DL — SIGNIFICANT CHANGE UP (ref 8.4–10.5)
CALCIUM SERPL-MCNC: 9 MG/DL — SIGNIFICANT CHANGE UP (ref 8.4–10.5)
CALCIUM SERPL-MCNC: 9.1 MG/DL — SIGNIFICANT CHANGE UP (ref 8.4–10.5)
CHLORIDE SERPL-SCNC: 119 MMOL/L — HIGH (ref 96–108)
CHLORIDE SERPL-SCNC: 123 MMOL/L — HIGH (ref 96–108)
CHLORIDE SERPL-SCNC: 124 MMOL/L — HIGH (ref 96–108)
CO2 SERPL-SCNC: 22 MMOL/L — SIGNIFICANT CHANGE UP (ref 22–31)
CO2 SERPL-SCNC: 25 MMOL/L — SIGNIFICANT CHANGE UP (ref 22–31)
CO2 SERPL-SCNC: 26 MMOL/L — SIGNIFICANT CHANGE UP (ref 22–31)
CREAT SERPL-MCNC: 0.8 MG/DL — SIGNIFICANT CHANGE UP (ref 0.5–1.3)
CREAT SERPL-MCNC: 0.89 MG/DL — SIGNIFICANT CHANGE UP (ref 0.5–1.3)
CREAT SERPL-MCNC: 0.95 MG/DL — SIGNIFICANT CHANGE UP (ref 0.5–1.3)
EOSINOPHIL # BLD AUTO: 0.1 K/UL — SIGNIFICANT CHANGE UP (ref 0–0.5)
EOSINOPHIL NFR BLD AUTO: 1.6 % — SIGNIFICANT CHANGE UP (ref 0–6)
GLUCOSE BLDC GLUCOMTR-MCNC: 138 MG/DL — HIGH (ref 70–99)
GLUCOSE BLDC GLUCOMTR-MCNC: 150 MG/DL — HIGH (ref 70–99)
GLUCOSE BLDC GLUCOMTR-MCNC: 152 MG/DL — HIGH (ref 70–99)
GLUCOSE BLDC GLUCOMTR-MCNC: 179 MG/DL — HIGH (ref 70–99)
GLUCOSE BLDC GLUCOMTR-MCNC: 199 MG/DL — HIGH (ref 70–99)
GLUCOSE SERPL-MCNC: 143 MG/DL — HIGH (ref 70–99)
GLUCOSE SERPL-MCNC: 169 MG/DL — HIGH (ref 70–99)
GLUCOSE SERPL-MCNC: 197 MG/DL — HIGH (ref 70–99)
GRAM STN FLD: SIGNIFICANT CHANGE UP
GRAM STN FLD: SIGNIFICANT CHANGE UP
HCT VFR BLD CALC: 34.7 % — LOW (ref 39–50)
HGB BLD-MCNC: 11.6 G/DL — LOW (ref 13–17)
LYMPHOCYTES # BLD AUTO: 1.4 K/UL — SIGNIFICANT CHANGE UP (ref 1–3.3)
LYMPHOCYTES # BLD AUTO: 28.2 % — SIGNIFICANT CHANGE UP (ref 13–44)
MCHC RBC-ENTMCNC: 33.4 GM/DL — SIGNIFICANT CHANGE UP (ref 32–36)
MCHC RBC-ENTMCNC: 33.5 PG — SIGNIFICANT CHANGE UP (ref 27–34)
MCV RBC AUTO: 100 FL — SIGNIFICANT CHANGE UP (ref 80–100)
METHOD TYPE: SIGNIFICANT CHANGE UP
MONOCYTES # BLD AUTO: 0.3 K/UL — SIGNIFICANT CHANGE UP (ref 0–0.9)
MONOCYTES NFR BLD AUTO: 6.4 % — SIGNIFICANT CHANGE UP (ref 2–14)
NEUTROPHILS # BLD AUTO: 3.2 K/UL — SIGNIFICANT CHANGE UP (ref 1.8–7.4)
NEUTROPHILS NFR BLD AUTO: 63.5 % — SIGNIFICANT CHANGE UP (ref 43–77)
PLATELET # BLD AUTO: 100 K/UL — LOW (ref 150–400)
POTASSIUM SERPL-MCNC: 3.5 MMOL/L — SIGNIFICANT CHANGE UP (ref 3.5–5.3)
POTASSIUM SERPL-MCNC: 3.8 MMOL/L — SIGNIFICANT CHANGE UP (ref 3.5–5.3)
POTASSIUM SERPL-MCNC: 4.2 MMOL/L — SIGNIFICANT CHANGE UP (ref 3.5–5.3)
POTASSIUM SERPL-SCNC: 3.5 MMOL/L — SIGNIFICANT CHANGE UP (ref 3.5–5.3)
POTASSIUM SERPL-SCNC: 3.8 MMOL/L — SIGNIFICANT CHANGE UP (ref 3.5–5.3)
POTASSIUM SERPL-SCNC: 4.2 MMOL/L — SIGNIFICANT CHANGE UP (ref 3.5–5.3)
RBC # BLD: 3.45 M/UL — LOW (ref 4.2–5.8)
RBC # FLD: 12.6 % — SIGNIFICANT CHANGE UP (ref 10.3–14.5)
SODIUM SERPL-SCNC: 154 MMOL/L — HIGH (ref 135–145)
SODIUM SERPL-SCNC: 159 MMOL/L — HIGH (ref 135–145)
SODIUM SERPL-SCNC: 159 MMOL/L — HIGH (ref 135–145)
WBC # BLD: 5 K/UL — SIGNIFICANT CHANGE UP (ref 3.8–10.5)
WBC # FLD AUTO: 5 K/UL — SIGNIFICANT CHANGE UP (ref 3.8–10.5)

## 2019-01-14 PROCEDURE — 71045 X-RAY EXAM CHEST 1 VIEW: CPT | Mod: 26

## 2019-01-14 RX ORDER — POTASSIUM CHLORIDE 20 MEQ
40 PACKET (EA) ORAL ONCE
Qty: 0 | Refills: 0 | Status: COMPLETED | OUTPATIENT
Start: 2019-01-14 | End: 2019-01-14

## 2019-01-14 RX ORDER — VANCOMYCIN HCL 1 G
1000 VIAL (EA) INTRAVENOUS EVERY 12 HOURS
Qty: 0 | Refills: 0 | Status: DISCONTINUED | OUTPATIENT
Start: 2019-01-15 | End: 2019-01-16

## 2019-01-14 RX ORDER — VANCOMYCIN HCL 1 G
VIAL (EA) INTRAVENOUS
Qty: 0 | Refills: 0 | Status: DISCONTINUED | OUTPATIENT
Start: 2019-01-14 | End: 2019-01-16

## 2019-01-14 RX ORDER — VANCOMYCIN HCL 1 G
1000 VIAL (EA) INTRAVENOUS ONCE
Qty: 0 | Refills: 0 | Status: COMPLETED | OUTPATIENT
Start: 2019-01-14 | End: 2019-01-14

## 2019-01-14 RX ORDER — SODIUM CHLORIDE 9 MG/ML
1000 INJECTION, SOLUTION INTRAVENOUS
Qty: 0 | Refills: 0 | Status: DISCONTINUED | OUTPATIENT
Start: 2019-01-14 | End: 2019-01-16

## 2019-01-14 RX ADMIN — SODIUM CHLORIDE 100 MILLILITER(S): 9 INJECTION, SOLUTION INTRAVENOUS at 11:03

## 2019-01-14 RX ADMIN — HEPARIN SODIUM 5000 UNIT(S): 5000 INJECTION INTRAVENOUS; SUBCUTANEOUS at 23:16

## 2019-01-14 RX ADMIN — HEPARIN SODIUM 5000 UNIT(S): 5000 INJECTION INTRAVENOUS; SUBCUTANEOUS at 05:28

## 2019-01-14 RX ADMIN — Medication 250 MILLIGRAM(S): at 18:44

## 2019-01-14 RX ADMIN — Medication 1: at 00:29

## 2019-01-14 RX ADMIN — HEPARIN SODIUM 5000 UNIT(S): 5000 INJECTION INTRAVENOUS; SUBCUTANEOUS at 13:09

## 2019-01-14 RX ADMIN — Medication 1: at 13:09

## 2019-01-14 RX ADMIN — Medication 40 MILLIEQUIVALENT(S): at 18:45

## 2019-01-14 NOTE — PROGRESS NOTE ADULT - SUBJECTIVE AND OBJECTIVE BOX
Patient is a 78y old  Male who presents with a chief complaint of altered mental status (2019 13:01)      SUBJECTIVE / OVERNIGHT EVENTS:    Events noted.  On NGT feeding  No N/V      MEDICATIONS  (STANDING):  dextrose 50% Injectable 12.5 Gram(s) IV Push once  dextrose 50% Injectable 25 Gram(s) IV Push once  dextrose 50% Injectable 25 Gram(s) IV Push once  heparin  Injectable 5000 Unit(s) SubCutaneous every 8 hours  insulin lispro (HumaLOG) corrective regimen sliding scale   SubCutaneous every 6 hours  potassium chloride   Solution 40 milliEquivalent(s) Oral once  sodium chloride 0.45%. 1000 milliLiter(s) (100 mL/Hr) IV Continuous <Continuous>    MEDICATIONS  (PRN):  dextrose 40% Gel 15 Gram(s) Oral once PRN Blood Glucose LESS THAN 70 milliGRAM(s)/deciliter  glucagon  Injectable 1 milliGRAM(s) IntraMuscular once PRN Glucose LESS THAN 70 milligrams/deciliter        CAPILLARY BLOOD GLUCOSE      POCT Blood Glucose.: 152 mg/dL (2019 13:02)  POCT Blood Glucose.: 138 mg/dL (2019 06:02)  POCT Blood Glucose.: 199 mg/dL (2019 00:23)  POCT Blood Glucose.: 202 mg/dL (2019 18:26)    I&O's Summary    2019 07:01  -  2019 07:00  --------------------------------------------------------  IN: 900 mL / OUT: 0 mL / NET: 900 mL        PHYSICAL EXAM:  GENERAL: NAD  NECK: Supple, No JVD  CHEST/LUNG: Clear to auscultation bilaterally; No wheezing.  HEART: Regular rate and rhythm; No murmurs, rubs, or gallops  ABDOMEN: Soft, Nontender, Nondistended; Bowel sounds present  EXTREMITIES:   No clubbing, cyanosis, or edema  NEUROLOGY: Nonverbal      LABS:                        11.6   5.0   )-----------( 100      ( 2019 06:22 )             34.7     01-14    159<H>  |  123<H>  |  43<H>  ----------------------------<  169<H>  3.8   |  25  |  0.89    Ca    9.1      2019 14:43  Mg     2.3         TPro  5.8<L>  /  Alb  3.3  /  TBili  0.3  /  DBili  x   /  AST  16  /  ALT  27  /  AlkPhos  43      PT/INR - ( 2019 19:33 )   PT: 11.8 sec;   INR: 1.03 ratio         PTT - ( 2019 19:33 )  PTT:25.0 sec      Urinalysis Basic - ( 2019 20:19 )    Color: Yellow / Appearance: Clear / S.022 / pH: x  Gluc: x / Ketone: Negative  / Bili: Negative / Urobili: Negative   Blood: x / Protein: Trace / Nitrite: Negative   Leuk Esterase: Negative / RBC: 3 /hpf / WBC 1 /HPF   Sq Epi: x / Non Sq Epi: 0 /hpf / Bacteria: Negative      CAPILLARY BLOOD GLUCOSE      POCT Blood Glucose.: 152 mg/dL (2019 13:02)  POCT Blood Glucose.: 138 mg/dL (2019 06:02)  POCT Blood Glucose.: 199 mg/dL (2019 00:23)  POCT Blood Glucose.: 202 mg/dL (2019 18:26)     @ 08:27  Culture-urine --  Culture results   No growth to date.  method type --  Organism --  Organism Identification --  Specimen source .Blood Blood-Venous   @ 22:19  Culture-urine --  Culture results   No growth  method type --  Organism --  Organism Identification --  Specimen source .Urine Catheterized   @ 22:17  Culture-urine --  Culture results   Growth in anaerobic bottle: Gram Positive Cocci in Clusters  "Due to technical problems, Proteus sp. will Not be reported as part of  the BCID panel until further notice"  ***Blood Panel PCR results on this specimen are available  approximately 3 hours after the Gram stain result.***  Gram stain, PCR, and/or culture results may not always  correspond due to difference in methodologies.  ************************************************************  This PCR assay was performed using [employer].  The following targets are tested for: Enterococcus,  vancomycin resistant enterococci, Listeria monocytogenes,  coagulase negative staphylococci, S. aureus,  methicillin resistant S. aureus, Streptococcus agalactiae  (Group B), S. pneumoniae, S. pyogenes (Group A),  Acinetobacter baumannii, Enterobacter cloacae, E. coli,  Klebsiella oxytoca, K. pneumoniae, Proteus sp.,  Serratia marcescens, Haemophilus influenzae,  Neisseria meningitidis, Pseudomonas aeruginosa, Candida  albicans, C. glabrata, C krusei, C parapsilosis,  C. tropicalis and the KPC resistance gene.  method type PCR  Organism Blood Culture PCR  Organism Identification Blood Culture PCR  Specimen source .Blood Blood-Peripheral            @ 08:27  Culture blood --  Culture results   No growth to date.  Gram stain --  Gram stain blood --  Method type --  Organism --  Organism identification --  Specimen source .Blood Blood-Venous    @ 22:19  Culture blood --  Culture results   No growth  Gram stain --  Gram stain blood --  Method type --  Organism --  Organism identification --  Specimen source .Urine Catheterized    @ 22:17  Culture blood --  Culture results   Growth in anaerobic bottle: Gram Positive Cocci in Clusters  "Due to technical problems, Proteus sp. will Not be reported as part of  the BCID panel until further notice"  ***Blood Panel PCR results on this specimen are available  approximately 3 hours after the Gram stain result.***  Gram stain, PCR, and/or culture results may not always  correspond due to difference in methodologies.  ************************************************************  This PCR assay was performed using [employer].  The following targets are tested for: Enterococcus,  vancomycin resistant enterococci, Listeria monocytogenes,  coagulase negative staphylococci, S. aureus,  methicillin resistant S. aureus, Streptococcus agalactiae  (Group B), S. pneumoniae, S. pyogenes (Group A),  Acinetobacter baumannii, Enterobacter cloacae, E. coli,  Klebsiella oxytoca, K. pneumoniae, Proteus sp.,  Serratia marcescens, Haemophilus influenzae,  Neisseria meningitidis, Pseudomonas aeruginosa, Candida  albicans, C. glabrata, C krusei, C parapsilosis,  C. tropicalis and the KPC resistance gene.  Gram stain   Growth in aerobic bottle: Gram Positive Cocci in Clusters  Gram stain blood --  Method type PCR  Organism Blood Culture PCR  Organism identification Blood Culture PCR  Specimen source .Blood Blood-Peripheral      RADIOLOGY & ADDITIONAL TESTS:    Imaging Personally Reviewed:    Consultant(s) Notes Reviewed:      Care Discussed with Consultants/Other Providers:

## 2019-01-14 NOTE — PROGRESS NOTE ADULT - SUBJECTIVE AND OBJECTIVE BOX
Infectious Diseases progress note:    Subjective:  No fever or leukocytosis.  Wife present at bedside.  States pt appeared slightly more awake, able to say couple of words.  Getting NG tube placed at bedside.      ROS:  nonverbal    Allergies    No Known Allergies    Intolerances        ANTIBIOTICS/RELEVANT:  antimicrobials    immunologic:    OTHER:  dextrose 40% Gel 15 Gram(s) Oral once PRN  dextrose 50% Injectable 12.5 Gram(s) IV Push once  dextrose 50% Injectable 25 Gram(s) IV Push once  dextrose 50% Injectable 25 Gram(s) IV Push once  glucagon  Injectable 1 milliGRAM(s) IntraMuscular once PRN  heparin  Injectable 5000 Unit(s) SubCutaneous every 8 hours  insulin lispro (HumaLOG) corrective regimen sliding scale   SubCutaneous every 6 hours  potassium chloride   Solution 40 milliEquivalent(s) Oral once  sodium chloride 0.45%. 1000 milliLiter(s) IV Continuous <Continuous>      Objective:  Vital Signs Last 24 Hrs  T(C): 36.6 (2019 09:52), Max: 37.1 (2019 17:00)  T(F): 97.9 (2019 09:52), Max: 98.8 (2019 17:00)  HR: 71 (2019 05:03) (71 - 87)  BP: 96/61 (2019 05:03) (96/61 - 128/86)  BP(mean): --  RR: 18 (2019 05:03) (18 - 18)  SpO2: 100% (2019 05:03) (98% - 100%)    PHYSICAL EXAM:  Constitutional:NAD  Eyes:BENJY, EOMI  Ear/Nose/Throat: no thrush, mucositis.  Moist mucous membranes	  Neck:no JVD, no lymphadenopathy, supple  Respiratory: CTA phi  Cardiovascular: S1S2 RRR, no murmurs  Gastrointestinal:soft, nontender,  nondistended (+) BS  Extremities:no e/e/c  Skin:  no rashes, open wounds or ulcerations        LABS:                        11.6   5.0   )-----------( 100      ( 2019 06:22 )             34.7     01-14    159<H>  |  124<H>  |  48<H>  ----------------------------<  143<H>  3.5   |  26  |  0.95    Ca    9.0      2019 06:22  Mg     2.3         TPro  5.8<L>  /  Alb  3.3  /  TBili  0.3  /  DBili  x   /  AST  16  /  ALT  27  /  AlkPhos  43  01-    PT/INR - ( 2019 19:33 )   PT: 11.8 sec;   INR: 1.03 ratio         PTT - ( 2019 19:33 )  PTT:25.0 sec  Urinalysis Basic - ( 2019 20:19 )    Color: Yellow / Appearance: Clear / S.022 / pH: x  Gluc: x / Ketone: Negative  / Bili: Negative / Urobili: Negative   Blood: x / Protein: Trace / Nitrite: Negative   Leuk Esterase: Negative / RBC: 3 /hpf / WBC 1 /HPF   Sq Epi: x / Non Sq Epi: 0 /hpf / Bacteria: Negative                  Rapid RVP Result: NotDetec  Rapid RVP Result: NotDetec          MICROBIOLOGY:    Culture - Blood (19 @ 08:27)    Specimen Source: .Blood Blood-Venous    Culture Results:   No growth to date.    Culture - Urine (19 @ 22:19)    Specimen Source: .Urine Catheterized    Culture Results:   No growth    Culture - Blood (19 @ 22:17)    Specimen Source: .Blood Blood-Venous    Culture Results:   No growth to date.    Culture - Blood (19 @ 22:17)    Specimen Source: .Blood Blood-Peripheral    Culture Results:   No growth to date.          RADIOLOGY & ADDITIONAL STUDIES:    < from: Xray Chest 1 View- PORTABLE-Urgent (19 @ 21:45) >    FINDINGS:     Cardiac silhouette normal in size. Sternotomy and valve repair. Lungs are   clear. No pleural effusion or pneumothorax.    IMPRESSION:   Clear lungs.    < end of copied text >

## 2019-01-14 NOTE — PROGRESS NOTE ADULT - SUBJECTIVE AND OBJECTIVE BOX
[facility] [place] [facility]   MD FREDRICK JUAREZ MD RUORU WONG, PA    TEL:  OFFICE: [phone]  DR BREWER CELL: [phone]  DAVID ESCAMILLA CELL: [phone]  DR. PHILILP CELL: [phone]    RENAL FOLLOW UP NOTE  --------------------------------------------------------------------------------  HPI:      Pt seen and examined at bedside.       PAST HISTORY  --------------------------------------------------------------------------------  No significant changes to PMH, PSH, FHx, SHx, unless otherwise noted    ALLERGIES & MEDICATIONS  --------------------------------------------------------------------------------  Allergies    No Known Allergies    Intolerances      Standing Inpatient Medications  dextrose 50% Injectable 12.5 Gram(s) IV Push once  dextrose 50% Injectable 25 Gram(s) IV Push once  dextrose 50% Injectable 25 Gram(s) IV Push once  heparin  Injectable 5000 Unit(s) SubCutaneous every 8 hours  insulin lispro (HumaLOG) corrective regimen sliding scale   SubCutaneous every 6 hours  potassium chloride   Solution 40 milliEquivalent(s) Oral once  sodium chloride 0.45%. 1000 milliLiter(s) IV Continuous <Continuous>    PRN Inpatient Medications  dextrose 40% Gel 15 Gram(s) Oral once PRN  glucagon  Injectable 1 milliGRAM(s) IntraMuscular once PRN      REVIEW OF SYSTEMS  --------------------------------------------------------------------------------  General: no fever  MSK: no edema     VITALS/PHYSICAL EXAM  --------------------------------------------------------------------------------  T(C): 36.6 (01-14-19 @ 09:52), Max: 37.1 (01-13-19 @ 17:00)  HR: 71 (01-14-19 @ 05:03) (71 - 87)  BP: 96/61 (01-14-19 @ 05:03) (96/61 - 128/86)  RR: 18 (01-14-19 @ 05:03) (18 - 18)  SpO2: 100% (01-14-19 @ 05:03) (98% - 100%)  Wt(kg): --  Height (cm): 167.64 (01-13-19 @ 19:30)  Weight (kg): 55.4 (01-13-19 @ 19:30)  BMI (kg/m2): 19.7 (01-13-19 @ 19:30)  BSA (m2): 1.62 (01-13-19 @ 19:30)      01-13-19 @ 07:01  -  01-14-19 @ 07:00  --------------------------------------------------------  IN: 900 mL / OUT: 0 mL / NET: 900 mL      Physical Exam:  	Gen: NAD, lethargic  	HEENT: MMM  	Pulm: CTA B/L  	CV: S1S2  	Abd: Soft, +BS  	Ext: No LE edema B/L                      Neuro: lethargic   	Skin: Warm and Dry   	Vascular access: no tamiko    LABS/STUDIES  --------------------------------------------------------------------------------              11.6   5.0   >-----------<  100      [01-14-19 @ 06:22]              34.7     159  |  124  |  48  ----------------------------<  143      [01-14-19 @ 06:22]  3.5   |  26  |  0.95        Ca     9.0     [01-14-19 @ 06:22]      Mg     2.3     [01-12-19 @ 19:33]    TPro  5.8  /  Alb  3.3  /  TBili  0.3  /  DBili  x   /  AST  16  /  ALT  27  /  AlkPhos  43  [01-13-19 @ 17:05]    PT/INR: PT 11.8 , INR 1.03       [01-12-19 @ 19:33]  PTT: 25.0       [01-12-19 @ 19:33]      Creatinine Trend:  SCr 0.95 [01-14 @ 06:22]  SCr 0.95 [01-13 @ 21:08]  SCr 0.99 [01-13 @ 17:05]  SCr 1.59 [01-12 @ 19:33]  SCr 1.33 [01-03 @ 05:07]    Urinalysis - [01-12-19 @ 20:19]      Color Yellow / Appearance Clear / SG 1.022 / pH 5.5      Gluc 500 mg/dL / Ketone Negative  / Bili Negative / Urobili Negative       Blood Negative / Protein Trace / Leuk Est Negative / Nitrite Negative      RBC 3 / WBC 1 / Hyaline 4 / Gran  / Sq Epi  / Non Sq Epi 0 / Bacteria Negative      HbA1c 7.1      [11-02-18 @ 06:00]  TSH 0.47      [01-13-19 @ 00:25]

## 2019-01-14 NOTE — PROGRESS NOTE ADULT - ASSESSMENT
79 yo M w/ hx of CVA w/ left sided weakness, CAD w/ CABG 2013, prior hx of thoracic aortic aneurysm repair, HTN, DM2, systolic CHF (mild LV dysfunction in 11/2018), bioprosthetic aortic valve, BPH, presents with altered mental status. Patient is unable to provide any history.     Hx obtained from son Kleber ([phone]) and spouse ([phone]) at bedside. Per family patient should not have been discharged from recent admission. They report patient was having fevers and was hypernatremic prior to discharge. The family report since coming home the patient has been more altered, sleeping more, hardly interactive, and to the point where now it is difficult to arouse him. He was admitted in November 2018 for MRSA bacteremia. DICKSON was not performed according to discharge paperwork bc family declined. However, spouse and son report that this was never the case. Patient was treated with prolonged IV antibiotics including vancomycin/rifampin/gentamicin.     Then again in december 2018 he was hospitalized for hypoxic respiratory failure 2/2 to acute decompensated heart failure requiring NIPPV. His hospital course was complicated by steracolitis for which he was given cipro/flagyl. Spouse reports he completed this on discharge. Today the patient's outpatient blood work returned with sodium in "160s" and blood glucose in "400s" despite continuing outpatient metformin therapy. Therefore, the patient & family was advised to come to hospital for further management.  Of note family reports patient had diarrheal illness which resolved. Last BM was three days ago. Per family, patient has not been reporting any type of pain. Furthermore, family reports patient has had temperatures of 101 at home since discharge. (12 Jan 2019 21:02)    Spoke to son and wife at bedside.  Pt more lethargic than usual, not eating or drinking.  Pt's metformin dose decreased during last admission, pt continued to take all his other medications including lasix at regular dose.  He recently finished course of cipro/flagyl for stercoral colitis (diagnosed during prior admission), last week around Monday.  To me, son reported pt had low grade temp off 99 or 100 earlier in the week, but since then no fevers or sweats.  No cough, no other localizing symptoms.      Recommend:    - Agree with observe off abx for now.  Pt is afebrile, hemodynamically stable.  Chest xray clear.  CTap with decreased stool burden and resolution of stercoral colitis.    - f/u blood cultures x 3 - NGTD    - Monitor for fever.  No leukocytosis.      - F/u TTE.  If bcx (-) and no signs of sepsis, doubt need for DICKSON to r/o endocarditis.      - Pt getting NG tube for po nutrition.     d/w wife at bedside.     Will follow,    Tanisha Ash  [phone]

## 2019-01-14 NOTE — CHART NOTE - NSCHARTNOTEFT_GEN_A_CORE
KEVIN VILLARREAL    Notified by RN patient with critical lab result blood culture x2 1/12/2019 growth in anaerobic bottle gram cocci in cluster       Interventions taken                           Estefania EDWARDSC KEVIN VILLARREAL    Notified by RN patient with critical lab result blood culture x2 1/12/2019 growth in anaerobic bottle gram positive  cocci in cluster   Grow in aerobic gram positive cocci in cluster       Interventions taken  started on Vancomycin 1gram [id number]   spoke with Dr galen shepherd   monitor for fever             Estefania Garcia NP-C KEVIN VILLARREAL    Notified by RN patient with critical lab result blood culture x2 1/12/2019 growth in anaerobic bottle gram positive  cocci in cluster   Grow in aerobic gram positive cocci in cluster     Vital Signs Last 24 Hrs  T(C): 36.5 (14 Jan 2019 12:27), Max: 36.7 (13 Jan 2019 19:04)  T(F): 97.7 (14 Jan 2019 12:27), Max: 98.1 (13 Jan 2019 19:04)  HR: 66 (14 Jan 2019 12:27) (66 - 85)  BP: 102/64 (14 Jan 2019 12:27) (96/61 - 111/69)  BP(mean): --  RR: 18 (14 Jan 2019 12:27) (18 - 18)  SpO2: 99% (14 Jan 2019 12:27) (99% - 100%)                        11.6   5.0   )-----------( 100      ( 14 Jan 2019 06:22 )             34.7   01-14    159<H>  |  123<H>  |  43<H>  ----------------------------<  169<H>  3.8   |  25  |  0.89    Ca    9.1      14 Jan 2019 14:43  Mg     2.3     01-12    TPro  5.8<L>  /  Alb  3.3  /  TBili  0.3  /  DBili  x   /  AST  16  /  ALT  27  /  AlkPhos  43  01-13    Interventions taken  started on Vancomycin 1gram [id number]   Spoke with Tanisha Barajas Rai  infectious   vanco tough   monitor for fever   will sign out to night NP   f/u with Attending             Estefania Garcia NP-C KLEBER VILLARREAL    Notified by RN patient with critical lab result blood culture x2 1/12/2019 growth in anaerobic bottle gram positive  cocci in cluster   Grow in aerobic gram positive cocci in cluster     Vital Signs Last 24 Hrs  T(C): 36.5 (14 Jan 2019 12:27), Max: 36.7 (13 Jan 2019 19:04)  T(F): 97.7 (14 Jan 2019 12:27), Max: 98.1 (13 Jan 2019 19:04)  HR: 66 (14 Jan 2019 12:27) (66 - 85)  BP: 102/64 (14 Jan 2019 12:27) (96/61 - 111/69)  BP(mean): --  RR: 18 (14 Jan 2019 12:27) (18 - 18)  SpO2: 99% (14 Jan 2019 12:27) (99% - 100%)                        11.6   5.0   )-----------( 100      ( 14 Jan 2019 06:22 )             34.7   01-14    159<H>  |  123<H>  |  43<H>  ----------------------------<  169<H>  3.8   |  25  |  0.89    Ca    9.1      14 Jan 2019 14:43  Mg     2.3     01-12    TPro  5.8<L>  /  Alb  3.3  /  TBili  0.3  /  DBili  x   /  AST  16  /  ALT  27  /  AlkPhos  43  01-13  HPI:  77 yo M w/ hx of CVA w/ left sided weakness, CAD w/ CABG 2013, prior hx of thoracic aortic aneurysm repair, HTN, DM2, systolic CHF (mild LV dysfunction in 11/2018), bioprosthetic aortic valve, BPH, presents with altered mental status. Patient is unable to provide any history. Hx obtained from son Kleber ([phone]) and spouse ([phone]) at bedside. Per family patient should not have been discharged from recent admission. They report patient was having fevers and was hypernatremic prior to discharge. The family report since coming home the patient has been more altered, sleeping more, hardly interactive, and to the point where now it is difficult to arouse him. He was admitted in November 2018 for MRSA bacteremia. DICKSON was not performed according to discharge paperwork bc family declined. However, spouse and son report that this was never the case. Patient was treated with prolonged IV antibiotics including vancomycin/rifampin/gentamicin. Then again in december 2018 he was hospitalized for hypoxic respiratory failure 2/2 to acute decompensated heart failure requiring NIPPV. His hospital course was complicated by steracolitis for which he was given cipro/flagyl. Spouse reports he completed this on discharge. Today the patient's outpatient blood work returned with sodium in "160s" and blood glucose in "400s" despite continuing outpatient metformin therapy. Therefore, the patient & family was advised to come to hospital for further management.  Of note family reports patient had diarrheal illness which resolved. Last BM was three days ago. Per family, patient has not been reporting any type of pain. Furthermore, family reports patient has had temperatures of 101 at home since discharge. (12 Jan 2019 21:02)   Now# positive blood culture/ bacteremia   Interventions taken  started on Vancomycin 1gram [id number]   Spoke with Tanisha Barajas Rai  infectious   vanco tough   monitor for fever   - pending ECHO   will sign out to night NP   discussed with Dr Trevizo above result             Estefania Garcia NP-C

## 2019-01-14 NOTE — PROGRESS NOTE ADULT - ASSESSMENT
· Assessment	  79 yo M w/ hx of CVA w/ left sided weakness, CAD w/ CABG 2013, prior hx of thoracic aortic aneurysm repair, HTN, DM2, systolic CHF (mild LV dysfunction in 11/2018), bioprosthetic aortic valve, BPH, presents with acute encephalopathy presumably from infectious source versus metabolic derangement versus a central process.      Problem/Plan - 1:  ·  Problem: Acute encephalopathy.  Plan: - Likely from electrolyte disturbance-Hyponatremia            Problem/Plan - 2:  ·  Problem: Hyperglycemia.  Plan: -Patient's blood glucose of 356. He is acidotic and no ketones in urine. This is not consistent with true DKA and this is more driven by other secondary etiologies i.e., infection ,etc..  -Lantus/FSSS           Problem/Plan - 4:  ·  Problem: Hypernatremia.  Plan: -  BMP/Nephro f/up noted. IVF/ NGT feeding     Problem/Plan - 5:  ·  Problem: JENNIFER (acute kidney injury).  Plan: -  BMP/IVF     Problem/Plan - 6:  Problem: Demand ischemia. Plan: -Cardio f/up noted. C/w telemetry monitoring.     Problem/Plan - 7:  ·  Problem: Chronic systolic congestive heart failure.  Plan: - Cardio f/up noted.  - TTE

## 2019-01-14 NOTE — PROGRESS NOTE ADULT - ASSESSMENT
79 yo M w/ hx of CVA w/ left sided weakness, CAD w/ CABG 2013, prior hx of thoracic aortic aneurysm repair, HTN, DM2, systolic CHF (mild LV dysfunction in 11/2018), bioprosthetic aortic valve, BPH, presents with altered mental status.

## 2019-01-14 NOTE — PROGRESS NOTE ADULT - SUBJECTIVE AND OBJECTIVE BOX
PRESENTING CC:Weakness    SUBJ: 77 yo M w/ hx of CVA w/ left sided weakness, s/o Thoracic aorta repair with Bio AVR-2013, HTN, T2DM, HF (mild LV dysfunction in 11/2018), , BPH, presents with altered mental status.Recently discharged from [facility] for sepsis-sent home but noted poor oral intake-is still poorly responsive on IVF-Renal consult noted,afebrile        PMH -reviewed admission note, no change since admission  Heart failure: acute [ ] chronic [ ] acute or chronic [ ] diastolic [ ] systolic [ ] combined systolic and diastolic[ ]  JENNIFER: ATN[ ] renal medullary necrosis [ ] CKD I [ ]CKDII [ ]CKD III [ ]CKD IV [ ]CKD V [ ]Other pathological lesions [ ]    MEDICATIONS  (STANDING):  heparin  Injectable 5000 Unit(s) SubCutaneous every 8 hours  insulin lispro (HumaLOG) corrective regimen sliding scale   SubCutaneous every 6 hours  sodium chloride 0.45% with potassium chloride 20 mEq/L 1000 milliLiter(s) (75 mL/Hr) IV Continuous <Continuous>    MEDICATIONS  (PRN):  dextrose 40% Gel 15 Gram(s) Oral once PRN Blood Glucose LESS THAN 70 milliGRAM(s)/deciliter  glucagon  Injectable 1 milliGRAM(s) IntraMuscular once PRN Glucose LESS THAN 70 milligrams/deciliter          FAMILY HISTORY:  No pertinent family history in first degree relatives  No family history of premature coronary artery disease or sudden cardiac death      REVIEW OF SYSTEMS:  Constitutional: [ ] fever, [ ]weight loss,  [x ]fatigue  Eyes: [ ] visual changes  Respiratory: [ ]shortness of breath;  [ ] cough, [ ]wheezing, [ ]chills, [ ]hemoptysis  Cardiovascular: [ ] chest pain, [ ]palpitations, [ ]dizziness,  [ ]leg swelling[ ]orthopnea[ ]PND  Gastrointestinal: [ ] abdominal pain, [ ]nausea, [ ]vomiting,  [ ]diarrhea   Genitourinary: [ ] dysuria, [ ] hematuria  Neurologic: [ ] headaches [ ] tremors[ ]weakness  Skin: [ ] itching, [ ]burning, [ ] rashes  Endocrine: [ ] heat or cold intolerance  Musculoskeletal: [ ] joint pain or swelling; [ ] muscle, back, or extremity pain  Psychiatric: [ ] depression, [ ]anxiety, [ ]mood swings, or [ ]difficulty sleeping  Hematologic: [ ] easy bruising, [ ] bleeding gums    [x] All remaining systems negative except as per above.   [ ]Unable to obtain.    Vital Signs Last 24 Hrs  T(C): 36.7 (14 Jan 2019 05:03), Max: 37.1 (13 Jan 2019 07:55)  T(F): 98 (14 Jan 2019 05:03), Max: 98.8 (13 Jan 2019 17:00)  HR: 71 (14 Jan 2019 05:03) (71 - 87)  BP: 96/61 (14 Jan 2019 05:03) (96/61 - 128/86)  RR: 18 (14 Jan 2019 05:03) (18 - 21)  SpO2: 100% (14 Jan 2019 05:03) (98% - 100%)  I&O's Summary    13 Jan 2019 07:01  -  14 Jan 2019 07:00  --------------------------------------------------------  IN: 900 mL / OUT: 0 mL / NET: 900 mL        PHYSICAL EXAM:  General: No acute distress BMI-19.7  HEENT: EOMI, PERRL  Neck: Supple, [ ] JVD  Lungs: Equal air entry bilaterally; [ ] rales [ ] wheezing [ ] rhonchi  Heart: Regular rate and rhythm; [x ] murmur   2/6 [x ] systolic [ ] diastolic [ ] radiation[ ] rubs [ ]  gallops  Abdomen: Nontender, bowel sounds present  Extremities: No clubbing, cyanosis, [ ] edema  Nervous system:  Alert & Oriented X1, Lrft paresis  Psychiatric: Normal affect  Skin: No rashes or lesions    LABS:  01-14    159<H>  |  124<H>  |  48<H>  ----------------------------<  143<H>  3.5   |  26  |  0.95    Ca    9.0      14 Jan 2019 06:22  Mg     2.3     01-12    TPro  5.8<L>  /  Alb  3.3  /  TBili  0.3  /  DBili  x   /  AST  16  /  ALT  27  /  AlkPhos  43  01-13    Creatinine Trend: 0.95<--, 0.95<--, 0.99<--, 1.59<--, 1.33<--, 1.32<--                        11.7   7.98  )-----------( 102      ( 13 Jan 2019 11:33 )             38.8     PT/INR - ( 12 Jan 2019 19:33 )   PT: 11.8 sec;   INR: 1.03 ratio    PTT - ( 12 Jan 2019 19:33 )  PTT:25.0 sec    Culture - Urine (01.12.19 @ 22:19)    Specimen Source: .Urine Catheterized    Culture Results:  No growth        Serum Pro-Brain Natriuretic Peptide: 73486 pg/mL (01-12-19 @ 19:33)        RADIOLOGY:XR CHEST PORTABLE URGENTIMPRESSION:Clear lungs.        IMPRESSION AND PLAN:    77 yo M w/ hx of CVA w/ left sided weakness,  prior hx of thoracic aortic aneurysm repair, HTN, DM2, systolic CHF (mild LV dysfunction in 11/2018),  s/p bioprosthetic aortic valve, BPH, presents with acute encephalopathy presumably from infectious source versus metabolic derangement versus a central process.       Problem/Plan - 1:  ·  Problem: Acute encephalopathy.  Plan: -Patient presents with more lethargy and unable to provide any hx. Usually per family patient will say 1-2 words and be much more alert than this.   Cultures-no growth thus far  Likely metabolic 2/2 dehydration      Problem/Plan - 2:  ·  Problem: Hypernatremia.  Plan: -Likely driven by hypovolemia, decreased po intake.   Renal consult noted-IVF with K supplementation      Problem/Plan - 3:  ·  Problem: JENNIFER (acute kidney injury).  Plan: -Baseline creatinine is about 1.0; likely this is hypovolemic mediated in setting of using diuretics and not drinking or eating at home.  Improving

## 2019-01-14 NOTE — PROGRESS NOTE ADULT - PROBLEM SELECTOR PLAN 1
Pt with hypernatremia likely sec to decreased PO intake/dehydration  Increase 1/2NS to 100cc/hr   Insert NGT and start free water flushes at 200 Q 6hrs   Monitor serum Sodium closely Q 6hrs  Please call nephrology at [phone] or [phone] with results   Avoid overcorrection >8mEq in 24 hours.

## 2019-01-15 LAB
ANION GAP SERPL CALC-SCNC: 10 MMOL/L — SIGNIFICANT CHANGE UP (ref 5–17)
ANION GAP SERPL CALC-SCNC: 13 MMOL/L — SIGNIFICANT CHANGE UP (ref 5–17)
BUN SERPL-MCNC: 26 MG/DL — HIGH (ref 7–23)
BUN SERPL-MCNC: 32 MG/DL — HIGH (ref 7–23)
CALCIUM SERPL-MCNC: 7.9 MG/DL — LOW (ref 8.4–10.5)
CALCIUM SERPL-MCNC: 8.5 MG/DL — SIGNIFICANT CHANGE UP (ref 8.4–10.5)
CHLORIDE SERPL-SCNC: 114 MMOL/L — HIGH (ref 96–108)
CHLORIDE SERPL-SCNC: 116 MMOL/L — HIGH (ref 96–108)
CO2 SERPL-SCNC: 19 MMOL/L — LOW (ref 22–31)
CO2 SERPL-SCNC: 22 MMOL/L — SIGNIFICANT CHANGE UP (ref 22–31)
CREAT SERPL-MCNC: 0.7 MG/DL — SIGNIFICANT CHANGE UP (ref 0.5–1.3)
CREAT SERPL-MCNC: 0.73 MG/DL — SIGNIFICANT CHANGE UP (ref 0.5–1.3)
GLUCOSE BLDC GLUCOMTR-MCNC: 162 MG/DL — HIGH (ref 70–99)
GLUCOSE BLDC GLUCOMTR-MCNC: 163 MG/DL — HIGH (ref 70–99)
GLUCOSE SERPL-MCNC: 167 MG/DL — HIGH (ref 70–99)
GLUCOSE SERPL-MCNC: 178 MG/DL — HIGH (ref 70–99)
HCT VFR BLD CALC: 33.8 % — LOW (ref 39–50)
HGB BLD-MCNC: 11.6 G/DL — LOW (ref 13–17)
MCHC RBC-ENTMCNC: 34.2 GM/DL — SIGNIFICANT CHANGE UP (ref 32–36)
MCHC RBC-ENTMCNC: 34.2 PG — HIGH (ref 27–34)
MCV RBC AUTO: 99.8 FL — SIGNIFICANT CHANGE UP (ref 80–100)
PLATELET # BLD AUTO: 85 K/UL — LOW (ref 150–400)
POTASSIUM SERPL-MCNC: 3.7 MMOL/L — SIGNIFICANT CHANGE UP (ref 3.5–5.3)
POTASSIUM SERPL-MCNC: 4 MMOL/L — SIGNIFICANT CHANGE UP (ref 3.5–5.3)
POTASSIUM SERPL-SCNC: 3.7 MMOL/L — SIGNIFICANT CHANGE UP (ref 3.5–5.3)
POTASSIUM SERPL-SCNC: 4 MMOL/L — SIGNIFICANT CHANGE UP (ref 3.5–5.3)
RBC # BLD: 3.39 M/UL — LOW (ref 4.2–5.8)
RBC # FLD: 12.4 % — SIGNIFICANT CHANGE UP (ref 10.3–14.5)
SODIUM SERPL-SCNC: 146 MMOL/L — HIGH (ref 135–145)
SODIUM SERPL-SCNC: 148 MMOL/L — HIGH (ref 135–145)
WBC # BLD: 5.3 K/UL — SIGNIFICANT CHANGE UP (ref 3.8–10.5)
WBC # FLD AUTO: 5.3 K/UL — SIGNIFICANT CHANGE UP (ref 3.8–10.5)

## 2019-01-15 RX ADMIN — Medication 1: at 12:46

## 2019-01-15 RX ADMIN — HEPARIN SODIUM 5000 UNIT(S): 5000 INJECTION INTRAVENOUS; SUBCUTANEOUS at 21:15

## 2019-01-15 RX ADMIN — SODIUM CHLORIDE 100 MILLILITER(S): 9 INJECTION, SOLUTION INTRAVENOUS at 19:58

## 2019-01-15 RX ADMIN — Medication 250 MILLIGRAM(S): at 05:42

## 2019-01-15 RX ADMIN — SODIUM CHLORIDE 100 MILLILITER(S): 9 INJECTION, SOLUTION INTRAVENOUS at 05:36

## 2019-01-15 RX ADMIN — SODIUM CHLORIDE 100 MILLILITER(S): 9 INJECTION, SOLUTION INTRAVENOUS at 09:35

## 2019-01-15 RX ADMIN — Medication 1: at 07:04

## 2019-01-15 RX ADMIN — Medication 250 MILLIGRAM(S): at 17:19

## 2019-01-15 RX ADMIN — HEPARIN SODIUM 5000 UNIT(S): 5000 INJECTION INTRAVENOUS; SUBCUTANEOUS at 13:14

## 2019-01-15 RX ADMIN — HEPARIN SODIUM 5000 UNIT(S): 5000 INJECTION INTRAVENOUS; SUBCUTANEOUS at 05:36

## 2019-01-15 RX ADMIN — Medication 1: at 00:22

## 2019-01-15 NOTE — PROGRESS NOTE ADULT - ASSESSMENT
· Assessment	  77 yo M w/ hx of CVA w/ left sided weakness, CAD w/ CABG 2013, prior hx of thoracic aortic aneurysm repair, HTN, DM2, systolic CHF (mild LV dysfunction in 11/2018), bioprosthetic aortic valve, BPH, presents with acute encephalopathy presumably from infectious source versus metabolic derangement versus a central process.      Problem/Plan - 1:  ·  Problem: Acute encephalopathy.  Plan: - Likely from electrolyte disturbance-Hyponatremia          Problem/Plan - 2:  ·  Problem: Hyperglycemia.  Plan: -Patient's blood glucose of 356. He is acidotic and no ketones in urine. This is not consistent with true DKA and this is more driven by other secondary etiologies i.e., infection ,etc..  -Lantus/FSSS     Problem/Plan - 4:  ·  Problem: Hypernatremia.  Plan: -  BMP/Nephro f/up noted.  NGT feeding     Problem/Plan - 5:  ·  Problem: JENNIFER (acute kidney injury).  Plan: -  BMP/IVF     Problem/Plan - 6:  Problem: Demand ischemia. Plan: -Cardio f/up noted.     Problem/Plan - 7:  ·  Problem: Chronic systolic congestive heart failure.  Plan: - Cardio f/up noted.

## 2019-01-15 NOTE — PROGRESS NOTE ADULT - SUBJECTIVE AND OBJECTIVE BOX
Infectious Diseases progress note:    Subjective:  Events noted.  Pt growing CNS/GPC clusters from initial blood cultures.  Pt started on vancomycin yesterday.  Pending DICKSON.      ROS:  Nonverbal    Allergies    No Known Allergies    Intolerances        ANTIBIOTICS/RELEVANT:  antimicrobials  vancomycin  IVPB 1000 milliGRAM(s) IV Intermittent every 12 hours  vancomycin  IVPB        immunologic:    OTHER:  heparin  Injectable 5000 Unit(s) SubCutaneous every 8 hours  sodium chloride 0.45%. 1000 milliLiter(s) IV Continuous <Continuous>      Objective:  Vital Signs Last 24 Hrs  T(C): 36.9 (15 Jonathan 2019 12:10), Max: 37.2 (15 Jonathan 2019 01:30)  T(F): 98.4 (15 Jonathan 2019 12:10), Max: 98.9 (15 Jonathan 2019 01:30)  HR: 70 (15 Jonathan 2019 12:10) (69 - 70)  BP: 113/69 (15 Jonathan 2019 12:10) (113/69 - 114/76)  BP(mean): --  RR: 18 (15 Jonathan 2019 12:10) (18 - 18)  SpO2: 95% (15 Jonathan 2019 12:10) (95% - 97%)    PHYSICAL EXAM:  Constitutional: lethargic  Eyes:BENJY, EOMI  Ear/Nose/Throat: no thrush, mucositis.  Moist mucous membranes	  Neck:no JVD, no lymphadenopathy, supple  Respiratory: CTA phi  Cardiovascular: S1S2 RRR, no murmurs  Gastrointestinal:soft, nontender,  nondistended (+) BS, NG tube in place  Extremities:no e/e/c  Skin:  no rashes, open wounds or ulcerations        LABS:                        11.6   5.3   )-----------( 85       ( 15 Jonathan 2019 06:06 )             33.8     01-15    146<H>  |  114<H>  |  26<H>  ----------------------------<  178<H>  3.7   |  22  |  0.70    Ca    7.9<L>      15 Jonathan 2019 18:48                      Rapid RVP Result: [facility]          MICROBIOLOGY:    Culture - Blood (01.13.19 @ 08:27)    Specimen Source: .Blood Blood-Venous    Culture Results:   No growth to date.    Culture - Urine (01.12.19 @ 22:19)    Specimen Source: .Urine Catheterized    Culture Results:   No growth    Culture - Blood (01.12.19 @ 22:17)    Gram Stain:   Growth in anaerobic bottle: Gram Positive Cocci in Clusters    Specimen Source: .Blood Blood-Venous    Culture Results:   Growth in anaerobic bottle: Staphylococcus epidermidis    Culture - Blood (01.12.19 @ 22:17)    Gram Stain:   Growth in aerobic bottle: Gram Positive Cocci in Clusters    -  Coagulase negative Staphylococcus: Detec    Specimen Source: .Blood Blood-Peripheral    Organism: Blood Culture PCR    Culture Results:   Growth in anaerobic bottle: Staphylococcus epidermidis  "Due to technical problems, Proteus sp. will Not be reported as part of  the BCID panel until further notice"  ***Blood Panel PCR results on this specimen are available  approximately 3 hours after the Gram stain result.***  Gram stain, PCR, and/or culture results may not always  correspond due to difference in methodologies.  ************************************************************  This PCR assay was performed using [employer].  The following targets are tested for: Enterococcus,  vancomycin resistant enterococci, Listeria monocytogenes,  coagulase negative staphylococci, S. aureus,  methicillin resistant S. aureus, Streptococcus agalactiae  (Group B), S. pneumoniae, S. pyogenes (Group A),  Acinetobacter baumannii, Enterobacter cloacae, E. coli,  Klebsiella oxytoca, K. pneumoniae, Proteus sp.,  Serratia marcescens, Haemophilus influenzae,  Neisseria meningitidis, Pseudomonas aeruginosa, Candida  albicans, C. glabrata, C krusei, C parapsilosis,  C. tropicalis and the KPC resistance gene.    Organism Identification: Blood Culture PCR    Method Type: PCR          RADIOLOGY & ADDITIONAL STUDIES:    < from: Xray Chest 1 View- PORTABLE-Urgent (01.14.19 @ 14:20) >  FINDINGS:    Status post median sternotomy and valve replacement.  Enteric tube seen coursing below the diaphragm, tip overlies the stomach.    No focal consolidation.  There is no pneumothorax. There are no pleural effusions.   The cardiomediastinal silhouette cannot be adequately assessed on this   projection.    IMPRESSION:   Clear lungs.   Enteric tube with overlying stomach.    < end of copied text >

## 2019-01-15 NOTE — PROGRESS NOTE ADULT - PROBLEM SELECTOR PLAN 1
Pt with hypernatremia likely sec to decreased PO intake/dehydration  Serum sodium improving  no new labs available today  Continue 1/2NS to 100cc/hr   Continue  free water flushes at 200 Q 6hrs   Monitor serum Sodium closely Q 6hrs  Please call nephrology at [phone] or [phone] with results of next bmp  Avoid overcorrection >8mEq in 24 hours.

## 2019-01-15 NOTE — PROGRESS NOTE ADULT - SUBJECTIVE AND OBJECTIVE BOX
Patient is a 78y old  Male who presents with a chief complaint of altered mental status (15 Jonathan 2019 11:43)      SUBJECTIVE / OVERNIGHT EVENTS:    Events noted.  Nonverbal  not in distress    MEDICATIONS  (STANDING):  heparin  Injectable 5000 Unit(s) SubCutaneous every 8 hours  sodium chloride 0.45%. 1000 milliLiter(s) (100 mL/Hr) IV Continuous <Continuous>  vancomycin  IVPB 1000 milliGRAM(s) IV Intermittent every 12 hours  vancomycin  IVPB        MEDICATIONS  (PRN):        CAPILLARY BLOOD GLUCOSE      POCT Blood Glucose.: 163 mg/dL (15 Jonathan 2019 12:40)  POCT Blood Glucose.: 162 mg/dL (15 Jonathan 2019 05:48)  POCT Blood Glucose.: 179 mg/dL (14 Jan 2019 23:48)    I&O's Summary    14 Jan 2019 07:01  -  15 Jonathan 2019 07:00  --------------------------------------------------------  IN: 750 mL / OUT: 0 mL / NET: 750 mL    15 Jonathan 2019 07:01  -  15 Jonathan 2019 21:46  --------------------------------------------------------  IN: 500 mL / OUT: 0 mL / NET: 500 mL        PHYSICAL EXAM:  GENERAL: NAD  NECK: Supple, No JVD  CHEST/LUNG: Clear to auscultation bilaterally; No wheezing.  HEART: Regular rate and rhythm; No murmurs, rubs, or gallops  ABDOMEN: Soft, Nontender, Nondistended; Bowel sounds present  EXTREMITIES:   No clubbing, cyanosis, or edema  NEUROLOGY: Nonverbal      LABS:                        11.6   5.3   )-----------( 85       ( 15 Jonathan 2019 06:06 )             33.8     01-15    146<H>  |  114<H>  |  26<H>  ----------------------------<  178<H>  3.7   |  22  |  0.70    Ca    7.9<L>      15 Jonathan 2019 18:48              CAPILLARY BLOOD GLUCOSE      POCT Blood Glucose.: 163 mg/dL (15 Jonathan 2019 12:40)  POCT Blood Glucose.: 162 mg/dL (15 Jonathan 2019 05:48)  POCT Blood Glucose.: 179 mg/dL (14 Jan 2019 23:48)    01-13 @ 08:27  Culture-urine --  Culture results   No growth to date.  method type --  Organism --  Organism Identification --  Specimen source .Blood Blood-Venous  01-12 @ 22:19  Culture-urine --  Culture results   No growth  method type --  Organism --  Organism Identification --  Specimen source .Urine Catheterized  01-12 @ 22:17  Culture-urine --  Culture results   Growth in anaerobic bottle: Staphylococcus epidermidis  "Due to technical problems, Proteus sp. will Not be reported as part of  the BCID panel until further notice"  ***Blood Panel PCR results on this specimen are available  approximately 3 hours after the Gram stain result.***  Gram stain, PCR, and/or culture results may not always  correspond due to difference in methodologies.  ************************************************************  This PCR assay was performed using [employer].  The following targets are tested for: Enterococcus,  vancomycin resistant enterococci, Listeria monocytogenes,  coagulase negative staphylococci, S. aureus,  methicillin resistant S. aureus, Streptococcus agalactiae  (Group B), S. pneumoniae, S. pyogenes (Group A),  Acinetobacter baumannii, Enterobacter cloacae, E. coli,  Klebsiella oxytoca, K. pneumoniae, Proteus sp.,  Serratia marcescens, Haemophilus influenzae,  Neisseria meningitidis, Pseudomonas aeruginosa, Candida  albicans, C. glabrata, C krusei, C parapsilosis,  C. tropicalis and the KPC resistance gene.  method type PCR  Organism Blood Culture PCR  Organism Identification Blood Culture PCR  Specimen source .Blood Blood-Peripheral           01-13 @ 08:27  Culture blood --  Culture results   No growth to date.  Gram stain --  Gram stain blood --  Method type --  Organism --  Organism identification --  Specimen source .Blood Blood-Venous   01-12 @ 22:19  Culture blood --  Culture results   No growth  Gram stain --  Gram stain blood --  Method type --  Organism --  Organism identification --  Specimen source .Urine Catheterized   01-12 @ 22:17  Culture blood --  Culture results   Growth in anaerobic bottle: Staphylococcus epidermidis  "Due to technical problems, Proteus sp. will Not be reported as part of  the BCID panel until further notice"  ***Blood Panel PCR results on this specimen are available  approximately 3 hours after the Gram stain result.***  Gram stain, PCR, and/or culture results may not always  correspond due to difference in methodologies.  ************************************************************  This PCR assay was performed using [employer].  The following targets are tested for: Enterococcus,  vancomycin resistant enterococci, Listeria monocytogenes,  coagulase negative staphylococci, S. aureus,  methicillin resistant S. aureus, Streptococcus agalactiae  (Group B), S. pneumoniae, S. pyogenes (Group A),  Acinetobacter baumannii, Enterobacter cloacae, E. coli,  Klebsiella oxytoca, K. pneumoniae, Proteus sp.,  Serratia marcescens, Haemophilus influenzae,  Neisseria meningitidis, Pseudomonas aeruginosa, Candida  albicans, C. glabrata, C krusei, C parapsilosis,  C. tropicalis and the KPC resistance gene.  Gram stain   Growth in aerobic bottle: Gram Positive Cocci in Clusters  Gram stain blood --  Method type PCR  Organism Blood Culture PCR  Organism identification Blood Culture PCR  Specimen source .Blood Blood-Peripheral      RADIOLOGY & ADDITIONAL TESTS:    Imaging Personally Reviewed:    Consultant(s) Notes Reviewed:      Care Discussed with Consultants/Other Providers:

## 2019-01-15 NOTE — PROGRESS NOTE ADULT - SUBJECTIVE AND OBJECTIVE BOX
PRESENTING CC:Weakness    SUBJ: 77 yo M w/ hx of CVA w/ left sided weakness, s/o Thoracic aorta repair with Bio AVR-2013, HTN, T2DM, HF (mild LV dysfunction in 11/2018), , BPH, presents with altered mental status.Recently discharged from [facility] for sepsis-sent home but noted poor oral intake-noted GPC in Blood cultures,had NGT placed,arousable,no dyspnea    PMH -reviewed admission note, no change since admission  Heart failure: acute [ ] chronic [x ] acute or chronic [ ] diastolic [x ] systolic [ ] combined systolic and diastolic[ ]  JENNIFER[x] ATN[ ] renal medullary necrosis [ ] CKD I [ ]CKDII [ ]CKD III [ ]CKD IV [ ]CKD V [ ]Other pathological lesions [ ]    MEDICATIONS  (STANDING):  heparin  Injectable 5000 Unit(s) SubCutaneous every 8 hours  insulin lispro (HumaLOG) corrective regimen sliding scale   SubCutaneous every 6 hours  sodium chloride 0.45%. 1000 milliLiter(s) (100 mL/Hr) IV Continuous <Continuous>  vancomycin  IVPB 1000 milliGRAM(s) IV Intermittent every 12 hours  vancomycin  IVPB        MEDICATIONS  (PRN):  dextrose 40% Gel 15 Gram(s) Oral once PRN Blood Glucose LESS THAN 70 milliGRAM(s)/deciliter  glucagon  Injectable 1 milliGRAM(s) IntraMuscular once PRN Glucose LESS THAN 70 milligrams/deciliter          FAMILY HISTORY:  No pertinent family history in first degree relatives  No family history of premature coronary artery disease or sudden cardiac death      REVIEW OF SYSTEMS:  Constitutional: [ ] fever, [ ]weight loss,  [ ]fatigue  Eyes: [ ] visual changes  Respiratory: [ ]shortness of breath;  [ ] cough, [ ]wheezing, [ ]chills, [ ]hemoptysis  Cardiovascular: [ ] chest pain, [ ]palpitations, [ ]dizziness,  [ ]leg swelling[ ]orthopnea[ ]PND  Gastrointestinal: [ ] abdominal pain, [ ]nausea, [ ]vomiting,  [ ]diarrhea   Genitourinary: [ ] dysuria, [ ] hematuria  Neurologic: [ ] headaches [ ] tremors[ ]weakness  Skin: [ ] itching, [ ]burning, [ ] rashes  Endocrine: [ ] heat or cold intolerance  Musculoskeletal: [ ] joint pain or swelling; [ ] muscle, back, or extremity pain  Psychiatric: [ ] depression, [ ]anxiety, [ ]mood swings, or [ ]difficulty sleeping  Hematologic: [ ] easy bruising, [ ] bleeding gums    [ ] All remaining systems negative except as per above.   [x ]Unable to obtain.    Vital Signs Last 24 Hrs  T(C): 36.5 (15 Jonathan 2019 04:30), Max: 37.3 (14 Jan 2019 18:39)  T(F): 97.7 (15 Jonathan 2019 04:30), Max: 99.2 (14 Jan 2019 18:39)  HR: 69 (15 Jonathan 2019 04:30) (66 - 76)  BP: 114/76 (15 Jonathan 2019 04:30) (102/64 - 114/76)  RR: 18 (15 Jonathan 2019 04:30) (18 - 18)  SpO2: 97% (15 Jonathan 2019 04:30) (96% - 99%)  I&O's Summary    14 Jan 2019 07:01  -  15 Jonathan 2019 07:00  --------------------------------------------------------  IN: 750 mL / OUT: 0 mL / NET: 750 mL        PHYSICAL EXAM:  General: No acute distress BMI-19.7  HEENT: EOMI, PERRL  Neck: Supple, [ ] JVD  Lungs: Fair air entry bilaterally; [ ] rales [ ] wheezing [x ] rhonchi  Heart: Regular rate and rhythm; [x ] murmur  2 /6 [x ] systolic [ ] diastolic [ ] radiation[ ] rubs [ ]  gallops  Abdomen: Nontender, bowel sounds present  Extremities: No clubbing, cyanosis, [ ] edema  Nervous system:  Alert & Oriented X1, Left Paresis  Psychiatric: Normal affect  Skin: No rashes or lesions    LABS:  01-14    154<H>  |  119<H>  |  37<H>  ----------------------------<  197<H>  4.2   |  22  |  0.80    Ca    8.5      14 Jan 2019 22:38    TPro  5.8<L>  /  Alb  3.3  /  TBili  0.3  /  DBili  x   /  AST  16  /  ALT  27  /  AlkPhos  43  01-13    Creatinine Trend: 0.80<--, 0.89<--, 0.95<--, 0.95<--, 0.99<--, 1.59<--                        11.6   5.3   )-----------( 85       ( 15 Jonathan 2019 06:06 )             33.8       Culture - Blood (01.12.19 @ 22:17)    Gram Stain:   Growth in anaerobic bottle: Gram Positive Cocci in Clusters    Specimen Source: .Blood Blood-Venous    Culture Results:   Growth in anaerobic bottle: Gram Positive Cocci in Clusters      RADIOLOGY:-XR CHEST PORTABLEIMPRESSION:   Clear lungs.   Enteric tube with overlying stomach.        IMPRESSION AND PLAN:      77 yo M w/ hx of CVA w/ left sided weakness,  prior hx of thoracic aortic aneurysm repair, HTN, DM2, systolic CHF (mild LV dysfunction in 11/2018),  s/p bioprosthetic aortic valve, BPH, presents with acute encephalopathy presumably from infectious source versus metabolic derangement versus a central process.       Problem/Plan - 1:  ·  Problem: Acute encephalopathy.  Plan: -Patient presents with more lethargy and unable to provide any hx. Usually per family patient will say 1-2 words and be much more alert than this.   Cultures-GPC in clusters.  Started on Vancomycin.  Hx Aortic root repair with Bio AVR-will consider DICKSON if cultures remain positive.    Problem/Plan - 2:  ·  Problem: Hypernatremia.  Plan: -Likely driven by hypovolemia, decreased po intake.   Renal consult noted-IVF with K supplementation      Problem/Plan - 3:  ·  Problem: JENNIFER (acute kidney injury).  Plan: -Baseline creatinine is about 1.0; likely this is hypovolemic mediated in setting of using diuretics and not drinking or eating at home.  Improving

## 2019-01-15 NOTE — PROGRESS NOTE ADULT - SUBJECTIVE AND OBJECTIVE BOX
[facility] [place] [facility]   MD FREDRICK JUAREZ MD RUORU WONG, PA    TEL:  OFFICE: [phone]  DR BREWER CELL: [phone]  DAVID ESCAMILLA CELL: [phone]  DR. PHILLIP CELL: [phone]    RENAL FOLLOW UP NOTE  --------------------------------------------------------------------------------  HPI:      Pt seen and examined at bedside.        PAST HISTORY  --------------------------------------------------------------------------------  No significant changes to PMH, PSH, FHx, SHx, unless otherwise noted    ALLERGIES & MEDICATIONS  --------------------------------------------------------------------------------  Allergies    No Known Allergies    Intolerances      Standing Inpatient Medications  dextrose 50% Injectable 12.5 Gram(s) IV Push once  dextrose 50% Injectable 25 Gram(s) IV Push once  dextrose 50% Injectable 25 Gram(s) IV Push once  heparin  Injectable 5000 Unit(s) SubCutaneous every 8 hours  insulin lispro (HumaLOG) corrective regimen sliding scale   SubCutaneous every 6 hours  sodium chloride 0.45%. 1000 milliLiter(s) IV Continuous <Continuous>  vancomycin  IVPB 1000 milliGRAM(s) IV Intermittent every 12 hours  vancomycin  IVPB        PRN Inpatient Medications  dextrose 40% Gel 15 Gram(s) Oral once PRN  glucagon  Injectable 1 milliGRAM(s) IntraMuscular once PRN      REVIEW OF SYSTEMS  --------------------------------------------------------------------------------  General: no fever  MSK: no edema     VITALS/PHYSICAL EXAM  --------------------------------------------------------------------------------  T(C): 36.5 (01-15-19 @ 04:30), Max: 37.3 (01-14-19 @ 18:39)  HR: 69 (01-15-19 @ 04:30) (66 - 76)  BP: 114/76 (01-15-19 @ 04:30) (102/64 - 114/76)  RR: 18 (01-15-19 @ 04:30) (18 - 18)  SpO2: 97% (01-15-19 @ 04:30) (96% - 99%)  Wt(kg): --  Height (cm): 167.64 (01-13-19 @ 19:30)  Weight (kg): 55.4 (01-13-19 @ 19:30)  BMI (kg/m2): 19.7 (01-13-19 @ 19:30)  BSA (m2): 1.62 (01-13-19 @ 19:30)      01-14-19 @ 07:01  -  01-15-19 @ 07:00  --------------------------------------------------------  IN: 750 mL / OUT: 0 mL / NET: 750 mL      Physical Exam:  	Gen: NAD  	HEENT: MMM  	Pulm: CTA B/L  	CV: S1S2  	Abd: Soft, +BS  	Ext: No LE edema B/L                      Neuro: lethargic  	Skin: Warm and Dry   	 no tamiko    LABS/STUDIES  --------------------------------------------------------------------------------              11.6   5.3   >-----------<  85       [01-15-19 @ 06:06]              33.8     154  |  119  |  37  ----------------------------<  197      [01-14-19 @ 22:38]  4.2   |  22  |  0.80        Ca     8.5     [01-14-19 @ 22:38]    TPro  5.8  /  Alb  3.3  /  TBili  0.3  /  DBili  x   /  AST  16  /  ALT  27  /  AlkPhos  43  [01-13-19 @ 17:05]          Creatinine Trend:  SCr 0.80 [01-14 @ 22:38]  SCr 0.89 [01-14 @ 14:43]  SCr 0.95 [01-14 @ 06:22]  SCr 0.95 [01-13 @ 21:08]  SCr 0.99 [01-13 @ 17:05]    Urinalysis - [01-12-19 @ 20:19]      Color Yellow / Appearance Clear / SG 1.022 / pH 5.5      Gluc 500 mg/dL / Ketone Negative  / Bili Negative / Urobili Negative       Blood Negative / Protein Trace / Leuk Est Negative / Nitrite Negative      RBC 3 / WBC 1 / Hyaline 4 / Gran  / Sq Epi  / Non Sq Epi 0 / Bacteria Negative      HbA1c 7.1      [11-02-18 @ 06:00]  TSH 0.47      [01-13-19 @ 00:25]

## 2019-01-15 NOTE — PROGRESS NOTE ADULT - ASSESSMENT
77 yo M w/ hx of CVA w/ left sided weakness, CAD w/ CABG 2013, prior hx of thoracic aortic aneurysm repair, HTN, DM2, systolic CHF (mild LV dysfunction in 11/2018), bioprosthetic aortic valve, BPH, presents with altered mental status. Patient is unable to provide any history.     Hx obtained from son Kleber ([phone]) and spouse ([phone]) at bedside. Per family patient should not have been discharged from recent admission. They report patient was having fevers and was hypernatremic prior to discharge. The family report since coming home the patient has been more altered, sleeping more, hardly interactive, and to the point where now it is difficult to arouse him. He was admitted in November 2018 for MRSA bacteremia. DICKSON was not performed according to discharge paperwork bc family declined. However, spouse and son report that this was never the case. Patient was treated with prolonged IV antibiotics including vancomycin/rifampin/gentamicin.     Then again in december 2018 he was hospitalized for hypoxic respiratory failure 2/2 to acute decompensated heart failure requiring NIPPV. His hospital course was complicated by steracolitis for which he was given cipro/flagyl. Spouse reports he completed this on discharge. Today the patient's outpatient blood work returned with sodium in "160s" and blood glucose in "400s" despite continuing outpatient metformin therapy. Therefore, the patient & family was advised to come to hospital for further management.  Of note family reports patient had diarrheal illness which resolved. Last BM was three days ago. Per family, patient has not been reporting any type of pain. Furthermore, family reports patient has had temperatures of 101 at home since discharge. (12 Jan 2019 21:02)    Spoke to son and wife at bedside.  Pt more lethargic than usual, not eating or drinking.  Pt's metformin dose decreased during last admission, pt continued to take all his other medications including lasix at regular dose.  He recently finished course of cipro/flagyl for stercoral colitis (diagnosed during prior admission), last week around Monday.  To me, son reported pt had low grade temp off 99 or 100 earlier in the week, but since then no fevers or sweats.  No cough, no other localizing symptoms.      Recommend:    - Bcx growing CNS/staph epidermidis.  ? contaminant vs. true infection.  Given presence of aortic valve repair, agree with starting abx with vancomycin.  f/u repeat blood cultures.  Check DICKSON to evaluate for endocarditis.    - Check esr, crp.            d/w wife at bedside.     Will follow,    Tanisha Ash  [phone]

## 2019-01-16 DIAGNOSIS — E83.51 HYPOCALCEMIA: ICD-10-CM

## 2019-01-16 LAB
-  AMOXICILLIN/CLAVULANIC ACID: SIGNIFICANT CHANGE UP
-  AMOXICILLIN/CLAVULANIC ACID: SIGNIFICANT CHANGE UP
-  AMPICILLIN/SULBACTAM: SIGNIFICANT CHANGE UP
-  AMPICILLIN/SULBACTAM: SIGNIFICANT CHANGE UP
-  AMPICILLIN: SIGNIFICANT CHANGE UP
-  AMPICILLIN: SIGNIFICANT CHANGE UP
-  CEFAZOLIN: SIGNIFICANT CHANGE UP
-  CEFAZOLIN: SIGNIFICANT CHANGE UP
-  CEFTRIAXONE: SIGNIFICANT CHANGE UP
-  CEFTRIAXONE: SIGNIFICANT CHANGE UP
-  CIPROFLOXACIN: SIGNIFICANT CHANGE UP
-  CIPROFLOXACIN: SIGNIFICANT CHANGE UP
-  CLINDAMYCIN: SIGNIFICANT CHANGE UP
-  CLINDAMYCIN: SIGNIFICANT CHANGE UP
-  DAPTOMYCIN: SIGNIFICANT CHANGE UP
-  DAPTOMYCIN: SIGNIFICANT CHANGE UP
-  ERYTHROMYCIN: SIGNIFICANT CHANGE UP
-  ERYTHROMYCIN: SIGNIFICANT CHANGE UP
-  GENTAMICIN: SIGNIFICANT CHANGE UP
-  GENTAMICIN: SIGNIFICANT CHANGE UP
-  LEVOFLOXACIN: SIGNIFICANT CHANGE UP
-  LEVOFLOXACIN: SIGNIFICANT CHANGE UP
-  LINEZOLID: SIGNIFICANT CHANGE UP
-  LINEZOLID: SIGNIFICANT CHANGE UP
-  MEROPENEM: SIGNIFICANT CHANGE UP
-  MEROPENEM: SIGNIFICANT CHANGE UP
-  MOXIFLOXACIN(AEROBIC): SIGNIFICANT CHANGE UP
-  MOXIFLOXACIN(AEROBIC): SIGNIFICANT CHANGE UP
-  OXACILLIN: SIGNIFICANT CHANGE UP
-  OXACILLIN: SIGNIFICANT CHANGE UP
-  PENICILLIN: SIGNIFICANT CHANGE UP
-  PENICILLIN: SIGNIFICANT CHANGE UP
-  RIFAMPIN: SIGNIFICANT CHANGE UP
-  RIFAMPIN: SIGNIFICANT CHANGE UP
-  TETRACYCLINE: SIGNIFICANT CHANGE UP
-  TETRACYCLINE: SIGNIFICANT CHANGE UP
-  TRIMETHOPRIM/SULFAMETHOXAZOLE: SIGNIFICANT CHANGE UP
-  TRIMETHOPRIM/SULFAMETHOXAZOLE: SIGNIFICANT CHANGE UP
-  VANCOMYCIN: SIGNIFICANT CHANGE UP
-  VANCOMYCIN: SIGNIFICANT CHANGE UP
ANION GAP SERPL CALC-SCNC: 10 MMOL/L — SIGNIFICANT CHANGE UP (ref 5–17)
ANION GAP SERPL CALC-SCNC: 12 MMOL/L — SIGNIFICANT CHANGE UP (ref 5–17)
ANION GAP SERPL CALC-SCNC: 12 MMOL/L — SIGNIFICANT CHANGE UP (ref 5–17)
BUN SERPL-MCNC: 16 MG/DL — SIGNIFICANT CHANGE UP (ref 7–23)
BUN SERPL-MCNC: 21 MG/DL — SIGNIFICANT CHANGE UP (ref 7–23)
BUN SERPL-MCNC: 24 MG/DL — HIGH (ref 7–23)
CALCIUM SERPL-MCNC: 7.9 MG/DL — LOW (ref 8.4–10.5)
CALCIUM SERPL-MCNC: 8 MG/DL — LOW (ref 8.4–10.5)
CALCIUM SERPL-MCNC: 8.1 MG/DL — LOW (ref 8.4–10.5)
CHLORIDE SERPL-SCNC: 109 MMOL/L — HIGH (ref 96–108)
CHLORIDE SERPL-SCNC: 111 MMOL/L — HIGH (ref 96–108)
CHLORIDE SERPL-SCNC: 113 MMOL/L — HIGH (ref 96–108)
CO2 SERPL-SCNC: 17 MMOL/L — LOW (ref 22–31)
CO2 SERPL-SCNC: 20 MMOL/L — LOW (ref 22–31)
CO2 SERPL-SCNC: 20 MMOL/L — LOW (ref 22–31)
CREAT SERPL-MCNC: 0.59 MG/DL — SIGNIFICANT CHANGE UP (ref 0.5–1.3)
CREAT SERPL-MCNC: 0.65 MG/DL — SIGNIFICANT CHANGE UP (ref 0.5–1.3)
CREAT SERPL-MCNC: 0.67 MG/DL — SIGNIFICANT CHANGE UP (ref 0.5–1.3)
CRP SERPL-MCNC: <0.1 MG/DL — SIGNIFICANT CHANGE UP (ref 0–0.4)
CULTURE RESULTS: SIGNIFICANT CHANGE UP
ERYTHROCYTE [SEDIMENTATION RATE] IN BLOOD: 10 MM/HR — SIGNIFICANT CHANGE UP (ref 0–20)
GLUCOSE BLDC GLUCOMTR-MCNC: 104 MG/DL — HIGH (ref 70–99)
GLUCOSE BLDC GLUCOMTR-MCNC: 110 MG/DL — HIGH (ref 70–99)
GLUCOSE BLDC GLUCOMTR-MCNC: 119 MG/DL — HIGH (ref 70–99)
GLUCOSE BLDC GLUCOMTR-MCNC: 132 MG/DL — HIGH (ref 70–99)
GLUCOSE BLDC GLUCOMTR-MCNC: 155 MG/DL — HIGH (ref 70–99)
GLUCOSE SERPL-MCNC: 106 MG/DL — HIGH (ref 70–99)
GLUCOSE SERPL-MCNC: 113 MG/DL — HIGH (ref 70–99)
GLUCOSE SERPL-MCNC: 139 MG/DL — HIGH (ref 70–99)
GRAM STN FLD: SIGNIFICANT CHANGE UP
HCT VFR BLD CALC: 30 % — LOW (ref 39–50)
HGB BLD-MCNC: 10.4 G/DL — LOW (ref 13–17)
MCHC RBC-ENTMCNC: 34.4 PG — HIGH (ref 27–34)
MCHC RBC-ENTMCNC: 34.8 GM/DL — SIGNIFICANT CHANGE UP (ref 32–36)
MCV RBC AUTO: 98.8 FL — SIGNIFICANT CHANGE UP (ref 80–100)
METHOD TYPE: SIGNIFICANT CHANGE UP
METHOD TYPE: SIGNIFICANT CHANGE UP
ORGANISM # SPEC MICROSCOPIC CNT: SIGNIFICANT CHANGE UP
PLATELET # BLD AUTO: 85 K/UL — LOW (ref 150–400)
POTASSIUM SERPL-MCNC: 3.6 MMOL/L — SIGNIFICANT CHANGE UP (ref 3.5–5.3)
POTASSIUM SERPL-MCNC: 4 MMOL/L — SIGNIFICANT CHANGE UP (ref 3.5–5.3)
POTASSIUM SERPL-MCNC: 4.8 MMOL/L — SIGNIFICANT CHANGE UP (ref 3.5–5.3)
POTASSIUM SERPL-SCNC: 3.6 MMOL/L — SIGNIFICANT CHANGE UP (ref 3.5–5.3)
POTASSIUM SERPL-SCNC: 4 MMOL/L — SIGNIFICANT CHANGE UP (ref 3.5–5.3)
POTASSIUM SERPL-SCNC: 4.8 MMOL/L — SIGNIFICANT CHANGE UP (ref 3.5–5.3)
RBC # BLD: 3.04 M/UL — LOW (ref 4.2–5.8)
RBC # FLD: 12.4 % — SIGNIFICANT CHANGE UP (ref 10.3–14.5)
SODIUM SERPL-SCNC: 136 MMOL/L — SIGNIFICANT CHANGE UP (ref 135–145)
SODIUM SERPL-SCNC: 143 MMOL/L — SIGNIFICANT CHANGE UP (ref 135–145)
SODIUM SERPL-SCNC: 145 MMOL/L — SIGNIFICANT CHANGE UP (ref 135–145)
SPECIMEN SOURCE: SIGNIFICANT CHANGE UP
VANCOMYCIN TROUGH SERPL-MCNC: 24.4 UG/ML — HIGH (ref 10–20)
WBC # BLD: 4.7 K/UL — SIGNIFICANT CHANGE UP (ref 3.8–10.5)
WBC # FLD AUTO: 4.7 K/UL — SIGNIFICANT CHANGE UP (ref 3.8–10.5)

## 2019-01-16 PROCEDURE — 93306 TTE W/DOPPLER COMPLETE: CPT | Mod: 26

## 2019-01-16 RX ORDER — SODIUM CHLORIDE 9 MG/ML
1000 INJECTION, SOLUTION INTRAVENOUS
Qty: 0 | Refills: 0 | Status: DISCONTINUED | OUTPATIENT
Start: 2019-01-16 | End: 2019-01-17

## 2019-01-16 RX ORDER — VANCOMYCIN HCL 1 G
750 VIAL (EA) INTRAVENOUS EVERY 12 HOURS
Qty: 0 | Refills: 0 | Status: DISCONTINUED | OUTPATIENT
Start: 2019-01-16 | End: 2019-01-19

## 2019-01-16 RX ADMIN — HEPARIN SODIUM 5000 UNIT(S): 5000 INJECTION INTRAVENOUS; SUBCUTANEOUS at 21:11

## 2019-01-16 RX ADMIN — SODIUM CHLORIDE 100 MILLILITER(S): 9 INJECTION, SOLUTION INTRAVENOUS at 07:05

## 2019-01-16 RX ADMIN — Medication 250 MILLIGRAM(S): at 06:19

## 2019-01-16 RX ADMIN — Medication 250 MILLIGRAM(S): at 21:44

## 2019-01-16 RX ADMIN — HEPARIN SODIUM 5000 UNIT(S): 5000 INJECTION INTRAVENOUS; SUBCUTANEOUS at 06:16

## 2019-01-16 RX ADMIN — SODIUM CHLORIDE 100 MILLILITER(S): 9 INJECTION, SOLUTION INTRAVENOUS at 09:26

## 2019-01-16 RX ADMIN — HEPARIN SODIUM 5000 UNIT(S): 5000 INJECTION INTRAVENOUS; SUBCUTANEOUS at 13:39

## 2019-01-16 NOTE — PROGRESS NOTE ADULT - ASSESSMENT
79 yo M w/ hx of CVA w/ left sided weakness, CAD w/ CABG 2013, prior hx of thoracic aortic aneurysm repair, HTN, DM2, systolic CHF (mild LV dysfunction in 11/2018), bioprosthetic aortic valve, BPH, presents with altered mental status. Patient is unable to provide any history.     Hx obtained from son Kleber ([phone]) and spouse ([phone]) at bedside. Per family patient should not have been discharged from recent admission. They report patient was having fevers and was hypernatremic prior to discharge. The family report since coming home the patient has been more altered, sleeping more, hardly interactive, and to the point where now it is difficult to arouse him. He was admitted in November 2018 for MRSA bacteremia. DICKSON was not performed according to discharge paperwork bc family declined. However, spouse and son report that this was never the case. Patient was treated with prolonged IV antibiotics including vancomycin/rifampin/gentamicin.     Then again in december 2018 he was hospitalized for hypoxic respiratory failure 2/2 to acute decompensated heart failure requiring NIPPV. His hospital course was complicated by steracolitis for which he was given cipro/flagyl. Spouse reports he completed this on discharge. Today the patient's outpatient blood work returned with sodium in "160s" and blood glucose in "400s" despite continuing outpatient metformin therapy. Therefore, the patient & family was advised to come to hospital for further management.  Of note family reports patient had diarrheal illness which resolved. Last BM was three days ago. Per family, patient has not been reporting any type of pain. Furthermore, family reports patient has had temperatures of 101 at home since discharge. (12 Jan 2019 21:02)    Spoke to son and wife at bedside.  Pt more lethargic than usual, not eating or drinking.  Pt's metformin dose decreased during last admission, pt continued to take all his other medications including lasix at regular dose.  He recently finished course of cipro/flagyl for stercoral colitis (diagnosed during prior admission), last week around Monday.  To me, son reported pt had low grade temp off 99 or 100 earlier in the week, but since then no fevers or sweats.  No cough, no other localizing symptoms.      Recommend:    - Bcx growing CNS/staph epidermidis.  ? contaminant vs. true infection.  Given presence of aortic valve repair, agree with starting abx with vancomycin.  f/u repeat blood cultures.  TTE no vegetations seen.  Check DICKSON to evaluate for endocarditis.    - esr and crp are relatively low.    - f/u repeat blood cultures to ensure clearance.              Will follow,    Tanisha Ash  [phone]

## 2019-01-16 NOTE — PROGRESS NOTE ADULT - SUBJECTIVE AND OBJECTIVE BOX
Infectious Diseases progress note:    Subjective:  Pt minimally responsive, opens eyes slightly.  No new fevers.      ROS:  Nonverbal    Allergies    No Known Allergies    Intolerances        ANTIBIOTICS/RELEVANT:  antimicrobials  vancomycin  IVPB 1000 milliGRAM(s) IV Intermittent every 12 hours  vancomycin  IVPB        immunologic:    OTHER:  heparin  Injectable 5000 Unit(s) SubCutaneous every 8 hours  sodium chloride 0.45%. 1000 milliLiter(s) IV Continuous <Continuous>      Objective:  Vital Signs Last 24 Hrs  T(C): 36.7 (16 Jan 2019 12:27), Max: 36.9 (16 Jan 2019 04:38)  T(F): 98.1 (16 Jan 2019 12:27), Max: 98.4 (16 Jan 2019 04:38)  HR: 67 (16 Jan 2019 12:27) (63 - 70)  BP: 113/60 (16 Jan 2019 12:27) (99/62 - 113/60)  BP(mean): --  RR: 18 (16 Jan 2019 12:27) (18 - 18)  SpO2: 95% (16 Jan 2019 12:27) (95% - 96%)    PHYSICAL EXAM:  Constitutional:  lethargic  Eyes:BENJY, EOMI  Ear/Nose/Throat: no thrush, mucositis.  Moist mucous membranes	  Neck:no JVD, no lymphadenopathy, supple  Respiratory: CTA phi  Cardiovascular: S1S2 RRR, no murmurs  Gastrointestinal:soft, nontender,  nondistended (+) BS, NG tube in place  Extremities:no e/e/c  Skin:  no rashes, open wounds or ulcerations        LABS:                        10.4   4.7   )-----------( 85       ( 16 Jan 2019 06:30 )             30.0     01-16    143  |  111<H>  |  21  ----------------------------<  113<H>  3.6   |  20<L>  |  0.65    Ca    7.9<L>      16 Jan 2019 06:26                      Rapid RVP Result: [facility]          MICROBIOLOGY:    Culture - Blood (01.13.19 @ 08:27)    Specimen Source: .Blood Blood-Venous    Culture Results:   No growth to date.    Culture - Urine (01.12.19 @ 22:19)    Specimen Source: .Urine Catheterized    Culture Results:   No growth    Culture - Blood (01.12.19 @ 22:17)    -  Vancomycin: S 2    -  Gentamicin: S <=1 Should not be used as monotherapy    -  Levofloxacin: R >4    -  Linezolid: S 2    -  Meropenem: R 8    -  Moxifloxacin(Aerobic): I 4    -  Oxacillin: R >2    -  Penicillin: R >8    -  RIF- Rifampin: R >2 Should not be used as monotherapy    -  Tetra/Doxy: R >8    -  Trimethoprim/Sulfamethoxazole: R >2/38    Gram Stain:   Growth in anaerobic bottle: Gram Positive Cocci in Clusters  Growth in aerobic bottle: Gram Positive Cocci in Clusters    -  Amoxicillin/Clavulanic Acid: R <=4/2    -  Ampicillin: R 8    -  Ampicillin/Sulbactam: R <=8/4    -  Cefazolin: R <=4    -  Ceftriaxone: R 32    -  Ciprofloxacin: R >2    -  Clindamycin: R 2 This isolate is presumed to be clindamycin resistant based on detection of inducible resistance. Clindamycin may still be effective in some patients.    -  Daptomycin: S 0.5    -  Erythromycin: R >4    Specimen Source: .Blood Blood-Venous    Organism: Staphylococcus epidermidis    Culture Results:   Growth in anaerobic bottle: Staphylococcus epidermidis  Growth in aerobic bottle: Gram Positive Cocci in Clusters    Organism Identification: Staphylococcus epidermidis    Method Type: ERNESTINE      Culture - Blood (01.12.19 @ 22:17)    -  Ampicillin/Sulbactam: R <=8/4    -  Ampicillin: R >8    Gram Stain:   Growth in aerobic bottle: Gram Positive Cocci in Clusters    -  Erythromycin: S 0.5    -  Ciprofloxacin: R >2    -  Clindamycin: S 0.5    -  Ceftriaxone: R >32    -  Amoxicillin/Clavulanic Acid: R >4/2    -  Cefazolin: R 16    -  Daptomycin: S 0.5    -  Coagulase negative Staphylococcus: Detec    -  Trimethoprim/Sulfamethoxazole: R >2/38    -  Vancomycin: S 2    -  RIF- Rifampin: R >2 Should not be used as monotherapy    -  Tetra/Doxy: S 2    -  Oxacillin: R >2    -  Penicillin: R >8    -  Meropenem: R >8    -  Moxifloxacin(Aerobic): R 2    -  Linezolid: S 2    -  Levofloxacin: R >4    -  Gentamicin: R >8 Should not be used as monotherapy    Specimen Source: .Blood Blood-Peripheral    Organism: Blood Culture PCR    Organism: Staphylococcus epidermidis    Culture Results:   Growth in anaerobic bottle: Staphylococcus epidermidis  "Due to technical problems, Proteus sp. will Not be reported as part of  the BCID panel until further notice"  ***Blood Panel PCR results on this specimen are available  approximately 3 hours after the Gram stain result.***  Gram stain, PCR, and/or culture results may not always  correspond due to difference in methodologies.  ************************************************************  This PCR assay was performed using [employer].  The following targets are tested for: Enterococcus,  vancomycin resistant enterococci, Listeria monocytogenes,  coagulase negative staphylococci, S. aureus,  methicillin resistant S. aureus, Streptococcus agalactiae  (Group B), S. pneumoniae, S. pyogenes (Group A),  Acinetobacter baumannii, Enterobacter cloacae, E. coli,  Klebsiella oxytoca, K. pneumoniae, Proteus sp.,  Serratia marcescens, Haemophilus influenzae,  Neisseria meningitidis, Pseudomonas aeruginosa, Candida  albicans, C. glabrata, C krusei, C parapsilosis,  C. tropicalis and the KPC resistance gene.    Organism Identification: Blood Culture PCR  Staphylococcus epidermidis    Method Type: PCR    Method Type: [facility]          RADIOLOGY & ADDITIONAL STUDIES:    < from: Transthoracic Echocardiogram (01.16.19 @ 10:05) >  Observations:  Mitral Valve: Normal mitral valve. Mild-moderate mitral  regurgitation.  Aortic Valve/Aorta: Bioprosthetic aortic valve. Peak  transaortic valve gradient equals 23 mm Hg, mean  transaortic valve gradient [id number] mm Hg, which is  probably normal in the presence of a bioprosthetic aortic  valve. No aortic valve regurgitation seen. Peak left  ventricular outflow tract gradient equals 1 mm Hg, LVOT  velocity time integral equals 13 cm.  Normal aortic root.  Left Atrium: Normal left atrium.  LA volume index = 27  cc/m2.  Left Ventricle: Severe segmental left ventricular systolic  dysfunction. Normal left ventricular internal dimensions  and wall thicknesses.  Right Heart: Normal right atrium. Decreased right  ventricular systolic function. Normal tricuspid valve.  Normal pulmonic valve.  Pericardium/Pleura: Normal pericardium with no pericardial  effusion.  Hemodynamic: Estimated right atrial pressure is 8 mm Hg.  ------------------------------------------------------------------------  Conclusions:  1. Bioprosthetic aortic valve. Peak transaortic valve  gradient equals 23 mm Hg, mean transaortic valve gradient  equals 12 mm Hg, which is probably normal in the presence  of a bioprosthetic aortic valve. No aortic valve  regurgitation seen.  2. Normal left ventricular internal dimensions and wall  thicknesses.  3. Severe segmental left ventricular systolic dysfunction.  Peak left ventricular outflow tract gradient equals 1 mm  Hg, LVOT velocity time integral equals 13 cm.  4. Decreased right ventricular systolic function.  Unable to rule out endocarditis.  Consider DICKSON if  clinically indicated.    < end of copied text >

## 2019-01-16 NOTE — PROGRESS NOTE ADULT - SUBJECTIVE AND OBJECTIVE BOX
[facility]   MD FREDRICK JUAREZ MD RUORU WONG, PA    TEL:  OFFICE: [phone]  DR BREWER CELL: [phone]  DAVID ESCAMILLA CELL: [phone]  DR. PHILLIP CELL: [phone]    RENAL FOLLOW UP NOTE  --------------------------------------------------------------------------------  HPI:      Pt seen and examined at bedside.       PAST HISTORY  --------------------------------------------------------------------------------  No significant changes to PMH, PSH, FHx, SHx, unless otherwise noted    ALLERGIES & MEDICATIONS  --------------------------------------------------------------------------------  Allergies    No Known Allergies    Intolerances      Standing Inpatient Medications  heparin  Injectable 5000 Unit(s) SubCutaneous every 8 hours  sodium chloride 0.45%. 1000 milliLiter(s) IV Continuous <Continuous>  vancomycin  IVPB 1000 milliGRAM(s) IV Intermittent every 12 hours  vancomycin  IVPB        PRN Inpatient Medications      REVIEW OF SYSTEMS  --------------------------------------------------------------------------------  General: no fever  MSK: no edema     VITALS/PHYSICAL EXAM  --------------------------------------------------------------------------------  T(C): 36.7 (01-16-19 @ 12:27), Max: 36.9 (01-16-19 @ 04:38)  HR: 67 (01-16-19 @ 12:27) (63 - 70)  BP: 113/60 (01-16-19 @ 12:27) (99/62 - 113/60)  RR: 18 (01-16-19 @ 12:27) (18 - 18)  SpO2: 95% (01-16-19 @ 12:27) (95% - 96%)  Wt(kg): --        01-15-19 @ 07:01  -  01-16-19 @ 07:00  --------------------------------------------------------  IN: 2200 mL / OUT: 0 mL / NET: 2200 mL    01-16-19 @ 07:01  -  01-16-19 @ 12:49  --------------------------------------------------------  IN: 0 mL / OUT: 150 mL / NET: -150 mL      Physical Exam:  	Gen: NAD  	HEENT: MMM  	Pulm: CTA B/L  	CV: S1S2  	Abd: Soft, +BS  	Ext: No LE edema B/L                      Neuro: Awake   	Skin: Warm and Dry   	    LABS/STUDIES  --------------------------------------------------------------------------------              10.4   4.7   >-----------<  85       [01-16-19 @ 06:30]              30.0     143  |  111  |  21  ----------------------------<  113      [01-16-19 @ 06:26]  3.6   |  20  |  0.65        Ca     7.9     [01-16-19 @ 06:26]            Creatinine Trend:  SCr 0.65 [01-16 @ 06:26]  SCr 0.67 [01-16 @ 00:40]  SCr 0.70 [01-15 @ 18:48]  SCr 0.73 [01-15 @ 12:51]  SCr 0.80 [01-14 @ 22:38]    Urinalysis - [01-12-19 @ 20:19]      Color Yellow / Appearance Clear / SG 1.022 / pH 5.5      Gluc 500 mg/dL / Ketone Negative  / Bili Negative / Urobili Negative       Blood Negative / Protein Trace / Leuk Est Negative / Nitrite Negative      RBC 3 / WBC 1 / Hyaline 4 / Gran  / Sq Epi  / Non Sq Epi 0 / Bacteria Negative      HbA1c 7.1      [11-02-18 @ 06:00]  TSH 0.47      [01-13-19 @ 00:25]

## 2019-01-16 NOTE — PROGRESS NOTE ADULT - SUBJECTIVE AND OBJECTIVE BOX
Patient is a 78y old  Male who presents with a chief complaint of altered mental status (16 Jan 2019 16:24)      SUBJECTIVE / OVERNIGHT EVENTS:    Events noted.  Nonverbal  NGT feeding    MEDICATIONS  (STANDING):  heparin  Injectable 5000 Unit(s) SubCutaneous every 8 hours  sodium chloride 0.45%. 1000 milliLiter(s) (50 mL/Hr) IV Continuous <Continuous>  vancomycin  IVPB 1000 milliGRAM(s) IV Intermittent every 12 hours  vancomycin  IVPB        MEDICATIONS  (PRN):        CAPILLARY BLOOD GLUCOSE      POCT Blood Glucose.: 132 mg/dL (16 Jan 2019 12:29)  POCT Blood Glucose.: 110 mg/dL (16 Jan 2019 05:39)  POCT Blood Glucose.: 155 mg/dL (16 Jan 2019 00:07)    I&O's Summary    15 Jonathan 2019 07:01  -  16 Jan 2019 07:00  --------------------------------------------------------  IN: 2200 mL / OUT: 0 mL / NET: 2200 mL    16 Jan 2019 07:01  -  16 Jan 2019 18:26  --------------------------------------------------------  IN: 250 mL / OUT: 350 mL / NET: -100 mL        PHYSICAL EXAM:  GENERAL: NAD  NECK: Supple, No JVD  CHEST/LUNG: Clear to auscultation bilaterally; No wheezing.  HEART: Regular rate and rhythm; No murmurs, rubs, or gallops  ABDOMEN: Soft, Nontender, Nondistended; Bowel sounds present  EXTREMITIES:   No clubbing, cyanosis, or edema  NEUROLOGY: Nonverbal      LABS:                        10.4   4.7   )-----------( 85       ( 16 Jan 2019 06:30 )             30.0     01-16    143  |  111<H>  |  21  ----------------------------<  113<H>  3.6   |  20<L>  |  0.65    Ca    7.9<L>      16 Jan 2019 06:26              CAPILLARY BLOOD GLUCOSE      POCT Blood Glucose.: 132 mg/dL (16 Jan 2019 12:29)  POCT Blood Glucose.: 110 mg/dL (16 Jan 2019 05:39)  POCT Blood Glucose.: 155 mg/dL (16 Jan 2019 00:07)    01-13 @ 08:27  Culture-urine --  Culture results   No growth to date.  method type --  Organism --  Organism Identification --  Specimen source .Blood Blood-Venous  01-12 @ 22:19  Culture-urine --  Culture results   No growth  method type --  Organism --  Organism Identification --  Specimen source .Urine Catheterized  01-12 @ 22:17  Culture-urine --  Culture results   Growth in anaerobic bottle: Staphylococcus epidermidis  "Due to technical problems, Proteus sp. will Not be reported as part of  the BCID panel until further notice"  ***Blood Panel PCR results on this specimen are available  approximately 3 hours after the Gram stain result.***  Gram stain, PCR, and/or culture results may not always  correspond due to difference in methodologies.  ************************************************************  This PCR assay was performed using [employer].  The following targets are tested for: Enterococcus,  vancomycin resistant enterococci, Listeria monocytogenes,  coagulase negative staphylococci, S. aureus,  methicillin resistant S. aureus, Streptococcus agalactiae  (Group B), S. pneumoniae, S. pyogenes (Group A),  Acinetobacter baumannii, Enterobacter cloacae, E. coli,  Klebsiella oxytoca, K. pneumoniae, Proteus sp.,  Serratia marcescens, Haemophilus influenzae,  Neisseria meningitidis, Pseudomonas aeruginosa, Candida  albicans, C. glabrata, C krusei, C parapsilosis,  C. tropicalis and the KPC resistance gene.  method type PCR  Organism Blood Culture PCR  Organism Identification Blood Culture PCR  Staphylococcus epidermidis  Specimen source .Blood Blood-Peripheral           01-13 @ 08:27  Culture blood --  Culture results   No growth to date.  Gram stain --  Gram stain blood --  Method type --  Organism --  Organism identification --  Specimen source .Blood Blood-Venous   01-12 @ 22:19  Culture blood --  Culture results   No growth  Gram stain --  Gram stain blood --  Method type --  Organism --  Organism identification --  Specimen source .Urine Catheterized   01-12 @ 22:17  Culture blood --  Culture results   Growth in anaerobic bottle: Staphylococcus epidermidis  "Due to technical problems, Proteus sp. will Not be reported as part of  the BCID panel until further notice"  ***Blood Panel PCR results on this specimen are available  approximately 3 hours after the Gram stain result.***  Gram stain, PCR, and/or culture results may not always  correspond due to difference in methodologies.  ************************************************************  This PCR assay was performed using [employer].  The following targets are tested for: Enterococcus,  vancomycin resistant enterococci, Listeria monocytogenes,  coagulase negative staphylococci, S. aureus,  methicillin resistant S. aureus, Streptococcus agalactiae  (Group B), S. pneumoniae, S. pyogenes (Group A),  Acinetobacter baumannii, Enterobacter cloacae, E. coli,  Klebsiella oxytoca, K. pneumoniae, Proteus sp.,  Serratia marcescens, Haemophilus influenzae,  Neisseria meningitidis, Pseudomonas aeruginosa, Candida  albicans, C. glabrata, C krusei, C parapsilosis,  C. tropicalis and the KPC resistance gene.  Gram stain   Growth in aerobic bottle: Gram Positive Cocci in Clusters  Gram stain blood --  Method type PCR  Organism Blood Culture PCR  Organism identification Blood Culture PCR  Staphylococcus epidermidis  Specimen source .Blood Blood-Peripheral      RADIOLOGY & ADDITIONAL TESTS:    Imaging Personally Reviewed:    Consultant(s) Notes Reviewed:      Care Discussed with Consultants/Other Providers:

## 2019-01-16 NOTE — PROGRESS NOTE ADULT - PROBLEM SELECTOR PLAN 1
Pt with hypernatremia likely sec to decreased PO intake/dehydration  Serum sodium improving  Decrease IVF to 50cc/hr  Monitor serum Sodium Q daily   Avoid overcorrection >8mEq in 24 hours.

## 2019-01-16 NOTE — PROGRESS NOTE ADULT - SUBJECTIVE AND OBJECTIVE BOX
PRESENTING CC:Weakness    SUBJ: 79 yo M w/ hx of CVA w/ left sided weakness, s/o Thoracic aorta repair with Bio AVR-2013, HTN, T2DM, HF (mild LV dysfunction in 11/2018), , BPH, presents with altered mental status.Recently discharged from [facility] for sepsis-sent home but noted poor oral intake-noted GPC in Blood cultures,had NGT placed,arousable,no dyspnea-remains afebrile        PMH -reviewed admission note, no change since admission  Heart failure: acute [ ] chronic [ ] acute or chronic [ ] diastolic [ ] systolic [ ] combined systolic and diastolic[ ]  JENNIFER: ATN[ ] renal medullary necrosis [ ] CKD I [ ]CKDII [ ]CKD III [ ]CKD IV [ ]CKD V [ ]Other pathological lesions [ ]    MEDICATIONS  (STANDING):  heparin  Injectable 5000 Unit(s) SubCutaneous every 8 hours  sodium chloride 0.45%. 1000 milliLiter(s) (100 mL/Hr) IV Continuous <Continuous>  vancomycin  IVPB 1000 milliGRAM(s) IV Intermittent every 12 hours  vancomycin  IVPB          FAMILY HISTORY:  No pertinent family history in first degree relatives  No family history of premature coronary artery disease or sudden cardiac death      REVIEW OF SYSTEMS:  Constitutional: [ ] fever, [ ]weight loss,  [ ]fatigue  Eyes: [ ] visual changes  Respiratory: [ ]shortness of breath;  [ ] cough, [ ]wheezing, [ ]chills, [ ]hemoptysis  Cardiovascular: [ ] chest pain, [ ]palpitations, [ ]dizziness,  [ ]leg swelling[ ]orthopnea[ ]PND  Gastrointestinal: [ ] abdominal pain, [ ]nausea, [ ]vomiting,  [ ]diarrhea   Genitourinary: [ ] dysuria, [ ] hematuria  Neurologic: [ ] headaches [ ] tremors[ ]weakness  Skin: [ ] itching, [ ]burning, [ ] rashes  Endocrine: [ ] heat or cold intolerance  Musculoskeletal: [ ] joint pain or swelling; [ ] muscle, back, or extremity pain  Psychiatric: [ ] depression, [ ]anxiety, [ ]mood swings, or [ ]difficulty sleeping  Hematologic: [ ] easy bruising, [ ] bleeding gums    [ ] All remaining systems negative except as per above.   [x ]Unable to obtain.    Vital Signs Last 24 Hrs  T(C): 36.9 (16 Jan 2019 04:38), Max: 36.9 (15 Jonathan 2019 12:10)  T(F): 98.4 (16 Jan 2019 04:38), Max: 98.4 (15 Jonathan 2019 12:10)  HR: 63 (16 Jan 2019 04:38) (63 - 70)  BP: 99/62 (16 Jan 2019 04:38) (99/62 - 113/69)  RR: 18 (16 Jan 2019 04:38) (18 - 18)  SpO2: 96% (16 Jan 2019 04:38) (95% - 96%)  I&O's Summary    15 Jonathan 2019 07:01  -  16 Jan 2019 07:00  --------------------------------------------------------  IN: 2200 mL / OUT: 0 mL / NET: 2200 mL        PHYSICAL EXAM:  General: No acute distress BMI-19.7  HEENT: EOMI, PERRL  Neck: Supple, [ ] JVD  Lungs: Fair air entry bilaterally; [ ] rales [ ] wheezing [ ] rhonchi  Heart: Regular rate and rhythm; [x ] murmur  2 /6 [x ] systolic [ ] diastolic [ ] radiation[ ] rubs [ ]  gallops  Abdomen: Nontender, bowel sounds present  Extremities: No clubbing, cyanosis, [ ] edema  Nervous system:  Alert & Oriented X1,Left paresis  Psychiatric: Normal affect  Skin: No rashes or lesions    LABS:  01-16    143<---146 <---154  |  111<H>  |  21                     ----------------------------<  113<H>                          3.6   |  20<L>  |  0.65    Ca    7.9<L>      16 Jan 2019 06:26      Creatinine Trend: 0.65<--, 0.67<--, 0.70<--, 0.73<--, 0.80<--, 0.89<--                        10.4   4.7   )-----------( 85       ( 16 Jan 2019 06:30 )             30.0       Culture - Blood (01.12.19 @ 22:17)    Gram Stain:   Growth in anaerobic bottle: Gram Positive Cocci in Clusters  Growth in aerobic bottle: Gram Positive Cocci in Clusters    Specimen Source: .Blood Blood-Venous    Culture Results:   Growth in anaerobic bottle: Staphylococcus epidermidis  Growth in aerobic bottle: Gram Positive Cocci in Clusters          IMPRESSION AND PLAN:      79 yo M w/ hx of CVA w/ left sided weakness,  prior hx of thoracic aortic aneurysm repair, HTN, DM2, systolic CHF (mild LV dysfunction in 11/2018),  s/p bioprosthetic aortic valve, BPH, presents with acute encephalopathy presumably from infectious source versus metabolic derangement versus a central process.       Problem/Plan - 1:  ·  Problem: Acute encephalopathy.  Plan: -Patient presents with more lethargy and unable to provide any hx. Usually per family patient will say 1-2 words and be much more alert than this.   Cultures-GPC in clusters.  Started on Vancomycin.  Hx Aortic root repair with Bio AVR-will consider DICKSON if cultures remain positive.  Repeat cultures no growth    Problem/Plan - 2:  ·  Problem: Hypernatremia.  Plan: -Likely driven by hypovolemia, decreased po intake.   Renal consult noted-IVF with K supplementation  Hypernatremia corrected-  Consideration for PEG feeding      Problem/Plan - 3:  ·  Problem: JENNIFER (acute kidney injury).  Plan: -Baseline creatinine is about 1.0; likely this is hypovolemic mediated in setting of using diuretics and not drinking or eating at home.  Improved.

## 2019-01-16 NOTE — PROGRESS NOTE ADULT - ASSESSMENT
· Assessment	  79 yo M w/ hx of CVA w/ left sided weakness, CAD w/ CABG 2013, prior hx of thoracic aortic aneurysm repair, HTN, DM2, systolic CHF (mild LV dysfunction in 11/2018), bioprosthetic aortic valve, BPH, presents with acute encephalopathy presumably from infectious source versus metabolic derangement versus a central process.      Problem/Plan - 1:  ·  Problem: Acute encephalopathy.  Plan: - Likely from electrolyte disturbance-Hyponatremia          Problem/Plan - 2:  ·  Problem: Hyperglycemia.  Plan: -Patient's blood glucose of 356. He is acidotic and no ketones in urine. This is not consistent with true DKA and this is more driven by other secondary etiologies i.e., infection ,etc..  -Lantus/FSSS     Problem/Plan - 4:  ·  Problem: Hypernatremia.  Plan: -  BMP/Nephro f/up noted.  NGT feeding     Problem/Plan - 5:  ·  Problem: JENNIFER (acute kidney injury).  Plan: -  BMP/IVF     Problem/Plan - 6:  Problem: Demand ischemia. Plan: -Cardio f/up noted.     Problem/Plan - 7:  ·  Problem: Chronic systolic congestive heart failure.  Plan: - Cardio f/up noted.

## 2019-01-17 LAB
ANION GAP SERPL CALC-SCNC: 15 MMOL/L — SIGNIFICANT CHANGE UP (ref 5–17)
ANION GAP SERPL CALC-SCNC: 15 MMOL/L — SIGNIFICANT CHANGE UP (ref 5–17)
BUN SERPL-MCNC: 13 MG/DL — SIGNIFICANT CHANGE UP (ref 7–23)
BUN SERPL-MCNC: 15 MG/DL — SIGNIFICANT CHANGE UP (ref 7–23)
CALCIUM SERPL-MCNC: 8.2 MG/DL — LOW (ref 8.4–10.5)
CALCIUM SERPL-MCNC: 8.3 MG/DL — LOW (ref 8.4–10.5)
CHLORIDE SERPL-SCNC: 108 MMOL/L — SIGNIFICANT CHANGE UP (ref 96–108)
CHLORIDE SERPL-SCNC: 109 MMOL/L — HIGH (ref 96–108)
CO2 SERPL-SCNC: 16 MMOL/L — LOW (ref 22–31)
CO2 SERPL-SCNC: 16 MMOL/L — LOW (ref 22–31)
CREAT SERPL-MCNC: 0.61 MG/DL — SIGNIFICANT CHANGE UP (ref 0.5–1.3)
CREAT SERPL-MCNC: 0.62 MG/DL — SIGNIFICANT CHANGE UP (ref 0.5–1.3)
CULTURE RESULTS: SIGNIFICANT CHANGE UP
GLUCOSE BLDC GLUCOMTR-MCNC: 119 MG/DL — HIGH (ref 70–99)
GLUCOSE BLDC GLUCOMTR-MCNC: 141 MG/DL — HIGH (ref 70–99)
GLUCOSE BLDC GLUCOMTR-MCNC: 197 MG/DL — HIGH (ref 70–99)
GLUCOSE BLDC GLUCOMTR-MCNC: 94 MG/DL — SIGNIFICANT CHANGE UP (ref 70–99)
GLUCOSE SERPL-MCNC: 104 MG/DL — HIGH (ref 70–99)
GLUCOSE SERPL-MCNC: 130 MG/DL — HIGH (ref 70–99)
HCT VFR BLD CALC: 34.1 % — LOW (ref 39–50)
HGB BLD-MCNC: 12 G/DL — LOW (ref 13–17)
MCHC RBC-ENTMCNC: 33.9 PG — SIGNIFICANT CHANGE UP (ref 27–34)
MCHC RBC-ENTMCNC: 35.1 GM/DL — SIGNIFICANT CHANGE UP (ref 32–36)
MCV RBC AUTO: 96.7 FL — SIGNIFICANT CHANGE UP (ref 80–100)
ORGANISM # SPEC MICROSCOPIC CNT: SIGNIFICANT CHANGE UP
ORGANISM # SPEC MICROSCOPIC CNT: SIGNIFICANT CHANGE UP
PLATELET # BLD AUTO: 99 K/UL — LOW (ref 150–400)
POTASSIUM SERPL-MCNC: 3.6 MMOL/L — SIGNIFICANT CHANGE UP (ref 3.5–5.3)
POTASSIUM SERPL-MCNC: 3.8 MMOL/L — SIGNIFICANT CHANGE UP (ref 3.5–5.3)
POTASSIUM SERPL-SCNC: 3.6 MMOL/L — SIGNIFICANT CHANGE UP (ref 3.5–5.3)
POTASSIUM SERPL-SCNC: 3.8 MMOL/L — SIGNIFICANT CHANGE UP (ref 3.5–5.3)
RBC # BLD: 3.53 M/UL — LOW (ref 4.2–5.8)
RBC # FLD: 12.1 % — SIGNIFICANT CHANGE UP (ref 10.3–14.5)
SODIUM SERPL-SCNC: 139 MMOL/L — SIGNIFICANT CHANGE UP (ref 135–145)
SODIUM SERPL-SCNC: 140 MMOL/L — SIGNIFICANT CHANGE UP (ref 135–145)
SPECIMEN SOURCE: SIGNIFICANT CHANGE UP
WBC # BLD: 4.9 K/UL — SIGNIFICANT CHANGE UP (ref 3.8–10.5)
WBC # FLD AUTO: 4.9 K/UL — SIGNIFICANT CHANGE UP (ref 3.8–10.5)

## 2019-01-17 RX ORDER — SODIUM CHLORIDE 9 MG/ML
1000 INJECTION, SOLUTION INTRAVENOUS
Qty: 0 | Refills: 0 | Status: DISCONTINUED | OUTPATIENT
Start: 2019-01-17 | End: 2019-01-18

## 2019-01-17 RX ORDER — SODIUM CHLORIDE 9 MG/ML
1000 INJECTION, SOLUTION INTRAVENOUS
Qty: 0 | Refills: 0 | Status: DISCONTINUED | OUTPATIENT
Start: 2019-01-17 | End: 2019-01-17

## 2019-01-17 RX ADMIN — HEPARIN SODIUM 5000 UNIT(S): 5000 INJECTION INTRAVENOUS; SUBCUTANEOUS at 15:38

## 2019-01-17 RX ADMIN — Medication 250 MILLIGRAM(S): at 21:17

## 2019-01-17 RX ADMIN — HEPARIN SODIUM 5000 UNIT(S): 5000 INJECTION INTRAVENOUS; SUBCUTANEOUS at 05:20

## 2019-01-17 RX ADMIN — SODIUM CHLORIDE 50 MILLILITER(S): 9 INJECTION, SOLUTION INTRAVENOUS at 12:12

## 2019-01-17 RX ADMIN — HEPARIN SODIUM 5000 UNIT(S): 5000 INJECTION INTRAVENOUS; SUBCUTANEOUS at 21:08

## 2019-01-17 RX ADMIN — Medication 250 MILLIGRAM(S): at 12:13

## 2019-01-17 NOTE — PROGRESS NOTE ADULT - SUBJECTIVE AND OBJECTIVE BOX
PRESENTING CC:Weakness    SUBJ:77 yo M w/ hx of CVA w/ left sided weakness, s/o Thoracic Aorta repair with Bio AVR-2013, HTN, T2DM, HF (mild LV dysfunction in 11/2018), , BPH, presents with altered mental status.Recently discharged from [facility] for sepsis-sent home but noted poor oral intake-now noted to be bacteremic-Culture - Gram Positive Cocci in Clusters: Staphylococcus epidermidis-subsequent cultures no growth-on Vanco remains afebrile-on NGT feeding,concern for endocarditis in view of Bio AVR-non verbal.         PMH -reviewed admission note, no change since admission  Heart failure: acute [ ] chronic [ ] acute or chronic [ ] diastolic [ ] systolic [ ] combined systolic and diastolic[ ]  JENNIFER: ATN[ ] renal medullary necrosis [ ] CKD I [ ]CKDII [ ]CKD III [ ]CKD IV [ ]CKD V [ ]Other pathological lesions [ ]    MEDICATIONS  (STANDING):  heparin  Injectable 5000 Unit(s) SubCutaneous every 8 hours  sodium chloride 0.45%. 1000 milliLiter(s) (50 mL/Hr) IV Continuous <Continuous>  vancomycin  IVPB 750 milliGRAM(s) IV Intermittent every 12 hours    MEDICATIONS  (PRN):          FAMILY HISTORY:  No pertinent family history in first degree relatives  No family history of premature coronary artery disease or sudden cardiac death      REVIEW OF SYSTEMS:  Constitutional: [ ] fever, [ ]weight loss,  [ ]fatigue  Eyes: [ ] visual changes  Respiratory: [ ]shortness of breath;  [ ] cough, [ ]wheezing, [ ]chills, [ ]hemoptysis  Cardiovascular: [ ] chest pain, [ ]palpitations, [ ]dizziness,  [ ]leg swelling[ ]orthopnea[ ]PND  Gastrointestinal: [ ] abdominal pain, [ ]nausea, [ ]vomiting,  [ ]diarrhea   Genitourinary: [ ] dysuria, [ ] hematuria  Neurologic: [ ] headaches [ ] tremors[ ]weakness  Skin: [ ] itching, [ ]burning, [ ] rashes  Endocrine: [ ] heat or cold intolerance  Musculoskeletal: [ ] joint pain or swelling; [ ] muscle, back, or extremity pain  Psychiatric: [ ] depression, [ ]anxiety, [ ]mood swings, or [ ]difficulty sleeping  Hematologic: [ ] easy bruising, [ ] bleeding gums    [ ] All remaining systems negative except as per above.   [x ]Unable to obtain.    Vital Signs Last 24 Hrs  T(C): 36.8 (17 Jan 2019 04:27), Max: 36.8 (17 Jan 2019 04:27)  T(F): 98.3 (17 Jan 2019 04:27), Max: 98.3 (17 Jan 2019 04:27)  HR: 75 (17 Jan 2019 04:27) (67 - 75)  BP: 123/78 (17 Jan 2019 04:27) (106/69 - 123/78)  RR: 18 (17 Jan 2019 04:27) (17 - 18)  SpO2: 97% (17 Jan 2019 04:27) (93% - 97%)  I&O's Summary    16 Jan 2019 07:01  -  17 Jan 2019 07:00  --------------------------------------------------------  IN: 2700 mL / OUT: 960 mL / NET: 1740 mL        PHYSICAL EXAM:  General: No acute distress BMI-19  HEENT: EOMI, PERRL  Neck: Supple, [ ] JVD  Lungs: Equal air entry bilaterally; [ ] rales [ ] wheezing [ ] rhonchi  Heart: Regular rate and rhythm; [x ] murmur  2/6 [x ] systolic [ ] diastolic [ ] radiation[ ] rubs [ ]  gallops  Abdomen: Nontender, bowel sounds present  Extremities: No clubbing, cyanosis, [ ] edema  Nervous system:  Alert & Oriented X1,Lefr paresis  Psychiatric: Normal affect  Skin: No rashes or lesions    LABS:  01-17    140  |  109<H>  |  15  ----------------------------<  130<H>  3.8   |  16<L>  |  0.61    Ca    8.3<L>      17 Jan 2019 00:42      Creatinine Trend: 0.61<--, 0.59<--, 0.65<--, 0.67<--, 0.70<--, 0.73<--                        10.4   4.7   )-----------( 85       ( 16 Jan 2019 06:30 )             30.0       Culture - Blood (01.13.19 @ 08:27)    Specimen Source: .Blood Blood-Venous    Culture Results:   No growth to date.      ECHO:  Study Date: 1/16/2019  Severe segmental left ventricular systolic dysfunction.EF- 30 %   Bioprosthetic aortic valve.  No aortic valve regurgitation seen.  Unable to rule out endocarditis.     IMPRESSION AND PLAN:    77 yo M w/ hx of CVA w/ left sided weakness,  prior hx of thoracic aortic aneurysm repair, HTN, DM2, systolic CHF (mild LV dysfunction in 11/2018),  s/p bioprosthetic aortic valve, BPH, presents with acute encephalopathy presumably from infectious source versus metabolic derangement versus a central process.       Problem/Plan - 1:  ·  Problem: Acute encephalopathy.  Plan: -Patient presents with more lethargy and unable to provide any hx.   Cultures-GPC in clusters.Staph Epi  Started on Vancomycin.  Hx Aortic root repair with Bio AVR-Repeat cultures no growth.   Will get DICKSON to determine length for ABx RX    Problem/Plan - 2:  ·  Problem: Hypernatremia.  Plan: -Likely driven by hypovolemia, decreased po intake.   Renal consult noted-IVF with K supplementation  Hypernatremia corrected-  Consideration for PEG feeding.      Problem/Plan - 3:  ·  Problem: JENNIFER (acute kidney injury).  Plan: -Baseline creatinine is about 1.0; likely this is hypovolemic mediated in setting of using diuretics and not drinking or eating at home.  Normalized    Problem/Plan - 4:  .  Problem:Chronic Systolic Heart Failure EF-30%  Clinically euvolemic.  Eventual restarting BBlockers and ACEI

## 2019-01-17 NOTE — PROGRESS NOTE ADULT - SUBJECTIVE AND OBJECTIVE BOX
Infectious Diseases progress note:    Subjective: No acute o/n events    ROS:  Poor cognition, unable to assess    Allergies    No Known Allergies    Intolerances        ANTIBIOTICS/RELEVANT:  antimicrobials  vancomycin  IVPB 750 milliGRAM(s) IV Intermittent every 12 hours    immunologic:    OTHER:  dextrose 5% 1000 milliLiter(s) IV Continuous <Continuous>  heparin  Injectable 5000 Unit(s) SubCutaneous every 8 hours      Objective:  Vital Signs Last 24 Hrs  T(C): 36.3 (17 Jan 2019 21:06), Max: 36.8 (17 Jan 2019 04:27)  T(F): 97.3 (17 Jan 2019 21:06), Max: 98.3 (17 Jan 2019 04:27)  HR: 75 (17 Jan 2019 21:06) (69 - 75)  BP: 143/74 (17 Jan 2019 21:06) (111/75 - 143/74)  BP(mean): --  RR: 18 (17 Jan 2019 21:06) (18 - 18)  SpO2: 98% (17 Jan 2019 21:06) (93% - 98%)    PHYSICAL EXAM:  Constitutional:NAD  Eyes:BENJY, EOMI  Ear/Nose/Throat: no thrush, mucositis.  Moist mucous membranes	  Neck:no JVD, no lymphadenopathy, supple  Respiratory: CTA phi  Cardiovascular: S1S2 RRR, no murmurs  Gastrointestinal:soft, nontender,  nondistended (+) BS, ng tube  Extremities:no e/e/c  Skin:  no rashes, open wounds or ulcerations        LABS:                        12.0   4.9   )-----------( 99       ( 17 Jan 2019 09:47 )             34.1     01-17    139  |  108  |  13  ----------------------------<  104<H>  3.6   |  16<L>  |  0.62    Ca    8.2<L>      17 Jan 2019 09:47                  Vancomycin Level, Trough: 24.4 ug/mL (01-16 @ 18:23)      [facility]          MICROBIOLOGY:    Culture - Blood (01.13.19 @ 08:27)    Specimen Source: .Blood Blood-Venous    Culture Results:   No growth to date.    Culture - Urine (01.12.19 @ 22:19)    Specimen Source: .Urine Catheterized    Culture Results:   No growth    Culture - Blood (01.12.19 @ 22:17)    -  Trimethoprim/Sulfamethoxazole: R >2/38    -  Vancomycin: S 2    -  Gentamicin: S <=1 Should not be used as monotherapy    -  Levofloxacin: R >4    -  Linezolid: S 2    -  Meropenem: R 8    -  Moxifloxacin(Aerobic): I 4    -  Oxacillin: R >2    -  Penicillin: R >8    -  RIF- Rifampin: R >2 Should not be used as monotherapy    -  Tetra/Doxy: R >8    Gram Stain:   Growth in anaerobic bottle: Gram Positive Cocci in Clusters  Growth in aerobic bottle: Gram Positive Cocci in Clusters    -  Amoxicillin/Clavulanic Acid: R <=4/2    -  Ampicillin: R 8    -  Ampicillin/Sulbactam: R <=8/4    -  Cefazolin: R <=4    -  Ceftriaxone: R 32    -  Ciprofloxacin: R >2    -  Clindamycin: R 2 This isolate is presumed to be clindamycin resistant based on detection of inducible resistance. Clindamycin may still be effective in some patients.    -  Daptomycin: S 0.5    -  Erythromycin: R >4    Specimen Source: .Blood Blood-Venous    Organism: Staphylococcus epidermidis    Culture Results:   Growth in aerobic and anaerobic bottles: Staphylococcus epidermidis    Organism Identification: Staphylococcus epidermidis    Method Type: ERNESTINE      Culture - Blood (01.12.19 @ 22:17)    Gram Stain:   Growth in aerobic bottle: Gram Positive Cocci in Clusters    -  Ciprofloxacin: R >2    -  Ceftriaxone: R >32    -  Cefazolin: R 16    -  Ampicillin/Sulbactam: R <=8/4    -  Ampicillin: R >8    -  Amoxicillin/Clavulanic Acid: R >4/2    -  Erythromycin: S 0.5    -  Clindamycin: S 0.5    -  Daptomycin: S 0.5    -  Vancomycin: S 2    -  Trimethoprim/Sulfamethoxazole: R >2/38    -  Coagulase negative Staphylococcus: Detec    -  Tetra/Doxy: S 2    -  RIF- Rifampin: R >2 Should not be used as monotherapy    -  Penicillin: R >8    -  Moxifloxacin(Aerobic): R 2    -  Oxacillin: R >2    -  Meropenem: R >8    -  Linezolid: S 2    -  Gentamicin: R >8 Should not be used as monotherapy    -  Levofloxacin: R >4    Specimen Source: .Blood Blood-Peripheral    Organism: Blood Culture PCR    Organism: Staphylococcus epidermidis    Culture Results:   Growth in anaerobic bottle: Staphylococcus epidermidis  "Due to technical problems, Proteus sp. will Not be reported as part of  the BCID panel until further notice"  ***Blood Panel PCR results on this specimen are available  approximately 3 hours after the Gram stain result.***  Gram stain, PCR, and/or culture results may not always  correspond due to difference in methodologies.  ************************************************************  This PCR assay was performed using [employer].  The following targets are tested for: Enterococcus,  vancomycin resistant enterococci, Listeria monocytogenes,  coagulase negative staphylococci, S. aureus,  methicillin resistant S. aureus, Streptococcus agalactiae  (Group B), S. pneumoniae, S. pyogenes (Group A),  Acinetobacter baumannii, Enterobacter cloacae, E. coli,  Klebsiella oxytoca, K. pneumoniae, Proteus sp.,  Serratia marcescens, Haemophilus influenzae,  Neisseria meningitidis, Pseudomonas aeruginosa, Candida  albicans, C. glabrata, C krusei, C parapsilosis,  C. tropicalis and the KPC resistance gene.    Organism Identification: Blood Culture PCR  Staphylococcus epidermidis    Method Type: PCR    Method Type: ERNESTINE              RADIOLOGY & ADDITIONAL STUDIES:    < from: Xray Chest 1 View- PORTABLE-Urgent (01.14.19 @ 14:20) >  FINDINGS:    Status post median sternotomy and valve replacement.  Enteric tube seen coursing below the diaphragm, tip overlies the stomach.    No focal consolidation.  There is no pneumothorax. There are no pleural effusions.   The cardiomediastinal silhouette cannot be adequately assessed on this   projection.    IMPRESSION:   Clear lungs.   Enteric tube with overlying stomach.    < end of copied text >

## 2019-01-17 NOTE — PROGRESS NOTE ADULT - ASSESSMENT
79 yo M w/ hx of CVA w/ left sided weakness, CAD w/ CABG 2013, prior hx of thoracic aortic aneurysm repair, HTN, DM2, systolic CHF (mild LV dysfunction in 11/2018), bioprosthetic aortic valve, BPH, presents with altered mental status. Patient is unable to provide any history.     Hx obtained from son Kleber ([phone]) and spouse ([phone]) at bedside. Per family patient should not have been discharged from recent admission. They report patient was having fevers and was hypernatremic prior to discharge. The family report since coming home the patient has been more altered, sleeping more, hardly interactive, and to the point where now it is difficult to arouse him. He was admitted in November 2018 for MRSA bacteremia. DICKSON was not performed according to discharge paperwork bc family declined. However, spouse and son report that this was never the case. Patient was treated with prolonged IV antibiotics including vancomycin/rifampin/gentamicin.     Then again in december 2018 he was hospitalized for hypoxic respiratory failure 2/2 to acute decompensated heart failure requiring NIPPV. His hospital course was complicated by steracolitis for which he was given cipro/flagyl. Spouse reports he completed this on discharge. Today the patient's outpatient blood work returned with sodium in "160s" and blood glucose in "400s" despite continuing outpatient metformin therapy. Therefore, the patient & family was advised to come to hospital for further management.  Of note family reports patient had diarrheal illness which resolved. Last BM was three days ago. Per family, patient has not been reporting any type of pain. Furthermore, family reports patient has had temperatures of 101 at home since discharge. (12 Jan 2019 21:02)    Spoke to son and wife at bedside.  Pt more lethargic than usual, not eating or drinking.  Pt's metformin dose decreased during last admission, pt continued to take all his other medications including lasix at regular dose.  He recently finished course of cipro/flagyl for stercoral colitis (diagnosed during prior admission), last week around Monday.  To me, son reported pt had low grade temp off 99 or 100 earlier in the week, but since then no fevers or sweats.  No cough, no other localizing symptoms.      Recommend:    - Bcx growing CNS/staph epidermidis.  ? contaminant vs. true infection.  Given presence of aortic valve repair, agree with starting abx with vancomycin.  f/u repeat blood cultures.  TTE no vegetations seen.  Check DICKSON to evaluate for endocarditis and plan abx duration    - esr and crp are relatively low.    - f/u repeat blood cultures to ensure clearance. 1/13 neg x 1 set. (1/17 testing)     - vanco trough decreased to 750mg IV q12 due to elevated trough.  Check repeat prior to 4th dose.           Will follow,    Tanisha Reyesi  [phone]

## 2019-01-17 NOTE — PROGRESS NOTE ADULT - PROBLEM SELECTOR PLAN 1
Pt with hypernatremia likely sec to decreased PO intake/dehydration  Serum sodium improved  Change IVF to D5W with 150mEQ of bicarb at 50ccc/hr in view of acidosis   Monitor serum Sodium Q daily   Avoid overcorrection >8mEq in 24 hours.

## 2019-01-17 NOTE — PROGRESS NOTE ADULT - SUBJECTIVE AND OBJECTIVE BOX
[facility]   MD FREDRICK JUAREZ MD RUORU WONG, PA    TEL:  OFFICE: [phone]  DR BREWER CELL: [phone]  DAVID ESCAMILLA CELL: [phone]  DR. PHILLIP CELL: [phone]    RENAL FOLLOW UP NOTE  --------------------------------------------------------------------------------  HPI:      Pt seen and examined at bedside.       PAST HISTORY  --------------------------------------------------------------------------------  No significant changes to PMH, PSH, FHx, SHx, unless otherwise noted    ALLERGIES & MEDICATIONS  --------------------------------------------------------------------------------  Allergies    No Known Allergies    Intolerances      Standing Inpatient Medications  heparin  Injectable 5000 Unit(s) SubCutaneous every 8 hours  vancomycin  IVPB 750 milliGRAM(s) IV Intermittent every 12 hours    PRN Inpatient Medications      REVIEW OF SYSTEMS  --------------------------------------------------------------------------------  General: no fever  MSK: no edema     VITALS/PHYSICAL EXAM  --------------------------------------------------------------------------------  T(C): 36.8 (01-17-19 @ 04:27), Max: 36.8 (01-17-19 @ 04:27)  HR: 75 (01-17-19 @ 04:27) (67 - 75)  BP: 123/78 (01-17-19 @ 04:27) (106/69 - 123/78)  RR: 18 (01-17-19 @ 04:27) (17 - 18)  SpO2: 97% (01-17-19 @ 04:27) (93% - 97%)  Wt(kg): --        01-16-19 @ 07:01  -  01-17-19 @ 07:00  --------------------------------------------------------  IN: 2700 mL / OUT: 960 mL / NET: 1740 mL      Physical Exam:  	Gen: NAD  	HEENT: MMM  	Pulm: CTA B/L  	CV: S1S2  	Abd: Soft, +BS  	Ext: No LE edema B/L                      Neuro: Lethargic    	Skin: Warm and Dry   	ABNER no tamiko    LABS/STUDIES  --------------------------------------------------------------------------------              12.0   4.9   >-----------<  99       [01-17-19 @ 09:47]              34.1     140  |  109  |  15  ----------------------------<  130      [01-17-19 @ 00:42]  3.8   |  16  |  0.61        Ca     8.3     [01-17-19 @ 00:42]            Creatinine Trend:  SCr 0.61 [01-17 @ 00:42]  SCr 0.59 [01-16 @ 18:54]  SCr 0.65 [01-16 @ 06:26]  SCr 0.67 [01-16 @ 00:40]  SCr 0.70 [01-15 @ 18:48]    Urinalysis - [01-12-19 @ 20:19]      Color Yellow / Appearance Clear / SG 1.022 / pH 5.5      Gluc 500 mg/dL / Ketone Negative  / Bili Negative / Urobili Negative       Blood Negative / Protein Trace / Leuk Est Negative / Nitrite Negative      RBC 3 / WBC 1 / Hyaline 4 / Gran  / Sq Epi  / Non Sq Epi 0 / Bacteria Negative      HbA1c 7.1      [11-02-18 @ 06:00]  TSH 0.47      [01-13-19 @ 00:25]

## 2019-01-17 NOTE — PROGRESS NOTE ADULT - ASSESSMENT
· Assessment	  79 yo M w/ hx of CVA w/ left sided weakness, CAD w/ CABG 2013, prior hx of thoracic aortic aneurysm repair, HTN, DM2, systolic CHF (mild LV dysfunction in 11/2018), bioprosthetic aortic valve, BPH, presents with acute encephalopathy presumably from infectious source versus metabolic derangement versus a central process.      Problem/Plan - 1:  ·  Problem: Acute toxic metabolic encephalopathy.  Plan: - Likely from electrolyte disturbance-Hyponatremia and sepsis. On Abx          Problem/Plan - 2:  ·  Problem: Hyperglycemia.    Plan: -Lantus/FSSS     Problem/Plan - 4:  ·  Problem: Hypernatremia.  Improved     Problem/Plan - 5:  ·  Problem: JENNIFER (acute kidney injury).  Plan: -  BMP/IVF     Problem/Plan - 6:  Problem: Demand ischemia. Plan: -Cardio f/up noted.     Problem/Plan - 7:  ·  Problem: Chronic systolic congestive heart failure.  Plan: - Cardio f/up noted.

## 2019-01-17 NOTE — PROGRESS NOTE ADULT - SUBJECTIVE AND OBJECTIVE BOX
Patient is a 78y old  Male who presents with a chief complaint of altered mental status (17 Jan 2019 10:01)      SUBJECTIVE / OVERNIGHT EVENTS:  Non verbal, no distress  Review of Systems:   CONSTITUTIONAL: No fever, weight loss, or fatigue  EYES: No eye pain, visual disturbances, or discharge  ENMT:  No difficulty hearing, tinnitus, vertigo; No sinus or throat pain  NECK: No pain or stiffness  BREASTS: No pain, masses, or nipple discharge  RESPIRATORY: No cough, wheezing, chills or hemoptysis; No shortness of breath  CARDIOVASCULAR: No chest pain, palpitations, dizziness, or leg swelling  GASTROINTESTINAL: No abdominal or epigastric pain. No nausea, vomiting, or hematemesis; No diarrhea or constipation. No melena or hematochezia.  GENITOURINARY: No dysuria, frequency, hematuria, or incontinence  NEUROLOGICAL: No headaches, memory loss, loss of strength, numbness, or tremors  SKIN: No itching, burning, rashes, or lesions   LYMPH NODES: No enlarged glands  ENDOCRINE: No heat or cold intolerance; No hair loss  MUSCULOSKELETAL: No joint pain or swelling; No muscle, back, or extremity pain  PSYCHIATRIC: No depression, anxiety, mood swings, or difficulty sleeping  HEME/LYMPH: No easy bruising, or bleeding gums  ALLERY AND IMMUNOLOGIC: No hives or eczema    MEDICATIONS  (STANDING):  dextrose 5% 1000 milliLiter(s) (50 mL/Hr) IV Continuous <Continuous>  heparin  Injectable 5000 Unit(s) SubCutaneous every 8 hours  vancomycin  IVPB 750 milliGRAM(s) IV Intermittent every 12 hours    MEDICATIONS  (PRN):      PHYSICAL EXAM:  Vital Signs Last 24 Hrs  T(C): 36.4 (17 Jan 2019 12:04), Max: 36.8 (17 Jan 2019 04:27)  T(F): 97.5 (17 Jan 2019 12:04), Max: 98.3 (17 Jan 2019 04:27)  HR: 69 (17 Jan 2019 12:04) (68 - 75)  BP: 111/75 (17 Jan 2019 12:04) (106/69 - 123/78)  BP(mean): --  RR: 18 (17 Jan 2019 12:04) (17 - 18)  SpO2: 93% (17 Jan 2019 12:04) (93% - 97%)  I&O's Summary    16 Jan 2019 07:01  -  17 Jan 2019 07:00  --------------------------------------------------------  IN: 2700 mL / OUT: 960 mL / NET: 1740 mL    17 Jan 2019 07:01  -  17 Jan 2019 14:29  --------------------------------------------------------  IN: 0 mL / OUT: 0 mL / NET: 0 mL      GENERAL: NAD, well-developed  HEAD:  Atraumatic, Normocephalic  EYES: EOMI, PERRLA, conjunctiva and sclera clear  NECK: Supple, No JVD  CHEST/LUNG: Clear to auscultation bilaterally; No wheeze  HEART: Regular rate and rhythm; No murmurs, rubs, or gallops  ABDOMEN: Soft, Nontender, Nondistended; Bowel sounds present  EXTREMITIES:  2+ Peripheral Pulses, No clubbing, cyanosis, or edema  PSYCH: Non verbal  NEUROLOGY: non-focal  SKIN: No rashes or lesions    LABS:  CAPILLARY BLOOD GLUCOSE      POCT Blood Glucose.: 119 mg/dL (17 Jan 2019 12:45)  POCT Blood Glucose.: 94 mg/dL (17 Jan 2019 05:25)  POCT Blood Glucose.: 119 mg/dL (16 Jan 2019 23:43)  POCT Blood Glucose.: 104 mg/dL (16 Jan 2019 18:39)                          12.0   4.9   )-----------( 99       ( 17 Jan 2019 09:47 )             34.1     01-17    139  |  108  |  13  ----------------------------<  104<H>  3.6   |  16<L>  |  0.62    Ca    8.2<L>      17 Jan 2019 09:47                RADIOLOGY & ADDITIONAL TESTS:    Imaging Personally Reviewed:    Consultant(s) Notes Reviewed:      Care Discussed with Consultants/Other Providers:

## 2019-01-18 DIAGNOSIS — R13.10 DYSPHAGIA, UNSPECIFIED: ICD-10-CM

## 2019-01-18 LAB
ANION GAP SERPL CALC-SCNC: 13 MMOL/L — SIGNIFICANT CHANGE UP (ref 5–17)
BUN SERPL-MCNC: 10 MG/DL — SIGNIFICANT CHANGE UP (ref 7–23)
CALCIUM SERPL-MCNC: 8 MG/DL — LOW (ref 8.4–10.5)
CALCIUM SERPL-MCNC: 8.5 MG/DL — SIGNIFICANT CHANGE UP (ref 8.4–10.5)
CHLORIDE SERPL-SCNC: 106 MMOL/L — SIGNIFICANT CHANGE UP (ref 96–108)
CO2 SERPL-SCNC: 18 MMOL/L — LOW (ref 22–31)
CREAT SERPL-MCNC: 0.51 MG/DL — SIGNIFICANT CHANGE UP (ref 0.5–1.3)
CULTURE RESULTS: SIGNIFICANT CHANGE UP
GLUCOSE BLDC GLUCOMTR-MCNC: 192 MG/DL — HIGH (ref 70–99)
GLUCOSE BLDC GLUCOMTR-MCNC: 200 MG/DL — HIGH (ref 70–99)
GLUCOSE BLDC GLUCOMTR-MCNC: 255 MG/DL — HIGH (ref 70–99)
GLUCOSE BLDC GLUCOMTR-MCNC: 259 MG/DL — HIGH (ref 70–99)
GLUCOSE BLDC GLUCOMTR-MCNC: 259 MG/DL — HIGH (ref 70–99)
GLUCOSE SERPL-MCNC: 185 MG/DL — HIGH (ref 70–99)
HCT VFR BLD CALC: 35.6 % — LOW (ref 39–50)
HGB BLD-MCNC: 12.4 G/DL — LOW (ref 13–17)
MCHC RBC-ENTMCNC: 33.5 PG — SIGNIFICANT CHANGE UP (ref 27–34)
MCHC RBC-ENTMCNC: 34.7 GM/DL — SIGNIFICANT CHANGE UP (ref 32–36)
MCV RBC AUTO: 96.5 FL — SIGNIFICANT CHANGE UP (ref 80–100)
PLATELET # BLD AUTO: 72 K/UL — LOW (ref 150–400)
POTASSIUM SERPL-MCNC: 4 MMOL/L — SIGNIFICANT CHANGE UP (ref 3.5–5.3)
POTASSIUM SERPL-SCNC: 4 MMOL/L — SIGNIFICANT CHANGE UP (ref 3.5–5.3)
PTH-INTACT FLD-MCNC: 71 PG/ML — HIGH (ref 15–65)
RBC # BLD: 3.69 M/UL — LOW (ref 4.2–5.8)
RBC # FLD: 12.7 % — SIGNIFICANT CHANGE UP (ref 10.3–14.5)
SODIUM SERPL-SCNC: 137 MMOL/L — SIGNIFICANT CHANGE UP (ref 135–145)
SPECIMEN SOURCE: SIGNIFICANT CHANGE UP
VANCOMYCIN TROUGH SERPL-MCNC: 24.6 UG/ML — HIGH (ref 10–20)
VIT D25+D1,25 OH+D1,25 PNL SERPL-MCNC: 17.2 PG/ML — LOW (ref 19.9–79.3)
WBC # BLD: 5.1 K/UL — SIGNIFICANT CHANGE UP (ref 3.8–10.5)
WBC # FLD AUTO: 5.1 K/UL — SIGNIFICANT CHANGE UP (ref 3.8–10.5)

## 2019-01-18 PROCEDURE — 99222 1ST HOSP IP/OBS MODERATE 55: CPT

## 2019-01-18 RX ORDER — INSULIN LISPRO 100/ML
VIAL (ML) SUBCUTANEOUS EVERY 6 HOURS
Qty: 0 | Refills: 0 | Status: DISCONTINUED | OUTPATIENT
Start: 2019-01-18 | End: 2019-02-02

## 2019-01-18 RX ORDER — CHOLECALCIFEROL (VITAMIN D3) 125 MCG
2000 CAPSULE ORAL DAILY
Qty: 0 | Refills: 0 | Status: DISCONTINUED | OUTPATIENT
Start: 2019-01-18 | End: 2019-01-18

## 2019-01-18 RX ORDER — SODIUM BICARBONATE 1 MEQ/ML
650 SYRINGE (ML) INTRAVENOUS EVERY 8 HOURS
Qty: 0 | Refills: 0 | Status: DISCONTINUED | OUTPATIENT
Start: 2019-01-18 | End: 2019-01-18

## 2019-01-18 RX ORDER — CHOLECALCIFEROL (VITAMIN D3) 125 MCG
2000 CAPSULE ORAL DAILY
Qty: 0 | Refills: 0 | Status: DISCONTINUED | OUTPATIENT
Start: 2019-01-18 | End: 2019-02-01

## 2019-01-18 RX ORDER — SODIUM BICARBONATE 1 MEQ/ML
650 SYRINGE (ML) INTRAVENOUS EVERY 8 HOURS
Qty: 0 | Refills: 0 | Status: COMPLETED | OUTPATIENT
Start: 2019-01-18 | End: 2019-01-21

## 2019-01-18 RX ADMIN — Medication 2000 UNIT(S): at 21:56

## 2019-01-18 RX ADMIN — Medication 1: at 06:05

## 2019-01-18 RX ADMIN — Medication 1: at 01:16

## 2019-01-18 RX ADMIN — Medication 650 MILLIGRAM(S): at 21:56

## 2019-01-18 RX ADMIN — HEPARIN SODIUM 5000 UNIT(S): 5000 INJECTION INTRAVENOUS; SUBCUTANEOUS at 05:09

## 2019-01-18 RX ADMIN — Medication 3: at 18:43

## 2019-01-18 RX ADMIN — Medication 250 MILLIGRAM(S): at 21:57

## 2019-01-18 RX ADMIN — Medication 250 MILLIGRAM(S): at 11:50

## 2019-01-18 RX ADMIN — SODIUM CHLORIDE 50 MILLILITER(S): 9 INJECTION, SOLUTION INTRAVENOUS at 11:50

## 2019-01-18 RX ADMIN — HEPARIN SODIUM 5000 UNIT(S): 5000 INJECTION INTRAVENOUS; SUBCUTANEOUS at 21:56

## 2019-01-18 RX ADMIN — Medication 3: at 22:41

## 2019-01-18 RX ADMIN — HEPARIN SODIUM 5000 UNIT(S): 5000 INJECTION INTRAVENOUS; SUBCUTANEOUS at 13:17

## 2019-01-18 RX ADMIN — Medication 3: at 13:17

## 2019-01-18 NOTE — PROGRESS NOTE ADULT - PROBLEM SELECTOR PLAN 1
Pt with hypernatremia likely sec to decreased PO intake/dehydration  Serum sodium improved  Pt started on tube feeds, also with hyperglycemia now-- recommend to dc IVF   Monitor serum Sodium Q daily   Avoid overcorrection >8mEq in 24 hours.

## 2019-01-18 NOTE — DIETITIAN INITIAL EVALUATION ADULT. - PERTINENT MEDS FT
dextrose 5% 1000  heparin  Injectable 5000  insulin lispro (HumaLOG) corrective regimen sliding scale   vancomycin  IVPB 750

## 2019-01-18 NOTE — CONSULT NOTE ADULT - SUBJECTIVE AND OBJECTIVE BOX
[facility]  Shan Torres PA-C  [address] [place] [address]  [phone]      Chief Complaint:  Patient is a 78y old  Male who presents with a chief complaint of altered mental status (2019 13:18)      HPI:79 yo M w/ hx of CVA w/ left sided weakness, CAD w/ CABG , prior hx of thoracic aortic aneurysm repair, HTN, DM2, systolic CHF (mild LV dysfunction in 2018), bioprosthetic aortic valve, BPH, presents with altered mental status The family report since coming home the patient has been more altered, sleeping more, hardly interactive, and to the point where now it is difficult to arouse him. He was admitted in 2018 for MRSA bacteremia. DICKSON was not performed according to discharge paperwork bc family declined. However, spouse and son report that this was never the case. Patient was treated with prolonged IV antibiotics including vancomycin/rifampin/gentamicin. Then again in 2018 he was hospitalized for hypoxic respiratory failure 2/2 to acute decompensated heart failure requiring NIPPV. His hospital course was complicated by steracolitis for which he was given cipro/flagyl. Spouse reports he completed this on discharge. Today the patient's outpatient blood work returned with sodium in "160s" and blood glucose in "400s" despite continuing outpatient metformin therapy. Therefore, the patient & family was advised to come to hospital for further management.  Of note family reports patient had diarrheal illness which resolved. Last BM was three days ago. Per family, patient has not been reporting any type of pain. Furthermore, family reports patient has had temperatures of 101 at home since discharge.     Allergies:  No Known Allergies      Medications:  cholecalciferol 2000 Unit(s) Oral daily  heparin  Injectable 5000 Unit(s) SubCutaneous every 8 hours  insulin lispro (HumaLOG) corrective regimen sliding scale   SubCutaneous every 6 hours  sodium bicarbonate 650 milliGRAM(s) Oral every 8 hours  vancomycin  IVPB 750 milliGRAM(s) IV Intermittent every 12 hours      PMHX/PSHX:  Sepsis  UTI (urinary tract infection)  GI bleed  PUD (peptic ulcer disease)  CVA, old, hemiparesis  Gastric ulcer  Hyperlipemia  Diabetes mellitus  Hypertension  History of eye surgery  H/O aortic root repair  No Past Surgical History      Family history:  No pertinent family history in first degree relatives      Social History:     ROS:     unable to obtain      PHYSICAL EXAM:   Vital Signs:  Vital Signs Last 24 Hrs  T(C): 36.7 (2019 12:10), Max: 36.7 (2019 12:10)  T(F): 98 (2019 12:10), Max: 98 (2019 12:10)  HR: 76 (2019 12:10) (75 - 76)  BP: 117/75 (2019 12:10) (117/75 - 143/74)  BP(mean): --  RR: 16 (2019 12:10) (16 - 18)  SpO2: 97% (2019 12:10) (97% - 98%)  Daily     Daily Weight in k.4 (2019 10:45)    nad  ngt in place  frail  non toxic  soft, nt  no edema      LABS:                        12.4   5.1   )-----------( 72       ( 2019 07:00 )             35.6     01-18    137  |  106  |  10  ----------------------------<  185<H>  4.0   |  18<L>  |  0.51    Ca    8.0<L>      2019 07:00                Imaging:

## 2019-01-18 NOTE — DIETITIAN INITIAL EVALUATION ADULT. - NS AS NUTRI INTERV ENTERAL NUTRITION
change formula to Glucerna 1.2  recommend goal rate of 50ml/hr x 24 hr. provides 1140kcal and 72grams protein. provides 26Kcal/Kg and 1.3grams protein and 966ml free water.  pt needs additional 400ml free water daily. initiate tube feeds at 20ml/hr x 24 hours. increase by 10ml every 6-8 hours until goal rate of 50ml/hr is achieved.

## 2019-01-18 NOTE — PROGRESS NOTE ADULT - SUBJECTIVE AND OBJECTIVE BOX
PRESENTING CC:Weakness    SUBJ: 79 yo M w/ hx of CVA w/ left sided weakness, s/o Thoracic Aorta repair with Bio AVR-2013, HTN, T2DM, HF (mild LV dysfunction in 11/2018), , BPH, presents with altered mental status.Recently discharged from [facility] for sepsis-sent home but noted poor oral intake-now noted to be bacteremic-Culture - Gram Positive Cocci in Clusters: Staphylococcus epidermidis-subsequent cultures no growth-on Vanco remains afebrile-on NGT feeding,discussed with son about DICKSON-he does not wish to persue study,wants to consider PEG.         PMH -reviewed admission note, no change since admission  Heart failure: acute [ ] chronic [x ] acute or chronic [ ] diastolic [x ] systolic [ ] combined systolic and diastolic[ ]  JENNIFER[x]-resolved ATN[ ] renal medullary necrosis [ ] CKD I [ ]CKDII [ ]CKD III [ ]CKD IV [ ]CKD V [ ]Other pathological lesions [ ]    MEDICATIONS  (STANDING):  dextrose 5% 1000 milliLiter(s) (50 mL/Hr) IV Continuous <Continuous>  heparin  Injectable 5000 Unit(s) SubCutaneous every 8 hours  insulin lispro (HumaLOG) corrective regimen sliding scale   SubCutaneous every 6 hours  vancomycin  IVPB 750 milliGRAM(s) IV Intermittent every 12 hours      FAMILY HISTORY:  No pertinent family history in first degree relatives  No family history of premature coronary artery disease or sudden cardiac death      REVIEW OF SYSTEMS:  Constitutional: [ ] fever, [ ]weight loss,  [ ]fatigue  Eyes: [ ] visual changes  Respiratory: [ ]shortness of breath;  [ ] cough, [ ]wheezing, [ ]chills, [ ]hemoptysis  Cardiovascular: [ ] chest pain, [ ]palpitations, [ ]dizziness,  [ ]leg swelling[ ]orthopnea[ ]PND  Gastrointestinal: [ ] abdominal pain, [ ]nausea, [ ]vomiting,  [ ]diarrhea   Genitourinary: [ ] dysuria, [ ] hematuria  Neurologic: [ ] headaches [ ] tremors[ ]weakness  Skin: [ ] itching, [ ]burning, [ ] rashes  Endocrine: [ ] heat or cold intolerance  Musculoskeletal: [ ] joint pain or swelling; [ ] muscle, back, or extremity pain  Psychiatric: [ ] depression, [ ]anxiety, [ ]mood swings, or [ ]difficulty sleeping  Hematologic: [ ] easy bruising, [ ] bleeding gums    [ ] All remaining systems negative except as per above.   [x ]Unable to obtain-non verbal    Vital Signs Last 24 Hrs  T(C): 36.6 (18 Jan 2019 04:23), Max: 36.6 (18 Jan 2019 04:23)  T(F): 97.8 (18 Jan 2019 04:23), Max: 97.8 (18 Jan 2019 04:23)  HR: 76 (18 Jan 2019 04:23) (69 - 76)  BP: 132/77 (18 Jan 2019 04:23) (111/75 - 143/74)  RR: 18 (18 Jan 2019 04:23) (18 - 18)  SpO2: 97% (18 Jan 2019 04:23) (93% - 98%)  I&O's Summary    16 Jan 2019 07:01  -  17 Jan 2019 07:00  --------------------------------------------------------  IN: 2700 mL / OUT: 960 mL / NET: 1740 mL    17 Jan 2019 07:01  -  18 Jan 2019 06:32  --------------------------------------------------------  IN: 530 mL / OUT: 700 mL / NET: -170 mL        PHYSICAL EXAM:  General: No acute distress BMI-19.7  HEENT: EOMI, PERRL  Neck: Supple, [ ] JVD  Lungs: Equal air entry bilaterally; [ ] rales [ ] wheezing [ ] rhonchi  Heart: Regular rate and rhythm; [x ] murmur  2 /6 [x ] systolic [ ] diastolic [ ] radiation[ ] rubs [ ]  gallops  Abdomen: Nontender, bowel sounds present  Extremities: No clubbing, cyanosis, [ ] edema  Nervous system:  Alert & Oriented X1, Left paresis  Psychiatric: Normal affect  Skin: No rashes or lesions    LABS:  01-17    139  |  108  |  13  ----------------------------<  104<H>  3.6   |  16<L>  |  0.62    Ca    8.2<L>      17 Jan 2019 09:47      Creatinine Trend: 0.62<--, 0.61<--, 0.59<--, 0.65<--, 0.67<--, 0.70<--                        12.0   4.9   )-----------( 99       ( 17 Jan 2019 09:47 )             34.1       Culture - Blood (01.13.19 @ 08:27)    Specimen Source: .Blood Blood-Venous    Culture Results:   No growth to date.      IMPRESSION AND PLAN:    79 yo M w/ hx of CVA w/ left sided weakness,  prior hx of thoracic aortic aneurysm repair, HTN, DM2, systolic CHF (mild LV dysfunction in 11/2018),  s/p bioprosthetic aortic valve, BPH, presents with acute encephalopathy presumably from infectious source versus metabolic derangement versus a central process.       Problem/Plan - 1:  ·  Problem: Acute encephalopathy.  Plan: -Patient presents with more lethargy and unable to provide any hx.   Cultures-GPC in clusters.Staph Epi  Started on Vancomycin.  Hx Aortic root repair with Bio AVR-Repeat cultures no growth.   Discussed with son concerning DICKSON-does not want to persue-will defer to ID on length of IV ABx if indicated as repeat blood Cultures no growth-TTE no vegetations seen.    Problem/Plan - 2:  ·  Problem: Hypernatremia.  Plan: -Likely driven by hypovolemia, decreased po intake.   Renal consult noted-IVF with K supplementation  Hypernatremia corrected-  Consideration for PEG -GI Consult Dr Chavez/Carol called.      Problem/Plan - 3:  ·  Problem: JENNIFER (acute kidney injury).  Plan: -Baseline creatinine is about 1.0; likely this is hypovolemic mediated in setting of using diuretics and not drinking or eating at home.  Normalized.    Problem/Plan - 4:  .  Problem:Chronic Systolic Heart Failure EF-30%  Clinically euvolemic.  Eventual restarting BBlockers and ACEI

## 2019-01-18 NOTE — CONSULT NOTE ADULT - SUBJECTIVE AND OBJECTIVE BOX
Wound SURGERY CONSULT NOTE    HPI:  77 yo M w/ hx of CVA w/ left sided weakness, CAD w/ CABG 2013, prior hx of thoracic aortic aneurysm repair, HTN, DM2, systolic CHF (mild LV dysfunction in 11/2018), bioprosthetic aortic valve, BPH, presents with altered mental status. Patient is unable to provide any history. Hx obtained from son Kleber ([phone]) and spouse ([phone]) at bedside. Per family patient should not have been discharged from recent admission. They report patient was having fevers and was hypernatremic prior to discharge. The family report since coming home the patient has been more altered, sleeping more, hardly interactive, and to the point where now it is difficult to arouse him. He was admitted in November 2018 for MRSA bacteremia. DICKSON was not performed according to discharge paperwork bc family declined. However, spouse and son report that this was never the case. Patient was treated with prolonged IV antibiotics including vancomycin/rifampin/gentamicin. Then again in december 2018 he was hospitalized for hypoxic respiratory failure 2/2 to acute decompensated heart failure requiring NIPPV. His hospital course was complicated by steracolitis for which he was given cipro/flagyl. Spouse reports he completed this on discharge. Today the patient's outpatient blood work returned with sodium in "160s" and blood glucose in "400s" despite continuing outpatient metformin therapy. Therefore, the patient & family was advised to come to hospital for further management.  Of note family reports patient had diarrheal illness which resolved. Last BM was three days ago. Per family, patient has not been reporting any type of pain. Furthermore, family reports patient has had temperatures of 101 at home since discharge. (12 Jan 2019 21:02)      PAST MEDICAL & SURGICAL HISTORY:  Sepsis  UTI (urinary tract infection)  GI bleed  PUD (peptic ulcer disease)  CVA, old, 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  Gastric ulcer  Hyperlipemia  Diabetes mellitus  Hypertension  History of eye surgery: endophthalmitis in 2014  H/O aortic root repair  No Past Surgical History      REVIEW OF SYSTEMS      General:	    Skin/Breast:  	  Ophthalmologic:  	  ENMT:	    Respiratory and Thorax:  	  Cardiovascular:	    Gastrointestinal:	    Genitourinary:	    Musculoskeletal:	    Neurological:	    Psychiatric:	    Hematology/Lymphatics:	    Endocrine:	    Allergic/Immunologic:	  Pt unable to offer  Skin/ MSK: see HPI  All other systems negative    MEDICATIONS  (STANDING):  cholecalciferol 2000 Unit(s) Oral daily  dextrose 5% 1000 milliLiter(s) (50 mL/Hr) IV Continuous <Continuous>  heparin  Injectable 5000 Unit(s) SubCutaneous every 8 hours  insulin lispro (HumaLOG) corrective regimen sliding scale   SubCutaneous every 6 hours  vancomycin  IVPB 750 milliGRAM(s) IV Intermittent every 12 hours    MEDICATIONS  (PRN):      Allergies    No Known Allergies    Intolerances        SOCIAL HISTORY:  / /single/ ; (+)HHA/ lives in SNF; Former smoker, Denies smoking, ETOH, drugs    FAMILY HISTORY:  No pertinent family history in first degree relatives      Vital Signs Last 24 Hrs  T(C): 36.6 (18 Jan 2019 04:23), Max: 36.6 (18 Jan 2019 04:23)  T(F): 97.8 (18 Jan 2019 04:23), Max: 97.8 (18 Jan 2019 04:23)  HR: 76 (18 Jan 2019 04:23) (69 - 76)  BP: 132/77 (18 Jan 2019 04:23) (111/75 - 143/74)  BP(mean): --  RR: 18 (18 Jan 2019 04:23) (18 - 18)  SpO2: 97% (18 Jan 2019 04:23) (93% - 98%)    NAD / gaurded but stable,  A&Ox3/ Alert/ Confused  cachectic/ MO/ Obese/ frail  WD/ WN/ WG/ Disheveled  Total Care Sport/ Versa Care P500 bed/ [place]    Cardiovascular: RRR (+)m    Respiratory: CTA    Gastrointestinal soft NT/ND (+)BS  (+)PEG (+)ostomy    Neurology  weakened strength & sensation grossly intact/ paraesthesia  nonverbal, no follow commands/ paraplegic    Musculoskeletal/Vascular: passive/ stiff ROM  no BLE edema  BLE equally warm  no acute ischemia noted  mild contractures    Skin:  moist w/ good turgor    Bilteral buttocks / sacrum w/ wounds in a 3cm x 5cm x 0.1cm area  periwound area w/ hyperpigmentation of moisture dermatitis  Lt sacral aspect w/ 3 small maroon hyperpigmented lesions w/o blistering  gluteal cleft and Rt  buttock w/ partial thickness wound of moisture origin  serosanguinous drainage  No odor, erythema, increased warmth, tenderness, induration, fluctuance    LABS:  01-18    137  |  106  |  10  ----------------------------<  185<H>  4.0   |  18<L>  |  0.51    Ca    8.0<L>      18 Jan 2019 07:00                            12.4   5.1   )-----------( 72       ( 18 Jan 2019 07:00 )             35.6           RADIOLOGY & ADDITIONAL STUDIES:  < from: CT Abdomen and Pelvis No Cont (01.12.19 @ 21:22) >  FINDINGS:    LOWER CHEST: Partially imaged left pleural effusion.  Status post aortic   valve repair.    LIVER: Within normal limits.  BILE DUCTS: Normal caliber.  GALLBLADDER: Within normal limits.  SPLEEN: Within normal limits.  PANCREAS: Within normal limits.  ADRENALS: Focal nodular thickening of the left adrenal gland, unchanged.   KIDNEYS/URETERS: Multiple bilateral renal cysts. No hydronephrosis.   Hyperdense 11 mm right posterior lateral renal lesion and 7 mm left upper   pole renal lesion. These are likely hemorrhagic cysts   BLADDER: Within normal limits.  REPRODUCTIVE ORGANS: The prostate is within normal limits.    BOWEL: Interval decrease in size of rectal stool burden. No perirectal   fat stranding. Scattered colonic diverticula. No bowel obstruction.   Normal appendix.   PERITONEUM: No ascites.  VESSELS:  Embolization coils in the gastroduodenal artery.   Atherosclerotic disease of aorta.  RETROPERITONEUM: No lymphadenopathy.    ABDOMINAL WALL: Small fat-containing left inguinal hernia.  BONES: Multilevel spinal degenerative changes. Partial compression   deformity T12.      IMPRESSION:     Interval decrease in size of rectal stool burden. No evidence of colitis.        Cultures:      Culture - Blood (01.13.19 @ 08:27)    Specimen Source: .Blood Blood-Venous    Culture Results:   No growth at 5 days.    Culture - Urine (01.12.19 @ 22:19)    Specimen Source: .Urine Catheterized    Culture Results:   No growth      Culture - Blood (01.12.19 @ 22:17)    -  Trimethoprim/Sulfamethoxazole: R >2/38    -  Vancomycin: S 2    -  Gentamicin: S <=1 Should not be used as monotherapy    -  Levofloxacin: R >4    -  Linezolid: S 2    -  Meropenem: R 8    -  Moxifloxacin(Aerobic): I 4    -  Oxacillin: R >2    -  Penicillin: R >8    -  RIF- Rifampin: R >2 Should not be used as monotherapy    -  Tetra/Doxy: R >8    Gram Stain:   Growth in anaerobic bottle: Gram Positive Cocci in Clusters  Growth in aerobic bottle: Gram Positive Cocci in Clusters    -  Amoxicillin/Clavulanic Acid: R <=4/2    -  Ampicillin: R 8    -  Ampicillin/Sulbactam: R <=8/4    -  Cefazolin: R <=4    -  Ceftriaxone: R 32    -  Ciprofloxacin: R >2    -  Clindamycin: R 2 This isolate is presumed to be clindamycin resistant based on detection of inducible resistance. Clindamycin may still be effective in some patients.    -  Daptomycin: S 0.5    -  Erythromycin: R >4    Specimen Source: .Blood Blood-Venous    Organism: Staphylococcus epidermidis    Culture Results:   Growth in aerobic and anaerobic bottles: Staphylococcus epidermidis    Organism Identification: Staphylococcus epidermidis    Method Type: ERNESTINE Wound SURGERY CONSULT NOTE    HPI:  79 yo M w/ hx of CVA w/ left sided weakness, CAD w/ CABG 2013, prior hx of thoracic aortic aneurysm repair, HTN, DM2, systolic CHF (mild LV dysfunction in 11/2018), bioprosthetic aortic valve, BPH, presents with altered mental status. Patient is unable to provide any history. Hx obtained from son Kleber ([phone]) and spouse ([phone]) at bedside. Per family patient should not have been discharged from recent admission. They report patient was having fevers and was hypernatremic prior to discharge. The family report since coming home the patient has been more altered, sleeping more, hardly interactive, and to the point where now it is difficult to arouse him. He was admitted in November 2018 for MRSA bacteremia. DICKSON was not performed according to discharge paperwork bc family declined. However, spouse and son report that this was never the case. Patient was treated with prolonged IV antibiotics including vancomycin/rifampin/gentamicin. Then again in december 2018 he was hospitalized for hypoxic respiratory failure 2/2 to acute decompensated heart failure requiring NIPPV. His hospital course was complicated by steracolitis for which he was given cipro/flagyl. Spouse reports he completed this on discharge. Today the patient's outpatient blood work returned with sodium in "160s" and blood glucose in "400s" despite continuing outpatient metformin therapy. Therefore, the patient & family was advised to come to hospital for further management.  Of note family reports patient had diarrheal illness which resolved. Last BM was three days ago. Per family, patient has not been reporting any type of pain. Furthermore, family reports patient has had temperatures of 101 at home since discharge.       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  Gastric ulcer  Hyperlipemia  Diabetes mellitus  Hypertension  s/p eye surgery: endophthalmitis in 2014  s/p aortic root repair      REVIEW OF SYSTEMS  Pt unable to offer    MEDICATIONS  (STANDING):  cholecalciferol 2000 Unit(s) Oral daily  dextrose 5% 1000 milliLiter(s) (50 mL/Hr) IV Continuous <Continuous>  heparin  Injectable 5000 Unit(s) SubCutaneous every 8 hours  insulin lispro (HumaLOG) corrective regimen sliding scale   SubCutaneous every 6 hours  vancomycin  IVPB 750 milliGRAM(s) IV Intermittent every 12 hours    No Known Allergies    SOCIAL HISTORY:  / /single/ ; (+)HHA/ lives in SNF; Former smoker, Denies ETOH, drugs    FAMILY HISTORY:  No pertinent family history in first degree relatives      Vital Signs Last 24 Hrs  T(C): 36.6 (18 Jan 2019 04:23), Max: 36.6 (18 Jan 2019 04:23)  T(F): 97.8 (18 Jan 2019 04:23), Max: 97.8 (18 Jan 2019 04:23)  HR: 76 (18 Jan 2019 04:23) (69 - 76)  BP: 132/77 (18 Jan 2019 04:23) (111/75 - 143/74)  BP(mean): --  RR: 18 (18 Jan 2019 04:23) (18 - 18)  SpO2: 97% (18 Jan 2019 04:23) (93% - 98%)    NAD /Alert  cachectic/  frail  WD/ WN/ Disheveled  Versa Care P500 bed    Cardiovascular: RRR (+)m    Respiratory: CTA    Gastrointestinal soft NT/ND (+)BS  (+)PEG     Neurology nonverbal, no follow commands/ paraplegic    Musculoskeletal/Vascular: passive/ stiff ROM  no BLE edema  BLE equally warm  no acute ischemia noted  mild contractures    Skin:  moist w/ good turgor    Bilteral buttocks / sacrum w/ wounds in a 3cm x 5cm x 0.1cm area  periwound area w/ hyperpigmentation of moisture dermatitis  Lt sacral aspect w/ 3 small maroon hyperpigmented lesions w/o blistering  gluteal cleft and Rt  buttock w/ partial thickness wound of moisture origin  serosanguinous drainage  No odor, erythema, increased warmth, tenderness, induration, fluctuance    LABS:  01-18    137  |  106  |  10  ----------------------------<  185<H>  4.0   |  18<L>  |  0.51    Ca    8.0<L>      18 Jan 2019 07:00                            12.4   5.1   )-----------( 72       ( 18 Jan 2019 07:00 )             35.6           RADIOLOGY & ADDITIONAL STUDIES:  < from: CT Abdomen and Pelvis No Cont (01.12.19 @ 21:22) >  FINDINGS:    LOWER CHEST: Partially imaged left pleural effusion.  Status post aortic   valve repair.    LIVER: Within normal limits.  BILE DUCTS: Normal caliber.  GALLBLADDER: Within normal limits.  SPLEEN: Within normal limits.  PANCREAS: Within normal limits.  ADRENALS: Focal nodular thickening of the left adrenal gland, unchanged.   KIDNEYS/URETERS: Multiple bilateral renal cysts. No hydronephrosis.   Hyperdense 11 mm right posterior lateral renal lesion and 7 mm left upper   pole renal lesion. These are likely hemorrhagic cysts   BLADDER: Within normal limits.  REPRODUCTIVE ORGANS: The prostate is within normal limits.    BOWEL: Interval decrease in size of rectal stool burden. No perirectal   fat stranding. Scattered colonic diverticula. No bowel obstruction.   Normal appendix.   PERITONEUM: No ascites.  VESSELS:  Embolization coils in the gastroduodenal artery.   Atherosclerotic disease of aorta.  RETROPERITONEUM: No lymphadenopathy.    ABDOMINAL WALL: Small fat-containing left inguinal hernia.  BONES: Multilevel spinal degenerative changes. Partial compression   deformity T12.      IMPRESSION:     Interval decrease in size of rectal stool burden. No evidence of colitis.        Cultures:      Culture - Blood (01.13.19 @ 08:27)    Specimen Source: .Blood Blood-Venous    Culture Results:   No growth at 5 days.    Culture - Urine (01.12.19 @ 22:19)    Specimen Source: .Urine Catheterized    Culture Results:   No growth      Culture - Blood (01.12.19 @ 22:17)    -  Trimethoprim/Sulfamethoxazole: R >2/38    -  Vancomycin: S 2    -  Gentamicin: S <=1 Should not be used as monotherapy    -  Levofloxacin: R >4    -  Linezolid: S 2    -  Meropenem: R 8    -  Moxifloxacin(Aerobic): I 4    -  Oxacillin: R >2    -  Penicillin: R >8    -  RIF- Rifampin: R >2 Should not be used as monotherapy    -  Tetra/Doxy: R >8    Gram Stain:   Growth in anaerobic bottle: Gram Positive Cocci in Clusters  Growth in aerobic bottle: Gram Positive Cocci in Clusters    -  Amoxicillin/Clavulanic Acid: R <=4/2    -  Ampicillin: R 8    -  Ampicillin/Sulbactam: R <=8/4    -  Cefazolin: R <=4    -  Ceftriaxone: R 32    -  Ciprofloxacin: R >2    -  Clindamycin: R 2 This isolate is presumed to be clindamycin resistant based on detection of inducible resistance. Clindamycin may still be effective in some patients.    -  Daptomycin: S 0.5    -  Erythromycin: R >4    Specimen Source: .Blood Blood-Venous    Organism: Staphylococcus epidermidis    Culture Results:   Growth in aerobic and anaerobic bottles: Staphylococcus epidermidis    Organism Identification: Staphylococcus epidermidis    Method Type: ERNESTINE Wound SURGERY CONSULT NOTE    HPI:  77 yo M w/ hx of CVA w/ left sided weakness, CAD w/ CABG 2013, prior hx of thoracic aortic aneurysm repair, HTN, DM2, systolic CHF (mild LV dysfunction in 11/2018), bioprosthetic aortic valve, BPH, presents with altered mental status The family report since coming home the patient has been more altered, sleeping more, hardly interactive, and to the point where now it is difficult to arouse him. He was admitted in November 2018 for MRSA bacteremia. DICKSON was not performed according to discharge paperwork bc family declined. However, spouse and son report that this was never the case. Patient was treated with prolonged IV antibiotics including vancomycin/rifampin/gentamicin. Then again in december 2018 he was hospitalized for hypoxic respiratory failure 2/2 to acute decompensated heart failure requiring NIPPV. His hospital course was complicated by steracolitis for which he was given cipro/flagyl. Spouse reports he completed this on discharge. Today the patient's outpatient blood work returned with sodium in "160s" and blood glucose in "400s" despite continuing outpatient metformin therapy. Therefore, the patient & family was advised to come to hospital for further management.  Of note family reports patient had diarrheal illness which resolved. Last BM was three days ago. Per family, patient has not been reporting any type of pain. Furthermore, family reports patient has had temperatures of 101 at home since discharge.   Wound consult requested to assist w/ management of sacral wound.  Allevyn placed awaiting consult. No drainage, odor, redness, warmth, pain, f/c/s noted. (+)incontinent (+)sedentary. Offloading & pericare initiated upon admission.  Minimal improvement noted.     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  Gastric ulcer  Hyperlipemia  Diabetes mellitus  Hypertension  s/p eye surgery: endophthalmitis in 2014  s/p aortic root repair      REVIEW OF SYSTEMS  Pt unable to offer    MEDICATIONS  (STANDING):  cholecalciferol 2000 Unit(s) Oral daily  dextrose 5% 1000 milliLiter(s) (50 mL/Hr) IV Continuous <Continuous>  heparin  Injectable 5000 Unit(s) SubCutaneous every 8 hours  insulin lispro (HumaLOG) corrective regimen sliding scale   SubCutaneous every 6 hours  vancomycin  IVPB 750 milliGRAM(s) IV Intermittent every 12 hours    No Known Allergies    SOCIAL HISTORY:  ; (+) lives in SNF; Former smoker, Denies ETOH, drugs    FAMILY HISTORY:  No pertinent family history in first degree relatives      Vital Signs Last 24 Hrs  T(C): 36.6 (18 Jan 2019 04:23), Max: 36.6 (18 Jan 2019 04:23)  T(F): 97.8 (18 Jan 2019 04:23), Max: 97.8 (18 Jan 2019 04:23)  HR: 76 (18 Jan 2019 04:23) (69 - 76)  BP: 132/77 (18 Jan 2019 04:23) (111/75 - 143/74)  BP(mean): --  RR: 18 (18 Jan 2019 04:23) (18 - 18)  SpO2: 97% (18 Jan 2019 04:23) (93% - 98%)    NAD /Alert  cachectic/  frail  WD/ WN/ Disheveled  bed ridden  Versa Care P500 bed    Cardiovascular: RRR (+)m    Respiratory: CTA    Gastrointestinal soft NT/ND (+)BS  (+)PEG     Neurology nonverbal, no follow commands/ Lt sided paraplegic    Musculoskeletal/Vascular: passive/ stiff ROM  no BLE edema  BLE equally warm  no acute ischemia noted  mild contractures    Skin:  moist w/ good turgor    Bilateral buttocks / sacrum w/ wounds in a 3cm x 5cm x 0.1cm area  periwound area w/ hyperpigmentation of moisture dermatitis  Lt sacral aspect w/ 3 small maroon hyperpigmented lesions w/o blistering  gluteal cleft and Rt  buttock w/ partial thickness wound of moisture origin  serosanguinous drainage  No odor, erythema, increased warmth, tenderness, induration, fluctuance    LABS:  01-18    137  |  106  |  10  ----------------------------<  185<H>  4.0   |  18<L>  |  0.51    Ca    8.0<L>      18 Jan 2019 07:00                            12.4   5.1   )-----------( 72       ( 18 Jan 2019 07:00 )             35.6           RADIOLOGY & ADDITIONAL STUDIES:  < from: CT Abdomen and Pelvis No Cont (01.12.19 @ 21:22) >  FINDINGS:    LOWER CHEST: Partially imaged left pleural effusion.  Status post aortic   valve repair.    LIVER: Within normal limits.  BILE DUCTS: Normal caliber.  GALLBLADDER: Within normal limits.  SPLEEN: Within normal limits.  PANCREAS: Within normal limits.  ADRENALS: Focal nodular thickening of the left adrenal gland, unchanged.   KIDNEYS/URETERS: Multiple bilateral renal cysts. No hydronephrosis.   Hyperdense 11 mm right posterior lateral renal lesion and 7 mm left upper   pole renal lesion. These are likely hemorrhagic cysts   BLADDER: Within normal limits.  REPRODUCTIVE ORGANS: The prostate is within normal limits.    BOWEL: Interval decrease in size of rectal stool burden. No perirectal   fat stranding. Scattered colonic diverticula. No bowel obstruction.   Normal appendix.   PERITONEUM: No ascites.  VESSELS:  Embolization coils in the gastroduodenal artery.   Atherosclerotic disease of aorta.  RETROPERITONEUM: No lymphadenopathy.    ABDOMINAL WALL: Small fat-containing left inguinal hernia.  BONES: Multilevel spinal degenerative changes. Partial compression   deformity T12.      IMPRESSION:     Interval decrease in size of rectal stool burden. No evidence of colitis.        Cultures:      Culture - Blood (01.13.19 @ 08:27)    Specimen Source: .Blood Blood-Venous    Culture Results:   No growth at 5 days.    Culture - Urine (01.12.19 @ 22:19)    Specimen Source: .Urine Catheterized    Culture Results:   No growth      Culture - Blood (01.12.19 @ 22:17)    -  Trimethoprim/Sulfamethoxazole: R >2/38    -  Vancomycin: S 2    -  Gentamicin: S <=1 Should not be used as monotherapy    -  Levofloxacin: R >4    -  Linezolid: S 2    -  Meropenem: R 8    -  Moxifloxacin(Aerobic): I 4    -  Oxacillin: R >2    -  Penicillin: R >8    -  RIF- Rifampin: R >2 Should not be used as monotherapy    -  Tetra/Doxy: R >8    Gram Stain:   Growth in anaerobic bottle: Gram Positive Cocci in Clusters  Growth in aerobic bottle: Gram Positive Cocci in Clusters    -  Amoxicillin/Clavulanic Acid: R <=4/2    -  Ampicillin: R 8    -  Ampicillin/Sulbactam: R <=8/4    -  Cefazolin: R <=4    -  Ceftriaxone: R 32    -  Ciprofloxacin: R >2    -  Clindamycin: R 2 This isolate is presumed to be clindamycin resistant based on detection of inducible resistance. Clindamycin may still be effective in some patients.    -  Daptomycin: S 0.5    -  Erythromycin: R >4    Specimen Source: .Blood Blood-Venous    Organism: Staphylococcus epidermidis    Culture Results:   Growth in aerobic and anaerobic bottles: Staphylococcus epidermidis    Organism Identification: Staphylococcus epidermidis    Method Type: ERNESTINE

## 2019-01-18 NOTE — CONSULT NOTE ADULT - ASSESSMENT
A/P:      Buttock / sacrum w/ Evolving DTI Lt sacrum and moisture wound- Gluteal cleft/ Rt buttock- TRIAD  Stage 2 pressure injury Lt posterior midthoracic region  BLE elevation  Abx per Medicine/ ID  Moisturize intact skin w/ SWEEN cream BID  con't Nutrition (as tolerated), Nutrition Consult  con't Offloading   con't Pericare  Care as per medicine will follow w/ you  Upon discharge f/u as outpatient at [facility] [address] [place] [address] [phone]  Seen w/ attng and D/w team  Thank you for this consult  Magdalene Vieira PA-C [id number] A/P:  77 yo M w/ hx of CVA w/ left sided weakness, CAD w/ CABG 2013, prior hx of thoracic aortic aneurysm repair, HTN, DM2, systolic CHF (mild LV dysfunction in 11/2018), bioprosthetic aortic valve, BPH, presents with acute encephalopathy presumably from infectious source versus metabolic derangement versus a central process.     Buttock / sacrum w/ Evolving DTI Lt sacrum and moisture wound- Gluteal cleft/ Rt buttock- TRIAD  Stage 2 pressure injury Lt posterior midthoracic region  BLE elevation  Abx per Medicine/ ID  Moisturize intact skin w/ SWEEN cream BID  con't Nutrition (as tolerated), Nutrition Consult  con't Offloading   con't Pericare  Care as per medicine will follow w/ you  Upon discharge f/u as outpatient at [facility] [address] [place] [address] [phone]  Seen w/ attng and D/w team  Thank you for this consult  Magdalene Vieira PA-C CWS [id number]

## 2019-01-18 NOTE — PROGRESS NOTE ADULT - ASSESSMENT
77 yo M w/ hx of CVA w/ left sided weakness, CAD w/ CABG 2013, prior hx of thoracic aortic aneurysm repair, HTN, DM2, systolic CHF (mild LV dysfunction in 11/2018), bioprosthetic aortic valve, BPH, presents with altered mental status. Patient is unable to provide any history.     Hx obtained from son Kleber ([phone]) and spouse ([phone]) at bedside. Per family patient should not have been discharged from recent admission. They report patient was having fevers and was hypernatremic prior to discharge. The family report since coming home the patient has been more altered, sleeping more, hardly interactive, and to the point where now it is difficult to arouse him. He was admitted in November 2018 for MRSA bacteremia. DICKSON was not performed according to discharge paperwork bc family declined. However, spouse and son report that this was never the case. Patient was treated with prolonged IV antibiotics including vancomycin/rifampin/gentamicin.     Then again in december 2018 he was hospitalized for hypoxic respiratory failure 2/2 to acute decompensated heart failure requiring NIPPV. His hospital course was complicated by steracolitis for which he was given cipro/flagyl. Spouse reports he completed this on discharge. Today the patient's outpatient blood work returned with sodium in "160s" and blood glucose in "400s" despite continuing outpatient metformin therapy. Therefore, the patient & family was advised to come to hospital for further management.  Of note family reports patient had diarrheal illness which resolved. Last BM was three days ago. Per family, patient has not been reporting any type of pain. Furthermore, family reports patient has had temperatures of 101 at home since discharge. (12 Jan 2019 21:02)    Spoke to son and wife at bedside.  Pt more lethargic than usual, not eating or drinking.  Pt's metformin dose decreased during last admission, pt continued to take all his other medications including lasix at regular dose.  He recently finished course of cipro/flagyl for stercoral colitis (diagnosed during prior admission), last week around Monday.  To me, son reported pt had low grade temp off 99 or 100 earlier in the week, but since then no fevers or sweats.  No cough, no other localizing symptoms.      Recommend:    - Bcx growing CNS/staph epidermidis.  ? contaminant vs. true infection.  Given presence of aortic valve repair, agree with starting abx with vancomycin.  f/u repeat blood cultures.  TTE no vegetations seen.  Son deferring DICKSON as per discussion with cardiologist    - esr and crp are relatively low, TTE without vegetations, repeat bcx ngtd.  Very low suspicion for endocarditis.  Will treat 7 to 10 day course for bacteremia, suspect more likely a contaminant than true infection    - vanco trough decreased to 750mg IV q12 due to elevated trough.  Check repeat prior to 4th dose.           Will follow,    Tanisha Ash  [phone]

## 2019-01-18 NOTE — PROGRESS NOTE ADULT - SUBJECTIVE AND OBJECTIVE BOX
[facility]   MD FREDRICK JUAREZ MD RUORU WONG, PA    TEL:  OFFICE: [phone]  DR BREWER CELL: [phone]  DAVID ESCAMILLA CELL: [phone]  DR. PHILLIP CELL: [phone]    RENAL FOLLOW UP NOTE  --------------------------------------------------------------------------------  HPI:      Pt seen and examined at bedside.       PAST HISTORY  --------------------------------------------------------------------------------  No significant changes to PMH, PSH, FHx, SHx, unless otherwise noted    ALLERGIES & MEDICATIONS  --------------------------------------------------------------------------------  Allergies    No Known Allergies    Intolerances      Standing Inpatient Medications  cholecalciferol 2000 Unit(s) Oral daily  dextrose 5% 1000 milliLiter(s) IV Continuous <Continuous>  heparin  Injectable 5000 Unit(s) SubCutaneous every 8 hours  insulin lispro (HumaLOG) corrective regimen sliding scale   SubCutaneous every 6 hours  vancomycin  IVPB 750 milliGRAM(s) IV Intermittent every 12 hours    PRN Inpatient Medications      REVIEW OF SYSTEMS  --------------------------------------------------------------------------------  General: no fever  MSK: no edema     VITALS/PHYSICAL EXAM  --------------------------------------------------------------------------------  T(C): 36.7 (01-18-19 @ 12:10), Max: 36.7 (01-18-19 @ 12:10)  HR: 76 (01-18-19 @ 12:10) (75 - 76)  BP: 117/75 (01-18-19 @ 12:10) (117/75 - 143/74)  RR: 16 (01-18-19 @ 12:10) (16 - 18)  SpO2: 97% (01-18-19 @ 12:10) (97% - 98%)  Wt(kg): --        01-17-19 @ 07:01  -  01-18-19 @ 07:00  --------------------------------------------------------  IN: 530 mL / OUT: 700 mL / NET: -170 mL      Physical Exam:  	Gen: NAD  	HEENT: MMM  	Pulm: CTA B/L  	CV: S1S2  	Abd: Soft, +BS  	Ext: No LE edema B/L                      Neuro: lethargic  	Skin: Warm and Dry   	    LABS/STUDIES  --------------------------------------------------------------------------------              12.4   5.1   >-----------<  72       [01-18-19 @ 07:00]              35.6     137  |  106  |  10  ----------------------------<  185      [01-18-19 @ 07:00]  4.0   |  18  |  0.51        Ca     8.0     [01-18-19 @ 07:00]            Creatinine Trend:  SCr 0.51 [01-18 @ 07:00]  SCr 0.62 [01-17 @ 09:47]  SCr 0.61 [01-17 @ 00:42]  SCr 0.59 [01-16 @ 18:54]  SCr 0.65 [01-16 @ 06:26]    Urinalysis - [01-12-19 @ 20:19]      Color Yellow / Appearance Clear / SG 1.022 / pH 5.5      Gluc 500 mg/dL / Ketone Negative  / Bili Negative / Urobili Negative       Blood Negative / Protein Trace / Leuk Est Negative / Nitrite Negative      RBC 3 / WBC 1 / Hyaline 4 / Gran  / Sq Epi  / Non Sq Epi 0 / Bacteria Negative      PTH -- (Ca 8.5)      [01-17-19 @ 13:34]   71  HbA1c 7.1      [11-02-18 @ 06:00]  TSH 0.47      [01-13-19 @ 00:25]

## 2019-01-18 NOTE — PROGRESS NOTE ADULT - SUBJECTIVE AND OBJECTIVE BOX
Patient is a 78y old  Male who presents with a chief complaint of altered mental status (18 Jan 2019 19:28)      SUBJECTIVE / OVERNIGHT EVENTS:  No new symptoms  Review of Systems:   CONSTITUTIONAL: No fever, weight loss, or fatigue  EYES: No eye pain, visual disturbances, or discharge  ENMT:  No difficulty hearing, tinnitus, vertigo; No sinus or throat pain  NECK: No pain or stiffness  BREASTS: No pain, masses, or nipple discharge  RESPIRATORY: No cough, wheezing, chills or hemoptysis; No shortness of breath  CARDIOVASCULAR: No chest pain, palpitations, dizziness, or leg swelling  GASTROINTESTINAL: No abdominal or epigastric pain. No nausea, vomiting, or hematemesis; No diarrhea or constipation. No melena or hematochezia.  GENITOURINARY: No dysuria, frequency, hematuria, or incontinence  NEUROLOGICAL: No headaches, memory loss, loss of strength, numbness, or tremors  SKIN: No itching, burning, rashes, or lesions   LYMPH NODES: No enlarged glands  ENDOCRINE: No heat or cold intolerance; No hair loss  MUSCULOSKELETAL: No joint pain or swelling; No muscle, back, or extremity pain  PSYCHIATRIC: No depression, anxiety, mood swings, or difficulty sleeping  HEME/LYMPH: No easy bruising, or bleeding gums  ALLERY AND IMMUNOLOGIC: No hives or eczema    MEDICATIONS  (STANDING):  cholecalciferol 2000 Unit(s) Oral daily  heparin  Injectable 5000 Unit(s) SubCutaneous every 8 hours  insulin lispro (HumaLOG) corrective regimen sliding scale   SubCutaneous every 6 hours  sodium bicarbonate 650 milliGRAM(s) Oral every 8 hours  vancomycin  IVPB 750 milliGRAM(s) IV Intermittent every 12 hours    MEDICATIONS  (PRN):      PHYSICAL EXAM:  Vital Signs Last 24 Hrs  T(C): 36.3 (18 Jan 2019 21:51), Max: 36.7 (18 Jan 2019 12:10)  T(F): 97.4 (18 Jan 2019 21:51), Max: 98 (18 Jan 2019 12:10)  HR: 84 (18 Jan 2019 21:51) (76 - 84)  BP: 103/65 (18 Jan 2019 21:51) (103/65 - 132/77)  BP(mean): --  RR: 18 (18 Jan 2019 21:51) (16 - 18)  SpO2: 98% (18 Jan 2019 21:51) (97% - 98%)  I&O's Summary    17 Jan 2019 07:01  -  18 Jan 2019 07:00  --------------------------------------------------------  IN: 530 mL / OUT: 700 mL / NET: -170 mL    18 Jan 2019 07:01  -  18 Jan 2019 22:44  --------------------------------------------------------  IN: 605 mL / OUT: 0 mL / NET: 605 mL      GENERAL: NAD, well-developed  HEAD:  Atraumatic, Normocephalic  EYES: EOMI, PERRLA, conjunctiva and sclera clear  NECK: Supple, No JVD  CHEST/LUNG: Clear to auscultation bilaterally; No wheeze  HEART: Regular rate and rhythm; No murmurs, rubs, or gallops  ABDOMEN: Soft, Nontender, Nondistended; Bowel sounds present  EXTREMITIES:  2+ Peripheral Pulses, No clubbing, cyanosis, or edema  PSYCH: AAOx3  NEUROLOGY: non-focal  SKIN: No rashes or lesions    LABS:  CAPILLARY BLOOD GLUCOSE      POCT Blood Glucose.: 259 mg/dL (18 Jan 2019 22:25)  POCT Blood Glucose.: 255 mg/dL (18 Jan 2019 18:16)  POCT Blood Glucose.: 259 mg/dL (18 Jan 2019 12:56)  POCT Blood Glucose.: 200 mg/dL (18 Jan 2019 05:48)  POCT Blood Glucose.: 192 mg/dL (18 Jan 2019 01:10)  POCT Blood Glucose.: 197 mg/dL (17 Jan 2019 23:56)                          12.4   5.1   )-----------( 72       ( 18 Jan 2019 07:00 )             35.6     01-18    137  |  106  |  10  ----------------------------<  185<H>  4.0   |  18<L>  |  0.51    Ca    8.0<L>      18 Jan 2019 07:00                RADIOLOGY & ADDITIONAL TESTS:    Imaging Personally Reviewed:    Consultant(s) Notes Reviewed:      Care Discussed with Consultants/Other Providers:

## 2019-01-18 NOTE — PROGRESS NOTE ADULT - SUBJECTIVE AND OBJECTIVE BOX
Infectious Diseases progress note:    Subjective: Mental status the same.  Pt lethargic, minimally verbal.  No new fevers, no acute o/n events.      ROS:  poor cognition, nonverbal    Allergies    No Known Allergies    Intolerances        ANTIBIOTICS/RELEVANT:  antimicrobials  vancomycin  IVPB 750 milliGRAM(s) IV Intermittent every 12 hours    immunologic:    OTHER:  cholecalciferol 2000 Unit(s) Oral daily  heparin  Injectable 5000 Unit(s) SubCutaneous every 8 hours  insulin lispro (HumaLOG) corrective regimen sliding scale   SubCutaneous every 6 hours  sodium bicarbonate 650 milliGRAM(s) Oral every 8 hours      Objective:  Vital Signs Last 24 Hrs  T(C): 36.7 (18 Jan 2019 12:10), Max: 36.7 (18 Jan 2019 12:10)  T(F): 98 (18 Jan 2019 12:10), Max: 98 (18 Jan 2019 12:10)  HR: 76 (18 Jan 2019 12:10) (75 - 76)  BP: 117/75 (18 Jan 2019 12:10) (117/75 - 143/74)  BP(mean): --  RR: 16 (18 Jan 2019 12:10) (16 - 18)  SpO2: 97% (18 Jan 2019 12:10) (97% - 98%)    PHYSICAL EXAM:  Constitutional:NAD  Eyes:BENJY, EOMI  Ear/Nose/Throat: no thrush, mucositis.  Moist mucous membranes	  Neck:no JVD, no lymphadenopathy, supple  Respiratory: CTA phi  Cardiovascular: S1S2 RRR, no murmurs  Gastrointestinal:soft, nontender,  nondistended (+) BS, NG tube in place  Extremities:no e/e/c  Skin:  no rashes, open wounds or ulcerations        LABS:                        12.4   5.1   )-----------( 72       ( 18 Jan 2019 07:00 )             35.6     01-18    137  |  106  |  10  ----------------------------<  185<H>  4.0   |  18<L>  |  0.51    Ca    8.0<L>      18 Jan 2019 07:00                  Vancomycin Level, Trough: 24.4 ug/mL (01-16 @ 18:23)      Rapid RVP Result: [facility]          MICROBIOLOGY:      Culture - Blood in AM (01.17.19 @ 14:14)    Specimen Source: .Blood Blood-Peripheral    Culture Results:   No growth to date.    Culture - Blood (01.13.19 @ 08:27)    Specimen Source: .Blood Blood-Venous    Culture Results:   No growth at 5 days.    Culture - Blood (01.12.19 @ 22:17)    -  Tetra/Doxy: R >8    -  Trimethoprim/Sulfamethoxazole: R >2/38    -  Vancomycin: S 2    -  Gentamicin: S <=1 Should not be used as monotherapy    -  Levofloxacin: R >4    -  Linezolid: S 2    -  Meropenem: R 8    -  Moxifloxacin(Aerobic): I 4    -  Oxacillin: R >2    -  Penicillin: R >8    -  RIF- Rifampin: R >2 Should not be used as monotherapy    Gram Stain:   Growth in anaerobic bottle: Gram Positive Cocci in Clusters  Growth in aerobic bottle: Gram Positive Cocci in Clusters    -  Amoxicillin/Clavulanic Acid: R <=4/2    -  Ampicillin: R 8    -  Ampicillin/Sulbactam: R <=8/4    -  Cefazolin: R <=4    -  Ceftriaxone: R 32    -  Ciprofloxacin: R >2    -  Clindamycin: R 2 This isolate is presumed to be clindamycin resistant based on detection of inducible resistance. Clindamycin may still be effective in some patients.    -  Daptomycin: S 0.5    -  Erythromycin: R >4    Specimen Source: .Blood Blood-Venous    Organism: Staphylococcus epidermidis    Culture Results:   Growth in aerobic and anaerobic bottles: Staphylococcus epidermidis    Organism Identification: Staphylococcus epidermidis    Method Type: ERNESTINE    Culture - Urine (01.12.19 @ 22:19)    Specimen Source: .Urine Catheterized    Culture Results:   No growth        RADIOLOGY & ADDITIONAL STUDIES:

## 2019-01-18 NOTE — PROGRESS NOTE ADULT - ASSESSMENT
· Assessment	  77 yo M w/ hx of CVA w/ left sided weakness, CAD w/ CABG 2013, prior hx of thoracic aortic aneurysm repair, HTN, DM2, systolic CHF (mild LV dysfunction in 11/2018), bioprosthetic aortic valve, BPH, presents with acute encephalopathy presumably from infectious source versus metabolic derangement versus a central process.      Problem/Plan - 1:  ·  Problem: Acute toxic metabolic encephalopathy.  Plan: - Likely from electrolyte disturbance-Hyponatremia and sepsis. On Abx          Problem/Plan - 2:  ·  Problem: Hyperglycemia.    Plan: -Lantus/FSSS     Problem/Plan - 4:  ·  Problem: Hypernatremia.  Improved     Problem/Plan - 5:  ·  Problem: JENNIFER (acute kidney injury).  Plan: -  BMP/IVF     Problem/Plan - 6:  Problem: Demand ischemia. Plan: -Cardio f/up noted.     Problem/Plan - 7:  ·  Problem: Chronic systolic congestive heart failure.  Plan: - Cardio f/up noted.  GI called for PEG

## 2019-01-18 NOTE — DIETITIAN INITIAL EVALUATION ADULT. - OTHER INFO
seen for consult for new NGT, hypernatremia resolved, admitted for altered mental status, Receiving Jevity 1.2 @30ml/hr. provides provides 864kcal/40Grams protein/581water. provides 16kcal/kg and 0.7Grams protein/kg. Based on  dosing weight of 55.4Kg. last BM 1/15, fecal ioncontinent. NKFA

## 2019-01-19 DIAGNOSIS — Z71.89 OTHER SPECIFIED COUNSELING: ICD-10-CM

## 2019-01-19 LAB
ANION GAP SERPL CALC-SCNC: 13 MMOL/L — SIGNIFICANT CHANGE UP (ref 5–17)
BUN SERPL-MCNC: 10 MG/DL — SIGNIFICANT CHANGE UP (ref 7–23)
CALCIUM SERPL-MCNC: 8.8 MG/DL — SIGNIFICANT CHANGE UP (ref 8.4–10.5)
CHLORIDE SERPL-SCNC: 106 MMOL/L — SIGNIFICANT CHANGE UP (ref 96–108)
CO2 SERPL-SCNC: 22 MMOL/L — SIGNIFICANT CHANGE UP (ref 22–31)
CREAT SERPL-MCNC: 0.61 MG/DL — SIGNIFICANT CHANGE UP (ref 0.5–1.3)
GLUCOSE BLDC GLUCOMTR-MCNC: 242 MG/DL — HIGH (ref 70–99)
GLUCOSE BLDC GLUCOMTR-MCNC: 246 MG/DL — HIGH (ref 70–99)
GLUCOSE BLDC GLUCOMTR-MCNC: 285 MG/DL — HIGH (ref 70–99)
GLUCOSE SERPL-MCNC: 245 MG/DL — HIGH (ref 70–99)
HCT VFR BLD CALC: 35.3 % — LOW (ref 39–50)
HGB BLD-MCNC: 12.4 G/DL — LOW (ref 13–17)
MCHC RBC-ENTMCNC: 33.9 PG — SIGNIFICANT CHANGE UP (ref 27–34)
MCHC RBC-ENTMCNC: 35.2 GM/DL — SIGNIFICANT CHANGE UP (ref 32–36)
MCV RBC AUTO: 96.4 FL — SIGNIFICANT CHANGE UP (ref 80–100)
PLATELET # BLD AUTO: 114 K/UL — LOW (ref 150–400)
POTASSIUM SERPL-MCNC: 3.5 MMOL/L — SIGNIFICANT CHANGE UP (ref 3.5–5.3)
POTASSIUM SERPL-SCNC: 3.5 MMOL/L — SIGNIFICANT CHANGE UP (ref 3.5–5.3)
RBC # BLD: 3.66 M/UL — LOW (ref 4.2–5.8)
RBC # FLD: 13.1 % — SIGNIFICANT CHANGE UP (ref 10.3–14.5)
SODIUM SERPL-SCNC: 141 MMOL/L — SIGNIFICANT CHANGE UP (ref 135–145)
WBC # BLD: 6.6 K/UL — SIGNIFICANT CHANGE UP (ref 3.8–10.5)
WBC # FLD AUTO: 6.6 K/UL — SIGNIFICANT CHANGE UP (ref 3.8–10.5)

## 2019-01-19 RX ORDER — VANCOMYCIN HCL 1 G
500 VIAL (EA) INTRAVENOUS EVERY 12 HOURS
Qty: 0 | Refills: 0 | Status: DISCONTINUED | OUTPATIENT
Start: 2019-01-19 | End: 2019-01-21

## 2019-01-19 RX ADMIN — Medication 650 MILLIGRAM(S): at 21:56

## 2019-01-19 RX ADMIN — Medication 650 MILLIGRAM(S): at 13:22

## 2019-01-19 RX ADMIN — HEPARIN SODIUM 5000 UNIT(S): 5000 INJECTION INTRAVENOUS; SUBCUTANEOUS at 21:57

## 2019-01-19 RX ADMIN — Medication 3: at 11:54

## 2019-01-19 RX ADMIN — Medication 2000 UNIT(S): at 11:56

## 2019-01-19 RX ADMIN — Medication 650 MILLIGRAM(S): at 06:06

## 2019-01-19 RX ADMIN — Medication 100 MILLIGRAM(S): at 09:19

## 2019-01-19 RX ADMIN — HEPARIN SODIUM 5000 UNIT(S): 5000 INJECTION INTRAVENOUS; SUBCUTANEOUS at 13:22

## 2019-01-19 RX ADMIN — Medication 100 MILLIGRAM(S): at 22:10

## 2019-01-19 RX ADMIN — Medication 2: at 06:08

## 2019-01-19 RX ADMIN — HEPARIN SODIUM 5000 UNIT(S): 5000 INJECTION INTRAVENOUS; SUBCUTANEOUS at 06:05

## 2019-01-19 RX ADMIN — Medication 2: at 17:50

## 2019-01-19 NOTE — CHART NOTE - NSCHARTNOTEFT_GEN_A_CORE
Vanco trough 24.5  Current dose of 750q12 administered while waiting for trough results  Vanco decreased to 500q12  Repeat trough p 4th dose    discussed with JW Chawla [id number]

## 2019-01-19 NOTE — PROGRESS NOTE ADULT - SUBJECTIVE AND OBJECTIVE BOX
PRESENTING CC:Weakness    SUBJ: 79 yo M w/ hx of CVA w/ left sided weakness, s/o Thoracic Aorta repair with Bio AVR-2013, HTN, T2DM, HF (mild LV dysfunction in 11/2018), , BPH, presents with altered mental status.Now admitted with JENNIFER 2/2 dehydration from poor oral intake-Staph epi bacteremia on Vanco-reamins afebrile repeat Blood C&S no growth-consideration for PEG-GI consult noted.Non verbal arousable-Left paresis on NGT feeds.      PMH -reviewed admission note, no change since admission  Heart failure: acute [ ] chronic [x ] acute or chronic [ ] diastolic [ ] systolic [x ] combined systolic and diastolic[ ]  JENNIFER[x]-Resolved  ATN[ ] renal medullary necrosis [ ] CKD I [ ]CKDII [ ]CKD III [ ]CKD IV [ ]CKD V [ ]Other pathological lesions [ ]    MEDICATIONS  (STANDING):  cholecalciferol 2000 Unit(s) Oral daily  heparin  Injectable 5000 Unit(s) SubCutaneous every 8 hours  insulin lispro (HumaLOG) corrective regimen sliding scale   SubCutaneous every 6 hours  sodium bicarbonate 650 milliGRAM(s) Oral every 8 hours  vancomycin  IVPB 500 milliGRAM(s) IV Intermittent every 12 hours      FAMILY HISTORY:  No pertinent family history in first degree relatives  No family history of premature coronary artery disease or sudden cardiac death      REVIEW OF SYSTEMS:  Constitutional: [ ] fever, [ ]weight loss,  [ ]fatigue  Eyes: [ ] visual changes  Respiratory: [ ]shortness of breath;  [ ] cough, [ ]wheezing, [ ]chills, [ ]hemoptysis  Cardiovascular: [ ] chest pain, [ ]palpitations, [ ]dizziness,  [ ]leg swelling[ ]orthopnea[ ]PND  Gastrointestinal: [ ] abdominal pain, [ ]nausea, [ ]vomiting,  [ ]diarrhea   Genitourinary: [ ] dysuria, [ ] hematuria  Neurologic: [ ] headaches [ ] tremors[ ]weakness  Skin: [ ] itching, [ ]burning, [ ] rashes  Endocrine: [ ] heat or cold intolerance  Musculoskeletal: [ ] joint pain or swelling; [ ] muscle, back, or extremity pain  Psychiatric: [ ] depression, [ ]anxiety, [ ]mood swings, or [ ]difficulty sleeping  Hematologic: [ ] easy bruising, [ ] bleeding gums    [ ] All remaining systems negative except as per above.   [x ]Unable to obtain.    Vital Signs Last 24 Hrs  T(C): 36.3 (19 Jan 2019 04:55), Max: 36.7 (18 Jan 2019 12:10)  T(F): 97.3 (19 Jan 2019 04:55), Max: 98 (18 Jan 2019 12:10)  HR: 98 (19 Jan 2019 04:55) (76 - 98)  BP: 105/73 (19 Jan 2019 04:55) (103/65 - 117/75)  RR: 18 (18 Jan 2019 21:51) (16 - 18)  SpO2: 98% (18 Jan 2019 21:51) (97% - 98%)  I&O's Summary    18 Jan 2019 07:01  -  19 Jan 2019 07:00  --------------------------------------------------------  IN: 1775 mL / OUT: 0 mL / NET: 1775 mL        PHYSICAL EXAM:  General: No acute distress BMI-19  HEENT: EOMI, PERRL Non Verbal  Neck: Supple, [ ] JVD  Lungs: Equal air entry bilaterally; [ ] rales [ ] wheezing [ ] rhonchi  Heart: Regular rate and rhythm; [x ] murmur  2 /6 [x ] systolic [ ] diastolic [ ] radiation[ ] rubs [ ]  gallops  Abdomen: Nontender, bowel sounds present  Extremities: No clubbing, cyanosis, [ ] edema  Nervous system:  Alert & Oriented X1, Left paresis  Psychiatric: Normal affect  Skin: No rashes or lesions    LABS:  01-19    141  |  106  |  10  ----------------------------<  245<H>  3.5   |  22  |  0.61    Ca    8.8      19 Jan 2019 06:59      Creatinine Trend: 0.61<--, 0.51<--, 0.62<--, 0.61<--, 0.59<--, 0.65<--                        12.4   6.6   )-----------( 114      ( 19 Jan 2019 06:59 )             35.3         IMPRESSION AND PLAN:    79 yo M w/ hx of CVA w/ left sided weakness,  prior hx of Thoracic aortic aneurysm repair, HTN, T2DM, systolic CHF (mild LV dysfunction in 11/2018),  s/p Bioprosthetic aortic valve, BPH, presents with acute encephalopathy presumably from infectious source versus metabolic derangement.      Problem/Plan - 1:  ·  Problem: Acute encephalopathy.  Plan: -Patient presents with more lethargy and unable to provide any hx.   Cultures-GPC in clusters.Staph Epi  Started on Vancomycin.  Hx Aortic root repair with Bio AVR-Repeat cultures no growth.   Discussed with son concerning DICKSON-does not want to persue-will defer to ID on length of IV ABx if indicated as repeat blood Cultures no growth-TTE no vegetations seen.  ID follow up suspect Blood C&S contaminant vs Endocarditis-low criteria-complete 10 days ABx.     Problem/Plan - 2:  ·  Problem: Hypernatremia.  Plan: -Likely driven by hypovolemia, decreased po intake.   Renal consult noted-IVF with K supplementation  Hypernatremia corrected-  Consideration for PEG -GI Consult noted    Problem/Plan - 3:  ·  Problem: JENNIFER (acute kidney injury).  Plan: -Baseline creatinine is about 1.0; likely this is hypovolemic mediated in setting of using diuretics and not drinking or eating at home.  Normalized.    Problem/Plan - 4:  .  Problem:Chronic Systolic Heart Failure EF-30%  Clinically euvolemic.  Eventual restarting BBlockers and ACEI

## 2019-01-19 NOTE — PROGRESS NOTE ADULT - ASSESSMENT
77 yo M w/ hx of CVA w/ left sided weakness, CAD w/ CABG 2013, prior hx of thoracic aortic aneurysm repair, HTN, DM2, systolic CHF (mild LV dysfunction in 11/2018), bioprosthetic aortic valve, BPH, presents with altered mental status. Patient is unable to provide any history.     Hx obtained from son Kleber ([phone]) and spouse ([phone]) at bedside. Per family patient should not have been discharged from recent admission. They report patient was having fevers and was hypernatremic prior to discharge. The family report since coming home the patient has been more altered, sleeping more, hardly interactive, and to the point where now it is difficult to arouse him. He was admitted in November 2018 for MRSA bacteremia. DICKSON was not performed according to discharge paperwork bc family declined. However, spouse and son report that this was never the case. Patient was treated with prolonged IV antibiotics including vancomycin/rifampin/gentamicin.     Then again in december 2018 he was hospitalized for hypoxic respiratory failure 2/2 to acute decompensated heart failure requiring NIPPV. His hospital course was complicated by steracolitis for which he was given cipro/flagyl. Spouse reports he completed this on discharge. Today the patient's outpatient blood work returned with sodium in "160s" and blood glucose in "400s" despite continuing outpatient metformin therapy. Therefore, the patient & family was advised to come to hospital for further management.  Of note family reports patient had diarrheal illness which resolved. Last BM was three days ago. Per family, patient has not been reporting any type of pain. Furthermore, family reports patient has had temperatures of 101 at home since discharge. (12 Jan 2019 21:02)    Spoke to son and wife at bedside.  Pt more lethargic than usual, not eating or drinking.  Pt's metformin dose decreased during last admission, pt continued to take all his other medications including lasix at regular dose.  He recently finished course of cipro/flagyl for stercoral colitis (diagnosed during prior admission), last week around Monday.  To me, son reported pt had low grade temp off 99 or 100 earlier in the week, but since then no fevers or sweats.  No cough, no other localizing symptoms.      Recommend:    - Bcx growing CNS/staph epidermidis.  ? contaminant vs. true infection.  Given presence of aortic valve repair, agree with starting abx with vancomycin.  f/u repeat blood cultures.  TTE no vegetations seen.  Son deferring DICKSON as per discussion with cardiologist    - esr and crp are relatively low, TTE without vegetations, repeat bcx ngtd.  Very low suspicion for endocarditis.  Will treat 7 to 10 day course for bacteremia, suspect more likely a contaminant than true infection    - vanco trough decreased to 750mg IV q12 due to elevated trough.  Check repeat prior to 4th dose.           Will follow, 77 yo M w/ hx of CVA w/ left sided weakness, CAD w/ CABG 2013, prior hx of thoracic aortic aneurysm repair, HTN, DM2, systolic CHF (mild LV dysfunction in 11/2018), bioprosthetic aortic valve, BPH, presents with altered mental status.  He was admitted in November 2018 for MRSA bacteremia. DICKSON was not performed according to discharge paperwork bc family declined. However, spouse and son report that this was never the case. Patient was treated with prolonged IV antibiotics including vancomycin/rifampin/gentamicin.   Then again in december 2018 he was hospitalized for hypoxic respiratory failure 2/2 to acute decompensated heart failure requiring NIPPV. His hospital course was complicated by steralcolitis for which he was given cipro/flagyl. Spouse reports he completed this on discharge. Today the patient's outpatient blood work returned with sodium in "160s" and blood glucose in "400s" despite continuing outpatient metformin therapy. Therefore, the patient & family was advised to come to hospital for further management.       # Positive Blood culture- CNS/staph epidermidis.  ? contaminant vs. true infection.  Given presence of aortic valve repair, agree with starting abx with vancomycin.  f/u repeat blood cultures.  TTE no vegetations seen.  Son deferring DICKSON as per discussion with cardiologist, ESR and CRP are relatively low, TTE without vegetations, repeat bcx ngtd.  Very low suspicion for endocarditis.  Will treat 7 to 10 day course for bacteremia, suspect more likely a contaminant than true infection      Would Recommend:    1. Continue Adjusted doses Vancomycin and keep level between 15 to 20  2. Continue NGT feeding until PEG placement  3. Aspiration precaution    Covering Dr. Mihir Trevizo  [phone]

## 2019-01-19 NOTE — PROGRESS NOTE ADULT - SUBJECTIVE AND OBJECTIVE BOX
Patient is a 78y old  Male who presents with a chief complaint of altered mental status (19 Jan 2019 18:14)      SUBJECTIVE / OVERNIGHT EVENTS:  No change in mentation. Afebrile  Review of Systems: Non verbal  MEDICATIONS  (STANDING):  cholecalciferol 2000 Unit(s) Oral daily  heparin  Injectable 5000 Unit(s) SubCutaneous every 8 hours  insulin lispro (HumaLOG) corrective regimen sliding scale   SubCutaneous every 6 hours  sodium bicarbonate 650 milliGRAM(s) Oral every 8 hours  vancomycin  IVPB 500 milliGRAM(s) IV Intermittent every 12 hours    MEDICATIONS  (PRN):      PHYSICAL EXAM:  Vital Signs Last 24 Hrs  T(C): 36.3 (19 Jan 2019 12:20), Max: 36.3 (18 Jan 2019 21:51)  T(F): 97.3 (19 Jan 2019 12:20), Max: 97.4 (18 Jan 2019 21:51)  HR: 89 (19 Jan 2019 12:20) (84 - 98)  BP: 104/69 (19 Jan 2019 12:20) (103/65 - 105/73)  BP(mean): --  RR: 18 (19 Jan 2019 12:20) (18 - 18)  SpO2: 95% (19 Jan 2019 12:20) (95% - 98%)  I&O's Summary    18 Jan 2019 07:01  -  19 Jan 2019 07:00  --------------------------------------------------------  IN: 1775 mL / OUT: 0 mL / NET: 1775 mL    19 Jan 2019 07:01  -  19 Jan 2019 19:45  --------------------------------------------------------  IN: 800 mL / OUT: 0 mL / NET: 800 mL      GENERAL: NAD, well-developed  HEAD:  Atraumatic, Normocephalic  EYES: EOMI, PERRLA, conjunctiva and sclera clear  NECK: Supple, No JVD  CHEST/LUNG: Clear to auscultation bilaterally; No wheeze  HEART: Regular rate and rhythm; No murmurs, rubs, or gallops  ABDOMEN: Soft, Nontender, Nondistended; Bowel sounds present  EXTREMITIES:  2+ Peripheral Pulses, No clubbing, cyanosis, or edema  PSYCH: non verbal  NEUROLOGY: quadriplegic  SKIN: No rashes or lesions    LABS:  CAPILLARY BLOOD GLUCOSE      POCT Blood Glucose.: 242 mg/dL (19 Jan 2019 17:49)  POCT Blood Glucose.: 285 mg/dL (19 Jan 2019 11:52)  POCT Blood Glucose.: 246 mg/dL (19 Jan 2019 05:54)  POCT Blood Glucose.: 259 mg/dL (18 Jan 2019 22:25)                          12.4   6.6   )-----------( 114      ( 19 Jan 2019 06:59 )             35.3     01-19    141  |  106  |  10  ----------------------------<  245<H>  3.5   |  22  |  0.61    Ca    8.8      19 Jan 2019 06:59                RADIOLOGY & ADDITIONAL TESTS:    Imaging Personally Reviewed:    Consultant(s) Notes Reviewed:      Care Discussed with Consultants/Other Providers:

## 2019-01-19 NOTE — PROGRESS NOTE ADULT - PROBLEM SELECTOR PLAN 1
npo  ngt feeds  family is agreeable to PEG tube. Plan for  upper gastrointestinal endoscopy with PEG placement Tuesday. Hold NGT feeds monday past midnight.

## 2019-01-19 NOTE — PROGRESS NOTE ADULT - SUBJECTIVE AND OBJECTIVE BOX
Interval Events; pt seen and examined  non-verbal  tolerating NGT feeds     MEDICATIONS  (STANDING):  cholecalciferol 2000 Unit(s) Oral daily  heparin  Injectable 5000 Unit(s) SubCutaneous every 8 hours  insulin lispro (HumaLOG) corrective regimen sliding scale   SubCutaneous every 6 hours  sodium bicarbonate 650 milliGRAM(s) Oral every 8 hours  vancomycin  IVPB 500 milliGRAM(s) IV Intermittent every 12 hours    MEDICATIONS  (PRN):      Allergies    No Known Allergies    Intolerances        Review of Systems:    unable to obtain      Vital Signs Last 24 Hrs  T(C): 36.3 (19 Jan 2019 04:55), Max: 36.7 (18 Jan 2019 12:10)  T(F): 97.3 (19 Jan 2019 04:55), Max: 98 (18 Jan 2019 12:10)  HR: 98 (19 Jan 2019 04:55) (76 - 98)  BP: 105/73 (19 Jan 2019 04:55) (103/65 - 117/75)  BP(mean): --  RR: 18 (18 Jan 2019 21:51) (16 - 18)  SpO2: 98% (18 Jan 2019 21:51) (97% - 98%)    PHYSICAL EXAM:    nad  ngt in place  frail  non toxic  soft, nt  no edema    LABS:                        12.4   6.6   )-----------( 114      ( 19 Jan 2019 06:59 )             35.3     01-19    141  |  106  |  10  ----------------------------<  245<H>  3.5   |  22  |  0.61    Ca    8.8      19 Jan 2019 06:59            RADIOLOGY & ADDITIONAL TESTS:

## 2019-01-19 NOTE — PROGRESS NOTE ADULT - ASSESSMENT
· Assessment	  79 yo M w/ hx of CVA w/ left sided weakness, CAD w/ CABG 2013, prior hx of thoracic aortic aneurysm repair, HTN, DM2, systolic CHF (mild LV dysfunction in 11/2018), bioprosthetic aortic valve, BPH, presents with acute encephalopathy presumably from infectious source versus metabolic derangement versus a central process.      Problem/Plan - 1:  ·  Problem: Acute toxic metabolic encephalopathy.  Plan: - Likely from electrolyte disturbance-Hyponatremia and sepsis. On Abx. ID eval noted. Staph epidermidis possibly contamination Short course of antibiotics          Problem/Plan - 2:  ·  Problem: Hyperglycemia.    Plan: -Lantus/FSSS     Problem/Plan - 4:  ·  Problem: Hypernatremia.  Improved     Problem/Plan - 5:  ·  Problem: JENNIFER (acute kidney injury).  Plan: -  BMP/IVF     Problem/Plan - 6:  Problem: Demand ischemia. Plan: -Cardio f/up noted.     Problem/Plan - 7:  ·  Problem: Chronic systolic congestive heart failure.  Plan: - Cardio f/up noted.  GI called for PEG

## 2019-01-20 LAB
ANION GAP SERPL CALC-SCNC: 10 MMOL/L — SIGNIFICANT CHANGE UP (ref 5–17)
BUN SERPL-MCNC: 13 MG/DL — SIGNIFICANT CHANGE UP (ref 7–23)
CALCIUM SERPL-MCNC: 8.7 MG/DL — SIGNIFICANT CHANGE UP (ref 8.4–10.5)
CHLORIDE SERPL-SCNC: 104 MMOL/L — SIGNIFICANT CHANGE UP (ref 96–108)
CO2 SERPL-SCNC: 24 MMOL/L — SIGNIFICANT CHANGE UP (ref 22–31)
CREAT SERPL-MCNC: 0.73 MG/DL — SIGNIFICANT CHANGE UP (ref 0.5–1.3)
GLUCOSE BLDC GLUCOMTR-MCNC: 232 MG/DL — HIGH (ref 70–99)
GLUCOSE BLDC GLUCOMTR-MCNC: 236 MG/DL — HIGH (ref 70–99)
GLUCOSE BLDC GLUCOMTR-MCNC: 238 MG/DL — HIGH (ref 70–99)
GLUCOSE BLDC GLUCOMTR-MCNC: 257 MG/DL — HIGH (ref 70–99)
GLUCOSE BLDC GLUCOMTR-MCNC: 269 MG/DL — HIGH (ref 70–99)
GLUCOSE BLDC GLUCOMTR-MCNC: 283 MG/DL — HIGH (ref 70–99)
GLUCOSE SERPL-MCNC: 218 MG/DL — HIGH (ref 70–99)
HCT VFR BLD CALC: 32.8 % — LOW (ref 39–50)
HGB BLD-MCNC: 11.6 G/DL — LOW (ref 13–17)
MCHC RBC-ENTMCNC: 34.3 PG — HIGH (ref 27–34)
MCHC RBC-ENTMCNC: 35.3 GM/DL — SIGNIFICANT CHANGE UP (ref 32–36)
MCV RBC AUTO: 97.3 FL — SIGNIFICANT CHANGE UP (ref 80–100)
PLATELET # BLD AUTO: 108 K/UL — LOW (ref 150–400)
POTASSIUM SERPL-MCNC: 3.8 MMOL/L — SIGNIFICANT CHANGE UP (ref 3.5–5.3)
POTASSIUM SERPL-SCNC: 3.8 MMOL/L — SIGNIFICANT CHANGE UP (ref 3.5–5.3)
RBC # BLD: 3.37 M/UL — LOW (ref 4.2–5.8)
RBC # FLD: 13 % — SIGNIFICANT CHANGE UP (ref 10.3–14.5)
SODIUM SERPL-SCNC: 138 MMOL/L — SIGNIFICANT CHANGE UP (ref 135–145)
VANCOMYCIN TROUGH SERPL-MCNC: 30 UG/ML — CRITICAL HIGH (ref 10–20)
WBC # BLD: 5.8 K/UL — SIGNIFICANT CHANGE UP (ref 3.8–10.5)
WBC # FLD AUTO: 5.8 K/UL — SIGNIFICANT CHANGE UP (ref 3.8–10.5)

## 2019-01-20 RX ADMIN — Medication 3: at 12:35

## 2019-01-20 RX ADMIN — Medication 650 MILLIGRAM(S): at 22:11

## 2019-01-20 RX ADMIN — Medication 650 MILLIGRAM(S): at 13:46

## 2019-01-20 RX ADMIN — HEPARIN SODIUM 5000 UNIT(S): 5000 INJECTION INTRAVENOUS; SUBCUTANEOUS at 05:42

## 2019-01-20 RX ADMIN — Medication 2: at 07:25

## 2019-01-20 RX ADMIN — HEPARIN SODIUM 5000 UNIT(S): 5000 INJECTION INTRAVENOUS; SUBCUTANEOUS at 13:46

## 2019-01-20 RX ADMIN — Medication 650 MILLIGRAM(S): at 05:42

## 2019-01-20 RX ADMIN — Medication 100 MILLIGRAM(S): at 08:53

## 2019-01-20 RX ADMIN — HEPARIN SODIUM 5000 UNIT(S): 5000 INJECTION INTRAVENOUS; SUBCUTANEOUS at 22:11

## 2019-01-20 RX ADMIN — Medication 2000 UNIT(S): at 12:21

## 2019-01-20 RX ADMIN — Medication 3: at 18:14

## 2019-01-20 RX ADMIN — Medication 3: at 00:14

## 2019-01-20 NOTE — PROGRESS NOTE ADULT - SUBJECTIVE AND OBJECTIVE BOX
PRESENTING CC:Weakness    SUBJ: 77 yo M w/ hx of CVA w/ left sided weakness, s/o Thoracic Aorta repair with Bio AVR-2013, HTN, T2DM, HF (mild LV dysfunction in 11/2018), , BPH, presents with altered mental status.Now admitted with JENNIFER 2/2 dehydration from poor oral intake-Staph epi bacteremia on Vanco-reamins afebrile repeat Blood C&S no growth-consideration for PEG-discussed with wifr and son-agreeable for PEG-no overnight events noted.      PMH -reviewed admission note, no change since admission  Heart failure: acute [ ] chronic [x ] acute or chronic [ ] diastolic [ ] systolic [x ] combined systolic and diastolic[ ]  JENNIFER[x]-Resolved ATN[ ] renal medullary necrosis [ ] CKD I [ ]CKDII [ ]CKD III [ ]CKD IV [ ]CKD V [ ]Other pathological lesions [ ]    MEDICATIONS  (STANDING):  cholecalciferol 2000 Unit(s) Oral daily  heparin  Injectable 5000 Unit(s) SubCutaneous every 8 hours  insulin lispro (HumaLOG) corrective regimen sliding scale   SubCutaneous every 6 hours  sodium bicarbonate 650 milliGRAM(s) Oral every 8 hours  vancomycin  IVPB 500 milliGRAM(s) IV Intermittent every 12 hours      FAMILY HISTORY:  No pertinent family history in first degree relatives  No family history of premature coronary artery disease or sudden cardiac death      REVIEW OF SYSTEMS:  Constitutional: [ ] fever, [ ]weight loss,  [ ]fatigue  Eyes: [ ] visual changes  Respiratory: [ ]shortness of breath;  [ ] cough, [ ]wheezing, [ ]chills, [ ]hemoptysis  Cardiovascular: [ ] chest pain, [ ]palpitations, [ ]dizziness,  [ ]leg swelling[ ]orthopnea[ ]PND  Gastrointestinal: [ ] abdominal pain, [ ]nausea, [ ]vomiting,  [ ]diarrhea   Genitourinary: [ ] dysuria, [ ] hematuria  Neurologic: [ ] headaches [ ] tremors[ ]weakness  Skin: [ ] itching, [ ]burning, [ ] rashes  Endocrine: [ ] heat or cold intolerance  Musculoskeletal: [ ] joint pain or swelling; [ ] muscle, back, or extremity pain  Psychiatric: [ ] depression, [ ]anxiety, [ ]mood swings, or [ ]difficulty sleeping  Hematologic: [ ] easy bruising, [ ] bleeding gums    [ ] All remaining systems negative except as per above.   [x ]Unable to obtain.    Vital Signs Last 24 Hrs  T(C): 36.8 (20 Jan 2019 04:34), Max: 36.8 (19 Jan 2019 21:37)  T(F): 98.3 (20 Jan 2019 04:34), Max: 98.3 (19 Jan 2019 21:37)  HR: 80 (20 Jan 2019 04:34) (80 - 89)  BP: 108/72 (20 Jan 2019 04:34) (104/69 - 113/74)  RR: 18 (20 Jan 2019 04:34) (18 - 18)  SpO2: 98% (20 Jan 2019 04:34) (95% - 98%)  I&O's Summary    19 Jan 2019 07:01  -  20 Jan 2019 07:00  --------------------------------------------------------  IN: 1800 mL / OUT: 0 mL / NET: 1800 mL        PHYSICAL EXAM:  General: No acute distress BMI-19  HEENT: EOMI, PERRL-Non verbal  Neck: Supple, [ ] JVD  Lungs: Equal air entry bilaterally; [ ] rales [ ] wheezing [ ] rhonchi  Heart: Regular rate and rhythm; [x ] murmur  2 /6 [x ] systolic [ ] diastolic [ ] radiation[ ] rubs [ ]  gallops  Abdomen: Nontender, bowel sounds present  Extremities: No clubbing, cyanosis, [ ] edema  Nervous system:  Alert & Oriented X1, Left paresis  Psychiatric: Normal affect  Skin: No rashes or lesions    LABS:  01-20    138  |  104  |  13  ----------------------------<  218<H>  3.8   |  24  |  0.73    Ca    8.7      20 Jan 2019 05:12      Creatinine Trend: 0.73<--, 0.61<--, 0.51<--, 0.62<--, 0.61<--, 0.59<--                        11.6   5.8   )-----------( 108      ( 20 Jan 2019 05:12 )             32.8       Culture - Blood in AM (01.17.19 @ 14:14)    Specimen Source: .Blood Blood-Peripheral    Culture Results:   No growth to date.        IMPRESSION AND PLAN:    77 yo M w/ hx of CVA w/ left sided weakness,  prior hx of Thoracic aortic aneurysm repair, HTN, T2DM, systolic CHF (mild LV dysfunction in 11/2018),  s/p Bioprosthetic aortic valve, BPH, presents with acute encephalopathy presumably from infectious source versus metabolic derangement.      Problem/Plan - 1:  ·  Problem: Acute encephalopathy.  Plan: -Patient presents with more lethargy and unable to provide any hx.   Cultures-GPC in clusters.Staph Epi  Started on Vancomycin.  Hx Aortic root repair with Bio AVR-Repeat cultures no growth.   Discussed with son concerning DICKSON-does not want to persue-will defer to ID on length of IV ABx if indicated as repeat blood Cultures no growth-TTE no vegetations seen.  ID follow up suspect Blood C&S contaminant vs Endocarditis-low criteria-complete 10 days ABx.     Problem/Plan - 2:  ·  Problem: Hypernatremia.  Plan: -Likely driven by hypovolemia, decreased po intake.   Renal consult noted-IVF with K supplementation  Hypernatremia corrected-  Consideration for PEG -GI Consult noted-for PEG on Tuesday    Problem/Plan - 3:  ·  Problem: JENNIFER (acute kidney injury).  Plan: -Baseline creatinine is about 1.0; likely this is hypovolemic mediated in setting of using diuretics and not drinking or eating at home.  Normalized.    Problem/Plan - 4:  .  Problem:Chronic Systolic Heart Failure EF-30%  Clinically euvolemic.  Eventual restarting BBlockers and ACEI

## 2019-01-20 NOTE — PROGRESS NOTE ADULT - ASSESSMENT
· Assessment	  79 yo M w/ hx of CVA w/ left sided weakness, CAD w/ CABG 2013, prior hx of thoracic aortic aneurysm repair, HTN, DM2, systolic CHF (mild LV dysfunction in 11/2018), bioprosthetic aortic valve, BPH, presents with acute encephalopathy presumably from infectious source versus metabolic derangement versus a central process.      Problem/Plan - 1:  ·  Problem: Acute toxic metabolic encephalopathy.  Plan: - Likely from electrolyte disturbance-Hyponatremia and sepsis. On Abx. ID eval noted. Staph epidermidis possibly contamination Short course of antibiotics          Problem/Plan - 2:  ·  Problem: Hyperglycemia.    Plan: -Lantus/FSSS     Problem/Plan - 4:  ·  Problem: Hypernatremia.  Improved     Problem/Plan - 5:  ·  Problem: JENNIFER (acute kidney injury).  Plan: -  BMP/IVF     Problem/Plan - 6:  Problem: Demand ischemia. Plan: -Cardio f/up noted.     Problem/Plan - 7:  ·  Problem: Chronic systolic congestive heart failure.  Plan: - Cardio f/up noted.  GI FU for PEG

## 2019-01-20 NOTE — PROGRESS NOTE ADULT - SUBJECTIVE AND OBJECTIVE BOX
Patient is a 78y old  Male who presents with a chief complaint of altered mental status (20 Jan 2019 10:44)      SUBJECTIVE / OVERNIGHT EVENTS:  No new symptoms  Review of Systems:   CONSTITUTIONAL: No fever, weight loss, or fatigue  EYES: No eye pain, visual disturbances, or discharge  ENMT:  No difficulty hearing, tinnitus, vertigo; No sinus or throat pain  NECK: No pain or stiffness  BREASTS: No pain, masses, or nipple discharge  RESPIRATORY: No cough, wheezing, chills or hemoptysis; No shortness of breath  CARDIOVASCULAR: No chest pain, palpitations, dizziness, or leg swelling  GASTROINTESTINAL: No abdominal or epigastric pain. No nausea, vomiting, or hematemesis; No diarrhea or constipation. No melena or hematochezia.  GENITOURINARY: No dysuria, frequency, hematuria, or incontinence  NEUROLOGICAL: No headaches, memory loss, loss of strength, numbness, or tremors  SKIN: No itching, burning, rashes, or lesions   LYMPH NODES: No enlarged glands  ENDOCRINE: No heat or cold intolerance; No hair loss  MUSCULOSKELETAL: No joint pain or swelling; No muscle, back, or extremity pain  PSYCHIATRIC: No depression, anxiety, mood swings, or difficulty sleeping  HEME/LYMPH: No easy bruising, or bleeding gums  ALLERY AND IMMUNOLOGIC: No hives or eczema    MEDICATIONS  (STANDING):  cholecalciferol 2000 Unit(s) Oral daily  heparin  Injectable 5000 Unit(s) SubCutaneous every 8 hours  insulin lispro (HumaLOG) corrective regimen sliding scale   SubCutaneous every 6 hours  sodium bicarbonate 650 milliGRAM(s) Oral every 8 hours  vancomycin  IVPB 500 milliGRAM(s) IV Intermittent every 12 hours    MEDICATIONS  (PRN):      PHYSICAL EXAM:  Vital Signs Last 24 Hrs  T(C): 36.6 (20 Jan 2019 12:15), Max: 36.8 (19 Jan 2019 21:37)  T(F): 97.8 (20 Jan 2019 12:15), Max: 98.3 (19 Jan 2019 21:37)  HR: 91 (20 Jan 2019 12:15) (80 - 91)  BP: 108/70 (20 Jan 2019 12:15) (108/70 - 113/74)  BP(mean): --  RR: 18 (20 Jan 2019 12:15) (18 - 18)  SpO2: 96% (20 Jan 2019 12:15) (96% - 98%)  I&O's Summary    19 Jan 2019 07:01  -  20 Jan 2019 07:00  --------------------------------------------------------  IN: 1800 mL / OUT: 0 mL / NET: 1800 mL      GENERAL: NAD, well-developed  HEAD:  Atraumatic, Normocephalic  EYES: EOMI, PERRLA, conjunctiva and sclera clear  NECK: Supple, No JVD  CHEST/LUNG: Clear to auscultation bilaterally; No wheeze  HEART: Regular rate and rhythm; No murmurs, rubs, or gallops  ABDOMEN: Soft, Nontender, Nondistended; Bowel sounds present  EXTREMITIES:  2+ Peripheral Pulses, No clubbing, cyanosis, or edema  PSYCH: AAOx3  NEUROLOGY: non-focal  SKIN: No rashes or lesions    LABS:  CAPILLARY BLOOD GLUCOSE      POCT Blood Glucose.: 257 mg/dL (20 Jan 2019 12:22)  POCT Blood Glucose.: 238 mg/dL (20 Jan 2019 07:21)  POCT Blood Glucose.: 236 mg/dL (20 Jan 2019 05:56)  POCT Blood Glucose.: 269 mg/dL (20 Jan 2019 00:00)  POCT Blood Glucose.: 242 mg/dL (19 Jan 2019 17:49)                          11.6   5.8   )-----------( 108      ( 20 Jan 2019 05:12 )             32.8     01-20    138  |  104  |  13  ----------------------------<  218<H>  3.8   |  24  |  0.73    Ca    8.7      20 Jan 2019 05:12                RADIOLOGY & ADDITIONAL TESTS:    Imaging Personally Reviewed:    Consultant(s) Notes Reviewed:      Care Discussed with Consultants/Other Providers:

## 2019-01-20 NOTE — PROGRESS NOTE ADULT - SUBJECTIVE AND OBJECTIVE BOX
Interval Events:  pt seen and examined  non-verbal  tolerating NGT feeds     MEDICATIONS  (STANDING):  cholecalciferol 2000 Unit(s) Oral daily  heparin  Injectable 5000 Unit(s) SubCutaneous every 8 hours  insulin lispro (HumaLOG) corrective regimen sliding scale   SubCutaneous every 6 hours  sodium bicarbonate 650 milliGRAM(s) Oral every 8 hours  vancomycin  IVPB 500 milliGRAM(s) IV Intermittent every 12 hours    MEDICATIONS  (PRN):      Allergies    No Known Allergies    Intolerances        Review of Systems:    unable to obtain      Vital Signs Last 24 Hrs  T(C): 36.8 (20 Jan 2019 04:34), Max: 36.8 (19 Jan 2019 21:37)  T(F): 98.3 (20 Jan 2019 04:34), Max: 98.3 (19 Jan 2019 21:37)  HR: 80 (20 Jan 2019 04:34) (80 - 89)  BP: 108/72 (20 Jan 2019 04:34) (104/69 - 113/74)  BP(mean): --  RR: 18 (20 Jan 2019 04:34) (18 - 18)  SpO2: 98% (20 Jan 2019 04:34) (95% - 98%)    PHYSICAL EXAM:    nad  ngt in place  frail  non toxic  soft, nt  no edema    LABS:                        11.6   5.8   )-----------( 108      ( 20 Jan 2019 05:12 )             32.8     01-20    138  |  104  |  13  ----------------------------<  218<H>  3.8   |  24  |  0.73    Ca    8.7      20 Jan 2019 05:12            RADIOLOGY & ADDITIONAL TESTS:

## 2019-01-20 NOTE — PROVIDER CONTACT NOTE (CRITICAL VALUE NOTIFICATION) - TEST AND RESULT REPORTED:
Bld Cx from 1/12/19 -- growth in anerobic bottle gram + cocci in clusters
Sodium - 161
vanco 30
Sodium 160 on BMP

## 2019-01-20 NOTE — PROVIDER CONTACT NOTE (CRITICAL VALUE NOTIFICATION) - BACKGROUND
Pt admitted w/ AMS, hypernatremia, hyperglycemia
pt on vanco 500 q12
Pt admitted for AMS/ encephalopathy, Dehydration/ JENNIFER, hypernatremia, hyperglycemia, lactic acidosis and demand ischemia.

## 2019-01-20 NOTE — PROVIDER CONTACT NOTE (CRITICAL VALUE NOTIFICATION) - SITUATION
Bld Cx from 1/12/19 -- growth in anerobic bottle gram + cocci in clusters
vanco 30
Sodium 160 on BMP

## 2019-01-20 NOTE — CHART NOTE - NSCHARTNOTEFT_GEN_A_CORE
KEVIN VILLARREAL    Notified by RN patient with critical lab result vancomycin tough 30   Hold next Dose       Interventions taken   HOLD Next Dose of Vancomycin   Repeat Next vancomycin tough   monitor                           Estefania EDWARDSC KEVIN VILLARREAL    Notified by RN patient with critical lab result vancomycin trough 30   Hold next Dose       Interventions taken   HOLD Next Dose of Vancomycin   Repeat Next vancomycin trough   monitor trough level                           Estefania AMOS

## 2019-01-21 LAB
ANION GAP SERPL CALC-SCNC: 13 MMOL/L — SIGNIFICANT CHANGE UP (ref 5–17)
BUN SERPL-MCNC: 19 MG/DL — SIGNIFICANT CHANGE UP (ref 7–23)
CALCIUM SERPL-MCNC: 9.2 MG/DL — SIGNIFICANT CHANGE UP (ref 8.4–10.5)
CHLORIDE SERPL-SCNC: 99 MMOL/L — SIGNIFICANT CHANGE UP (ref 96–108)
CO2 SERPL-SCNC: 24 MMOL/L — SIGNIFICANT CHANGE UP (ref 22–31)
CREAT SERPL-MCNC: 0.69 MG/DL — SIGNIFICANT CHANGE UP (ref 0.5–1.3)
GLUCOSE BLDC GLUCOMTR-MCNC: 212 MG/DL — HIGH (ref 70–99)
GLUCOSE BLDC GLUCOMTR-MCNC: 240 MG/DL — HIGH (ref 70–99)
GLUCOSE BLDC GLUCOMTR-MCNC: 265 MG/DL — HIGH (ref 70–99)
GLUCOSE BLDC GLUCOMTR-MCNC: 271 MG/DL — HIGH (ref 70–99)
GLUCOSE SERPL-MCNC: 280 MG/DL — HIGH (ref 70–99)
HCT VFR BLD CALC: 39.3 % — SIGNIFICANT CHANGE UP (ref 39–50)
HGB BLD-MCNC: 13.5 G/DL — SIGNIFICANT CHANGE UP (ref 13–17)
MCHC RBC-ENTMCNC: 33.7 PG — SIGNIFICANT CHANGE UP (ref 27–34)
MCHC RBC-ENTMCNC: 34.5 GM/DL — SIGNIFICANT CHANGE UP (ref 32–36)
MCV RBC AUTO: 97.7 FL — SIGNIFICANT CHANGE UP (ref 80–100)
PLATELET # BLD AUTO: 100 K/UL — LOW (ref 150–400)
POTASSIUM SERPL-MCNC: 5.2 MMOL/L — SIGNIFICANT CHANGE UP (ref 3.5–5.3)
POTASSIUM SERPL-SCNC: 5.2 MMOL/L — SIGNIFICANT CHANGE UP (ref 3.5–5.3)
RBC # BLD: 4.02 M/UL — LOW (ref 4.2–5.8)
RBC # FLD: 13.5 % — SIGNIFICANT CHANGE UP (ref 10.3–14.5)
SODIUM SERPL-SCNC: 136 MMOL/L — SIGNIFICANT CHANGE UP (ref 135–145)
VANCOMYCIN FLD-MCNC: 21.7 UG/ML — SIGNIFICANT CHANGE UP
WBC # BLD: 7.9 K/UL — SIGNIFICANT CHANGE UP (ref 3.8–10.5)
WBC # FLD AUTO: 7.9 K/UL — SIGNIFICANT CHANGE UP (ref 3.8–10.5)

## 2019-01-21 RX ADMIN — Medication 650 MILLIGRAM(S): at 13:00

## 2019-01-21 RX ADMIN — Medication 2000 UNIT(S): at 10:34

## 2019-01-21 RX ADMIN — Medication 3: at 17:21

## 2019-01-21 RX ADMIN — HEPARIN SODIUM 5000 UNIT(S): 5000 INJECTION INTRAVENOUS; SUBCUTANEOUS at 21:46

## 2019-01-21 RX ADMIN — Medication 2: at 00:09

## 2019-01-21 RX ADMIN — Medication 2: at 06:28

## 2019-01-21 RX ADMIN — Medication 3: at 13:00

## 2019-01-21 RX ADMIN — HEPARIN SODIUM 5000 UNIT(S): 5000 INJECTION INTRAVENOUS; SUBCUTANEOUS at 06:28

## 2019-01-21 RX ADMIN — HEPARIN SODIUM 5000 UNIT(S): 5000 INJECTION INTRAVENOUS; SUBCUTANEOUS at 13:00

## 2019-01-21 RX ADMIN — Medication 650 MILLIGRAM(S): at 06:28

## 2019-01-21 NOTE — PROGRESS NOTE ADULT - ASSESSMENT
· Assessment	  79 yo M w/ hx of CVA w/ left sided weakness, CAD w/ CABG 2013, prior hx of thoracic aortic aneurysm repair, HTN, DM2, systolic CHF (mild LV dysfunction in 11/2018), bioprosthetic aortic valve, BPH, presents with acute encephalopathy presumably from infectious source versus metabolic derangement versus a central process.      Problem/Plan - 1:  ·  Problem: Acute toxic metabolic encephalopathy.  Plan: - Likely from electrolyte disturbance-Hyponatremia and sepsis. On Abx. ID eval noted. Staph epidermidis possibly contamination.          Problem/Plan - 2:  ·  Problem: Hyperglycemia.    Plan: -Lantus/FSSS     Problem/Plan - 4:  ·  Problem: Hypernatremia.  Improved     Problem/Plan - 5:  ·  Problem: JENNIFER (acute kidney injury).  Plan: -  BMP/IVF     Problem/Plan - 6:  Problem: Demand ischemia. Plan: -Cardio f/up noted.     Problem/Plan - 7:  ·  Problem: Chronic systolic congestive heart failure.  Plan: - Cardio f/up noted.  GI FU for PEG

## 2019-01-21 NOTE — PROGRESS NOTE ADULT - SUBJECTIVE AND OBJECTIVE BOX
Patient is a 78y old  Male who presents with a chief complaint of altered mental status (21 Jan 2019 16:21)      SUBJECTIVE / OVERNIGHT EVENTS:  No new symptoms  Review of Systems:   CONSTITUTIONAL: No fever, weight loss, or fatigue  EYES: No eye pain, visual disturbances, or discharge  ENMT:  No difficulty hearing, tinnitus, vertigo; No sinus or throat pain  NECK: No pain or stiffness  BREASTS: No pain, masses, or nipple discharge  RESPIRATORY: No cough, wheezing, chills or hemoptysis; No shortness of breath  CARDIOVASCULAR: No chest pain, palpitations, dizziness, or leg swelling  GASTROINTESTINAL: No abdominal or epigastric pain. No nausea, vomiting, or hematemesis; No diarrhea or constipation. No melena or hematochezia.  GENITOURINARY: No dysuria, frequency, hematuria, or incontinence  NEUROLOGICAL: No headaches, memory loss, loss of strength, numbness, or tremors  SKIN: No itching, burning, rashes, or lesions   LYMPH NODES: No enlarged glands  ENDOCRINE: No heat or cold intolerance; No hair loss  MUSCULOSKELETAL: No joint pain or swelling; No muscle, back, or extremity pain  PSYCHIATRIC: No depression, anxiety, mood swings, or difficulty sleeping  HEME/LYMPH: No easy bruising, or bleeding gums  ALLERY AND IMMUNOLOGIC: No hives or eczema    MEDICATIONS  (STANDING):  cholecalciferol 2000 Unit(s) Oral daily  heparin  Injectable 5000 Unit(s) SubCutaneous every 8 hours  insulin lispro (HumaLOG) corrective regimen sliding scale   SubCutaneous every 6 hours    MEDICATIONS  (PRN):      PHYSICAL EXAM:  Vital Signs Last 24 Hrs  T(C): 36.4 (21 Jan 2019 11:48), Max: 36.8 (20 Jan 2019 21:35)  T(F): 97.6 (21 Jan 2019 11:48), Max: 98.3 (20 Jan 2019 21:35)  HR: 94 (21 Jan 2019 11:48) (78 - 94)  BP: 111/74 (21 Jan 2019 11:48) (108/72 - 115/73)  BP(mean): --  RR: 18 (21 Jan 2019 11:48) (18 - 18)  SpO2: 94% (21 Jan 2019 11:48) (94% - 96%)  I&O's Summary    20 Jan 2019 07:01  -  21 Jan 2019 07:00  --------------------------------------------------------  IN: 1000 mL / OUT: 0 mL / NET: 1000 mL    21 Jan 2019 07:01  -  21 Jan 2019 19:14  --------------------------------------------------------  IN: 180 mL / OUT: 0 mL / NET: 180 mL      GENERAL: NAD, well-developed  HEAD:  Atraumatic, Normocephalic  EYES: EOMI, PERRLA, conjunctiva and sclera clear  NECK: Supple, No JVD  CHEST/LUNG: Clear to auscultation bilaterally; No wheeze  HEART: Regular rate and rhythm; No murmurs, rubs, or gallops  ABDOMEN: Soft, Nontender, Nondistended; Bowel sounds present  EXTREMITIES:  2+ Peripheral Pulses, No clubbing, cyanosis, or edema  PSYCH: Confused  NEUROLOGY: Functionally quadriplegic.  SKIN: No rashes or lesions    LABS:  CAPILLARY BLOOD GLUCOSE      POCT Blood Glucose.: 265 mg/dL (21 Jan 2019 17:10)  POCT Blood Glucose.: 271 mg/dL (21 Jan 2019 12:48)  POCT Blood Glucose.: 212 mg/dL (21 Jan 2019 06:07)  POCT Blood Glucose.: 232 mg/dL (20 Jan 2019 23:47)                          13.5   7.9   )-----------( 100      ( 21 Jan 2019 11:18 )             39.3     01-21    136  |  99  |  19  ----------------------------<  280<H>  5.2   |  24  |  0.69    Ca    9.2      21 Jan 2019 11:18                RADIOLOGY & ADDITIONAL TESTS:    Imaging Personally Reviewed:    Consultant(s) Notes Reviewed:      Care Discussed with Consultants/Other Providers:

## 2019-01-21 NOTE — PROGRESS NOTE ADULT - ASSESSMENT
IMPRESSION AND PLAN:    77 yo M w/ hx of CVA w/ left sided weakness,  prior hx of Thoracic aortic aneurysm repair, HTN, T2DM, systolic CHF (mild LV dysfunction in 11/2018),  s/p Bioprosthetic aortic valve, BPH, presents with acute encephalopathy presumably from infectious source versus metabolic derangement.      Problem/Plan - 1:  ·  Problem: Acute encephalopathy.  Plan: -Patient presents with more lethargy and unable to provide any hx.   Cultures-GPC in clusters.Staph Epi  Started on Vancomycin.  Hx Aortic root repair with Bio AVR-Repeat cultures no growth.   Discussed with son concerning DICKSON-does not want to persue-will defer to ID on length of IV ABx if indicated as repeat blood Cultures no growth-TTE no vegetations seen.  ID follow up suspect Blood C&S contaminant vs Endocarditis-low criteria-complete 10 days ABx.     Problem/Plan - 2:  ·  Problem: Hypernatremia.  Plan: -Likely driven by hypovolemia, decreased po intake.   Renal consult noted-IVF with K supplementation  Hypernatremia corrected-  Consideration for PEG -GI Consult noted-for PEG tomorrow  Problem/Plan - 3:  ·  Problem: JENNIFER (acute kidney injury).  Plan: -Baseline creatinine is about 1.0; likely this is hypovolemic mediated in setting of using diuretics and not drinking or eating at home.  Normalized.    Problem/Plan - 4:  .  Problem:Chronic Systolic Heart Failure EF-30%  Clinically euvolemic.  Eventual restarting BBlockers and ACEI

## 2019-01-21 NOTE — PROGRESS NOTE ADULT - SUBJECTIVE AND OBJECTIVE BOX
79 yo M w/ hx of CVA w/ left sided weakness, s/o Thoracic Aorta repair with Bio AVR-2013, HTN, T2DM, HF (mild LV dysfunction in 11/2018), , BPH, presents with altered mental status.Now admitted with JENNIFER 2/2 dehydration from poor oral intake-Staph epi bacteremia on Vanco-reamins afebrile repeat Blood C&S no growth-consideration for PEG-discussed with wifr and son-agreeable for PEG scheduled for tomorrow-tolerating NGT feeds afebrile        MEDICATIONS  (STANDING):  cholecalciferol 2000 Unit(s) Oral daily  heparin  Injectable 5000 Unit(s) SubCutaneous every 8 hours  insulin lispro (HumaLOG) corrective regimen sliding scale   SubCutaneous every 6 hours  sodium bicarbonate 650 milliGRAM(s) Oral every 8 hours    Allergies-No Known Allergies    Review of Systems: unable to obtain.    Vital Signs Last 24 Hrs  T(C): 36.4 (21 Jan 2019 04:46), Max: 36.8 (20 Jan 2019 21:35)  T(F): 97.5 (21 Jan 2019 04:46), Max: 98.3 (20 Jan 2019 21:35)  HR: 80 (21 Jan 2019 06:25) (78 - 91)  BP: 115/73 (21 Jan 2019 06:25) (108/70 - 115/73)  RR: 18 (21 Jan 2019 04:46) (18 - 18)  SpO2: 95% (21 Jan 2019 04:46) (95% - 96%)    PHYSICAL EXAM:  General: No acute distress BMI-19  HEENT: EOMI, PERRL-Non verbal  Neck: Supple, [ ] JVD  Lungs: Equal air entry bilaterally; [ ] rales [ ] wheezing [ ] rhonchi  Heart: Regular rate and rhythm; [x ] murmur  2 /6 [x ] systolic [ ] diastolic [ ] radiation[ ] rubs [ ]  gallops  Abdomen: Nontender, bowel sounds present  Extremities: No clubbing, cyanosis, [ ] edema  Nervous system:  Alert & Oriented X1, Left paresis  Psychiatric: Normal affect  Skin: No rashes or lesions      LABS:                        11.6   5.8   )-----------( 108      ( 20 Jan 2019 05:12 )             32.8     01-20    138  |  104  |  13  ----------------------------<  218<H>  3.8   |  24  |  0.73    Ca    8.7      20 Jan 2019 05:12

## 2019-01-21 NOTE — PROGRESS NOTE ADULT - SUBJECTIVE AND OBJECTIVE BOX
Patient is seen and examined at the bed side, is afebrile. No change in Mental status. The WBC count stay normal. The repeat blood culture remains negative.         ROS: Unable to obtain due to mental status      Allergies    No Known Allergies      Vital Signs Last 24 Hrs  T(C): 36.4 (21 Jan 2019 11:48), Max: 36.8 (20 Jan 2019 21:35)  T(F): 97.6 (21 Jan 2019 11:48), Max: 98.3 (20 Jan 2019 21:35)  HR: 94 (21 Jan 2019 11:48) (78 - 94)  BP: 111/74 (21 Jan 2019 11:48) (108/72 - 115/73)  BP(mean): --  RR: 18 (21 Jan 2019 11:48) (18 - 18)  SpO2: 94% (21 Jan 2019 11:48) (94% - 96%)        PHYSICAL EXAM:    GENERAL: Not in distress, is awake  HEENT: NGT in placed   Respiratory: Air entry B/L  Cardiovascular:  s1 and s2 present  GI: Abdomen   nontender and nondistended   Extremities: No pedal edema  CNS: Awake but ? Nonverbal        LABS:                        13.5   7.9   )-----------( 100      ( 21 Jan 2019 11:18 )             39.3                           12.4   6.6   )-----------( 114      ( 19 Jan 2019 06:59 )             35.3       01-21    136  |  99  |  19  ----------------------------<  280<H>  5.2   |  24  |  0.69    Ca    9.2      21 Jan 2019 11:18      01-19    141  |  106  |  10  ----------------------------<  245<H>  3.5   |  22  |  0.61    Ca    8.8      19 Jan 2019 06:59        MEDICATIONS  (STANDING):  cholecalciferol 2000 Unit(s) Oral daily  heparin  Injectable 5000 Unit(s) SubCutaneous every 8 hours  insulin lispro (HumaLOG) corrective regimen sliding scale   SubCutaneous every 6 hours    MEDICATIONS  (PRN):      DRUG LEVEL:    Vancomycin Level, Random (01.21.19 @ 06:40)    Vancomycin Level, Random: 21.7: The "VANCOMYCIN, RANDOM" test should only be ordered for patients with  severe renal dysfunction or on dialysis. Therapeutic ranges have not been  established and results must be interpreted in the context of patient's  clinical condition.  NOTE: Therapeutic range for Trough Vancomycin is 10-20 mcg/mL. ug/mL        Vancomycin Level, Trough -Pre 4th Dose, order if dosed q6/8/12h (01.18.19 @ 21:34) Vancomycin Level, Trough: 24.6:     Vancomycin Level, Trough: 24.4 ug/mL (01-16 @ 18:23)        MICROBIOLOGY  DATA:      Culture - Blood in AM (01.17.19 @ 14:14)    Specimen Source: .Blood Blood-Peripheral    Culture Results:   No growth to date.    Culture - Blood (01.13.19 @ 08:27)    Specimen Source: .Blood Blood-Venous    Culture Results:   No growth at 5 days.    Culture - Blood (01.12.19 @ 22:17)    -  Tetra/Doxy: R >8    -  Trimethoprim/Sulfamethoxazole: R >2/38    -  Vancomycin: S 2    -  Gentamicin: S <=1 Should not be used as monotherapy    -  Levofloxacin: R >4    -  Linezolid: S 2    -  Meropenem: R 8    -  Moxifloxacin(Aerobic): I 4    -  Oxacillin: R >2    -  Penicillin: R >8    -  RIF- Rifampin: R >2 Should not be used as monotherapy    Gram Stain:   Growth in anaerobic bottle: Gram Positive Cocci in Clusters  Growth in aerobic bottle: Gram Positive Cocci in Clusters    -  Amoxicillin/Clavulanic Acid: R <=4/2    -  Ampicillin: R 8    -  Ampicillin/Sulbactam: R <=8/4    -  Cefazolin: R <=4    -  Ceftriaxone: R 32    -  Ciprofloxacin: R >2    -  Clindamycin: R 2 This isolate is presumed to be clindamycin resistant based on detection of inducible resistance. Clindamycin may still be effective in some patients.    -  Daptomycin: S 0.5    -  Erythromycin: R >4    Specimen Source: .Blood Blood-Venous    Organism: Staphylococcus epidermidis    Culture Results:   Growth in aerobic and anaerobic bottles: Staphylococcus epidermidis    Organism Identification: Staphylococcus epidermidis    Method Type: ERNESTINE    Culture - Urine (01.12.19 @ 22:19)    Specimen Source: .Urine Catheterized    Culture Results:   No growth        RADIOLOGY & ADDITIONAL STUDIES:    1/12/19: Xray Chest 1 View- PORTABLE-Urgent (01.12.19 @ 21:45) Clear lungs. Patient is seen and examined at the bed side, is afebrile. He seems more aware of his surrounding today.  The Vancomycin level is 21.7. The GI follow up noted, patient is scheduled for PEG in AM.        ROS: Unable to obtain due to mental status      Allergies    No Known Allergies      Vital Signs Last 24 Hrs  T(C): 36.4 (21 Jan 2019 11:48), Max: 36.8 (20 Jan 2019 21:35)  T(F): 97.6 (21 Jan 2019 11:48), Max: 98.3 (20 Jan 2019 21:35)  HR: 94 (21 Jan 2019 11:48) (78 - 94)  BP: 111/74 (21 Jan 2019 11:48) (108/72 - 115/73)  BP(mean): --  RR: 18 (21 Jan 2019 11:48) (18 - 18)  SpO2: 94% (21 Jan 2019 11:48) (94% - 96%)        PHYSICAL EXAM:    GENERAL: Not in distress, is awake  HEENT: NGT in placed   Respiratory: Air entry B/L  Cardiovascular:  s1 and s2 present  GI: Abdomen   nontender and nondistended   Extremities: No pedal edema  CNS: Awake but ? Nonverbal        LABS:                        13.5   7.9   )-----------( 100      ( 21 Jan 2019 11:18 )             39.3                           12.4   6.6   )-----------( 114      ( 19 Jan 2019 06:59 )             35.3       01-21    136  |  99  |  19  ----------------------------<  280<H>  5.2   |  24  |  0.69    Ca    9.2      21 Jan 2019 11:18      01-19    141  |  106  |  10  ----------------------------<  245<H>  3.5   |  22  |  0.61    Ca    8.8      19 Jan 2019 06:59        MEDICATIONS  (STANDING):  cholecalciferol 2000 Unit(s) Oral daily  heparin  Injectable 5000 Unit(s) SubCutaneous every 8 hours  insulin lispro (HumaLOG) corrective regimen sliding scale   SubCutaneous every 6 hours    MEDICATIONS  (PRN):      DRUG LEVEL:    Vancomycin Level, Random (01.21.19 @ 06:40)    Vancomycin Level, Random: 21.7: The "VANCOMYCIN, RANDOM" test should only be ordered for patients with  severe renal dysfunction or on dialysis.    Vancomycin Level, Trough -Pre 4th Dose, order if dosed q6/8/12h (01.18.19 @ 21:34) Vancomycin Level, Trough: 24.6:     Vancomycin Level, Trough: 24.4 ug/mL (01-16 @ 18:23)        MICROBIOLOGY  DATA:      Culture - Blood in AM (01.17.19 @ 14:14)    Specimen Source: .Blood Blood-Peripheral    Culture Results:   No growth to date.    Culture - Blood (01.13.19 @ 08:27)    Specimen Source: .Blood Blood-Venous    Culture Results:   No growth at 5 days.    Culture - Blood (01.12.19 @ 22:17)    -  Tetra/Doxy: R >8    -  Trimethoprim/Sulfamethoxazole: R >2/38    -  Vancomycin: S 2    -  Gentamicin: S <=1 Should not be used as monotherapy    -  Levofloxacin: R >4    -  Linezolid: S 2    -  Meropenem: R 8    -  Moxifloxacin(Aerobic): I 4    -  Oxacillin: R >2    -  Penicillin: R >8    -  RIF- Rifampin: R >2 Should not be used as monotherapy    Gram Stain:   Growth in anaerobic bottle: Gram Positive Cocci in Clusters  Growth in aerobic bottle: Gram Positive Cocci in Clusters    -  Amoxicillin/Clavulanic Acid: R <=4/2    -  Ampicillin: R 8    -  Ampicillin/Sulbactam: R <=8/4    -  Cefazolin: R <=4    -  Ceftriaxone: R 32    -  Ciprofloxacin: R >2    -  Clindamycin: R 2 This isolate is presumed to be clindamycin resistant based on detection of inducible resistance. Clindamycin may still be effective in some patients.    -  Daptomycin: S 0.5    -  Erythromycin: R >4    Specimen Source: .Blood Blood-Venous    Organism: Staphylococcus epidermidis    Culture Results:   Growth in aerobic and anaerobic bottles: Staphylococcus epidermidis    Organism Identification: Staphylococcus epidermidis    Method Type: ERNESTINE    Culture - Urine (01.12.19 @ 22:19)    Specimen Source: .Urine Catheterized    Culture Results:   No growth        RADIOLOGY & ADDITIONAL STUDIES:    1/12/19: Xray Chest 1 View- PORTABLE-Urgent (01.12.19 @ 21:45) Clear lungs.

## 2019-01-21 NOTE — PROGRESS NOTE ADULT - ASSESSMENT
77 yo M w/ hx of CVA w/ left sided weakness, CAD w/ CABG 2013, prior hx of thoracic aortic aneurysm repair, HTN, DM2, systolic CHF (mild LV dysfunction in 11/2018), bioprosthetic aortic valve, BPH, presents with altered mental status.  He was admitted in November 2018 for MRSA bacteremia. DICKSON was not performed according to discharge paperwork bc family declined. However, spouse and son report that this was never the case. Patient was treated with prolonged IV antibiotics including vancomycin/rifampin/gentamicin.   Then again in december 2018 he was hospitalized for hypoxic respiratory failure 2/2 to acute decompensated heart failure requiring NIPPV. His hospital course was complicated by steralcolitis for which he was given cipro/flagyl. Spouse reports he completed this on discharge. Today the patient's outpatient blood work returned with sodium in "160s" and blood glucose in "400s" despite continuing outpatient metformin therapy. Therefore, the patient & family was advised to come to hospital for further management.       # Positive Blood culture- CNS/staph epidermidis.  ? contaminant vs. true infection.  Given presence of aortic valve repair, agree with starting abx with vancomycin.  f/u repeat blood cultures.  TTE no vegetations seen.  Son deferring DICKSON as per discussion with cardiologist, ESR and CRP are relatively low, TTE without vegetations, repeat bcx ngtd.  Very low suspicion for endocarditis.  Will treat 7 to 10 day course for bacteremia, suspect more likely a contaminant than true infection      Would Recommend:    1. Monitor Vancomycin level and re-dose when level is less than 20   2. Monitor  and keep level between 15 to 20  3. Continue NGT feeding until PEG placement  4. Aspiration precaution    Covering Dr. Mihir Trevizo  [phone]

## 2019-01-21 NOTE — PROGRESS NOTE ADULT - PROBLEM SELECTOR PLAN 1
npo  ngt feeds  family is agreeable to PEG tube. Plan for  upper gastrointestinal endoscopy with PEG placement Tuesday. Hold NGT feeds past midnight tonight

## 2019-01-21 NOTE — PROGRESS NOTE ADULT - SUBJECTIVE AND OBJECTIVE BOX
[facility]   MD FREDRICK JUAREZ MD ANGELA WONG, PA    TEL:  OFFICE: [phone]  DR BREWER CELL: [phone]  DR. PHILLIP CELL: [phone]  ALYSSA ESCAMILLA CELL: [phone]        Patient is a 78y old  Male who presents with a chief complaint of altered mental status (21 Jan 2019 10:49)      Patient seen and examined at bedside.     VITALS:  T(F): 97.6 (01-21-19 @ 11:48), Max: 98.3 (01-20-19 @ 21:35)  HR: 94 (01-21-19 @ 11:48)  BP: 111/74 (01-21-19 @ 11:48)  RR: 18 (01-21-19 @ 11:48)  SpO2: 94% (01-21-19 @ 11:48)  Wt(kg): --    01-20 @ 07:01  -  01-21 @ 07:00  --------------------------------------------------------  IN: 1000 mL / OUT: 0 mL / NET: 1000 mL    01-21 @ 07:01  -  01-21 @ 16:21  --------------------------------------------------------  IN: 180 mL / OUT: 0 mL / NET: 180 mL          PHYSICAL EXAM:  Constitutional: NAD, awake  Neck: No JVD  Respiratory: CTAB, no wheezes, rales or rhonchi  Cardiovascular: S1, S2, RRR,   Gastrointestinal: BS+, soft, NT/ND, +NGT  Extremities: No peripheral edema    Hospital Medications:   MEDICATIONS  (STANDING):  cholecalciferol 2000 Unit(s) Oral daily  heparin  Injectable 5000 Unit(s) SubCutaneous every 8 hours  insulin lispro (HumaLOG) corrective regimen sliding scale   SubCutaneous every 6 hours      LABS:  01-21    136  |  99  |  19  ----------------------------<  280<H>  5.2   |  24  |  0.69    Ca    9.2      21 Jan 2019 11:18      Creatinine Trend: 0.69 <--, 0.73 <--, 0.61 <--, 0.51 <--, 0.62 <--, 0.61 <--, 0.59 <--, 0.65 <--, 0.67 <--, 0.70 <--, 0.73 <--, 0.80 <--                                13.5   7.9   )-----------( 100      ( 21 Jan 2019 11:18 )             39.3     Urine Studies:  Urinalysis - [01-12-19 @ 20:19]      Color Yellow / Appearance Clear / SG 1.022 / pH 5.5      Gluc 500 mg/dL / Ketone Negative  / Bili Negative / Urobili Negative       Blood Negative / Protein Trace / Leuk Est Negative / Nitrite Negative      RBC 3 / WBC 1 / Hyaline 4 / Gran  / Sq Epi  / Non Sq Epi 0 / Bacteria Negative      PTH -- (Ca 8.5)      [01-17-19 @ 13:34]   71  HbA1c 7.1      [11-02-18 @ 06:00]  TSH 0.47      [01-13-19 @ 00:25]        RADIOLOGY & ADDITIONAL STUDIES:

## 2019-01-21 NOTE — PROGRESS NOTE ADULT - ASSESSMENT
79 yo M w/ hx of CVA w/ left sided weakness, CAD w/ CABG 2013, prior hx of thoracic aortic aneurysm repair, HTN, DM2, systolic CHF (mild LV dysfunction in 11/2018), bioprosthetic aortic valve, BPH, presents with altered mental status.     Pending PEG placement likely 1/22

## 2019-01-21 NOTE — PROGRESS NOTE ADULT - PROBLEM SELECTOR PLAN 1
Pt with hypernatremia likely sec to decreased PO intake/dehydration  Serum sodium improved  Pt started on tube feeds  Monitor serum Sodium Q daily   Avoid overcorrection >8mEq in 24 hours.

## 2019-01-22 ENCOUNTER — RESULT REVIEW (OUTPATIENT)
Age: 79
End: 2019-01-22

## 2019-01-22 LAB
ANION GAP SERPL CALC-SCNC: 12 MMOL/L — SIGNIFICANT CHANGE UP (ref 5–17)
BUN SERPL-MCNC: 20 MG/DL — SIGNIFICANT CHANGE UP (ref 7–23)
CALCIUM SERPL-MCNC: 9.2 MG/DL — SIGNIFICANT CHANGE UP (ref 8.4–10.5)
CHLORIDE SERPL-SCNC: 101 MMOL/L — SIGNIFICANT CHANGE UP (ref 96–108)
CO2 SERPL-SCNC: 27 MMOL/L — SIGNIFICANT CHANGE UP (ref 22–31)
CREAT SERPL-MCNC: 0.76 MG/DL — SIGNIFICANT CHANGE UP (ref 0.5–1.3)
CULTURE RESULTS: SIGNIFICANT CHANGE UP
GLUCOSE BLDC GLUCOMTR-MCNC: 188 MG/DL — HIGH (ref 70–99)
GLUCOSE BLDC GLUCOMTR-MCNC: 212 MG/DL — HIGH (ref 70–99)
GLUCOSE BLDC GLUCOMTR-MCNC: 215 MG/DL — HIGH (ref 70–99)
GLUCOSE BLDC GLUCOMTR-MCNC: 220 MG/DL — HIGH (ref 70–99)
GLUCOSE BLDC GLUCOMTR-MCNC: 225 MG/DL — HIGH (ref 70–99)
GLUCOSE SERPL-MCNC: 235 MG/DL — HIGH (ref 70–99)
POTASSIUM SERPL-MCNC: 4.6 MMOL/L — SIGNIFICANT CHANGE UP (ref 3.5–5.3)
POTASSIUM SERPL-SCNC: 4.6 MMOL/L — SIGNIFICANT CHANGE UP (ref 3.5–5.3)
SODIUM SERPL-SCNC: 140 MMOL/L — SIGNIFICANT CHANGE UP (ref 135–145)
SPECIMEN SOURCE: SIGNIFICANT CHANGE UP
VANCOMYCIN FLD-MCNC: 16.1 UG/ML — SIGNIFICANT CHANGE UP

## 2019-01-22 PROCEDURE — 88312 SPECIAL STAINS GROUP 1: CPT | Mod: 26

## 2019-01-22 PROCEDURE — 88305 TISSUE EXAM BY PATHOLOGIST: CPT | Mod: 26

## 2019-01-22 RX ORDER — CEFAZOLIN SODIUM 1 G
2000 VIAL (EA) INJECTION ONCE
Qty: 0 | Refills: 0 | Status: COMPLETED | OUTPATIENT
Start: 2019-01-22 | End: 2019-01-22

## 2019-01-22 RX ORDER — VANCOMYCIN HCL 1 G
750 VIAL (EA) INTRAVENOUS EVERY 24 HOURS
Qty: 0 | Refills: 0 | Status: DISCONTINUED | OUTPATIENT
Start: 2019-01-22 | End: 2019-01-23

## 2019-01-22 RX ADMIN — Medication 2: at 23:52

## 2019-01-22 RX ADMIN — Medication 2: at 13:17

## 2019-01-22 RX ADMIN — Medication 2: at 06:43

## 2019-01-22 RX ADMIN — Medication 2: at 01:12

## 2019-01-22 RX ADMIN — Medication 1: at 18:46

## 2019-01-22 RX ADMIN — Medication 100 MILLIGRAM(S): at 20:35

## 2019-01-22 RX ADMIN — Medication 250 MILLIGRAM(S): at 18:54

## 2019-01-22 RX ADMIN — HEPARIN SODIUM 5000 UNIT(S): 5000 INJECTION INTRAVENOUS; SUBCUTANEOUS at 21:17

## 2019-01-22 NOTE — PROGRESS NOTE ADULT - SUBJECTIVE AND OBJECTIVE BOX
Pre-Endoscopy Evaluation      Referring Physician:  ashkan nguyen                                  Procedure:  upper gastrointestinal endoscopy/peg placement    Indication for Procedure: dysphagia      PAST MEDICAL & SURGICAL HISTORY:  Sepsis  UTI (urinary tract infection)  GI bleed  PUD (peptic ulcer disease)  CVA, old, 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  Gastric ulcer  Hyperlipemia  Diabetes mellitus  Hypertension  History of eye surgery: endophthalmitis in 2014  H/O aortic root repair  No Past Surgical History      PMH of Gastroparesis [ ]  Gastric Surgery [ ]  Gastric Outlet Obstruction [ ]    Allergies:    No Known Allergies    Intolerances:    Latex allergy: [ ] yes [X] no    Medications:MEDICATIONS  (STANDING):  cholecalciferol 2000 Unit(s) Oral daily  heparin  Injectable 5000 Unit(s) SubCutaneous every 8 hours  insulin lispro (HumaLOG) corrective regimen sliding scale   SubCutaneous every 6 hours    MEDICATIONS  (PRN):      Smoking: [ ] yes  [X] no    AICD/PPM: [ ] yes   [X] no    Pertinent lab data:                        13.5   7.9   )-----------( 100      ( 21 Jan 2019 11:18 )             39.3     01-22    140  |  101  |  20  ----------------------------<  235<H>  4.6   |  27  |  0.76    Ca    9.2      22 Jan 2019 06:46      CAPILLARY BLOOD GLUCOSE  POCT Blood Glucose.: 215 mg/dL (22 Jan 2019 13:00)    < from: Transthoracic Echocardiogram (01.16.19 @ 10:05) >  Patient name: KEVIN VILLARREAL    Fractional short: 18 %  EF (Rolle Rule): 30 %Doppler Peak Velocity (m/sec):  AoV=2.4  ------------------------------------------------------------------------  Observations:  Mitral Valve: Normal mitral valve. Mild-moderate mitral  regurgitation.  Aortic Valve/Aorta: Bioprosthetic aortic valve. Peak  transaortic valve gradient equals 23 mm Hg, mean  transaortic valve gradient mugerw41 mm Hg, which is  probably normal in the presence of a bioprosthetic aortic  valve. No aortic valve regurgitation seen. Peak left  ventricular outflow tract gradient equals 1 mm Hg, LVOT  velocity time integral equals 13 cm.  Normal aortic root.  Left Atrium: Normal left atrium.  LA volume index = 27  cc/m2.  Left Ventricle: Severe segmental left ventricular systolic  dysfunction. Normal left ventricular internal dimensions  and wall thicknesses.  Right Heart: Normal right atrium. Decreased right  ventricular systolic function. Normal tricuspid valve.  Normal pulmonic valve.  Pericardium/Pleura: Normal pericardium with no pericardial  effusion.  Hemodynamic: Estimated right atrial pressure is 8 mm Hg.  ------------------------------------------------------------------------  Conclusions:  1. Bioprosthetic aortic valve. Peak transaortic valve  gradient equals 23 mm Hg, mean transaortic valve gradient  equals 12 mm Hg, which is probably normal in the presence  of a bioprosthetic aortic valve. No aortic valve  regurgitation seen.  2. Normal left ventricular internal dimensions and wall  thicknesses.  3. Severe segmental left ventricular systolic dysfunction.  Peak left ventricular outflow tract gradient equals 1 mm  Hg, LVOT velocity time integral equals 13 cm.  4. Decreased right ventricular systolic function.  Unable to rule out endocarditis.  Consider DICKSON if  clinically indicated.  *** No previous Echo exam.  -------------------------------------------------------------------------------      Physical Examination:    Daily   Vital Signs Last 24 Hrs  T(C): 36.4 (22 Jan 2019 12:16), Max: 36.6 (21 Jan 2019 21:10)  T(F): 97.6 (22 Jan 2019 12:16), Max: 97.8 (21 Jan 2019 21:10)  HR: 91 (22 Jan 2019 12:16) (87 - 91)  BP: 112/75 (22 Jan 2019 12:16) (112/75 - 116/84)  BP(mean): --  RR: 18 (22 Jan 2019 12:16) (18 - 18)  SpO2: 93% (22 Jan 2019 12:16) (93% - 94%)    Drug Dosing Weight  Height (cm): 167.64 (13 Jan 2019 19:30)  Weight (kg): 55.4 (13 Jan 2019 19:30)  BMI (kg/m2): 19.7 (13 Jan 2019 19:30)  BSA (m2): 1.62 (13 Jan 2019 19:30)    Constitutional: NAD     Neck:  No JVD    Respiratory: CTAB/L    Cardiovascular: S1 and S2    Gastrointestinal: BS+, soft, NT/ND    Extremities: No peripheral edema    : No House    Skin: No rashes    Comments:    ASA Class: I [ ]  II [ ]  III [ ]  IV [X]    The patient is a suitable candidate for the planned procedure unless box checked [ ]  No, explain:

## 2019-01-22 NOTE — PROGRESS NOTE ADULT - ASSESSMENT
77 yo M w/ hx of CVA w/ left sided weakness, CAD w/ CABG 2013, prior hx of thoracic aortic aneurysm repair, HTN, DM2, systolic CHF (mild LV dysfunction in 11/2018), bioprosthetic aortic valve, BPH, presents with altered mental status.     Pending PEG placement likely 1/22

## 2019-01-22 NOTE — CHART NOTE - NSCHARTNOTEFT_GEN_A_CORE
KEVIN VILLARREAL    Patient vanco was held secondary to elevated vanco trough was elevated. Current trough is 16.1      Interventions taken: Dr. Ash made aware. Recommends Vancomycin 750 q daily.     Vanco trough ordered for repeat prior to 3rd dose.              Perry ANGEL (Medicine PA )

## 2019-01-22 NOTE — PROGRESS NOTE ADULT - SUBJECTIVE AND OBJECTIVE BOX
PRESENTING CC:Weakness    SUBJ: Scheduled for PEG today-is more alert-afebrile-      PMH -reviewed admission note, no change since admission  Heart failure: acute [ ] chronic [x ] acute or chronic [ ] diastolic [ ] systolic [x ] combined systolic and diastolic[ ]  JENNIFER: ATN[ ] renal medullary necrosis [ ] CKD I [ ]CKDII [ ]CKD III [ ]CKD IV [ ]CKD V [ ]Other pathological lesions [ ]    MEDICATIONS  (STANDING):  cholecalciferol 2000 Unit(s) Oral daily  heparin  Injectable 5000 Unit(s) SubCutaneous every 8 hours  insulin lispro (HumaLOG) corrective regimen sliding scale   SubCutaneous every 6 hours    FAMILY HISTORY:  No pertinent family history in first degree relatives    No family history of premature coronary artery disease or sudden cardiac death      REVIEW OF SYSTEMS:  Constitutional: [ ] fever, [ ]weight loss,  [ ]fatigue  Eyes: [ ] visual changes  Respiratory: [ ]shortness of breath;  [ ] cough, [ ]wheezing, [ ]chills, [ ]hemoptysis  Cardiovascular: [ ] chest pain, [ ]palpitations, [ ]dizziness,  [ ]leg swelling[ ]orthopnea[ ]PND  Gastrointestinal: [ ] abdominal pain, [ ]nausea, [ ]vomiting,  [ ]diarrhea   Genitourinary: [ ] dysuria, [ ] hematuria  Neurologic: [ ] headaches [ ] tremors[ ]weakness  Skin: [ ] itching, [ ]burning, [ ] rashes  Endocrine: [ ] heat or cold intolerance  Musculoskeletal: [ ] joint pain or swelling; [ ] muscle, back, or extremity pain  Psychiatric: [ ] depression, [ ]anxiety, [ ]mood swings, or [ ]difficulty sleeping  Hematologic: [ ] easy bruising, [ ] bleeding gums    [ ] All remaining systems negative except as per above.   [x ]Unable to obtain.    Vital Signs Last 24 Hrs  T(C): 36.4 (22 Jan 2019 04:54), Max: 36.6 (21 Jan 2019 21:10)  T(F): 97.6 (22 Jan 2019 04:54), Max: 97.8 (21 Jan 2019 21:10)  HR: 90 (22 Jan 2019 04:54) (87 - 94)  BP: 115/76 (22 Jan 2019 04:54) (111/74 - 116/84)  RR: 18 (22 Jan 2019 04:54) (18 - 18)  SpO2: 94% (22 Jan 2019 04:54) (94% - 94%)  I&O's Summary    21 Jan 2019 07:01  -  22 Jan 2019 07:00  --------------------------------------------------------  IN: 180 mL / OUT: 0 mL / NET: 180 mL        PHYSICAL EXAM:  General: No acute distress BMI-19  HEENT: EOMI, PERRL  Neck: Supple, [ ] JVD  Lungs: Equal air entry bilaterally; [ ] rales [ ] wheezing [ ] rhonchi  Heart: Regular rate and rhythm; [x ] murmur  2 /6 [x ] systolic [ ] diastolic [ ] radiation[ ] rubs [ ]  gallops  Abdomen: Nontender, bowel sounds present  Extremities: No clubbing, cyanosis, [ ] edema  Nervous system:  Alert & Oriented X1, Left paresis  Psychiatric: Normal affect  Skin: No rashes or lesions    LABS:  01-21    136  |  99  |  19  ----------------------------<  280<H>  5.2   |  24  |  0.69    Ca    9.2      21 Jan 2019 11:18      Creatinine Trend: 0.69<--, 0.73<--, 0.61<--, 0.51<--, 0.62<--, 0.61<--                        13.5   7.9   )-----------( 100      ( 21 Jan 2019 11:18 )             39.3       IMPRESSION AND PLAN:      77 yo M w/ hx of CVA w/ left sided weakness,  prior hx of Thoracic aortic aneurysm repair, HTN, T2DM, systolic CHF (mild LV dysfunction in 11/2018),  s/p Bioprosthetic aortic valve, BPH, presents with acute encephalopathy presumably from infectious source versus metabolic derangement.        Problem/Plan - 1:  ·  Problem: Acute encephalopathy.  Plan: -Patient presents with more lethargy and unable to provide any hx.   Cultures-GPC in clusters.Staph Epi  Started on Vancomycin.On hold 2/2 elevated trough  Hx Aortic root repair with Bio AVR-Repeat cultures no growth.      Problem/Plan - 2:  ·  Problem: Hypernatremia.  Plan: -Likely driven by hypovolemia, decreased po intake.   Renal consult noted-IVF with K supplementation  Hypernatremia corrected-  Consideration for PEG -GI Consult noted-for PEG today      Problem/Plan - 3:  ·  Problem: JENNIFER (acute kidney injury).  Plan: -Baseline creatinine is about 1.0; likely this is hypovolemic mediated in setting of using diuretics and not drinking or eating at home.  Normalized.    Problem/Plan - 4:  .  Problem:Chronic Systolic Heart Failure EF-30%  Clinically euvolemic.  Eventual restarting BBlockers and ACEI

## 2019-01-22 NOTE — PROGRESS NOTE ADULT - SUBJECTIVE AND OBJECTIVE BOX
Dr. Oscar  Office [phone]  Cell [phone]  Jennifer SERRA  Cell [phone]      Patient is a 78y old  Male who presents with a chief complaint of altered mental status (22 Jan 2019 15:46)      Patient seen and examined at bedside.     VITALS:  T(F): 98.2 (01-22-19 @ 21:07), Max: 98.2 (01-22-19 @ 21:07)  HR: 94 (01-22-19 @ 21:07)  BP: 116/79 (01-22-19 @ 21:07)  RR: 18 (01-22-19 @ 21:07)  SpO2: 92% (01-22-19 @ 21:07)  Wt(kg): --    01-21 @ 07:01 - 01-22 @ 07:00  --------------------------------------------------------  IN: 180 mL / OUT: 0 mL / NET: 180 mL      Height (cm): 167.64 (01-22 @ 16:47)  Weight (kg): 55.4 (01-22 @ 16:47)  BMI (kg/m2): 19.7 (01-22 @ 16:47)  BSA (m2): 1.62 (01-22 @ 16:47)    PHYSICAL EXAM:  Constitutional: NAD  Neck: No JVD  Respiratory: CTAB, no wheezes, rales or rhonchi  Cardiovascular: S1, S2, RRR  Gastrointestinal: BS+, soft, NT/ND  Extremities: No peripheral edema    Hospital Medications:   MEDICATIONS  (STANDING):  cholecalciferol 2000 Unit(s) Oral daily  heparin  Injectable 5000 Unit(s) SubCutaneous every 8 hours  insulin lispro (HumaLOG) corrective regimen sliding scale   SubCutaneous every 6 hours  vancomycin  IVPB 750 milliGRAM(s) IV Intermittent every 24 hours      LABS:  01-22    140  |  101  |  20  ----------------------------<  235<H>  4.6   |  27  |  0.76    Ca    9.2      22 Jan 2019 06:46      Creatinine Trend: 0.76 <--, 0.69 <--, 0.73 <--, 0.61 <--, 0.51 <--, 0.62 <--, 0.61 <--, 0.59 <--, 0.65 <--, 0.67 <--                                13.5   7.9   )-----------( 100      ( 21 Jan 2019 11:18 )             39.3     Urine Studies:  Urinalysis - [01-12-19 @ 20:19]      Color Yellow / Appearance Clear / SG 1.022 / pH 5.5      Gluc 500 mg/dL / Ketone Negative  / Bili Negative / Urobili Negative       Blood Negative / Protein Trace / Leuk Est Negative / Nitrite Negative      RBC 3 / WBC 1 / Hyaline 4 / Gran  / Sq Epi  / Non Sq Epi 0 / Bacteria Negative      PTH -- (Ca 8.5)      [01-17-19 @ 13:34]   71  HbA1c 7.1      [11-02-18 @ 06:00]  TSH 0.47      [01-13-19 @ 00:25]        RADIOLOGY & ADDITIONAL STUDIES:

## 2019-01-22 NOTE — PROGRESS NOTE ADULT - PROBLEM SELECTOR PLAN 1
Pt with hypernatremia likely sec to decreased PO intake/dehydration  Serum sodium improved  Pt started on tube feeds  planned for PEG  Monitor serum Sodium Q daily   Avoid overcorrection >8mEq in 24 hours.

## 2019-01-23 LAB
ANION GAP SERPL CALC-SCNC: 13 MMOL/L — SIGNIFICANT CHANGE UP (ref 5–17)
BUN SERPL-MCNC: 20 MG/DL — SIGNIFICANT CHANGE UP (ref 7–23)
CALCIUM SERPL-MCNC: 9.3 MG/DL — SIGNIFICANT CHANGE UP (ref 8.4–10.5)
CHLORIDE SERPL-SCNC: 99 MMOL/L — SIGNIFICANT CHANGE UP (ref 96–108)
CO2 SERPL-SCNC: 26 MMOL/L — SIGNIFICANT CHANGE UP (ref 22–31)
CREAT SERPL-MCNC: 0.81 MG/DL — SIGNIFICANT CHANGE UP (ref 0.5–1.3)
GLUCOSE BLDC GLUCOMTR-MCNC: 185 MG/DL — HIGH (ref 70–99)
GLUCOSE BLDC GLUCOMTR-MCNC: 199 MG/DL — HIGH (ref 70–99)
GLUCOSE BLDC GLUCOMTR-MCNC: 226 MG/DL — HIGH (ref 70–99)
GLUCOSE SERPL-MCNC: 217 MG/DL — HIGH (ref 70–99)
HCT VFR BLD CALC: 38.9 % — LOW (ref 39–50)
HGB BLD-MCNC: 13.1 G/DL — SIGNIFICANT CHANGE UP (ref 13–17)
MCHC RBC-ENTMCNC: 32.9 PG — SIGNIFICANT CHANGE UP (ref 27–34)
MCHC RBC-ENTMCNC: 33.5 GM/DL — SIGNIFICANT CHANGE UP (ref 32–36)
MCV RBC AUTO: 98.2 FL — SIGNIFICANT CHANGE UP (ref 80–100)
PLATELET # BLD AUTO: 150 K/UL — SIGNIFICANT CHANGE UP (ref 150–400)
POTASSIUM SERPL-MCNC: 4.4 MMOL/L — SIGNIFICANT CHANGE UP (ref 3.5–5.3)
POTASSIUM SERPL-SCNC: 4.4 MMOL/L — SIGNIFICANT CHANGE UP (ref 3.5–5.3)
RBC # BLD: 3.97 M/UL — LOW (ref 4.2–5.8)
RBC # FLD: 13.5 % — SIGNIFICANT CHANGE UP (ref 10.3–14.5)
SODIUM SERPL-SCNC: 138 MMOL/L — SIGNIFICANT CHANGE UP (ref 135–145)
WBC # BLD: 9.2 K/UL — SIGNIFICANT CHANGE UP (ref 3.8–10.5)
WBC # FLD AUTO: 9.2 K/UL — SIGNIFICANT CHANGE UP (ref 3.8–10.5)

## 2019-01-23 RX ORDER — VANCOMYCIN HCL 1 G
750 VIAL (EA) INTRAVENOUS EVERY 24 HOURS
Qty: 0 | Refills: 0 | Status: DISCONTINUED | OUTPATIENT
Start: 2019-01-23 | End: 2019-01-26

## 2019-01-23 RX ORDER — PANTOPRAZOLE SODIUM 20 MG/1
40 TABLET, DELAYED RELEASE ORAL
Qty: 0 | Refills: 0 | Status: DISCONTINUED | OUTPATIENT
Start: 2019-01-23 | End: 2019-02-02

## 2019-01-23 RX ORDER — PANTOPRAZOLE SODIUM 20 MG/1
40 TABLET, DELAYED RELEASE ORAL
Qty: 0 | Refills: 0 | Status: DISCONTINUED | OUTPATIENT
Start: 2019-01-23 | End: 2019-01-23

## 2019-01-23 RX ADMIN — Medication 2: at 18:26

## 2019-01-23 RX ADMIN — Medication 1: at 06:31

## 2019-01-23 RX ADMIN — HEPARIN SODIUM 5000 UNIT(S): 5000 INJECTION INTRAVENOUS; SUBCUTANEOUS at 15:43

## 2019-01-23 RX ADMIN — HEPARIN SODIUM 5000 UNIT(S): 5000 INJECTION INTRAVENOUS; SUBCUTANEOUS at 21:51

## 2019-01-23 RX ADMIN — Medication 250 MILLIGRAM(S): at 20:14

## 2019-01-23 RX ADMIN — HEPARIN SODIUM 5000 UNIT(S): 5000 INJECTION INTRAVENOUS; SUBCUTANEOUS at 05:38

## 2019-01-23 RX ADMIN — PANTOPRAZOLE SODIUM 40 MILLIGRAM(S): 20 TABLET, DELAYED RELEASE ORAL at 18:26

## 2019-01-23 RX ADMIN — Medication 2000 UNIT(S): at 18:25

## 2019-01-23 RX ADMIN — Medication 1: at 23:42

## 2019-01-23 NOTE — PROGRESS NOTE ADULT - ASSESSMENT
79 yo M w/ hx of CVA w/ left sided weakness, CAD w/ CABG 2013, prior hx of thoracic aortic aneurysm repair, HTN, DM2, systolic CHF (mild LV dysfunction in 11/2018), bioprosthetic aortic valve, BPH, presents with altered mental status.  He was admitted in November 2018 for MRSA bacteremia. DICKSON was not performed according to discharge paperwork bc family declined. However, spouse and son report that this was never the case. Patient was treated with prolonged IV antibiotics including vancomycin/rifampin/gentamicin.   Then again in december 2018 he was hospitalized for hypoxic respiratory failure 2/2 to acute decompensated heart failure requiring NIPPV. His hospital course was complicated by steralcolitis for which he was given cipro/flagyl. Spouse reports he completed this on discharge. Today the patient's outpatient blood work returned with sodium in "160s" and blood glucose in "400s" despite continuing outpatient metformin therapy. Therefore, the patient & family was advised to come to hospital for further management.       # Positive Blood culture- CNS/staph epidermidis.  ? contaminant vs. true infection.  Given presence of aortic valve repair, agree with starting abx with vancomycin.  f/u repeat blood cultures.  TTE no vegetations seen.  Son deferring DICKSON as per discussion with cardiologist, ESR and CRP are relatively low, TTE without vegetations, repeat bcx ngtd.  Very low suspicion for endocarditis.  Will treat 7 to 10 day course for bacteremia, suspect more likely a contaminant than true infection      Recommend:    - Cont vancomycin, dose decreased to 750mg IV q24.  Cont through 1/24, then d/c abx.    Will follow,    Tanisha Ash  [phone]

## 2019-01-23 NOTE — PROGRESS NOTE ADULT - PROBLEM SELECTOR PLAN 1
- s/p  upper gastrointestinal endoscopy with PEG placement 1/22  - feeds to be started today  - daily PEG care   - monitor residuals  - PPI BID started for one non- bleeding gastric ulcer found

## 2019-01-23 NOTE — PROGRESS NOTE ADULT - SUBJECTIVE AND OBJECTIVE BOX
[facility]   MD FREDRICK JUAREZ MD RUORU WONG, PA    TEL:  OFFICE: [phone]  DR BREWER CELL: [phone]  DAVID ESCAMILLA CELL: [phone]  DR. PHILLIP CELL: [phone]    RENAL FOLLOW UP NOTE  --------------------------------------------------------------------------------  HPI:      Pt seen and examined at bedside.       PAST HISTORY  --------------------------------------------------------------------------------  No significant changes to PMH, PSH, FHx, SHx, unless otherwise noted    ALLERGIES & MEDICATIONS  --------------------------------------------------------------------------------  Allergies    No Known Allergies    Intolerances      Standing Inpatient Medications  cholecalciferol 2000 Unit(s) Oral daily  heparin  Injectable 5000 Unit(s) SubCutaneous every 8 hours  insulin lispro (HumaLOG) corrective regimen sliding scale   SubCutaneous every 6 hours  pantoprazole   Suspension 40 milliGRAM(s) Oral two times a day before meals  vancomycin  IVPB 750 milliGRAM(s) IV Intermittent every 24 hours    PRN Inpatient Medications      REVIEW OF SYSTEMS  --------------------------------------------------------------------------------  General: no fever  MSK: no edema     VITALS/PHYSICAL EXAM  --------------------------------------------------------------------------------  T(C): 36.6 (01-23-19 @ 12:16), Max: 37 (01-23-19 @ 04:35)  HR: 85 (01-23-19 @ 12:16) (85 - 94)  BP: 100/62 (01-23-19 @ 12:16) (100/62 - 127/86)  RR: 18 (01-23-19 @ 12:16) (18 - 18)  SpO2: 94% (01-23-19 @ 12:16) (92% - 95%)  Wt(kg): --  Height (cm): 167.64 (01-22-19 @ 16:47)  Weight (kg): 55.4 (01-22-19 @ 16:47)  BMI (kg/m2): 19.7 (01-22-19 @ 16:47)  BSA (m2): 1.62 (01-22-19 @ 16:47)      01-22-19 @ 07:01  -  01-23-19 @ 07:00  --------------------------------------------------------  IN: 50 mL / OUT: 0 mL / NET: 50 mL      Physical Exam:  	Gen: NAD  	HEENT: MMM  	Pulm: CTA B/L  	CV: S1S2  	Abd: Soft, +BS  	Ext: No LE edema B/L                      Neuro: lethargic  	Skin: Warm and Dry   	    LABS/STUDIES  --------------------------------------------------------------------------------              13.1   9.2   >-----------<  150      [01-23-19 @ 06:48]              38.9     138  |  99  |  20  ----------------------------<  217      [01-23-19 @ 06:46]  4.4   |  26  |  0.81        Ca     9.3     [01-23-19 @ 06:46]            Creatinine Trend:  SCr 0.81 [01-23 @ 06:46]  SCr 0.76 [01-22 @ 06:46]  SCr 0.69 [01-21 @ 11:18]  SCr 0.73 [01-20 @ 05:12]  SCr 0.61 [01-19 @ 06:59]    Urinalysis - [01-12-19 @ 20:19]      Color Yellow / Appearance Clear / SG 1.022 / pH 5.5      Gluc 500 mg/dL / Ketone Negative  / Bili Negative / Urobili Negative       Blood Negative / Protein Trace / Leuk Est Negative / Nitrite Negative      RBC 3 / WBC 1 / Hyaline 4 / Gran  / Sq Epi  / Non Sq Epi 0 / Bacteria Negative      PTH -- (Ca 8.5)      [01-17-19 @ 13:34]   71  HbA1c 7.1      [11-02-18 @ 06:00]  TSH 0.47      [01-13-19 @ 00:25]

## 2019-01-23 NOTE — PROGRESS NOTE ADULT - SUBJECTIVE AND OBJECTIVE BOX
Interval Events: s/p  upper gastrointestinal endoscopy with PEG placement  one non- bleeding gastric ulcer found  feeds to start today  no drainage/bleeding from PEG site.     MEDICATIONS  (STANDING):  cholecalciferol 2000 Unit(s) Oral daily  heparin  Injectable 5000 Unit(s) SubCutaneous every 8 hours  insulin lispro (HumaLOG) corrective regimen sliding scale   SubCutaneous every 6 hours  vancomycin  IVPB 750 milliGRAM(s) IV Intermittent every 24 hours    MEDICATIONS  (PRN):      Allergies    No Known Allergies    Intolerances        Review of Systems: unable to obtain.     Vital Signs Last 24 Hrs  T(C): 36.6 (23 Jan 2019 12:16), Max: 37 (23 Jan 2019 04:35)  T(F): 97.9 (23 Jan 2019 12:16), Max: 98.6 (23 Jan 2019 04:35)  HR: 85 (23 Jan 2019 12:16) (85 - 94)  BP: 100/62 (23 Jan 2019 12:16) (100/62 - 127/86)  BP(mean): --  RR: 18 (23 Jan 2019 12:16) (18 - 18)  SpO2: 94% (23 Jan 2019 12:16) (92% - 95%)    PHYSICAL EXAM:      nad  frail  non toxic  soft, nt, + peg with abdominal binder c/d/i  no edema        LABS:                        13.1   9.2   )-----------( 150      ( 23 Jan 2019 06:48 )             38.9     01-23    138  |  99  |  20  ----------------------------<  217<H>  4.4   |  26  |  0.81    Ca    9.3      23 Jan 2019 06:46            RADIOLOGY & ADDITIONAL TESTS:

## 2019-01-23 NOTE — PROGRESS NOTE ADULT - ASSESSMENT
· Assessment	  79 yo M w/ hx of CVA w/ left sided weakness, CAD w/ CABG 2013, prior hx of thoracic aortic aneurysm repair, HTN, DM2, systolic CHF (mild LV dysfunction in 11/2018), bioprosthetic aortic valve, BPH, presents with acute encephalopathy presumably from infectious source versus metabolic derangement versus a central process.      Bacteremia:  IV Vanco  ID f/up noted.    Dysphagia:  S/p PEG  GI f/up noted.

## 2019-01-23 NOTE — PROGRESS NOTE ADULT - ATTENDING COMMENTS
Patient was seen and examined,interim events noted,labs and radiology studies reviewed.  Matt Trevizo MD,FACC.  [address] [place] [address].  [facility].  [phone]
Patient was seen and examined,interim events noted,labs and radiology studies reviewed.  Matt Trevizo MD,FACC.  [address] [place] [address].  [id number].  [phone]
Patient was seen and examined,interim events noted,labs and radiology studies reviewed.  Matt Trevizo MD,FACC.  [address] [place] [address].  [facility]83.  [phone]
Patient was seen and examined,interim events noted,labs and radiology studies reviewed.  Matt Trevizo MD,FACC.  [address] [place] [address].  [facility]136.  [phone]
Patient was seen and examined,interim events noted,labs and radiology studies reviewed.  Matt Trevizo MD,FACC.  [address] [place] [address].  [id number].  [phone]
Patient was seen and examined,interim events noted,labs and radiology studies reviewed.  Matt Trevizo MD,FACC.  [address] [place] [address].  [facility]68104.  [phone]
Patient was seen and examined,interim events noted,labs and radiology studies reviewed.  Matt Trevizo MD,FACC.  [address] [place] [address].  [id number].  [phone]
Patient was seen and examined,interim events noted,labs and radiology studies reviewed.  Matt Trevizo MD,FACC.  [address] [place] [address].  [facility]00787.  [phone]
Patient was seen and examined,interim events noted,labs and radiology studies reviewed.  Matt Trevizo MD,FACC.  [address] [place] [address].  [facility]36827.  [phone]
Patient was seen and examined,interim events noted,labs and radiology studies reviewed.  Matt Trevizo MD,FACC.  [address] [place] [address].  [facility]43622.  [phone]
Patient was seen and examined,interim events noted,labs and radiology studies reviewed.  Matt Trevizo MD,FACC.  [address] [place] [address].  [facility]998.  [phone]

## 2019-01-23 NOTE — PROGRESS NOTE ADULT - SUBJECTIVE AND OBJECTIVE BOX
Patient is a 78y old  Male who presents with a chief complaint of altered mental status (23 Jan 2019 17:15)      SUBJECTIVE / OVERNIGHT EVENTS:    Events noted.  No N/V  No SOB    MEDICATIONS  (STANDING):  cholecalciferol 2000 Unit(s) Oral daily  heparin  Injectable 5000 Unit(s) SubCutaneous every 8 hours  insulin lispro (HumaLOG) corrective regimen sliding scale   SubCutaneous every 6 hours  pantoprazole   Suspension 40 milliGRAM(s) Oral two times a day before meals  vancomycin  IVPB 750 milliGRAM(s) IV Intermittent every 24 hours    MEDICATIONS  (PRN):        CAPILLARY BLOOD GLUCOSE      POCT Blood Glucose.: 185 mg/dL (23 Jan 2019 23:39)  POCT Blood Glucose.: 226 mg/dL (23 Jan 2019 18:09)  POCT Blood Glucose.: 199 mg/dL (23 Jan 2019 06:00)  POCT Blood Glucose.: 212 mg/dL (22 Jan 2019 23:48)    I&O's Summary    22 Jan 2019 07:01  -  23 Jan 2019 07:00  --------------------------------------------------------  IN: 50 mL / OUT: 0 mL / NET: 50 mL    23 Jan 2019 07:01  -  23 Jan 2019 23:46  --------------------------------------------------------  IN: 220 mL / OUT: 0 mL / NET: 220 mL        PHYSICAL EXAM:  GENERAL: NAD  NECK: Supple, No JVD  CHEST/LUNG: Clear to auscultation bilaterally; No wheezing.  HEART: Regular rate and rhythm; No murmurs, rubs, or gallops  ABDOMEN: Soft, Nontender, Nondistended; Bowel sounds present  EXTREMITIES:   No clubbing, cyanosis, or edema  NEUROLOGY: Awake      LABS:                        13.1   9.2   )-----------( 150      ( 23 Jan 2019 06:48 )             38.9     01-23    138  |  99  |  20  ----------------------------<  217<H>  4.4   |  26  |  0.81    Ca    9.3      23 Jan 2019 06:46              CAPILLARY BLOOD GLUCOSE      POCT Blood Glucose.: 185 mg/dL (23 Jan 2019 23:39)  POCT Blood Glucose.: 226 mg/dL (23 Jan 2019 18:09)  POCT Blood Glucose.: 199 mg/dL (23 Jan 2019 06:00)  POCT Blood Glucose.: 212 mg/dL (22 Jan 2019 23:48)    01-17 @ 14:14  Culture-urine --  Culture results   No growth at 5 days.  method type --  Organism --  Organism Identification --  Specimen source .Blood Blood-Peripheral           01-17 @ 14:14  Culture blood --  Culture results   No growth at 5 days.  Gram stain --  Gram stain blood --  Method type --  Organism --  Organism identification --  Specimen source .Blood Blood-Peripheral      RADIOLOGY & ADDITIONAL TESTS:    Imaging Personally Reviewed:    Consultant(s) Notes Reviewed:      Care Discussed with Consultants/Other Providers:

## 2019-01-23 NOTE — PROGRESS NOTE ADULT - SUBJECTIVE AND OBJECTIVE BOX
Infectious Diseases progress note:    Subjective:  Coverage appreciated.  No new fevers.  Pt is s/p PEG    ROS:  nonverbal    Allergies    No Known Allergies    Intolerances        ANTIBIOTICS/RELEVANT:  antimicrobials  vancomycin  IVPB 750 milliGRAM(s) IV Intermittent every 24 hours    immunologic:    OTHER:  cholecalciferol 2000 Unit(s) Oral daily  heparin  Injectable 5000 Unit(s) SubCutaneous every 8 hours  insulin lispro (HumaLOG) corrective regimen sliding scale   SubCutaneous every 6 hours  pantoprazole   Suspension 40 milliGRAM(s) Oral two times a day before meals      Objective:  Vital Signs Last 24 Hrs  T(C): 36.6 (23 Jan 2019 12:16), Max: 37 (23 Jan 2019 04:35)  T(F): 97.9 (23 Jan 2019 12:16), Max: 98.6 (23 Jan 2019 04:35)  HR: 85 (23 Jan 2019 12:16) (85 - 88)  BP: 100/62 (23 Jan 2019 12:16) (100/62 - 127/86)  BP(mean): --  RR: 18 (23 Jan 2019 12:16) (18 - 18)  SpO2: 94% (23 Jan 2019 12:16) (94% - 95%)    PHYSICAL EXAM:  Constitutional: lethargic  Eyes:BENJY, EOMI  Ear/Nose/Throat: no thrush, mucositis.  Moist mucous membranes	  Neck:no JVD, no lymphadenopathy, supple  Respiratory: CTA phi  Cardiovascular: S1S2 RRR, no murmurs  Gastrointestinal:soft, nontender,  nondistended (+) BS, (+) PEG  Extremities:no e/e/c  Skin:  no rashes, open wounds or ulcerations        LABS:                        13.1   9.2   )-----------( 150      ( 23 Jan 2019 06:48 )             38.9     01-23    138  |  99  |  20  ----------------------------<  217<H>  4.4   |  26  |  0.81    Ca    9.3      23 Jan 2019 06:46              Vancomycin Level, Random: 16.1: The "VANCOMYCIN, RANDOM" test should only be ordered for patients with  Culture - Blood (01.12.19 @ 22:17)    -  Trimethoprim/Sulfamethoxazole: R >2/38    -  Vancomycin: S 2    -  Levofloxacin: R >4    -  Linezolid: S 2    -  Meropenem: R 8    -  Moxifloxacin(Aerobic): I 4    -  Oxacillin: R >2    -  Penicillin: R >8    -  RIF- Rifampin: R >2 Should not be used as monotherapy    -  Tetra/Doxy: R >8    -  Gentamicin: S <=1 Should not be used as monotherapy    Gram Stain:   Growth in anaerobic bottle: Gram Positive Cocci in Clusters  Growth in aerobic bottle: Gram Positive Cocci in Clusters    -  Amoxicillin/Clavulanic Acid: R <=4/2    -  Ampicillin: R 8    -  Ampicillin/Sulbactam: R <=8/4    -  Cefazolin: R <=4    -  Ceftriaxone: R 32    -  Ciprofloxacin: R >2    -  Clindamycin: R 2 This isolate is presumed to be clindamycin resistant based on detection of inducible resistance. Clindamycin may still be effective in some patients.    -  Daptomycin: S 0.5    -  Erythromycin: R >4    Specimen Source: .Blood Blood-Venous    Organism: Staphylococcus epidermidis    Culture Results:   Growth in aerobic and anaerobic bottles: Staphylococcus epidermidis    Organism Identification: Staphylococcus epidermidis    Method Type: ERNESTINE    severe renal dysfunction or on dialysis. Therapeutic ranges have not been  established and results must be interpreted in the context of patient's  clinical condition.  NOTE: Therapeutic range for Trough Vancomycin is 10-20 mcg/mL. ug/mL (01.22.19 @ 06:46)          Rapid RVP Result: [facility]          MICROBIOLOGY:    Culture - Blood in AM (01.17.19 @ 14:14)    Specimen Source: .Blood Blood-Peripheral    Culture Results:   No growth at 5 days.    Culture - Blood (01.13.19 @ 08:27)    Specimen Source: .Blood Blood-Venous    Culture Results:   No growth at 5 days.    Culture - Urine (01.12.19 @ 22:19)    Specimen Source: .Urine Catheterized    Culture Results:   No growth      Culture - Blood (01.12.19 @ 22:17)    Gram Stain:   Growth in aerobic bottle: Gram Positive Cocci in Clusters    -  Coagulase negative Staphylococcus: Detec    -  Erythromycin: S 0.5    -  Clindamycin: S 0.5    -  Daptomycin: S 0.5    -  Ceftriaxone: R >32    -  Ciprofloxacin: R >2    -  Cefazolin: R 16    -  Ampicillin/Sulbactam: R <=8/4    -  Ampicillin: R >8    -  Amoxicillin/Clavulanic Acid: R >4/2    -  Vancomycin: S 2    -  Trimethoprim/Sulfamethoxazole: R >2/38    -  Tetra/Doxy: S 2    -  RIF- Rifampin: R >2 Should not be used as monotherapy    -  Penicillin: R >8    -  Moxifloxacin(Aerobic): R 2    -  Oxacillin: R >2    -  Meropenem: R >8    -  Linezolid: S 2    -  Gentamicin: R >8 Should not be used as monotherapy    -  Levofloxacin: R >4    Specimen Source: .Blood Blood-Peripheral    Organism: Blood Culture PCR    Organism: Staphylococcus epidermidis    Culture Results:   Growth in anaerobic bottle: Staphylococcus epidermidis  "Due to technical problems, Proteus sp. will Not be reported as part of  the BCID panel until further notice"  ***Blood Panel PCR results on this specimen are available  approximately 3 hours after the Gram stain result.***  Gram stain, PCR, and/or culture results may not always  correspond due to difference in methodologies.  ************************************************************  This PCR assay was performed using [employer].  The following targets are tested for: Enterococcus,  vancomycin resistant enterococci, Listeria monocytogenes,  coagulase negative staphylococci, S. aureus,  methicillin resistant S. aureus, Streptococcus agalactiae  (Group B), S. pneumoniae, S. pyogenes (Group A),  Acinetobacter baumannii, Enterobacter cloacae, E. coli,  Klebsiella oxytoca, K. pneumoniae, Proteus sp.,  Serratia marcescens, Haemophilus influenzae,  Neisseria meningitidis, Pseudomonas aeruginosa, Candida  albicans, C. glabrata, C krusei, C parapsilosis,  C. tropicalis and the KPC resistance gene.    Organism Identification: Blood Culture PCR  Staphylococcus epidermidis    Method Type: PCR    Method Type: ERNESTINE        RADIOLOGY & ADDITIONAL STUDIES:

## 2019-01-23 NOTE — PROGRESS NOTE ADULT - SUBJECTIVE AND OBJECTIVE BOX
PRESENTING CC:Weakness    SUBJ: 79 yo M w/ hx of CVA w/ left sided weakness,  prior hx of Thoracic aortic aneurysm repair, HTN, T2DM, systolic CHF (mild LV dysfunction in 11/2018),  s/p Bioprosthetic aortic valve, BPH, presents with acute encephalopathy presumably from infectious source versus metabolic derangement.Had PEG placed yesterday-no overnight events noted      PMH -reviewed admission note, no change since admission  Heart failure: acute [ ] chronic [x ] acute or chronic [ ] diastolic [ ] systolic [x ] combined systolic and diastolic[ ]  JENNIFER: ATN[ ] renal medullary necrosis [ ] CKD I [ ]CKDII [ ]CKD III [ ]CKD IV [ ]CKD V [ ]Other pathological lesions [ ]    MEDICATIONS  (STANDING):  cholecalciferol 2000 Unit(s) Oral daily  heparin  Injectable 5000 Unit(s) SubCutaneous every 8 hours  insulin lispro (HumaLOG) corrective regimen sliding scale   SubCutaneous every 6 hours  vancomycin  IVPB 750 milliGRAM(s) IV Intermittent every 24 hours    MEDICATIONS  (PRN):          FAMILY HISTORY:  No pertinent family history in first degree relative  No family history of premature coronary artery disease or sudden cardiac death      REVIEW OF SYSTEMS:  Constitutional: [ ] fever, [ ]weight loss,  [ ]fatigue  Eyes: [ ] visual changes  Respiratory: [ ]shortness of breath;  [ ] cough, [ ]wheezing, [ ]chills, [ ]hemoptysis  Cardiovascular: [ ] chest pain, [ ]palpitations, [ ]dizziness,  [ ]leg swelling[ ]orthopnea[ ]PND  Gastrointestinal: [ ] abdominal pain, [ ]nausea, [ ]vomiting,  [ ]diarrhea   Genitourinary: [ ] dysuria, [ ] hematuria  Neurologic: [ ] headaches [ ] tremors[ ]weakness  Skin: [ ] itching, [ ]burning, [ ] rashes  Endocrine: [ ] heat or cold intolerance  Musculoskeletal: [ ] joint pain or swelling; [ ] muscle, back, or extremity pain  Psychiatric: [ ] depression, [ ]anxiety, [ ]mood swings, or [ ]difficulty sleeping  Hematologic: [ ] easy bruising, [ ] bleeding gums    [ ] All remaining systems negative except as per above.   [x ]Unable to obtain.    Vital Signs Last 24 Hrs  T(C): 37 (23 Jan 2019 04:35), Max: 37 (23 Jan 2019 04:35)  T(F): 98.6 (23 Jan 2019 04:35), Max: 98.6 (23 Jan 2019 04:35)  HR: 88 (23 Jan 2019 04:35) (88 - 94)  BP: 127/86 (23 Jan 2019 04:35) (112/75 - 127/86)  RR: 18 (23 Jan 2019 04:35) (18 - 18)  SpO2: 95% (23 Jan 2019 04:35) (92% - 95%)  I&O's Summary    22 Jan 2019 07:01  -  23 Jan 2019 07:00  --------------------------------------------------------  IN: 50 mL / OUT: 0 mL / NET: 50 mL        PHYSICAL EXAM:  General: No acute distress BMI-19.7  HEENT: EOMI, PERRL  Neck: Supple, [ ] JVD  Lungs: Equal air entry bilaterally; [ ] rales [ ] wheezing [ ] rhonchi  Heart: Regular rate and rhythm; [x ] murmur  2 /6 [x] systolic [ ] diastolic [ ] radiation[ ] rubs [ ]  gallops  Abdomen: Nontender, bowel sounds present  Extremities: No clubbing, cyanosis, [ ] edema  Nervous system:  Alert & Oriented X1, Left paresis  Psychiatric: Normal affect  Skin: No rashes or lesions    LABS:  01-23    138  |  99  |  20  ----------------------------<  217<H>  4.4   |  26  |  0.81    Ca    9.3      23 Jan 2019 06:46      Creatinine Trend: 0.81<--, 0.76<--, 0.69<--, 0.73<--, 0.61<--, 0.51<--                        13.1   9.2   )-----------( 150      ( 23 Jan 2019 06:48 )             38.9     IMPRESSION AND PLAN:    Problem/Plan - 1:  ·  Problem: Acute encephalopathy.  Plan: -Patient presents with more lethargy and unable to provide any hx.   Cultures-GPC in clusters.Staph Epi  Started on Vancomycin.On hold 2/2 elevated trough  Hx Aortic root repair with Bio AVR-Repeat cultures no growth.      Problem/Plan - 2:  ·  Problem: Hypernatremia.  Plan: -Likely driven by hypovolemia, decreased po intake.   Renal consult noted-IVF with K supplementation  Hypernatremia corrected-  S/P PEG start feeding as per GI      Problem/Plan - 3:  ·  Problem: JENNIFER (acute kidney injury).  Plan: -Baseline creatinine is about 1.0; likely this is hypovolemic mediated in setting of using diuretics and not drinking or eating at home.  Normalized.    Problem/Plan - 4:  .  Problem:Chronic Systolic Heart Failure EF-30%  Clinically euvolemic.  Eventual restarting BBlockers and ACEI

## 2019-01-23 NOTE — PROGRESS NOTE ADULT - PROBLEM SELECTOR PLAN 1
Pt with hypernatremia likely sec to decreased PO intake/dehydration  Serum sodium improved  s/p  PEG  Monitor serum Sodium Q daily   Avoid overcorrection >8mEq in 24 hours.

## 2019-01-24 LAB
ANION GAP SERPL CALC-SCNC: 12 MMOL/L — SIGNIFICANT CHANGE UP (ref 5–17)
BUN SERPL-MCNC: 24 MG/DL — HIGH (ref 7–23)
CALCIUM SERPL-MCNC: 9.7 MG/DL — SIGNIFICANT CHANGE UP (ref 8.4–10.5)
CHLORIDE SERPL-SCNC: 101 MMOL/L — SIGNIFICANT CHANGE UP (ref 96–108)
CO2 SERPL-SCNC: 26 MMOL/L — SIGNIFICANT CHANGE UP (ref 22–31)
CREAT SERPL-MCNC: 0.86 MG/DL — SIGNIFICANT CHANGE UP (ref 0.5–1.3)
GLUCOSE BLDC GLUCOMTR-MCNC: 241 MG/DL — HIGH (ref 70–99)
GLUCOSE BLDC GLUCOMTR-MCNC: 262 MG/DL — HIGH (ref 70–99)
GLUCOSE BLDC GLUCOMTR-MCNC: 262 MG/DL — HIGH (ref 70–99)
GLUCOSE BLDC GLUCOMTR-MCNC: 301 MG/DL — HIGH (ref 70–99)
GLUCOSE SERPL-MCNC: 293 MG/DL — HIGH (ref 70–99)
HCT VFR BLD CALC: 38.8 % — LOW (ref 39–50)
HGB BLD-MCNC: 13.2 G/DL — SIGNIFICANT CHANGE UP (ref 13–17)
MCHC RBC-ENTMCNC: 33.4 PG — SIGNIFICANT CHANGE UP (ref 27–34)
MCHC RBC-ENTMCNC: 34.1 GM/DL — SIGNIFICANT CHANGE UP (ref 32–36)
MCV RBC AUTO: 98 FL — SIGNIFICANT CHANGE UP (ref 80–100)
PLATELET # BLD AUTO: 161 K/UL — SIGNIFICANT CHANGE UP (ref 150–400)
POTASSIUM SERPL-MCNC: 4.5 MMOL/L — SIGNIFICANT CHANGE UP (ref 3.5–5.3)
POTASSIUM SERPL-SCNC: 4.5 MMOL/L — SIGNIFICANT CHANGE UP (ref 3.5–5.3)
RBC # BLD: 3.96 M/UL — LOW (ref 4.2–5.8)
RBC # FLD: 13.3 % — SIGNIFICANT CHANGE UP (ref 10.3–14.5)
SODIUM SERPL-SCNC: 139 MMOL/L — SIGNIFICANT CHANGE UP (ref 135–145)
WBC # BLD: 9.4 K/UL — SIGNIFICANT CHANGE UP (ref 3.8–10.5)
WBC # FLD AUTO: 9.4 K/UL — SIGNIFICANT CHANGE UP (ref 3.8–10.5)

## 2019-01-24 RX ADMIN — Medication 4: at 12:55

## 2019-01-24 RX ADMIN — Medication 2000 UNIT(S): at 12:55

## 2019-01-24 RX ADMIN — Medication 250 MILLIGRAM(S): at 21:06

## 2019-01-24 RX ADMIN — Medication 3: at 23:52

## 2019-01-24 RX ADMIN — Medication 2: at 18:17

## 2019-01-24 RX ADMIN — PANTOPRAZOLE SODIUM 40 MILLIGRAM(S): 20 TABLET, DELAYED RELEASE ORAL at 17:25

## 2019-01-24 RX ADMIN — HEPARIN SODIUM 5000 UNIT(S): 5000 INJECTION INTRAVENOUS; SUBCUTANEOUS at 13:03

## 2019-01-24 RX ADMIN — HEPARIN SODIUM 5000 UNIT(S): 5000 INJECTION INTRAVENOUS; SUBCUTANEOUS at 21:06

## 2019-01-24 RX ADMIN — Medication 3: at 05:37

## 2019-01-24 RX ADMIN — PANTOPRAZOLE SODIUM 40 MILLIGRAM(S): 20 TABLET, DELAYED RELEASE ORAL at 05:36

## 2019-01-24 RX ADMIN — HEPARIN SODIUM 5000 UNIT(S): 5000 INJECTION INTRAVENOUS; SUBCUTANEOUS at 05:34

## 2019-01-24 NOTE — PROGRESS NOTE ADULT - SUBJECTIVE AND OBJECTIVE BOX
[facility]   MD FREDRICK JUAREZ MD RUORU WONG, PA    TEL:  OFFICE: [phone]  DR BREWER CELL: [phone]  DAVID ESCAMILLA CELL: [phone]  DR. PHILLIP CELL: [phone]    RENAL FOLLOW UP NOTE  --------------------------------------------------------------------------------  HPI:      Pt seen and examined at bedside.       PAST HISTORY  --------------------------------------------------------------------------------  No significant changes to PMH, PSH, FHx, SHx, unless otherwise noted    ALLERGIES & MEDICATIONS  --------------------------------------------------------------------------------  Allergies    No Known Allergies    Intolerances      Standing Inpatient Medications  cholecalciferol 2000 Unit(s) Oral daily  heparin  Injectable 5000 Unit(s) SubCutaneous every 8 hours  insulin lispro (HumaLOG) corrective regimen sliding scale   SubCutaneous every 6 hours  pantoprazole   Suspension 40 milliGRAM(s) Oral two times a day before meals  vancomycin  IVPB 750 milliGRAM(s) IV Intermittent every 24 hours    PRN Inpatient Medications      REVIEW OF SYSTEMS  --------------------------------------------------------------------------------  General: no fever  MSK: no edema     VITALS/PHYSICAL EXAM  --------------------------------------------------------------------------------  T(C): 37.7 (01-24-19 @ 05:00), Max: 37.7 (01-24-19 @ 05:00)  HR: 124 (01-24-19 @ 05:00) (85 - 124)  BP: 119/79 (01-24-19 @ 05:00) (100/62 - 119/79)  RR: 17 (01-24-19 @ 05:05) (17 - 19)  SpO2: 95% (01-24-19 @ 05:05) (90% - 95%)  Wt(kg): --  Height (cm): 167.64 (01-22-19 @ 16:47)  Weight (kg): 55.4 (01-22-19 @ 16:47)  BMI (kg/m2): 19.7 (01-22-19 @ 16:47)  BSA (m2): 1.62 (01-22-19 @ 16:47)      01-23-19 @ 07:01 - 01-24-19 @ 07:00  --------------------------------------------------------  IN: 220 mL / OUT: 0 mL / NET: 220 mL      Physical Exam:  	Gen: NAD  	HEENT: MMM  	Pulm: CTA B/L  	CV: S1S2  	Abd: Soft, +BS  	Ext: No LE edema B/L                      Neuro: lethargic  	Skin: Warm and Dry   	    LABS/STUDIES  --------------------------------------------------------------------------------              13.2   9.4   >-----------<  161      [01-24-19 @ 06:02]              38.8     139  |  101  |  24  ----------------------------<  293      [01-24-19 @ 06:02]  4.5   |  26  |  0.86        Ca     9.7     [01-24-19 @ 06:02]            Creatinine Trend:  SCr 0.86 [01-24 @ 06:02]  SCr 0.81 [01-23 @ 06:46]  SCr 0.76 [01-22 @ 06:46]  SCr 0.69 [01-21 @ 11:18]  SCr 0.73 [01-20 @ 05:12]    Urinalysis - [01-12-19 @ 20:19]      Color Yellow / Appearance Clear / SG 1.022 / pH 5.5      Gluc 500 mg/dL / Ketone Negative  / Bili Negative / Urobili Negative       Blood Negative / Protein Trace / Leuk Est Negative / Nitrite Negative      RBC 3 / WBC 1 / Hyaline 4 / Gran  / Sq Epi  / Non Sq Epi 0 / Bacteria Negative      PTH -- (Ca 8.5)      [01-17-19 @ 13:34]   71  HbA1c 7.1      [11-02-18 @ 06:00]  TSH 0.47      [01-13-19 @ 00:25]

## 2019-01-24 NOTE — PROGRESS NOTE ADULT - SUBJECTIVE AND OBJECTIVE BOX
Patient is a 78y old  Male who presents with a chief complaint of altered mental status (24 Jan 2019 11:57)      SUBJECTIVE / OVERNIGHT EVENTS:    Events noted.  s/p PEG  No N/V    MEDICATIONS  (STANDING):  cholecalciferol 2000 Unit(s) Oral daily  heparin  Injectable 5000 Unit(s) SubCutaneous every 8 hours  insulin lispro (HumaLOG) corrective regimen sliding scale   SubCutaneous every 6 hours  pantoprazole   Suspension 40 milliGRAM(s) Oral two times a day before meals  vancomycin  IVPB 750 milliGRAM(s) IV Intermittent every 24 hours    MEDICATIONS  (PRN):        CAPILLARY BLOOD GLUCOSE      POCT Blood Glucose.: 301 mg/dL (24 Jan 2019 12:04)  POCT Blood Glucose.: 262 mg/dL (24 Jan 2019 05:33)  POCT Blood Glucose.: 185 mg/dL (23 Jan 2019 23:39)  POCT Blood Glucose.: 226 mg/dL (23 Jan 2019 18:09)    I&O's Summary    23 Jan 2019 07:01  -  24 Jan 2019 07:00  --------------------------------------------------------  IN: 220 mL / OUT: 0 mL / NET: 220 mL        PHYSICAL EXAM:  GENERAL: NAD  NECK: Supple, No JVD  CHEST/LUNG: Clear to auscultation bilaterally; No wheezing.  HEART: Regular rate and rhythm; No murmurs, rubs, or gallops  ABDOMEN: Soft, Nontender, Nondistended; Bowel sounds present  EXTREMITIES:   No clubbing, cyanosis, or edema  NEUROLOGY: Awake      LABS:                        13.2   9.4   )-----------( 161      ( 24 Jan 2019 06:02 )             38.8     01-24    139  |  101  |  24<H>  ----------------------------<  293<H>  4.5   |  26  |  0.86    Ca    9.7      24 Jan 2019 06:02              CAPILLARY BLOOD GLUCOSE      POCT Blood Glucose.: 301 mg/dL (24 Jan 2019 12:04)  POCT Blood Glucose.: 262 mg/dL (24 Jan 2019 05:33)  POCT Blood Glucose.: 185 mg/dL (23 Jan 2019 23:39)  POCT Blood Glucose.: 226 mg/dL (23 Jan 2019 18:09)                RADIOLOGY & ADDITIONAL TESTS:    Imaging Personally Reviewed:    Consultant(s) Notes Reviewed:      Care Discussed with Consultants/Other Providers:

## 2019-01-24 NOTE — CHART NOTE - NSCHARTNOTEFT_GEN_A_CORE
Nutrition Follow Up Note  Patient seen for: consult for tube evaluation for bolus    Source: PA, chart    Diet : ENTERAL,NPO/ENTERAL,SUPPLEMENT    Patient reports: confused, unable to speak, hypophonic       Enteral /Parenteral Nutrition: change formula to Glucerna 1.2  recommend goal rate of 50ml/hr x 24 hr. provides 1140kcal and 72grams protein. provides 26Kcal/Kg and 1.3grams protein and 966ml free water.  pt needs additional 400ml free water daily.  Danactive x 2 daily    Daily Weight in k.1 (), Weight in k.1 (), Weight in k.4 (), Weight in k.3 ()  % Weight Change: 3% gain since     Pertinent Medications: MEDICATIONS  (STANDING):  cholecalciferol 2000 Unit(s) Oral daily  heparin  Injectable 5000 Unit(s) SubCutaneous every 8 hours  insulin lispro (HumaLOG) corrective regimen sliding scale   SubCutaneous every 6 hours  pantoprazole   Suspension 40 milliGRAM(s) Oral two times a day before meals  vancomycin  IVPB 750 milliGRAM(s) IV Intermittent every 24 hours    MEDICATIONS  (PRN):    Pertinent Labs:  @ 06:02: Na 139, BUN 24<H>, Cr 0.86, <H>, K+ 4.5    Finger Sticks:  POCT Blood Glucose.: 262 mg/dL ( @ 05:33)  POCT Blood Glucose.: 185 mg/dL ( @ 23:39)  POCT Blood Glucose.: 226 mg/dL ( @ 18:09)      Skin per nursing documentation: stage 3 sacrum  Edema: +2 left arm, right arm    Estimated Needs:   [ x] no change since previous assessment  [ ] recalculated:     Previous Nutrition Diagnosis: Excessive carbohydrate intake  Nutrition Diagnosis is: addressed with change of formula          Recommend  1)    Monitoring and Evaluation:     Continue to monitor Nutritional intake, Tolerance to diet prescription, weights, labs, skin integrity    RD remains available upon request and will follow up per protocol Nutrition Follow Up Note  Patient seen for: consult for tube evaluation for bolus  · Reason for Admission	altered mental status  77 yo M w/ hx of CVA w/ left sided weakness, CAD w/ CABG , prior hx of thoracic aortic aneurysm repair, HTN, DM2, systolic CHF (mild LV dysfunction in 2018), bioprosthetic aortic valve, BPH, presents with acute encephalopathy presumably from infectious source versus metabolic derangement versus a central process.         Source: PA, chart    Diet : ENTERAL,NPO/ENTERAL,SUPPLEMENT    Patient reports: confused, unable to speak, hypophonic       Enteral /Parenteral Nutrition: change formula to Glucerna 1.2  recommend goal rate of 50ml/hr x 24 hr. provides 1140kcal and 72grams protein. provides 26Kcal/Kg and 1.3grams protein and 966ml free water.  pt needs additional 400ml free water daily.  Danactive x 2 daily    Daily Weight in k.1 (), Weight in k.1 (), Weight in k.4 (), Weight in k.3 ()  % Weight Change: 3% gain since     Pertinent Medications: MEDICATIONS  (STANDING):  cholecalciferol 2000 Unit(s) Oral daily  heparin  Injectable 5000 Unit(s) SubCutaneous every 8 hours  insulin lispro (HumaLOG) corrective regimen sliding scale   SubCutaneous every 6 hours  pantoprazole   Suspension 40 milliGRAM(s) Oral two times a day before meals  vancomycin  IVPB 750 milliGRAM(s) IV Intermittent every 24 hours    MEDICATIONS  (PRN):    Pertinent Labs:  @ 06:02: Na 139, BUN 24<H>, Cr 0.86, <H>, K+ 4.5    Finger Sticks:  POCT Blood Glucose.: 262 mg/dL ( @ 05:33)  POCT Blood Glucose.: 185 mg/dL ( @ 23:39)  POCT Blood Glucose.: 226 mg/dL ( @ 18:09)      Skin per nursing documentation: stage 3 sacrum  Edema: +2 left arm, right arm    Estimated Needs:   [ x] no change since previous assessment  [ ] recalculated:     Previous Nutrition Diagnosis: Excessive carbohydrate intake  Nutrition Diagnosis is: addressed with change of formula          Recommend  1)Glucerna 1.2 (5 cans daily) provides 1425kcal/71grams protein and 960ml H2O. provides 26kcal/kg and 1.3grams protein/Kg. based on dosing weight of 54.4Kg.   2)additional 400ml free water daily  3)continue Danactive 2 x daily      Monitoring and Evaluation:     Continue to monitor Nutritional intake, Tolerance to diet prescription, weights, labs, skin integrity    RD remains available upon request and will follow up per protocol  Elvi Tobar MA, RD, CDN #[id number] Nutrition Follow Up Note  Patient seen for: consult for tube evaluation for bolus  · Reason for Admission	altered mental status  79 yo M w/ hx of CVA w/ left sided weakness, CAD w/ CABG , prior hx of thoracic aortic aneurysm repair, HTN, DM2, systolic CHF (mild LV dysfunction in 2018), bioprosthetic aortic valve, BPH, presents with acute encephalopathy presumably from infectious source versus metabolic derangement versus a central process.     Patient reached goal rate of feeds. once goal is reached and tolerated for 24hrs, plan for patient to be converted to bolus feeds for  home maintenance feeds, if not contraindicated.      Source: PA, chart    Diet : ENTERAL,NPO/ENTERAL,SUPPLEMENT    Patient reports: confused, unable to speak, hypophonic       Enteral /Parenteral Nutrition: change formula to Glucerna 1.2  recommend goal rate of 50ml/hr x 24 hr. provides 1140kcal and 72grams protein. provides 26Kcal/Kg and 1.3grams protein and 966ml free water.  pt needs additional 400ml free water daily.  Danactive x 2 daily    Daily Weight in k.1 (), Weight in k.1 (), Weight in k.4 (), Weight in k.3 ()  % Weight Change: 3% gain since     Pertinent Medications: MEDICATIONS  (STANDING):  cholecalciferol 2000 Unit(s) Oral daily  heparin  Injectable 5000 Unit(s) SubCutaneous every 8 hours  insulin lispro (HumaLOG) corrective regimen sliding scale   SubCutaneous every 6 hours  pantoprazole   Suspension 40 milliGRAM(s) Oral two times a day before meals  vancomycin  IVPB 750 milliGRAM(s) IV Intermittent every 24 hours    MEDICATIONS  (PRN):    Pertinent Labs:  @ 06:02: Na 139, BUN 24<H>, Cr 0.86, <H>, K+ 4.5    Finger Sticks:  POCT Blood Glucose.: 262 mg/dL ( @ 05:33)  POCT Blood Glucose.: 185 mg/dL ( @ 23:39)  POCT Blood Glucose.: 226 mg/dL ( @ 18:09)      Skin per nursing documentation: stage 3 sacrum  Edema: +2 left arm, right arm    Estimated Needs:   [ x] no change since previous assessment  [ ] recalculated:     Previous Nutrition Diagnosis: Excessive carbohydrate intake  Nutrition Diagnosis is: addressed with change of formula          Recommend  1)Glucerna 1.2 (5 cans daily) provides 1425kcal/71grams protein and 960ml H2O. provides 26kcal/kg and 1.3grams protein/Kg. based on dosing weight of 54.4Kg.   2)additional 400ml free water daily  3)continue Danactive 2 x daily      Monitoring and Evaluation:     Continue to monitor Nutritional intake, Tolerance to diet prescription, weights, labs, skin integrity    RD remains available upon request and will follow up per protocol  Elvi Tobar MA, RD, CDN #[phone] Nutrition Follow Up Note  Patient seen for: consult for tube evaluation for bolus  · Reason for Admission	altered mental status  79 yo M w/ hx of CVA w/ left sided weakness, CAD w/ CABG , prior hx of thoracic aortic aneurysm repair, HTN, DM2, systolic CHF (mild LV dysfunction in 2018), bioprosthetic aortic valve, BPH, presents with acute encephalopathy presumably from infectious source versus metabolic derangement versus a central process.     Patient reached goal rate of feeds. once goal is reached and tolerated for 24hrs, plan for patient to be converted to bolus feeds for  home maintenance feeds, if not contraindicated.      Source: PA, chart    Diet : ENTERAL,NPO/ENTERAL,SUPPLEMENT    Patient reports: confused, unable to speak, hypophonic       Enteral /Parenteral Nutrition: Glucerna 1.2  recommend goal rate of 50ml/hr x 24 hr. provides 1140kcal and 72grams protein. provides 26Kcal/Kg and 1.3grams protein and 966ml free water.  pt needs additional 400ml free water daily.  Danactive x 2 daily    Daily Weight in k.1 (), Weight in k.1 (), Weight in k.4 (), Weight in k.3 ()  % Weight Change: 3% gain since     Pertinent Medications: MEDICATIONS  (STANDING):  cholecalciferol 2000 Unit(s) Oral daily  heparin  Injectable 5000 Unit(s) SubCutaneous every 8 hours  insulin lispro (HumaLOG) corrective regimen sliding scale   SubCutaneous every 6 hours  pantoprazole   Suspension 40 milliGRAM(s) Oral two times a day before meals  vancomycin  IVPB 750 milliGRAM(s) IV Intermittent every 24 hours    MEDICATIONS  (PRN):    Pertinent Labs:  @ 06:02: Na 139, BUN 24<H>, Cr 0.86, <H>, K+ 4.5    Finger Sticks:  POCT Blood Glucose.: 262 mg/dL ( @ 05:33)  POCT Blood Glucose.: 185 mg/dL ( @ 23:39)  POCT Blood Glucose.: 226 mg/dL ( @ 18:09)      Skin per nursing documentation: stage 3 sacrum  Edema: +2 left arm, right arm    Estimated Needs:   [ x] no change since previous assessment  [ ] recalculated:     Previous Nutrition Diagnosis: Excessive carbohydrate intake  Nutrition Diagnosis is: addressed with change of formula          Recommend  1)Glucerna 1.2 (5 cans daily) provides 1425kcal/71grams protein and 960ml H2O. provides 26kcal/kg and 1.3grams protein/Kg. based on dosing weight of 54.4Kg.   2)additional 400ml free water daily  3)continue Danactive 2 x daily      Monitoring and Evaluation:     Continue to monitor Nutritional intake, Tolerance to diet prescription, weights, labs, skin integrity    RD remains available upon request and will follow up per protocol  Elvi Tobar MA, RD, CDN #[phone]

## 2019-01-24 NOTE — PROGRESS NOTE ADULT - SUBJECTIVE AND OBJECTIVE BOX
Interval Events: pt seen and examined  tolerating PEG feeds well without residuals as per RN    MEDICATIONS  (STANDING):  cholecalciferol 2000 Unit(s) Oral daily  heparin  Injectable 5000 Unit(s) SubCutaneous every 8 hours  insulin lispro (HumaLOG) corrective regimen sliding scale   SubCutaneous every 6 hours  pantoprazole   Suspension 40 milliGRAM(s) Oral two times a day before meals  vancomycin  IVPB 750 milliGRAM(s) IV Intermittent every 24 hours    MEDICATIONS  (PRN):      Allergies    No Known Allergies    Intolerances        Review of Systems: unable to obtain       Vital Signs Last 24 Hrs  T(C): 37.7 (24 Jan 2019 05:00), Max: 37.7 (24 Jan 2019 05:00)  T(F): 99.9 (24 Jan 2019 05:00), Max: 99.9 (24 Jan 2019 05:00)  HR: 124 (24 Jan 2019 05:00) (85 - 124)  BP: 119/79 (24 Jan 2019 05:00) (100/62 - 119/79)  BP(mean): --  RR: 17 (24 Jan 2019 05:05) (17 - 19)  SpO2: 95% (24 Jan 2019 05:05) (90% - 95%)    PHYSICAL EXAM:    nad  frail  non toxic  soft, nt, + peg with abdominal binder c/d/i  no edema      LABS:                        13.2   9.4   )-----------( 161      ( 24 Jan 2019 06:02 )             38.8     01-24    139  |  101  |  24<H>  ----------------------------<  293<H>  4.5   |  26  |  0.86    Ca    9.7      24 Jan 2019 06:02            RADIOLOGY & ADDITIONAL TESTS:

## 2019-01-24 NOTE — PROGRESS NOTE ADULT - ASSESSMENT
· Assessment	  77 yo M w/ hx of CVA w/ left sided weakness, CAD w/ CABG 2013, prior hx of thoracic aortic aneurysm repair, HTN, DM2, systolic CHF (mild LV dysfunction in 11/2018), bioprosthetic aortic valve, BPH, presents with acute encephalopathy presumably from infectious source versus metabolic derangement versus a central process.      Bacteremia:  IV Vanco  ID f/up noted.    Dysphagia:  S/p PEG  GI f/up noted.

## 2019-01-24 NOTE — PROGRESS NOTE ADULT - ASSESSMENT
77 yo M w/ hx of CVA w/ left sided weakness, CAD w/ CABG 2013, prior hx of thoracic aortic aneurysm repair, HTN, DM2, systolic CHF (mild LV dysfunction in 11/2018), bioprosthetic aortic valve, BPH, presents with altered mental status.  He was admitted in November 2018 for MRSA bacteremia. DICKSON was not performed according to discharge paperwork bc family declined. However, spouse and son report that this was never the case. Patient was treated with prolonged IV antibiotics including vancomycin/rifampin/gentamicin.   Then again in december 2018 he was hospitalized for hypoxic respiratory failure 2/2 to acute decompensated heart failure requiring NIPPV. His hospital course was complicated by steralcolitis for which he was given cipro/flagyl. Spouse reports he completed this on discharge. Today the patient's outpatient blood work returned with sodium in "160s" and blood glucose in "400s" despite continuing outpatient metformin therapy. Therefore, the patient & family was advised to come to hospital for further management.       # Positive Blood culture- CNS/staph epidermidis.  ? contaminant vs. true infection.  Given presence of aortic valve repair, agree with starting abx with vancomycin.  f/u repeat blood cultures.  TTE no vegetations seen.  Son deferring DICKSON as per discussion with cardiologist, ESR and CRP are relatively low, TTE without vegetations, repeat bcx ngtd.  Very low suspicion for endocarditis.  Will treat 7 to 10 day course for bacteremia, suspect more likely a contaminant than true infection      Recommend:    - Cont vancomycin, dose decreased to 750mg IV q24.  Cont through 1/24, then d/c abx.    - Monitor temp curve, WBC.  If pt spikes fever, repeat bcx x 2, chest xray, ua, ucx    Will follow,    Tanisha Ash  [phone]

## 2019-01-24 NOTE — PROGRESS NOTE ADULT - SUBJECTIVE AND OBJECTIVE BOX
Infectious Diseases progress note:    Subjective:  No acute o/n events.  Pt with low grade temps 99 and 100.  pt tolerating PEG feeds with normal residuals.  No diarrhea - d/w RN.      ROS:  Nonverbal, unable to assess    Allergies    No Known Allergies    Intolerances        ANTIBIOTICS/RELEVANT:  antimicrobials  vancomycin  IVPB 750 milliGRAM(s) IV Intermittent every 24 hours    immunologic:    OTHER:  cholecalciferol 2000 Unit(s) Oral daily  heparin  Injectable 5000 Unit(s) SubCutaneous every 8 hours  insulin lispro (HumaLOG) corrective regimen sliding scale   SubCutaneous every 6 hours  pantoprazole   Suspension 40 milliGRAM(s) Oral two times a day before meals      Objective:  Vital Signs Last 24 Hrs  T(C): 37.3 (24 Jan 2019 21:17), Max: 37.8 (24 Jan 2019 12:21)  T(F): 99.2 (24 Jan 2019 21:17), Max: 100 (24 Jan 2019 12:21)  HR: 107 (24 Jan 2019 21:17) (100 - 124)  BP: 102/71 (24 Jan 2019 21:17) (102/71 - 119/79)  BP(mean): --  RR: 18 (24 Jan 2019 21:17) (17 - 19)  SpO2: 95% (24 Jan 2019 21:17) (90% - 100%)    PHYSICAL EXAM:  Constitutional:NAD  Eyes:BENJY, EOMI  Ear/Nose/Throat: no thrush, mucositis.  Moist mucous membranes	  Neck:no JVD, no lymphadenopathy, supple  Respiratory: CTA phi  Cardiovascular: S1S2 RRR, no murmurs  Gastrointestinal:soft, nontender,  nondistended (+) BS, peg in place  Extremities:no e/e/c  Skin:  no rashes, open wounds or ulcerations        LABS:                        13.2   9.4   )-----------( 161      ( 24 Jan 2019 06:02 )             38.8     01-24    139  |  101  |  24<H>  ----------------------------<  293<H>  4.5   |  26  |  0.86    Ca    9.7      24 Jan 2019 06:02              Vancomycin Level, Random:  ug/mL (01-22 @ 06:46)  Vancomycin Level, Random:  ug/mL (01-21 @ 06:40)      Vancomycin Level, Trough: 30.0 ug/mL (01-20 @ 08:31)      Rapid RVP Result: [facility]          MICROBIOLOGY:    Culture - Blood in AM (01.17.19 @ 14:14)    Specimen Source: .Blood Blood-Peripheral    Culture Results:   No growth at 5 days.    Culture - Blood (01.13.19 @ 08:27)    Specimen Source: .Blood Blood-Venous    Culture Results:   No growth at 5 days.    Culture - Urine (01.12.19 @ 22:19)    Specimen Source: .Urine Catheterized    Culture Results:   No growth          RADIOLOGY & ADDITIONAL STUDIES:    < from: Xray Chest 1 View- PORTABLE-Urgent (01.14.19 @ 14:20) >  FINDINGS:    Status post median sternotomy and valve replacement.  Enteric tube seen coursing below the diaphragm, tip overlies the stomach.    No focal consolidation.  There is no pneumothorax. There are no pleural effusions.   The cardiomediastinal silhouette cannot be adequately assessed on this   projection.    IMPRESSION:   Clear lungs.   Enteric tube with overlying stomach.    < end of copied text >

## 2019-01-25 LAB
ANION GAP SERPL CALC-SCNC: 10 MMOL/L — SIGNIFICANT CHANGE UP (ref 5–17)
BUN SERPL-MCNC: 28 MG/DL — HIGH (ref 7–23)
CALCIUM SERPL-MCNC: 9 MG/DL — SIGNIFICANT CHANGE UP (ref 8.4–10.5)
CHLORIDE SERPL-SCNC: 105 MMOL/L — SIGNIFICANT CHANGE UP (ref 96–108)
CO2 SERPL-SCNC: 25 MMOL/L — SIGNIFICANT CHANGE UP (ref 22–31)
CREAT SERPL-MCNC: 0.88 MG/DL — SIGNIFICANT CHANGE UP (ref 0.5–1.3)
GLUCOSE BLDC GLUCOMTR-MCNC: 241 MG/DL — HIGH (ref 70–99)
GLUCOSE BLDC GLUCOMTR-MCNC: 244 MG/DL — HIGH (ref 70–99)
GLUCOSE BLDC GLUCOMTR-MCNC: 256 MG/DL — HIGH (ref 70–99)
GLUCOSE SERPL-MCNC: 254 MG/DL — HIGH (ref 70–99)
HCT VFR BLD CALC: 33.4 % — LOW (ref 39–50)
HGB BLD-MCNC: 11.7 G/DL — LOW (ref 13–17)
MCHC RBC-ENTMCNC: 34.4 PG — HIGH (ref 27–34)
MCHC RBC-ENTMCNC: 35.1 GM/DL — SIGNIFICANT CHANGE UP (ref 32–36)
MCV RBC AUTO: 98 FL — SIGNIFICANT CHANGE UP (ref 80–100)
PLATELET # BLD AUTO: 131 K/UL — LOW (ref 150–400)
POTASSIUM SERPL-MCNC: 4.6 MMOL/L — SIGNIFICANT CHANGE UP (ref 3.5–5.3)
POTASSIUM SERPL-SCNC: 4.6 MMOL/L — SIGNIFICANT CHANGE UP (ref 3.5–5.3)
RBC # BLD: 3.4 M/UL — LOW (ref 4.2–5.8)
RBC # FLD: 13.3 % — SIGNIFICANT CHANGE UP (ref 10.3–14.5)
SODIUM SERPL-SCNC: 140 MMOL/L — SIGNIFICANT CHANGE UP (ref 135–145)
VANCOMYCIN TROUGH SERPL-MCNC: 16.3 UG/ML — SIGNIFICANT CHANGE UP (ref 10–20)
WBC # BLD: 7.1 K/UL — SIGNIFICANT CHANGE UP (ref 3.8–10.5)
WBC # FLD AUTO: 7.1 K/UL — SIGNIFICANT CHANGE UP (ref 3.8–10.5)

## 2019-01-25 RX ADMIN — Medication 2000 UNIT(S): at 12:35

## 2019-01-25 RX ADMIN — Medication 3: at 06:38

## 2019-01-25 RX ADMIN — HEPARIN SODIUM 5000 UNIT(S): 5000 INJECTION INTRAVENOUS; SUBCUTANEOUS at 13:04

## 2019-01-25 RX ADMIN — Medication 250 MILLIGRAM(S): at 23:02

## 2019-01-25 RX ADMIN — PANTOPRAZOLE SODIUM 40 MILLIGRAM(S): 20 TABLET, DELAYED RELEASE ORAL at 17:33

## 2019-01-25 RX ADMIN — Medication 2: at 12:35

## 2019-01-25 RX ADMIN — Medication 2: at 18:04

## 2019-01-25 RX ADMIN — HEPARIN SODIUM 5000 UNIT(S): 5000 INJECTION INTRAVENOUS; SUBCUTANEOUS at 05:22

## 2019-01-25 RX ADMIN — HEPARIN SODIUM 5000 UNIT(S): 5000 INJECTION INTRAVENOUS; SUBCUTANEOUS at 21:10

## 2019-01-25 RX ADMIN — PANTOPRAZOLE SODIUM 40 MILLIGRAM(S): 20 TABLET, DELAYED RELEASE ORAL at 06:11

## 2019-01-25 NOTE — PROGRESS NOTE ADULT - SUBJECTIVE AND OBJECTIVE BOX
[facility] [place] [facility]   MD FREDRICK JUAREZ MD RUORU WONG, PA    TEL:  OFFICE: [phone]  DR BREWER CELL: [phone]  DAVID ESCAMILLA CELL: [phone]  DR. PHILLIP CELL: [phone]    RENAL FOLLOW UP NOTE  --------------------------------------------------------------------------------  HPI:      Pt seen and examined at bedside.       PAST HISTORY  --------------------------------------------------------------------------------  No significant changes to PMH, PSH, FHx, SHx, unless otherwise noted    ALLERGIES & MEDICATIONS  --------------------------------------------------------------------------------  Allergies    No Known Allergies    Intolerances      Standing Inpatient Medications  cholecalciferol 2000 Unit(s) Oral daily  heparin  Injectable 5000 Unit(s) SubCutaneous every 8 hours  insulin lispro (HumaLOG) corrective regimen sliding scale   SubCutaneous every 6 hours  pantoprazole   Suspension 40 milliGRAM(s) Oral two times a day before meals  vancomycin  IVPB 750 milliGRAM(s) IV Intermittent every 24 hours    PRN Inpatient Medications      REVIEW OF SYSTEMS  --------------------------------------------------------------------------------  General: no fever  MSK: no edema     VITALS/PHYSICAL EXAM  --------------------------------------------------------------------------------  T(C): 36.5 (01-25-19 @ 12:07), Max: 37.3 (01-24-19 @ 21:17)  HR: 95 (01-25-19 @ 12:07) (95 - 107)  BP: 103/70 (01-25-19 @ 12:07) (102/71 - 111/71)  RR: 18 (01-25-19 @ 12:07) (18 - 18)  SpO2: 96% (01-25-19 @ 12:07) (95% - 96%)  Wt(kg): --        01-24-19 @ 07:01  -  01-25-19 @ 07:00  --------------------------------------------------------  IN: 2010 mL / OUT: 0 mL / NET: 2010 mL    01-25-19 @ 07:01 - 01-25-19 @ 14:01  --------------------------------------------------------  IN: 340 mL / OUT: 0 mL / NET: 340 mL      Physical Exam:  	Gen: NAD  	HEENT: RAGINI  	Pulm: CTA B/L  	CV: S1S2  	Abd: Soft, +BS  	Ext: No LE edema B/L                      Neuro: lethargic  	Skin: Warm and Dry   	Gu no tamiko    LABS/STUDIES  --------------------------------------------------------------------------------              11.7   7.1   >-----------<  131      [01-25-19 @ 05:53]              33.4     140  |  105  |  28  ----------------------------<  254      [01-25-19 @ 05:53]  4.6   |  25  |  0.88        Ca     9.0     [01-25-19 @ 05:53]            Creatinine Trend:  SCr 0.88 [01-25 @ 05:53]  SCr 0.86 [01-24 @ 06:02]  SCr 0.81 [01-23 @ 06:46]  SCr 0.76 [01-22 @ 06:46]  SCr 0.69 [01-21 @ 11:18]    Urinalysis - [01-12-19 @ 20:19]      Color Yellow / Appearance Clear / SG 1.022 / pH 5.5      Gluc 500 mg/dL / Ketone Negative  / Bili Negative / Urobili Negative       Blood Negative / Protein Trace / Leuk Est Negative / Nitrite Negative      RBC 3 / WBC 1 / Hyaline 4 / Gran  / Sq Epi  / Non Sq Epi 0 / Bacteria Negative      PTH -- (Ca 8.5)      [01-17-19 @ 13:34]   71  HbA1c 7.1      [11-02-18 @ 06:00]  TSH 0.47      [01-13-19 @ 00:25]

## 2019-01-25 NOTE — PROGRESS NOTE ADULT - SUBJECTIVE AND OBJECTIVE BOX
Interval events: pt seen and examined.   feeds started, will monitor residuals     MEDICATIONS  (STANDING):  cholecalciferol 2000 Unit(s) Oral daily  heparin  Injectable 5000 Unit(s) SubCutaneous every 8 hours  insulin lispro (HumaLOG) corrective regimen sliding scale   SubCutaneous every 6 hours  pantoprazole   Suspension 40 milliGRAM(s) Oral two times a day before meals  vancomycin  IVPB 750 milliGRAM(s) IV Intermittent every 24 hours    MEDICATIONS  (PRN):      Allergies    No Known Allergies    Intolerances        Review of Systems: unable to obtain.     Vital Signs Last 24 Hrs  T(C): 36.5 (25 Jan 2019 12:07), Max: 37.3 (24 Jan 2019 21:17)  T(F): 97.7 (25 Jan 2019 12:07), Max: 99.2 (24 Jan 2019 21:17)  HR: 95 (25 Jan 2019 12:07) (95 - 107)  BP: 103/70 (25 Jan 2019 12:07) (102/71 - 111/71)  BP(mean): --  RR: 18 (25 Jan 2019 12:07) (18 - 18)  SpO2: 96% (25 Jan 2019 12:07) (95% - 96%)    PHYSICAL EXAM:    nad  frail  non toxic  soft, nt, + peg with abdominal binder c/d/i  no edema    LABS:                        11.7   7.1   )-----------( 131      ( 25 Jan 2019 05:53 )             33.4     01-25    140  |  105  |  28<H>  ----------------------------<  254<H>  4.6   |  25  |  0.88    Ca    9.0      25 Jan 2019 05:53            RADIOLOGY & ADDITIONAL TESTS:

## 2019-01-25 NOTE — PROGRESS NOTE ADULT - ASSESSMENT
· Assessment	  77 yo M w/ hx of CVA w/ left sided weakness, CAD w/ CABG 2013, prior hx of thoracic aortic aneurysm repair, HTN, DM2, systolic CHF (mild LV dysfunction in 11/2018), bioprosthetic aortic valve, BPH, presents with acute encephalopathy presumably from infectious source versus metabolic derangement versus a central process.      Bacteremia:  IV Vanco  ID f/up noted.    Dysphagia:  S/p PEG  GI f/up noted.    DM II:  FSSS

## 2019-01-25 NOTE — PROGRESS NOTE ADULT - SUBJECTIVE AND OBJECTIVE BOX
Patient is a 78y old  Male who presents with a chief complaint of altered mental status (25 Jan 2019 14:29)      SUBJECTIVE / OVERNIGHT EVENTS:    Events noted.  Nonverbal  No N/V    MEDICATIONS  (STANDING):  cholecalciferol 2000 Unit(s) Oral daily  heparin  Injectable 5000 Unit(s) SubCutaneous every 8 hours  insulin lispro (HumaLOG) corrective regimen sliding scale   SubCutaneous every 6 hours  pantoprazole   Suspension 40 milliGRAM(s) Oral two times a day before meals  vancomycin  IVPB 750 milliGRAM(s) IV Intermittent every 24 hours    MEDICATIONS  (PRN):        CAPILLARY BLOOD GLUCOSE      POCT Blood Glucose.: 244 mg/dL (25 Jan 2019 18:00)  POCT Blood Glucose.: 241 mg/dL (25 Jan 2019 12:10)  POCT Blood Glucose.: 256 mg/dL (25 Jan 2019 06:34)  POCT Blood Glucose.: 262 mg/dL (24 Jan 2019 23:22)    I&O's Summary    24 Jan 2019 07:01  -  25 Jan 2019 07:00  --------------------------------------------------------  IN: 2010 mL / OUT: 0 mL / NET: 2010 mL    25 Jan 2019 07:01  -  25 Jan 2019 19:27  --------------------------------------------------------  IN: 1020 mL / OUT: 100 mL / NET: 920 mL        PHYSICAL EXAM:  GENERAL: NAD  NECK: Supple, No JVD  CHEST/LUNG: Clear to auscultation bilaterally; No wheezing.  HEART: Regular rate and rhythm; No murmurs, rubs, or gallops  ABDOMEN: Soft, Nontender, Nondistended; Bowel sounds present  EXTREMITIES:   No clubbing, cyanosis, or edema  NEUROLOGY: Nonverbal      LABS:                        11.7   7.1   )-----------( 131      ( 25 Jan 2019 05:53 )             33.4     01-25    140  |  105  |  28<H>  ----------------------------<  254<H>  4.6   |  25  |  0.88    Ca    9.0      25 Jan 2019 05:53              CAPILLARY BLOOD GLUCOSE      POCT Blood Glucose.: 244 mg/dL (25 Jan 2019 18:00)  POCT Blood Glucose.: 241 mg/dL (25 Jan 2019 12:10)  POCT Blood Glucose.: 256 mg/dL (25 Jan 2019 06:34)  POCT Blood Glucose.: 262 mg/dL (24 Jan 2019 23:22)                RADIOLOGY & ADDITIONAL TESTS:    Imaging Personally Reviewed:    Consultant(s) Notes Reviewed:      Care Discussed with Consultants/Other Providers:

## 2019-01-25 NOTE — PROGRESS NOTE ADULT - PROBLEM SELECTOR PLAN 1
- s/p  upper gastrointestinal endoscopy with PEG placement 1/22  - feeds started   - daily PEG care   - monitor residuals  - PPI BID started for one non- bleeding gastric ulcer found

## 2019-01-26 LAB
GLUCOSE BLDC GLUCOMTR-MCNC: 198 MG/DL — HIGH (ref 70–99)
GLUCOSE BLDC GLUCOMTR-MCNC: 226 MG/DL — HIGH (ref 70–99)
GLUCOSE BLDC GLUCOMTR-MCNC: 238 MG/DL — HIGH (ref 70–99)
GLUCOSE BLDC GLUCOMTR-MCNC: 250 MG/DL — HIGH (ref 70–99)
GLUCOSE BLDC GLUCOMTR-MCNC: 264 MG/DL — HIGH (ref 70–99)

## 2019-01-26 RX ADMIN — PANTOPRAZOLE SODIUM 40 MILLIGRAM(S): 20 TABLET, DELAYED RELEASE ORAL at 06:13

## 2019-01-26 RX ADMIN — PANTOPRAZOLE SODIUM 40 MILLIGRAM(S): 20 TABLET, DELAYED RELEASE ORAL at 18:10

## 2019-01-26 RX ADMIN — HEPARIN SODIUM 5000 UNIT(S): 5000 INJECTION INTRAVENOUS; SUBCUTANEOUS at 23:41

## 2019-01-26 RX ADMIN — Medication 2000 UNIT(S): at 18:10

## 2019-01-26 RX ADMIN — HEPARIN SODIUM 5000 UNIT(S): 5000 INJECTION INTRAVENOUS; SUBCUTANEOUS at 14:34

## 2019-01-26 RX ADMIN — Medication 2: at 23:41

## 2019-01-26 RX ADMIN — Medication 3: at 00:48

## 2019-01-26 RX ADMIN — HEPARIN SODIUM 5000 UNIT(S): 5000 INJECTION INTRAVENOUS; SUBCUTANEOUS at 05:37

## 2019-01-26 RX ADMIN — Medication 2: at 07:24

## 2019-01-26 RX ADMIN — Medication 2: at 13:11

## 2019-01-26 RX ADMIN — Medication 1: at 18:10

## 2019-01-26 NOTE — PROGRESS NOTE ADULT - SUBJECTIVE AND OBJECTIVE BOX
[facility] [place] [facility]   MD FREDRICK JUAREZ MD ANGELA WONG, PA    TEL:  OFFICE: [phone]  DR BREWER CELL: [phone]  DR. PHILLIP CELL: [phone]  ALYSSA ESCAMILLA CELL: [phone]        Patient is a 78y old  Male who presents with a chief complaint of altered mental status (26 Jan 2019 11:43)      Patient seen and examined at bedside.     VITALS:  T(F): 98.6 (01-26-19 @ 12:09), Max: 98.8 (01-25-19 @ 21:39)  HR: 91 (01-26-19 @ 12:09)  BP: 102/67 (01-26-19 @ 12:09)  RR: 18 (01-26-19 @ 12:09)  SpO2: 95% (01-26-19 @ 12:09)  Wt(kg): --    01-25 @ 07:01  -  01-26 @ 07:00  --------------------------------------------------------  IN: 2000 mL / OUT: 200 mL / NET: 1800 mL          PHYSICAL EXAM:  Constitutional: NAD  Neck: No JVD  Respiratory: CTAB, no wheezes, rales or rhonchi  Cardiovascular: S1, S2, RRR  Gastrointestinal: BS+, soft, NT/ND, +PEG  Extremities: No peripheral edema    Hospital Medications:   MEDICATIONS  (STANDING):  cholecalciferol 2000 Unit(s) Oral daily  heparin  Injectable 5000 Unit(s) SubCutaneous every 8 hours  insulin lispro (HumaLOG) corrective regimen sliding scale   SubCutaneous every 6 hours  pantoprazole   Suspension 40 milliGRAM(s) Oral two times a day before meals      LABS:  01-25    140  |  105  |  28<H>  ----------------------------<  254<H>  4.6   |  25  |  0.88    Ca    9.0      25 Jan 2019 05:53      Creatinine Trend: 0.88 <--, 0.86 <--, 0.81 <--, 0.76 <--, 0.69 <--, 0.73 <--                                11.7   7.1   )-----------( 131      ( 25 Jan 2019 05:53 )             33.4     Urine Studies:  Urinalysis - [01-12-19 @ 20:19]      Color Yellow / Appearance Clear / SG 1.022 / pH 5.5      Gluc 500 mg/dL / Ketone Negative  / Bili Negative / Urobili Negative       Blood Negative / Protein Trace / Leuk Est Negative / Nitrite Negative      RBC 3 / WBC 1 / Hyaline 4 / Gran  / Sq Epi  / Non Sq Epi 0 / Bacteria Negative      PTH -- (Ca 8.5)      [01-17-19 @ 13:34]   71  HbA1c 7.1      [11-02-18 @ 06:00]  TSH 0.47      [01-13-19 @ 00:25]        RADIOLOGY & ADDITIONAL STUDIES:

## 2019-01-26 NOTE — PROGRESS NOTE ADULT - ASSESSMENT
77 yo M w/ hx of CVA w/ left sided weakness, CAD w/ CABG 2013, prior hx of thoracic aortic aneurysm repair, HTN, DM2, systolic CHF (mild LV dysfunction in 11/2018), bioprosthetic aortic valve, BPH, presents with altered mental status.  He was admitted in November 2018 for MRSA bacteremia. DICKSON was not performed according to discharge paperwork bc family declined. However, spouse and son report that this was never the case. Patient was treated with prolonged IV antibiotics including vancomycin/rifampin/gentamicin.   Then again in december 2018 he was hospitalized for hypoxic respiratory failure 2/2 to acute decompensated heart failure requiring NIPPV. His hospital course was complicated by steralcolitis for which he was given cipro/flagyl. Spouse reports he completed this on discharge. Today the patient's outpatient blood work returned with sodium in "160s" and blood glucose in "400s" despite continuing outpatient metformin therapy. Therefore, the patient & family was advised to come to hospital for further management.       # Positive Blood culture- CNS/staph epidermidis.  ? contaminant vs. true infection.  Given presence of aortic valve repair, agree with starting abx with vancomycin.  f/u repeat blood cultures.  TTE no vegetations seen.  Son deferring DICKSON as per discussion with cardiologist, ESR and CRP are relatively low, TTE without vegetations, repeat bcx ngtd.  Very low suspicion for endocarditis.  Will treat 7 to 10 day course for bacteremia, suspect more likely a contaminant than true infection      Recommend:    - No new fevers, will d/c vancomycin today (had low grade temps a couple of days ago).  Pt has completed abx course (from 1/14 through 1/26).      - Monitor temp curve, WBC.  If pt spikes fever, repeat bcx x 2, chest xray, ua, ucx    Will follow,    Tanisha Ash  [phone]

## 2019-01-26 NOTE — PROGRESS NOTE ADULT - SUBJECTIVE AND OBJECTIVE BOX
Infectious Diseases progress note:    Subjective: No acute o/n events.  Appears comfortable.  Lethargic.  Afebrile.  No leukocytosis.     ROS:  nonvebal, unable to assess    Allergies    No Known Allergies    Intolerances        ANTIBIOTICS/RELEVANT:  antimicrobials    immunologic:    OTHER:  cholecalciferol 2000 Unit(s) Oral daily  heparin  Injectable 5000 Unit(s) SubCutaneous every 8 hours  insulin lispro (HumaLOG) corrective regimen sliding scale   SubCutaneous every 6 hours  pantoprazole   Suspension 40 milliGRAM(s) Oral two times a day before meals      Objective:  Vital Signs Last 24 Hrs  T(C): 36.8 (26 Jan 2019 05:10), Max: 37.1 (25 Jan 2019 21:39)  T(F): 98.2 (26 Jan 2019 05:10), Max: 98.8 (25 Jan 2019 21:39)  HR: 104 (26 Jan 2019 05:10) (93 - 104)  BP: 114/77 (26 Jan 2019 05:10) (103/70 - 117/77)  BP(mean): --  RR: 18 (26 Jan 2019 05:10) (18 - 18)  SpO2: 98% (26 Jan 2019 05:10) (96% - 98%)    PHYSICAL EXAM:  Constitutional:NAD  Eyes:BENJY, EOMI  Ear/Nose/Throat: no thrush, mucositis.  Moist mucous membranes	  Neck:no JVD, no lymphadenopathy, supple  Respiratory: CTA phi  Cardiovascular: S1S2 RRR, no murmurs  Gastrointestinal:soft, nontender,  nondistended (+) BS, peg in place  Extremities:no e/e/c  Skin:  no rashes, open wounds or ulcerations        LABS:                        11.7   7.1   )-----------( 131      ( 25 Jan 2019 05:53 )             33.4     01-25    140  |  105  |  28<H>  ----------------------------<  254<H>  4.6   |  25  |  0.88    Ca    9.0      25 Jan 2019 05:53              Vancomycin Level, Random:  ug/mL (01-22 @ 06:46)      Vancomycin Level, Trough: 16.3 ug/mL (01-25 @ 21:52)      Rapid RVP Result: [facility]          MICROBIOLOGY:    Culture - Blood in AM (01.17.19 @ 14:14)    Specimen Source: .Blood Blood-Peripheral    Culture Results:   No growth at 5 days.    Culture - Blood (01.13.19 @ 08:27)    Specimen Source: .Blood Blood-Venous    Culture Results:   No growth at 5 days.          RADIOLOGY & ADDITIONAL STUDIES:    < from: Xray Chest 1 View- PORTABLE-Urgent (01.14.19 @ 14:20) >  FINDINGS:    Status post median sternotomy and valve replacement.  Enteric tube seen coursing below the diaphragm, tip overlies the stomach.    No focal consolidation.  There is no pneumothorax. There are no pleural effusions.   The cardiomediastinal silhouette cannot be adequately assessed on this   projection.    IMPRESSION:   Clear lungs.   Enteric tube with overlying stomach.    < end of copied text >

## 2019-01-27 LAB
ANION GAP SERPL CALC-SCNC: 10 MMOL/L — SIGNIFICANT CHANGE UP (ref 5–17)
BUN SERPL-MCNC: 31 MG/DL — HIGH (ref 7–23)
CALCIUM SERPL-MCNC: 8.8 MG/DL — SIGNIFICANT CHANGE UP (ref 8.4–10.5)
CHLORIDE SERPL-SCNC: 100 MMOL/L — SIGNIFICANT CHANGE UP (ref 96–108)
CO2 SERPL-SCNC: 25 MMOL/L — SIGNIFICANT CHANGE UP (ref 22–31)
CREAT SERPL-MCNC: 0.69 MG/DL — SIGNIFICANT CHANGE UP (ref 0.5–1.3)
GLUCOSE BLDC GLUCOMTR-MCNC: 171 MG/DL — HIGH (ref 70–99)
GLUCOSE BLDC GLUCOMTR-MCNC: 204 MG/DL — HIGH (ref 70–99)
GLUCOSE BLDC GLUCOMTR-MCNC: 219 MG/DL — HIGH (ref 70–99)
GLUCOSE BLDC GLUCOMTR-MCNC: 237 MG/DL — HIGH (ref 70–99)
GLUCOSE SERPL-MCNC: 199 MG/DL — HIGH (ref 70–99)
HCT VFR BLD CALC: 31.3 % — LOW (ref 39–50)
HGB BLD-MCNC: 11 G/DL — LOW (ref 13–17)
MCHC RBC-ENTMCNC: 34.5 PG — HIGH (ref 27–34)
MCHC RBC-ENTMCNC: 35.2 GM/DL — SIGNIFICANT CHANGE UP (ref 32–36)
MCV RBC AUTO: 98 FL — SIGNIFICANT CHANGE UP (ref 80–100)
PLATELET # BLD AUTO: 107 K/UL — LOW (ref 150–400)
POTASSIUM SERPL-MCNC: 4.9 MMOL/L — SIGNIFICANT CHANGE UP (ref 3.5–5.3)
POTASSIUM SERPL-SCNC: 4.9 MMOL/L — SIGNIFICANT CHANGE UP (ref 3.5–5.3)
RBC # BLD: 3.19 M/UL — LOW (ref 4.2–5.8)
RBC # FLD: 13.1 % — SIGNIFICANT CHANGE UP (ref 10.3–14.5)
SODIUM SERPL-SCNC: 135 MMOL/L — SIGNIFICANT CHANGE UP (ref 135–145)
WBC # BLD: 6 K/UL — SIGNIFICANT CHANGE UP (ref 3.8–10.5)
WBC # FLD AUTO: 6 K/UL — SIGNIFICANT CHANGE UP (ref 3.8–10.5)

## 2019-01-27 RX ADMIN — Medication 2: at 05:46

## 2019-01-27 RX ADMIN — Medication 2: at 12:59

## 2019-01-27 RX ADMIN — HEPARIN SODIUM 5000 UNIT(S): 5000 INJECTION INTRAVENOUS; SUBCUTANEOUS at 21:07

## 2019-01-27 RX ADMIN — PANTOPRAZOLE SODIUM 40 MILLIGRAM(S): 20 TABLET, DELAYED RELEASE ORAL at 05:22

## 2019-01-27 RX ADMIN — PANTOPRAZOLE SODIUM 40 MILLIGRAM(S): 20 TABLET, DELAYED RELEASE ORAL at 16:58

## 2019-01-27 RX ADMIN — HEPARIN SODIUM 5000 UNIT(S): 5000 INJECTION INTRAVENOUS; SUBCUTANEOUS at 14:30

## 2019-01-27 RX ADMIN — Medication 2000 UNIT(S): at 13:00

## 2019-01-27 RX ADMIN — Medication 2: at 18:23

## 2019-01-27 RX ADMIN — HEPARIN SODIUM 5000 UNIT(S): 5000 INJECTION INTRAVENOUS; SUBCUTANEOUS at 05:22

## 2019-01-27 NOTE — PROGRESS NOTE ADULT - SUBJECTIVE AND OBJECTIVE BOX
Dr. Oscar  Office [phone]  Cell [phone]  Jennifer SERRA  Cell [phone]      Patient is a 78y old  Male who presents with a chief complaint of altered mental status (26 Jan 2019 16:26)      Patient seen and examined at bedside. No apparent distress    VITALS:  T(F): 97.7 (01-27-19 @ 04:04), Max: 97.7 (01-27-19 @ 04:04)  HR: 84 (01-27-19 @ 04:04)  BP: 103/68 (01-27-19 @ 04:04)  RR: 18 (01-27-19 @ 04:04)  SpO2: 95% (01-27-19 @ 04:04)  Wt(kg): --    01-26 @ 07:01  -  01-27 @ 07:00  --------------------------------------------------------  IN: 1840 mL / OUT: 0 mL / NET: 1840 mL          PHYSICAL EXAM:  Constitutional: NAD  Neck: No JVD  Respiratory: CTAB, no wheezes, rales or rhonchi  Cardiovascular: S1, S2, RRR  Gastrointestinal: BS+, soft, NT/ND  Extremities: No peripheral edema    Hospital Medications:   MEDICATIONS  (STANDING):  cholecalciferol 2000 Unit(s) Oral daily  heparin  Injectable 5000 Unit(s) SubCutaneous every 8 hours  insulin lispro (HumaLOG) corrective regimen sliding scale   SubCutaneous every 6 hours  pantoprazole   Suspension 40 milliGRAM(s) Oral two times a day before meals      LABS:  01-27    135  |  100  |  31<H>  ----------------------------<  199<H>  4.9   |  25  |  0.69    Ca    8.8      27 Jan 2019 05:26      Creatinine Trend: 0.69 <--, 0.88 <--, 0.86 <--, 0.81 <--, 0.76 <--, 0.69 <--                                11.0   6.0   )-----------( 107      ( 27 Jan 2019 05:26 )             31.3     Urine Studies:  Urinalysis - [01-12-19 @ 20:19]      Color Yellow / Appearance Clear / SG 1.022 / pH 5.5      Gluc 500 mg/dL / Ketone Negative  / Bili Negative / Urobili Negative       Blood Negative / Protein Trace / Leuk Est Negative / Nitrite Negative      RBC 3 / WBC 1 / Hyaline 4 / Gran  / Sq Epi  / Non Sq Epi 0 / Bacteria Negative      PTH -- (Ca 8.5)      [01-17-19 @ 13:34]   71  HbA1c 7.1      [11-02-18 @ 06:00]  TSH 0.47      [01-13-19 @ 00:25]        RADIOLOGY & ADDITIONAL STUDIES:

## 2019-01-27 NOTE — PROGRESS NOTE ADULT - ASSESSMENT
· Assessment	  77 yo M w/ hx of CVA w/ left sided weakness, CAD w/ CABG 2013, prior hx of thoracic aortic aneurysm repair, HTN, DM2, systolic CHF (mild LV dysfunction in 11/2018), bioprosthetic aortic valve, BPH, presents with acute encephalopathy presumably from infectious source versus metabolic derangement versus a central process.      Bacteremia:  Resolved  Off abx  ID f/up noted.    Dysphagia:  S/p PEG  GI f/up noted.    DM II:  FSSS  Will restart metformin on DC

## 2019-01-27 NOTE — PROGRESS NOTE ADULT - PROBLEM SELECTOR PLAN 1
Pt with hypernatremia likely sec to decreased PO intake/dehydration  Serum sodium improved with fluid supplement  s/p  PEG  Monitor serum Sodium Q daily   D/C free water supplement except flushes to avoid hyponatrermia  Avoid overcorrection >8mEq in 24 hours. Pt with hypernatremia likely sec to decreased PO intake/dehydration  Serum sodium improved with fluid supplement  s/p  PEG  Monitor serum Sodium Q daily   Avoid overcorrection >8mEq in 24 hours.

## 2019-01-27 NOTE — PROGRESS NOTE ADULT - SUBJECTIVE AND OBJECTIVE BOX
Patient is a 78y old  Male who presents with a chief complaint of altered mental status (27 Jan 2019 12:29)      SUBJECTIVE / OVERNIGHT EVENTS:    Events noted.  Nonverbal  No N/V    MEDICATIONS  (STANDING):  cholecalciferol 2000 Unit(s) Oral daily  heparin  Injectable 5000 Unit(s) SubCutaneous every 8 hours  insulin lispro (HumaLOG) corrective regimen sliding scale   SubCutaneous every 6 hours  pantoprazole   Suspension 40 milliGRAM(s) Oral two times a day before meals    MEDICATIONS  (PRN):        CAPILLARY BLOOD GLUCOSE      POCT Blood Glucose.: 237 mg/dL (27 Jan 2019 18:10)  POCT Blood Glucose.: 204 mg/dL (27 Jan 2019 12:05)  POCT Blood Glucose.: 219 mg/dL (27 Jan 2019 05:41)  POCT Blood Glucose.: 238 mg/dL (26 Jan 2019 23:37)    I&O's Summary    26 Jan 2019 07:01  -  27 Jan 2019 07:00  --------------------------------------------------------  IN: 1840 mL / OUT: 0 mL / NET: 1840 mL    27 Jan 2019 07:01  -  27 Jan 2019 22:33  --------------------------------------------------------  IN: 945 mL / OUT: 0 mL / NET: 945 mL        PHYSICAL EXAM:  GENERAL: NAD  NECK: Supple, No JVD  CHEST/LUNG: Clear to auscultation bilaterally; No wheezing.  HEART: Regular rate and rhythm; No murmurs, rubs, or gallops  ABDOMEN: Soft, Nontender, Nondistended; Bowel sounds present  EXTREMITIES:   No clubbing, cyanosis, or edema  NEUROLOGY: Nonverbal      LABS:                        11.0   6.0   )-----------( 107      ( 27 Jan 2019 05:26 )             31.3     01-27    135  |  100  |  31<H>  ----------------------------<  199<H>  4.9   |  25  |  0.69    Ca    8.8      27 Jan 2019 05:26              CAPILLARY BLOOD GLUCOSE      POCT Blood Glucose.: 237 mg/dL (27 Jan 2019 18:10)  POCT Blood Glucose.: 204 mg/dL (27 Jan 2019 12:05)  POCT Blood Glucose.: 219 mg/dL (27 Jan 2019 05:41)  POCT Blood Glucose.: 238 mg/dL (26 Jan 2019 23:37)                RADIOLOGY & ADDITIONAL TESTS:    Imaging Personally Reviewed:    Consultant(s) Notes Reviewed:      Care Discussed with Consultants/Other Providers:

## 2019-01-28 LAB
ANION GAP SERPL CALC-SCNC: 11 MMOL/L — SIGNIFICANT CHANGE UP (ref 5–17)
BUN SERPL-MCNC: 28 MG/DL — HIGH (ref 7–23)
CALCIUM SERPL-MCNC: 9.2 MG/DL — SIGNIFICANT CHANGE UP (ref 8.4–10.5)
CHLORIDE SERPL-SCNC: 99 MMOL/L — SIGNIFICANT CHANGE UP (ref 96–108)
CO2 SERPL-SCNC: 24 MMOL/L — SIGNIFICANT CHANGE UP (ref 22–31)
CREAT SERPL-MCNC: 0.71 MG/DL — SIGNIFICANT CHANGE UP (ref 0.5–1.3)
GLUCOSE BLDC GLUCOMTR-MCNC: 152 MG/DL — HIGH (ref 70–99)
GLUCOSE BLDC GLUCOMTR-MCNC: 198 MG/DL — HIGH (ref 70–99)
GLUCOSE BLDC GLUCOMTR-MCNC: 199 MG/DL — HIGH (ref 70–99)
GLUCOSE BLDC GLUCOMTR-MCNC: 228 MG/DL — HIGH (ref 70–99)
GLUCOSE BLDC GLUCOMTR-MCNC: 273 MG/DL — HIGH (ref 70–99)
GLUCOSE SERPL-MCNC: 255 MG/DL — HIGH (ref 70–99)
POTASSIUM SERPL-MCNC: 5.2 MMOL/L — SIGNIFICANT CHANGE UP (ref 3.5–5.3)
POTASSIUM SERPL-SCNC: 5.2 MMOL/L — SIGNIFICANT CHANGE UP (ref 3.5–5.3)
SODIUM SERPL-SCNC: 134 MMOL/L — LOW (ref 135–145)

## 2019-01-28 RX ADMIN — Medication 1: at 06:57

## 2019-01-28 RX ADMIN — HEPARIN SODIUM 5000 UNIT(S): 5000 INJECTION INTRAVENOUS; SUBCUTANEOUS at 15:05

## 2019-01-28 RX ADMIN — Medication 2000 UNIT(S): at 15:04

## 2019-01-28 RX ADMIN — Medication 1: at 00:28

## 2019-01-28 RX ADMIN — HEPARIN SODIUM 5000 UNIT(S): 5000 INJECTION INTRAVENOUS; SUBCUTANEOUS at 06:57

## 2019-01-28 RX ADMIN — HEPARIN SODIUM 5000 UNIT(S): 5000 INJECTION INTRAVENOUS; SUBCUTANEOUS at 21:55

## 2019-01-28 RX ADMIN — PANTOPRAZOLE SODIUM 40 MILLIGRAM(S): 20 TABLET, DELAYED RELEASE ORAL at 15:04

## 2019-01-28 RX ADMIN — Medication 2: at 13:46

## 2019-01-28 RX ADMIN — Medication 3: at 18:36

## 2019-01-28 RX ADMIN — PANTOPRAZOLE SODIUM 40 MILLIGRAM(S): 20 TABLET, DELAYED RELEASE ORAL at 08:00

## 2019-01-28 NOTE — PROGRESS NOTE ADULT - SUBJECTIVE AND OBJECTIVE BOX
Infectious Diseases progress note:    Subjective: NAD, no acute o/n events.  Pt afebrile.  Tolerating PEG feeds.      ROS:  nonverbal    Allergies    No Known Allergies    Intolerances        ANTIBIOTICS/RELEVANT:  antimicrobials    immunologic:    OTHER:  cholecalciferol 2000 Unit(s) Oral daily  heparin  Injectable 5000 Unit(s) SubCutaneous every 8 hours  insulin lispro (HumaLOG) corrective regimen sliding scale   SubCutaneous every 6 hours  pantoprazole   Suspension 40 milliGRAM(s) Oral two times a day before meals      Objective:  Vital Signs Last 24 Hrs  T(C): 36.4 (28 Jan 2019 08:33), Max: 36.8 (28 Jan 2019 02:08)  T(F): 97.6 (28 Jan 2019 08:33), Max: 98.2 (28 Jan 2019 02:08)  HR: 95 (28 Jan 2019 08:33) (92 - 95)  BP: 108/72 (28 Jan 2019 08:33) (100/67 - 118/81)  BP(mean): --  RR: 20 (28 Jan 2019 08:33) (18 - 20)  SpO2: 97% (28 Jan 2019 08:33) (96% - 98%)    PHYSICAL EXAM:  Constitutional:NAD  Eyes:BENJY, EOMI  Ear/Nose/Throat: no thrush, mucositis.  Moist mucous membranes	  Neck:no JVD, no lymphadenopathy, supple  Respiratory: CTA phi  Cardiovascular: S1S2 RRR, no murmurs  Gastrointestinal:soft, nontender,  nondistended (+) BS, PEG  Extremities:no e/e/c  Skin:  no rashes, open wounds or ulcerations        LABS:                        11.0   6.0   )-----------( 107      ( 27 Jan 2019 05:26 )             31.3     01-28    134<L>  |  99  |  28<H>  ----------------------------<  255<H>  5.2   |  24  |  0.71    Ca    9.2      28 Jan 2019 11:31                  Vancomycin Level, Trough: 16.3 ug/mL (01-25 @ 21:52)              MICROBIOLOGY:          RADIOLOGY & ADDITIONAL STUDIES:

## 2019-01-28 NOTE — PROGRESS NOTE ADULT - SUBJECTIVE AND OBJECTIVE BOX
Interval Events: pt seen and examined.   Tolerating PEG feeds without residuals as per RN    MEDICATIONS  (STANDING):  cholecalciferol 2000 Unit(s) Oral daily  heparin  Injectable 5000 Unit(s) SubCutaneous every 8 hours  insulin lispro (HumaLOG) corrective regimen sliding scale   SubCutaneous every 6 hours  pantoprazole   Suspension 40 milliGRAM(s) Oral two times a day before meals    MEDICATIONS  (PRN):      Allergies    No Known Allergies    Intolerances          Review of Systems: unable to obtain.       Vital Signs Last 24 Hrs  T(C): 36.4 (28 Jan 2019 08:33), Max: 36.8 (28 Jan 2019 02:08)  T(F): 97.6 (28 Jan 2019 08:33), Max: 98.2 (28 Jan 2019 02:08)  HR: 95 (28 Jan 2019 08:33) (92 - 95)  BP: 108/72 (28 Jan 2019 08:33) (100/67 - 118/81)  BP(mean): --  RR: 20 (28 Jan 2019 08:33) (18 - 20)  SpO2: 97% (28 Jan 2019 08:33) (96% - 98%)    PHYSICAL EXAM:    nad  frail  non toxic  soft, nt, + peg with abdominal binder c/d/i  no edema      LABS:                        11.0   6.0   )-----------( 107      ( 27 Jan 2019 05:26 )             31.3     01-28    134<L>  |  99  |  28<H>  ----------------------------<  255<H>  5.2   |  24  |  0.71    Ca    9.2      28 Jan 2019 11:31            RADIOLOGY & ADDITIONAL TESTS:

## 2019-01-28 NOTE — PROGRESS NOTE ADULT - ASSESSMENT
79 yo M w/ hx of CVA w/ left sided weakness, CAD w/ CABG 2013, prior hx of thoracic aortic aneurysm repair, HTN, DM2, systolic CHF (mild LV dysfunction in 11/2018), bioprosthetic aortic valve, BPH, presents with altered mental status.  He was admitted in November 2018 for MRSA bacteremia. DICKSON was not performed according to discharge paperwork bc family declined. However, spouse and son report that this was never the case. Patient was treated with prolonged IV antibiotics including vancomycin/rifampin/gentamicin.   Then again in december 2018 he was hospitalized for hypoxic respiratory failure 2/2 to acute decompensated heart failure requiring NIPPV. His hospital course was complicated by steralcolitis for which he was given cipro/flagyl. Spouse reports he completed this on discharge. Today the patient's outpatient blood work returned with sodium in "160s" and blood glucose in "400s" despite continuing outpatient metformin therapy. Therefore, the patient & family was advised to come to hospital for further management.       # Positive Blood culture- CNS/staph epidermidis.  ? contaminant vs. true infection.  Given presence of aortic valve repair, agree with starting abx with vancomycin.  f/u repeat blood cultures.  TTE no vegetations seen.  Son deferring DICKSON as per discussion with cardiologist, ESR and CRP are relatively low, TTE without vegetations, repeat bcx ngtd.  Very low suspicion for endocarditis.  Will treat 7 to 10 day course for bacteremia, suspect more likely a contaminant than true infection      Recommend:    - No new fevers, d/c vancomycin (as of 1/27).  Pt has completed abx course     - Monitor temp curve, WBC.  If pt spikes fever, repeat bcx x 2, chest xray, ua, ucx    Will follow,    Tanisha Ash  [phone]

## 2019-01-29 DIAGNOSIS — R78.81 BACTEREMIA: ICD-10-CM

## 2019-01-29 LAB
GLUCOSE BLDC GLUCOMTR-MCNC: 197 MG/DL — HIGH (ref 70–99)
GLUCOSE BLDC GLUCOMTR-MCNC: 221 MG/DL — HIGH (ref 70–99)
GLUCOSE BLDC GLUCOMTR-MCNC: 231 MG/DL — HIGH (ref 70–99)
GLUCOSE BLDC GLUCOMTR-MCNC: 257 MG/DL — HIGH (ref 70–99)

## 2019-01-29 RX ADMIN — Medication 1: at 17:37

## 2019-01-29 RX ADMIN — Medication 3: at 13:01

## 2019-01-29 RX ADMIN — Medication 2000 UNIT(S): at 17:37

## 2019-01-29 RX ADMIN — HEPARIN SODIUM 5000 UNIT(S): 5000 INJECTION INTRAVENOUS; SUBCUTANEOUS at 13:01

## 2019-01-29 RX ADMIN — PANTOPRAZOLE SODIUM 40 MILLIGRAM(S): 20 TABLET, DELAYED RELEASE ORAL at 17:37

## 2019-01-29 RX ADMIN — HEPARIN SODIUM 5000 UNIT(S): 5000 INJECTION INTRAVENOUS; SUBCUTANEOUS at 21:57

## 2019-01-29 RX ADMIN — PANTOPRAZOLE SODIUM 40 MILLIGRAM(S): 20 TABLET, DELAYED RELEASE ORAL at 09:04

## 2019-01-29 RX ADMIN — Medication 2: at 06:14

## 2019-01-29 RX ADMIN — Medication 1: at 00:25

## 2019-01-29 RX ADMIN — HEPARIN SODIUM 5000 UNIT(S): 5000 INJECTION INTRAVENOUS; SUBCUTANEOUS at 05:22

## 2019-01-29 NOTE — CONSULT NOTE ADULT - ASSESSMENT
78 year old male with onychomycosis, bilateral feet  -Debridement of elongated nails x 10  -Pain relieved s/p debridement   -No open lesions or local signs of infection noted  -Z floats at all times  -No other acute podiatric issues   -Please reconsult as needed    [phone] pager

## 2019-01-29 NOTE — PROGRESS NOTE ADULT - SUBJECTIVE AND OBJECTIVE BOX
Interval Events: pt seen and examined.   Tolerating PEG feeds without residuals as per RN    MEDICATIONS  (STANDING):  cholecalciferol 2000 Unit(s) Oral daily  heparin  Injectable 5000 Unit(s) SubCutaneous every 8 hours  insulin lispro (HumaLOG) corrective regimen sliding scale   SubCutaneous every 6 hours  pantoprazole   Suspension 40 milliGRAM(s) Oral two times a day before meals    MEDICATIONS  (PRN):      Allergies    No Known Allergies    Intolerances        Review of Systems: unable to obtain.     Vital Signs Last 24 Hrs  T(C): 36.7 (29 Jan 2019 05:08), Max: 37.1 (28 Jan 2019 21:53)  T(F): 98 (29 Jan 2019 05:08), Max: 98.8 (28 Jan 2019 21:53)  HR: 88 (29 Jan 2019 05:08) (88 - 96)  BP: 106/72 (29 Jan 2019 05:08) (98/63 - 114/75)  BP(mean): --  RR: 18 (29 Jan 2019 05:08) (18 - 18)  SpO2: 96% (29 Jan 2019 05:08) (96% - 97%)    PHYSICAL EXAM:    nad  frail  non toxic  soft, nt, + peg with abdominal binder c/d/i  no edema          LABS:    01-28    134<L>  |  99  |  28<H>  ----------------------------<  255<H>  5.2   |  24  |  0.71    Ca    9.2      28 Jan 2019 11:31            RADIOLOGY & ADDITIONAL TESTS:

## 2019-01-29 NOTE — CONSULT NOTE ADULT - SUBJECTIVE AND OBJECTIVE BOX
HPI:  77 yo M w/ hx of CVA w/ left sided weakness, CAD w/ CABG 2013, prior hx of thoracic aortic aneurysm repair, HTN, DM2, systolic CHF (mild LV dysfunction in 11/2018), bioprosthetic aortic valve, BPH, presents with altered mental status. Patient is unable to provide any history. Hx obtained from son Kleber ([phone]) and spouse ([phone]) at bedside. Per family patient should not have been discharged from recent admission. They report patient was having fevers and was hypernatremic prior to discharge. The family report since coming home the patient has been more altered, sleeping more, hardly interactive, and to the point where now it is difficult to arouse him. He was admitted in November 2018 for MRSA bacteremia. DICKSON was not performed according to discharge paperwork bc family declined. However, spouse and son report that this was never the case. Patient was treated with prolonged IV antibiotics including vancomycin/rifampin/gentamicin. Then again in december 2018 he was hospitalized for hypoxic respiratory failure 2/2 to acute decompensated heart failure requiring NIPPV. His hospital course was complicated by steracolitis for which he was given cipro/flagyl. Spouse reports he completed this on discharge. Today the patient's outpatient blood work returned with sodium in "160s" and blood glucose in "400s" despite continuing outpatient metformin therapy. Therefore, the patient & family was advised to come to hospital for further management.  Of note family reports patient had diarrheal illness which resolved. Last BM was three days ago. Per family, patient has not been reporting any type of pain. Furthermore, family reports patient has had temperatures of 101 at home since discharge. (12 Jan 2019 21:02)    Patient is a 78y old  Male who presents with a chief complaint of altered mental status (29 Jan 2019 13:16)    Allergies    No Known Allergies    Intolerances      Vital Signs Last 24 Hrs  T(C): 36.4 (29 Jan 2019 12:43), Max: 37.1 (28 Jan 2019 21:53)  T(F): 97.5 (29 Jan 2019 12:43), Max: 98.8 (28 Jan 2019 21:53)  HR: 89 (29 Jan 2019 12:43) (88 - 96)  BP: 101/66 (29 Jan 2019 12:43) (98/63 - 114/75)  BP(mean): --  RR: 18 (29 Jan 2019 12:43) (18 - 18)  SpO2: 100% (29 Jan 2019 12:43) (96% - 100%)    01-28    134<L>  |  99  |  28<H>  ----------------------------<  255<H>  5.2   |  24  |  0.71    Ca    9.2      28 Jan 2019 11:31      CAPILLARY BLOOD GLUCOSE      POCT Blood Glucose.: 257 mg/dL (29 Jan 2019 12:36)    MEDICATIONS  (STANDING):  cholecalciferol 2000 Unit(s) Oral daily  heparin  Injectable 5000 Unit(s) SubCutaneous every 8 hours  insulin lispro (HumaLOG) corrective regimen sliding scale   SubCutaneous every 6 hours  pantoprazole   Suspension 40 milliGRAM(s) Oral two times a day before meals    MEDICATIONS  (PRN):    PAST MEDICAL & SURGICAL HISTORY:  Sepsis  UTI (urinary tract infection)  GI bleed  PUD (peptic ulcer disease)  CVA, old, 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  Gastric ulcer  Hyperlipemia  Diabetes mellitus  Hypertension  History of eye surgery: endophthalmitis in 2014  H/O aortic root repair  No Past Surgical History        GUSTABO:  Elognated, ingrowing, painful toe nails x 10  No open lesions or local signs of infection   Pedal pulses strongly palpable 2/4 bilaterally  No gross deformities  Epicritic sensation intact to b/l feet

## 2019-01-29 NOTE — CONSULT NOTE ADULT - REASON FOR ADMISSION
altered mental status

## 2019-01-29 NOTE — PROGRESS NOTE ADULT - ASSESSMENT
77 yo M w/ hx of CVA w/ left sided weakness, CAD w/ CABG 2013, prior hx of thoracic aortic aneurysm repair, HTN, DM2, systolic CHF (mild LV dysfunction in 11/2018), bioprosthetic aortic valve, BPH, presents with altered mental status.     s/p  PEG placement

## 2019-01-29 NOTE — PROGRESS NOTE ADULT - ASSESSMENT
· Assessment	  77 yo M w/ hx of CVA w/ left sided weakness, CAD w/ CABG 2013, prior hx of thoracic aortic aneurysm repair, HTN, DM2, systolic CHF (mild LV dysfunction in 11/2018), bioprosthetic aortic valve, BPH, presents with acute encephalopathy presumably from infectious source versus metabolic derangement versus a central process.       Dysphagia:  S/p PEG  GI f/up noted.    DM II:  FSSS

## 2019-01-29 NOTE — PROGRESS NOTE ADULT - ASSESSMENT
77 yo M w/ hx of CVA w/ left sided weakness, CAD w/ CABG 2013, prior hx of thoracic aortic aneurysm repair, HTN, DM2, systolic CHF (mild LV dysfunction in 11/2018), bioprosthetic aortic valve, BPH, presents with altered mental status.  He was admitted in November 2018 for MRSA bacteremia. DICKSON was not performed according to discharge paperwork bc family declined. However, spouse and son report that this was never the case. Patient was treated with prolonged IV antibiotics including vancomycin/rifampin/gentamicin.   Then again in december 2018 he was hospitalized for hypoxic respiratory failure 2/2 to acute decompensated heart failure requiring NIPPV. His hospital course was complicated by steralcolitis for which he was given cipro/flagyl. Spouse reports he completed this on discharge. Today the patient's outpatient blood work returned with sodium in "160s" and blood glucose in "400s" despite continuing outpatient metformin therapy. Therefore, the patient & family was advised to come to hospital for further management.       # Positive Blood culture- CNS/staph epidermidis.  ? contaminant vs. true infection.  Given presence of aortic valve repair, agree with starting abx with vancomycin.  f/u repeat blood cultures.  TTE no vegetations seen.  Son deferring DICKSON as per discussion with cardiologist, ESR and CRP are relatively low, TTE without vegetations, repeat bcx ngtd.  Very low suspicion for endocarditis.  Will treat 7 to 10 day course for bacteremia, suspect more likely a contaminant than true infection      Recommend:    - No new fevers, d/c vancomycin (as of 1/27).  Pt has completed abx course     - Monitor temp curve, WBC.  If pt spikes fever, repeat bcx x 2, chest xray, ua, ucx.  No acute ID issues at this time.     d/c planning as per primary team    Tanisha Ash  [phone]

## 2019-01-29 NOTE — PROGRESS NOTE ADULT - SUBJECTIVE AND OBJECTIVE BOX
[facility] [place] [facility]   MD FREDRICK JUAREZ MD RUORU WONG, PA    TEL:  OFFICE: [phone]  DR BREWER CELL: [phone]  DAVID ESCAMILLA CELL: [phone]  DR. PHILLIP CELL: [phone]    RENAL FOLLOW UP NOTE  --------------------------------------------------------------------------------  HPI:      Pt seen and examined at bedside.       PAST HISTORY  --------------------------------------------------------------------------------  No significant changes to PMH, PSH, FHx, SHx, unless otherwise noted    ALLERGIES & MEDICATIONS  --------------------------------------------------------------------------------  Allergies    No Known Allergies    Intolerances      Standing Inpatient Medications  cholecalciferol 2000 Unit(s) Oral daily  heparin  Injectable 5000 Unit(s) SubCutaneous every 8 hours  insulin lispro (HumaLOG) corrective regimen sliding scale   SubCutaneous every 6 hours  pantoprazole   Suspension 40 milliGRAM(s) Oral two times a day before meals    PRN Inpatient Medications      REVIEW OF SYSTEMS  --------------------------------------------------------------------------------  General: no fever    MSK: no edema     VITALS/PHYSICAL EXAM  --------------------------------------------------------------------------------  T(C): 36.4 (01-29-19 @ 12:43), Max: 37.1 (01-28-19 @ 21:53)  HR: 89 (01-29-19 @ 12:43) (88 - 96)  BP: 101/66 (01-29-19 @ 12:43) (98/63 - 114/75)  RR: 18 (01-29-19 @ 12:43) (18 - 18)  SpO2: 100% (01-29-19 @ 12:43) (96% - 100%)  Wt(kg): --        01-28-19 @ 07:01  -  01-29-19 @ 07:00  --------------------------------------------------------  IN: 1660 mL / OUT: 0 mL / NET: 1660 mL      Physical Exam:  	Gen: NAD  	HEENT: MMM  	Pulm: CTA B/L  	CV: S1S2  	Abd: Soft, +BS, + PEG  	Ext: No LE edema B/L                      Neuro: lethargic  	Skin: Warm and Dry   	 no tamiko    LABS/STUDIES  --------------------------------------------------------------------------------    134  |  99  |  28  ----------------------------<  255      [01-28-19 @ 11:31]  5.2   |  24  |  0.71        Ca     9.2     [01-28-19 @ 11:31]            Creatinine Trend:  SCr 0.71 [01-28 @ 11:31]  SCr 0.69 [01-27 @ 05:26]  SCr 0.88 [01-25 @ 05:53]  SCr 0.86 [01-24 @ 06:02]  SCr 0.81 [01-23 @ 06:46]    Urinalysis - [01-12-19 @ 20:19]      Color Yellow / Appearance Clear / SG 1.022 / pH 5.5      Gluc 500 mg/dL / Ketone Negative  / Bili Negative / Urobili Negative       Blood Negative / Protein Trace / Leuk Est Negative / Nitrite Negative      RBC 3 / WBC 1 / Hyaline 4 / Gran  / Sq Epi  / Non Sq Epi 0 / Bacteria Negative      PTH -- (Ca 8.5)      [01-17-19 @ 13:34]   71  HbA1c 7.1      [11-02-18 @ 06:00]  TSH 0.47      [01-13-19 @ 00:25]

## 2019-01-29 NOTE — PROGRESS NOTE ADULT - SUBJECTIVE AND OBJECTIVE BOX
Patient is a 78y old  Male who presents with a chief complaint of altered mental status (29 Jan 2019 16:05)      SUBJECTIVE / OVERNIGHT EVENTS:    Events noted.  Nonverbal  No N/V    MEDICATIONS  (STANDING):  cholecalciferol 2000 Unit(s) Oral daily  heparin  Injectable 5000 Unit(s) SubCutaneous every 8 hours  insulin lispro (HumaLOG) corrective regimen sliding scale   SubCutaneous every 6 hours  pantoprazole   Suspension 40 milliGRAM(s) Oral two times a day before meals    MEDICATIONS  (PRN):        CAPILLARY BLOOD GLUCOSE      POCT Blood Glucose.: 197 mg/dL (29 Jan 2019 17:29)  POCT Blood Glucose.: 257 mg/dL (29 Jan 2019 12:36)  POCT Blood Glucose.: 221 mg/dL (29 Jan 2019 06:01)  POCT Blood Glucose.: 152 mg/dL (28 Jan 2019 23:55)    I&O's Summary    28 Jan 2019 07:01  -  29 Jan 2019 07:00  --------------------------------------------------------  IN: 1660 mL / OUT: 0 mL / NET: 1660 mL    29 Jan 2019 07:01  -  29 Jan 2019 21:11  --------------------------------------------------------  IN: 1160 mL / OUT: 0 mL / NET: 1160 mL        PHYSICAL EXAM:  GENERAL: NAD  NECK: Supple, No JVD  CHEST/LUNG: Clear to auscultation bilaterally; No wheezing.  HEART: Regular rate and rhythm; No murmurs, rubs, or gallops  ABDOMEN: Soft, Nontender, Nondistended; Bowel sounds present  EXTREMITIES:   No clubbing, cyanosis, or edema  NEUROLOGY: nonverbal      LABS:    01-28    134<L>  |  99  |  28<H>  ----------------------------<  255<H>  5.2   |  24  |  0.71    Ca    9.2      28 Jan 2019 11:31              CAPILLARY BLOOD GLUCOSE      POCT Blood Glucose.: 197 mg/dL (29 Jan 2019 17:29)  POCT Blood Glucose.: 257 mg/dL (29 Jan 2019 12:36)  POCT Blood Glucose.: 221 mg/dL (29 Jan 2019 06:01)  POCT Blood Glucose.: 152 mg/dL (28 Jan 2019 23:55)                RADIOLOGY & ADDITIONAL TESTS:    Imaging Personally Reviewed:    Consultant(s) Notes Reviewed:      Care Discussed with Consultants/Other Providers:

## 2019-01-29 NOTE — PROGRESS NOTE ADULT - PROBLEM SELECTOR PLAN 1
Pt with hypernatremia likely sec to decreased PO intake/dehydration  s/p  PEG  Monitor serum Sodium Q daily   PT with hyponatremia now-- likely sec to hyperglycemia  Optimize glucose control

## 2019-01-30 LAB
GLUCOSE BLDC GLUCOMTR-MCNC: 235 MG/DL — HIGH (ref 70–99)
GLUCOSE BLDC GLUCOMTR-MCNC: 250 MG/DL — HIGH (ref 70–99)
GLUCOSE BLDC GLUCOMTR-MCNC: 287 MG/DL — HIGH (ref 70–99)

## 2019-01-30 RX ADMIN — HEPARIN SODIUM 5000 UNIT(S): 5000 INJECTION INTRAVENOUS; SUBCUTANEOUS at 06:41

## 2019-01-30 RX ADMIN — Medication 2: at 13:08

## 2019-01-30 RX ADMIN — Medication 2: at 00:13

## 2019-01-30 RX ADMIN — Medication 3: at 18:20

## 2019-01-30 RX ADMIN — HEPARIN SODIUM 5000 UNIT(S): 5000 INJECTION INTRAVENOUS; SUBCUTANEOUS at 13:09

## 2019-01-30 RX ADMIN — PANTOPRAZOLE SODIUM 40 MILLIGRAM(S): 20 TABLET, DELAYED RELEASE ORAL at 16:03

## 2019-01-30 RX ADMIN — Medication 2000 UNIT(S): at 16:03

## 2019-01-30 RX ADMIN — PANTOPRAZOLE SODIUM 40 MILLIGRAM(S): 20 TABLET, DELAYED RELEASE ORAL at 08:42

## 2019-01-30 RX ADMIN — HEPARIN SODIUM 5000 UNIT(S): 5000 INJECTION INTRAVENOUS; SUBCUTANEOUS at 21:15

## 2019-01-30 RX ADMIN — Medication 2: at 06:40

## 2019-01-30 NOTE — PROGRESS NOTE ADULT - ASSESSMENT
· Assessment	  79 yo M w/ hx of CVA w/ left sided weakness, CAD w/ CABG 2013, prior hx of thoracic aortic aneurysm repair, HTN, DM2, systolic CHF (mild LV dysfunction in 11/2018), bioprosthetic aortic valve, BPH, presents with acute encephalopathy presumably from infectious source versus metabolic derangement versus a central process.       Dysphagia:  S/p PEG  GI f/up noted.    DM II:  FSSS

## 2019-01-30 NOTE — PROGRESS NOTE ADULT - SUBJECTIVE AND OBJECTIVE BOX
[facility] [place] [facility]   MD FREDRICK JUAREZ MD RUORU WONG, PA    TEL:  OFFICE: [phone]  DR BREWER CELL: [phone]  DAVID ESCAMILLA CELL: [phone]  DR. PHILLIP CELL: [phone]    RENAL FOLLOW UP NOTE  --------------------------------------------------------------------------------  HPI:      Pt seen and examined at bedside.       PAST HISTORY  --------------------------------------------------------------------------------  No significant changes to PMH, PSH, FHx, SHx, unless otherwise noted    ALLERGIES & MEDICATIONS  --------------------------------------------------------------------------------  Allergies    No Known Allergies    Intolerances      Standing Inpatient Medications  cholecalciferol 2000 Unit(s) Oral daily  heparin  Injectable 5000 Unit(s) SubCutaneous every 8 hours  insulin lispro (HumaLOG) corrective regimen sliding scale   SubCutaneous every 6 hours  pantoprazole   Suspension 40 milliGRAM(s) Oral two times a day before meals    PRN Inpatient Medications      REVIEW OF SYSTEMS  --------------------------------------------------------------------------------  General: no fever  MSK: no edema     VITALS/PHYSICAL EXAM  --------------------------------------------------------------------------------  T(C): 36.5 (01-30-19 @ 12:27), Max: 36.9 (01-30-19 @ 04:59)  HR: 104 (01-30-19 @ 12:27) (92 - 106)  BP: 102/65 (01-30-19 @ 12:27) (102/57 - 114/77)  RR: 18 (01-30-19 @ 12:27) (18 - 18)  SpO2: 95% (01-30-19 @ 12:27) (95% - 98%)  Wt(kg): --        01-29-19 @ 07:01  -  01-30-19 @ 07:00  --------------------------------------------------------  IN: 2020 mL / OUT: 0 mL / NET: 2020 mL      Physical Exam:  	Gen: NAD  	HEENT: MMM  	Pulm: CTA B/L  	CV: S1S2  	Abd: Soft, +BS  	Ext: No LE edema B/L                      Neuro: lethargic  	Skin: Warm and Dry   	    LABS/STUDIES  --------------------------------------------------------------------------------                Creatinine Trend:  SCr 0.71 [01-28 @ 11:31]  SCr 0.69 [01-27 @ 05:26]  SCr 0.88 [01-25 @ 05:53]  SCr 0.86 [01-24 @ 06:02]  SCr 0.81 [01-23 @ 06:46]    Urinalysis - [01-12-19 @ 20:19]      Color Yellow / Appearance Clear / SG 1.022 / pH 5.5      Gluc 500 mg/dL / Ketone Negative  / Bili Negative / Urobili Negative       Blood Negative / Protein Trace / Leuk Est Negative / Nitrite Negative      RBC 3 / WBC 1 / Hyaline 4 / Gran  / Sq Epi  / Non Sq Epi 0 / Bacteria Negative      PTH -- (Ca 8.5)      [01-17-19 @ 13:34]   71  HbA1c 7.1      [11-02-18 @ 06:00]  TSH 0.47      [01-13-19 @ 00:25]

## 2019-01-30 NOTE — PROGRESS NOTE ADULT - ASSESSMENT
77 yo M w/ hx of CVA w/ left sided weakness, CAD w/ CABG 2013, prior hx of thoracic aortic aneurysm repair, HTN, DM2, systolic CHF (mild LV dysfunction in 11/2018), bioprosthetic aortic valve, BPH, presents with altered mental status.  He was admitted in November 2018 for MRSA bacteremia. DICKSON was not performed according to discharge paperwork bc family declined. However, spouse and son report that this was never the case. Patient was treated with prolonged IV antibiotics including vancomycin/rifampin/gentamicin.   Then again in december 2018 he was hospitalized for hypoxic respiratory failure 2/2 to acute decompensated heart failure requiring NIPPV. His hospital course was complicated by steralcolitis for which he was given cipro/flagyl. Spouse reports he completed this on discharge. Today the patient's outpatient blood work returned with sodium in "160s" and blood glucose in "400s" despite continuing outpatient metformin therapy. Therefore, the patient & family was advised to come to hospital for further management.       # Positive Blood culture- CNS/staph epidermidis.  ? contaminant vs. true infection.  Given presence of aortic valve repair, agree with starting abx with vancomycin.  f/u repeat blood cultures.  TTE no vegetations seen.  Son deferring DICKSON as per discussion with cardiologist, ESR and CRP are relatively low, TTE without vegetations, repeat bcx ngtd.  Very low suspicion for endocarditis.  Will treat 7 to 10 day course for bacteremia, suspect more likely a contaminant than true infection      Recommend:    - No new fevers, d/c vancomycin (as of 1/27).  Pt has completed abx course     - Monitor temp curve, WBC.  If pt spikes fever, repeat bcx x 2, chest xray, ua, ucx.  No acute ID issues at this time.     d/c planning as per primary team    Tanisha Ash  [phone]

## 2019-01-30 NOTE — SWALLOW BEDSIDE ASSESSMENT ADULT - SLP PERTINENT HISTORY OF CURRENT PROBLEM
79 yo M w/ hx of CVA w/ left sided weakness, CAD w/ CABG 2013, prior hx of thoracic aortic aneurysm repair, HTN, DM2, systolic CHF (mild LV dysfunction in 11/2018), bioprosthetic aortic valve, BPH, presents with AMS. Hx obtained from son Kleber ([phone]) and spouse ([phone]) at bedside. Per family pt should not have been d/lucinda from recent admission. They report pt was having fevers and was hypernatremic prior to discharge. The family report since coming home, pt has been more altered, sleeping more, hardly interactive, to the point where now it is difficult to arouse him. He was admitted in November 2018 for MRSA bacteremia. DICKSON was not performed according to d/c paperwork bc family declined. However, spouse and son report that this was never the case. Pt treated with prolonged IV Abx including vanco/rifampin/gentamicin.

## 2019-01-30 NOTE — PROGRESS NOTE ADULT - SUBJECTIVE AND OBJECTIVE BOX
Interval Events: pt seen and examined.   Tolerating PEG feeds    MEDICATIONS  (STANDING):  cholecalciferol 2000 Unit(s) Oral daily  heparin  Injectable 5000 Unit(s) SubCutaneous every 8 hours  insulin lispro (HumaLOG) corrective regimen sliding scale   SubCutaneous every 6 hours  pantoprazole   Suspension 40 milliGRAM(s) Oral two times a day before meals    MEDICATIONS  (PRN):      Allergies    No Known Allergies    Intolerances        Review of Systems: unable to obtain.     Vital Signs Last 24 Hrs  T(C): 36.5 (30 Jan 2019 12:27), Max: 36.9 (30 Jan 2019 04:59)  T(F): 97.7 (30 Jan 2019 12:27), Max: 98.4 (30 Jan 2019 04:59)  HR: 104 (30 Jan 2019 12:27) (92 - 106)  BP: 102/65 (30 Jan 2019 12:27) (102/57 - 114/77)  BP(mean): --  RR: 18 (30 Jan 2019 12:27) (18 - 18)  SpO2: 95% (30 Jan 2019 12:27) (95% - 98%)    PHYSICAL EXAM:    nad  frail  non toxic  soft, nt, + peg with abdominal binder c/d/i  no edema      LABS:                RADIOLOGY & ADDITIONAL TESTS:

## 2019-01-30 NOTE — CHART NOTE - NSCHARTNOTEFT_GEN_A_CORE
follow up- pt was seen by speech and swallow-had evaluation and advised NPO; continue peg tubed feeds for 99 months as long as patient with npo status.  Korina Rothman(NP)  [facility], [phone]

## 2019-01-30 NOTE — PROGRESS NOTE ADULT - SUBJECTIVE AND OBJECTIVE BOX
Infectious Diseases progress note:    Subjective: No acute o/n events.      ROS:    nonverbal    Allergies    No Known Allergies    Intolerances        ANTIBIOTICS/RELEVANT:  antimicrobials    immunologic:    OTHER:  cholecalciferol 2000 Unit(s) Oral daily  heparin  Injectable 5000 Unit(s) SubCutaneous every 8 hours  insulin lispro (HumaLOG) corrective regimen sliding scale   SubCutaneous every 6 hours  pantoprazole   Suspension 40 milliGRAM(s) Oral two times a day before meals      Objective:  Vital Signs Last 24 Hrs  T(C): 36.9 (30 Jan 2019 04:59), Max: 36.9 (30 Jan 2019 04:59)  T(F): 98.4 (30 Jan 2019 04:59), Max: 98.4 (30 Jan 2019 04:59)  HR: 92 (30 Jan 2019 04:59) (89 - 106)  BP: 114/77 (30 Jan 2019 04:59) (101/66 - 114/77)  BP(mean): --  RR: 18 (30 Jan 2019 04:59) (18 - 18)  SpO2: 98% (30 Jan 2019 04:59) (97% - 100%)    PHYSICAL EXAM:  Constitutional:NAD  Eyes:BENJY, EOMI  Ear/Nose/Throat: no thrush, mucositis.  Moist mucous membranes	  Neck:no JVD, no lymphadenopathy, supple  Respiratory: CTA phi  Cardiovascular: S1S2 RRR, no murmurs  Gastrointestinal:soft, nontender,  nondistended (+) BS  Extremities:no e/e/c  Skin:  no rashes, open wounds or ulcerations        LABS:                      Vancomycin Level, Trough: 16.3 ug/mL (01-25 @ 21:52)              MICROBIOLOGY:    Culture - Blood in AM (01.17.19 @ 14:14)    Specimen Source: .Blood Blood-Peripheral    Culture Results:   No growth at 5 days.          RADIOLOGY & ADDITIONAL STUDIES:

## 2019-01-30 NOTE — CHART NOTE - NSCHARTNOTESELECT_GEN_ALL_CORE
Event Note
Event Note/VANCO
Nutrition Services
Nutrition Services

## 2019-01-30 NOTE — CHART NOTE - NSCHARTNOTEFT_GEN_A_CORE
Nutrition Follow Up Note 3  Meredith   Patient seen for: tube feeding consult  79 yo M w/ hx of CVA w/ left sided weakness, CAD w/ CABG , prior hx of thoracic aortic aneurysm repair, HTN, DM2, systolic CHF (mild LV dysfunction in 2018), bioprosthetic aortic valve, BPH, presents with acute encephalopathy presumably from infectious source versus metabolic derangement versus a central process.       Diet : NPO with tube feeding    Patient reports:       Enteral /Parenteral Nutrition:       Daily Weight in k.5 (), Weight in k.9 (), Weight in k.1 ()  % Weight Change    Pertinent Medications: MEDICATIONS  (STANDING):  cholecalciferol 2000 Unit(s) Oral daily  heparin  Injectable 5000 Unit(s) SubCutaneous every 8 hours  insulin lispro (HumaLOG) corrective regimen sliding scale   SubCutaneous every 6 hours  pantoprazole   Suspension 40 milliGRAM(s) Oral two times a day before meals    MEDICATIONS  (PRN):    Pertinent Labs:   Finger Sticks:  POCT Blood Glucose.: 235 mg/dL ( @ 06:18)  POCT Blood Glucose.: 231 mg/dL ( @ 23:58)  POCT Blood Glucose.: 197 mg/dL ( @ 17:29)  POCT Blood Glucose.: 257 mg/dL ( @ 12:36)      Skin per nursing documentation:   Edema:    Estimated Needs:   [ ] no change since previous assessment  [ ] recalculated:     Previous Nutrition Diagnosis:   Nutrition Diagnosis is:    New Nutrition Diagnosis:  Related to:    As evidenced by:      Interventions:     Recommend  1)    Monitoring and Evaluation:     Continue to monitor Nutritional intake, Tolerance to diet prescription, weights, labs, skin integrity    RD remains available upon request and will follow up per protocol Nutrition Follow Up Note 3  Meredith   Patient seen for: tube feeding consult  77 yo M w/ hx of CVA w/ left sided weakness, CAD w/ CABG 2013, prior hx of thoracic aortic aneurysm repair, HTN, DM2, systolic CHF (mild LV dysfunction in 11/2018), bioprosthetic aortic valve, BPH, presents with acute encephalopathy presumably from infectious source versus metabolic derangement versus a central process. free water      Diet : NPO with tube feeding, new PEG placement 1/22/2019           Enteral  Nutrition:  Bolus feeds Glucerna 1.2 (5 cans daily) provides 1425kcal, 26/kg, protein 71grams, 1.3/kg, based on dosing weight 54.4kgn, water 960ml, plus added H2O [id number]     plus Danactive 2 x daily           Dosing weight 55.4kg , current weight  63.5kg likely reflective of tube feeds with increased hydration        Pertinent Medications: MEDICATIONS  (STANDING):  cholecalciferol 2000 Unit(s) Oral daily  heparin  Injectable 5000 Unit(s) SubCutaneous every 8 hours  insulin lispro (HumaLOG) corrective regimen sliding scale   SubCutaneous every 6 hours  pantoprazole   Suspension 40 milliGRAM(s) Oral two times a day before meals    MEDICATIONS  (PRN):    Pertinent Labs:   Finger Sticks:  POCT Blood Glucose.: 235 mg/dL (01-30 @ 06:18)  POCT Blood Glucose.: 231 mg/dL (01-29 @ 23:58)  POCT Blood Glucose.: 197 mg/dL (01-29 @ 17:29)  POCT Blood Glucose.: 257 mg/dL (01-29 @ 12:36)      Skin per nursing documentation: ae sacral wound st II  Edema: right arm  left arm 1+  BM x4 receiving bowel regimen    Estimated Needs:   [ X] no change since previous assessment  [ ] recalculated:      Previous Nutrition Diagnosis: Excessive carbohydrate intake (related to previous tube feed formula now  receiving glucerna1.2)  Nutrition Diagnosis is: resolved    New Nutrition Diagnosis: inadequate protein energy intake requiring feeding tube ( patien admitted with weakness, decreased poor PO intake PTA and after admission     Interventions/ RECOMMENDATIONS:      1) Continue Bolus feeds Glucerna 1.2 (5 cans daily) provides 1425kcal, 26/kg, protein 71grams, 1.3/kg, based on dosing weight 54.4kgn, water 960ml, plus added H2O [id number]   2) as discussed with NP,  Speech and Swallow evaluation planned to assess need for prolonged enteral nutrition support    Monitoring and Evaluation:     Continue to monitor Nutritional intake, Tolerance to diet prescription, weights, labs, skin integrity    RD remains available upon request and will follow up per protocol

## 2019-01-30 NOTE — PROGRESS NOTE ADULT - SUBJECTIVE AND OBJECTIVE BOX
Patient is a 78y old  Male who presents with a chief complaint of altered mental status (30 Jan 2019 13:37)      SUBJECTIVE / OVERNIGHT EVENTS:    Events noted.  Nonverbal    MEDICATIONS  (STANDING):  cholecalciferol 2000 Unit(s) Oral daily  heparin  Injectable 5000 Unit(s) SubCutaneous every 8 hours  insulin lispro (HumaLOG) corrective regimen sliding scale   SubCutaneous every 6 hours  pantoprazole   Suspension 40 milliGRAM(s) Oral two times a day before meals    MEDICATIONS  (PRN):        CAPILLARY BLOOD GLUCOSE      POCT Blood Glucose.: 287 mg/dL (30 Jan 2019 18:15)  POCT Blood Glucose.: 250 mg/dL (30 Jan 2019 12:50)  POCT Blood Glucose.: 235 mg/dL (30 Jan 2019 06:18)  POCT Blood Glucose.: 231 mg/dL (29 Jan 2019 23:58)    I&O's Summary    29 Jan 2019 07:01  -  30 Jan 2019 07:00  --------------------------------------------------------  IN: 2020 mL / OUT: 0 mL / NET: 2020 mL    30 Jan 2019 07:01  -  30 Jan 2019 20:27  --------------------------------------------------------  IN: 1020 mL / OUT: 0 mL / NET: 1020 mL        PHYSICAL EXAM:  GENERAL: NAD  NECK: Supple, No JVD  CHEST/LUNG: Clear to auscultation bilaterally; No wheezing.  HEART: Regular rate and rhythm; No murmurs, rubs, or gallops  ABDOMEN: Soft, Nontender, Nondistended; Bowel sounds present  EXTREMITIES:   No clubbing, cyanosis, or edema  NEUROLOGY: Nonverbal      LABS:                  CAPILLARY BLOOD GLUCOSE      POCT Blood Glucose.: 287 mg/dL (30 Jan 2019 18:15)  POCT Blood Glucose.: 250 mg/dL (30 Jan 2019 12:50)  POCT Blood Glucose.: 235 mg/dL (30 Jan 2019 06:18)  POCT Blood Glucose.: 231 mg/dL (29 Jan 2019 23:58)                RADIOLOGY & ADDITIONAL TESTS:    Imaging Personally Reviewed:    Consultant(s) Notes Reviewed:      Care Discussed with Consultants/Other Providers:

## 2019-01-30 NOTE — SWALLOW BEDSIDE ASSESSMENT ADULT - SWALLOW EVAL: DIAGNOSIS
Pt known to this service, with known h/o oropharyngeal dysphagia, last seen for MBS on 12/7/17, which recommended most conservative diet. Pt currently presents with evidence of 1) oropharyngeal dysphagia notable for s/s laryngeal penetration/aspiration with most conservative consistencies, 2) cognitive-linguistic deficits c/w h/o mx CVAs and encephalopathy. Pt known to this service, with known h/o oropharyngeal dysphagia, last seen for MBS on 12/7/17, which recommended most conservative diet. Pt currently presents with evidence of 1) oropharyngeal dysphagia notable for s/s laryngeal penetration/aspiration with most conservative consistencies, 2) cognitive-linguistic deficits c/w h/o mx CVAs and encephalopathy, 3) dysarthria.

## 2019-01-30 NOTE — SWALLOW BEDSIDE ASSESSMENT ADULT - SLP GENERAL OBSERVATIONS
Pt seen at bedside, asleep, easily arousable, minimally verbally responsive, mostly unintelligible due to dysarthria, inconsistently following simple directions for the purposes of the exam.

## 2019-01-30 NOTE — SWALLOW BEDSIDE ASSESSMENT ADULT - PHARYNGEAL PHASE
Delayed pharyngeal swallow/Decreased laryngeal elevation/Wet vocal quality post oral intake/Delayed cough post oral intake

## 2019-01-30 NOTE — SWALLOW BEDSIDE ASSESSMENT ADULT - COMMENTS
Again in 12/2018 was hospitalized for hypoxic respiratory failure 2/2 to acute decompensated HF requiring NIPPV. Hospital course was c/b steracolitis for which he was given cipro/flagyl. Spouse reports he completed this on d/c. Day of adm, pt's outpatient blood work returned with sodium in "160s" and blood glucose in "400s" despite continuing outpt metformin therapy. Therefore, pt & family was advised to come to hospital for further management.  Of note family reports patient had diarrheal illness which resolved. Last BM was three days ago. Per family, pt has not been reporting any type of pain. Family also reports pt has had temperatures of 101 at home since d/c.    Per Medicine: Pt a/w acute encephalopathy presumably from infectious source vs metabolic derangement vs central process; hyperglycemia; lactic acidosis.    Swallow Hx: MBS 12/7/17: Pt presents with an oropharyngeal dysphagia notable for reduced oral management, and silent laryngeal penetration/aspiration with all liquid consistencies and soft solids. Use of controlled volume via teaspoon delivery and head in Pt's neutral position (in head down position) was effective for improving airway protection with honey-thick liquids. Rx Dysphagia 1 with Honey-thick liquids via teaspoon.

## 2019-01-30 NOTE — PROGRESS NOTE ADULT - NSHPATTENDINGPLANDISCUSS_GEN_ALL_CORE
Dr. Trevizo
Patients son
Patients son and wife
wife and son at bedside
Family at bedside
NP,
NP,
NP, Family at bedside
wife and son at bedside

## 2019-01-31 ENCOUNTER — TRANSCRIPTION ENCOUNTER (OUTPATIENT)
Age: 79
End: 2019-01-31

## 2019-01-31 LAB
GLUCOSE BLDC GLUCOMTR-MCNC: 203 MG/DL — HIGH (ref 70–99)
GLUCOSE BLDC GLUCOMTR-MCNC: 206 MG/DL — HIGH (ref 70–99)
GLUCOSE BLDC GLUCOMTR-MCNC: 210 MG/DL — HIGH (ref 70–99)
GLUCOSE BLDC GLUCOMTR-MCNC: 216 MG/DL — HIGH (ref 70–99)
GLUCOSE BLDC GLUCOMTR-MCNC: 224 MG/DL — HIGH (ref 70–99)

## 2019-01-31 PROCEDURE — 99232 SBSQ HOSP IP/OBS MODERATE 35: CPT

## 2019-01-31 RX ADMIN — HEPARIN SODIUM 5000 UNIT(S): 5000 INJECTION INTRAVENOUS; SUBCUTANEOUS at 05:45

## 2019-01-31 RX ADMIN — PANTOPRAZOLE SODIUM 40 MILLIGRAM(S): 20 TABLET, DELAYED RELEASE ORAL at 08:00

## 2019-01-31 RX ADMIN — Medication 2: at 06:29

## 2019-01-31 RX ADMIN — Medication 2000 UNIT(S): at 11:31

## 2019-01-31 RX ADMIN — Medication 2: at 12:53

## 2019-01-31 RX ADMIN — Medication 2: at 00:39

## 2019-01-31 RX ADMIN — Medication 2: at 17:40

## 2019-01-31 RX ADMIN — PANTOPRAZOLE SODIUM 40 MILLIGRAM(S): 20 TABLET, DELAYED RELEASE ORAL at 16:48

## 2019-01-31 RX ADMIN — HEPARIN SODIUM 5000 UNIT(S): 5000 INJECTION INTRAVENOUS; SUBCUTANEOUS at 12:53

## 2019-01-31 RX ADMIN — HEPARIN SODIUM 5000 UNIT(S): 5000 INJECTION INTRAVENOUS; SUBCUTANEOUS at 22:24

## 2019-01-31 NOTE — PROGRESS NOTE ADULT - ASSESSMENT
79 yo M w/ hx of CVA w/ left sided weakness, CAD w/ CABG 2013, prior hx of thoracic aortic aneurysm repair, HTN, DM2, systolic CHF (mild LV dysfunction in 11/2018), bioprosthetic aortic valve, BPH, presents with altered mental status.  He was admitted in November 2018 for MRSA bacteremia. DIKCSON was not performed according to discharge paperwork bc family declined. However, spouse and son report that this was never the case. Patient was treated with prolonged IV antibiotics including vancomycin/rifampin/gentamicin.   Then again in december 2018 he was hospitalized for hypoxic respiratory failure 2/2 to acute decompensated heart failure requiring NIPPV. His hospital course was complicated by steralcolitis for which he was given cipro/flagyl. Spouse reports he completed this on discharge. Today the patient's outpatient blood work returned with sodium in "160s" and blood glucose in "400s" despite continuing outpatient metformin therapy. Therefore, the patient & family was advised to come to hospital for further management.       # Positive Blood culture- CNS/staph epidermidis.  ? contaminant vs. true infection.  Given presence of aortic valve repair, agree with starting abx with vancomycin.  f/u repeat blood cultures.  TTE no vegetations seen.  Son deferring DICKSON as per discussion with cardiologist, ESR and CRP are relatively low, TTE without vegetations, repeat bcx ngtd.  Very low suspicion for endocarditis.  Will treat 7 to 10 day course for bacteremia, suspect more likely a contaminant than true infection      Recommend:    - No new fevers, d/c vancomycin (as of 1/27).  Pt has completed abx course     - Monitor temp curve, WBC.  If pt spikes fever, repeat bcx x 2, chest xray, ua, ucx.  No acute ID issues at this time.     d/c planning as per primary team    Tanisha Ash  [phone]

## 2019-01-31 NOTE — DISCHARGE NOTE ADULT - HOSPITAL COURSE
77 yo M w/ hx of CVA w/ left sided weakness, CAD w/ CABG 2013, prior hx of thoracic aortic aneurysm repair, HTN, DM2, systolic CHF (mild LV dysfunction in 11/2018), bioprosthetic aortic valve, BPH, presents with acute encephalopathy presumably from infectious source versus metabolic derangement versus a central process.   Dysphagia: had  PEG tube placed 1/22/19-  seen by renal/cardiology/wound care;  follow up with primary MD-Dr. Trevizo in 1 week  home care follow up.

## 2019-01-31 NOTE — DISCHARGE NOTE ADULT - PATIENT PORTAL LINK FT
You can access the [facility] Patient Portal, offered by [facility], by registering with the following website: [email or web address]

## 2019-01-31 NOTE — PROGRESS NOTE ADULT - SUBJECTIVE AND OBJECTIVE BOX
Infectious Diseases progress note:    Subjective: NAD, afebrile.  No acute o/n events    ROS:  nonverbal    Allergies    No Known Allergies    Intolerances        ANTIBIOTICS/RELEVANT:  antimicrobials    immunologic:    OTHER:  cholecalciferol 2000 Unit(s) Oral daily  heparin  Injectable 5000 Unit(s) SubCutaneous every 8 hours  insulin lispro (HumaLOG) corrective regimen sliding scale   SubCutaneous every 6 hours  pantoprazole   Suspension 40 milliGRAM(s) Oral two times a day before meals      Objective:  Vital Signs Last 24 Hrs  T(C): 36.9 (31 Jan 2019 05:41), Max: 36.9 (31 Jan 2019 05:41)  T(F): 98.5 (31 Jan 2019 05:41), Max: 98.5 (31 Jan 2019 05:41)  HR: 80 (31 Jan 2019 05:41) (80 - 104)  BP: 106/69 (31 Jan 2019 05:41) (100/67 - 106/69)  BP(mean): --  RR: 18 (31 Jan 2019 05:41) (18 - 18)  SpO2: 97% (31 Jan 2019 05:41) (95% - 97%)    PHYSICAL EXAM:  Constitutional:NAD  Eyes:BENJY, EOMI  Ear/Nose/Throat: no thrush, mucositis.  Moist mucous membranes	  Neck:no JVD, no lymphadenopathy, supple  Respiratory: CTA phi  Cardiovascular: S1S2 RRR, no murmurs  Gastrointestinal:soft, nontender,  nondistended (+) BS, peg in place  Extremities:no e/e/c  Skin:  no rashes, open wounds or ulcerations        LABS:                                  MICROBIOLOGY:    No new culture data      RADIOLOGY & ADDITIONAL STUDIES:

## 2019-01-31 NOTE — DISCHARGE NOTE ADULT - CARE PROVIDER_API CALL
Matt Trevizo)  Medicine  Dept Director  [address] [place] [address]  Phone: [phone]  Fax: [phone]    Walt Chavez (DO)  Gastroenterology; Internal Medicine  [address] [place] [address]  Phone: [phone]  Fax: [phone] Matt Trevizo)  Medicine  Dept Director  [address] [place] [address]  Phone: [phone]  Fax: [phone]    Walt Chavez ()  Gastroenterology; Internal Medicine  [address] [place] [address]  Phone: [phone]  Fax: [phone]    Rajendra Verde (MD)  Surgery  [facility] [address], [address]  [place] [address]  Phone: [phone]  Fax: [phone]    Jorje Krishna)  Internal Medicine  Phone: [phone]  Fax: [phone] Matt Trevizo)  Medicine  Dept Director  [address] [place] [address]  Phone: [phone]  Fax: [phone]    Walt Chavez ()  Gastroenterology; Internal Medicine  [address] [place] [address]  Phone: [phone]  Fax: [phone]    Jorje Krishna)  Internal Medicine  Phone: [phone]  Fax: [phone]    Naty Elizondo)  Surgery; Vascular Surgery  [address] [facility] [address], [address]  [place] [address]  Phone: [phone]  Fax: [phone]

## 2019-01-31 NOTE — DISCHARGE NOTE ADULT - CARE PLAN
Principal Discharge DX:	Acute encephalopathy  Secondary Diagnosis:	JENNIFER (acute kidney injury)  Secondary Diagnosis:	Bacteremia  Secondary Diagnosis:	Chronic systolic congestive heart failure  Secondary Diagnosis:	Dysphagia  Assessment and plan of treatment:	peg tube placement on 1/22/19 Principal Discharge DX:	Acute encephalopathy  Goal:	stable baseline mental status  Assessment and plan of treatment:	total assistance in all activities  fall precaution  Secondary Diagnosis:	JENNIFER (acute kidney injury)  Assessment and plan of treatment:	avoid kidney toxic medication  monitor kidney function by your doctor  Secondary Diagnosis:	Bacteremia  Assessment and plan of treatment:	completed antibiotic  Secondary Diagnosis:	Chronic systolic congestive heart failure  Assessment and plan of treatment:	monitor weight as prescribed  follow up by your cardiologist  Secondary Diagnosis:	Dysphagia  Assessment and plan of treatment:	peg tube placement on 1/22/19  continue tube feeds and water flush as prescribed  elevate head of bed 45 degree when in bed

## 2019-01-31 NOTE — DISCHARGE NOTE ADULT - PLAN OF CARE
peg tube placement on 1/22/19 stable baseline mental status total assistance in all activities  fall precaution avoid kidney toxic medication  monitor kidney function by your doctor completed antibiotic monitor weight as prescribed  follow up by your cardiologist peg tube placement on 1/22/19  continue tube feeds and water flush as prescribed  elevate head of bed 45 degree when in bed

## 2019-01-31 NOTE — PROGRESS NOTE ADULT - ASSESSMENT
79 yo M w/ hx of CVA w/ left sided weakness, CAD w/ CABG 2013, prior hx of thoracic aortic aneurysm repair, HTN, DM2, systolic CHF (mild LV dysfunction in 11/2018), bioprosthetic aortic valve, BPH, presents with acute encephalopathy presumably from infectious source versus metabolic derangement versus a central process.     Buttock / sacrum w/ stage 2 & moisture wound- Gluteal cleft/ Rt buttock- TRIAD  Stage 2 pressure injury Lt posterior midthoracic region- healed  BLE elevation  Abx per Medicine/ ID  Moisturize intact skin w/ SWEEN cream BID  con't Nutrition (as tolerated), Nutrition Consult  con't Offloading   con't Pericare  Care as per medicine will follow w/ you  Upon discharge f/u as outpatient at [facility] [address] [place] [address] [phone]  D/w team & attng  Magdalene Vieira PA-C [id number]

## 2019-01-31 NOTE — PROGRESS NOTE ADULT - SUBJECTIVE AND OBJECTIVE BOX
Interval Events: pt seen and examined.   Tolerating PEG feeds without residuals as per RN.     MEDICATIONS  (STANDING):  cholecalciferol 2000 Unit(s) Oral daily  heparin  Injectable 5000 Unit(s) SubCutaneous every 8 hours  insulin lispro (HumaLOG) corrective regimen sliding scale   SubCutaneous every 6 hours  pantoprazole   Suspension 40 milliGRAM(s) Oral two times a day before meals    MEDICATIONS  (PRN):      Allergies    No Known Allergies    Intolerances      Review of Systems: unable to obtain.       Vital Signs Last 24 Hrs  T(C): 36.9 (31 Jan 2019 05:41), Max: 36.9 (31 Jan 2019 05:41)  T(F): 98.5 (31 Jan 2019 05:41), Max: 98.5 (31 Jan 2019 05:41)  HR: 80 (31 Jan 2019 05:41) (80 - 104)  BP: 106/69 (31 Jan 2019 05:41) (100/67 - 106/69)  BP(mean): --  RR: 18 (31 Jan 2019 05:41) (18 - 18)  SpO2: 97% (31 Jan 2019 05:41) (95% - 97%)    PHYSICAL EXAM:    nad  frail  non toxic  soft, nt, + peg with abdominal binder c/d/i  no edema      LABS:                RADIOLOGY & ADDITIONAL TESTS:

## 2019-01-31 NOTE — DISCHARGE NOTE ADULT - PROVIDER TOKENS
TOKEN:'[id number]',TOKEN:'[id number]' TOKEN:'[id number]',TOKEN:'[id number]:'[id number]',TOKEN:'[id number]' TOKEN:'[id number]:'[id number]:'[id number]',TOKEN:'[id number]'

## 2019-01-31 NOTE — DISCHARGE NOTE ADULT - ADDITIONAL INSTRUCTIONS
follow up-home care follow up-home care  skin care  buttock / sacrum -stage 2 & moisture wound- Gluteal cleft/ Rt buttock- TRIAD paste twice daily  lower extremity elevation in bed  Moisturize intact skin with SWEEN cream twice daily

## 2019-01-31 NOTE — DISCHARGE NOTE ADULT - HOME CARE AGENCY
Your home care services has been referred to [facility] ([phone]).  Please call them to inquire about time of Registered Nurse visit.

## 2019-01-31 NOTE — PROGRESS NOTE ADULT - SUBJECTIVE AND OBJECTIVE BOX
SUBJECTIVE: Pt seen, chart reviewed.  no f/c/s/  no odor, redness, pain, drainage    ROS pt unable to offer    heparin  Injectable 5000 Unit(s) SubCutaneous every 8 hours      Physical Exam:  Vital Signs Last 24 Hrs  T(C): 36.2 (31 Jan 2019 12:48), Max: 36.9 (31 Jan 2019 05:41)  T(F): 97.2 (31 Jan 2019 12:48), Max: 98.5 (31 Jan 2019 05:41)  HR: 89 (31 Jan 2019 12:48) (80 - 89)  BP: 123/83 (31 Jan 2019 12:48) (100/67 - 123/83)  BP(mean): --  RR: 18 (31 Jan 2019 12:48) (18 - 18)  SpO2: 96% (31 Jan 2019 12:48) (95% - 97%)  General Appearance:   NAD /Alert  cachectic/  frail  WD/ WN/ WG  nonverbal/ no follow directions  bed ridden  Versa Care P500 bed    Skin:  moist w/ good turgor    Bilateral buttocks / sacrum w/1 partial thickness wounds 2cm x 2cm 0.1cm  with improved moisture dermatitis  No drainage  No odor, erythema, increased warmth, tenderness, induration, fluctuance      LABS:  Complete Blood Count in AM (01.27.19 @ 05:26)    WBC Count: 6.0 K/uL    RBC Count: 3.19 M/uL    Hemoglobin: 11.0 g/dL    Hematocrit: 31.3 %    Mean Cell Volume: 98.0 fl    Mean Cell Hemoglobin: 34.5 pg    Mean Cell Hemoglobin Conc: 35.2 gm/dL    Red Cell Distrib Width: 13.1 %    Platelet Count - Automated: 107 K/uL    Basic Metabolic Panel - STAT (01.28.19 @ 11:31)    Sodium, Serum: 134 mmol/L    Potassium, Serum: 5.2 mmol/L    Chloride, Serum: 99 mmol/L    Carbon Dioxide, Serum: 24 mmol/L    Anion Gap, Serum: 11 mmol/L    Blood Urea Nitrogen, Serum: 28 mg/dL    Creatinine, Serum: 0.71 mg/dL    Glucose, Serum: 255 mg/dL    Calcium, Total Serum: 9.2 mg/dL         CULTURES:  Culture - Blood in AM (01.17.19 @ 14:14)    Specimen Source: .Blood Blood-Peripheral    Culture Results:   No growth at 5 days.

## 2019-01-31 NOTE — DISCHARGE NOTE ADULT - DURABLE MEDICAL EQUIPMENT AGENCY
Your enteral nutrition/tube feed formula was ordered from [facility] [phone].

## 2019-01-31 NOTE — DISCHARGE NOTE ADULT - MEDICATION SUMMARY - MEDICATIONS TO TAKE
I will START or STAY ON the medications listed below when I get home from the hospital:    Herlinda lift  -- Indication: For Assist with moving patient    PEG Feeding: Glucerna 1.2 uriel   -- SHIRA: 99  DX; G93.40  240 cc q 4hourly    -- Indication: For Tube feeds     Bulb syringe  -- Bulb syringe for tube feeds  -- Indication: For For administering medication    tamsulosin 0.4 mg oral capsule  -- 1 cap(s) by mouth once a day (at bedtime)  -- Indication: For PBH    metFORMIN 500 mg oral tablet  -- 1 tab(s) by mouth 3 times a day  -- Indication: For Diabetes    simvastatin 20 mg oral tablet  -- 1 tab(s) by mouth once a day (in the evening)  -- Indication: For Cholesterol    Lasix 20 mg oral tablet  -- 1 tab(s) by gastrostomy tube once a day  -- Indication: For Water pill    docusate sodium 10 mg/mL oral liquid  -- 10 milliliter(s) by gastrostomy tube 2 times a day, As Needed  -- Indication: For Stool softener    pantoprazole 40 mg oral granule, delayed release  -- 40 milligram(s) by gastrostomy tube once a day  -- Indication: For Stomach acid    multivitamin  -- 1 tab(s) by gastrostomy tube once a day  -- Indication: For Supplement    cholecalciferol oral tablet  -- 2000 unit(s) by gastrostomy tube once a day  -- Indication: For Vitamin D Supplement

## 2019-01-31 NOTE — DISCHARGE NOTE ADULT - CARE PROVIDERS DIRECT ADDRESSES
,DirectAddress_Unknown,DirectAddress_Unknown ,DirectAddress_Unknown,DirectAddress_Unknown,[email or web address] ,DirectAddress_Unknown,DirectAddress_Unknown,[email or web address]

## 2019-01-31 NOTE — DISCHARGE NOTE ADULT - INSTRUCTIONS
glucerna 1.2 uriel bolus feeds 240ml 5 cans daily; water flush 100ml every 6 hours Glucerna 1.2 uriel bolus feeds 240ml 5 cans daily; water flush 100ml every 6 hours

## 2019-01-31 NOTE — PROGRESS NOTE ADULT - SUBJECTIVE AND OBJECTIVE BOX
[facility] [place]   MD FREDRICK JUAREZ MD RUORU WONG, PA    TEL:  OFFICE: [phone]  DR BREWER CELL: [phone]  DAVID ESCAMILLA CELL: [phone]  DR. PHILLIP CELL: [phone]    RENAL FOLLOW UP NOTE  --------------------------------------------------------------------------------  HPI:      Pt seen and examined at bedside.       PAST HISTORY  --------------------------------------------------------------------------------  No significant changes to PMH, PSH, FHx, SHx, unless otherwise noted    ALLERGIES & MEDICATIONS  --------------------------------------------------------------------------------  Allergies    No Known Allergies    Intolerances      Standing Inpatient Medications  cholecalciferol 2000 Unit(s) Oral daily  heparin  Injectable 5000 Unit(s) SubCutaneous every 8 hours  insulin lispro (HumaLOG) corrective regimen sliding scale   SubCutaneous every 6 hours  pantoprazole   Suspension 40 milliGRAM(s) Oral two times a day before meals    PRN Inpatient Medications      REVIEW OF SYSTEMS  --------------------------------------------------------------------------------  General: no fever    MSK: no edema     VITALS/PHYSICAL EXAM  --------------------------------------------------------------------------------  T(C): 36.9 (01-31-19 @ 05:41), Max: 36.9 (01-31-19 @ 05:41)  HR: 80 (01-31-19 @ 05:41) (80 - 104)  BP: 106/69 (01-31-19 @ 05:41) (100/67 - 106/69)  RR: 18 (01-31-19 @ 05:41) (18 - 18)  SpO2: 97% (01-31-19 @ 05:41) (95% - 97%)  Wt(kg): --        01-30-19 @ 07:01  -  01-31-19 @ 07:00  --------------------------------------------------------  IN: 1940 mL / OUT: 0 mL / NET: 1940 mL      Physical Exam:  	Gen: NAD  	HEENT: MMM  	Pulm: CTA B/L  	CV: S1S2  	Abd: Soft, +BS  	Ext:+ LE edema B/L                      Neuro: Lethargic  	Skin: Warm and Dry   	ABNER no tamiko    LABS/STUDIES  --------------------------------------------------------------------------------                Creatinine Trend:  SCr 0.71 [01-28 @ 11:31]  SCr 0.69 [01-27 @ 05:26]  SCr 0.88 [01-25 @ 05:53]  SCr 0.86 [01-24 @ 06:02]  SCr 0.81 [01-23 @ 06:46]    Urinalysis - [01-12-19 @ 20:19]      Color Yellow / Appearance Clear / SG 1.022 / pH 5.5      Gluc 500 mg/dL / Ketone Negative  / Bili Negative / Urobili Negative       Blood Negative / Protein Trace / Leuk Est Negative / Nitrite Negative      RBC 3 / WBC 1 / Hyaline 4 / Gran  / Sq Epi  / Non Sq Epi 0 / Bacteria Negative      PTH -- (Ca 8.5)      [01-17-19 @ 13:34]   71  HbA1c 7.1      [11-02-18 @ 06:00]  TSH 0.47      [01-13-19 @ 00:25]

## 2019-02-01 LAB
GLUCOSE BLDC GLUCOMTR-MCNC: 183 MG/DL — HIGH (ref 70–99)
GLUCOSE BLDC GLUCOMTR-MCNC: 190 MG/DL — HIGH (ref 70–99)
GLUCOSE BLDC GLUCOMTR-MCNC: 223 MG/DL — HIGH (ref 70–99)
GLUCOSE BLDC GLUCOMTR-MCNC: 233 MG/DL — HIGH (ref 70–99)
GLUCOSE BLDC GLUCOMTR-MCNC: 264 MG/DL — HIGH (ref 70–99)

## 2019-02-01 RX ORDER — CHOLECALCIFEROL (VITAMIN D3) 125 MCG
2000 CAPSULE ORAL DAILY
Qty: 0 | Refills: 0 | Status: DISCONTINUED | OUTPATIENT
Start: 2019-02-01 | End: 2019-02-02

## 2019-02-01 RX ORDER — CHOLECALCIFEROL (VITAMIN D3) 125 MCG
2000 CAPSULE ORAL
Qty: 0 | Refills: 0 | COMMUNITY
Start: 2019-02-01

## 2019-02-01 RX ORDER — PANTOPRAZOLE SODIUM 20 MG/1
40 TABLET, DELAYED RELEASE ORAL
Qty: 0 | Refills: 0 | COMMUNITY
Start: 2019-02-01

## 2019-02-01 RX ADMIN — Medication 1: at 05:40

## 2019-02-01 RX ADMIN — Medication 3: at 12:53

## 2019-02-01 RX ADMIN — PANTOPRAZOLE SODIUM 40 MILLIGRAM(S): 20 TABLET, DELAYED RELEASE ORAL at 15:52

## 2019-02-01 RX ADMIN — HEPARIN SODIUM 5000 UNIT(S): 5000 INJECTION INTRAVENOUS; SUBCUTANEOUS at 05:40

## 2019-02-01 RX ADMIN — HEPARIN SODIUM 5000 UNIT(S): 5000 INJECTION INTRAVENOUS; SUBCUTANEOUS at 13:51

## 2019-02-01 RX ADMIN — HEPARIN SODIUM 5000 UNIT(S): 5000 INJECTION INTRAVENOUS; SUBCUTANEOUS at 21:24

## 2019-02-01 RX ADMIN — Medication 2: at 17:48

## 2019-02-01 RX ADMIN — Medication 2000 UNIT(S): at 11:28

## 2019-02-01 RX ADMIN — PANTOPRAZOLE SODIUM 40 MILLIGRAM(S): 20 TABLET, DELAYED RELEASE ORAL at 08:16

## 2019-02-01 RX ADMIN — Medication 2: at 00:00

## 2019-02-01 NOTE — PROGRESS NOTE ADULT - ASSESSMENT
79 yo M w/ hx of CVA w/ left sided weakness, CAD w/ CABG 2013, prior hx of thoracic aortic aneurysm repair, HTN, DM2, systolic CHF (mild LV dysfunction in 11/2018), bioprosthetic aortic valve, BPH, presents with altered mental status.     s/p  PEG placement

## 2019-02-01 NOTE — PROGRESS NOTE ADULT - ASSESSMENT
79 yo M w/ hx of CVA w/ left sided weakness, CAD w/ CABG 2013, prior hx of thoracic aortic aneurysm repair, HTN, DM2, systolic CHF (mild LV dysfunction in 11/2018), bioprosthetic aortic valve, BPH, presents with altered mental status.  He was admitted in November 2018 for MRSA bacteremia. DICKSON was not performed according to discharge paperwork bc family declined. However, spouse and son report that this was never the case. Patient was treated with prolonged IV antibiotics including vancomycin/rifampin/gentamicin.   Then again in december 2018 he was hospitalized for hypoxic respiratory failure 2/2 to acute decompensated heart failure requiring NIPPV. His hospital course was complicated by steralcolitis for which he was given cipro/flagyl. Spouse reports he completed this on discharge. Today the patient's outpatient blood work returned with sodium in "160s" and blood glucose in "400s" despite continuing outpatient metformin therapy. Therefore, the patient & family was advised to come to hospital for further management.       # Positive Blood culture- CNS/staph epidermidis.  ? contaminant vs. true infection.  Given presence of aortic valve repair, agree with starting abx with vancomycin.  f/u repeat blood cultures.  TTE no vegetations seen.  Son deferring DICKSON as per discussion with cardiologist, ESR and CRP are relatively low, TTE without vegetations, repeat bcx ngtd.  Very low suspicion for endocarditis.  Will treat 7 to 10 day course for bacteremia, suspect more likely a contaminant than true infection      Recommend:    - No new fevers, d/c vancomycin (as of 1/27).  Pt has completed abx course     - Monitor temp curve, WBC.  If pt spikes fever, repeat bcx x 2, chest xray, ua, ucx.  No acute ID issues at this time.     - Pt with sacrum stage 2 moisture wound and stage 2 pressure injury.  Appreciate wound care eval - cont offloading and topical care.      * No acute ID issues at this time    d/c planning as per primary team    Tanisha Ash  [phone]

## 2019-02-01 NOTE — PROGRESS NOTE ADULT - PROBLEM SELECTOR PROBLEM 4
Hypocalcemia

## 2019-02-01 NOTE — PROGRESS NOTE ADULT - SUBJECTIVE AND OBJECTIVE BOX
[facility] [place] [facility]   MD FREDRICK JUAREZ MD RUORU WONG, PA    TEL:  OFFICE: [phone]  DR BREWER CELL: [phone]  DAVID ESCAMILLA CELL: [phone]  DR. PHILLIP CELL: [phone]    RENAL FOLLOW UP NOTE  --------------------------------------------------------------------------------  HPI:      Pt seen and examined at bedside.       PAST HISTORY  --------------------------------------------------------------------------------  No significant changes to PMH, PSH, FHx, SHx, unless otherwise noted    ALLERGIES & MEDICATIONS  --------------------------------------------------------------------------------  Allergies    No Known Allergies    Intolerances      Standing Inpatient Medications  cholecalciferol 2000 Unit(s) Oral daily  heparin  Injectable 5000 Unit(s) SubCutaneous every 8 hours  insulin lispro (HumaLOG) corrective regimen sliding scale   SubCutaneous every 6 hours  pantoprazole   Suspension 40 milliGRAM(s) Oral two times a day before meals    PRN Inpatient Medications      REVIEW OF SYSTEMS  --------------------------------------------------------------------------------  General: no fever  MSK: no edema     VITALS/PHYSICAL EXAM  --------------------------------------------------------------------------------  T(C): 36.6 (02-01-19 @ 05:28), Max: 36.6 (02-01-19 @ 05:28)  HR: 99 (02-01-19 @ 05:28) (89 - 109)  BP: 115/75 (02-01-19 @ 05:28) (115/75 - 124/87)  RR: 18 (02-01-19 @ 05:28) (18 - 18)  SpO2: 96% (02-01-19 @ 05:28) (96% - 96%)  Wt(kg): --        01-31-19 @ 07:01  -  02-01-19 @ 07:00  --------------------------------------------------------  IN: 1940 mL / OUT: 0 mL / NET: 1940 mL    02-01-19 @ 07:01  -  02-01-19 @ 09:30  --------------------------------------------------------  IN: 340 mL / OUT: 0 mL / NET: 340 mL      Physical Exam:  	Gen: NAD  	HEENT: RAGINI  	Pulm: CTA B/L  	CV: S1S2  	Abd: Soft, +BS  	Ext: No LE edema B/L                      Neuro: opens eyes  	Skin: Warm and Dry   	Gu no tamiko    LABS/STUDIES  --------------------------------------------------------------------------------                Creatinine Trend:  SCr 0.71 [01-28 @ 11:31]  SCr 0.69 [01-27 @ 05:26]  SCr 0.88 [01-25 @ 05:53]  SCr 0.86 [01-24 @ 06:02]  SCr 0.81 [01-23 @ 06:46]    Urinalysis - [01-12-19 @ 20:19]      Color Yellow / Appearance Clear / SG 1.022 / pH 5.5      Gluc 500 mg/dL / Ketone Negative  / Bili Negative / Urobili Negative       Blood Negative / Protein Trace / Leuk Est Negative / Nitrite Negative      RBC 3 / WBC 1 / Hyaline 4 / Gran  / Sq Epi  / Non Sq Epi 0 / Bacteria Negative      PTH -- (Ca 8.5)      [01-17-19 @ 13:34]   71  HbA1c 7.1      [11-02-18 @ 06:00]  TSH 0.47      [01-13-19 @ 00:25]

## 2019-02-01 NOTE — PROGRESS NOTE ADULT - PROBLEM SELECTOR PROBLEM 3
Hypertension

## 2019-02-01 NOTE — PROGRESS NOTE ADULT - SUBJECTIVE AND OBJECTIVE BOX
Interval Events:pt seen and examined.   Tolerating PEG feeds    MEDICATIONS  (STANDING):  cholecalciferol 2000 Unit(s) Oral daily  heparin  Injectable 5000 Unit(s) SubCutaneous every 8 hours  insulin lispro (HumaLOG) corrective regimen sliding scale   SubCutaneous every 6 hours  pantoprazole   Suspension 40 milliGRAM(s) Oral two times a day before meals    MEDICATIONS  (PRN):      Allergies    No Known Allergies    Intolerances        Review of Systems: unable to obtain.     Vital Signs Last 24 Hrs  T(C): 36.6 (01 Feb 2019 09:59), Max: 36.6 (01 Feb 2019 05:28)  T(F): 97.9 (01 Feb 2019 09:59), Max: 97.9 (01 Feb 2019 09:59)  HR: 91 (01 Feb 2019 09:59) (89 - 109)  BP: 106/71 (01 Feb 2019 09:59) (106/71 - 124/87)  BP(mean): --  RR: 18 (01 Feb 2019 09:59) (18 - 18)  SpO2: 97% (01 Feb 2019 09:59) (96% - 97%)    PHYSICAL EXAM:    nad  frail  non toxic  soft, nt, + peg with abdominal binder c/d/i  no edema    LABS:                RADIOLOGY & ADDITIONAL TESTS:

## 2019-02-01 NOTE — PROGRESS NOTE ADULT - PROBLEM SELECTOR PLAN 4
Vitamin D low-- getting Vit D  Monitor serum Calcium
Check PTH, Vitamin D  Monitor serum Calcium
Check PTH, Vitamin D  Monitor serum Calcium
Vitamin D low-- getting Vit D  Monitor serum Calcium
Vitamin D low-- start ergocalciferol 50,000 units Q weekly x 8 doses  Monitor serum Calcium
Vitamin D low-- start ergocalciferol 50,000 units Q weekly x 8 doses  Monitor serum Calcium

## 2019-02-01 NOTE — PROGRESS NOTE ADULT - SUBJECTIVE AND OBJECTIVE BOX
Infectious Diseases progress note:    Subjective: No acute events.  Afebrile    ROS:  nonverbal    Allergies    No Known Allergies    Intolerances        ANTIBIOTICS/RELEVANT:  antimicrobials    immunologic:    OTHER:  cholecalciferol 2000 Unit(s) Oral daily  heparin  Injectable 5000 Unit(s) SubCutaneous every 8 hours  insulin lispro (HumaLOG) corrective regimen sliding scale   SubCutaneous every 6 hours  pantoprazole   Suspension 40 milliGRAM(s) Oral two times a day before meals      Objective:  Vital Signs Last 24 Hrs  T(C): 36.3 (01 Feb 2019 12:10), Max: 36.6 (01 Feb 2019 05:28)  T(F): 97.4 (01 Feb 2019 12:10), Max: 97.9 (01 Feb 2019 09:59)  HR: 90 (01 Feb 2019 12:10) (90 - 109)  BP: 109/72 (01 Feb 2019 12:10) (106/71 - 124/87)  BP(mean): --  RR: 18 (01 Feb 2019 12:10) (18 - 18)  SpO2: 96% (01 Feb 2019 12:10) (96% - 97%)    PHYSICAL EXAM:  Constitutional:NAD  Eyes:BENJY, EOMI  Ear/Nose/Throat: no thrush, mucositis.  Moist mucous membranes	  Neck:no JVD, no lymphadenopathy, supple  Respiratory: CTA phi  Cardiovascular: S1S2 RRR, no murmurs  Gastrointestinal:soft, nontender,  nondistended (+) BS, peg in place  Extremities:no e/e/c  Skin:  no rashes, open wounds or ulcerations        LABS:              MICROBIOLOGY:    Culture - Blood in AM (01.17.19 @ 14:14)    Specimen Source: .Blood Blood-Peripheral    Culture Results:   No growth at 5 days.          RADIOLOGY & ADDITIONAL STUDIES:    < from: Xray Chest 1 View- PORTABLE-Urgent (01.14.19 @ 14:20) >  FINDINGS:    Status post median sternotomy and valve replacement.  Enteric tube seen coursing below the diaphragm, tip overlies the stomach.    No focal consolidation.  There is no pneumothorax. There are no pleural effusions.   The cardiomediastinal silhouette cannot be adequately assessed on this   projection.    IMPRESSION:   Clear lungs.   Enteric tube with overlying stomach.    < end of copied text >

## 2019-02-01 NOTE — PROGRESS NOTE ADULT - PROBLEM SELECTOR PLAN 3
BP acceptable  Monitor BP.

## 2019-02-02 VITALS
TEMPERATURE: 98 F | RESPIRATION RATE: 18 BRPM | HEART RATE: 92 BPM | SYSTOLIC BLOOD PRESSURE: 113 MMHG | DIASTOLIC BLOOD PRESSURE: 73 MMHG | OXYGEN SATURATION: 97 %

## 2019-02-02 LAB
GLUCOSE BLDC GLUCOMTR-MCNC: 242 MG/DL — HIGH (ref 70–99)
GLUCOSE BLDC GLUCOMTR-MCNC: 253 MG/DL — HIGH (ref 70–99)

## 2019-02-02 PROCEDURE — 82947 ASSAY GLUCOSE BLOOD QUANT: CPT

## 2019-02-02 PROCEDURE — L8699: CPT

## 2019-02-02 PROCEDURE — 84132 ASSAY OF SERUM POTASSIUM: CPT

## 2019-02-02 PROCEDURE — 85610 PROTHROMBIN TIME: CPT

## 2019-02-02 PROCEDURE — 83735 ASSAY OF MAGNESIUM: CPT

## 2019-02-02 PROCEDURE — 85027 COMPLETE CBC AUTOMATED: CPT

## 2019-02-02 PROCEDURE — 85730 THROMBOPLASTIN TIME PARTIAL: CPT

## 2019-02-02 PROCEDURE — 81001 URINALYSIS AUTO W/SCOPE: CPT

## 2019-02-02 PROCEDURE — 83970 ASSAY OF PARATHORMONE: CPT

## 2019-02-02 PROCEDURE — 80048 BASIC METABOLIC PNL TOTAL CA: CPT

## 2019-02-02 PROCEDURE — 99285 EMERGENCY DEPT VISIT HI MDM: CPT | Mod: 25

## 2019-02-02 PROCEDURE — 84295 ASSAY OF SERUM SODIUM: CPT

## 2019-02-02 PROCEDURE — 86140 C-REACTIVE PROTEIN: CPT

## 2019-02-02 PROCEDURE — 93306 TTE W/DOPPLER COMPLETE: CPT

## 2019-02-02 PROCEDURE — 82330 ASSAY OF CALCIUM: CPT

## 2019-02-02 PROCEDURE — 74176 CT ABD & PELVIS W/O CONTRAST: CPT

## 2019-02-02 PROCEDURE — 82652 VIT D 1 25-DIHYDROXY: CPT

## 2019-02-02 PROCEDURE — 82803 BLOOD GASES ANY COMBINATION: CPT

## 2019-02-02 PROCEDURE — 85014 HEMATOCRIT: CPT

## 2019-02-02 PROCEDURE — 80053 COMPREHEN METABOLIC PANEL: CPT

## 2019-02-02 PROCEDURE — 70450 CT HEAD/BRAIN W/O DYE: CPT

## 2019-02-02 PROCEDURE — 93005 ELECTROCARDIOGRAM TRACING: CPT

## 2019-02-02 PROCEDURE — 87186 SC STD MICRODIL/AGAR DIL: CPT

## 2019-02-02 PROCEDURE — 83605 ASSAY OF LACTIC ACID: CPT

## 2019-02-02 PROCEDURE — 84443 ASSAY THYROID STIM HORMONE: CPT

## 2019-02-02 PROCEDURE — 87150 DNA/RNA AMPLIFIED PROBE: CPT

## 2019-02-02 PROCEDURE — 82310 ASSAY OF CALCIUM: CPT

## 2019-02-02 PROCEDURE — 85652 RBC SED RATE AUTOMATED: CPT

## 2019-02-02 PROCEDURE — 88312 SPECIAL STAINS GROUP 1: CPT

## 2019-02-02 PROCEDURE — 88305 TISSUE EXAM BY PATHOLOGIST: CPT

## 2019-02-02 PROCEDURE — 87086 URINE CULTURE/COLONY COUNT: CPT

## 2019-02-02 PROCEDURE — 87040 BLOOD CULTURE FOR BACTERIA: CPT

## 2019-02-02 PROCEDURE — 84484 ASSAY OF TROPONIN QUANT: CPT

## 2019-02-02 PROCEDURE — 83880 ASSAY OF NATRIURETIC PEPTIDE: CPT

## 2019-02-02 PROCEDURE — 71045 X-RAY EXAM CHEST 1 VIEW: CPT

## 2019-02-02 PROCEDURE — 82435 ASSAY OF BLOOD CHLORIDE: CPT

## 2019-02-02 PROCEDURE — 92610 EVALUATE SWALLOWING FUNCTION: CPT

## 2019-02-02 PROCEDURE — 82962 GLUCOSE BLOOD TEST: CPT

## 2019-02-02 PROCEDURE — 80202 ASSAY OF VANCOMYCIN: CPT

## 2019-02-02 RX ADMIN — PANTOPRAZOLE SODIUM 40 MILLIGRAM(S): 20 TABLET, DELAYED RELEASE ORAL at 09:37

## 2019-02-02 RX ADMIN — HEPARIN SODIUM 5000 UNIT(S): 5000 INJECTION INTRAVENOUS; SUBCUTANEOUS at 05:22

## 2019-02-02 RX ADMIN — Medication 3: at 06:17

## 2019-02-02 RX ADMIN — Medication 1: at 00:02

## 2019-02-02 RX ADMIN — Medication 2: at 13:23

## 2019-02-02 RX ADMIN — Medication 2000 UNIT(S): at 13:24

## 2019-02-02 RX ADMIN — HEPARIN SODIUM 5000 UNIT(S): 5000 INJECTION INTRAVENOUS; SUBCUTANEOUS at 13:24

## 2019-02-02 NOTE — PROGRESS NOTE ADULT - PROBLEM SELECTOR PLAN 2
likely sec to decreased PO intake  Improved with supplement  Monitor  serum potassium.
likely sec to decreased PO intake  Improved   Monitor  serum potassium.
- s/p course of abx   - Monitor temp curve, WBC  - ID input appreciated
likely sec to decreased PO intake  Improved with supplement  Monitor  serum potassium.
- s/p course of abx   - Monitor temp curve, WBC  - ID input appreciated
Advanced care planning was discussed with patient and family.  Advanced care planning forms were reviewed and discussed.  Risks, benefits and alternatives of gastroenterologic procedures were discussed in detail and all questions were answered.    30 minutes spent.
likely sec to decreased PO intake  Improved   Monitor  serum potassium.
likely sec to decreased PO intake  Improved with supplement  Monitor  serum potassium.
likely sec to decreased PO intake  Replete KCL 40mEQ x 1 dose   Monitor  serum potassium.

## 2019-02-02 NOTE — PROGRESS NOTE ADULT - SUBJECTIVE AND OBJECTIVE BOX
INTERVAL HPI/OVERNIGHT EVENTS:  No new overnight event.  No N/V/D.  Tolerating diet.     MEDICATIONS  (STANDING):  cholecalciferol 2000 Unit(s) Oral daily  heparin  Injectable 5000 Unit(s) SubCutaneous every 8 hours  insulin lispro (HumaLOG) corrective regimen sliding scale   SubCutaneous every 6 hours  pantoprazole   Suspension 40 milliGRAM(s) Oral two times a day before meals    MEDICATIONS  (PRN):      Allergies    No Known Allergies    Intolerances        Review of Systems:    General:  No wt loss, fevers, chills, night sweats,fatigue,   Eyes:  Good vision, no reported pain  ENT:  No sore throat, pain, runny nose, dysphagia  CV:  No pain, palpitatioins, hypo/hypertension  Resp:  No dyspnea, cough, tachypnea, wheezing  GI:  No pain, No nausea, No vomiting, No diarrhea, No constipatiion, No weight loss, No fever, No pruritis, No rectal bleeding, No tarry stools, No dysphagia,  :  No pain, bleeding, incontinence, nocturia  Muscle:  No pain, weakness  Neuro:  No weakness, tingling, memory problems  Psych:  No fatigue, insomnia, mood problems, depression  Endocrine:  No polyuria, polydypsia, cold/heat intolerance  Heme:  No petechiae, ecchymosis, easy bruisability  Skin:  No rash, tattoos, scars, edema      Vital Signs Last 24 Hrs  T(C): 36.9 (02 Feb 2019 12:32), Max: 36.9 (02 Feb 2019 12:32)  T(F): 98.4 (02 Feb 2019 12:32), Max: 98.4 (02 Feb 2019 12:32)  HR: 92 (02 Feb 2019 12:32) (92 - 106)  BP: 113/73 (02 Feb 2019 12:32) (113/73 - 120/84)  BP(mean): --  RR: 18 (02 Feb 2019 12:32) (17 - 18)  SpO2: 97% (02 Feb 2019 12:32) (97% - 98%)    PHYSICAL EXAM:    Constitutional: NAD, well-developed  HEENT: EOMI, throat clear  Neck: No LAD, supple  Respiratory: CTA and P  Cardiovascular: S1 and S2, RRR, no M  Gastrointestinal: BS+, soft, NT/ND, neg HSM,  Extremities: No peripheral edema, neg clubing, cyanosis  Vascular: 2+ peripheral pulses  Neurological: A/O x 3, no focal deficits  Psychiatric: Normal mood, normal affect  Skin: No rashes      LABS:                RADIOLOGY & ADDITIONAL TESTS:

## 2019-02-02 NOTE — PROGRESS NOTE ADULT - PROBLEM SELECTOR PROBLEM 2
Hypokalemia
Bacteremia
Hypokalemia
ACP (advance care planning)
Bacteremia
Hypokalemia

## 2019-02-02 NOTE — PROGRESS NOTE ADULT - PROVIDER SPECIALTY LIST ADULT
Cardiology
Gastroenterology
Infectious Disease
Internal Medicine
Nephrology
Wound Care
Internal Medicine
Cardiology
Infectious Disease
Cardiology
Cardiology
Gastroenterology
Nephrology

## 2019-02-02 NOTE — PROGRESS NOTE ADULT - PROBLEM SELECTOR PROBLEM 1
Dysphagia
Hypernatremia
Dysphagia
Hypernatremia

## 2019-02-02 NOTE — PROGRESS NOTE ADULT - REASON FOR ADMISSION
altered mental status

## 2019-02-25 ENCOUNTER — INPATIENT (INPATIENT)
Facility: HOSPITAL | Age: 79
LOS: 3 days | Discharge: HOME | DRG: 178 | End: 2019-03-01
Attending: INTERNAL MEDICINE | Admitting: INTERNAL MEDICINE
Payer: MEDICARE

## 2019-02-25 VITALS
OXYGEN SATURATION: 98 % | SYSTOLIC BLOOD PRESSURE: 100 MMHG | RESPIRATION RATE: 22 BRPM | WEIGHT: 139.99 LBS | HEART RATE: 113 BPM | DIASTOLIC BLOOD PRESSURE: 68 MMHG | TEMPERATURE: 98 F | HEIGHT: 66 IN

## 2019-02-25 PROCEDURE — 93010 ELECTROCARDIOGRAM REPORT: CPT

## 2019-02-25 RX ORDER — CEFTRIAXONE 500 MG/1
1 INJECTION, POWDER, FOR SOLUTION INTRAMUSCULAR; INTRAVENOUS ONCE
Qty: 0 | Refills: 0 | Status: COMPLETED | OUTPATIENT
Start: 2019-02-25 | End: 2019-02-25

## 2019-02-26 DIAGNOSIS — I50.9 HEART FAILURE, UNSPECIFIED: ICD-10-CM

## 2019-02-26 DIAGNOSIS — R73.9 HYPERGLYCEMIA, UNSPECIFIED: ICD-10-CM

## 2019-02-26 DIAGNOSIS — J69.0 PNEUMONITIS DUE TO INHALATION OF FOOD AND VOMIT: ICD-10-CM

## 2019-02-26 DIAGNOSIS — Z93.1 GASTROSTOMY STATUS: Chronic | ICD-10-CM

## 2019-02-26 DIAGNOSIS — D64.9 ANEMIA, UNSPECIFIED: ICD-10-CM

## 2019-02-26 DIAGNOSIS — Z29.9 ENCOUNTER FOR PROPHYLACTIC MEASURES, UNSPECIFIED: ICD-10-CM

## 2019-02-26 DIAGNOSIS — K27.9 PEPTIC ULCER, SITE UNSPECIFIED, UNSPECIFIED AS ACUTE OR CHRONIC, WITHOUT HEMORRHAGE OR PERFORATION: ICD-10-CM

## 2019-02-26 DIAGNOSIS — A04.72 ENTEROCOLITIS DUE TO CLOSTRIDIUM DIFFICILE, NOT SPECIFIED AS RECURRENT: ICD-10-CM

## 2019-02-26 DIAGNOSIS — I25.709 ATHEROSCLEROSIS OF CORONARY ARTERY BYPASS GRAFT(S), UNSPECIFIED, WITH UNSPECIFIED ANGINA PECTORIS: Chronic | ICD-10-CM

## 2019-02-26 PROBLEM — A41.9 SEPSIS, UNSPECIFIED ORGANISM: Chronic | Status: ACTIVE | Noted: 2019-01-12

## 2019-02-26 PROBLEM — N39.0 URINARY TRACT INFECTION, SITE NOT SPECIFIED: Chronic | Status: ACTIVE | Noted: 2019-01-12

## 2019-02-26 LAB
ALBUMIN SERPL ELPH-MCNC: 2.4 G/DL — LOW (ref 3.5–5)
ALP SERPL-CCNC: 47 U/L — SIGNIFICANT CHANGE UP (ref 40–120)
ALT FLD-CCNC: 18 U/L DA — SIGNIFICANT CHANGE UP (ref 10–60)
ANION GAP SERPL CALC-SCNC: 10 MMOL/L — SIGNIFICANT CHANGE UP (ref 5–17)
APPEARANCE UR: ABNORMAL
APTT BLD: 25 SEC — LOW (ref 27.5–36.3)
AST SERPL-CCNC: 20 U/L — SIGNIFICANT CHANGE UP (ref 10–40)
BASE EXCESS BLDA CALC-SCNC: -2.1 MMOL/L — LOW (ref -2–2)
BASOPHILS # BLD AUTO: 0 K/UL — SIGNIFICANT CHANGE UP (ref 0–0.2)
BASOPHILS NFR BLD AUTO: 0 % — SIGNIFICANT CHANGE UP (ref 0–2)
BILIRUB SERPL-MCNC: 0.2 MG/DL — SIGNIFICANT CHANGE UP (ref 0.2–1.2)
BILIRUB UR-MCNC: NEGATIVE — SIGNIFICANT CHANGE UP
BLOOD GAS COMMENTS ARTERIAL: SIGNIFICANT CHANGE UP
BUN SERPL-MCNC: 33 MG/DL — HIGH (ref 7–18)
CALCIUM SERPL-MCNC: 8.3 MG/DL — LOW (ref 8.4–10.5)
CHLORIDE SERPL-SCNC: 107 MMOL/L — SIGNIFICANT CHANGE UP (ref 96–108)
CO2 SERPL-SCNC: 21 MMOL/L — LOW (ref 22–31)
COLOR SPEC: YELLOW — SIGNIFICANT CHANGE UP
CREAT SERPL-MCNC: 0.55 MG/DL — SIGNIFICANT CHANGE UP (ref 0.5–1.3)
DIFF PNL FLD: NEGATIVE — SIGNIFICANT CHANGE UP
EOSINOPHIL # BLD AUTO: 0 K/UL — SIGNIFICANT CHANGE UP (ref 0–0.5)
EOSINOPHIL NFR BLD AUTO: 0 % — SIGNIFICANT CHANGE UP (ref 0–6)
FLU A RESULT: SIGNIFICANT CHANGE UP
FLU A RESULT: SIGNIFICANT CHANGE UP
FLUAV AG NPH QL: SIGNIFICANT CHANGE UP
FLUBV AG NPH QL: SIGNIFICANT CHANGE UP
GLUCOSE BLDC GLUCOMTR-MCNC: 219 MG/DL — HIGH (ref 70–99)
GLUCOSE BLDC GLUCOMTR-MCNC: 234 MG/DL — HIGH (ref 70–99)
GLUCOSE BLDC GLUCOMTR-MCNC: 240 MG/DL — HIGH (ref 70–99)
GLUCOSE SERPL-MCNC: 119 MG/DL — HIGH (ref 70–99)
GLUCOSE UR QL: 100 MG/DL
HCO3 BLDA-SCNC: 22 MMOL/L — LOW (ref 23–27)
HCT VFR BLD CALC: 32.9 % — LOW (ref 39–50)
HGB BLD-MCNC: 10.9 G/DL — LOW (ref 13–17)
HOROWITZ INDEX BLDA+IHG-RTO: 30 — SIGNIFICANT CHANGE UP
INR BLD: 1.03 RATIO — SIGNIFICANT CHANGE UP (ref 0.88–1.16)
KETONES UR-MCNC: ABNORMAL
LACTATE SERPL-SCNC: 1.2 MMOL/L — SIGNIFICANT CHANGE UP (ref 0.7–2)
LEUKOCYTE ESTERASE UR-ACNC: ABNORMAL
LYMPHOCYTES # BLD AUTO: 0.7 K/UL — LOW (ref 1–3.3)
LYMPHOCYTES # BLD AUTO: 3 % — LOW (ref 13–44)
MAGNESIUM SERPL-MCNC: 1.9 MG/DL — SIGNIFICANT CHANGE UP (ref 1.6–2.6)
MCHC RBC-ENTMCNC: 32.6 PG — SIGNIFICANT CHANGE UP (ref 27–34)
MCHC RBC-ENTMCNC: 33.1 GM/DL — SIGNIFICANT CHANGE UP (ref 32–36)
MCV RBC AUTO: 98.5 FL — SIGNIFICANT CHANGE UP (ref 80–100)
MONOCYTES # BLD AUTO: 0.93 K/UL — HIGH (ref 0–0.9)
MONOCYTES NFR BLD AUTO: 4 % — SIGNIFICANT CHANGE UP (ref 2–14)
NEUTROPHILS # BLD AUTO: 21.64 K/UL — HIGH (ref 1.8–7.4)
NEUTROPHILS NFR BLD AUTO: 93 % — HIGH (ref 43–77)
NITRITE UR-MCNC: NEGATIVE — SIGNIFICANT CHANGE UP
PCO2 BLDA: 35 MMHG — SIGNIFICANT CHANGE UP (ref 32–46)
PH BLDA: 7.41 — SIGNIFICANT CHANGE UP (ref 7.35–7.45)
PH UR: 5 — SIGNIFICANT CHANGE UP (ref 5–8)
PLATELET # BLD AUTO: 157 K/UL — SIGNIFICANT CHANGE UP (ref 150–400)
PO2 BLDA: 79 MMHG — SIGNIFICANT CHANGE UP (ref 74–108)
POTASSIUM SERPL-MCNC: 4.3 MMOL/L — SIGNIFICANT CHANGE UP (ref 3.5–5.3)
POTASSIUM SERPL-SCNC: 4.3 MMOL/L — SIGNIFICANT CHANGE UP (ref 3.5–5.3)
PROT SERPL-MCNC: 5.7 G/DL — LOW (ref 6–8.3)
PROT UR-MCNC: 30 MG/DL
PROTHROM AB SERPL-ACNC: 11.4 SEC — SIGNIFICANT CHANGE UP (ref 10–12.9)
RBC # BLD: 3.34 M/UL — LOW (ref 4.2–5.8)
RBC # FLD: 13.9 % — SIGNIFICANT CHANGE UP (ref 10.3–14.5)
RSV RESULT: SIGNIFICANT CHANGE UP
RSV RNA RESP QL NAA+PROBE: SIGNIFICANT CHANGE UP
SAO2 % BLDA: 96 % — SIGNIFICANT CHANGE UP (ref 92–96)
SODIUM SERPL-SCNC: 138 MMOL/L — SIGNIFICANT CHANGE UP (ref 135–145)
SP GR SPEC: 1.02 — SIGNIFICANT CHANGE UP (ref 1.01–1.02)
UROBILINOGEN FLD QL: NEGATIVE — SIGNIFICANT CHANGE UP
WBC # BLD: 23.27 K/UL — HIGH (ref 3.8–10.5)
WBC # FLD AUTO: 23.27 K/UL — HIGH (ref 3.8–10.5)

## 2019-02-26 PROCEDURE — 71045 X-RAY EXAM CHEST 1 VIEW: CPT | Mod: 26

## 2019-02-26 PROCEDURE — 99285 EMERGENCY DEPT VISIT HI MDM: CPT | Mod: 25

## 2019-02-26 RX ORDER — INSULIN LISPRO 100/ML
VIAL (ML) SUBCUTANEOUS EVERY 6 HOURS
Qty: 0 | Refills: 0 | Status: DISCONTINUED | OUTPATIENT
Start: 2019-02-26 | End: 2019-03-01

## 2019-02-26 RX ORDER — SODIUM CHLORIDE 9 MG/ML
1000 INJECTION, SOLUTION INTRAVENOUS
Qty: 0 | Refills: 0 | Status: DISCONTINUED | OUTPATIENT
Start: 2019-02-26 | End: 2019-03-01

## 2019-02-26 RX ORDER — METFORMIN HYDROCHLORIDE 850 MG/1
1 TABLET ORAL
Qty: 0 | Refills: 0 | COMMUNITY

## 2019-02-26 RX ORDER — DOCUSATE SODIUM 100 MG
10 CAPSULE ORAL
Qty: 0 | Refills: 0 | COMMUNITY

## 2019-02-26 RX ORDER — DEXTROSE 50 % IN WATER 50 %
25 SYRINGE (ML) INTRAVENOUS ONCE
Qty: 0 | Refills: 0 | Status: DISCONTINUED | OUTPATIENT
Start: 2019-02-26 | End: 2019-03-01

## 2019-02-26 RX ORDER — IPRATROPIUM/ALBUTEROL SULFATE 18-103MCG
3 AEROSOL WITH ADAPTER (GRAM) INHALATION EVERY 6 HOURS
Qty: 0 | Refills: 0 | Status: DISCONTINUED | OUTPATIENT
Start: 2019-02-26 | End: 2019-03-01

## 2019-02-26 RX ORDER — ENOXAPARIN SODIUM 100 MG/ML
40 INJECTION SUBCUTANEOUS DAILY
Qty: 0 | Refills: 0 | Status: DISCONTINUED | OUTPATIENT
Start: 2019-02-26 | End: 2019-03-01

## 2019-02-26 RX ORDER — PANTOPRAZOLE SODIUM 20 MG/1
40 TABLET, DELAYED RELEASE ORAL DAILY
Qty: 0 | Refills: 0 | Status: DISCONTINUED | OUTPATIENT
Start: 2019-02-26 | End: 2019-03-01

## 2019-02-26 RX ORDER — DEXTROSE 50 % IN WATER 50 %
15 SYRINGE (ML) INTRAVENOUS ONCE
Qty: 0 | Refills: 0 | Status: DISCONTINUED | OUTPATIENT
Start: 2019-02-26 | End: 2019-03-01

## 2019-02-26 RX ORDER — VANCOMYCIN HCL 1 G
125 VIAL (EA) INTRAVENOUS EVERY 6 HOURS
Qty: 0 | Refills: 0 | Status: DISCONTINUED | OUTPATIENT
Start: 2019-02-26 | End: 2019-02-26

## 2019-02-26 RX ORDER — SIMVASTATIN 20 MG/1
20 TABLET, FILM COATED ORAL AT BEDTIME
Qty: 0 | Refills: 0 | Status: DISCONTINUED | OUTPATIENT
Start: 2019-02-26 | End: 2019-03-01

## 2019-02-26 RX ORDER — GLUCAGON INJECTION, SOLUTION 0.5 MG/.1ML
1 INJECTION, SOLUTION SUBCUTANEOUS ONCE
Qty: 0 | Refills: 0 | Status: DISCONTINUED | OUTPATIENT
Start: 2019-02-26 | End: 2019-03-01

## 2019-02-26 RX ORDER — FUROSEMIDE 40 MG
1 TABLET ORAL
Qty: 0 | Refills: 0 | COMMUNITY

## 2019-02-26 RX ORDER — FUROSEMIDE 40 MG
20 TABLET ORAL DAILY
Qty: 0 | Refills: 0 | Status: DISCONTINUED | OUTPATIENT
Start: 2019-02-26 | End: 2019-02-26

## 2019-02-26 RX ORDER — DEXTROSE 50 % IN WATER 50 %
12.5 SYRINGE (ML) INTRAVENOUS ONCE
Qty: 0 | Refills: 0 | Status: DISCONTINUED | OUTPATIENT
Start: 2019-02-26 | End: 2019-03-01

## 2019-02-26 RX ORDER — ASPIRIN/CALCIUM CARB/MAGNESIUM 324 MG
1 TABLET ORAL
Qty: 0 | Refills: 0 | COMMUNITY

## 2019-02-26 RX ORDER — PIPERACILLIN AND TAZOBACTAM 4; .5 G/20ML; G/20ML
3.38 INJECTION, POWDER, LYOPHILIZED, FOR SOLUTION INTRAVENOUS EVERY 8 HOURS
Qty: 0 | Refills: 0 | Status: DISCONTINUED | OUTPATIENT
Start: 2019-02-26 | End: 2019-03-01

## 2019-02-26 RX ORDER — SIMVASTATIN 20 MG/1
1 TABLET, FILM COATED ORAL
Qty: 0 | Refills: 0 | COMMUNITY

## 2019-02-26 RX ORDER — VANCOMYCIN HCL 1 G
125 VIAL (EA) INTRAVENOUS EVERY 6 HOURS
Qty: 0 | Refills: 0 | Status: DISCONTINUED | OUTPATIENT
Start: 2019-02-26 | End: 2019-02-27

## 2019-02-26 RX ADMIN — Medication 125 MILLIGRAM(S): at 12:27

## 2019-02-26 RX ADMIN — Medication 125 MILLIGRAM(S): at 05:57

## 2019-02-26 RX ADMIN — SIMVASTATIN 20 MILLIGRAM(S): 20 TABLET, FILM COATED ORAL at 21:23

## 2019-02-26 RX ADMIN — Medication 3 MILLILITER(S): at 08:48

## 2019-02-26 RX ADMIN — Medication 3 MILLILITER(S): at 03:52

## 2019-02-26 RX ADMIN — Medication 125 MILLIGRAM(S): at 21:22

## 2019-02-26 RX ADMIN — Medication 3 MILLILITER(S): at 15:37

## 2019-02-26 RX ADMIN — PIPERACILLIN AND TAZOBACTAM 25 GRAM(S): 4; .5 INJECTION, POWDER, LYOPHILIZED, FOR SOLUTION INTRAVENOUS at 14:13

## 2019-02-26 RX ADMIN — ENOXAPARIN SODIUM 40 MILLIGRAM(S): 100 INJECTION SUBCUTANEOUS at 12:32

## 2019-02-26 RX ADMIN — Medication 3 MILLILITER(S): at 21:24

## 2019-02-26 RX ADMIN — CEFTRIAXONE 100 GRAM(S): 500 INJECTION, POWDER, FOR SOLUTION INTRAMUSCULAR; INTRAVENOUS at 00:00

## 2019-02-26 RX ADMIN — Medication 100 MILLIGRAM(S): at 00:00

## 2019-02-26 RX ADMIN — PANTOPRAZOLE SODIUM 40 MILLIGRAM(S): 20 TABLET, DELAYED RELEASE ORAL at 12:27

## 2019-02-26 RX ADMIN — PIPERACILLIN AND TAZOBACTAM 25 GRAM(S): 4; .5 INJECTION, POWDER, LYOPHILIZED, FOR SOLUTION INTRAVENOUS at 05:56

## 2019-02-26 RX ADMIN — PIPERACILLIN AND TAZOBACTAM 25 GRAM(S): 4; .5 INJECTION, POWDER, LYOPHILIZED, FOR SOLUTION INTRAVENOUS at 21:23

## 2019-02-26 NOTE — ED ADULT NURSE NOTE - OBJECTIVE STATEMENT
Per EMS, pt started vomiting after an episode of tube feeding. Pt observed laying in bed, breathing via NC @2l/min, not verbal, not able to make needs known. Per EMS, pt started vomiting after an episode of peg tube feeding. Pt observed laying in bed, breathing via NC @2l/min, not verbal, not able to make needs known.

## 2019-02-26 NOTE — CONSULT NOTE ADULT - SUBJECTIVE AND OBJECTIVE BOX
Patient is a 78y old  Male who presents with a chief complaint of Aspiration Pneumonia (26 Feb 2019 02:09)      HPI:  77 yo M w/ hx of CVA w/ left sided weakness, CAD w/ CABG 2013, prior hx of thoracic aortic aneurysm repair, HTN, DM2, systolic CHF (mild LV dysfunction in 11/2018 requiring Bipap), bioprosthetic aortic valve s/p repair, BPH, MRSA (Nov 2018), stercoral colitis presents after he vomited after PEG feed and aspirated. patient is non verbal and bed bound. un able to provide any history. ROS is limited due to patient's non verbal status. History was obtained by wife over the phone. She reports that patient had his tube feed at 4:00 pm and at 6:00 pm, he vomited x 2, only food particles. Patient then started to cough and started to gasp for breathing. Patient's on then called EMS. As per spouse patient is having diarrhea for past 1 week. patient was recently started on Levofloxacin by PCP for fever at home. She denies any other acute complaints. (26 Feb 2019 02:09)      PAST MEDICAL & SURGICAL HISTORY:  Coronary artery disease  PUD (peptic ulcer disease)  Bicuspid aortic valve  CHF (congestive heart failure)  CVA (cerebral vascular accident)  Sepsis  UTI (urinary tract infection)  GI bleed  PUD (peptic ulcer disease)  CVA, old, 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  Gastric ulcer  Hyperlipemia  Diabetes mellitus  Hypertension  PEG (percutaneous endoscopic gastrostomy) status  Coronary artery disease involving coronary bypass graft of native heart with angina pectoris  History of eye surgery: endophthalmitis in 2014  H/O aortic root repair  No Past Surgical History         MEDICATIONS  (STANDING):  ALBUTerol/ipratropium for Nebulization 3 milliLiter(s) Nebulizer every 6 hours  dextrose 5%. 1000 milliLiter(s) (50 mL/Hr) IV Continuous <Continuous>  dextrose 50% Injectable 12.5 Gram(s) IV Push once  dextrose 50% Injectable 25 Gram(s) IV Push once  dextrose 50% Injectable 25 Gram(s) IV Push once  enoxaparin Injectable 40 milliGRAM(s) SubCutaneous daily  insulin lispro (HumaLOG) corrective regimen sliding scale   SubCutaneous every 6 hours  pantoprazole   Suspension 40 milliGRAM(s) Oral daily  piperacillin/tazobactam IVPB. 3.375 Gram(s) IV Intermittent every 8 hours  simvastatin 20 milliGRAM(s) Oral at bedtime  vancomycin    Solution 125 milliGRAM(s) Enteral Tube every 6 hours    MEDICATIONS  (PRN):  dextrose 40% Gel 15 Gram(s) Oral once PRN Blood Glucose LESS THAN 70 milliGRAM(s)/deciliter  glucagon  Injectable 1 milliGRAM(s) IntraMuscular once PRN Glucose LESS THAN 70 milligrams/deciliter      FAMILY HISTORY:  No pertinent family history in first degree relatives  No pertinent family history in first degree relatives      SOCIAL HISTORY:      REVIEW OF SYSTEMS:  CONSTITUTIONAL: No fever, weight loss, or fatigue  EYES: No eye pain, visual disturbances, or discharge  ENT:  No difficulty hearing, tinnitus, vertigo; No sinus or throat pain  NECK: No pain or stiffness  RESPIRATORY: No cough, wheezing, chills or hemoptysis; No Shortness of Breath  CARDIOVASCULAR: No chest pain, palpitations, passing out, dizziness, or leg swelling  GASTROINTESTINAL: No abdominal or epigastric pain. No nausea, vomiting, or hematemesis; No diarrhea or constipation. No melena or hematochezia.  GENITOURINARY: No dysuria, frequency, hematuria, or incontinence  NEUROLOGICAL: No headaches, memory loss, loss of strength, numbness, or tremors  SKIN: No itching, burning, rashes, or lesions   LYMPH Nodes: No enlarged glands  ENDOCRINE: No heat or cold intolerance; No hair loss  MUSCULOSKELETAL: No joint pain or swelling; No muscle, back, or extremity pain  PSYCHIATRIC: No depression, anxiety, mood swings, or difficulty sleeping  HEME/LYMPH: No easy bruising, or bleeding gums  ALLERGY AND IMMUNOLOGIC: No hives or eczema	        Vital Signs Last 24 Hrs  T(C): 36.7 (26 Feb 2019 07:33), Max: 37.2 (25 Feb 2019 21:50)  T(F): 98.1 (26 Feb 2019 07:33), Max: 98.9 (25 Feb 2019 21:50)  HR: 120 (26 Feb 2019 07:33) (98 - 120)  BP: 99/61 (26 Feb 2019 07:33) (99/61 - 114/68)  BP(mean): --  RR: 18 (26 Feb 2019 07:33) (18 - 22)  SpO2: 98% (26 Feb 2019 07:33) (96% - 99%)      Constitutional:    HEENT: nad    Neck:  No JVD, bruits or thyromegaly    Respiratory:  Clear without rales or rhonchi    Cardiovascular:  RR without murmur, rub or gallop.    Gastrointestinal: Soft without hepatosplenomegaly.    Extremities: without cyanosis, clubbing or edema.    Neurological:  Oriented   x   0   . No gross sensory or motor defects.        LABS:                        10.9   23.27 )-----------( 157      ( 26 Feb 2019 00:10 )             32.9     02-26    138  |  107  |  33<H>  ----------------------------<  119<H>  4.3   |  21<L>  |  0.55    Ca    8.3<L>      26 Feb 2019 00:10  Mg     1.9     02-26    TPro  5.7<L>  /  Alb  2.4<L>  /  TBili  0.2  /  DBili  x   /  AST  20  /  ALT  18  /  AlkPhos  47  02-26        PT/INR - ( 26 Feb 2019 00:10 )   PT: 11.4 sec;   INR: 1.03 ratio         PTT - ( 26 Feb 2019 00:10 )  PTT:25.0 sec    CAPILLARY BLOOD GLUCOSE      POCT Blood Glucose.: 234 mg/dL (26 Feb 2019 08:36)      RADIOLOGY & ADDITIONAL STUDIES:

## 2019-02-26 NOTE — CONSULT NOTE ADULT - ASSESSMENT
Aspiration pneumonia  Leukocytosis  ?C. diff 1.	Aspiration pneumonia  2.	Leukocytosis  3.	?C. diff  ·	cont zosyn 3.375gm IV q8h  D1  ·	cont vanco 125mg via peg q6h for now, will dc this tomorrow if still no diarrhea  ·	awaiting culture results

## 2019-02-26 NOTE — H&P ADULT - ASSESSMENT
79 yo M w/ hx of CVA w/ left sided weakness, CAD w/ CABG 2013, prior hx of thoracic aortic aneurysm repair, HTN, DM2, systolic CHF (mild LV dysfunction in 11/2018 requiring Bipap), bioprosthetic aortic valve s/p repair, BPH, MRSA (Nov 2018), stercoral colitis presents after he vomited after PEG feed and aspirated. patient is non verbal and bed bound. un able to provide any history. ROS is limited due to patient's non verbal status. Admitted for Aspriation Pneumonia. 79 yo M w/ hx of CVA w/ left sided weakness, CAD w/ CABG 2013, prior hx of thoracic aortic aneurysm repair, HTN, DM2, systolic CHF (mild LV dysfunction in 11/2018 requiring Bipap), bioprosthetic aortic valve s/p repair, BPH, MRSA (Nov 2018), stercoral colitis presents after he vomited after PEG feed and aspirated. patient is non verbal and bed bound. un able to provide any history. ROS is limited due to patient's non verbal status. Admitted for Aspriation Pneumonia, suspected C.diff.

## 2019-02-26 NOTE — H&P ADULT - NSHPPHYSICALEXAM_GEN_ALL_CORE
PHYSICAL EXAM:  GENERAL: NAD  HEENT: Normocephalic;  conjunctivae and sclerae clear; moist mucous membranes;   NECK : supple  CHEST/LUNG: Clear to auscultation bilaterally with good air entry   HEART: S1 S2  regular; no murmurs, gallops or rubs  ABDOMEN: Soft, Nontender, Nondistended; Bowel sounds present PEG tube in place. No peristomal erythema or purulent discharge   EXTREMITIES: no cyanosis; no edema; no calf tenderness  SKIN: warm and dry; no rash  NERVOUS SYSTEM:  Awake and alert; Oriented  x0 PHYSICAL EXAM:  GENERAL: NAD  HEENT: Normocephalic;  conjunctivae and sclerae clear; moist mucous membranes; left eye drop  NECK : supple  CHEST/LUNG: Clear to auscultation bilaterally with good air entry   HEART: S1 S2  regular; no murmurs, gallops or rubs  ABDOMEN: Soft, Nontender, Nondistended; Bowel sounds present PEG tube in place. No peristomal erythema or purulent discharge   EXTREMITIES: no cyanosis; no edema; no calf tenderness  SKIN: warm and dry; no rash  NERVOUS SYSTEM:  Awake and alert; Oriented  x0, residual left sided weakness

## 2019-02-26 NOTE — ED PROVIDER NOTE - OBJECTIVE STATEMENT
Pertinent PMH/PSH/FHx/SHx and Review of Systems contained within:  77 yo M w/ hx of CVA w/ left sided weakness, CAD w/ CABG 2013, prior hx of thoracic aortic aneurysm repair, HTN, DM2, systolic CHF (mild LV dysfunction in 11/2018), bioprosthetic aortic valve, BPH, presents with sob and cough after vomiting. per spouse, patient was at home when after a g tube feeding he vomiting formula. Several minutes later, patient developed coughing and ostensible difficulty breathing. no fever. at baseline patient barely speaks and that is ongoing now  Fh and Sh not otherwise contributory  ROS otherwise negative

## 2019-02-26 NOTE — H&P ADULT - PROBLEM SELECTOR PLAN 2
-Patient's H/H is 10.9/32. baseline H/H is 12  -He has history of non bleeding gastric Ulcer  -no active melena or hematemesis   -Monitor H/H  -c/w protonix daily -patient has loose stools x 1 week  -recent antibiotic exposure and elevated wbc count 23k  -will start on vancomycin 125mg Q6 hrs via PEG   -f/u Clostridium c.diff PCR  -contact isolation until c.diff ruled out

## 2019-02-26 NOTE — ED PROVIDER NOTE - PHYSICAL EXAMINATION
Gen: awake, not alert, no distress  Head: NC, AT   Eyes: PERRL, EOMI, normal lids/conjunctiva  ENT: frothy sputum  Neck: supple, no tenderness, Trachea midline  Pulm: slight tachypnea, ronchorous breath sounds  CV: tachycardia, ext wwp  Abd: soft, PEG tube   Mskel: muscle wasting, especially in the lower extremities   Skin: no rash, no bruising   Neuro: non verbal, not following commands. no spontaneous movements of the bl le

## 2019-02-26 NOTE — H&P ADULT - PSH
Coronary artery disease involving coronary bypass graft of native heart with angina pectoris    PEG (percutaneous endoscopic gastrostomy) status

## 2019-02-26 NOTE — ED ADULT NURSE NOTE - NSIMPLEMENTINTERV_GEN_ALL_ED
Implemented All Fall with Harm Risk Interventions:  [place] to call system. Call bell, personal items and telephone within reach. Instruct patient to call for assistance. Room bathroom lighting operational. Non-slip footwear when patient is off stretcher. Physically safe environment: no spills, clutter or unnecessary equipment. Stretcher in lowest position, wheels locked, appropriate side rails in place. Provide visual cue, wrist band, yellow gown, etc. Monitor gait and stability. Monitor for mental status changes and reorient to person, place, and time. Review medications for side effects contributing to fall risk. Reinforce activity limits and safety measures with patient and family. Provide visual clues: red socks.

## 2019-02-26 NOTE — CONSULT NOTE ADULT - ASSESSMENT
79 yo M w/ hx of CVA w/ left sided weakness, CAD w/ CABG 2013, prior hx of thoracic aortic aneurysm repair, HTN, DM2, systolic CHF (mild LV dysfunction in 11/2018 requiring Bipap), bioprosthetic aortic valve s/p repair, BPH, MRSA (Nov 2018), stercoral colitis presents after he vomited after PEG feed and aspirated. patient is non verbal and bed bound. un able to provide any history. ROS is limited due to patient's non verbal status. admitted with aspiration PNA found to have high blood glucose. Pt takes metformin as out pt.

## 2019-02-26 NOTE — H&P ADULT - PROBLEM SELECTOR PLAN 3
-c/w protonix daily -Patient's H/H is 10.9/32. baseline H/H is 12  -He has history of non bleeding gastric Ulcer  -no active melena or hematemesis   -Monitor H/H  -c/w protonix daily

## 2019-02-26 NOTE — H&P ADULT - PROBLEM SELECTOR PLAN 1
-pt presented with aspiration event  -CXR shows possible right lower lobe  infiltrate   -wbc count 23.37k  -RVP negative  -recently discharged from [facility] after being treated for MRSA bactremia   -s/p clindamycin and rocephin  -Will start on Zosyn for now   -f/u blood cultures  -Aspiration precautions  -Hold tube feeds for now. -pt presented with aspiration event  -CXR shows possible right lower lobe  infiltrate   -wbc count 23.37k  -RVP negative  -recently discharged from [facility] after being treated for MRSA bactremia   -s/p clindamycin and rocephin  -Will start on Zosyn for now   -f/u blood cultures  -Aspiration precautions  -patient is wheezing on examination. c/w Duoneb   -Hold tube feeds for now.

## 2019-02-26 NOTE — H&P ADULT - HISTORY OF PRESENT ILLNESS
79 yo M w/ hx of CVA w/ left sided weakness, CAD w/ CABG 2013, prior hx of thoracic aortic aneurysm repair, HTN, DM2, systolic CHF (mild LV dysfunction in 11/2018 requiring Bipap), bioprosthetic aortic valve s/p repair, BPH, MRSA (Nov 2018), stercoral colitis presents after he vomited after PEG feed and aspirated. patient is non verbal and bed bound. un able to provide any history. ROS is limited due to patient's non verbal status. 77 yo M w/ hx of CVA w/ left sided weakness, CAD w/ CABG 2013, prior hx of thoracic aortic aneurysm repair, HTN, DM2, systolic CHF (mild LV dysfunction in 11/2018 requiring Bipap), bioprosthetic aortic valve s/p repair, BPH, MRSA (Nov 2018), stercoral colitis presents after he vomited after PEG feed and aspirated. patient is non verbal and bed bound. un able to provide any history. ROS is limited due to patient's non verbal status. History was obtained by wife over the phone. She reports that patient had his tube feed at 4:00 pm and at 6:00 pm, he vomited x 2, only food particles. Patient then started to cough and started to gasp for breathing. Patient's on then called EMS. As per spouse patient is having diarrhea for past 1 week. patient was recently started on Levofloxacin by PCP for fever at home. She denies any other acute complaints.

## 2019-02-26 NOTE — ED ADULT NURSE REASSESSMENT NOTE - NS ED NURSE REASSESS COMMENT FT1
Pt reassessed, observed laying in bed, breathing NC @2l/min. Pt drools intermittently, suctioned as needed. Pt has a peg tube in place. Meds administered as ordered. Pt on contact isolation for r/o c-diff. Admitted to [facility], awaiting bed, endorsed to JW Au.

## 2019-02-26 NOTE — CONSULT NOTE ADULT - SUBJECTIVE AND OBJECTIVE BOX
HPI:  ID consult was called to evaluate this 77 y/o male from home for aspiration pneumonia and ?c.diff. Patient presented to ED last night after he vomited then aspirated PEG feeds, he proceeded to have respiratory distress. Spouse also states that patient has been having diarrhea for past week. Recently in [facility] after treatment for bacteremia. Has not had any fevers while inhouse, but wbc count is 23.27.    As per H&P:  79 yo M w/ hx of CVA w/ left sided weakness, CAD w/ CABG , prior hx of thoracic aortic aneurysm repair, HTN, DM2, systolic CHF (mild LV dysfunction in 2018 requiring Bipap), bioprosthetic aortic valve s/p repair, BPH, MRSA (2018), stercoral colitis presents after he vomited after PEG feed and aspirated. patient is non verbal and bed bound. un able to provide any history. ROS is limited due to patient's non verbal status. History was obtained by wife over the phone. She reports that patient had his tube feed at 4:00 pm and at 6:00 pm, he vomited x 2, only food particles. Patient then started to cough and started to gasp for breathing. Patient's on then called EMS. As per spouse patient is having diarrhea for past 1 week. patient was recently started on Levofloxacin by PCP for fever at home. She denies any other acute complaints. 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  Gastric ulcer  Hyperlipemia  Diabetes mellitus  Hypertension  PEG (percutaneous endoscopic gastrostomy) status  Coronary artery disease involving coronary bypass graft of native heart with angina pectoris  History of eye surgery: endophthalmitis in   H/O aortic root repair  No Past Surgical History    ALLERGIES: No Known Allergies    MEDS:  ALBUTerol/ipratropium for Nebulization 3 milliLiter(s) Nebulizer every 6 hours  dextrose 40% Gel 15 Gram(s) Oral once PRN  dextrose 5%. 1000 milliLiter(s) IV Continuous <Continuous>  dextrose 50% Injectable 12.5 Gram(s) IV Push once  dextrose 50% Injectable 25 Gram(s) IV Push once  dextrose 50% Injectable 25 Gram(s) IV Push once  enoxaparin Injectable 40 milliGRAM(s) SubCutaneous daily  glucagon  Injectable 1 milliGRAM(s) IntraMuscular once PRN  insulin lispro (HumaLOG) corrective regimen sliding scale   SubCutaneous every 6 hours  pantoprazole   Suspension 40 milliGRAM(s) Oral daily  piperacillin/tazobactam IVPB. 3.375 Gram(s) IV Intermittent every 8 hours  simvastatin 20 milliGRAM(s) Oral at bedtime  vancomycin    Solution 125 milliGRAM(s) Enteral Tube every 6 hours    SOCIAL HISTORY:  Smoker:  unknown     FAMILY HISTORY:  No pertinent family history in first degree relatives  No pertinent family history in first degree relatives    VITALS:  Vital Signs Last 24 Hrs  T(C): 36.9 (2019 11:58), Max: 37.2 (2019 21:50)  T(F): 98.5 (2019 11:58), Max: 98.9 (2019 21:50)  HR: 109 (2019 11:58) (98 - 120)  BP: 103/63 (2019 11:58) (99/61 - 115/70)  BP(mean): --  RR: 17 (2019 11:58) (17 - 22)  SpO2: 96% (2019 11:58) (96% - 99%)      PHYSICAL EXAM:  Constitutional:  HEENT:  Neck:  Respiratory:  Cardiovascular:  Gastrointestinal:  Extremities:  Skin:  Ortho:  Neuro:      LABS/DIAGNOSTIC TESTS:                        10.9   23.27 )-----------( 157      ( 2019 00:10 )             32.9     WBC Count: 23.27 K/uL ( @ 00:10)        138  |  107  |  33<H>  ----------------------------<  119<H>  4.3   |  21<L>  |  0.55    Ca    8.3<L>      2019 00:10  Mg     1.9         TPro  5.7<L>  /  Alb  2.4<L>  /  TBili  0.2  /  DBili  x   /  AST  20  /  ALT  18  /  AlkPhos  47      Urinalysis Basic - ( 2019 11:07 )  Color: Yellow / Appearance: Slightly Turbid / S.020 / pH: x  Gluc: x / Ketone: Small  / Bili: Negative / Urobili: Negative   Blood: x / Protein: 30 mg/dL / Nitrite: Negative   Leuk Esterase: Small / RBC: 0-2 /HPF / WBC 3-5 /HPF   Sq Epi: x / Non Sq Epi: Occasional /HPF / Bacteria: x    LIVER FUNCTIONS - ( 2019 00:10 )  Alb: 2.4 g/dL / Pro: 5.7 g/dL / ALK PHOS: 47 U/L / ALT: 18 U/L DA / AST: 20 U/L / GGT: x           PT/INR - ( 2019 00:10 )   PT: 11.4 sec;   INR: 1.03 ratio    PTT - ( 2019 00:10 )  PTT:25.0 sec    Lactate, Blood: 1.2 mmol/L ( @ 00:10)    ABG - ABG - ( 2019 01:11 )  pH, Arterial: 7.41  pH, Blood: x     /  pCO2: 35    /  pO2: 79    / HCO3: 22    / Base Excess: -2.1  /  SaO2: 96        C.diff PCR - ordered        CULTURES:   .Blood Blood-Peripheral   @ 14:14   No growth at 5 days.  --  --      .Blood Blood-Venous   @ 08:27   No growth at 5 days.  --  --      .Urine Catheterized   @ 22:19   No growth  --  --      .Blood Blood-Peripheral   @ 22:17   Growth in anaerobic bottle: Staphylococcus epidermidis  "Due to technical problems, Proteus sp. will Not be reported as part of  the BCID panel until further notice"  ***Blood Panel PCR results on this specimen are available  approximately 3 hours after the Gram stain result.***  Gram stain, PCR, and/or culture results may not always  correspond due to difference in methodologies.  ************************************************************  This PCR assay was performed using [employer].  The following targets are tested for: Enterococcus,  vancomycin resistant enterococci, Listeria monocytogenes,  coagulase negative staphylococci, S. aureus,  methicillin resistant S. aureus, Streptococcus agalactiae  (Group B), S. pneumoniae, S. pyogenes (Group A),  Acinetobacter baumannii, Enterobacter cloacae, E. coli,  Klebsiella oxytoca, K. pneumoniae, Proteus sp.,  Serratia marcescens, Haemophilus influenzae,  Neisseria meningitidis, Pseudomonas aeruginosa, Candida  albicans, C. glabrata, C krusei, C parapsilosis,  C. tropicalis and the KPC resistance gene.  --  Blood Culture PCR  Staphylococcus epidermidis      .Urine Clean Catch (Midstream)   @ 10:23   10,000 - 49,000 CFU/mL Pseudomonas aeruginosa  --  Pseudomonas aeruginosa      .Blood Blood-Peripheral   @ 08:22   No growth at 5 days.  --  --      .Blood Blood-Peripheral   @ 18:39   No growth at 5 days.  --  --      .Urine Clean Catch (Midstream)   @ 23:32   No growth  --  --      .Blood Blood-Peripheral  12 @ 21:46   No growth at 5 days.  --  Blood Culture PCR        RADIOLOGY:  EXAM:  XR CHEST PORTABLE IMMED 1V                        PROCEDURE DATE:  2019    INTERPRETATION:  INDICATION: Shortness of breath    PRIORS: None    VIEWS: Portable AP radiography of the chest performed.    FINDINGS: Sternotomy wires are noted. Note is made of an aortic valve   replacement. Heart size appears within normal limits. No hilar or   superior mediastinal abnormalities are identified. Allowing for shallow   inspiration and portable technique there is no definite evidence for   focal infiltrate or lobar consolidation. Mild interstitial prominence is   noted; an element of interstitial edema cannot be fully excluded. There   is no evidence for pleural effusion or pneumothorax. No mediastinal shift   is noted. Chronic appearing deformity of the mid left clavicle noted.   Density is identified overlying the right upper quadrant region, which   may be extrinsic to the patient.    IMPRESSION: No definite evidence for focal infiltrate or lobar   consolidation. Interstitial prominence; mild interstitial edema cannot be   excluded. HPI:  ID consult was called to evaluate this 77 y/o male from home for aspiration pneumonia and ?c.diff. Patient presented to ED last night after he vomited then aspirated PEG feeds, he proceeded to have respiratory distress. Spouse also states that patient has been having diarrhea for past week. Recently in [facility] after treatment for bacteremia. Has not had any fevers while inhouse, but wbc count is 23.27. Patient has not had any diarrhea while inhouse yet. If not diarrhea into tomorrow, will dc vanco PO.    As per H&P:  79 yo M w/ hx of CVA w/ left sided weakness, CAD w/ CABG , prior hx of thoracic aortic aneurysm repair, HTN, DM2, systolic CHF (mild LV dysfunction in 2018 requiring Bipap), bioprosthetic aortic valve s/p repair, BPH, MRSA (2018), stercoral colitis presents after he vomited after PEG feed and aspirated. patient is non verbal and bed bound. un able to provide any history. ROS is limited due to patient's non verbal status. History was obtained by wife over the phone. She reports that patient had his tube feed at 4:00 pm and at 6:00 pm, he vomited x 2, only food particles. Patient then started to cough and started to gasp for breathing. Patient's on then called EMS. As per spouse patient is having diarrhea for past 1 week. patient was recently started on Levofloxacin by PCP for fever at home. She denies any other acute complaints. 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  Gastric ulcer  Hyperlipemia  Diabetes mellitus  Hypertension  PEG (percutaneous endoscopic gastrostomy) status  Coronary artery disease involving coronary bypass graft of native heart with angina pectoris  History of eye surgery: endophthalmitis in   H/O aortic root repair  No Past Surgical History    ALLERGIES: No Known Allergies    MEDS:  ALBUTerol/ipratropium for Nebulization 3 milliLiter(s) Nebulizer every 6 hours  dextrose 40% Gel 15 Gram(s) Oral once PRN  dextrose 5%. 1000 milliLiter(s) IV Continuous <Continuous>  dextrose 50% Injectable 12.5 Gram(s) IV Push once  dextrose 50% Injectable 25 Gram(s) IV Push once  dextrose 50% Injectable 25 Gram(s) IV Push once  enoxaparin Injectable 40 milliGRAM(s) SubCutaneous daily  glucagon  Injectable 1 milliGRAM(s) IntraMuscular once PRN  insulin lispro (HumaLOG) corrective regimen sliding scale   SubCutaneous every 6 hours  pantoprazole   Suspension 40 milliGRAM(s) Oral daily  piperacillin/tazobactam IVPB. 3.375 Gram(s) IV Intermittent every 8 hours  simvastatin 20 milliGRAM(s) Oral at bedtime  vancomycin    Solution 125 milliGRAM(s) Enteral Tube every 6 hours    SOCIAL HISTORY:  Smoker:  unknown     FAMILY HISTORY:  No pertinent family history in first degree relatives  No pertinent family history in first degree relatives    VITALS:  Vital Signs Last 24 Hrs  T(C): 36.9 (2019 11:58), Max: 37.2 (2019 21:50)  T(F): 98.5 (2019 11:58), Max: 98.9 (2019 21:50)  HR: 109 (2019 11:58) (98 - 120)  BP: 103/63 (2019 11:58) (99/61 - 115/70)  BP(mean): --  RR: 17 (2019 11:58) (17 - 22)  SpO2: 96% (2019 11:58) (96% - 99%)      PHYSICAL EXAM:  Constitutional: arousable, non verbal male in NAD  HEENT: +NC, moist oral mucosa  Neck: supple no LN's no JVD  Respiratory: bilateral scattered rhoncherous sounds no rales  Cardiovascular: S1 S2 reg no murmurs  Gastrointestinal: +BS with soft, nondistended abdomen; nontender  +Peg - site is clean  Extremities: no edema no cyanosis  Skin: noted bruising of right arm   Ortho: no jt swelling  Neuro: lethargic       LABS/DIAGNOSTIC TESTS:                        10.9   23.27 )-----------( 157      ( 2019 00:10 )             32.9     WBC Count: 23.27 K/uL ( @ 00:10)        138  |  107  |  33<H>  ----------------------------<  119<H>  4.3   |  21<L>  |  0.55    Ca    8.3<L>      2019 00:10  Mg     1.9         TPro  5.7<L>  /  Alb  2.4<L>  /  TBili  0.2  /  DBili  x   /  AST  20  /  ALT  18  /  AlkPhos  47      Urinalysis Basic - ( 2019 11:07 )  Color: Yellow / Appearance: Slightly Turbid / S.020 / pH: x  Gluc: x / Ketone: Small  / Bili: Negative / Urobili: Negative   Blood: x / Protein: 30 mg/dL / Nitrite: Negative   Leuk Esterase: Small / RBC: 0-2 /HPF / WBC 3-5 /HPF   Sq Epi: x / Non Sq Epi: Occasional /HPF / Bacteria: x    LIVER FUNCTIONS - ( 2019 00:10 )  Alb: 2.4 g/dL / Pro: 5.7 g/dL / ALK PHOS: 47 U/L / ALT: 18 U/L DA / AST: 20 U/L / GGT: x           PT/INR - ( 2019 00:10 )   PT: 11.4 sec;   INR: 1.03 ratio    PTT - ( 2019 00:10 )  PTT:25.0 sec    Lactate, Blood: 1.2 mmol/L ( @ 00:10)    ABG - ABG - ( 2019 01:11 )  pH, Arterial: 7.41  pH, Blood: x     /  pCO2: 35    /  pO2: 79    / HCO3: 22    / Base Excess: -2.1  /  SaO2: 96        C.diff PCR - ordered        CULTURES:   .Blood Blood-Peripheral   @ 14:14   No growth at 5 days.  --  --      .Blood Blood-Venous   @ 08:27   No growth at 5 days.  --  --      .Urine Catheterized   @ 22:19   No growth  --  --      .Blood Blood-Peripheral   @ 22:17   Growth in anaerobic bottle: Staphylococcus epidermidis  "Due to technical problems, Proteus sp. will Not be reported as part of  the BCID panel until further notice"  ***Blood Panel PCR results on this specimen are available  approximately 3 hours after the Gram stain result.***  Gram stain, PCR, and/or culture results may not always  correspond due to difference in methodologies.  ************************************************************  This PCR assay was performed using [employer].  The following targets are tested for: Enterococcus,  vancomycin resistant enterococci, Listeria monocytogenes,  coagulase negative staphylococci, S. aureus,  methicillin resistant S. aureus, Streptococcus agalactiae  (Group B), S. pneumoniae, S. pyogenes (Group A),  Acinetobacter baumannii, Enterobacter cloacae, E. coli,  Klebsiella oxytoca, K. pneumoniae, Proteus sp.,  Serratia marcescens, Haemophilus influenzae,  Neisseria meningitidis, Pseudomonas aeruginosa, Candida  albicans, C. glabrata, C krusei, C parapsilosis,  C. tropicalis and the KPC resistance gene.  --  Blood Culture PCR  Staphylococcus epidermidis      .Urine Clean Catch (Midstream)   @ 10:23   10,000 - 49,000 CFU/mL Pseudomonas aeruginosa  --  Pseudomonas aeruginosa      .Blood Blood-Peripheral   @ 08:22   No growth at 5 days.  --  --      .Blood Blood-Peripheral   @ 18:39   No growth at 5 days.  --  --      .Urine Clean Catch (Midstream)   @ 23:32   No growth  --  --      .Blood Blood-Peripheral   @ 21:46   No growth at 5 days.  --  Blood Culture PCR        RADIOLOGY:  EXAM:  XR CHEST PORTABLE IMMED 1V                        PROCEDURE DATE:  2019    INTERPRETATION:  INDICATION: Shortness of breath    PRIORS: None    VIEWS: Portable AP radiography of the chest performed.    FINDINGS: Sternotomy wires are noted. Note is made of an aortic valve   replacement. Heart size appears within normal limits. No hilar or   superior mediastinal abnormalities are identified. Allowing for shallow   inspiration and portable technique there is no definite evidence for   focal infiltrate or lobar consolidation. Mild interstitial prominence is   noted; an element of interstitial edema cannot be fully excluded. There   is no evidence for pleural effusion or pneumothorax. No mediastinal shift   is noted. Chronic appearing deformity of the mid left clavicle noted.   Density is identified overlying the right upper quadrant region, which   may be extrinsic to the patient.    IMPRESSION: No definite evidence for focal infiltrate or lobar   consolidation. Interstitial prominence; mild interstitial edema cannot be   excluded. HPI:  ID consult was called to evaluate this 77 y/o male from home for aspiration pneumonia and ?c.diff. Patient presented to ED last night after he vomited then aspirated PEG feeds, he proceeded to have respiratory distress. Spouse also states that patient has been having diarrhea for past week. Recently in [facility] after treatment for bacteremia. Has not had any fevers while inhouse, but wbc count is 23.27. Patient has not had any diarrhea while inhouse yet. If no diarrhea into tomorrow, will dc vanco PO.    As per H&P:  77 yo M w/ hx of CVA w/ left sided weakness, CAD w/ CABG , prior hx of thoracic aortic aneurysm repair, HTN, DM2, systolic CHF (mild LV dysfunction in 2018 requiring Bipap), bioprosthetic aortic valve s/p repair, BPH, MRSA (2018), stercoral colitis presents after he vomited after PEG feed and aspirated. patient is non verbal and bed bound. un able to provide any history. ROS is limited due to patient's non verbal status. History was obtained by wife over the phone. She reports that patient had his tube feed at 4:00 pm and at 6:00 pm, he vomited x 2, only food particles. Patient then started to cough and started to gasp for breathing. Patient's on then called EMS. As per spouse patient is having diarrhea for past 1 week. patient was recently started on Levofloxacin by PCP for fever at home. She denies any other acute complaints. 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  Gastric ulcer  Hyperlipemia  Diabetes mellitus  Hypertension  PEG (percutaneous endoscopic gastrostomy) status  Coronary artery disease involving coronary bypass graft of native heart with angina pectoris  History of eye surgery: endophthalmitis in   H/O aortic root repair  No Past Surgical History    ALLERGIES: No Known Allergies    MEDS:  ALBUTerol/ipratropium for Nebulization 3 milliLiter(s) Nebulizer every 6 hours  dextrose 40% Gel 15 Gram(s) Oral once PRN  dextrose 5%. 1000 milliLiter(s) IV Continuous <Continuous>  dextrose 50% Injectable 12.5 Gram(s) IV Push once  dextrose 50% Injectable 25 Gram(s) IV Push once  dextrose 50% Injectable 25 Gram(s) IV Push once  enoxaparin Injectable 40 milliGRAM(s) SubCutaneous daily  glucagon  Injectable 1 milliGRAM(s) IntraMuscular once PRN  insulin lispro (HumaLOG) corrective regimen sliding scale   SubCutaneous every 6 hours  pantoprazole   Suspension 40 milliGRAM(s) Oral daily  piperacillin/tazobactam IVPB. 3.375 Gram(s) IV Intermittent every 8 hours  simvastatin 20 milliGRAM(s) Oral at bedtime  vancomycin    Solution 125 milliGRAM(s) Enteral Tube every 6 hours    SOCIAL HISTORY:  Smoker:  unknown     FAMILY HISTORY:  No pertinent family history in first degree relatives  No pertinent family history in first degree relatives    VITALS:  Vital Signs Last 24 Hrs  T(C): 36.9 (2019 11:58), Max: 37.2 (2019 21:50)  T(F): 98.5 (2019 11:58), Max: 98.9 (2019 21:50)  HR: 109 (2019 11:58) (98 - 120)  BP: 103/63 (2019 11:58) (99/61 - 115/70)  BP(mean): --  RR: 17 (2019 11:58) (17 - 22)  SpO2: 96% (2019 11:58) (96% - 99%)      PHYSICAL EXAM:  Constitutional: arousable, non verbal male in NAD  HEENT: +NC, moist oral mucosa  Neck: supple no LN's no JVD  Respiratory: bilateral scattered rhoncherous sounds no rales  Cardiovascular: S1 S2 reg no murmurs  Gastrointestinal: +BS with soft, nondistended abdomen; nontender  +Peg - site is clean  Extremities: no edema no cyanosis  Skin: noted bruising of right arm   Ortho: no jt swelling  Neuro: lethargic       LABS/DIAGNOSTIC TESTS:                        10.9   23.27 )-----------( 157      ( 2019 00:10 )             32.9     WBC Count: 23.27 K/uL ( @ 00:10)        138  |  107  |  33<H>  ----------------------------<  119<H>  4.3   |  21<L>  |  0.55    Ca    8.3<L>      2019 00:10  Mg     1.9         TPro  5.7<L>  /  Alb  2.4<L>  /  TBili  0.2  /  DBili  x   /  AST  20  /  ALT  18  /  AlkPhos  47      Urinalysis Basic - ( 2019 11:07 )  Color: Yellow / Appearance: Slightly Turbid / S.020 / pH: x  Gluc: x / Ketone: Small  / Bili: Negative / Urobili: Negative   Blood: x / Protein: 30 mg/dL / Nitrite: Negative   Leuk Esterase: Small / RBC: 0-2 /HPF / WBC 3-5 /HPF   Sq Epi: x / Non Sq Epi: Occasional /HPF / Bacteria: x    LIVER FUNCTIONS - ( 2019 00:10 )  Alb: 2.4 g/dL / Pro: 5.7 g/dL / ALK PHOS: 47 U/L / ALT: 18 U/L DA / AST: 20 U/L / GGT: x           PT/INR - ( 2019 00:10 )   PT: 11.4 sec;   INR: 1.03 ratio    PTT - ( 2019 00:10 )  PTT:25.0 sec    Lactate, Blood: 1.2 mmol/L ( @ 00:10)    ABG - ABG - ( 2019 01:11 )  pH, Arterial: 7.41  pH, Blood: x     /  pCO2: 35    /  pO2: 79    / HCO3: 22    / Base Excess: -2.1  /  SaO2: 96        C.diff PCR - ordered        CULTURES:   .Blood Blood-Peripheral   @ 14:14   No growth at 5 days.  --  --      .Blood Blood-Venous   @ 08:27   No growth at 5 days.  --  --      .Urine Catheterized   @ 22:19   No growth  --  --      .Blood Blood-Peripheral   @ 22:17   Growth in anaerobic bottle: Staphylococcus epidermidis  "Due to technical problems, Proteus sp. will Not be reported as part of  the BCID panel until further notice"  ***Blood Panel PCR results on this specimen are available  approximately 3 hours after the Gram stain result.***  Gram stain, PCR, and/or culture results may not always  correspond due to difference in methodologies.  ************************************************************  This PCR assay was performed using [employer].  The following targets are tested for: Enterococcus,  vancomycin resistant enterococci, Listeria monocytogenes,  coagulase negative staphylococci, S. aureus,  methicillin resistant S. aureus, Streptococcus agalactiae  (Group B), S. pneumoniae, S. pyogenes (Group A),  Acinetobacter baumannii, Enterobacter cloacae, E. coli,  Klebsiella oxytoca, K. pneumoniae, Proteus sp.,  Serratia marcescens, Haemophilus influenzae,  Neisseria meningitidis, Pseudomonas aeruginosa, Candida  albicans, C. glabrata, C krusei, C parapsilosis,  C. tropicalis and the KPC resistance gene.  --  Blood Culture PCR  Staphylococcus epidermidis      .Urine Clean Catch (Midstream)   @ 10:23   10,000 - 49,000 CFU/mL Pseudomonas aeruginosa  --  Pseudomonas aeruginosa      .Blood Blood-Peripheral   @ 08:22   No growth at 5 days.  --  --      .Blood Blood-Peripheral   @ 18:39   No growth at 5 days.  --  --      .Urine Clean Catch (Midstream)   @ 23:32   No growth  --  --      .Blood Blood-Peripheral   @ 21:46   No growth at 5 days.  --  Blood Culture PCR        RADIOLOGY:  EXAM:  XR CHEST PORTABLE IMMED 1V                        PROCEDURE DATE:  2019    INTERPRETATION:  INDICATION: Shortness of breath    PRIORS: None    VIEWS: Portable AP radiography of the chest performed.    FINDINGS: Sternotomy wires are noted. Note is made of an aortic valve   replacement. Heart size appears within normal limits. No hilar or   superior mediastinal abnormalities are identified. Allowing for shallow   inspiration and portable technique there is no definite evidence for   focal infiltrate or lobar consolidation. Mild interstitial prominence is   noted; an element of interstitial edema cannot be fully excluded. There   is no evidence for pleural effusion or pneumothorax. No mediastinal shift   is noted. Chronic appearing deformity of the mid left clavicle noted.   Density is identified overlying the right upper quadrant region, which   may be extrinsic to the patient.    IMPRESSION: No definite evidence for focal infiltrate or lobar   consolidation. Interstitial prominence; mild interstitial edema cannot be   excluded.

## 2019-02-26 NOTE — ED PROVIDER NOTE - CLINICAL SUMMARY MEDICAL DECISION MAKING FREE TEXT BOX
patient pw history and physical concerning for aspiration pna. will start on ceftriaxone and clinda given he has motility dysfunction and good story for aspiration. wbc elevated at 22. o2 sats ok with nc. case osei luther, will admit.

## 2019-02-26 NOTE — H&P ADULT - PMH
Bicuspid aortic valve    CHF (congestive heart failure)    Coronary artery disease    CVA (cerebral vascular accident)    PUD (peptic ulcer disease)

## 2019-02-27 LAB
ANION GAP SERPL CALC-SCNC: 10 MMOL/L — SIGNIFICANT CHANGE UP (ref 5–17)
BUN SERPL-MCNC: 23 MG/DL — HIGH (ref 7–18)
CALCIUM SERPL-MCNC: 8.6 MG/DL — SIGNIFICANT CHANGE UP (ref 8.4–10.5)
CHLORIDE SERPL-SCNC: 106 MMOL/L — SIGNIFICANT CHANGE UP (ref 96–108)
CO2 SERPL-SCNC: 25 MMOL/L — SIGNIFICANT CHANGE UP (ref 22–31)
CREAT SERPL-MCNC: 0.66 MG/DL — SIGNIFICANT CHANGE UP (ref 0.5–1.3)
GLUCOSE BLDC GLUCOMTR-MCNC: 164 MG/DL — HIGH (ref 70–99)
GLUCOSE BLDC GLUCOMTR-MCNC: 164 MG/DL — HIGH (ref 70–99)
GLUCOSE BLDC GLUCOMTR-MCNC: 171 MG/DL — HIGH (ref 70–99)
GLUCOSE BLDC GLUCOMTR-MCNC: 186 MG/DL — HIGH (ref 70–99)
GLUCOSE BLDC GLUCOMTR-MCNC: 206 MG/DL — HIGH (ref 70–99)
GLUCOSE SERPL-MCNC: 172 MG/DL — HIGH (ref 70–99)
HBA1C BLD-MCNC: 8.6 % — HIGH (ref 4–5.6)
HCT VFR BLD CALC: 31.1 % — LOW (ref 39–50)
HGB BLD-MCNC: 9.9 G/DL — LOW (ref 13–17)
MCHC RBC-ENTMCNC: 31.8 GM/DL — LOW (ref 32–36)
MCHC RBC-ENTMCNC: 32.5 PG — SIGNIFICANT CHANGE UP (ref 27–34)
MCV RBC AUTO: 102 FL — HIGH (ref 80–100)
NRBC # BLD: 0 /100 WBCS — SIGNIFICANT CHANGE UP (ref 0–0)
PLATELET # BLD AUTO: 164 K/UL — SIGNIFICANT CHANGE UP (ref 150–400)
POTASSIUM SERPL-MCNC: 3.7 MMOL/L — SIGNIFICANT CHANGE UP (ref 3.5–5.3)
POTASSIUM SERPL-SCNC: 3.7 MMOL/L — SIGNIFICANT CHANGE UP (ref 3.5–5.3)
RBC # BLD: 3.05 M/UL — LOW (ref 4.2–5.8)
RBC # FLD: 14.6 % — HIGH (ref 10.3–14.5)
SODIUM SERPL-SCNC: 141 MMOL/L — SIGNIFICANT CHANGE UP (ref 135–145)
WBC # BLD: 15.78 K/UL — HIGH (ref 3.8–10.5)
WBC # FLD AUTO: 15.78 K/UL — HIGH (ref 3.8–10.5)

## 2019-02-27 RX ADMIN — Medication 2: at 01:54

## 2019-02-27 RX ADMIN — ENOXAPARIN SODIUM 40 MILLIGRAM(S): 100 INJECTION SUBCUTANEOUS at 12:53

## 2019-02-27 RX ADMIN — Medication 125 MILLIGRAM(S): at 12:53

## 2019-02-27 RX ADMIN — Medication 1: at 17:39

## 2019-02-27 RX ADMIN — Medication 3 MILLILITER(S): at 20:58

## 2019-02-27 RX ADMIN — SIMVASTATIN 20 MILLIGRAM(S): 20 TABLET, FILM COATED ORAL at 21:30

## 2019-02-27 RX ADMIN — Medication 1: at 12:53

## 2019-02-27 RX ADMIN — Medication 1: at 06:31

## 2019-02-27 RX ADMIN — Medication 125 MILLIGRAM(S): at 06:07

## 2019-02-27 RX ADMIN — Medication 1: at 23:52

## 2019-02-27 RX ADMIN — PIPERACILLIN AND TAZOBACTAM 25 GRAM(S): 4; .5 INJECTION, POWDER, LYOPHILIZED, FOR SOLUTION INTRAVENOUS at 21:30

## 2019-02-27 RX ADMIN — Medication 3 MILLILITER(S): at 02:55

## 2019-02-27 RX ADMIN — Medication 3 MILLILITER(S): at 10:30

## 2019-02-27 RX ADMIN — PANTOPRAZOLE SODIUM 40 MILLIGRAM(S): 20 TABLET, DELAYED RELEASE ORAL at 12:53

## 2019-02-27 RX ADMIN — Medication 125 MILLIGRAM(S): at 01:08

## 2019-02-27 RX ADMIN — PIPERACILLIN AND TAZOBACTAM 25 GRAM(S): 4; .5 INJECTION, POWDER, LYOPHILIZED, FOR SOLUTION INTRAVENOUS at 06:07

## 2019-02-27 RX ADMIN — Medication 3 MILLILITER(S): at 16:51

## 2019-02-27 NOTE — CONSULT NOTE ADULT - SUBJECTIVE AND OBJECTIVE BOX
PULMONARY CONSULT NOTE      KEVIN VILLARREAL  MRN-[id number]    Patient is a 78y old  Male who presents with a chief complaint of Aspiration Pneumonia (2019 17:25)  cough and sob, Pt vomitted and aspirated, G/T feeds  Hx chart lab and Xrays reviewed ,Pt examined and discussed with PMD    HISTORY OF PRESENT ILLNESS:   77 yo M w/ hx of CVA w/ left sided weakness, CAD w/ CABG , prior hx of thoracic aortic aneurysm repair, HTN, DM2, systolic CHF (mild LV dysfunction in 2018 requiring Bipap), bioprosthetic aortic valve s/p repair, BPH, MRSA (2018), stercoral colitis presents after he vomited after PEG feed and aspirated. patient is non verbal and bed bound. un able to provide any history. ROS is limited due to patient's non verbal status. History was obtained by wife over the phone. She reports that patient had his tube feed at 4:00 pm and at 6:00 pm, he vomited x 2, only food particles. Patient then started to cough and started to gasp for breathing. Patient's on then called EMS. As per spouse patient is having diarrhea for past 1 week. patient was recently started on Levofloxacin by PCP for fever at home. She denies any other acute complaints.     Home Medications:  Aspir 81 oral delayed release tablet: 1 tab(s) orally once a day (2019 09:50)  cholecalciferol oral tablet: 2000 unit(s) by gastrostomy tube once a day (2019 14:50)  docusate sodium 10 mg/mL oral liquid: 10 milliliter(s) by gastrostomy tube 2 times a day, As Needed (2019 14:50)  Lasix 20 mg oral tablet: 1 tab(s) by gastrostomy tube once a day (2019 14:50)  metFORMIN 1000 mg oral tablet: 1 tab(s) orally 2 times a day (2019 09:50)  metFORMIN 500 mg oral tablet: 1 tab(s) orally once a day (at bedtime) (2019 09:50)  metFORMIN 500 mg oral tablet: 1 tab(s) orally 3 times a day (2019 21:12)  Multiple Vitamins oral capsule: 1 cap(s) orally once a day (2019 09:50)  multivitamin: 1 tab(s) by gastrostomy tube once a day (2019 14:50)  pantoprazole 40 mg oral granule, delayed release: 40 milligram(s) by gastrostomy tube once a day (2019 14:50)  Protonix 40 mg oral granule, delayed release: 1 each orally once a day (2019 09:50)  simvastatin 20 mg oral tablet: 1 tab(s) orally once a day (in the evening) (2019 21:12)  simvastatin 20 mg oral tablet: 1 tab(s) orally once a day (at bedtime) (2019 09:50)  tamsulosin 0.4 mg oral capsule: 1 cap(s) orally once a day (2019 09:50)  tamsulosin 0.4 mg oral capsule: 1 cap(s) orally once a day (at bedtime) (2019 21:12)  Vitamin D3 2000 intl units oral capsule: 1 cap(s) orally once a day (2019 09:50)      MEDICATIONS  (STANDING):  ALBUTerol/ipratropium for Nebulization 3 milliLiter(s) Nebulizer every 6 hours  dextrose 5%. 1000 milliLiter(s) (50 mL/Hr) IV Continuous <Continuous>  dextrose 50% Injectable 12.5 Gram(s) IV Push once  dextrose 50% Injectable 25 Gram(s) IV Push once  dextrose 50% Injectable 25 Gram(s) IV Push once  enoxaparin Injectable 40 milliGRAM(s) SubCutaneous daily  insulin lispro (HumaLOG) corrective regimen sliding scale   SubCutaneous every 6 hours  pantoprazole   Suspension 40 milliGRAM(s) Oral daily  piperacillin/tazobactam IVPB. 3.375 Gram(s) IV Intermittent every 8 hours  simvastatin 20 milliGRAM(s) Oral at bedtime      MEDICATIONS  (PRN):  dextrose 40% Gel 15 Gram(s) Oral once PRN Blood Glucose LESS THAN 70 milliGRAM(s)/deciliter  glucagon  Injectable 1 milliGRAM(s) IntraMuscular once PRN Glucose LESS THAN 70 milligrams/deciliter      Allergies    No Known Allergies            PAST MEDICAL & SURGICAL HISTORY:  Coronary artery disease  PUD (peptic ulcer disease)  Bicuspid aortic valve  CHF (congestive heart failure)  CVA (cerebral vascular accident)  Sepsis  UTI (urinary tract infection)  GI bleed  PUD (peptic ulcer disease)  CVA, old, 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  Gastric ulcer  Hyperlipemia  Diabetes mellitus  Hypertension  PEG (percutaneous endoscopic gastrostomy) status  Coronary artery disease involving coronary bypass graft of native heart with angina pectoris  History of eye surgery: endophthalmitis in   H/O aortic root repair  No Past Surgical History      FAMILY HISTORY:  No pertinent family history in first degree relatives  No pertinent family history in first degree relatives  HTN --   DM--   IHD --  Asthma--  COPD--     SOCIAL HISTORY SMOKING--    ETOH--     DRUGS--    REVIEW OF SYSTEMS: as per Staff  CONSTITUTIONAL: No fever, weight loss, or fatigue   EYES: No eye pain, visual disturbances, or discharge  ENT:  No difficulty hearing, tinnitus, vertigo; No sinus or throat pain  NECK: No pain or stiffness   RESPIRATORY:  cough +  wheezing --  chills  -- hemoptysis--    Shortness of Breath+  CARDIOVASCULAR: No chest pain, palpitations, passing out, dizziness, or leg swelling  GASTROINTESTINAL: No abdominal or epigastric pain., vomiting++, no hematemesis; No diarrhea or constipation. No melena or hematochezia.  GENITOURINARY: No dysuria, frequency, hematuria, or incontinence  NEUROLOGICAL: No headaches, memory loss++loss of strength++ lt side , no numbness, or tremors  SKIN: No itching, burning, rashes, or lesions   ALLERGY AND IMMUNOLOGIC: No hives or eczema      Vital Signs Last 24 Hrs  T(C): 36.5 (2019 15:07), Max: 36.7 (2019 23:46)  T(F): 97.7 (2019 15:07), Max: 98 (2019 23:46)  HR: 97 (2019 15:07) (91 - 99)  BP: 109/88 (2019 15:07) (100/67 - 115/65)  BP(mean): --  RR: 18 (2019 15:07) (17 - 18)  SpO2: 100% (2019 15:07) (96% - 100%)  I&O's Detail      PHYSICAL EXAMINATION:    GENERAL: The patient is a w/m lying in bed in no apparent distress.   SKIN: No rashes ecchymoses or cyanosis  HEENT: Head is normocephalic and atraumatic. Extraocular muscles are intact. Mucous membranes are moist.   Neck supple LN not felt, JVP not increased  Thyroid not enlarged  Lymphatic: No lymphadenopathy  Cardiovascular:  S1 S2  heard ,RSR , JVP not increased , systolic  murmur at apex, No  gallop or rub  Respiratory:  Symmetrical chest wall movements Breathing vesicular , Percussion note normal no dulness   with   rales at basis. no   wheeze  ABDOMEN:  Soft, Non-tender, G/T in place  No  hepatosplenomegaly ,BS positive		  Extremities:  No clubbing, cyanosis or edema , No calf tenderness  Vascular: Peripheral pulses palpable 2+ bilaterally  CNS: Awake , non communicative  Lt Hemiplegia  Babinski +ve lt side      LABS:                        9.9    15.78 )-----------( 164      ( 2019 07:29 )             31.1         141  |  106  |  23<H>  ----------------------------<  172<H>  3.7   |  25  |  0.66    Ca    8.6      2019 07:29  Mg     1.9         TPro  5.7<L>  /  Alb  2.4<L>  /  TBili  0.2  /  DBili  x   /  AST  20  /  ALT  18  /  AlkPhos  47      PT/INR - ( 2019 00:10 )   PT: 11.4 sec;   INR: 1.03 ratio         PTT - ( 2019 00:10 )  PTT:25.0 sec  Urinalysis Basic - ( 2019 11:07 )    Color: Yellow / Appearance: Slightly Turbid / S.020 / pH: x  Gluc: x / Ketone: Small  / Bili: Negative / Urobili: Negative   Blood: x / Protein: 30 mg/dL / Nitrite: Negative   Leuk Esterase: Small / RBC: 0-2 /HPF / WBC 3-5 /HPF   Sq Epi: x / Non Sq Epi: Occasional /HPF / Bacteria: x      ABG - ( 2019 01:11 )  pH, Arterial: 7.41  pH, Blood: x     /  pCO2: 35    /  pO2: 79    / HCO3: 22    / Base Excess: -2.1  /  SaO2: 96                            TSH  MICROBIOLOGY:    Culture - Blood (collected 19 @ 11:52)  Source: .Blood  Preliminary Report (19 @ 12:01):    No growth to date.    Culture - Blood (collected 19 @ 11:52)  Source: .Blood two aerobic bottles received  Preliminary Report (19 @ 12:01):    No growth to date.        RADIOLOGY & ADDITIONAL STUDIES:    CXR:< from: Xray Chest 1 View-PORTABLE IMMEDIATE (19 @ 00:36) >  Sternotomy wires are noted. Note is made of an aortic valve   replacement. Heart size appears within normal limits. No hilar or   superior mediastinal abnormalities are identified. Allowing for shallow   inspiration and portable technique there is no definite evidence for   focal infiltrate or lobar consolidation. Mild interstitial prominence is   noted; an element of interstitial edema cannot be fully excluded. There   is no evidence for pleural effusion or pneumothorax. No mediastinal shift   is noted. Chronic appearing deformity of the mid left clavicle noted.   Density is identified overlying the right upper quadrant region, which   may be extrinsic to the patient.      ekg; Sinus Tach , LBBB, PVC    echo:< from: Transthoracic Echocardiogram (19 @ 10:05) >  1. Bioprosthetic aortic valve. Peak transaortic valve  gradient equals 23 mm Hg, mean transaortic valve gradient  equals 12 mm Hg, which is probably normal in the presence  of a bioprosthetic aortic valve. No aortic valve  regurgitation seen.  2. Normal left ventricular internal dimensions and wall  thicknesses.  3. Severe segmental left ventricular systolic dysfunction.  Peak left ventricular outflow tract gradient equals 1 mm  Hg, LVOT velocity time integral equals 13 cm.  4. Decreased right ventricular systolic function.  Unable to rule out endocarditis.  Consider DICKSON if  clinically indicated.  *** No previous Echo exam. PULMONARY CONSULT NOTE      KEVIN VILLARREAL  MRN-[id number]    Patient is a 78y old  Male who presents with a chief complaint of Aspiration Pneumonia (2019 17:25)  cough and sob, Pt vomitted and aspirated, G/T feeds  Hx chart lab and Xrays reviewed ,Pt examined and discussed with PMD    HISTORY OF PRESENT ILLNESS:   77 yo M w/ hx of CVA w/ left sided weakness, CAD w/ CABG , prior hx of thoracic aortic aneurysm repair, HTN, DM2, systolic CHF (mild LV dysfunction in 2018 requiring Bipap), bioprosthetic aortic valve s/p repair, BPH, MRSA (2018), stercoral colitis presents after he vomited after PEG feed and aspirated. patient is non verbal and bed bound. un able to provide any history. ROS is limited due to patient's non verbal status. History was obtained by wife over the phone. She reports that patient had his tube feed at 4:00 pm and at 6:00 pm, he vomited x 2, only food particles. Patient then started to cough and started to gasp for breathing. Patient's on then called EMS. As per spouse patient is having diarrhea for past 1 week. patient was recently started on Levofloxacin by PCP for fever at home. She denies any other acute complaints.     Home Medications:  Aspir 81 oral delayed release tablet: 1 tab(s) orally once a day (2019 09:50)  cholecalciferol oral tablet: 2000 unit(s) by gastrostomy tube once a day (2019 14:50)  docusate sodium 10 mg/mL oral liquid: 10 milliliter(s) by gastrostomy tube 2 times a day, As Needed (2019 14:50)  Lasix 20 mg oral tablet: 1 tab(s) by gastrostomy tube once a day (2019 14:50)  metFORMIN 1000 mg oral tablet: 1 tab(s) orally 2 times a day (2019 09:50)  metFORMIN 500 mg oral tablet: 1 tab(s) orally once a day (at bedtime) (2019 09:50)  metFORMIN 500 mg oral tablet: 1 tab(s) orally 3 times a day (2019 21:12)  Multiple Vitamins oral capsule: 1 cap(s) orally once a day (2019 09:50)  multivitamin: 1 tab(s) by gastrostomy tube once a day (2019 14:50)  pantoprazole 40 mg oral granule, delayed release: 40 milligram(s) by gastrostomy tube once a day (2019 14:50)  Protonix 40 mg oral granule, delayed release: 1 each orally once a day (2019 09:50)  simvastatin 20 mg oral tablet: 1 tab(s) orally once a day (in the evening) (2019 21:12)  simvastatin 20 mg oral tablet: 1 tab(s) orally once a day (at bedtime) (2019 09:50)  tamsulosin 0.4 mg oral capsule: 1 cap(s) orally once a day (2019 09:50)  tamsulosin 0.4 mg oral capsule: 1 cap(s) orally once a day (at bedtime) (2019 21:12)  Vitamin D3 2000 intl units oral capsule: 1 cap(s) orally once a day (2019 09:50)      MEDICATIONS  (STANDING):  ALBUTerol/ipratropium for Nebulization 3 milliLiter(s) Nebulizer every 6 hours  dextrose 5%. 1000 milliLiter(s) (50 mL/Hr) IV Continuous <Continuous>  dextrose 50% Injectable 12.5 Gram(s) IV Push once  dextrose 50% Injectable 25 Gram(s) IV Push once  dextrose 50% Injectable 25 Gram(s) IV Push once  enoxaparin Injectable 40 milliGRAM(s) SubCutaneous daily  insulin lispro (HumaLOG) corrective regimen sliding scale   SubCutaneous every 6 hours  pantoprazole   Suspension 40 milliGRAM(s) Oral daily  piperacillin/tazobactam IVPB. 3.375 Gram(s) IV Intermittent every 8 hours  simvastatin 20 milliGRAM(s) Oral at bedtime      MEDICATIONS  (PRN):  dextrose 40% Gel 15 Gram(s) Oral once PRN Blood Glucose LESS THAN 70 milliGRAM(s)/deciliter  glucagon  Injectable 1 milliGRAM(s) IntraMuscular once PRN Glucose LESS THAN 70 milligrams/deciliter      Allergies    No Known Allergies            PAST MEDICAL & SURGICAL HISTORY:  Coronary artery disease  PUD (peptic ulcer disease)  Bicuspid aortic valve  CHF (congestive heart failure)  CVA (cerebral vascular accident)  Sepsis  UTI (urinary tract infection)  GI bleed  PUD (peptic ulcer disease)  CVA, old, 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  Gastric ulcer  Hyperlipemia  Diabetes mellitus  Hypertension  PEG (percutaneous endoscopic gastrostomy) status  Coronary artery disease involving coronary bypass graft of native heart with angina pectoris  History of eye surgery: endophthalmitis in   H/O aortic root repair  No Past Surgical History      FAMILY HISTORY:  No pertinent family history in first degree relatives  No pertinent family history in first degree relatives  HTN --   DM--   IHD --  Asthma--  COPD--     SOCIAL HISTORY SMOKING--    ETOH--     DRUGS--    REVIEW OF SYSTEMS: as per Staff  CONSTITUTIONAL: No fever, weight loss, or fatigue   EYES: No eye pain, visual disturbances, or discharge  ENT:  No difficulty hearing, tinnitus, vertigo; No sinus or throat pain  NECK: No pain or stiffness   RESPIRATORY:  cough +  wheezing --  chills  -- hemoptysis--    Shortness of Breath+  CARDIOVASCULAR: No chest pain, palpitations, passing out, dizziness, or leg swelling  GASTROINTESTINAL: No abdominal or epigastric pain., vomiting++, no hematemesis; No diarrhea or constipation. No melena or hematochezia.  GENITOURINARY: No dysuria, frequency, hematuria, or incontinence  NEUROLOGICAL: No headaches, memory loss++loss of strength++ lt side , no numbness, or tremors  SKIN: No itching, burning, rashes, or lesions   ALLERGY AND IMMUNOLOGIC: No hives or eczema      Vital Signs Last 24 Hrs  T(C): 36.5 (2019 15:07), Max: 36.7 (2019 23:46)  T(F): 97.7 (2019 15:07), Max: 98 (2019 23:46)  HR: 97 (2019 15:07) (91 - 99)  BP: 109/88 (2019 15:07) (100/67 - 115/65)  BP(mean): --  RR: 18 (2019 15:07) (17 - 18)  SpO2: 100% (2019 15:07) (96% - 100%)  I&O's Detail      PHYSICAL EXAMINATION:    GENERAL: The patient is a w/m lying in bed in no apparent distress.   SKIN: No rashes ecchymoses or cyanosis  HEENT: Head is normocephalic and atraumatic. Extraocular muscles are intact. Mucous membranes are moist.   Neck supple LN not felt, JVP not increased  Thyroid not enlarged  Lymphatic: No lymphadenopathy  Cardiovascular:  S1 S2  heard ,RSR , JVP not increased , systolic  murmur at apex, No  gallop or rub  Respiratory:  Symmetrical chest wall movements Breathing vesicular , Percussion note normal no dulness   with   rales at basis. no   wheeze  ABDOMEN:  Soft, Non-tender, G/T in place  No  hepatosplenomegaly ,BS positive		  Extremities:  No clubbing, cyanosis or edema , No calf tenderness  Vascular: Peripheral pulses palpable 2+ bilaterally  CNS: Awake , non communicative  Lt Hemiplegia  Babinski +ve lt side      LABS:                        9.9    15.78 )-----------( 164      ( 2019 07:29 )             31.1         141  |  106  |  23<H>  ----------------------------<  172<H>  3.7   |  25  |  0.66    Ca    8.6      2019 07:29  Mg     1.9         TPro  5.7<L>  /  Alb  2.4<L>  /  TBili  0.2  /  DBili  x   /  AST  20  /  ALT  18  /  AlkPhos  47      PT/INR - ( 2019 00:10 )   PT: 11.4 sec;   INR: 1.03 ratio         PTT - ( 2019 00:10 )  PTT:25.0 sec  Urinalysis Basic - ( 2019 11:07 )    Color: Yellow / Appearance: Slightly Turbid / S.020 / pH: x  Gluc: x / Ketone: Small  / Bili: Negative / Urobili: Negative   Blood: x / Protein: 30 mg/dL / Nitrite: Negative   Leuk Esterase: Small / RBC: 0-2 /HPF / WBC 3-5 /HPF   Sq Epi: x / Non Sq Epi: Occasional /HPF / Bacteria: x      ABG - ( 2019 01:11 )  pH, Arterial: 7.41  pH, Blood: x     /  pCO2: 35    /  pO2: 79    / HCO3: 22    / Base Excess: -2.1  /  SaO2: 96                      MICROBIOLOGY:    Culture - Blood (collected 19 @ 11:52)  Source: .Blood  Preliminary Report (19 @ 12:01):    No growth to date.    Culture - Blood (collected 19 @ 11:52)  Source: .Blood two aerobic bottles received  Preliminary Report (19 @ 12:01):    No growth to date.        RADIOLOGY & ADDITIONAL STUDIES:    CXR:< from: Xray Chest 1 View-PORTABLE IMMEDIATE (19 @ 00:36) >  Sternotomy wires are noted. Note is made of an aortic valve   replacement. Heart size appears within normal limits. No hilar or   superior mediastinal abnormalities are identified. Allowing for shallow   inspiration and portable technique there is no definite evidence for   focal infiltrate or lobar consolidation. Mild interstitial prominence is   noted; an element of interstitial edema cannot be fully excluded. There   is no evidence for pleural effusion or pneumothorax. No mediastinal shift   is noted. Chronic appearing deformity of the mid left clavicle noted.   Density is identified overlying the right upper quadrant region, which   may be extrinsic to the patient.      ekg; Sinus Tach , LBBB, PVC    echo:< from: Transthoracic Echocardiogram (19 @ 10:05) >  1. Bioprosthetic aortic valve. Peak transaortic valve  gradient equals 23 mm Hg, mean transaortic valve gradient  equals 12 mm Hg, which is probably normal in the presence  of a bioprosthetic aortic valve. No aortic valve  regurgitation seen.  2. Normal left ventricular internal dimensions and wall  thicknesses.  3. Severe segmental left ventricular systolic dysfunction.  Peak left ventricular outflow tract gradient equals 1 mm  Hg, LVOT velocity time integral equals 13 cm.  4. Decreased right ventricular systolic function.  Unable to rule out endocarditis.  Consider DICKSON if  clinically indicated.  *** No previous Echo exam. PULMONARY CONSULT NOTE      KEVIN VILLARREAL  MRN-[id number]    Patient is a 78y old  Male who presents with a chief complaint of Aspiration Pneumonia (2019 17:25)  cough and sob, Pt vomitted and aspirated, G/T feeds  Hx chart lab and Xrays reviewed ,Pt examined and discussed with PMD    HISTORY OF PRESENT ILLNESS:   77 yo M w/ hx of CVA w/ left sided weakness, CAD w/ CABG , prior hx of thoracic aortic aneurysm repair, HTN, DM2, systolic CHF (mild LV dysfunction in 2018 requiring Bipap), bioprosthetic aortic valve s/p repair, BPH, MRSA (2018), stercoral colitis presents after he vomited after PEG feed and aspirated. patient is non verbal and bed bound. un able to provide any history. ROS is limited due to patient's non verbal status. History was obtained by wife over the phone. She reports that patient had his tube feed at 4:00 pm and at 6:00 pm, he vomited x 2, only food particles. Patient then started to cough and started to gasp for breathing. Patient's on then called EMS. As per spouse patient is having diarrhea for past 1 week. patient was recently started on Levofloxacin by PCP for fever at home. She denies any other acute complaints.     Home Medications:  Aspir 81 oral delayed release tablet: 1 tab(s) orally once a day (2019 09:50)  cholecalciferol oral tablet: 2000 unit(s) by gastrostomy tube once a day (2019 14:50)  docusate sodium 10 mg/mL oral liquid: 10 milliliter(s) by gastrostomy tube 2 times a day, As Needed (2019 14:50)  Lasix 20 mg oral tablet: 1 tab(s) by gastrostomy tube once a day (2019 14:50)  metFORMIN 1000 mg oral tablet: 1 tab(s) orally 2 times a day (2019 09:50)  metFORMIN 500 mg oral tablet: 1 tab(s) orally once a day (at bedtime) (2019 09:50)  metFORMIN 500 mg oral tablet: 1 tab(s) orally 3 times a day (2019 21:12)  Multiple Vitamins oral capsule: 1 cap(s) orally once a day (2019 09:50)  multivitamin: 1 tab(s) by gastrostomy tube once a day (2019 14:50)  pantoprazole 40 mg oral granule, delayed release: 40 milligram(s) by gastrostomy tube once a day (2019 14:50)  Protonix 40 mg oral granule, delayed release: 1 each orally once a day (2019 09:50)  simvastatin 20 mg oral tablet: 1 tab(s) orally once a day (in the evening) (2019 21:12)  simvastatin 20 mg oral tablet: 1 tab(s) orally once a day (at bedtime) (2019 09:50)  tamsulosin 0.4 mg oral capsule: 1 cap(s) orally once a day (2019 09:50)  tamsulosin 0.4 mg oral capsule: 1 cap(s) orally once a day (at bedtime) (2019 21:12)  Vitamin D3 2000 intl units oral capsule: 1 cap(s) orally once a day (2019 09:50)      MEDICATIONS  (STANDING):  ALBUTerol/ipratropium for Nebulization 3 milliLiter(s) Nebulizer every 6 hours  dextrose 5%. 1000 milliLiter(s) (50 mL/Hr) IV Continuous <Continuous>  dextrose 50% Injectable 12.5 Gram(s) IV Push once  dextrose 50% Injectable 25 Gram(s) IV Push once  dextrose 50% Injectable 25 Gram(s) IV Push once  enoxaparin Injectable 40 milliGRAM(s) SubCutaneous daily  insulin lispro (HumaLOG) corrective regimen sliding scale   SubCutaneous every 6 hours  pantoprazole   Suspension 40 milliGRAM(s) Oral daily  piperacillin/tazobactam IVPB. 3.375 Gram(s) IV Intermittent every 8 hours  simvastatin 20 milliGRAM(s) Oral at bedtime      MEDICATIONS  (PRN):  dextrose 40% Gel 15 Gram(s) Oral once PRN Blood Glucose LESS THAN 70 milliGRAM(s)/deciliter  glucagon  Injectable 1 milliGRAM(s) IntraMuscular once PRN Glucose LESS THAN 70 milligrams/deciliter      Allergies    No Known Allergies            PAST MEDICAL & SURGICAL HISTORY:  Coronary artery disease  PUD (peptic ulcer disease)  Bicuspid aortic valve  CHF (congestive heart failure)  CVA (cerebral vascular accident)  Sepsis  UTI (urinary tract infection)  GI bleed  PUD (peptic ulcer disease)  CVA, old, 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  Gastric ulcer  Hyperlipemia  Diabetes mellitus  Hypertension  PEG (percutaneous endoscopic gastrostomy) status  Coronary artery disease involving coronary bypass graft of native heart with angina pectoris  History of eye surgery: endophthalmitis in   H/O aortic root repair  No Past Surgical History      FAMILY HISTORY:  No pertinent family history in first degree relatives  No pertinent family history in first degree relatives  HTN --   DM--   IHD --  Asthma--  COPD--     SOCIAL HISTORY SMOKING--    ETOH--     DRUGS--    REVIEW OF SYSTEMS: as per Staff  CONSTITUTIONAL: No fever, weight loss, or fatigue   EYES: No eye pain, visual disturbances, or discharge  ENT:  No difficulty hearing, tinnitus, vertigo; No sinus or throat pain  NECK: No pain or stiffness   RESPIRATORY:  cough +  wheezing --  chills  -- hemoptysis--    Shortness of Breath+  CARDIOVASCULAR: No chest pain, palpitations, passing out, dizziness, or leg swelling  GASTROINTESTINAL: No abdominal or epigastric pain., vomiting++, no hematemesis; No diarrhea or constipation. No melena or hematochezia.  GENITOURINARY: No dysuria, frequency, hematuria, or incontinence  NEUROLOGICAL: No headaches, memory loss++loss of strength++ lt side , no numbness, or tremors  SKIN: No itching, burning, rashes, or lesions   ALLERGY AND IMMUNOLOGIC: No hives or eczema      Vital Signs Last 24 Hrs  T(C): 36.5 (2019 15:07), Max: 36.7 (2019 23:46)  T(F): 97.7 (2019 15:07), Max: 98 (2019 23:46)  HR: 97 (2019 15:07) (91 - 99)  BP: 109/88 (2019 15:07) (100/67 - 115/65)  BP(mean): --  RR: 18 (2019 15:07) (17 - 18)  SpO2: 100% (2019 15:07) (96% - 100%)  I&O's Detail      PHYSICAL EXAMINATION:    GENERAL: The patient is a w/m lying in bed in no apparent distress.   SKIN: No rashes ecchymoses or cyanosis  HEENT: Head is normocephalic and atraumatic. Extraocular muscles are intact. Mucous membranes are moist.   Neck supple LN not felt, JVP not increased  Thyroid not enlarged  Lymphatic: No lymphadenopathy  Cardiovascular:  S1 S2  heard ,RSR , JVP not increased , systolic  murmur at apex, with click No  gallop or rub  Respiratory:  Symmetrical chest wall movements Breathing vesicular , Percussion note normal no dulness   with   rales at basis. no   wheeze  ABDOMEN:  Soft, Non-tender, G/T in place  No  hepatosplenomegaly ,BS positive		  Extremities:  No clubbing, cyanosis or edema , No calf tenderness  Vascular: Peripheral pulses palpable 2+ bilaterally  CNS:  non communicative, eyes closed  Lt Hemiplegia  Babinski +ve lt side      LABS:                        9.9    15.78 )-----------( 164      ( 2019 07:29 )             31.1         141  |  106  |  23<H>  ----------------------------<  172<H>  3.7   |  25  |  0.66    Ca    8.6      2019 07:29  Mg     1.9         TPro  5.7<L>  /  Alb  2.4<L>  /  TBili  0.2  /  DBili  x   /  AST  20  /  ALT  18  /  AlkPhos  47      PT/INR - ( 2019 00:10 )   PT: 11.4 sec;   INR: 1.03 ratio         PTT - ( 2019 00:10 )  PTT:25.0 sec  Urinalysis Basic - ( 2019 11:07 )    Color: Yellow / Appearance: Slightly Turbid / S.020 / pH: x  Gluc: x / Ketone: Small  / Bili: Negative / Urobili: Negative   Blood: x / Protein: 30 mg/dL / Nitrite: Negative   Leuk Esterase: Small / RBC: 0-2 /HPF / WBC 3-5 /HPF   Sq Epi: x / Non Sq Epi: Occasional /HPF / Bacteria: x      ABG - ( 2019 01:11 )  pH, Arterial: 7.41  pH, Blood: x     /  pCO2: 35    /  pO2: 79    / HCO3: 22    / Base Excess: -2.1  /  SaO2: 96                      MICROBIOLOGY:    Culture - Blood (collected 19 @ 11:52)  Source: .Blood  Preliminary Report (19 @ 12:01):    No growth to date.    Culture - Blood (collected 19 @ 11:52)  Source: .Blood two aerobic bottles received  Preliminary Report (19 @ 12:01):    No growth to date.        RADIOLOGY & ADDITIONAL STUDIES:    CXR:< from: Xray Chest 1 View-PORTABLE IMMEDIATE (19 @ 00:36) >  Sternotomy wires are noted. Note is made of an aortic valve   replacement. Heart size appears within normal limits. No hilar or   superior mediastinal abnormalities are identified. Allowing for shallow   inspiration and portable technique there is no definite evidence for   focal infiltrate or lobar consolidation. Mild interstitial prominence is   noted; an element of interstitial edema cannot be fully excluded. There   is no evidence for pleural effusion or pneumothorax. No mediastinal shift   is noted. Chronic appearing deformity of the mid left clavicle noted.   Density is identified overlying the right upper quadrant region, which   may be extrinsic to the patient.      ekg; Sinus Tach , LBBB, PVC    echo:< from: Transthoracic Echocardiogram (19 @ 10:05) >  1. Bioprosthetic aortic valve. Peak transaortic valve  gradient equals 23 mm Hg, mean transaortic valve gradient  equals 12 mm Hg, which is probably normal in the presence  of a bioprosthetic aortic valve. No aortic valve  regurgitation seen.  2. Normal left ventricular internal dimensions and wall  thicknesses.  3. Severe segmental left ventricular systolic dysfunction.  Peak left ventricular outflow tract gradient equals 1 mm  Hg, LVOT velocity time integral equals 13 cm.  4. Decreased right ventricular systolic function.  Unable to rule out endocarditis.  Consider DICKSON if  clinically indicated.  *** No previous Echo exam.

## 2019-02-27 NOTE — PATIENT PROFILE ADULT - FUNCTIONAL SCREEN CURRENT LEVEL: COMMUNICATION, MLM
2 = difficulty understanding and speaking (not related to language barrier) 3 = unable to speak (not related to language barrier)

## 2019-02-27 NOTE — PATIENT PROFILE ADULT - NSPRONUTRITIONRISK_GEN_A_NUR
No indicators present Pressure injury stage 2 or greater/Enteral/parenteral nutrition prior to admission

## 2019-02-27 NOTE — PROGRESS NOTE ADULT - SUBJECTIVE AND OBJECTIVE BOX
PRESENTING CC:Dyspnea    SUBJ: 79 yo M w/ hx of CVA w/ left sided weakness, CAD w/ CABG 2013, prior hx of thoracic aortic aneurysm repair, HTN, DM2, systolic CHF (mild LV dysfunction in 11/2018 requiring Bipap), bioprosthetic aortic valve s/p repair, BPH, MRSA (Nov 2018), stercoral colitis presents after he vomited after PEG feed and aspirated.Dyspnea has improved not dyspneac-awake non verbal-      PMH -reviewed admission note, no change since admission  Heart failure: acute [ ] chronic [x ] acute or chronic [ ] diastolic [ ] systolic [x ] combined systolic and diastolic[ ]  JENNIFER: ATN[ ] renal medullary necrosis [ ] CKD I [ ]CKDII [ ]CKD III [ ]CKD IV [ ]CKD V [ ]Other pathological lesions [ ]    MEDICATIONS  (STANDING):  ALBUTerol/ipratropium for Nebulization 3 milliLiter(s) Nebulizer every 6 hours  enoxaparin Injectable 40 milliGRAM(s) SubCutaneous daily  insulin lispro (HumaLOG) corrective regimen sliding scale   SubCutaneous every 6 hours  pantoprazole   Suspension 40 milliGRAM(s) Oral daily  piperacillin/tazobactam IVPB. 3.375 Gram(s) IV Intermittent every 8 hours  simvastatin 20 milliGRAM(s) Oral at bedtime  vancomycin    Solution 125 milliGRAM(s) Enteral Tube every 6 hours    MEDICATIONS  (PRN):  dextrose 40% Gel 15 Gram(s) Oral once PRN Blood Glucose LESS THAN 70 milliGRAM(s)/deciliter  glucagon  Injectable 1 milliGRAM(s) IntraMuscular once PRN Glucose LESS THAN 70 milligrams/deciliter          FAMILY HISTORY:  No family history of premature coronary artery disease or sudden cardiac death      REVIEW OF SYSTEMS:  Constitutional: [ ] fever, [ ]weight loss,  [ ]fatigue  Eyes: [ ] visual changes  Respiratory: [ ]shortness of breath;  [ ] cough, [ ]wheezing, [ ]chills, [ ]hemoptysis  Cardiovascular: [ ] chest pain, [ ]palpitations, [ ]dizziness,  [ ]leg swelling[ ]orthopnea[ ]PND  Gastrointestinal: [ ] abdominal pain, [ ]nausea, [ ]vomiting,  [ ]diarrhea   Genitourinary: [ ] dysuria, [ ] hematuria  Neurologic: [ ] headaches [ ] tremors[ ]weakness  Skin: [ ] itching, [ ]burning, [ ] rashes  Endocrine: [ ] heat or cold intolerance  Musculoskeletal: [ ] joint pain or swelling; [ ] muscle, back, or extremity pain  Psychiatric: [ ] depression, [ ]anxiety, [ ]mood swings, or [ ]difficulty sleeping  Hematologic: [ ] easy bruising, [ ] bleeding gums    [ ] All remaining systems negative except as per above.   [x]Unable to obtain.    Vital Signs Last 24 Hrs  T(C): 35.4 (27 Feb 2019 07:30), Max: 36.9 (26 Feb 2019 11:58)  T(F): 95.7 (27 Feb 2019 07:30), Max: 98.5 (26 Feb 2019 11:58)  HR: 91 (27 Feb 2019 07:30) (91 - 109)  BP: 100/67 (27 Feb 2019 07:30) (100/67 - 115/70)  RR: 18 (27 Feb 2019 07:30) (17 - 18)  SpO2: 99% (27 Feb 2019 07:30) (96% - 100%)  I&O's Summary      PHYSICAL EXAM:  General: No acute distress BMI-22.6  HEENT: EOMI, PERRL  Neck: Supple, [ ] JVD  Lungs: Equal air entry bilaterally; [ ] rales [ ] wheezing [ ] rhonchi  Heart: Regular rate and rhythm; [x ] murmur   2/6 [x ] systolic [ ] diastolic [ ] radiation[ ] rubs [ ]  gallops  Abdomen: Nontender, bowel sounds present  Extremities: No clubbing, cyanosis, [ ] edema  Nervous system:  Alert & Oriented X2, Left paresi  Psychiatric: Normal affect  Skin: No rashes or lesions    LABS:  02-27    141  |  106  |  23<H>  ----------------------------<  172<H>  3.7   |  25  |  0.66    Ca    8.6      27 Feb 2019 07:29  Mg     1.9     02-26    TPro  5.7<L>  /  Alb  2.4<L>  /  TBili  0.2  /  DBili  x   /  AST  20  /  ALT  18  /  AlkPhos  47  02-26    Creatinine Trend: 0.66<--, 0.55<--, 0.71<--                        9.9    15.78 )-----------( 164      ( 27 Feb 2019 07:29 )             31.1     PT/INR - ( 26 Feb 2019 00:10 )   PT: 11.4 sec;   INR: 1.03 ratio    PTT - ( 26 Feb 2019 00:10 )  PTT:25.0 sec        RADIOLOGY: XR CHEST PORTABLE  IMPRESSION: No definite evidence for focal infiltrate or lobar  consolidation. Interstitial prominence; mild interstitial edema cannot be  excluded.      ECG [my interpretation]:Sinus tachycardia no acute ST T wave abnormalities      IMPRESSION AND PLAN:      · Assessment		  79 yo M w/ hx of CVA w/ left sided weakness, CAD w/ CABG 2013, prior hx of thoracic aortic aneurysm repair, HTN, DM2, systolic CHF (mild LV dysfunction in 11/2018 requiring Bipap), bioprosthetic aortic valve s/p repair, BPH, MRSA (Nov 2018), stercoral colitis presents after he vomited after PEG feed and aspirated. patient is non verbal and bed bound. un able to provide any history. ROS is limited due to patient's non verbal status. Admitted for Aspriation Pneumonia, suspected C.diff.         Problem/Plan - 1:  ·  Problem: Aspiration pneumonia.  Plan: -pt presented with aspiration event  -CXR shows possible right lower lobe  infiltrate   -wbc count 23.37k-improving.  Continue Zosyn      Problem/Plan - 2:  ·  Problem: Clostridium difficile diarrhea. Plan: -patient has loose stools x 1 week  -recent antibiotic exposure and elevated wbc count 23k  -will start on vancomycin 125mg Q6 hrs via PEG   C Diff pending      Problem/Plan - 3:  ·  Problem: Anemia. Plan: -Patient's H/H is 10.9/32. baseline H/H is 12  -He has history of non bleeding gastric Ulcer  -no active melena or hematemesis  Hct stable      Problem/Plan - 4:  ·  Problem: PUD (peptic ulcer disease). Plan: -c/w protonix daily.    Problem/Plan - 5:  ·  Problem: CHF (congestive heart failure)-Chronis Systolic Heart failure. Plan: -not on any medications 2/2 labile BP  c/w Aspirin.    Problem/Plan - 6:  Problem: Diabetes Mellitus. Plan: Endocrine consult noted.  Hemoglobin A1C, Whole Blood (11.02.18 @ 06:00)    Hemoglobin A1C, Whole Blood: 7.1%

## 2019-02-27 NOTE — PROGRESS NOTE ADULT - SUBJECTIVE AND OBJECTIVE BOX
78y Male is under our care for aspiration pneumonia and ?c. diff. Patient is in same disposition and has not had any BMs. Will dc contact isolation and vanco PO. Wbc count has improved though he had hypothermia episode in am.     REVIEW OF SYSTEMS:  [ X ] Not able to illicit    MEDS:  piperacillin/tazobactam IVPB. 3.375 Gram(s) IV Intermittent every 8 hours    ALLERGIES: No Known Allergies    VITALS:  Vital Signs Last 24 Hrs  T(C): 35.3 (27 Feb 2019 11:13), Max: 36.7 (26 Feb 2019 23:46)  T(F): 95.6 (27 Feb 2019 11:13), Max: 98 (26 Feb 2019 23:46)  HR: 94 (27 Feb 2019 11:13) (91 - 102)  BP: 115/65 (27 Feb 2019 11:13) (100/67 - 115/65)  BP(mean): --  RR: 17 (27 Feb 2019 11:13) (17 - 18)  SpO2: 96% (27 Feb 2019 11:13) (96% - 100%)      PHYSICAL EXAM:  HEENT: +NC  Neck: n/a  Respiratory: bilateral scattered rhoncherous sounds no rales  Cardiovascular: S1 S2 reg no murmurs  Gastrointestinal: +BS with soft, nondistended abdomen; nontender  +Peg   Extremities: no edema   Skin: noted bruising of right arm   Ortho: n/a  Neuro: lethargic       LABS/DIAGNOSTIC TESTS:                        9.9    15.78 )-----------( 164      ( 27 Feb 2019 07:29 )             31.1     WBC Count: 15.78 K/uL (02-27 @ 07:29)  WBC Count: 23.27 K/uL (02-26 @ 00:10)    02-27    141  |  106  |  23<H>  ----------------------------<  172<H>  3.7   |  25  |  0.66    Ca    8.6      27 Feb 2019 07:29  Mg     1.9     02-26    TPro  5.7<L>  /  Alb  2.4<L>  /  TBili  0.2  /  DBili  x   /  AST  20  /  ALT  18  /  AlkPhos  47  02-26        CULTURES:   .Blood two aerobic bottles received  02-26 @ 11:52   No growth to date.  --  --      .Blood Blood-Peripheral  01-17 @ 14:14   No growth at 5 days.  --  --      .Blood Blood-Venous  01-13 @ 08:27   No growth at 5 days.  --  --      .Urine Catheterized  01-12 @ 22:19   No growth  --  --      .Blood Blood-Peripheral  01-12 @ 22:17   Growth in anaerobic bottle: Staphylococcus epidermidis      .Urine Clean Catch (Midstream)  12-29 @ 10:23   10,000 - 49,000 CFU/mL Pseudomonas aeruginosa  --  Pseudomonas aeruginosa      .Blood Blood-Peripheral  12-29 @ 08:22   No growth at 5 days.  --  --      .Blood Blood-Peripheral  12-18 @ 18:39   No growth at 5 days.  --  --      .Urine Clean Catch (Midstream)  12-16 @ 23:32   No growth  --  --      .Blood Blood-Peripheral  12-16 @ 21:46   No growth at 5 days.  --  Blood Culture PCR        RADIOLOGY:  no new studies

## 2019-02-27 NOTE — PROGRESS NOTE ADULT - SUBJECTIVE AND OBJECTIVE BOX
Interval Events:  pt in nad    Allergies    No Known Allergies    Intolerances      Endocrine/Metabolic Medications:  dextrose 40% Gel 15 Gram(s) Oral once PRN  dextrose 50% Injectable 12.5 Gram(s) IV Push once  dextrose 50% Injectable 25 Gram(s) IV Push once  dextrose 50% Injectable 25 Gram(s) IV Push once  glucagon  Injectable 1 milliGRAM(s) IntraMuscular once PRN  insulin lispro (HumaLOG) corrective regimen sliding scale   SubCutaneous every 6 hours  simvastatin 20 milliGRAM(s) Oral at bedtime      Vital Signs Last 24 Hrs  T(C): 36.5 (27 Feb 2019 15:07), Max: 36.7 (26 Feb 2019 23:46)  T(F): 97.7 (27 Feb 2019 15:07), Max: 98 (26 Feb 2019 23:46)  HR: 97 (27 Feb 2019 15:07) (91 - 99)  BP: 109/88 (27 Feb 2019 15:07) (100/67 - 115/65)  BP(mean): --  RR: 18 (27 Feb 2019 15:07) (17 - 18)  SpO2: 100% (27 Feb 2019 15:07) (96% - 100%)      PHYSICAL EXAM  All physical exam findings normal, except those marked:  General:	Alert, active, cooperative, NAD, well hydrated  .		[] Abnormal:  Neck		Normal: supple, no cervical adenopathy, no palpable thyroid  .		[] Abnormal:  Cardiovascular	Normal: regular rate, normal S1, S2, no murmurs  .		[] Abnormal:  Respiratory	Normal: no chest wall deformity, normal respiratory pattern, CTA B/L  .		[] Abnormal:  Abdominal	Normal: soft, ND, NT, bowel sounds present, no masses, no organomegaly  .		[] Abnormal:  		Normal normal genitalia, testes descended, circumcised/uncircumcised  .		Kalin stage:			Breast kalin:  .		Menstrual history:  .		[] Abnormal:  Extremities	Normal: FROM x4  .		[] Abnormal:  Skin		Normal: intact and not indurated, no rash, no acanthosis nigricans  .		[] Abnormal:  Neurologic	Normal: grossly intact  .		[] Abnormal:    LABS                        9.9    15.78 )-----------( 164      ( 27 Feb 2019 07:29 )             31.1                               141    |  106    |  23                  Calcium: 8.6   / iCa: x      (02-27 @ 07:29)    ----------------------------<  172       Magnesium: x                                3.7     |  25     |  0.66             Phosphorous: x            Hemoglobin A1C, Whole Blood (02.27.19 @ 09:23)    Hemoglobin A1C, Whole Blood: 8.6: Method: Immunoassay             CAPILLARY BLOOD GLUCOSE      POCT Blood Glucose.: 186 mg/dL (27 Feb 2019 16:59)  POCT Blood Glucose.: 171 mg/dL (27 Feb 2019 11:54)  POCT Blood Glucose.: 164 mg/dL (27 Feb 2019 06:28)  POCT Blood Glucose.: 206 mg/dL (27 Feb 2019 01:46)        Assesment/plan    Dm- overall good control  cont humalog prn for now  consider metformin and add januvia upon d/c  fsg ac and hs    PNA- cont iv abx

## 2019-02-27 NOTE — PATIENT PROFILE ADULT - VISION (WITH CORRECTIVE LENSES IF THE PATIENT USUALLY WEARS THEM):
Normal vision: sees adequately in most situations; can see medication labels, newsprint patient is nonverbal

## 2019-02-27 NOTE — CONSULT NOTE ADULT - ASSESSMENT
R/O Asp Pneumonia- evolving  CABG with AVR with LBBB with CHF  CVA with Lt Hemiplegia   HCVD with AA repair  DM  HLD  HX of GI BLEED/PUD, UTI        PLAN-Frequent suctioning  Blood c/s  1-2 doses IV Lasix with f/u CXR  IV antibiotics at present  Prognosis guarded  Discussed with Resident and PMD  Thanks for consult R/O  RLL Asp Pneumonia- evolving  CABG with AVR with LBBB  CVA with Lt Hemiplegia   HCVD with AA repair  DM  HLD  HX of GI BLEED/PUD, UTI        PLAN-Frequent suctioning  Blood c/s   f/u CXR AM  IV antibiotics at present  Prognosis guarded  Discussed with Resident and PMD  Thanks for consult

## 2019-02-27 NOTE — PROGRESS NOTE ADULT - ASSESSMENT
1.	Aspiration pneumonia  2.	Leukocytosis - improved  3.	R/o'ed C. diff  ·	cont zosyn 3.375gm IV q8h  D2  ·	dc vanco PO  ·	dc isolation

## 2019-02-27 NOTE — PATIENT PROFILE ADULT - ABILITY TO HEAR (WITH HEARING AID OR HEARING APPLIANCE IF NORMALLY USED):
Unable to assess hearing/patient is nonverbal Unable to assess hearing/patient is nonverbal. Wife at bedside

## 2019-02-28 LAB
ANION GAP SERPL CALC-SCNC: 10 MMOL/L — SIGNIFICANT CHANGE UP (ref 5–17)
BUN SERPL-MCNC: 21 MG/DL — HIGH (ref 7–18)
CALCIUM SERPL-MCNC: 9.2 MG/DL — SIGNIFICANT CHANGE UP (ref 8.4–10.5)
CHLORIDE SERPL-SCNC: 109 MMOL/L — HIGH (ref 96–108)
CO2 SERPL-SCNC: 22 MMOL/L — SIGNIFICANT CHANGE UP (ref 22–31)
CREAT SERPL-MCNC: 0.62 MG/DL — SIGNIFICANT CHANGE UP (ref 0.5–1.3)
GLUCOSE BLDC GLUCOMTR-MCNC: 155 MG/DL — HIGH (ref 70–99)
GLUCOSE BLDC GLUCOMTR-MCNC: 176 MG/DL — HIGH (ref 70–99)
GLUCOSE BLDC GLUCOMTR-MCNC: 206 MG/DL — HIGH (ref 70–99)
GLUCOSE SERPL-MCNC: 150 MG/DL — HIGH (ref 70–99)
HCT VFR BLD CALC: 35.8 % — LOW (ref 39–50)
HGB BLD-MCNC: 11.7 G/DL — LOW (ref 13–17)
MCHC RBC-ENTMCNC: 32.1 PG — SIGNIFICANT CHANGE UP (ref 27–34)
MCHC RBC-ENTMCNC: 32.7 GM/DL — SIGNIFICANT CHANGE UP (ref 32–36)
MCV RBC AUTO: 98.4 FL — SIGNIFICANT CHANGE UP (ref 80–100)
NRBC # BLD: 0 /100 WBCS — SIGNIFICANT CHANGE UP (ref 0–0)
PLATELET # BLD AUTO: 215 K/UL — SIGNIFICANT CHANGE UP (ref 150–400)
POTASSIUM SERPL-MCNC: 3.4 MMOL/L — LOW (ref 3.5–5.3)
POTASSIUM SERPL-SCNC: 3.4 MMOL/L — LOW (ref 3.5–5.3)
RBC # BLD: 3.64 M/UL — LOW (ref 4.2–5.8)
RBC # FLD: 14.8 % — HIGH (ref 10.3–14.5)
SODIUM SERPL-SCNC: 141 MMOL/L — SIGNIFICANT CHANGE UP (ref 135–145)
WBC # BLD: 14.75 K/UL — HIGH (ref 3.8–10.5)
WBC # FLD AUTO: 14.75 K/UL — HIGH (ref 3.8–10.5)

## 2019-02-28 RX ORDER — POTASSIUM CHLORIDE 20 MEQ
40 PACKET (EA) ORAL ONCE
Qty: 0 | Refills: 0 | Status: COMPLETED | OUTPATIENT
Start: 2019-02-28 | End: 2019-02-28

## 2019-02-28 RX ADMIN — PANTOPRAZOLE SODIUM 40 MILLIGRAM(S): 20 TABLET, DELAYED RELEASE ORAL at 11:02

## 2019-02-28 RX ADMIN — Medication 2: at 17:03

## 2019-02-28 RX ADMIN — Medication 3 MILLILITER(S): at 14:46

## 2019-02-28 RX ADMIN — Medication 40 MILLIEQUIVALENT(S): at 11:02

## 2019-02-28 RX ADMIN — SIMVASTATIN 20 MILLIGRAM(S): 20 TABLET, FILM COATED ORAL at 21:39

## 2019-02-28 RX ADMIN — Medication 1: at 05:10

## 2019-02-28 RX ADMIN — ENOXAPARIN SODIUM 40 MILLIGRAM(S): 100 INJECTION SUBCUTANEOUS at 11:03

## 2019-02-28 RX ADMIN — PIPERACILLIN AND TAZOBACTAM 25 GRAM(S): 4; .5 INJECTION, POWDER, LYOPHILIZED, FOR SOLUTION INTRAVENOUS at 21:39

## 2019-02-28 RX ADMIN — Medication 3 MILLILITER(S): at 09:09

## 2019-02-28 RX ADMIN — Medication 1: at 11:22

## 2019-02-28 RX ADMIN — Medication 3 MILLILITER(S): at 20:50

## 2019-02-28 RX ADMIN — PIPERACILLIN AND TAZOBACTAM 25 GRAM(S): 4; .5 INJECTION, POWDER, LYOPHILIZED, FOR SOLUTION INTRAVENOUS at 05:09

## 2019-02-28 RX ADMIN — PIPERACILLIN AND TAZOBACTAM 25 GRAM(S): 4; .5 INJECTION, POWDER, LYOPHILIZED, FOR SOLUTION INTRAVENOUS at 13:01

## 2019-02-28 NOTE — PROGRESS NOTE ADULT - SUBJECTIVE AND OBJECTIVE BOX
PRESENTING CC:Dyspnea    SUBJ: 79 yo M w/ hx of CVA w/ left sided weakness, CAD w/ CABG 2013, prior hx of thoracic aortic aneurysm repair, HTN, T2DM, Systolic CHF (mild LV dysfunction in 11/2018 requiring Bipap), Bioprosthetic aortic valve s/p repair, BPH, MRSA (Nov 2018), stercoral colitis presents after he vomited after PEG feed and aspirated.Dyspnea has improved not dyspneac-awake non verbal-remains afebrile-no diarrhea noted      PMH -reviewed admission note, no change since admission  Heart failure: acute [ ] chronic [x ] acute or chronic [ ] diastolic [ ] systolic [x ] combined systolic and diastolic[ ]  JENNIFER: ATN[ ] renal medullary necrosis [ ] CKD I [ ]CKDII [ ]CKD III [ ]CKD IV [ ]CKD V [ ]Other pathological lesions [ ]    MEDICATIONS  (STANDING):  ALBUTerol/ipratropium for Nebulization 3 milliLiter(s) Nebulizer every 6 hours  enoxaparin Injectable 40 milliGRAM(s) SubCutaneous daily  insulin lispro (HumaLOG) corrective regimen sliding scale   SubCutaneous every 6 hours  pantoprazole   Suspension 40 milliGRAM(s) Oral daily  piperacillin/tazobactam IVPB. 3.375 Gram(s) IV Intermittent every 8 hours  potassium chloride   Powder 40 milliEquivalent(s) Oral once  simvastatin 20 milliGRAM(s) Oral at bedtime    MEDICATIONS  (PRN):  dextrose 40% Gel 15 Gram(s) Oral once PRN Blood Glucose LESS THAN 70 milliGRAM(s)/deciliter  glucagon  Injectable 1 milliGRAM(s) IntraMuscular once PRN Glucose LESS THAN 70 milligrams/deciliter          FAMILY HISTORY:  No family history of premature coronary artery disease or sudden cardiac death      REVIEW OF SYSTEMS:  Constitutional: [ ] fever, [ ]weight loss,  [ ]fatigue  Eyes: [ ] visual changes  Respiratory: [ ]shortness of breath;  [ ] cough, [ ]wheezing, [ ]chills, [ ]hemoptysis  Cardiovascular: [ ] chest pain, [ ]palpitations, [ ]dizziness,  [ ]leg swelling[ ]orthopnea[ ]PND  Gastrointestinal: [ ] abdominal pain, [ ]nausea, [ ]vomiting,  [ ]diarrhea   Genitourinary: [ ] dysuria, [ ] hematuria  Neurologic: [ ] headaches [ ] tremors[ ]weakness  Skin: [ ] itching, [ ]burning, [ ] rashes  Endocrine: [ ] heat or cold intolerance  Musculoskeletal: [ ] joint pain or swelling; [ ] muscle, back, or extremity pain  Psychiatric: [ ] depression, [ ]anxiety, [ ]mood swings, or [ ]difficulty sleeping  Hematologic: [ ] easy bruising, [ ] bleeding gums    [ ] All remaining systems negative except as per above.   [x ]Unable to obtain.    Vital Signs Last 24 Hrs  T(C): 36.7 (28 Feb 2019 05:20), Max: 36.7 (28 Feb 2019 05:20)  T(F): 98.1 (28 Feb 2019 05:20), Max: 98.1 (28 Feb 2019 05:20)  HR: 94 (28 Feb 2019 06:17) (91 - 103)  BP: 105/69 (28 Feb 2019 05:20) (104/60 - 115/65)  RR: 18 (28 Feb 2019 05:20) (17 - 18)  SpO2: 98% (28 Feb 2019 05:20) (96% - 100%)  I&O's Summary      PHYSICAL EXAM:  General: No acute distress BMI-22.6  HEENT: EOMI, PERRL  Neck: Supple, [ ] JVD  Lungs: Equal air entry bilaterally; [ ] rales [ ] wheezing [ ] rhonchi  Heart: Irregular rate and rhythm; [x] murmur  2 /6 [x ] systolic [ ] diastolic [ ] radiation[ ] rubs [ ]  gallops  Abdomen: Nontender, bowel sounds present  Extremities: No clubbing, cyanosis, [ ] edema  Nervous system:  Alert & Oriented X2, no focal deficits  Psychiatric: Normal affect  Skin: No rashes or lesions    LABS:  02-28    141  |  109<H>  |  21<H>  ----------------------------<  150<H>  3.4<L>   |  22  |  0.62    Ca    9.2      28 Feb 2019 07:25      Creatinine Trend: 0.62<--, 0.66<--, 0.55<--                        11.7   14.75 )-----------( 215      ( 28 Feb 2019 07:25 )             35.8     IMPRESSION AND PLAN:      79 yo M w/ hx of CVA w/ left sided weakness, CAD w/ CABG 2013, prior hx of thoracic aortic aneurysm repair, HTN, DM2, systolic CHF (mild LV dysfunction in 11/2018 requiring Bipap), bioprosthetic aortic valve s/p repair, BPH, MRSA (Nov 2018), stercoral colitis presents after he vomited after PEG feed and aspirated. patient is non verbal and bed bound. un able to provide any history. ROS is limited due to patient's non verbal status. Admitted for Aspriation Pneumonia, suspected C.diff.         Problem/Plan - 1:  ·  Problem: Aspiration pneumonia.  Plan: -pt presented with aspiration event  -CXR shows possible right lower lobe  infiltrate   -wbc count 23.37k-improving.  Continue Zosyn      Problem/Plan - 2:  ·  Problem: Clostridium difficile diarrhea. Plan: -patient has loose stools x 1 week  -recent antibiotic exposure and elevated wbc count 23k  -will start on vancomycin 125mg Q6 hrs via PEG   C Diff negative-discontinue Vanco    Problem/Plan - 3:  ·  Problem: Anemia. Plan: -Patient's H/H is 10.9/32. baseline H/H is 12  -He has history of non bleeding gastric Ulcer  -no active melena or hematemesis  Hct stable      Problem/Plan - 4:  ·  Problem: PUD (peptic ulcer disease). Plan: -c/w protonix daily.    Problem/Plan - 5:  ·  Problem: CHF (congestive heart failure)-Chronis Systolic Heart failure. Plan: -not on any medications 2/2 labile BP  c/w Aspirin.    Problem/Plan - 6:  Problem: Diabetes Mellitus. Plan: Endocrine consult noted.  Hemoglobin A1C, Whole Blood (11.02.18 @ 06:00)    Hemoglobin A1C, Whole Blood: 7.1%

## 2019-02-28 NOTE — PROGRESS NOTE ADULT - SUBJECTIVE AND OBJECTIVE BOX
78y Male is under our care for aspiration pneumonia. Patient is resting comfortably and appears in no distress. Peg feedings were resumed today. Has no had any diarrhea as per wife. No recurrent hypothermia and wbc count continues to trend downward.      REVIEW OF SYSTEMS:  [ X ] Not able to illicit    MEDS:  piperacillin/tazobactam IVPB. 3.375 Gram(s) IV Intermittent every 8 hours    ALLERGIES: No Known Allergies    VITALS:  Vital Signs Last 24 Hrs  T(C): 36.9 (28 Feb 2019 13:50), Max: 36.9 (28 Feb 2019 13:50)  T(F): 98.4 (28 Feb 2019 13:50), Max: 98.4 (28 Feb 2019 13:50)  HR: 110 (28 Feb 2019 13:50) (91 - 110)  BP: 100/65 (28 Feb 2019 13:50) (100/65 - 109/88)  BP(mean): --  RR: 18 (28 Feb 2019 13:50) (18 - 18)  SpO2: 95% (28 Feb 2019 13:50) (95% - 100%)      PHYSICAL EXAM:  HEENT: +NC  Neck: n/a  Respiratory: bilateral scattered rhoncherous sounds no rales  Cardiovascular: S1 S2 reg no murmurs  Gastrointestinal: +BS with soft, nondistended abdomen; nontender  +Peg   Extremities: no edema   Skin: noted bruising of right arm   Ortho: n/a  Neuro: sleeping      LABS/DIAGNOSTIC TESTS:                        11.7   14.75 )-----------( 215      ( 28 Feb 2019 07:25 )             35.8   WBC Count: 14.75 K/uL (02-28 @ 07:25)  WBC Count: 15.78 K/uL (02-27 @ 07:29)  WBC Count: 23.27 K/uL (02-26 @ 00:10)    02-28    141  |  109<H>  |  21<H>  ----------------------------<  150<H>  3.4<L>   |  22  |  0.62    Ca    9.2      28 Feb 2019 07:25        CULTURES:   .Blood two aerobic bottles received  02-26 @ 11:52   No growth to date.  --  --      .Blood Blood-Peripheral  01-17 @ 14:14   No growth at 5 days.  --  --        RADIOLOGY:  no new studies

## 2019-02-28 NOTE — PROGRESS NOTE ADULT - ASSESSMENT
R/O  RLL Asp Pneumonia- evolving  CAD with AVR with LBBB  CVA with Lt Hemiplegia   HCVD with AA repair  DM  HLD  HX of GI BLEED/PUD, UTI        PLAN-Frequent suctioning   f/u CXR today  IV antibiotics   Prognosis guarded  Discussed with Resident and PMD

## 2019-02-28 NOTE — PROGRESS NOTE ADULT - SUBJECTIVE AND OBJECTIVE BOX
Interval Events:      Allergies    No Known Allergies    Intolerances      Endocrine/Metabolic Medications:  dextrose 40% Gel 15 Gram(s) Oral once PRN  dextrose 50% Injectable 12.5 Gram(s) IV Push once  dextrose 50% Injectable 25 Gram(s) IV Push once  dextrose 50% Injectable 25 Gram(s) IV Push once  glucagon  Injectable 1 milliGRAM(s) IntraMuscular once PRN  insulin lispro (HumaLOG) corrective regimen sliding scale   SubCutaneous every 6 hours  simvastatin 20 milliGRAM(s) Oral at bedtime      Vital Signs Last 24 Hrs  T(C): 36.9 (28 Feb 2019 13:50), Max: 36.9 (28 Feb 2019 13:50)  T(F): 98.4 (28 Feb 2019 13:50), Max: 98.4 (28 Feb 2019 13:50)  HR: 110 (28 Feb 2019 13:50) (91 - 110)  BP: 100/65 (28 Feb 2019 13:50) (100/65 - 105/69)  BP(mean): --  RR: 18 (28 Feb 2019 13:50) (18 - 18)  SpO2: 95% (28 Feb 2019 13:50) (95% - 99%)      PHYSICAL EXAM  All physical exam findings normal, except those marked:  General:	Alert, active, cooperative, NAD, well hydrated  .		[] Abnormal:  Neck		Normal: supple, no cervical adenopathy, no palpable thyroid  .		[] Abnormal:  Cardiovascular	Normal: regular rate, normal S1, S2, no murmurs  .		[] Abnormal:  Respiratory	Normal: no chest wall deformity, normal respiratory pattern, CTA B/L  .		[] Abnormal:  Abdominal	Normal: soft, ND, NT, bowel sounds present, no masses, no organomegaly  .		[] Abnormal:  		Normal normal genitalia, testes descended, circumcised/uncircumcised  .		Kalin stage:			Breast kalin:  .		Menstrual history:  .		[] Abnormal:  Extremities	Normal: FROM x4  .		[] Abnormal:  Skin		Normal: intact and not indurated, no rash, no acanthosis nigricans  .		[] Abnormal:  Neurologic	Normal: grossly intact  .		[] Abnormal:    LABS                        11.7   14.75 )-----------( 215      ( 28 Feb 2019 07:25 )             35.8                               141    |  109    |  21                  Calcium: 9.2   / iCa: x      (02-28 @ 07:25)    ----------------------------<  150       Magnesium: x                                3.4     |  22     |  0.62             Phosphorous: x          CAPILLARY BLOOD GLUCOSE      POCT Blood Glucose.: 176 mg/dL (28 Feb 2019 11:11)  POCT Blood Glucose.: 155 mg/dL (28 Feb 2019 05:08)  POCT Blood Glucose.: 164 mg/dL (27 Feb 2019 23:50)  POCT Blood Glucose.: 186 mg/dL (27 Feb 2019 16:59)        Assesment/plan Interval Events:  pt in nad    Allergies    No Known Allergies    Intolerances      Endocrine/Metabolic Medications:  dextrose 40% Gel 15 Gram(s) Oral once PRN  dextrose 50% Injectable 12.5 Gram(s) IV Push once  dextrose 50% Injectable 25 Gram(s) IV Push once  dextrose 50% Injectable 25 Gram(s) IV Push once  glucagon  Injectable 1 milliGRAM(s) IntraMuscular once PRN  insulin lispro (HumaLOG) corrective regimen sliding scale   SubCutaneous every 6 hours  simvastatin 20 milliGRAM(s) Oral at bedtime      Vital Signs Last 24 Hrs  T(C): 36.9 (28 Feb 2019 13:50), Max: 36.9 (28 Feb 2019 13:50)  T(F): 98.4 (28 Feb 2019 13:50), Max: 98.4 (28 Feb 2019 13:50)  HR: 110 (28 Feb 2019 13:50) (91 - 110)  BP: 100/65 (28 Feb 2019 13:50) (100/65 - 105/69)  BP(mean): --  RR: 18 (28 Feb 2019 13:50) (18 - 18)  SpO2: 95% (28 Feb 2019 13:50) (95% - 99%)      PHYSICAL EXAM  All physical exam findings normal, except those marked:  General:	Alert, active, cooperative, NAD, well hydrated  .		[] Abnormal:  Neck		Normal: supple, no cervical adenopathy, no palpable thyroid  .		[] Abnormal:  Cardiovascular	Normal: regular rate, normal S1, S2, no murmurs  .		[] Abnormal:  Respiratory	Normal: no chest wall deformity, normal respiratory pattern, CTA B/L  .		[] Abnormal:  Abdominal	Normal: soft, ND, NT, bowel sounds present, no masses, no organomegaly  .		[] Abnormal:  		Normal normal genitalia, testes descended, circumcised/uncircumcised  .		Kalin stage:			Breast kalin:  .		Menstrual history:  .		[] Abnormal:  Extremities	Normal: FROM x4  .		[] Abnormal:  Skin		Normal: intact and not indurated, no rash, no acanthosis nigricans  .		[] Abnormal:  Neurologic	Normal: grossly intact  .		[] Abnormal:    LABS                        11.7   14.75 )-----------( 215      ( 28 Feb 2019 07:25 )             35.8                               141    |  109    |  21                  Calcium: 9.2   / iCa: x      (02-28 @ 07:25)    ----------------------------<  150       Magnesium: x                                3.4     |  22     |  0.62             Phosphorous: x          CAPILLARY BLOOD GLUCOSE      POCT Blood Glucose.: 176 mg/dL (28 Feb 2019 11:11)  POCT Blood Glucose.: 155 mg/dL (28 Feb 2019 05:08)  POCT Blood Glucose.: 164 mg/dL (27 Feb 2019 23:50)  POCT Blood Glucose.: 186 mg/dL (27 Feb 2019 16:59)        Assesment/plan    Dm- good control  cont humalog prn for now  fsg ac and hs    PNA- cont iv abx  per prim team

## 2019-02-28 NOTE — PROGRESS NOTE ADULT - SUBJECTIVE AND OBJECTIVE BOX
PULMONARY  progress note    KEVIN VILLARREAL  MRN-[id number]    Patient is a 78y old  Male who presents with a chief complaint of Aspiration Pneumonia (2019 17:51)  Non communicative in no distress      MEDICATIONS  (STANDING):  ALBUTerol/ipratropium for Nebulization 3 milliLiter(s) Nebulizer every 6 hours  dextrose 5%. 1000 milliLiter(s) (50 mL/Hr) IV Continuous <Continuous>  dextrose 50% Injectable 12.5 Gram(s) IV Push once  dextrose 50% Injectable 25 Gram(s) IV Push once  dextrose 50% Injectable 25 Gram(s) IV Push once  enoxaparin Injectable 40 milliGRAM(s) SubCutaneous daily  insulin lispro (HumaLOG) corrective regimen sliding scale   SubCutaneous every 6 hours  pantoprazole   Suspension 40 milliGRAM(s) Oral daily  piperacillin/tazobactam IVPB. 3.375 Gram(s) IV Intermittent every 8 hours  simvastatin 20 milliGRAM(s) Oral at bedtime      MEDICATIONS  (PRN):  dextrose 40% Gel 15 Gram(s) Oral once PRN Blood Glucose LESS THAN 70 milliGRAM(s)/deciliter  glucagon  Injectable 1 milliGRAM(s) IntraMuscular once PRN Glucose LESS THAN 70 milligrams/deciliter      Allergies    No Known Allergies          PAST MEDICAL & SURGICAL HISTORY:  Coronary artery disease  PUD (peptic ulcer disease)  Bicuspid aortic valve  CHF (congestive heart failure)  CVA (cerebral vascular accident)  Sepsis  UTI (urinary tract infection)  GI bleed  PUD (peptic ulcer disease)  CVA, old, 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  Gastric ulcer  Hyperlipemia  Diabetes mellitus  Hypertension  PEG (percutaneous endoscopic gastrostomy) status  Coronary artery disease involving coronary bypass graft of native heart with angina pectoris  History of eye surgery: endophthalmitis in   H/O aortic root repair  No Past Surgical History           REVIEW OF SYSTEMS: as per staff  CONSTITUTIONAL: No fever, weight loss, or fatigue   ENT:  No difficulty hearing, tinnitus, vertigo; No sinus or throat pain  NECK: No pain or stiffness or nodes  RESPIRATORY:  cough--   wheezing--   chills--  hemoptysis--  Shortness of Breath--  CARDIOVASCULAR: No chest pain, palpitations, passing out, dizziness, or leg swelling  GASTROINTESTINAL: No abdominal or epigastric pain. No nausea, vomiting, or hematemesis; No diarrhea or constipation. No melena or hematochezia.  GENITOURINARY: No dysuria, frequency, hematuria, or incontinence  NEUROLOGICAL: No headaches, memory loss++ loss of strength++  no tremors  SKIN: No itching, burning, rashes, or lesions   LYMPH Nodes: No enlarged glands  ALLERGY AND IMMUNOLOGIC: No hives or eczema    Vital Signs Last 24 Hrs  T(C): 36.7 (2019 05:20), Max: 36.7 (2019 05:20)  T(F): 98.1 (2019 05:20), Max: 98.1 (2019 05:20)  HR: 94 (2019 06:17) (91 - 103)  BP: 105/69 (2019 05:20) (104/60 - 115/65)  BP(mean): --  RR: 18 (2019 05:20) (17 - 18)  SpO2: 98% (2019 05:20) (96% - 100%)  I&O's Detail      PHYSICAL EXAMINATION:    GENERAL: The patient is a thin w/m in no apparent distress.   SKIN no rash ecchymoses or bruises  HEENT: Head is normocephalic and atraumatic  JAMARCUS , Mucous membranes  moist.   Neck supple ,No LN felt JVP not increased  Thyroid not enlarged  Cardiovascular:  S1 S2 heard, RSR, No JVD , systolic  murmur at apex and aortic area, No gallop or rub  Respiratory: Chest wall symmetrical with good air entry ,Percussion note normal,    Lungs vesicular breathing with  rt basilar  rales , no   wheeze	  ABDOMEN:  Soft, Non-tender, G/T in place,  no hepatomegaly or splenomegaly BS positive	  Extremities: No clubbing, cyanosis or edema  Vascular: Peripheral pulses palpable 2+ bilaterally  CNS:  eyes closed responding to deep stimuli   Lt Hemiplegia  Rt dysfunction also  LABS:                        11.7   14.75 )-----------( 215      ( 2019 07:25 )             35.8     -    141  |  109<H>  |  21<H>  ----------------------------<  150<H>  3.4<L>   |  22  |  0.62    Ca    9.2      2019 07:25        Urinalysis Basic - ( 2019 11:07 )    Color: Yellow / Appearance: Slightly Turbid / S.020 / pH: x  Gluc: x / Ketone: Small  / Bili: Negative / Urobili: Negative   Blood: x / Protein: 30 mg/dL / Nitrite: Negative   Leuk Esterase: Small / RBC: 0-2 /HPF / WBC 3-5 /HPF   Sq Epi: x / Non Sq Epi: Occasional /HPF / Bacteria: x      HbA1C 8.6      MICROBIOLOGY:    Culture - Blood (collected 19 @ 11:52)  Source: .Blood  Preliminary Report (19 @ 12:01):    No growth to date.    Culture - Blood (collected 19 @ 11:52)  Source: .Blood two aerobic bottles received  Preliminary Report (19 @ 12:01):    No growth to date.          RADIOLOGY & ADDITIONAL STUDIES:    CXR: sutures of sternotomy, Increased Markings /Infilt RLL

## 2019-03-01 ENCOUNTER — TRANSCRIPTION ENCOUNTER (OUTPATIENT)
Age: 79
End: 2019-03-01

## 2019-03-01 VITALS
SYSTOLIC BLOOD PRESSURE: 111 MMHG | RESPIRATION RATE: 18 BRPM | HEART RATE: 73 BPM | TEMPERATURE: 99 F | DIASTOLIC BLOOD PRESSURE: 82 MMHG | OXYGEN SATURATION: 97 %

## 2019-03-01 LAB
ANION GAP SERPL CALC-SCNC: 5 MMOL/L — SIGNIFICANT CHANGE UP (ref 5–17)
BASOPHILS # BLD AUTO: 0.04 K/UL — SIGNIFICANT CHANGE UP (ref 0–0.2)
BASOPHILS NFR BLD AUTO: 0.3 % — SIGNIFICANT CHANGE UP (ref 0–2)
BUN SERPL-MCNC: 21 MG/DL — HIGH (ref 7–18)
CALCIUM SERPL-MCNC: 8.9 MG/DL — SIGNIFICANT CHANGE UP (ref 8.4–10.5)
CHLORIDE SERPL-SCNC: 111 MMOL/L — HIGH (ref 96–108)
CO2 SERPL-SCNC: 26 MMOL/L — SIGNIFICANT CHANGE UP (ref 22–31)
CREAT SERPL-MCNC: 0.73 MG/DL — SIGNIFICANT CHANGE UP (ref 0.5–1.3)
EOSINOPHIL # BLD AUTO: 0.18 K/UL — SIGNIFICANT CHANGE UP (ref 0–0.5)
EOSINOPHIL NFR BLD AUTO: 1.5 % — SIGNIFICANT CHANGE UP (ref 0–6)
GLUCOSE BLDC GLUCOMTR-MCNC: 201 MG/DL — HIGH (ref 70–99)
GLUCOSE BLDC GLUCOMTR-MCNC: 214 MG/DL — HIGH (ref 70–99)
GLUCOSE BLDC GLUCOMTR-MCNC: 231 MG/DL — HIGH (ref 70–99)
GLUCOSE SERPL-MCNC: 187 MG/DL — HIGH (ref 70–99)
HCT VFR BLD CALC: 31.7 % — LOW (ref 39–50)
HGB BLD-MCNC: 10.1 G/DL — LOW (ref 13–17)
IMM GRANULOCYTES NFR BLD AUTO: 0.6 % — SIGNIFICANT CHANGE UP (ref 0–1.5)
LYMPHOCYTES # BLD AUTO: 1.25 K/UL — SIGNIFICANT CHANGE UP (ref 1–3.3)
LYMPHOCYTES # BLD AUTO: 10.6 % — LOW (ref 13–44)
MAGNESIUM SERPL-MCNC: 2 MG/DL — SIGNIFICANT CHANGE UP (ref 1.6–2.6)
MCHC RBC-ENTMCNC: 31.9 GM/DL — LOW (ref 32–36)
MCHC RBC-ENTMCNC: 32 PG — SIGNIFICANT CHANGE UP (ref 27–34)
MCV RBC AUTO: 100.3 FL — HIGH (ref 80–100)
MONOCYTES # BLD AUTO: 0.77 K/UL — SIGNIFICANT CHANGE UP (ref 0–0.9)
MONOCYTES NFR BLD AUTO: 6.6 % — SIGNIFICANT CHANGE UP (ref 2–14)
NEUTROPHILS # BLD AUTO: 9.44 K/UL — HIGH (ref 1.8–7.4)
NEUTROPHILS NFR BLD AUTO: 80.4 % — HIGH (ref 43–77)
NRBC # BLD: 0 /100 WBCS — SIGNIFICANT CHANGE UP (ref 0–0)
PHOSPHATE SERPL-MCNC: 2.6 MG/DL — SIGNIFICANT CHANGE UP (ref 2.5–4.5)
PLATELET # BLD AUTO: 203 K/UL — SIGNIFICANT CHANGE UP (ref 150–400)
POTASSIUM SERPL-MCNC: 4 MMOL/L — SIGNIFICANT CHANGE UP (ref 3.5–5.3)
POTASSIUM SERPL-SCNC: 4 MMOL/L — SIGNIFICANT CHANGE UP (ref 3.5–5.3)
RBC # BLD: 3.16 M/UL — LOW (ref 4.2–5.8)
RBC # FLD: 15.1 % — HIGH (ref 10.3–14.5)
SODIUM SERPL-SCNC: 142 MMOL/L — SIGNIFICANT CHANGE UP (ref 135–145)
WBC # BLD: 11.75 K/UL — HIGH (ref 3.8–10.5)
WBC # FLD AUTO: 11.75 K/UL — HIGH (ref 3.8–10.5)

## 2019-03-01 PROCEDURE — 85730 THROMBOPLASTIN TIME PARTIAL: CPT

## 2019-03-01 PROCEDURE — 82803 BLOOD GASES ANY COMBINATION: CPT

## 2019-03-01 PROCEDURE — 80053 COMPREHEN METABOLIC PANEL: CPT

## 2019-03-01 PROCEDURE — 93005 ELECTROCARDIOGRAM TRACING: CPT

## 2019-03-01 PROCEDURE — 83036 HEMOGLOBIN GLYCOSYLATED A1C: CPT

## 2019-03-01 PROCEDURE — 83605 ASSAY OF LACTIC ACID: CPT

## 2019-03-01 PROCEDURE — 87631 RESP VIRUS 3-5 TARGETS: CPT

## 2019-03-01 PROCEDURE — 94640 AIRWAY INHALATION TREATMENT: CPT

## 2019-03-01 PROCEDURE — 83735 ASSAY OF MAGNESIUM: CPT

## 2019-03-01 PROCEDURE — 71045 X-RAY EXAM CHEST 1 VIEW: CPT

## 2019-03-01 PROCEDURE — 36415 COLL VENOUS BLD VENIPUNCTURE: CPT

## 2019-03-01 PROCEDURE — 81001 URINALYSIS AUTO W/SCOPE: CPT

## 2019-03-01 PROCEDURE — 82962 GLUCOSE BLOOD TEST: CPT

## 2019-03-01 PROCEDURE — 99285 EMERGENCY DEPT VISIT HI MDM: CPT | Mod: 25

## 2019-03-01 PROCEDURE — 85610 PROTHROMBIN TIME: CPT

## 2019-03-01 PROCEDURE — 80048 BASIC METABOLIC PNL TOTAL CA: CPT

## 2019-03-01 PROCEDURE — 84100 ASSAY OF PHOSPHORUS: CPT

## 2019-03-01 PROCEDURE — 96375 TX/PRO/DX INJ NEW DRUG ADDON: CPT

## 2019-03-01 PROCEDURE — 85027 COMPLETE CBC AUTOMATED: CPT

## 2019-03-01 PROCEDURE — 87040 BLOOD CULTURE FOR BACTERIA: CPT

## 2019-03-01 PROCEDURE — 96374 THER/PROPH/DIAG INJ IV PUSH: CPT

## 2019-03-01 RX ORDER — CEFUROXIME AXETIL 250 MG
10 TABLET ORAL
Qty: 60 | Refills: 0 | OUTPATIENT
Start: 2019-03-01 | End: 2019-03-03

## 2019-03-01 RX ORDER — SIMVASTATIN 20 MG/1
1 TABLET, FILM COATED ORAL
Qty: 0 | Refills: 0 | COMMUNITY

## 2019-03-01 RX ORDER — METFORMIN HYDROCHLORIDE 850 MG/1
1 TABLET ORAL
Qty: 0 | Refills: 0 | COMMUNITY

## 2019-03-01 RX ORDER — FUROSEMIDE 40 MG
1 TABLET ORAL
Qty: 0 | Refills: 0 | COMMUNITY

## 2019-03-01 RX ORDER — METOPROLOL TARTRATE 50 MG
1 TABLET ORAL
Qty: 30 | Refills: 0 | OUTPATIENT
Start: 2019-03-01 | End: 2019-03-30

## 2019-03-01 RX ORDER — TAMSULOSIN HYDROCHLORIDE 0.4 MG/1
1 CAPSULE ORAL
Qty: 0 | Refills: 0 | COMMUNITY

## 2019-03-01 RX ORDER — PANTOPRAZOLE SODIUM 20 MG/1
1 TABLET, DELAYED RELEASE ORAL
Qty: 0 | Refills: 0 | COMMUNITY

## 2019-03-01 RX ORDER — CHOLECALCIFEROL (VITAMIN D3) 125 MCG
1 CAPSULE ORAL
Qty: 0 | Refills: 0 | COMMUNITY

## 2019-03-01 RX ADMIN — ENOXAPARIN SODIUM 40 MILLIGRAM(S): 100 INJECTION SUBCUTANEOUS at 11:59

## 2019-03-01 RX ADMIN — PANTOPRAZOLE SODIUM 40 MILLIGRAM(S): 20 TABLET, DELAYED RELEASE ORAL at 11:59

## 2019-03-01 RX ADMIN — Medication 3 MILLILITER(S): at 09:03

## 2019-03-01 RX ADMIN — Medication 2: at 11:59

## 2019-03-01 RX ADMIN — Medication 3 MILLILITER(S): at 15:30

## 2019-03-01 RX ADMIN — PIPERACILLIN AND TAZOBACTAM 25 GRAM(S): 4; .5 INJECTION, POWDER, LYOPHILIZED, FOR SOLUTION INTRAVENOUS at 06:17

## 2019-03-01 RX ADMIN — Medication 2: at 06:17

## 2019-03-01 RX ADMIN — Medication 2: at 00:39

## 2019-03-01 RX ADMIN — PIPERACILLIN AND TAZOBACTAM 25 GRAM(S): 4; .5 INJECTION, POWDER, LYOPHILIZED, FOR SOLUTION INTRAVENOUS at 13:06

## 2019-03-01 NOTE — DISCHARGE NOTE NURSING/CASE MANAGEMENT/SOCIAL WORK - NSDCDPATPORTLINK_GEN_ALL_CORE
You can access the [facility] Patient Portal, offered by [facility], by registering with the following website: [email or web address]

## 2019-03-01 NOTE — CHART NOTE - NSCHARTNOTEFT_GEN_A_CORE
Upon Nutritional Assessment by the Registered Dietitian your patient was determined to meet criteria / has evidence of the following diagnosis/diagnoses:          [ ]  Mild Protein Calorie Malnutrition        [ x ]  Moderate Protein Calorie Malnutrition        [ ] Severe Protein Calorie Malnutrition        [ ] Unspecified Protein Calorie Malnutrition        [ ] Underweight / BMI <19        [ ] Morbid Obesity / BMI > 40    Nutrition focused physical exam conducted:  Subcutaneous fat loss: [ x ] Orbital fat pads region, [ ]Buccal fat region, [ ]Triceps region,  [ ]Ribs region.  Muscle wasting: [ x ]Temples region, [ x ]Clavicle region, [ ]Shoulder region, [ ]Scapula region, [ ]Interosseous region,  [ ]thigh region, [ ]Calf region    Findings as based on:  •  Comprehensive nutrition assessment and consultation  •  Calorie counts (nutrient intake analysis)  •  Food acceptance and intake status from observations by staff  •  Follow up  •  Patient education  •  Intervention secondary to interdisciplinary rounds  •   concerns      Treatment:    The following diet has been recommended: Rate to be increased to: Glucerna 1.5 @ 55ml/hr x 16 hrs to provide 1320 kcal, 72.6 g pro, 668 ml, MD to adjust free water as medically feasible.      PROVIDER Section:     By signing this assessment you are acknowledging and agree with the diagnosis/diagnoses assigned by the Registered Dietitian    Comments:

## 2019-03-01 NOTE — DISCHARGE NOTE PROVIDER - CARE PROVIDER_API CALL
Matt Trevizo)  Medicine  Dept Director  [address] [place], [place] [id number]  Phone: [phone]  Fax: [phone]  Follow Up Time:

## 2019-03-01 NOTE — DISCHARGE NOTE PROVIDER - NSDCCPCAREPLAN_GEN_ALL_CORE_FT
PRINCIPAL DISCHARGE DIAGNOSIS  Problem: Aspiration pneumonia due to vomit  Assessment and Plan of Treatment: Complete the antibiotic course as mentioned. Follow up with PCP within a week of discharge. Maintain aspiration precautions.      SECONDARY DISCHARGE DIAGNOSES  Problem: CHF (congestive heart failure)  Assessment and Plan of Treatment: CHF (congestive heart failure)    Problem: PUD (peptic ulcer disease)  Assessment and Plan of Treatment: PUD (peptic ulcer disease) PRINCIPAL DISCHARGE DIAGNOSIS  Problem: Aspiration pneumonia due to vomit  Assessment and Plan of Treatment: Complete the antibiotic course as mentioned. Follow up with PCP within a week of discharge. Maintain aspiration precautions.      SECONDARY DISCHARGE DIAGNOSES  Problem: CHF (congestive heart failure)  Assessment and Plan of Treatment: Continue with aspirin, no other medications due to labile BP. Follow up with PCP routinely.    Problem: PUD (peptic ulcer disease)  Assessment and Plan of Treatment: Hb stayed stable during the hospital course. Continue with Protonix and follow up with PCP routinely.    Problem: Diabetes mellitus  Assessment and Plan of Treatment: HbA1c is 8.6. Continue with medications as mentioned, monitor blood sugars at home and follow up with PCP routinely.

## 2019-03-01 NOTE — PROGRESS NOTE ADULT - ASSESSMENT
1.	Aspiration pneumonia  2.	Leukocytosis - improved  ·	dc planning today  ·	dc on ceftin 500mg via PEG BID x 3 days  ·	reconsult prn

## 2019-03-01 NOTE — PROGRESS NOTE ADULT - REASON FOR ADMISSION
Aspiration Pneumonia

## 2019-03-01 NOTE — DIETITIAN INITIAL EVALUATION ADULT. - NS AS NUTRI INTERV ENTERAL NUTRITION
Rate/Glucerna 1.5 @ 55ml/hr x 16 hrs to provide 1320 kcal, 72.6 g pro, 668 ml, MD to adjust free water as medically feasible. Rate/Glucerna 1.5 @ 55ml/hr x 16 hrs to provide 1320 kcal, 72.6 g pro, 668 ml free water, MD to adjust free water as medically feasible.

## 2019-03-01 NOTE — PROGRESS NOTE ADULT - SUBJECTIVE AND OBJECTIVE BOX
78y Male is under our care for aspiration pneumonia. Patient is in same disposition with no overnight events. Remains without any diarrhea. No fevers and wbc count continues to improve. Due for dc home today on PO antibiotics.     REVIEW OF SYSTEMS:  [ X ] Not able to illicit    MEDS:  piperacillin/tazobactam IVPB. 3.375 Gram(s) IV Intermittent every 8 hours    ALLERGIES: No Known Allergies    VITALS:  Vital Signs Last 24 Hrs  T(C): 37.1 (01 Mar 2019 14:37), Max: 37.1 (01 Mar 2019 14:37)  T(F): 98.7 (01 Mar 2019 14:37), Max: 98.7 (01 Mar 2019 14:37)  HR: 114 (01 Mar 2019 14:37) (100 - 114)  BP: 108/71 (01 Mar 2019 14:37) (97/56 - 114/66)  BP(mean): --  RR: 17 (01 Mar 2019 14:37) (17 - 18)  SpO2: 100% (01 Mar 2019 14:37) (98% - 100%)      PHYSICAL EXAM:  HEENT: +NC  Neck: n/a  Respiratory: bilateral scattered rhoncherous sounds no rales  Cardiovascular: S1 S2 reg no murmurs  Gastrointestinal: +BS with soft, nondistended abdomen; nontender  +Peg   Extremities: no edema   Skin: no rashes   Ortho: n/a  Neuro: lethargic       LABS/DIAGNOSTIC TESTS:                        10.1   11.75 )-----------( 203      ( 01 Mar 2019 05:57 )             31.7   WBC Count: 11.75 K/uL (03-01 @ 05:57)  WBC Count: 14.75 K/uL (02-28 @ 07:25)  WBC Count: 15.78 K/uL (02-27 @ 07:29)  WBC Count: 23.27 K/uL (02-26 @ 00:10)    03-01    142  |  111<H>  |  21<H>  ----------------------------<  187<H>  4.0   |  26  |  0.73    Ca    8.9      01 Mar 2019 05:57  Phos  2.6     03-01  Mg     2.0     03-01        CULTURES:   .Blood two aerobic bottles received  02-26 @ 11:52   No growth to date.  --  --      .Blood Blood-Peripheral  01-17 @ 14:14   No growth at 5 days.  --  --        RADIOLOGY:  no new studies

## 2019-03-01 NOTE — DISCHARGE NOTE NURSING/CASE MANAGEMENT/SOCIAL WORK - NSDCPEPT PROEDHF_GEN_ALL_CORE
Call primary care provider for follow up after discharge/Monitor weight daily/Activities as tolerated/Report signs and symptoms to primary care provider/Low salt diet

## 2019-03-01 NOTE — DISCHARGE NOTE NURSING/CASE MANAGEMENT/SOCIAL WORK - NSDCPEPTSTRK_GEN_ALL_CORE
Signs and symptoms of stroke/Need for follow up after discharge/Prescribed medications/Risk factors for stroke/Stroke education booklet/Stroke warning signs and symptoms/Call 911 for stroke/Stroke support groups for patients, families, and friends

## 2019-03-01 NOTE — DIETITIAN INITIAL EVALUATION ADULT. - OTHER INFO
Patient visited for consult. Patient has history of CHF, CVA, DM, HTN, HLD, PUD, GI bleed, and gastric ucler. Patient admitted for PNA. Patient has bolus feeds at home. As per RN, patient tolerating TF. Patient on Glucerna 1.5 @ 40ml/hr x 16 hrs = 960 calories. Patient appears debilitated and presents with moderate muscle and fat mass loss. Patient presents with pressure injuries: Medical Back Stage II, Later Back Stage II, Sacrum Stage II, Left Patient visited for consult. Patient has history of CHF, CVA, DM, HTN, HLD, PUD, GI bleed, and gastric ulcer. Patient admitted for PNA. Patient has bolus feeds at home. As per RN, patient tolerating TF. Patient on Glucerna 1.5 @ 40ml/hr x 16 hrs = 960 calories. Patient appears debilitated and presents with moderate muscle and fat mass loss. Patient presents with pressure injuries: Medical Back Stage II, Later Back Stage II, Sacrum Stage II, Left Later Knee Stage I. Patient visited for consult. Patient has history of CHF, CVA, DM, HTN, HLD, PUD, GI bleed, and gastric ulcer. Patient admitted for PNA. Patient has bolus feeds at home. As per RN, patient tolerating TF. Patient on Glucerna 1.5 @ 40ml/hr x 16 hrs = 960 calories. Patient appears debilitated and presents with moderate muscle and fat mass loss and has 10 pound weight loss x 5 days. Patient presents with pressure injuries: Medical Back Stage II, Later Back Stage II, Sacrum Stage II, Left Later Knee Stage I. Patient visited for consult. Patient has history of CHF, CVA, DM, HTN, HLD, PUD, GI bleed, and gastric ulcer. Patient admitted for PNA. Patient has PEG and receives bolus feeds at home. As per RN, patient tolerating TF. Patient on Glucerna 1.5 @ 40ml/hr x 16 hrs = 960 calories. Patient appears debilitated and presents with moderate muscle and fat mass loss and has 10 pound weight loss x 5 days. Patient presents with pressure injuries: Medical Back Stage II, Later Back Stage II, Sacrum Stage II, Left Later Knee Stage I. Patient visited for consult. Patient has history of CHF, CVA, DM, HTN, HLD, PUD, GI bleed, and gastric ulcer.  Patient has PEG and receives bolus feeds at home. Patient current receiving Glucerna 1.5 x 16 hrs = 960 calories. Patient presents with moderate muscle and fat mass and 10 pound weight loss x 5 days. Patient presents with pressure injuries: Medical Back Stage II, Later Back Stage II, Sacrum Stage II, Left Later Knee Stage I. Patient visited for consult. Patient has history of CHF, CVA, DM, HTN, HLD, PUD, GI bleed, and gastric ulcer.  Patient has PEG and receives bolus feeds at home. Patient currently receiving Glucerna 1.5 x 16 hrs = 960 calories. Patient presents with moderate muscle and fat mass and 10 pound weight loss x 5 days. Patient presents with pressure injuries: Medical Back Stage II, Later Back Stage II, Sacrum Stage II, Left Later Knee Stage I. Patient visited for consult. Patient has history of CHF, CVA, DM, HTN, HLD, PUD, GI bleed, and gastric ulcer.  Spoke with patient's wife patient has PEG and receives bolus feeds at home (1 can (329 ml) Glucerna 1.2, 4 times a day. Patient currently receiving Glucerna 1.5 x 16 hrs = 960 calories. Patient presents with moderate muscle and fat mass and 10 pound weight loss x 5 days. Patient presents with pressure injuries: Medical Back Stage II, Later Back Stage II, Sacrum Stage II, Left Later Knee Stage I.

## 2019-03-01 NOTE — DIETITIAN INITIAL EVALUATION ADULT. - PROBLEM SELECTOR PLAN 1
-pt presented with aspiration event  -CXR shows possible right lower lobe  infiltrate   -wbc count 23.37k  -RVP negative  -recently discharged from [facility] after being treated for MRSA bactremia   -s/p clindamycin and rocephin  -Will start on Zosyn for now   -f/u blood cultures  -Aspiration precautions  -patient is wheezing on examination. c/w Duoneb   -Hold tube feeds for now.

## 2019-03-01 NOTE — PROGRESS NOTE ADULT - ASSESSMENT
R/O  RLL Asp Pneumonia- evolving  CAD with AVR with LBBB  CVA with Lt Hemiplegia   HCVD with AA repair  DM  HLD  HX of GI BLEED/PUD, UTI        PLAN-Frequent suctioning   f/u CXR today  IV antibiotics   Discussed with Resident

## 2019-03-01 NOTE — DISCHARGE NOTE PROVIDER - HOSPITAL COURSE
77 yo M w/ hx of CVA w/ left sided weakness, CAD w/ CABG 2013, prior hx of thoracic aortic aneurysm repair, HTN, DM2, systolic CHF (mild LV dysfunction in 11/2018 requiring Bipap), bioprosthetic aortic valve s/p repair, BPH, MRSA (Nov 2018), stercoral colitis presents after he vomited after PEG feed and aspirated. Wife additionally mentioned that patient had diarrhea after recent antibiotic exposure. Admitted for Aspiration Pneumonia and suspected C.diff. Leukocytosis of 23k on admission. No fever. CXR shows possible right lower lobe  infiltrate. Started on Zosyn and oral Vancomycin. No more diarrhea was noted, so Vancomycin was discontinued. Zosyn was continued and was switched to Ceftin on discharge to complete the course. Patient doing fine and is medically stable for discharge. Discussed with attending and consultants.

## 2019-03-01 NOTE — DIETITIAN INITIAL EVALUATION ADULT. - MD RECOMMEND
Increase TF rate to: Glucerna 1.5 @ 55ml/hr x 16 hrs to provide 1320 kcal, 72.6 g pro, 668 ml, MD to adjust free water as medically feasible./other other/Increase TF rate to: Glucerna 1.5 @ 55ml/hr x 16 hrs to provide 1320 kcal, 72.6 g pro, 668 ml free water, MD to adjust free water as medically feasible.

## 2019-03-01 NOTE — DIETITIAN INITIAL EVALUATION ADULT. - PROBLEM SELECTOR PLAN 3
-Patient's H/H is 10.9/32. baseline H/H is 12  -He has history of non bleeding gastric Ulcer  -no active melena or hematemesis   -Monitor H/H  -c/w protonix daily

## 2019-03-01 NOTE — PROGRESS NOTE ADULT - ATTENDING COMMENTS
Patient was seen and examined,interim events noted,labs and radiology studies reviewed.  Matt Trevizo MD,FACC.  [address] [place] [address].  [id number].  [phone]
Patient was seen and examined,interim events noted,labs and radiology studies reviewed.  Matt Trevizo MD,FACC.  [address] [place] [address].  [id number].  [phone]
I have examined pt personally Hx chart lab and xrays reviewed and pt discussed with residents
I have examined pt personally Hx chart lab and xrays reviewed and pt discussed with residents
I agree with above

## 2019-03-01 NOTE — DIETITIAN INITIAL EVALUATION ADULT. - PROBLEM SELECTOR PLAN 2
-patient has loose stools x 1 week  -recent antibiotic exposure and elevated wbc count 23k  -will start on vancomycin 125mg Q6 hrs via PEG   -f/u Clostridium c.diff PCR  -contact isolation until c.diff ruled out

## 2019-03-01 NOTE — PROGRESS NOTE ADULT - SUBJECTIVE AND OBJECTIVE BOX
Interval Events:      Allergies    No Known Allergies    Intolerances      Endocrine/Metabolic Medications:  dextrose 40% Gel 15 Gram(s) Oral once PRN  dextrose 50% Injectable 12.5 Gram(s) IV Push once  dextrose 50% Injectable 25 Gram(s) IV Push once  dextrose 50% Injectable 25 Gram(s) IV Push once  glucagon  Injectable 1 milliGRAM(s) IntraMuscular once PRN  insulin lispro (HumaLOG) corrective regimen sliding scale   SubCutaneous every 6 hours  simvastatin 20 milliGRAM(s) Oral at bedtime      Vital Signs Last 24 Hrs  T(C): 36.8 (01 Mar 2019 05:11), Max: 36.9 (28 Feb 2019 13:50)  T(F): 98.3 (01 Mar 2019 05:11), Max: 98.4 (28 Feb 2019 13:50)  HR: 102 (01 Mar 2019 05:11) (100 - 110)  BP: 114/66 (01 Mar 2019 05:11) (97/56 - 114/66)  BP(mean): --  RR: 17 (01 Mar 2019 05:11) (17 - 18)  SpO2: 98% (01 Mar 2019 05:11) (95% - 98%)      PHYSICAL EXAM  All physical exam findings normal, except those marked:  General:	Alert, active, cooperative, NAD, well hydrated  .		[] Abnormal:  Neck		Normal: supple, no cervical adenopathy, no palpable thyroid  .		[] Abnormal:  Cardiovascular	Normal: regular rate, normal S1, S2, no murmurs  .		[] Abnormal:  Respiratory	Normal: no chest wall deformity, normal respiratory pattern, CTA B/L  .		[] Abnormal:  Abdominal	Normal: soft, ND, NT, bowel sounds present, no masses, no organomegaly  .		[] Abnormal:  		Normal normal genitalia, testes descended, circumcised/uncircumcised  .		Kalin stage:			Breast kalin:  .		Menstrual history:  .		[] Abnormal:  Extremities	Normal: FROM x4  .		[] Abnormal:  Skin		Normal: intact and not indurated, no rash, no acanthosis nigricans  .		[] Abnormal:  Neurologic	Normal: grossly intact  .		[] Abnormal:    LABS                        10.1   11.75 )-----------( 203      ( 01 Mar 2019 05:57 )             31.7                               142    |  111    |  21                  Calcium: 8.9   / iCa: x      (03-01 @ 05:57)    ----------------------------<  187       Magnesium: 2.0                              4.0     |  26     |  0.73             Phosphorous: 2.6        CAPILLARY BLOOD GLUCOSE      POCT Blood Glucose.: 214 mg/dL (01 Mar 2019 11:36)  POCT Blood Glucose.: 201 mg/dL (01 Mar 2019 05:59)  POCT Blood Glucose.: 231 mg/dL (01 Mar 2019 00:00)  POCT Blood Glucose.: 206 mg/dL (28 Feb 2019 16:56)        Assesment/plan Interval Events:  pt in nad    Allergies    No Known Allergies    Intolerances      Endocrine/Metabolic Medications:  dextrose 40% Gel 15 Gram(s) Oral once PRN  dextrose 50% Injectable 12.5 Gram(s) IV Push once  dextrose 50% Injectable 25 Gram(s) IV Push once  dextrose 50% Injectable 25 Gram(s) IV Push once  glucagon  Injectable 1 milliGRAM(s) IntraMuscular once PRN  insulin lispro (HumaLOG) corrective regimen sliding scale   SubCutaneous every 6 hours  simvastatin 20 milliGRAM(s) Oral at bedtime      Vital Signs Last 24 Hrs  T(C): 36.8 (01 Mar 2019 05:11), Max: 36.9 (28 Feb 2019 13:50)  T(F): 98.3 (01 Mar 2019 05:11), Max: 98.4 (28 Feb 2019 13:50)  HR: 102 (01 Mar 2019 05:11) (100 - 110)  BP: 114/66 (01 Mar 2019 05:11) (97/56 - 114/66)  BP(mean): --  RR: 17 (01 Mar 2019 05:11) (17 - 18)  SpO2: 98% (01 Mar 2019 05:11) (95% - 98%)      PHYSICAL EXAM  All physical exam findings normal, except those marked:  General:	Alert, active, cooperative, NAD, well hydrated  .		[] Abnormal:  Neck		Normal: supple, no cervical adenopathy, no palpable thyroid  .		[] Abnormal:  Cardiovascular	Normal: regular rate, normal S1, S2, no murmurs  .		[] Abnormal:  Respiratory	Normal: no chest wall deformity, normal respiratory pattern, CTA B/L  .		[] Abnormal:  Abdominal	Normal: soft, ND, NT, bowel sounds present, no masses, no organomegaly  .		[] Abnormal:  		Normal normal genitalia, testes descended, circumcised/uncircumcised  .		Kalin stage:			Breast kalin:  .		Menstrual history:  .		[] Abnormal:  Extremities	Normal: FROM x4  .		[] Abnormal:  Skin		Normal: intact and not indurated, no rash, no acanthosis nigricans  .		[] Abnormal:  Neurologic	Normal: grossly intact  .		[] Abnormal:    LABS                        10.1   11.75 )-----------( 203      ( 01 Mar 2019 05:57 )             31.7                               142    |  111    |  21                  Calcium: 8.9   / iCa: x      (03-01 @ 05:57)    ----------------------------<  187       Magnesium: 2.0                              4.0     |  26     |  0.73             Phosphorous: 2.6        CAPILLARY BLOOD GLUCOSE      POCT Blood Glucose.: 214 mg/dL (01 Mar 2019 11:36)  POCT Blood Glucose.: 201 mg/dL (01 Mar 2019 05:59)  POCT Blood Glucose.: 231 mg/dL (01 Mar 2019 00:00)  POCT Blood Glucose.: 206 mg/dL (28 Feb 2019 16:56)        Assesment/plan    Dm- hyperglycemic on tube feeds  pt will be on bolus feeds as out pt  rec metformin 500 tid  add januvia if uncontrolled  d/w hs and pts wife  fsg q6 hr

## 2019-03-26 NOTE — PHYSICAL THERAPY INITIAL EVALUATION ADULT - THERAPY FREQUENCY, PT EVAL
Spoke to CICI Pitts, pt cleared for PT and pre medicated. Pt POD #1 distal femur fx prosthesis revision, WBAT. Pt rcvd semi supine, +hemovac, +cryocuff, +SCDs, +L IV disconnected, +tele, +2L NC, +incontinence. Pt reports 2/10 pain at rest, 5/10 with activity. Pt tolerated session fairly well, ModA transfers, CG amb 15ft x4 with BETHEA. Pt with incr pain, decr ROM, decr balance impairing ability to return to indpt func mob.. Fim gait=1 2-3x/week

## 2019-05-22 NOTE — PROVIDER CONTACT NOTE (MEDICATION) - NAME OF MD/NP/PA/DO NOTIFIED:
SAHIL WEEKS NP Ewa Billy 51 year old male presents to the ed for a wound check of the left distal tibia sustain 2 weeks ago. states that the area is itchy and concerned for infection given history of dm. physical, patient appears well, non-toxic. 2cm horizontal wound, no purulence fluctuance ttp or cellulitis. discussed wound care with patient and PMD follow up. do not believe abx necessitated at this time. the wound is cleaned and bacitracin placed with clean dressing. At this time, the evidence for any other entities in the differential is insufficient to justify any further testing. This was discussed and explained to the patient. It was advised that persistent or worsening symptoms would require further evaluation. This was discussed with the patient and family using shared decision making. ED evaluation and management discussed with the patient and family (if available) in detail.  Close PMD follow up encouraged.  Strict ED return instructions discussed in detail and patient given the opportunity to ask any questions about their discharge diagnosis and instructions. Patient verbalized understanding. Patient is agreeable to plan.

## 2019-08-14 NOTE — PROGRESS NOTE ADULT - SUBJECTIVE AND OBJECTIVE BOX
Infectious Diseases progress note:    Subjective:  NAD, no acute o/n events.  Afebrile    ROS:  nonverbal, unable to assess    Allergies    No Known Allergies    Intolerances        ANTIBIOTICS/RELEVANT:  antimicrobials    immunologic:    OTHER:  cholecalciferol 2000 Unit(s) Oral daily  heparin  Injectable 5000 Unit(s) SubCutaneous every 8 hours  insulin lispro (HumaLOG) corrective regimen sliding scale   SubCutaneous every 6 hours  pantoprazole   Suspension 40 milliGRAM(s) Oral two times a day before meals      Objective:  Vital Signs Last 24 Hrs  T(C): 36.6 (29 Jan 2019 20:19), Max: 37.1 (28 Jan 2019 21:53)  T(F): 97.9 (29 Jan 2019 20:19), Max: 98.8 (28 Jan 2019 21:53)  HR: 106 (29 Jan 2019 20:19) (88 - 106)  BP: 102/57 (29 Jan 2019 20:19) (101/66 - 114/75)  BP(mean): --  RR: 18 (29 Jan 2019 20:19) (18 - 18)  SpO2: 97% (29 Jan 2019 20:19) (96% - 100%)    PHYSICAL EXAM:  Constitutional:NAD  Eyes:BENJY, EOMI  Ear/Nose/Throat: no thrush, mucositis.  Moist mucous membranes	  Neck:no JVD, no lymphadenopathy, supple  Respiratory: CTA phi  Cardiovascular: S1S2 RRR, no murmurs  Gastrointestinal:soft, nontender,  nondistended (+) BS  Extremities:no e/e/c  Skin:  no rashes, open wounds or ulcerations        LABS:    01-28    134<L>  |  99  |  28<H>  ----------------------------<  255<H>  5.2   |  24  |  0.71    Ca    9.2      28 Jan 2019 11:31                  Vancomycin Level, Trough: 16.3 ug/mL (01-25 @ 21:52)              MICROBIOLOGY:          RADIOLOGY & ADDITIONAL STUDIES: given. CXR ordered. Discussed case with EP. Patient improved after atropine given. I spent 20 minutes providing critical care for this patient. This time is excluding time spent performing procedures.           Electronically signed by Grace Patton DO on 8/14/2019 at 10:17 AM

## 2019-12-09 NOTE — H&P ADULT. - ASSESSMENT
Spoke to patient, informed him that Allan Westfall is here and ready for  and verbalized understanding of this information. No further questions or concerns at this time. 76M w/PMHx HTN, HLD, Type 2DM, PUD s/p GI bleed not on A/C, AS s/p TAVR and asc aortic root replacement (1/2013), CVAx3 w/residual L-sided hemiplegia, bedbound, nonverbal, , who p/w recurrent episodes of n/v 76M w/PMHx HTN, HLD, Type 2DM, PUD s/p GI bleed not on A/C, AS s/p TAVR and asc aortic root replacement (1/2013), CVAx3 w/residual L-sided hemiparesis bedbound, minimally verbal since 2015, who p/w recurrent episodes of n/v, +UA, CT findings of radioopaque density in duodenum, and fecal material in distended rectal vault likely with UTI, fecal impaction and possible foreign object in duodenum with no bowel obstruction

## 2020-01-11 NOTE — PROGRESS NOTE ADULT - SUBJECTIVE AND OBJECTIVE BOX
Infectious Diseases progress note:    Subjective: No acute o/n events.  Afebrile.  No leukocytosis.    ROS:  nonverbal    Allergies    No Known Allergies    Intolerances        ANTIBIOTICS/RELEVANT:  antimicrobials  gentamicin   IVPB 40 milliGRAM(s) IV Intermittent every 12 hours  rifampin 300 milliGRAM(s) Oral three times a day  vancomycin  IVPB 1250 milliGRAM(s) IV Intermittent daily    immunologic:    OTHER:  acetaminophen   Tablet .. 650 milliGRAM(s) Oral every 6 hours PRN  aspirin enteric coated 81 milliGRAM(s) Oral daily  chlorhexidine 4% Liquid 1 Application(s) Topical every 12 hours  dextrose 40% Gel 15 Gram(s) Oral once PRN  dextrose 5%. 1000 milliLiter(s) IV Continuous <Continuous>  dextrose 50% Injectable 12.5 Gram(s) IV Push once  dextrose 50% Injectable 25 Gram(s) IV Push once  dextrose 50% Injectable 25 Gram(s) IV Push once  docusate sodium 100 milliGRAM(s) Oral three times a day  glucagon  Injectable 1 milliGRAM(s) IntraMuscular once PRN  heparin  Injectable 5000 Unit(s) SubCutaneous every 8 hours  insulin lispro (HumaLOG) corrective regimen sliding scale   SubCutaneous three times a day before meals  insulin lispro (HumaLOG) corrective regimen sliding scale   SubCutaneous at bedtime  lisinopril 5 milliGRAM(s) Oral daily  magnesium hydroxide Suspension 30 milliLiter(s) Oral daily PRN  metoprolol succinate ER 25 milliGRAM(s) Oral daily  multivitamin 1 Tablet(s) Oral daily  nystatin Powder 1 Application(s) Topical two times a day  pantoprazole    Tablet 40 milliGRAM(s) Oral before breakfast  simvastatin 20 milliGRAM(s) Oral at bedtime  sodium chloride 0.9%. 1000 milliLiter(s) IV Continuous <Continuous>  tamsulosin 0.4 milliGRAM(s) Oral at bedtime      Objective:  Vital Signs Last 24 Hrs  T(C): 36.7 (12 Nov 2018 15:16), Max: 37.4 (11 Nov 2018 21:07)  T(F): 98 (12 Nov 2018 15:16), Max: 99.3 (11 Nov 2018 21:07)  HR: 78 (12 Nov 2018 15:16) (78 - 82)  BP: 130/58 (12 Nov 2018 15:16) (130/58 - 150/81)  BP(mean): --  RR: 18 (12 Nov 2018 15:16) (18 - 19)  SpO2: 98% (12 Nov 2018 15:16) (98% - 98%)    PHYSICAL EXAM:  Constitutional:NAD  Eyes:BENJY, EOMI  Ear/Nose/Throat: no thrush, mucositis.  Moist mucous membranes	  Neck:no JVD, no lymphadenopathy, supple  Respiratory: CTA phi  Cardiovascular: S1S2 RRR, no murmurs  Gastrointestinal:soft, nontender,  nondistended (+) BS  Extremities:no e/e/c  Skin:  no rashes, open wounds or ulcerations, picc in place        LABS:                        11.7   8.23  )-----------( 131      ( 12 Nov 2018 06:15 )             35.4     11-12    141  |  105  |  10  ----------------------------<  184<H>  3.5   |  27  |  0.71    Ca    8.3<L>      12 Nov 2018 06:15            Vancomycin Level, Random: 13.5: Therapeutic ranges have not been established for random  vancomycin. Interpret results in context of patient's  clinical condition and time sample was drawn relative to  peak and trough therapeutic ranges. Therapeutic ranges for  peak vancomycin are 25-50 and for trough vancomycin 10-20  with 15-20 mcg/mL used for complicated infections. ug/mL (11.12.18 @ 06:15)    Gentamicin Level, Trough: 0.8: REFERENCE RANGES  ----------------  PEAK          TROUGH  ----          -------  ONCE DAILY THERAPY     N/A          < 1 ug/mL  TRADITIONAL DOSING   4 - 10 ug/mL   < 1 ug/mL  SYNERGY DOSING       3 -  4 ug/mL   < 2 ug/mL  TOXIC VALUES           > 10 ug/mL   > 2 ug/mL ug/mL (11.09.18 @ 07:40)                  MICROBIOLOGY:    Culture - Blood (11.07.18 @ 17:03)    Culture - Blood:   NO ORGANISMS ISOLATED    Specimen Source: BLOOD PERIPHERAL    Culture - Blood in AM (11.03.18 @ 06:13)    Culture - Blood:   NO ORGANISMS ISOLATED    Specimen Source: BLOOD PERIPHERAL          RADIOLOGY & ADDITIONAL STUDIES: 11-Jan-2020 00:08

## 2020-12-03 NOTE — SWALLOW BEDSIDE ASSESSMENT ADULT - ADDITIONAL RECOMMENDATIONS
Yes, most likely a side effect of Tamoxifen. Recommend annual PAP smear which is next due in January. Thanks    Maintain good oral hygiene.

## 2021-03-08 NOTE — PROGRESS NOTE ADULT - ATTENDING COMMENTS
Patient was seen and examined,interim events noted,labs and radiology studies reviewed.  Matt Trevizo MD,FACC.  [address] [place] [address].  [facility].  [phone]
Patient was seen and examined,interim events noted,labs and radiology studies reviewed.  Matt Trevizo MD,FACC.  [address] [place] [address].  [id number].  [phone]
Patient was seen and examined,interim events noted,labs and radiology studies reviewed.  Matt Trevizo MD,FACC.  [address] [place] [address].  [id number].  [phone]
Patient was seen and examined,interim events noted,labs and radiology studies reviewed.  Matt Trevizo MD,FACC.  [address] [place] [address].  [facility].  [phone]
Patient was seen and examined,interim events noted,labs and radiology studies reviewed.  Matt Trevizo MD,FACC.  [address] [place] [address].  [id number].  [phone]
Patient was seen and examined,interim events noted,labs and radiology studies reviewed.  Matt Trevizo MD,FACC.  [address] [place] [address].  [id number].  [phone]
Patient was seen and examined,interim events noted,labs and radiology studies reviewed.  Matt Trevizo MD,FACC.  [address] [place] [address].  [facility].  [phone]
Patient was seen and examined,interim events noted,labs and radiology studies reviewed.  Matt Trevizo MD,FACC.  [address] [place] [address].  [facility].  [phone]
Opt out

## 2021-04-19 NOTE — DISCHARGE NOTE ADULT - MEDICATION SUMMARY - MEDICATIONS TO CHANGE
set-up required/supervision/verbal cues/nonverbal cues (demo/gestures) I will SWITCH the dose or number of times a day I take the medications listed below when I get home from the hospital:    metFORMIN 500 mg oral tablet  -- 1 tab(s) by mouth 3 times a day

## 2021-05-01 NOTE — CONSULT NOTE ADULT - CONSULT REQUESTED DATE/TIME
Pt in no acute distress  Ambulates with a steady gait   Verbalizes understanding of discharge instructions       Sheridan Katz RN  05/01/21 [id number]
04-Dec-2017 15:28
05-Dec-2017 11:12
05-Dec-2017 06:22

## 2021-12-15 NOTE — PHYSICAL THERAPY INITIAL EVALUATION ADULT - LEVEL OF INDEPENDENCE: SCOOT/BRIDGE, REHAB EVAL
Hide Additional Notes?: No
Detail Level: Simple
Detail Level: Generalized
maximum assist (25% patients effort)

## 2022-02-01 NOTE — H&P ADULT - PROBLEM SELECTOR PROBLEM 2
Additional Notes: Wound cleansed, tegaderm applied over ulceration.  Coban wrapped around Tegarderm.  Patient instructed to leave on.   Patient given extra coban and tegaderm.  She was instructed to reapply tegaderm if the one applied today becomes too fluid filled.  Previous culture showing no infection.   Patient prefers to follow up in in 1 week to recheck. Render Risk Assessment In Note?: no Detail Level: Simple Anemia Clostridium difficile diarrhea

## 2022-03-24 NOTE — ED ADULT NURSE NOTE - NS ED NURSE PRESS ULCER STAGE 11
[FreeTextEntry1] : Patient is a 76 year old woman with a history of HTN, hyperlipidemia, paroxysmal atrial fibrillation several years ago, rhythm disorder, type 2 Diabetes mellitus, prior COVID infection, and family history of CAD who presents today for follow up of HTN. She states that she has been feeling well denying any exertional chest pain, dyspnea, palpitations, headaches, and dizziness. She states that she walks on a regular basis and that she is able to perform her activities of daily living without any limitations. 
stage III

## 2022-05-17 NOTE — H&P ADULT - PROBLEM SELECTOR PLAN 5
Left VM that patient hasn't had a well exam since 2020 and would need to be seen prior to refill to reassess skin condition. Left number to call back to schedule.    IMPROVE VTE Individual Risk Assessment    RISK                                                          Points  [] Previous VTE                                           3  [] Thrombophilia                                        2  [] Lower limb paralysis                              2   [] Current Cancer                                       2   [x] Immobilization > 24 hrs                        1  [] ICU/CCU stay > 24 hours                       1  [x] Age > 60                                                   1    IMPROVE VTE Score: 2  heparin podiatry for overgrown toenails

## 2022-11-23 NOTE — DISCHARGE NOTE PROVIDER - NSDCCONDITION_GEN_ALL_CORE
Stable Full Thickness Lip Wedge Repair (Flap) Text: Given the location of the defect and the proximity to free margins a full thickness wedge repair was deemed most appropriate.  Using a sterile surgical marker, the appropriate repair was drawn incorporating the defect and placing the expected incisions perpendicular to the vermilion border.  The vermilion border was also meticulously outlined to ensure appropriate reapproximation during the repair.  The area thus outlined was incised through and through with a #15 scalpel blade.  The muscularis and dermis were reaproximated with deep sutures following hemostasis. Care was taken to realign the vermilion border before proceeding with the superficial closure.  Once the vermilion was realigned the superfical and mucosal closure was finished.

## 2023-01-25 NOTE — H&P ADULT - DOES THIS PATIENT HAVE A HISTORY OF OR HAS BEEN DX WITH HEART FAILURE?
What Type Of Note Output Would You Prefer (Optional)?: Bullet Format How Severe Is Your Acne?: moderate Is This A New Presentation, Or A Follow-Up?: Acne Additional Comments (Use Complete Sentences): Patient states that the acne flare around her mouth and nose is itchy and raised. no

## 2023-06-30 NOTE — ED ADULT NURSE NOTE - CAS EDP DISCH DISPOSITION ADMI
[facility] INPATIENT ENCOUNTER  PHYSICAL MEDICINE AND REHABILITATION  DAILY PROGRESS NOTE    ADMISSION DATE:  6/19/2023  DATE:  6/30/2023  CURRENT HOSPITAL DAY:  Hospital Day: 12  ATTENDING PHYSICIAN:  Margy House MD  CODE STATUS:  Full Resuscitation    CHIEF COMPLAINT:  S/P BKA (below knee amputation) unilateral, left (CMD)    INTERVAL HISTORY:    Selene Escobar is a 87 year old female patient admitted with S/P BKA (below knee amputation) unilateral, left (CMD) [Z89.512]     6/30: Continues to remain positive and grateful. Strength in the left residual limb greater than antigravity. Pain is well controlled with tylenol. Underwent dressing change this AM, wound continues to heal appropriately with decreased swelling in residual limb. She continues to massage and touch residual limb to help desensitize region.  Completed last day of cefepime for UTI with Urine culture was positive for Citrobacter from ID complex and E. coli  Blood pressure appears better controlled on adjusted regimen of carvedilol 12.5mg qPM, captopril 6.25mg in AM and 25 at bedtime, and tamsulosin 0.4mg to PM. Additionally, compression stocking and abdominal binder added during therapy. Amlodipine still being held. Nephrology following. However, patient does report an episode of lightheadedness which resolved when sitting. Will continue to monitor.   Na remains at 130 today. Was the same yesterday. Been stable at 133 for last week. Nephrology following. Pt remains with 1.8L fluid restriction. Will continue to monitor.     DME face-to-face wheelchair evaluation  Patient will require a custom manual wheelchair with an lightweightweight wheelchair.  She will require bilateral foot rests as well as swing away, and residual limb support on Left side. She will need the wheel chair as her mobility significantly impairs her ability to participate in mobility related activities of daily living such as toileting, bathing, getting to the  kitchen table and other motor related activities. Her mobility would not be resolved by use of walker or fitted cane.Her living environment is suited for a wheelchair with suitable surfaces as well as room for maneuvering. The use of wheelchair will significantly improve ability to participate in MRADLs. She has not expressed an unwillingness to use the wheelchair. She has sufficient upper extremity function and other physical and mental capabilities needed to safely self propel the manual wheelchair that is provided in the home during a typical day. She would not be able to self propel in a standard wheelchair in the home and can and will self propel in a lightweight wheelchair.    MEDICATIONS:    Current Facility-Administered Medications   Medication   • iron sucrose (VENOFER) injection 200 mg   • carvedilol (COREG) tablet 3.125 mg   • carvedilol (COREG) tablet 12.5 mg   • captopril (CAPOTEN) tablet 25 mg   • tamsulosin (FLOMAX) capsule 0.4 mg   • gabapentin (NEURONTIN) capsule 100 mg   • HYDROcodone-acetaminophen (NORCO) 5-325 MG per tablet 1 tablet   • sodium chloride 0.9% infusion   • [Held by provider] iron sucrose (VENOFER) injection 200 mg   • polyethylene glycol (MIRALAX) packet 17 g   • docusate sodium (ENEMEEZ MINI) 283 MG rectal enema 283 mg   • acetaminophen (TYLENOL) tablet 650 mg   • apixaBAN (ELIQUIS) tablet 5 mg   • aspirin chewable 81 mg   • docusate sodium-sennosides (SENOKOT S) 50-8.6 MG 2 tablet   • famotidine (PEPCID) tablet 20 mg   • insulin glargine (LANTUS) injection 44 Units   • insulin lispro (ADMELOG, HumaLOG) injection 15 Units   • insulin lispro (ADMELOG,HumaLOG) - Correction Dose   • insulin lispro (ADMELOG,HumaLOG) - Correction Dose   • polyethylene glycol (MIRALAX) packet 17 g   • rosuvastatin (CRESTOR) tablet 5 mg   • bisacodyl (DULCOLAX) suppository 10 mg   • dextrose (GLUTOSE) 40 % gel 15 g   • dextrose (GLUTOSE) 40 % gel 30 g   • dextrose 50 % injection 12.5 g   • dextrose 50 %  injection 25 g   • prochlorperazine (COMPAZINE) tablet 5 mg   • aluminum-magnesium hydroxide-simethicone (MAALOX) 200-200-20 MG/5ML suspension 30 mL   • hydrALAZINE (APRESOLINE) tablet 10 mg         HISTORIES:     I have reviewed the past medical history, family history, social history, medications and allergies listed in the medical record as obtained by my nursing staff and support staff and agree with their documentation.      REVIEW OF SYSTEMS:  Review of Systems   Constitutional: Negative for chills and fever.   HENT: Positive for hearing loss. Negative for sore throat.    Eyes: Negative for blurred vision and double vision.   Respiratory: Negative for cough and shortness of breath.    Cardiovascular: Negative for chest pain and palpitations.   Gastrointestinal: Negative for abdominal pain, nausea and vomiting.        Stool Amount: Medium (06/25/23 1800)  Stool Occurrence: 1 (06/25/23 1800)   No GI upset or abdominal pain     Genitourinary: Negative for dysuria.        Urinary retention is resolved   Musculoskeletal: Negative for myalgias.        Stable--reports good pain control with Tylenol and gabapentin   Appeared comfortable   Skin: Negative for rash.   Neurological: Negative for sensory change and weakness.   Psychiatric/Behavioral: Negative for depression and suicidal ideas.   All other systems reviewed and are negative.        OBJECTIVE:    VITAL SIGNS:     Vital Last Value 24 Hour Range   Temperature 97.9 °F (36.6 °C) (06/30/23 0131) Temp  Min: 97.7 °F (36.5 °C)  Max: 98.7 °F (37.1 °C)   Pulse 78 (06/30/23 0839) Pulse  Min: 72  Max: 80   Respiratory 16 (06/30/23 0553) Resp  Min: 16  Max: 16   Non-Invasive  Blood Pressure 107/64 (06/30/23 0839) BP  Min: 107/64  Max: 149/72   Pulse Oximetry 96 % (06/30/23 0839) SpO2  Min: 95 %  Max: 99 %     Vital Today Admitted   Weight 66.7 kg (147 lb 1.6 oz) (06/30/23 0715) Weight: 68.9 kg (152 lb) (06/19/23 1832)       INTAKE/OUTPUT:      Intake/Output Summary (Last  24 hours) at 6/30/2023 1036  Last data filed at 6/30/2023 1030  Gross per 24 hour   Intake 80 ml   Output 1150 ml   Net -1070 ml       Bowel: Stool Amount: Medium (06/29/23 1900)  Stool Occurrence: 1 (06/29/23 1900)     PVR: Straight Cath  Catheter Size (fr): 16 fr (06/21/23 1300)  Reason for Insertion: Specimen collection (06/21/23 1300)  Inserted By: Clinician (06/21/23 1300)  Output (mL): 180 mL (06/21/23 1300)  Bladder Scan  Reason for Scan: Post void evaluation (06/28/23 1900)  Bladder Scanned Volume (mL): 0 mL (06/28/23 1900)    PHYSICAL EXAMINATION:  Physical Exam  HENT:      Head: Normocephalic.      Nose: Nose normal. No congestion.      Mouth/Throat:      Mouth: Mucous membranes are moist.   Eyes:      Extraocular Movements: Extraocular movements intact.      Pupils: Pupils are equal, round, and reactive to light.   Cardiovascular:      Rate and Rhythm: Normal rate and regular rhythm.      Pulses: Normal pulses.      Heart sounds: No murmur heard.  Pulmonary:      Effort: Pulmonary effort is normal.      Breath sounds: Normal breath sounds. No wheezing.   Abdominal:      Palpations: Abdomen is soft.      Tenderness: There is no abdominal tenderness.   Musculoskeletal:      Comments: S/p left bka, ace wrapped dressing   Good ROM L hip and L knee, able to extend knee in neutral position   Skin:     General: Skin is warm and dry.      Coloration: Skin is pale.   Neurological:      Mental Status: She is alert and oriented to person, place, and time.      Cranial Nerves: Cranial nerves 2-12 are intact. No dysarthria or facial asymmetry. Cranial nerve deficit:         Sensory: Sensation is intact.      Comments: She is able to follow commands  Speech fluent  No tremors noted  BUE  5/5  RLE   5/5  DF 5/5  PF 5/5  L BKA with sleeve   Psychiatric:         Behavior: Behavior normal.      Comments: Coping very well             LABORATORY DATA:    Recent Labs   Lab 06/29/23  0720 06/26/23  0547 06/24/23  1009   WBC  7.0 9.6 9.3   RBC 3.11* 2.97* 2.91*   HGB 8.9* 8.7* 8.1*   HCT 27.5* 26.2* 26.1*   MCV 88.4 88.2 89.7   MCH 28.6 29.3 27.8   MCHC 32.4 33.2 31.0*   RDWCV 16.0* 15.4* 15.2*    421 444       Recent Labs   Lab 06/30/23  0624 06/29/23  0720 06/26/23  0547   SODIUM 130* 130* 133*   CHLORIDE 102 103 105   BUN 30* 26* 20   POTASSIUM 4.3 4.3 4.0   GLUCOSE 217* 199* 185*   CREATININE 1.18* 1.20* 1.04*   CALCIUM 9.3 9.6 9.4       Recent Labs   Lab 06/30/23  0725 06/30/23  0142 06/29/23 2024 06/29/23  1643 06/29/23  1151 06/29/23  0734 06/28/23 2023 06/28/23  1638   GLUB 209* 215* 217* 67* 254* 191* 182* 122*       No results found    IMAGING STUDIES:   XR CHEST AP OR PA   Final Result       Persistent, subtle left lung base infiltrate and left pleural effusion   versus pleural fibrosis.         Electronically Signed by: GURJIT GARZA M.D.    Signed on: 6/21/2023 10:57 AM    Workstation ID: [id number]            ASSESSMENT/PLAN:     Acute blood loss anemia (ABLA)  Assessment & Plan  Hopitalist following  In setting of recent surgery  Gradual downtrend of hgb noted, 7.4 today.  No active signs of bleeding.  Will monitor closely as patient is also on eliquis  6/19 Repeat hgb ordered for this afternoon  hgb 7.5  will transfuse if hgb < 7  6/30:Hemoglobin yesterday at 8.3 and remains stable since 6/21.  We will continue to monitor intermittently.    Postoperative pain  Assessment & Plan  On tylenol and gabapentin and Norco  Patient does have phantom limb pain left lower extremity.  We will continue to monitor may need to adjust dosage of gabapentin  6/30: Pain continues to be well controlled on with tylenol. She continues to report phantom pain. She continues to massage and touch residual limb to continue to continue to desensitize region. Pain does not interfere with PT or OT     Critical limb ischemia of left lower extremity (CMD)/s/p bka  Assessment & Plan  Vascular following  Patient with advanced peripheral  vascular disease, not improved with medical management.    S/p L below knee amputation on 6/13 with vascular surgery (Dr. Srinivasan)  PTWC following, dressing changed 6/19/23  Patient was highly functional prior to the amputation  PT and OT    6/21 Dressing changes to Left bka c/o PTWC  6/23: Dressing in place.  No strikethrough.  Residual limb was visualized by rehab APN and noted to be healing well.  No signs of infection or drainage.  Vascular surgery service following.  6/24 Dressing is intact no strikethrough's noted  6/26: Discussed with vascular surgery, they recommend to keep knee immobilizer in place to prevent contractures.  Discussed with patient and therapy that can keep immobilizer on while in bed but can be off during therapies.  Continue PT wound care for dressing changes.  6/27: reports no discomfort with immobilizer and understood the importance of wearing it when she is not participating in therapies.     6/28: dressing changed this morning, healing well, minimal to no drainage, small region of superficial skin necrosis on the lateral edge of suture line, decreased swelling and erythema compared to prior dressing changes. Was instructed to touch residual limb frequently to desensitize region now that it is more exposed with the more minimal dressing.   6:29: dressing intact with sleeve in place. Pt working on self applying sleeve in therapy. Continues to touch and message residual limb per instructions.   6/30: dressing changed today by wound care, continues to heal appropriately, with decreased swelling in the residual limb. Pt continues to touch and message limb as instructed. Tolerating PT an OT. Wearing the immobilizer when she is not in therapy to prevent contractures     Acute urinary retention  Assessment & Plan  Primary following  Carlisle in place plan to remove carlisle this week.  Per hospitalist, likely  multifactorial from sx, pain meds, immobility and fluid restrictions.    6/21 lead was  removed on 6/21 .  Per staff patient noted to have foul-smelling and cloudy looking urine normal patient endorses febrile overnight with 39.4 temp and was having chills last night.  Temp this am 98.2 .Urinalysis showed occult blood,leukocyte esterase, and large bacteria.  Urine culture was ordered per hospitalist.  6/22: No longer has Reed.  Urine culture came back positive for Citrobacter.  On Zosyn.  Hospitalist service is following.  CPM and monitor.  6/30: completed 7 day course of cefepime, was asymptomatic throughout treatment. Denies any urinary symptoms. Will continue to mnitor  Straight Cath  Catheter Size (fr): 16 fr (06/21/23 1300)  Reason for Insertion: Specimen collection (06/21/23 1300)  Inserted By: Clinician (06/21/23 1300)  Output (mL): 180 mL (06/21/23 1300)  Bladder Scan  Reason for Scan: Post void evaluation (06/28/23 1900)  Bladder Scanned Volume (mL): 0 mL (06/28/23 1900)      Impaired mobility and ADLs  Assessment & Plan  PT and OT as tolerated--noted to be participatory in therapy session 6/29.  Discussed with patient, nursing and therapy staff.  Monitor for any change in neurologic or functional status    Stage 3a chronic kidney disease (CMD)  Assessment & Plan  Nephrology following  CKD POA  Renally dose medications, especially gabapentin per nephrology  postoperative VENUS prevention   6/30: Nephrology continues to follow.  Will monitor labs intermittently.  Kidney function is stable.        Paroxysmal atrial fibrillation (CMD)  Assessment & Plan  Hospitalist following  6/22: Heart rate in the 80s.  No new CV complaints.  Continue Eliquis.  Lopressor on hold.  Monitor  6/23: Heart rates reviewed as below.  72-90 on recent readings.  No new CV complaints.  Continue Eliquis.  Lopressor restarted.    6/30: reviewed. Will continue to monitor   Patient Vitals for the past 24 hrs:   BP Temp Temp src Pulse Resp SpO2 Weight   06/30/23 0839 107/64 -- -- 78 -- 96 % --   06/30/23 0715 -- -- -- -- --  -- 66.7 kg (147 lb 1.6 oz)   06/30/23 0553 137/71 -- -- 79 16 98 % --   06/30/23 0131 132/77 97.9 °F (36.6 °C) Oral 72 16 95 % --   06/29/23 1600 123/78 98.7 °F (37.1 °C) Oral 80 16 98 % --   06/29/23 1551 135/81 98.1 °F (36.7 °C) Oral 76 16 99 % --   06/29/23 1143 (!) 149/72 97.7 °F (36.5 °C) Oral 76 16 96 % --       Hyponatremia  Assessment & Plan  Nephrology on consult  Hospitalist following  Per hospitalist hyponatremia possibly from post op SIADH    6/30: Na rechecked today, was 130. 130 yesterday. Patient had very significant hyponatremia in the 118 level on 6/15 and is being monitored.  She has slowly improved and currently is at 130 today.  Nephrology is seeing her.  We are monitoring as she is on a fluid restriction of 1.8L. Monitoring periodically.    Type 2 diabetes mellitus with diabetic neuropathy (CMD)  Assessment & Plan  Last JuzC1k=3.9   Currently on Lantus 44 units daily, lispro 15 units TID with meals, med dose ISS    6/21   Continue to monitor  On Lantus and SSI  6/23: Recent blood sugars reviewed.  Labile.  Can fluctuate.  Last 162.  We will recheck.  Continue DM regimen and monitor.  Hospitalist is following  6/24  On Lantus and SSI continue to monitor. hospitalist following  6/30: Blood sugars overall stable.  Continue with Lantus and SSI.  Hospitalist is following.    Recent Labs   Lab 06/30/23  0725 06/30/23  0142 06/29/23 2024 06/29/23  1643 06/29/23  1151 06/29/23  0734 06/28/23 2023 06/28/23  1638   GLUB 209* 215* 217* 67* 254* 191* 182* 122*     Essential hypertension  Assessment & Plan  Primary following  On amlodipine 10 mg daily  Lopressor 50 mg BID  PRN hydralazine    6/21  Current /68 , continue current bp med amlodipine and lopressor on hold , work up for possible UTI and Lactated ringer 500 ml bolus ordered per hospitalist.  6/22: Recent blood pressures reviewed as below.  Last 140/69.  We will recheck.  Continue BP Rx and monitor.  Hospitalist is following.  Recent  hospitalist note reviewed  6/23: Recent blood pressures reviewed.  Elevated on a.m. reading.  Better on recheck since then.  Continue BP Rx and monitor.  Hospitalist is following.  Recent hospitalist note reviewed.  6/24 Continue Zestril, on metoprolol hospitalist following.  6/26: BP has been on the higher side as of late.  Amlodipine has been on hold.  Hospitalist and nephrology are following.  6/27: started on carvedilol 6.25mg bid this morning. bp fell to 94/59 after first dose. Decreased future carvedilol to 3.125mg bid per nephrology. Additionally, increased lisinopril to 40mg at bedtime. Amlodipine still being held. Nephrology and hospitalist following.   6/28: BP increased to 184/82 in at 2128 last night on new regimen. Will continue to monitor daytime pressures. Nephrology and hospitalist following. Pt remains asymptomatic  6/29: pressures so far today well managed. nephrology adjusted medications yesterday to carvedilol increased from 3.125mg AM to 12.5mg qPM, lisinopril 40mg at bedtime changed to captopril 6.25mg in AM and 25 at bedtime, and tamsulosin 0.4mg to PM. Additionally, compression stocking and abdominal binder added during therapy. Patient has been asymptomatic throughout fluctuations. Amlodipine still being held. Will continue to monitor.  6/30: pressures appear well controlled on current regimen, however pt did report an episode of lightheadedness which resolved while sitting. Will continue to monitor        Patient Vitals for the past 24 hrs:   BP Temp Temp src Pulse Resp SpO2 Weight   06/30/23 0839 107/64 -- -- 78 -- 96 % --   06/30/23 0715 -- -- -- -- -- -- 66.7 kg (147 lb 1.6 oz)   06/30/23 0553 137/71 -- -- 79 16 98 % --   06/30/23 0131 132/77 97.9 °F (36.6 °C) Oral 72 16 95 % --   06/29/23 1600 123/78 98.7 °F (37.1 °C) Oral 80 16 98 % --   06/29/23 1551 135/81 98.1 °F (36.7 °C) Oral 76 16 99 % --   06/29/23 1143 (!) 149/72 97.7 °F (36.5 °C) Oral 76 16 96 % --     On deep vein thrombosis  (DVT) prophylaxis  Assessment & Plan  On eliquis    S/P CABG (coronary artery bypass graft)  Assessment & Plan  Pt has been following [facility]  Pt asymptomatic per hospitalist    Chronic GERD  Assessment & Plan  6/23: Stable.  Continue Pepcid    HLD (hyperlipidemia)  Assessment & Plan  6/23: Continue on statin    Fever and chills/ Elevated Lactic acid - resolved  6/23:  UTI--Citrobacter.  Sensitive to cefepime. Afebrile.  Switch from Zosyn to cefepime based on sensitivities--.  CPM and monitor.  6/30: 1 week of Cefepime completed. Pt remains asymptomatic.    DVT prophylaxis patient is on Eliquis--CPM    Principal Problem:    S/P BKA (below knee amputation) unilateral, left (CMD)  Active Problems:    Critical limb ischemia of left lower extremity (CMD)/s/p bka    Postoperative pain    Acute blood loss anemia (ABLA)    Essential hypertension    Type 2 diabetes mellitus with diabetic neuropathy (CMD)    Hyponatremia    Paroxysmal atrial fibrillation (CMD)    Stage 3a chronic kidney disease (CMD)    Impaired mobility and ADLs    Acute urinary retention    Chronic GERD    S/P CABG (coronary artery bypass graft)    On deep vein thrombosis (DVT) prophylaxis    Acute UTI    Fever and chills/ elevated Lactic acid    Infection due to Citrobacter    HLD (hyperlipidemia)    A student assisted with documenting this service.  I saw the patient and reviewed and verified all information documented by the student and made modifications to such information, when appropriate.   Note written in conjunction with Jose Hills MS4    Patient's current functional progress and patient's current medical status and adjustment of treatment plan based on the assessment: therapy performance reviewed on 6/30/2023 as below  Supine - Sit     Supine to sit  stand by assist    Sit to supine  stand by assist        Transfers     Sit to stand  contact guard/touching/steadying assist    Stand to sit  contact guard/touching/steadying  assist    Stand pivot  contact guard/touching/steadying assist    Squat pivot  minimal assist        Gait and Wheelchair     Ambulation device  gait belt; two-wheeled walker    Distance 1at trial  30    Distance 2nd trial  30    Distance 3rd trial  25    Assist level  contact guard/touching/steadying assist    Wheelchair type  manual    Level surfaces distance  150    Assist level  stand by assist        Stairs     Number of stairs, trial 1 (ascend and desend together)  4    Device  left crutch    Rails  right rail only    Assist level  moderate assist; minimal assist        Self Cares     Oral hygiene  modified independent    Grooming  modified independent    Bathing  maximal assist    Upper body dressing  supervision  bra and t-shirt     Lower body dressing  contact guard/touching/steadying assist    Footwear  contact guard/touching/steadying assist    Toileting  moderate assist    Toilet transfer  minimal assist    Tub transfer  minimal assist        Feeding     None        Com/Cog     None        Team conference   PT-Bed mobilty SBA ND Transfers  MIN a TO cga AND HOPPING cga TO MIN a 15-30  4 steps R railing and L crutch mod A and curb is 2 ppl min A (4 inch curb) and needs to progress to 6 inch ecause she has a stoop to get in, got  and nneds max A to pauline an ddoff  OT-eating and OH and groming S/SBA and UE D is Stand by, LE D SBA but now max A because of , toileting mod A and TT min A, tub transfer mod A  RN-continent of B and B, FR and on accuchecks was low overnight and BP meds adjusted   manager-needs w/c, crutch and RW and bathroom equipment  HHC , PT/OT, RN  Around   PT-Bed mobilty SBA ND Transfers  MIN a TO cga AND HOPPING cga TO MIN a 15-30  4 steps R railing and L crutch mod A and curb is 2 ppl min A (4 inch curb) and needs to progress to 6 inch ecause she has a stoop to get in, got  and nneds max A to pauline an ddoff  OT-eating and OH and groming S/SBA and UE D is  Stand by, LE D SBA but now max A because of , toileting mod A and TT min A, tub transfer mod A  RN-continent of B and B, FR and on accuchecks was low overnight and BP meds adjusted   manager-needs w/c, crutch and RW and bathroom equipment  HHC , PT/OT, RN  Around     Yue Harrison M.D.  2023       [facility]

## 2023-07-23 NOTE — ED PROVIDER NOTE - NS_EDPROVIDERDISPOUSERTYPE_ED_A_ED
[Well groomed] : well groomed [Cooperative] : cooperative [Euthymic] : euthymic [Full] : full [Clear] : clear [Circumstantial] : circumstantial [None] : none [None Reported] : none reported [Average] : average [WNL] : within normal limits [de-identified] : Pt advised not to take - THC, discussed what she can take for sleep, that will avoid vertigo.  Attending Attestation (For Attendings USE Only)...

## 2023-08-14 NOTE — DISCHARGE NOTE ADULT - MEDICATION SUMMARY - MEDICATIONS TO STOP TAKING
ambulatory
I will STOP taking the medications listed below when I get home from the hospital:    lisinopril 5 mg oral tablet  -- 1 tab(s) by mouth once a day

## 2023-09-28 NOTE — DISCHARGE NOTE PROVIDER - CARE PROVIDERS DIRECT ADDRESSES
Clean the area with soap and water. Apply antibiotic ointment twice a day. Keep covered as needed. Start the antibiotic if redness increases.   Schedule a recheck with your primary doctor ,DirectAddress_Unknown

## 2023-11-03 NOTE — DISCHARGE NOTE ADULT - CARE PROVIDERS DIRECT ADDRESSES
Could be one of his arrhythmia medications from Dr. Hinds. I would check with them first.   ,DirectAddress_Unknown ,DirectAddress_Unknown,DirectAddress_Unknown

## 2023-11-17 NOTE — SWALLOW VFSS/MBS ASSESSMENT ADULT - RECOMMENDED CONSISTENCY
Dysphagia 1 with honey-thick liquids VIA TEASPOON  MD/team Please enter the following as notes to RN: 1) Full assist with meals, 2) Crush meds or provide via alternate source, 3) ALL PO via teaspoon, 4) Provide small single bites and sips at slow rate, 5) Encourage successive swallows for oral and pharyngeal clearance, 6) Alternate food and liquids to aid in oral and pharyngeal clearance, 7) Aspiration precautions. Monitor for s/s aspiration/laryngeal penetration. If noted:  D/C p.o. intake, provide non-oral nutrition/hydration/meds none

## 2024-02-15 NOTE — PRE-ANESTHESIA EVALUATION ADULT - MALLAMPATI CLASS
SEE HPI     All other ROS negative unless otherwise specified in HPI. Class II - visualization of the soft palate, fauces, and uvula

## 2024-08-13 NOTE — DIETITIAN INITIAL EVALUATION ADULT. - PERTINENT LABORATORY DATA
Reviewed. 03-01 Na142 mmol/L Glu 187 mg/dL<H> K+ 4.0 mmol/L Cr  0.73 mg/dL BUN 21 mg/dL<H> 03-01 Phos 2.6 mg/dL 02-26 Alb 2.4 g/dL<L> 02-27 [id number] 8.6 %<H>
Yes

## 2024-10-30 NOTE — DISCHARGE NOTE ADULT - FUNCTIONAL SCREEN CURRENT LEVEL: BATHING, MLM
Caller: Heladio Clark    Relationship to patient: Self    Best call back number: [phone]    Patient is needing: PATIENT STATED THAT HE NEEDS A CARDIAC CLEARANCE FROM DR. FISHER FOR A COLONOSCOPY TOMORROW 10.31.24 AT 8:30 AM. PATIENT STATED THAT [facility] IS DOING THE COLONOSCOPY AND THERE PHONE # [phone].PATIENT STATED THAT THEY ARE ASKING TO WITHHOLD BABY ASPIRIN. PLEASE CONTACT PATIENT TO ADVISE. THANK YOU.            (2) assistive person (4) completely dependent

## 2024-11-11 NOTE — ED PROVIDER NOTE - SECONDARY DIAGNOSIS.
Hospital Day:  4 days     Subjective:    Patient is a 89y old  Female who presents with a chief complaint of Hypokalemia   (10 Nov 2024 10:16)    This morning patient is lying in bed comfortably. he reports no new complaints, including SOB, chest pain, abdominal pain, nausea, vomiting, dysuria, constipation, or diarrhea.    Past Medical Hx:   High cholesterol    HTN (hypertension)    Pacemaker    AICD (automatic cardioverter/defibrillator) present      Past Sx:    S/P hip replacement    S/P knee replacement    S/P cataract surgery      Allergies:  codeine (Unknown)  penicillin (Unknown)    Current Meds:     MEDICATIONS  (STANDING):  amLODIPine   Tablet 5 milliGRAM(s) Oral daily  carvedilol 25 milliGRAM(s) Oral every 12 hours  chlorhexidine 2% Cloths 1 Application(s) Topical <User Schedule>  enoxaparin Injectable 40 milliGRAM(s) SubCutaneous every 24 hours  furosemide    Tablet 20 milliGRAM(s) Oral daily  lactated ringers 1000 milliLiter(s) (60 mL/Hr) IV Continuous <Continuous>  lactated ringers. 1000 milliLiter(s) (75 mL/Hr) IV Continuous <Continuous>  losartan 25 milliGRAM(s) Oral daily  memantine 5 milliGRAM(s) Oral daily  polyethylene glycol 3350 17 Gram(s) Oral every 12 hours  senna 2 Tablet(s) Oral daily    MEDICATIONS  (PRN):  ondansetron Injectable 4 milliGRAM(s) IV Push once PRN Nausea and/or Vomiting      HOME MEDICATIONS:  amLODIPine 2.5 mg oral tablet: 1 orally  aspirin 81 mg oral tablet, chewable: 1 tab(s) orally once a day  carvedilol 25 mg oral tablet: 1 tab(s) orally 2 times a day  furosemide 20 mg oral tablet: 1 orally  Linzess 145 mcg oral capsule: 1 orally  losartan 25 mg oral tablet: 1 orally      Vital Signs:     T(C): 37.1 (11 Nov 2024 04:00), Max: 37.1 (11 Nov 2024 04:00)  T(F): 98.8 (11 Nov 2024 04:00), Max: 98.8 (11 Nov 2024 04:00)  HR: 76 (11 Nov 2024 12:02) (59 - 76)  BP: 165/96 (11 Nov 2024 12:02) (165/96 - 178/78)  RR: 18 (11 Nov 2024 08:10) (18 - 18)  SpO2: 98% (11 Nov 2024 08:10) (96% - 98%)    Parameters below as of 11 Nov 2024 08:10  Patient On (Oxygen Delivery Method): room air      Physical Exam:   GENERAL: NAD  HEENT: NCAT  CHEST/LUNG: CTAB  HEART: Regular rate and rhythm; s1 s2 appreciated, No murmurs, rubs, or gallops  ABDOMEN: Soft, Nontender; Bowel sounds present  EXTREMITIES: No LE edema b/l  SKIN: no rashes, no new lesions  NERVOUS SYSTEM:  Alert & Oriented       Labs:                         10.6   10.13 )-----------( 249      ( 10 Nov 2024 19:00 )             32.1     11-11    137  |  102  |  7[L]  ----------------------------<  99  3.8   |  26  |  0.6[L]    Ca    10.1      11 Nov 2024 06:59    TPro  5.5[L]  /  Alb  3.0[L]  /  TBili  0.6  /  DBili  x   /  AST  12  /  ALT  <5  /  AlkPhos  86  11-10                        10.4   8.95  )-----------( 294      ( 09 Nov 2024 08:14 )             32.5     09 Nov 2024 08:14    143    |  107    |  9      ----------------------------<  96     3.4     |  27     |  0.7      Ca    10.2       09 Nov 2024 08:14    Mg     2.0       09 Nov 2024 08:14    TPro  5.6    /  Alb  3.0    /  TBili  0.6    /  DBili  x      /  AST  14     /  ALT  <5     /  AlkPhos  77     09 Nov 2024 08:14    Urinalysis Basic - ( 09 Nov 2024 08:14 )    Color: x / Appearance: x / SG: x / pH: x  Gluc: 96 mg/dL / Ketone: x  / Bili: x / Urobili: x   Blood: x / Protein: x / Nitrite: x   Leuk Esterase: x / RBC: x / WBC x   Sq Epi: x / Non Sq Epi: x / Bacteria: x     UTI (urinary tract infection)

## 2024-11-13 NOTE — PATIENT PROFILE ADULT - NSPROGENOTHERPROVIDER_GEN_A_NUR
Dr. Lewis: you started pt on Anoro Ellipta about 3-4 mos ago after PFT results (ordered by cards & showed mild restriction). Pt saw me for urinary symptoms of incomplete emptying and difficulty starting urine stream. UA normal. PSA pending. Daughter who is NP is concerned perhaps the LAMA is causing urinary retention: seems unlikely in my opinion, but they also don't really feel like it's helping. They are interested in trying advair... not convinced it would help but don't think it would hurt either. Thinking if that doesn't help consider a pulm referral. Sound ok?
none

## 2025-02-26 NOTE — PROGRESS NOTE ADULT - PROBLEM SELECTOR PLAN 7
DVT prophyalxis
1 pair